# Patient Record
Sex: FEMALE | Race: BLACK OR AFRICAN AMERICAN | NOT HISPANIC OR LATINO | URBAN - METROPOLITAN AREA
[De-identification: names, ages, dates, MRNs, and addresses within clinical notes are randomized per-mention and may not be internally consistent; named-entity substitution may affect disease eponyms.]

---

## 2017-01-17 ENCOUNTER — OUTPATIENT (OUTPATIENT)
Dept: OUTPATIENT SERVICES | Facility: HOSPITAL | Age: 77
LOS: 1 days | Discharge: HOME | End: 2017-01-17

## 2017-01-17 DIAGNOSIS — C90.00 MULTIPLE MYELOMA NOT HAVING ACHIEVED REMISSION: ICD-10-CM

## 2017-01-18 ENCOUNTER — RESULT REVIEW (OUTPATIENT)
Age: 77
End: 2017-01-18

## 2017-01-19 ENCOUNTER — APPOINTMENT (OUTPATIENT)
Dept: HEMATOLOGY ONCOLOGY | Facility: CLINIC | Age: 77
End: 2017-01-19

## 2017-01-19 VITALS
DIASTOLIC BLOOD PRESSURE: 70 MMHG | WEIGHT: 143.3 LBS | TEMPERATURE: 98.5 F | OXYGEN SATURATION: 98 % | RESPIRATION RATE: 16 BRPM | SYSTOLIC BLOOD PRESSURE: 113 MMHG | HEART RATE: 61 BPM | BODY MASS INDEX: 26.71 KG/M2

## 2017-01-19 LAB
BASOPHILS # BLD AUTO: 0.1 K/UL — SIGNIFICANT CHANGE UP (ref 0–0.2)
BASOPHILS NFR BLD AUTO: 1.3 % — SIGNIFICANT CHANGE UP (ref 0–2)
EOSINOPHIL # BLD AUTO: 0 K/UL — SIGNIFICANT CHANGE UP (ref 0–0.5)
EOSINOPHIL NFR BLD AUTO: 0.3 % — SIGNIFICANT CHANGE UP (ref 0–6)
HCT VFR BLD CALC: 33.4 % — LOW (ref 34.5–45)
HGB BLD-MCNC: 11.3 G/DL — LOW (ref 11.5–15.5)
LYMPHOCYTES # BLD AUTO: 1.2 K/UL — SIGNIFICANT CHANGE UP (ref 1–3.3)
LYMPHOCYTES # BLD AUTO: 21.5 % — SIGNIFICANT CHANGE UP (ref 13–44)
MCHC RBC-ENTMCNC: 28.4 PG — SIGNIFICANT CHANGE UP (ref 27–34)
MCHC RBC-ENTMCNC: 33.8 GM/DL — SIGNIFICANT CHANGE UP (ref 32–36)
MCV RBC AUTO: 84.1 FL — SIGNIFICANT CHANGE UP (ref 80–100)
MONOCYTES # BLD AUTO: 0.3 K/UL — SIGNIFICANT CHANGE UP (ref 0–0.9)
MONOCYTES NFR BLD AUTO: 4.8 % — SIGNIFICANT CHANGE UP (ref 2–14)
NEUTROPHILS # BLD AUTO: 4 K/UL — SIGNIFICANT CHANGE UP (ref 1.8–7.4)
NEUTROPHILS NFR BLD AUTO: 72.1 % — SIGNIFICANT CHANGE UP (ref 43–77)
PLATELET # BLD AUTO: 230 K/UL — SIGNIFICANT CHANGE UP (ref 150–400)
RBC # BLD: 3.98 M/UL — SIGNIFICANT CHANGE UP (ref 3.8–5.2)
RBC # FLD: 15.4 % — HIGH (ref 10.3–14.5)
WBC # BLD: 5.6 K/UL — SIGNIFICANT CHANGE UP (ref 3.8–10.5)
WBC # FLD AUTO: 5.6 K/UL — SIGNIFICANT CHANGE UP (ref 3.8–10.5)

## 2017-01-20 ENCOUNTER — CHART COPY (OUTPATIENT)
Age: 77
End: 2017-01-20

## 2017-01-29 ENCOUNTER — FORM ENCOUNTER (OUTPATIENT)
Age: 77
End: 2017-01-29

## 2017-01-30 ENCOUNTER — OUTPATIENT (OUTPATIENT)
Dept: OUTPATIENT SERVICES | Facility: HOSPITAL | Age: 77
LOS: 1 days | End: 2017-01-30
Payer: COMMERCIAL

## 2017-01-30 ENCOUNTER — APPOINTMENT (OUTPATIENT)
Dept: RADIOLOGY | Facility: IMAGING CENTER | Age: 77
End: 2017-01-30

## 2017-01-30 DIAGNOSIS — C90.00 MULTIPLE MYELOMA NOT HAVING ACHIEVED REMISSION: ICD-10-CM

## 2017-01-30 PROCEDURE — 77074 RADEX OSSEOUS SURVEY LMTD: CPT

## 2017-04-11 ENCOUNTER — OUTPATIENT (OUTPATIENT)
Dept: OUTPATIENT SERVICES | Facility: HOSPITAL | Age: 77
LOS: 1 days | Discharge: HOME | End: 2017-04-11

## 2017-04-11 DIAGNOSIS — C90.00 MULTIPLE MYELOMA NOT HAVING ACHIEVED REMISSION: ICD-10-CM

## 2017-04-12 ENCOUNTER — RESULT REVIEW (OUTPATIENT)
Age: 77
End: 2017-04-12

## 2017-04-13 ENCOUNTER — APPOINTMENT (OUTPATIENT)
Dept: HEMATOLOGY ONCOLOGY | Facility: CLINIC | Age: 77
End: 2017-04-13

## 2017-04-13 ENCOUNTER — LABORATORY RESULT (OUTPATIENT)
Age: 77
End: 2017-04-13

## 2017-04-13 VITALS
DIASTOLIC BLOOD PRESSURE: 74 MMHG | OXYGEN SATURATION: 98 % | SYSTOLIC BLOOD PRESSURE: 147 MMHG | HEART RATE: 63 BPM | TEMPERATURE: 97.9 F | WEIGHT: 145.5 LBS | RESPIRATION RATE: 16 BRPM | BODY MASS INDEX: 27.12 KG/M2

## 2017-04-13 LAB
ALBUMIN SERPL ELPH-MCNC: 3.3 G/DL
ALP BLD-CCNC: 69 U/L
ALT SERPL-CCNC: 8 U/L
ANION GAP SERPL CALC-SCNC: 10 MMOL/L
AST SERPL-CCNC: 13 U/L
B2 MICROGLOB SERPL-MCNC: 3.3 MG/L
BASOPHILS # BLD AUTO: 0 K/UL — SIGNIFICANT CHANGE UP (ref 0–0.2)
BASOPHILS NFR BLD AUTO: 0.2 % — SIGNIFICANT CHANGE UP (ref 0–2)
BILIRUB SERPL-MCNC: 0.3 MG/DL
BUN SERPL-MCNC: 21 MG/DL
CALCIUM SERPL-MCNC: 9.2 MG/DL
CHLORIDE SERPL-SCNC: 105 MMOL/L
CO2 SERPL-SCNC: 24 MMOL/L
CREAT SERPL-MCNC: 1.52 MG/DL
EOSINOPHIL # BLD AUTO: 0 K/UL — SIGNIFICANT CHANGE UP (ref 0–0.5)
EOSINOPHIL NFR BLD AUTO: 0.4 % — SIGNIFICANT CHANGE UP (ref 0–6)
GLUCOSE SERPL-MCNC: 95 MG/DL
HCT VFR BLD CALC: 33.6 % — LOW (ref 34.5–45)
HGB BLD-MCNC: 11.3 G/DL — LOW (ref 11.5–15.5)
LDH SERPL-CCNC: 166 U/L
LYMPHOCYTES # BLD AUTO: 1.6 K/UL — SIGNIFICANT CHANGE UP (ref 1–3.3)
LYMPHOCYTES # BLD AUTO: 32.4 % — SIGNIFICANT CHANGE UP (ref 13–44)
MAGNESIUM SERPL-MCNC: 2.2 MG/DL
MCHC RBC-ENTMCNC: 29.6 PG — SIGNIFICANT CHANGE UP (ref 27–34)
MCHC RBC-ENTMCNC: 33.5 G/DL — SIGNIFICANT CHANGE UP (ref 32–36)
MCV RBC AUTO: 88.3 FL — SIGNIFICANT CHANGE UP (ref 80–100)
MONOCYTES # BLD AUTO: 0.3 K/UL — SIGNIFICANT CHANGE UP (ref 0–0.9)
MONOCYTES NFR BLD AUTO: 6.6 % — SIGNIFICANT CHANGE UP (ref 2–14)
NEUTROPHILS # BLD AUTO: 3 K/UL — SIGNIFICANT CHANGE UP (ref 1.8–7.4)
NEUTROPHILS NFR BLD AUTO: 60.4 % — SIGNIFICANT CHANGE UP (ref 43–77)
PLATELET # BLD AUTO: 204 K/UL — SIGNIFICANT CHANGE UP (ref 150–400)
POTASSIUM SERPL-SCNC: 4.2 MMOL/L
PROT SERPL-MCNC: 7.8 G/DL
RBC # BLD: 3.81 M/UL — SIGNIFICANT CHANGE UP (ref 3.8–5.2)
RBC # FLD: 14.3 % — SIGNIFICANT CHANGE UP (ref 10.3–14.5)
SODIUM SERPL-SCNC: 139 MMOL/L
WBC # BLD: 4.9 K/UL — SIGNIFICANT CHANGE UP (ref 3.8–10.5)
WBC # FLD AUTO: 4.9 K/UL — SIGNIFICANT CHANGE UP (ref 3.8–10.5)

## 2017-04-14 LAB
ALBUMIN MFR SERPL ELPH: 41.9 %
ALBUMIN SERPL-MCNC: 3.3 G/DL
ALBUMIN/GLOB SERPL: 0.7 RATIO
ALBUPE: 10.4 %
ALPHA1 GLOB MFR SERPL ELPH: 4.5 %
ALPHA1 GLOB SERPL ELPH-MCNC: 0.4 G/DL
ALPHA1UPE: 6 %
ALPHA2 GLOB MFR SERPL ELPH: 9.1 %
ALPHA2 GLOB SERPL ELPH-MCNC: 0.7 G/DL
ALPHA2UPE: 8 %
B-GLOBULIN MFR SERPL ELPH: 38.8 %
B-GLOBULIN SERPL ELPH-MCNC: 3 G/DL
BETAUPE: 53.9 %
CREAT 24H UR-MCNC: NORMAL G/24 H
CREATININE UR (MAYO): 40 MG/DL
DEPRECATED KAPPA LC FREE/LAMBDA SER: 45.36 RATIO
DEPRECATED KAPPA LC FREE/LAMBDA SER: 45.36 RATIO
GAMMA GLOB FLD ELPH-MCNC: 0.4 G/DL
GAMMA GLOB MFR SERPL ELPH: 5.7 %
GAMMAUPE: 21.7 %
IGA 24H UR QL IFE: NORMAL
IGA 24H UR QL IFE: NORMAL
IGA SER QL IEP: 26 MG/DL
IGG SER QL IEP: 2990 MG/DL
IGM SER QL IEP: 32 MG/DL
INTERPRETATION SERPL IEP-IMP: NORMAL
KAPPA LC 24H UR QL: PRESENT
KAPPA LC CSF-MCNC: 1.12 MG/DL
KAPPA LC CSF-MCNC: 1.12 MG/DL
KAPPA LC SERPL-MCNC: 50.8 MG/DL
KAPPA LC SERPL-MCNC: 50.8 MG/DL
M PROTEIN MFR SERPL ELPH: 32.2 %
M PROTEIN SPEC IFE-MCNC: NORMAL
MONOCLON BAND OBS SERPL: 2.5 G/DL
PROT PATTERN 24H UR ELPH-IMP: NORMAL
PROT SERPL-MCNC: 7.8 G/DL
PROT SERPL-MCNC: 7.8 G/DL
PROT UR-MCNC: 18 MG/DL
PROT UR-MCNC: 18 MG/DL
SPECIMEN VOL 24H UR: NORMAL ML

## 2017-08-02 ENCOUNTER — CHART COPY (OUTPATIENT)
Age: 77
End: 2017-08-02

## 2017-08-10 ENCOUNTER — OUTPATIENT (OUTPATIENT)
Dept: OUTPATIENT SERVICES | Facility: HOSPITAL | Age: 77
LOS: 1 days | Discharge: HOME | End: 2017-08-10

## 2017-08-10 DIAGNOSIS — C90.00 MULTIPLE MYELOMA NOT HAVING ACHIEVED REMISSION: ICD-10-CM

## 2017-08-15 ENCOUNTER — APPOINTMENT (OUTPATIENT)
Dept: HEMATOLOGY ONCOLOGY | Facility: CLINIC | Age: 77
End: 2017-08-15
Payer: MEDICARE

## 2017-08-15 ENCOUNTER — RESULT REVIEW (OUTPATIENT)
Age: 77
End: 2017-08-15

## 2017-08-15 VITALS
WEIGHT: 144.84 LBS | HEART RATE: 92 BPM | OXYGEN SATURATION: 98 % | SYSTOLIC BLOOD PRESSURE: 145 MMHG | TEMPERATURE: 98.3 F | BODY MASS INDEX: 27 KG/M2 | RESPIRATION RATE: 16 BRPM | DIASTOLIC BLOOD PRESSURE: 72 MMHG

## 2017-08-15 LAB
ALBUMIN SERPL ELPH-MCNC: 3.3 G/DL
ALP BLD-CCNC: 69 U/L
ALT SERPL-CCNC: 12 U/L
ANION GAP SERPL CALC-SCNC: 12 MMOL/L
AST SERPL-CCNC: 17 U/L
B2 MICROGLOB SERPL-MCNC: 4 MG/L
BASOPHILS # BLD AUTO: 0 K/UL — SIGNIFICANT CHANGE UP (ref 0–0.2)
BASOPHILS NFR BLD AUTO: 0.4 % — SIGNIFICANT CHANGE UP (ref 0–2)
BILIRUB SERPL-MCNC: 0.4 MG/DL
BUN SERPL-MCNC: 32 MG/DL
CALCIUM SERPL-MCNC: 8.4 MG/DL
CHLORIDE SERPL-SCNC: 108 MMOL/L
CO2 SERPL-SCNC: 22 MMOL/L
CREAT SERPL-MCNC: 1.76 MG/DL
EOSINOPHIL # BLD AUTO: 0 K/UL — SIGNIFICANT CHANGE UP (ref 0–0.5)
EOSINOPHIL NFR BLD AUTO: 0.9 % — SIGNIFICANT CHANGE UP (ref 0–6)
GLUCOSE SERPL-MCNC: 132 MG/DL
HCT VFR BLD CALC: 31.3 % — LOW (ref 34.5–45)
HGB BLD-MCNC: 11.1 G/DL — LOW (ref 11.5–15.5)
LDH SERPL-CCNC: 180 U/L
LYMPHOCYTES # BLD AUTO: 1.3 K/UL — SIGNIFICANT CHANGE UP (ref 1–3.3)
LYMPHOCYTES # BLD AUTO: 27.5 % — SIGNIFICANT CHANGE UP (ref 13–44)
MAGNESIUM SERPL-MCNC: 2.4 MG/DL
MCHC RBC-ENTMCNC: 30.8 PG — SIGNIFICANT CHANGE UP (ref 27–34)
MCHC RBC-ENTMCNC: 35.3 G/DL — SIGNIFICANT CHANGE UP (ref 32–36)
MCV RBC AUTO: 87.2 FL — SIGNIFICANT CHANGE UP (ref 80–100)
MONOCYTES # BLD AUTO: 0.2 K/UL — SIGNIFICANT CHANGE UP (ref 0–0.9)
MONOCYTES NFR BLD AUTO: 5.1 % — SIGNIFICANT CHANGE UP (ref 2–14)
NEUTROPHILS # BLD AUTO: 3.2 K/UL — SIGNIFICANT CHANGE UP (ref 1.8–7.4)
NEUTROPHILS NFR BLD AUTO: 66.1 % — SIGNIFICANT CHANGE UP (ref 43–77)
PLATELET # BLD AUTO: 204 K/UL — SIGNIFICANT CHANGE UP (ref 150–400)
POTASSIUM SERPL-SCNC: 4.5 MMOL/L
PROT SERPL-MCNC: 8.5 G/DL
RBC # BLD: 3.59 M/UL — LOW (ref 3.8–5.2)
RBC # FLD: 14.4 % — SIGNIFICANT CHANGE UP (ref 10.3–14.5)
SODIUM SERPL-SCNC: 142 MMOL/L
WBC # BLD: 4.8 K/UL — SIGNIFICANT CHANGE UP (ref 3.8–10.5)
WBC # FLD AUTO: 4.8 K/UL — SIGNIFICANT CHANGE UP (ref 3.8–10.5)

## 2017-08-15 PROCEDURE — 99214 OFFICE O/P EST MOD 30 MIN: CPT

## 2017-08-16 LAB
ALBUMIN MFR SERPL ELPH: 41.1 %
ALBUMIN SERPL-MCNC: 3.5 G/DL
ALBUMIN/GLOB SERPL: 0.7 RATIO
ALPHA1 GLOB MFR SERPL ELPH: 4.6 %
ALPHA1 GLOB SERPL ELPH-MCNC: 0.4 G/DL
ALPHA2 GLOB MFR SERPL ELPH: 9.2 %
ALPHA2 GLOB SERPL ELPH-MCNC: 0.8 G/DL
B-GLOBULIN MFR SERPL ELPH: 39.3 %
B-GLOBULIN SERPL ELPH-MCNC: 3.3 G/DL
DEPRECATED KAPPA LC FREE/LAMBDA SER: 55.07 RATIO
DEPRECATED KAPPA LC FREE/LAMBDA SER: 55.07 RATIO
GAMMA GLOB FLD ELPH-MCNC: 0.5 G/DL
GAMMA GLOB MFR SERPL ELPH: 5.8 %
IGA SER QL IEP: 24 MG/DL
IGG SER QL IEP: 3370 MG/DL
IGM SER QL IEP: 29 MG/DL
INTERPRETATION SERPL IEP-IMP: NORMAL
KAPPA LC CSF-MCNC: 1.4 MG/DL
KAPPA LC CSF-MCNC: 1.4 MG/DL
KAPPA LC SERPL-MCNC: 77.1 MG/DL
KAPPA LC SERPL-MCNC: 77.1 MG/DL
M PROTEIN MFR SERPL ELPH: 34.3 %
M PROTEIN SPEC IFE-MCNC: NORMAL
MONOCLON BAND OBS SERPL: 2.9 G/DL
PROT SERPL-MCNC: 8.5 G/DL
PROT SERPL-MCNC: 8.5 G/DL

## 2017-12-06 ENCOUNTER — OUTPATIENT (OUTPATIENT)
Dept: OUTPATIENT SERVICES | Facility: HOSPITAL | Age: 77
LOS: 1 days | Discharge: HOME | End: 2017-12-06

## 2017-12-06 DIAGNOSIS — C90.00 MULTIPLE MYELOMA NOT HAVING ACHIEVED REMISSION: ICD-10-CM

## 2017-12-12 ENCOUNTER — RESULT REVIEW (OUTPATIENT)
Age: 77
End: 2017-12-12

## 2017-12-12 ENCOUNTER — APPOINTMENT (OUTPATIENT)
Dept: HEMATOLOGY ONCOLOGY | Facility: CLINIC | Age: 77
End: 2017-12-12
Payer: MEDICARE

## 2017-12-12 VITALS
SYSTOLIC BLOOD PRESSURE: 154 MMHG | TEMPERATURE: 98.4 F | BODY MASS INDEX: 27.53 KG/M2 | RESPIRATION RATE: 16 BRPM | OXYGEN SATURATION: 98 % | WEIGHT: 147.71 LBS | DIASTOLIC BLOOD PRESSURE: 64 MMHG | HEART RATE: 86 BPM

## 2017-12-12 LAB
ALBUMIN SERPL ELPH-MCNC: 3.3 G/DL
ALP BLD-CCNC: 64 U/L
ALT SERPL-CCNC: 21 U/L
ANION GAP SERPL CALC-SCNC: 14 MMOL/L
AST SERPL-CCNC: 18 U/L
BASOPHILS # BLD AUTO: 0 K/UL — SIGNIFICANT CHANGE UP (ref 0–0.2)
BASOPHILS NFR BLD AUTO: 0.1 % — SIGNIFICANT CHANGE UP (ref 0–2)
BILIRUB SERPL-MCNC: 0.4 MG/DL
BUN SERPL-MCNC: 35 MG/DL
CALCIUM SERPL-MCNC: 8.8 MG/DL
CHLORIDE SERPL-SCNC: 104 MMOL/L
CO2 SERPL-SCNC: 23 MMOL/L
CREAT SERPL-MCNC: 1.7 MG/DL
EOSINOPHIL # BLD AUTO: 0 K/UL — SIGNIFICANT CHANGE UP (ref 0–0.5)
EOSINOPHIL NFR BLD AUTO: 0.1 % — SIGNIFICANT CHANGE UP (ref 0–6)
GLUCOSE SERPL-MCNC: 70 MG/DL
HCT VFR BLD CALC: 32.8 % — LOW (ref 34.5–45)
HGB BLD-MCNC: 11.1 G/DL — LOW (ref 11.5–15.5)
LDH SERPL-CCNC: 212 U/L
LYMPHOCYTES # BLD AUTO: 1.4 K/UL — SIGNIFICANT CHANGE UP (ref 1–3.3)
LYMPHOCYTES # BLD AUTO: 24 % — SIGNIFICANT CHANGE UP (ref 13–44)
MAGNESIUM SERPL-MCNC: 2.7 MG/DL
MCHC RBC-ENTMCNC: 30.1 PG — SIGNIFICANT CHANGE UP (ref 27–34)
MCHC RBC-ENTMCNC: 33.9 G/DL — SIGNIFICANT CHANGE UP (ref 32–36)
MCV RBC AUTO: 88.9 FL — SIGNIFICANT CHANGE UP (ref 80–100)
MONOCYTES # BLD AUTO: 0.3 K/UL — SIGNIFICANT CHANGE UP (ref 0–0.9)
MONOCYTES NFR BLD AUTO: 5.6 % — SIGNIFICANT CHANGE UP (ref 2–14)
NEUTROPHILS # BLD AUTO: 4.1 K/UL — SIGNIFICANT CHANGE UP (ref 1.8–7.4)
NEUTROPHILS NFR BLD AUTO: 70.3 % — SIGNIFICANT CHANGE UP (ref 43–77)
PLATELET # BLD AUTO: 188 K/UL — SIGNIFICANT CHANGE UP (ref 150–400)
POTASSIUM SERPL-SCNC: 4.5 MMOL/L
PROT SERPL-MCNC: 8.2 G/DL
RBC # BLD: 3.68 M/UL — LOW (ref 3.8–5.2)
RBC # FLD: 14.1 % — SIGNIFICANT CHANGE UP (ref 10.3–14.5)
SODIUM SERPL-SCNC: 141 MMOL/L
WBC # BLD: 5.9 K/UL — SIGNIFICANT CHANGE UP (ref 3.8–10.5)
WBC # FLD AUTO: 5.9 K/UL — SIGNIFICANT CHANGE UP (ref 3.8–10.5)

## 2017-12-12 PROCEDURE — 99214 OFFICE O/P EST MOD 30 MIN: CPT

## 2017-12-14 LAB
ALBUMIN MFR SERPL ELPH: 43.3 %
ALBUMIN SERPL-MCNC: 3.6 G/DL
ALBUMIN/GLOB SERPL: 0.8 RATIO
ALPHA1 GLOB MFR SERPL ELPH: 3.9 %
ALPHA1 GLOB SERPL ELPH-MCNC: 0.3 G/DL
ALPHA2 GLOB MFR SERPL ELPH: 8.6 %
ALPHA2 GLOB SERPL ELPH-MCNC: 0.7 G/DL
B-GLOBULIN MFR SERPL ELPH: 10.7 %
B-GLOBULIN SERPL ELPH-MCNC: 0.9 G/DL
DEPRECATED KAPPA LC FREE/LAMBDA SER: 53.42 RATIO
DEPRECATED KAPPA LC FREE/LAMBDA SER: 53.42 RATIO
GAMMA GLOB FLD ELPH-MCNC: 2.7 G/DL
GAMMA GLOB MFR SERPL ELPH: 33.5 %
IGA SER QL IEP: 23 MG/DL
IGG SER QL IEP: 3460 MG/DL
IGM SER QL IEP: 33 MG/DL
INTERPRETATION SERPL IEP-IMP: NORMAL
KAPPA LC CSF-MCNC: 1.11 MG/DL
KAPPA LC CSF-MCNC: 1.11 MG/DL
KAPPA LC SERPL-MCNC: 59.3 MG/DL
KAPPA LC SERPL-MCNC: 59.3 MG/DL
M PROTEIN MFR SERPL ELPH: 31.7 %
M PROTEIN SPEC IFE-MCNC: NORMAL
MONOCLON BAND OBS SERPL: 2.6 G/DL
PROT SERPL-MCNC: 8.2 G/DL
PROT SERPL-MCNC: 8.2 G/DL

## 2018-03-25 ENCOUNTER — EMERGENCY (EMERGENCY)
Facility: HOSPITAL | Age: 78
LOS: 1 days | Discharge: HOME | End: 2018-03-25
Attending: EMERGENCY MEDICINE
Payer: COMMERCIAL

## 2018-03-25 VITALS
TEMPERATURE: 98 F | HEART RATE: 67 BPM | SYSTOLIC BLOOD PRESSURE: 132 MMHG | OXYGEN SATURATION: 100 % | DIASTOLIC BLOOD PRESSURE: 61 MMHG | WEIGHT: 145.06 LBS | RESPIRATION RATE: 20 BRPM

## 2018-03-25 LAB
ALBUMIN SERPL ELPH-MCNC: 3 G/DL — LOW (ref 3.5–5)
ALP SERPL-CCNC: 70 U/L — SIGNIFICANT CHANGE UP (ref 40–120)
ALT FLD-CCNC: 35 U/L DA — SIGNIFICANT CHANGE UP (ref 10–60)
ANION GAP SERPL CALC-SCNC: 9 MMOL/L — SIGNIFICANT CHANGE UP (ref 5–17)
AST SERPL-CCNC: 18 U/L — SIGNIFICANT CHANGE UP (ref 10–40)
BASOPHILS # BLD AUTO: 0.1 K/UL — SIGNIFICANT CHANGE UP (ref 0–0.2)
BASOPHILS NFR BLD AUTO: 0.9 % — SIGNIFICANT CHANGE UP (ref 0–2)
BILIRUB SERPL-MCNC: 0.4 MG/DL — SIGNIFICANT CHANGE UP (ref 0.2–1.2)
BUN SERPL-MCNC: 25 MG/DL — HIGH (ref 7–18)
CALCIUM SERPL-MCNC: 9.1 MG/DL — SIGNIFICANT CHANGE UP (ref 8.4–10.5)
CHLORIDE SERPL-SCNC: 108 MMOL/L — SIGNIFICANT CHANGE UP (ref 96–108)
CO2 SERPL-SCNC: 24 MMOL/L — SIGNIFICANT CHANGE UP (ref 22–31)
CREAT SERPL-MCNC: 1.63 MG/DL — HIGH (ref 0.5–1.3)
EOSINOPHIL # BLD AUTO: 0 K/UL — SIGNIFICANT CHANGE UP (ref 0–0.5)
EOSINOPHIL NFR BLD AUTO: 0.1 % — SIGNIFICANT CHANGE UP (ref 0–6)
GLUCOSE SERPL-MCNC: 86 MG/DL — SIGNIFICANT CHANGE UP (ref 70–99)
HCT VFR BLD CALC: 36.7 % — SIGNIFICANT CHANGE UP (ref 34.5–45)
HGB BLD-MCNC: 11.5 G/DL — SIGNIFICANT CHANGE UP (ref 11.5–15.5)
LYMPHOCYTES # BLD AUTO: 1.5 K/UL — SIGNIFICANT CHANGE UP (ref 1–3.3)
LYMPHOCYTES # BLD AUTO: 24.1 % — SIGNIFICANT CHANGE UP (ref 13–44)
MCHC RBC-ENTMCNC: 28.3 PG — SIGNIFICANT CHANGE UP (ref 27–34)
MCHC RBC-ENTMCNC: 31.4 GM/DL — LOW (ref 32–36)
MCV RBC AUTO: 90 FL — SIGNIFICANT CHANGE UP (ref 80–100)
MONOCYTES # BLD AUTO: 0.5 K/UL — SIGNIFICANT CHANGE UP (ref 0–0.9)
MONOCYTES NFR BLD AUTO: 7.4 % — SIGNIFICANT CHANGE UP (ref 2–14)
NEUTROPHILS # BLD AUTO: 4.1 K/UL — SIGNIFICANT CHANGE UP (ref 1.8–7.4)
NEUTROPHILS NFR BLD AUTO: 67.5 % — SIGNIFICANT CHANGE UP (ref 43–77)
PLATELET # BLD AUTO: 235 K/UL — SIGNIFICANT CHANGE UP (ref 150–400)
POTASSIUM SERPL-MCNC: 4.4 MMOL/L — SIGNIFICANT CHANGE UP (ref 3.5–5.3)
POTASSIUM SERPL-SCNC: 4.4 MMOL/L — SIGNIFICANT CHANGE UP (ref 3.5–5.3)
PROT SERPL-MCNC: 9.5 G/DL — HIGH (ref 6–8.3)
RBC # BLD: 4.08 M/UL — SIGNIFICANT CHANGE UP (ref 3.8–5.2)
RBC # FLD: 14.3 % — SIGNIFICANT CHANGE UP (ref 10.3–14.5)
SODIUM SERPL-SCNC: 141 MMOL/L — SIGNIFICANT CHANGE UP (ref 135–145)
WBC # BLD: 6.1 K/UL — SIGNIFICANT CHANGE UP (ref 3.8–10.5)
WBC # FLD AUTO: 6.1 K/UL — SIGNIFICANT CHANGE UP (ref 3.8–10.5)

## 2018-03-25 PROCEDURE — 96374 THER/PROPH/DIAG INJ IV PUSH: CPT

## 2018-03-25 PROCEDURE — 96375 TX/PRO/DX INJ NEW DRUG ADDON: CPT

## 2018-03-25 PROCEDURE — 85027 COMPLETE CBC AUTOMATED: CPT

## 2018-03-25 PROCEDURE — 73502 X-RAY EXAM HIP UNI 2-3 VIEWS: CPT

## 2018-03-25 PROCEDURE — 99284 EMERGENCY DEPT VISIT MOD MDM: CPT | Mod: 25

## 2018-03-25 PROCEDURE — 73502 X-RAY EXAM HIP UNI 2-3 VIEWS: CPT | Mod: 26,LT

## 2018-03-25 PROCEDURE — 80053 COMPREHEN METABOLIC PANEL: CPT

## 2018-03-25 PROCEDURE — 72170 X-RAY EXAM OF PELVIS: CPT

## 2018-03-25 PROCEDURE — 99285 EMERGENCY DEPT VISIT HI MDM: CPT

## 2018-03-25 RX ORDER — ACETAMINOPHEN 500 MG
1000 TABLET ORAL ONCE
Qty: 0 | Refills: 0 | Status: COMPLETED | OUTPATIENT
Start: 2018-03-25 | End: 2018-03-25

## 2018-03-25 RX ORDER — KETOROLAC TROMETHAMINE 30 MG/ML
15 SYRINGE (ML) INJECTION ONCE
Qty: 0 | Refills: 0 | Status: DISCONTINUED | OUTPATIENT
Start: 2018-03-25 | End: 2018-03-25

## 2018-03-25 RX ADMIN — Medication 15 MILLIGRAM(S): at 10:07

## 2018-03-25 RX ADMIN — Medication 1000 MILLIGRAM(S): at 10:07

## 2018-03-25 RX ADMIN — Medication 400 MILLIGRAM(S): at 09:35

## 2018-03-25 RX ADMIN — Medication 15 MILLIGRAM(S): at 09:35

## 2018-03-25 NOTE — ED PROVIDER NOTE - PROGRESS NOTE DETAILS
tito  states she is feeling better.  pt able to walk to the bathroom and back to stretcher  asking to go home

## 2018-03-25 NOTE — ED PROVIDER NOTE - OBJECTIVE STATEMENT
77 y/o F pt w/ PMHx of presents to the ED c/o nontraumatic L hip pain. Pt takes mediation for arthritis howeer it does not help and today has trouble ambulating secondary to pain. Pain is 10/10.

## 2018-03-25 NOTE — ED ADULT NURSE NOTE - OBJECTIVE STATEMENT
Pt presented to ED via Ambulance , from home, c/o chronic arthritic pain, but LLE and Rt. hip pain getting worse in the past few days, this morning was unable to stand due to pain  Denies injuries,  Has a Hx of DVT, on Plavix

## 2018-04-10 ENCOUNTER — OUTPATIENT (OUTPATIENT)
Dept: OUTPATIENT SERVICES | Facility: HOSPITAL | Age: 78
LOS: 1 days | Discharge: HOME | End: 2018-04-10

## 2018-04-10 DIAGNOSIS — C90.00 MULTIPLE MYELOMA NOT HAVING ACHIEVED REMISSION: ICD-10-CM

## 2018-04-12 ENCOUNTER — APPOINTMENT (OUTPATIENT)
Dept: HEMATOLOGY ONCOLOGY | Facility: CLINIC | Age: 78
End: 2018-04-12
Payer: MEDICARE

## 2018-04-12 ENCOUNTER — RESULT REVIEW (OUTPATIENT)
Age: 78
End: 2018-04-12

## 2018-04-12 VITALS
SYSTOLIC BLOOD PRESSURE: 136 MMHG | OXYGEN SATURATION: 98 % | BODY MASS INDEX: 27.12 KG/M2 | WEIGHT: 145.5 LBS | TEMPERATURE: 99.2 F | RESPIRATION RATE: 16 BRPM | DIASTOLIC BLOOD PRESSURE: 69 MMHG | HEART RATE: 68 BPM

## 2018-04-12 LAB
ALBUMIN SERPL ELPH-MCNC: 3.4 G/DL
ALP BLD-CCNC: 63 U/L
ALT SERPL-CCNC: 26 U/L
ANION GAP SERPL CALC-SCNC: 15 MMOL/L
AST SERPL-CCNC: 16 U/L
B2 MICROGLOB SERPL-MCNC: 4.6 MG/L
BASOPHILS # BLD AUTO: 0 K/UL — SIGNIFICANT CHANGE UP (ref 0–0.2)
BASOPHILS NFR BLD AUTO: 0.2 % — SIGNIFICANT CHANGE UP (ref 0–2)
BILIRUB SERPL-MCNC: 0.7 MG/DL
BUN SERPL-MCNC: 37 MG/DL
CALCIUM SERPL-MCNC: 9.3 MG/DL
CHLORIDE SERPL-SCNC: 108 MMOL/L
CO2 SERPL-SCNC: 18 MMOL/L
CREAT SERPL-MCNC: 1.86 MG/DL
EOSINOPHIL # BLD AUTO: 0 K/UL — SIGNIFICANT CHANGE UP (ref 0–0.5)
EOSINOPHIL NFR BLD AUTO: 0.2 % — SIGNIFICANT CHANGE UP (ref 0–6)
GLUCOSE SERPL-MCNC: 80 MG/DL
HCT VFR BLD CALC: 31.6 % — LOW (ref 34.5–45)
HGB BLD-MCNC: 10.8 G/DL — LOW (ref 11.5–15.5)
LDH SERPL-CCNC: 176 U/L
LYMPHOCYTES # BLD AUTO: 0.9 K/UL — LOW (ref 1–3.3)
LYMPHOCYTES # BLD AUTO: 15.3 % — SIGNIFICANT CHANGE UP (ref 13–44)
MAGNESIUM SERPL-MCNC: 2.3 MG/DL
MCHC RBC-ENTMCNC: 29.4 PG — SIGNIFICANT CHANGE UP (ref 27–34)
MCHC RBC-ENTMCNC: 34.1 G/DL — SIGNIFICANT CHANGE UP (ref 32–36)
MCV RBC AUTO: 86.1 FL — SIGNIFICANT CHANGE UP (ref 80–100)
MONOCYTES # BLD AUTO: 0.3 K/UL — SIGNIFICANT CHANGE UP (ref 0–0.9)
MONOCYTES NFR BLD AUTO: 4.8 % — SIGNIFICANT CHANGE UP (ref 2–14)
NEUTROPHILS # BLD AUTO: 4.5 K/UL — SIGNIFICANT CHANGE UP (ref 1.8–7.4)
NEUTROPHILS NFR BLD AUTO: 79.5 % — HIGH (ref 43–77)
PLATELET # BLD AUTO: 251 K/UL — SIGNIFICANT CHANGE UP (ref 150–400)
POTASSIUM SERPL-SCNC: 4.3 MMOL/L
PROT SERPL-MCNC: 8.9 G/DL
RBC # BLD: 3.67 M/UL — LOW (ref 3.8–5.2)
RBC # FLD: 14.1 % — SIGNIFICANT CHANGE UP (ref 10.3–14.5)
SODIUM SERPL-SCNC: 141 MMOL/L
WBC # BLD: 5.6 K/UL — SIGNIFICANT CHANGE UP (ref 3.8–10.5)
WBC # FLD AUTO: 5.6 K/UL — SIGNIFICANT CHANGE UP (ref 3.8–10.5)

## 2018-04-12 PROCEDURE — 99215 OFFICE O/P EST HI 40 MIN: CPT

## 2018-04-13 LAB
ALBUPE: 8.6 %
ALPHA1UPE: 7.2 %
ALPHA2UPE: 6.7 %
BENCE JONES EXCRETION: 322.5 MG/24HR
BETAUPE: 49.4 %
CREAT 24H UR-MCNC: 1 G/24 H
CREAT ?TM UR-MCNC: 68 MG/DL
CREAT ?TM UR-MCNC: 68 MG/DL
CREAT SPEC-SCNC: 69 MG/DL
CREAT/PROT UR: 0.8 RATIO
CREATININE UR (MAYO): 68 MG/DL
DEPRECATED KAPPA LC FREE/LAMBDA SER: 56.28 RATIO
GAMMAUPE: 28.1 %
IGA 24H UR QL IFE: NORMAL
KAPPA LC 24H UR QL: 21.5 MG/DL
KAPPA LC CSF-MCNC: 1.37 MG/DL
KAPPA LC SERPL-MCNC: 77.1 MG/DL
M PROTEIN 24H MFR UR ELPH: 41.3 %
PROT 24H UR-MRATE: 52 MG/DL
PROT ?TM UR-MCNC: 24 HR
PROT PATTERN 24H UR ELPH-IMP: NORMAL
PROT UR-MCNC: 52 MG/DL
PROT UR-MCNC: 780 MG/24 H
SPECIMEN VOL 24H UR: 1500 ML
U PROTEIN QNT CALCULATION: 780 MG/24 H

## 2018-04-16 LAB
KAPPA LC 24H UR-MCNC: 21.8 MG/DL
KAPPA LC 24H UR-MRATE: 327 MG/24 H
LAMBDA LC 24H UR-MCNC: <0.4 MG/DL
LAMBDA LC 24H UR-MRATE: NORMAL
SPECIMEN VOL 24H UR: 1500 ML/24 H

## 2018-04-18 LAB
ALBUMIN MFR SERPL ELPH: 37.9 %
ALBUMIN SERPL-MCNC: 3.4 G/DL
ALBUMIN/GLOB SERPL: 0.6 RATIO
ALPHA1 GLOB MFR SERPL ELPH: 4.6 %
ALPHA1 GLOB SERPL ELPH-MCNC: 0.4 G/DL
ALPHA2 GLOB MFR SERPL ELPH: 9.8 %
ALPHA2 GLOB SERPL ELPH-MCNC: 0.9 G/DL
B-GLOBULIN MFR SERPL ELPH: 42.5 %
B-GLOBULIN SERPL ELPH-MCNC: 3.8 G/DL
DEPRECATED KAPPA LC FREE/LAMBDA SER: 56.28 RATIO
GAMMA GLOB FLD ELPH-MCNC: 0.5 G/DL
GAMMA GLOB MFR SERPL ELPH: 5.2 %
IGA SER QL IEP: 25 MG/DL
IGG SER QL IEP: 3570 MG/DL
IGM SER QL IEP: 30 MG/DL
INTERPRETATION SERPL IEP-IMP: NORMAL
KAPPA LC CSF-MCNC: 1.37 MG/DL
KAPPA LC SERPL-MCNC: 77.1 MG/DL
M PROTEIN MFR SERPL ELPH: NORMAL %
M PROTEIN SPEC IFE-MCNC: NORMAL
MONOCLON BAND OBS SERPL: NORMAL G/DL
PROT SERPL-MCNC: 8.9 G/DL
PROT SERPL-MCNC: 8.9 G/DL

## 2018-05-17 ENCOUNTER — FORM ENCOUNTER (OUTPATIENT)
Age: 78
End: 2018-05-17

## 2018-05-18 ENCOUNTER — OUTPATIENT (OUTPATIENT)
Dept: OUTPATIENT SERVICES | Facility: HOSPITAL | Age: 78
LOS: 1 days | End: 2018-05-18
Payer: COMMERCIAL

## 2018-05-18 ENCOUNTER — APPOINTMENT (OUTPATIENT)
Dept: CT IMAGING | Facility: IMAGING CENTER | Age: 78
End: 2018-05-18
Payer: MEDICARE

## 2018-05-18 DIAGNOSIS — C90.00 MULTIPLE MYELOMA NOT HAVING ACHIEVED REMISSION: ICD-10-CM

## 2018-05-18 PROCEDURE — 74176 CT ABD & PELVIS W/O CONTRAST: CPT

## 2018-05-18 PROCEDURE — 71250 CT THORAX DX C-: CPT | Mod: 26

## 2018-05-18 PROCEDURE — 71250 CT THORAX DX C-: CPT

## 2018-05-18 PROCEDURE — 74176 CT ABD & PELVIS W/O CONTRAST: CPT | Mod: 26

## 2018-05-22 ENCOUNTER — OUTPATIENT (OUTPATIENT)
Dept: OUTPATIENT SERVICES | Facility: HOSPITAL | Age: 78
LOS: 1 days | Discharge: HOME | End: 2018-05-22

## 2018-05-22 DIAGNOSIS — C90.00 MULTIPLE MYELOMA NOT HAVING ACHIEVED REMISSION: ICD-10-CM

## 2018-05-24 ENCOUNTER — RESULT REVIEW (OUTPATIENT)
Age: 78
End: 2018-05-24

## 2018-05-24 ENCOUNTER — APPOINTMENT (OUTPATIENT)
Dept: HEMATOLOGY ONCOLOGY | Facility: CLINIC | Age: 78
End: 2018-05-24
Payer: MEDICARE

## 2018-05-24 VITALS
RESPIRATION RATE: 16 BRPM | BODY MASS INDEX: 27.53 KG/M2 | HEART RATE: 62 BPM | WEIGHT: 147.71 LBS | DIASTOLIC BLOOD PRESSURE: 72 MMHG | TEMPERATURE: 98.7 F | SYSTOLIC BLOOD PRESSURE: 137 MMHG | OXYGEN SATURATION: 98 %

## 2018-05-24 DIAGNOSIS — Z82.69 FAMILY HISTORY OF OTHER DISEASES OF THE MUSCULOSKELETAL SYSTEM AND CONNECTIVE TISSUE: ICD-10-CM

## 2018-05-24 LAB
ALBUMIN SERPL ELPH-MCNC: 3.2 G/DL
ALP BLD-CCNC: 63 U/L
ALT SERPL-CCNC: 13 U/L
ANION GAP SERPL CALC-SCNC: 9 MMOL/L
AST SERPL-CCNC: 16 U/L
B2 MICROGLOB SERPL-MCNC: 3.9 MG/L
BASOPHILS # BLD AUTO: 0 K/UL — SIGNIFICANT CHANGE UP (ref 0–0.2)
BASOPHILS NFR BLD AUTO: 0 % — SIGNIFICANT CHANGE UP (ref 0–2)
BILIRUB SERPL-MCNC: 0.3 MG/DL
BUN SERPL-MCNC: 25 MG/DL
CALCIUM SERPL-MCNC: 8.9 MG/DL
CHLORIDE SERPL-SCNC: 107 MMOL/L
CO2 SERPL-SCNC: 25 MMOL/L
CREAT SERPL-MCNC: 1.45 MG/DL
EOSINOPHIL # BLD AUTO: 0 K/UL — SIGNIFICANT CHANGE UP (ref 0–0.5)
EOSINOPHIL NFR BLD AUTO: 0.5 % — SIGNIFICANT CHANGE UP (ref 0–6)
GLUCOSE SERPL-MCNC: 95 MG/DL
HCT VFR BLD CALC: 30.8 % — LOW (ref 34.5–45)
HGB BLD-MCNC: 10.5 G/DL — LOW (ref 11.5–15.5)
LDH SERPL-CCNC: 196 U/L
LYMPHOCYTES # BLD AUTO: 1 K/UL — SIGNIFICANT CHANGE UP (ref 1–3.3)
LYMPHOCYTES # BLD AUTO: 26.2 % — SIGNIFICANT CHANGE UP (ref 13–44)
MAGNESIUM SERPL-MCNC: 2.3 MG/DL
MCHC RBC-ENTMCNC: 30 PG — SIGNIFICANT CHANGE UP (ref 27–34)
MCHC RBC-ENTMCNC: 34 G/DL — SIGNIFICANT CHANGE UP (ref 32–36)
MCV RBC AUTO: 88.2 FL — SIGNIFICANT CHANGE UP (ref 80–100)
MONOCYTES # BLD AUTO: 0.2 K/UL — SIGNIFICANT CHANGE UP (ref 0–0.9)
MONOCYTES NFR BLD AUTO: 4.8 % — SIGNIFICANT CHANGE UP (ref 2–14)
NEUTROPHILS # BLD AUTO: 2.7 K/UL — SIGNIFICANT CHANGE UP (ref 1.8–7.4)
NEUTROPHILS NFR BLD AUTO: 68.5 % — SIGNIFICANT CHANGE UP (ref 43–77)
PLATELET # BLD AUTO: 166 K/UL — SIGNIFICANT CHANGE UP (ref 150–400)
POTASSIUM SERPL-SCNC: 4.8 MMOL/L
PROT SERPL-MCNC: 8 G/DL
RBC # BLD: 3.5 M/UL — LOW (ref 3.8–5.2)
RBC # FLD: 14.9 % — HIGH (ref 10.3–14.5)
SODIUM SERPL-SCNC: 141 MMOL/L
WBC # BLD: 4 K/UL — SIGNIFICANT CHANGE UP (ref 3.8–10.5)
WBC # FLD AUTO: 4 K/UL — SIGNIFICANT CHANGE UP (ref 3.8–10.5)

## 2018-05-24 PROCEDURE — 99214 OFFICE O/P EST MOD 30 MIN: CPT

## 2018-05-25 LAB
DEPRECATED KAPPA LC FREE/LAMBDA SER: 58.22 RATIO
KAPPA LC CSF-MCNC: 1.29 MG/DL
KAPPA LC SERPL-MCNC: 75.1 MG/DL

## 2018-05-29 LAB
ALBUMIN MFR SERPL ELPH: 39.8 %
ALBUMIN SERPL-MCNC: 3.2 G/DL
ALBUMIN/GLOB SERPL: 0.7 RATIO
ALPHA1 GLOB MFR SERPL ELPH: 4.4 %
ALPHA1 GLOB SERPL ELPH-MCNC: 0.4 G/DL
ALPHA2 GLOB MFR SERPL ELPH: 9.1 %
ALPHA2 GLOB SERPL ELPH-MCNC: 0.7 G/DL
B-GLOBULIN MFR SERPL ELPH: 42 %
B-GLOBULIN SERPL ELPH-MCNC: 3.4 G/DL
DEPRECATED KAPPA LC FREE/LAMBDA SER: 58.22 RATIO
GAMMA GLOB FLD ELPH-MCNC: 0.4 G/DL
GAMMA GLOB MFR SERPL ELPH: 4.7 %
IGA SER QL IEP: 22 MG/DL
IGG SER QL IEP: 3230 MG/DL
IGM SER QL IEP: 35 MG/DL
INTERPRETATION SERPL IEP-IMP: NORMAL
KAPPA LC CSF-MCNC: 1.29 MG/DL
KAPPA LC SERPL-MCNC: 75.1 MG/DL
M PROTEIN MFR SERPL ELPH: 34.8 %
M PROTEIN SPEC IFE-MCNC: NORMAL
MONOCLON BAND OBS SERPL: 2.8 G/DL
PROT SERPL-MCNC: 8 G/DL
PROT SERPL-MCNC: 8 G/DL

## 2018-07-05 ENCOUNTER — OUTPATIENT (OUTPATIENT)
Dept: OUTPATIENT SERVICES | Facility: HOSPITAL | Age: 78
LOS: 1 days | Discharge: HOME | End: 2018-07-05

## 2018-07-05 DIAGNOSIS — C90.00 MULTIPLE MYELOMA NOT HAVING ACHIEVED REMISSION: ICD-10-CM

## 2018-07-12 ENCOUNTER — RESULT REVIEW (OUTPATIENT)
Age: 78
End: 2018-07-12

## 2018-07-12 ENCOUNTER — APPOINTMENT (OUTPATIENT)
Dept: HEMATOLOGY ONCOLOGY | Facility: CLINIC | Age: 78
End: 2018-07-12
Payer: MEDICARE

## 2018-07-12 VITALS
WEIGHT: 147.05 LBS | OXYGEN SATURATION: 100 % | HEART RATE: 57 BPM | SYSTOLIC BLOOD PRESSURE: 146 MMHG | TEMPERATURE: 98.2 F | BODY MASS INDEX: 27.41 KG/M2 | DIASTOLIC BLOOD PRESSURE: 74 MMHG | RESPIRATION RATE: 16 BRPM

## 2018-07-12 LAB
ALBUMIN SERPL ELPH-MCNC: 3.6 G/DL
ALP BLD-CCNC: 82 U/L
ALT SERPL-CCNC: 7 U/L
ANION GAP SERPL CALC-SCNC: 14 MMOL/L
AST SERPL-CCNC: 15 U/L
BASOPHILS # BLD AUTO: 0 K/UL — SIGNIFICANT CHANGE UP (ref 0–0.2)
BASOPHILS NFR BLD AUTO: 0 % — SIGNIFICANT CHANGE UP (ref 0–2)
BILIRUB SERPL-MCNC: 0.5 MG/DL
BUN SERPL-MCNC: 22 MG/DL
CALCIUM SERPL-MCNC: 8.4 MG/DL
CHLORIDE SERPL-SCNC: 106 MMOL/L
CO2 SERPL-SCNC: 20 MMOL/L
CREAT SERPL-MCNC: 1.42 MG/DL
DEPRECATED KAPPA LC FREE/LAMBDA SER: 77.39 RATIO
EOSINOPHIL # BLD AUTO: 0 K/UL — SIGNIFICANT CHANGE UP (ref 0–0.5)
EOSINOPHIL NFR BLD AUTO: 0.9 % — SIGNIFICANT CHANGE UP (ref 0–6)
GLUCOSE SERPL-MCNC: 92 MG/DL
HCT VFR BLD CALC: 30.1 % — LOW (ref 34.5–45)
HGB BLD-MCNC: 10 G/DL — LOW (ref 11.5–15.5)
KAPPA LC CSF-MCNC: 1.34 MG/DL
KAPPA LC SERPL-MCNC: 103.7 MG/DL
LDH SERPL-CCNC: 169 U/L
LYMPHOCYTES # BLD AUTO: 1 K/UL — SIGNIFICANT CHANGE UP (ref 1–3.3)
LYMPHOCYTES # BLD AUTO: 23.3 % — SIGNIFICANT CHANGE UP (ref 13–44)
MAGNESIUM SERPL-MCNC: 2.2 MG/DL
MCHC RBC-ENTMCNC: 28.4 PG — SIGNIFICANT CHANGE UP (ref 27–34)
MCHC RBC-ENTMCNC: 33.3 G/DL — SIGNIFICANT CHANGE UP (ref 32–36)
MCV RBC AUTO: 85.4 FL — SIGNIFICANT CHANGE UP (ref 80–100)
MONOCYTES # BLD AUTO: 0.3 K/UL — SIGNIFICANT CHANGE UP (ref 0–0.9)
MONOCYTES NFR BLD AUTO: 5.8 % — SIGNIFICANT CHANGE UP (ref 2–14)
NEUTROPHILS # BLD AUTO: 3.2 K/UL — SIGNIFICANT CHANGE UP (ref 1.8–7.4)
NEUTROPHILS NFR BLD AUTO: 70.1 % — SIGNIFICANT CHANGE UP (ref 43–77)
PLATELET # BLD AUTO: 248 K/UL — SIGNIFICANT CHANGE UP (ref 150–400)
POTASSIUM SERPL-SCNC: 4 MMOL/L
PROT SERPL-MCNC: 8.7 G/DL
RBC # BLD: 3.53 M/UL — LOW (ref 3.8–5.2)
RBC # FLD: 13.7 % — SIGNIFICANT CHANGE UP (ref 10.3–14.5)
SODIUM SERPL-SCNC: 139 MMOL/L
WBC # BLD: 4.5 K/UL — SIGNIFICANT CHANGE UP (ref 3.8–10.5)
WBC # FLD AUTO: 4.5 K/UL — SIGNIFICANT CHANGE UP (ref 3.8–10.5)

## 2018-07-12 PROCEDURE — 99214 OFFICE O/P EST MOD 30 MIN: CPT

## 2018-07-18 LAB
ALBUMIN MFR SERPL ELPH: 38.5 %
ALBUMIN SERPL-MCNC: 3.3 G/DL
ALBUMIN/GLOB SERPL: 0.6 RATIO
ALPHA1 GLOB MFR SERPL ELPH: 4.6 %
ALPHA1 GLOB SERPL ELPH-MCNC: 0.4 G/DL
ALPHA2 GLOB MFR SERPL ELPH: 9.2 %
ALPHA2 GLOB SERPL ELPH-MCNC: 0.8 G/DL
B-GLOBULIN MFR SERPL ELPH: 43 %
B-GLOBULIN SERPL ELPH-MCNC: 3.7 G/DL
DEPRECATED KAPPA LC FREE/LAMBDA SER: 77.39 RATIO
GAMMA GLOB FLD ELPH-MCNC: 0.4 G/DL
GAMMA GLOB MFR SERPL ELPH: 4.7 %
IGA SER QL IEP: 20 MG/DL
IGG SER QL IEP: 4419 MG/DL
IGM SER QL IEP: 30 MG/DL
INTERPRETATION SERPL IEP-IMP: NORMAL
KAPPA LC CSF-MCNC: 1.34 MG/DL
KAPPA LC SERPL-MCNC: 103.7 MG/DL
M PROTEIN MFR SERPL ELPH: 37 %
M PROTEIN SPEC IFE-MCNC: NORMAL
MONOCLON BAND OBS SERPL: 3.2 G/DL
PROT SERPL-MCNC: 8.7 G/DL
PROT SERPL-MCNC: 8.7 G/DL

## 2018-10-02 ENCOUNTER — OUTPATIENT (OUTPATIENT)
Dept: OUTPATIENT SERVICES | Facility: HOSPITAL | Age: 78
LOS: 1 days | Discharge: HOME | End: 2018-10-02
Payer: MEDICARE

## 2018-10-02 DIAGNOSIS — C90.00 MULTIPLE MYELOMA NOT HAVING ACHIEVED REMISSION: ICD-10-CM

## 2018-10-17 ENCOUNTER — APPOINTMENT (OUTPATIENT)
Dept: HEMATOLOGY ONCOLOGY | Facility: CLINIC | Age: 78
End: 2018-10-17
Payer: MEDICARE

## 2018-10-17 ENCOUNTER — RESULT REVIEW (OUTPATIENT)
Age: 78
End: 2018-10-17

## 2018-10-17 ENCOUNTER — LABORATORY RESULT (OUTPATIENT)
Age: 78
End: 2018-10-17

## 2018-10-17 VITALS
HEART RATE: 64 BPM | BODY MASS INDEX: 26.91 KG/M2 | WEIGHT: 144.4 LBS | DIASTOLIC BLOOD PRESSURE: 70 MMHG | SYSTOLIC BLOOD PRESSURE: 130 MMHG | OXYGEN SATURATION: 100 % | TEMPERATURE: 98.6 F | RESPIRATION RATE: 16 BRPM

## 2018-10-17 DIAGNOSIS — Z82.49 FAMILY HISTORY OF ISCHEMIC HEART DISEASE AND OTHER DISEASES OF THE CIRCULATORY SYSTEM: ICD-10-CM

## 2018-10-17 LAB
ALBUMIN SERPL ELPH-MCNC: 3.3 G/DL
ALP BLD-CCNC: 71 U/L
ALT SERPL-CCNC: 8 U/L
ANION GAP SERPL CALC-SCNC: 12 MMOL/L
AST SERPL-CCNC: 14 U/L
B2 MICROGLOB SERPL-MCNC: 4.8 MG/L
BASOPHILS # BLD AUTO: 0 K/UL — SIGNIFICANT CHANGE UP (ref 0–0.2)
BASOPHILS NFR BLD AUTO: 0.1 % — SIGNIFICANT CHANGE UP (ref 0–2)
BILIRUB SERPL-MCNC: 0.6 MG/DL
BUN SERPL-MCNC: 22 MG/DL
CALCIUM SERPL-MCNC: 9.4 MG/DL
CHLORIDE SERPL-SCNC: 100 MMOL/L
CO2 SERPL-SCNC: 24 MMOL/L
CREAT SERPL-MCNC: 1.53 MG/DL
DEPRECATED KAPPA LC FREE/LAMBDA SER: 86.5 RATIO
EOSINOPHIL # BLD AUTO: 0 K/UL — SIGNIFICANT CHANGE UP (ref 0–0.5)
EOSINOPHIL NFR BLD AUTO: 0.7 % — SIGNIFICANT CHANGE UP (ref 0–6)
GLUCOSE SERPL-MCNC: 96 MG/DL
HCT VFR BLD CALC: 26.8 % — LOW (ref 34.5–45)
HGB BLD-MCNC: 9.2 G/DL — LOW (ref 11.5–15.5)
KAPPA LC CSF-MCNC: 1.19 MG/DL
KAPPA LC SERPL-MCNC: 102.94 MG/DL
LDH SERPL-CCNC: 135 U/L
LYMPHOCYTES # BLD AUTO: 1.3 K/UL — SIGNIFICANT CHANGE UP (ref 1–3.3)
LYMPHOCYTES # BLD AUTO: 31.2 % — SIGNIFICANT CHANGE UP (ref 13–44)
MAGNESIUM SERPL-MCNC: 2.3 MG/DL
MCHC RBC-ENTMCNC: 27.5 PG — SIGNIFICANT CHANGE UP (ref 27–34)
MCHC RBC-ENTMCNC: 34.2 G/DL — SIGNIFICANT CHANGE UP (ref 32–36)
MCV RBC AUTO: 80.5 FL — SIGNIFICANT CHANGE UP (ref 80–100)
MONOCYTES # BLD AUTO: 0.3 K/UL — SIGNIFICANT CHANGE UP (ref 0–0.9)
MONOCYTES NFR BLD AUTO: 6.5 % — SIGNIFICANT CHANGE UP (ref 2–14)
NEUTROPHILS # BLD AUTO: 2.5 K/UL — SIGNIFICANT CHANGE UP (ref 1.8–7.4)
NEUTROPHILS NFR BLD AUTO: 61.5 % — SIGNIFICANT CHANGE UP (ref 43–77)
PLATELET # BLD AUTO: 215 K/UL — SIGNIFICANT CHANGE UP (ref 150–400)
POTASSIUM SERPL-SCNC: 4.4 MMOL/L
PROT SERPL-MCNC: 9.3 G/DL
RBC # BLD: 3.33 M/UL — LOW (ref 3.8–5.2)
RBC # FLD: 14.9 % — HIGH (ref 10.3–14.5)
SODIUM SERPL-SCNC: 136 MMOL/L
WBC # BLD: 4 K/UL — SIGNIFICANT CHANGE UP (ref 3.8–10.5)
WBC # FLD AUTO: 4 K/UL — SIGNIFICANT CHANGE UP (ref 3.8–10.5)

## 2018-10-17 PROCEDURE — 99214 OFFICE O/P EST MOD 30 MIN: CPT

## 2018-10-18 LAB
ALBUMIN MFR SERPL ELPH: 34.4 %
ALBUMIN SERPL-MCNC: 3.2 G/DL
ALBUMIN/GLOB SERPL: 0.5 RATIO
ALPHA1 GLOB MFR SERPL ELPH: 4.8 %
ALPHA1 GLOB SERPL ELPH-MCNC: 0.4 G/DL
ALPHA2 GLOB MFR SERPL ELPH: 9.3 %
ALPHA2 GLOB SERPL ELPH-MCNC: 0.9 G/DL
APPEARANCE: CLEAR
B-GLOBULIN MFR SERPL ELPH: 46.3 %
B-GLOBULIN SERPL ELPH-MCNC: 4.3 G/DL
BILIRUBIN URINE: NEGATIVE
BLOOD URINE: NEGATIVE
COLOR: YELLOW
DEPRECATED KAPPA LC FREE/LAMBDA SER: 86.5 RATIO
GAMMA GLOB FLD ELPH-MCNC: 0.5 G/DL
GAMMA GLOB MFR SERPL ELPH: 5.2 %
GLUCOSE QUALITATIVE U: NEGATIVE MG/DL
IGA SER QL IEP: 18 MG/DL
IGG SER QL IEP: 4703 MG/DL
IGM SER QL IEP: 31 MG/DL
INTERPRETATION SERPL IEP-IMP: NORMAL
KAPPA LC CSF-MCNC: 1.19 MG/DL
KAPPA LC SERPL-MCNC: 102.94 MG/DL
KETONES URINE: NEGATIVE
LEUKOCYTE ESTERASE URINE: ABNORMAL
M PROTEIN MFR SERPL ELPH: NORMAL %
M PROTEIN SPEC IFE-MCNC: NORMAL
MONOCLON BAND OBS SERPL: NORMAL G/DL
NITRITE URINE: NEGATIVE
PH URINE: 6.5
PROT SERPL-MCNC: 9.3 G/DL
PROT SERPL-MCNC: 9.3 G/DL
PROTEIN URINE: NEGATIVE MG/DL
SPECIFIC GRAVITY URINE: 1
UROBILINOGEN URINE: NEGATIVE MG/DL

## 2018-10-19 LAB — BACTERIA UR CULT: NORMAL

## 2018-10-24 ENCOUNTER — RESULT REVIEW (OUTPATIENT)
Age: 78
End: 2018-10-24

## 2018-10-24 ENCOUNTER — APPOINTMENT (OUTPATIENT)
Dept: INFUSION THERAPY | Facility: HOSPITAL | Age: 78
End: 2018-10-24

## 2018-10-24 LAB
BASOPHILS # BLD AUTO: 0 K/UL — SIGNIFICANT CHANGE UP (ref 0–0.2)
BASOPHILS NFR BLD AUTO: 0.5 % — SIGNIFICANT CHANGE UP (ref 0–2)
EOSINOPHIL # BLD AUTO: 0.2 K/UL — SIGNIFICANT CHANGE UP (ref 0–0.5)
EOSINOPHIL NFR BLD AUTO: 3.2 % — SIGNIFICANT CHANGE UP (ref 0–6)
HCT VFR BLD CALC: 26.4 % — LOW (ref 34.5–45)
HGB BLD-MCNC: 9.1 G/DL — LOW (ref 11.5–15.5)
LYMPHOCYTES # BLD AUTO: 2 K/UL — SIGNIFICANT CHANGE UP (ref 1–3.3)
LYMPHOCYTES # BLD AUTO: 41.3 % — SIGNIFICANT CHANGE UP (ref 13–44)
MCHC RBC-ENTMCNC: 27.4 PG — SIGNIFICANT CHANGE UP (ref 27–34)
MCHC RBC-ENTMCNC: 34.5 G/DL — SIGNIFICANT CHANGE UP (ref 32–36)
MCV RBC AUTO: 79.3 FL — LOW (ref 80–100)
MONOCYTES # BLD AUTO: 0.4 K/UL — SIGNIFICANT CHANGE UP (ref 0–0.9)
MONOCYTES NFR BLD AUTO: 8.2 % — SIGNIFICANT CHANGE UP (ref 2–14)
NEUTROPHILS # BLD AUTO: 2.2 K/UL — SIGNIFICANT CHANGE UP (ref 1.8–7.4)
NEUTROPHILS NFR BLD AUTO: 46.8 % — SIGNIFICANT CHANGE UP (ref 43–77)
PLATELET # BLD AUTO: 248 K/UL — SIGNIFICANT CHANGE UP (ref 150–400)
RBC # BLD: 3.33 M/UL — LOW (ref 3.8–5.2)
RBC # FLD: 15 % — HIGH (ref 10.3–14.5)
WBC # BLD: 4.8 K/UL — SIGNIFICANT CHANGE UP (ref 3.8–10.5)
WBC # FLD AUTO: 4.8 K/UL — SIGNIFICANT CHANGE UP (ref 3.8–10.5)

## 2018-10-24 PROCEDURE — 93010 ELECTROCARDIOGRAM REPORT: CPT

## 2018-10-25 DIAGNOSIS — Z51.11 ENCOUNTER FOR ANTINEOPLASTIC CHEMOTHERAPY: ICD-10-CM

## 2018-10-25 DIAGNOSIS — R11.2 NAUSEA WITH VOMITING, UNSPECIFIED: ICD-10-CM

## 2018-10-31 ENCOUNTER — LABORATORY RESULT (OUTPATIENT)
Age: 78
End: 2018-10-31

## 2018-10-31 ENCOUNTER — RESULT REVIEW (OUTPATIENT)
Age: 78
End: 2018-10-31

## 2018-10-31 ENCOUNTER — APPOINTMENT (OUTPATIENT)
Dept: INFUSION THERAPY | Facility: HOSPITAL | Age: 78
End: 2018-10-31

## 2018-10-31 LAB
BASOPHILS # BLD AUTO: 0 K/UL — SIGNIFICANT CHANGE UP (ref 0–0.2)
BASOPHILS NFR BLD AUTO: 0.8 % — SIGNIFICANT CHANGE UP (ref 0–2)
EOSINOPHIL # BLD AUTO: 0 K/UL — SIGNIFICANT CHANGE UP (ref 0–0.5)
EOSINOPHIL NFR BLD AUTO: 0.7 % — SIGNIFICANT CHANGE UP (ref 0–6)
HCT VFR BLD CALC: 28.6 % — LOW (ref 34.5–45)
HGB BLD-MCNC: 9.7 G/DL — LOW (ref 11.5–15.5)
LYMPHOCYTES # BLD AUTO: 1.1 K/UL — SIGNIFICANT CHANGE UP (ref 1–3.3)
LYMPHOCYTES # BLD AUTO: 27.1 % — SIGNIFICANT CHANGE UP (ref 13–44)
MCHC RBC-ENTMCNC: 27.5 PG — SIGNIFICANT CHANGE UP (ref 27–34)
MCHC RBC-ENTMCNC: 34.1 G/DL — SIGNIFICANT CHANGE UP (ref 32–36)
MCV RBC AUTO: 80.7 FL — SIGNIFICANT CHANGE UP (ref 80–100)
MONOCYTES # BLD AUTO: 0.3 K/UL — SIGNIFICANT CHANGE UP (ref 0–0.9)
MONOCYTES NFR BLD AUTO: 6.3 % — SIGNIFICANT CHANGE UP (ref 2–14)
NEUTROPHILS # BLD AUTO: 2.6 K/UL — SIGNIFICANT CHANGE UP (ref 1.8–7.4)
NEUTROPHILS NFR BLD AUTO: 65.1 % — SIGNIFICANT CHANGE UP (ref 43–77)
PLATELET # BLD AUTO: 242 K/UL — SIGNIFICANT CHANGE UP (ref 150–400)
RBC # BLD: 3.54 M/UL — LOW (ref 3.8–5.2)
RBC # FLD: 15.1 % — HIGH (ref 10.3–14.5)
WBC # BLD: 4 K/UL — SIGNIFICANT CHANGE UP (ref 3.8–10.5)
WBC # FLD AUTO: 4 K/UL — SIGNIFICANT CHANGE UP (ref 3.8–10.5)

## 2018-11-01 ENCOUNTER — OUTPATIENT (OUTPATIENT)
Dept: OUTPATIENT SERVICES | Facility: HOSPITAL | Age: 78
LOS: 1 days | Discharge: HOME | End: 2018-11-01

## 2018-11-01 DIAGNOSIS — C90.00 MULTIPLE MYELOMA NOT HAVING ACHIEVED REMISSION: ICD-10-CM

## 2018-11-07 ENCOUNTER — RESULT REVIEW (OUTPATIENT)
Age: 78
End: 2018-11-07

## 2018-11-07 ENCOUNTER — APPOINTMENT (OUTPATIENT)
Dept: INFUSION THERAPY | Facility: HOSPITAL | Age: 78
End: 2018-11-07

## 2018-11-07 LAB
BASOPHILS # BLD AUTO: 0.1 K/UL — SIGNIFICANT CHANGE UP (ref 0–0.2)
BASOPHILS NFR BLD AUTO: 1.4 % — SIGNIFICANT CHANGE UP (ref 0–2)
EOSINOPHIL # BLD AUTO: 0.1 K/UL — SIGNIFICANT CHANGE UP (ref 0–0.5)
EOSINOPHIL NFR BLD AUTO: 1.1 % — SIGNIFICANT CHANGE UP (ref 0–6)
HCT VFR BLD CALC: 27.4 % — LOW (ref 34.5–45)
HGB BLD-MCNC: 9.4 G/DL — LOW (ref 11.5–15.5)
LYMPHOCYTES # BLD AUTO: 2.1 K/UL — SIGNIFICANT CHANGE UP (ref 1–3.3)
LYMPHOCYTES # BLD AUTO: 35.1 % — SIGNIFICANT CHANGE UP (ref 13–44)
MCHC RBC-ENTMCNC: 27.4 PG — SIGNIFICANT CHANGE UP (ref 27–34)
MCHC RBC-ENTMCNC: 34.2 G/DL — SIGNIFICANT CHANGE UP (ref 32–36)
MCV RBC AUTO: 80.1 FL — SIGNIFICANT CHANGE UP (ref 80–100)
MONOCYTES # BLD AUTO: 0.4 K/UL — SIGNIFICANT CHANGE UP (ref 0–0.9)
MONOCYTES NFR BLD AUTO: 7.3 % — SIGNIFICANT CHANGE UP (ref 2–14)
NEUTROPHILS # BLD AUTO: 3.4 K/UL — SIGNIFICANT CHANGE UP (ref 1.8–7.4)
NEUTROPHILS NFR BLD AUTO: 55.1 % — SIGNIFICANT CHANGE UP (ref 43–77)
PLATELET # BLD AUTO: 210 K/UL — SIGNIFICANT CHANGE UP (ref 150–400)
RBC # BLD: 3.42 M/UL — LOW (ref 3.8–5.2)
RBC # FLD: 15.7 % — HIGH (ref 10.3–14.5)
WBC # BLD: 6.1 K/UL — SIGNIFICANT CHANGE UP (ref 3.8–10.5)
WBC # FLD AUTO: 6.1 K/UL — SIGNIFICANT CHANGE UP (ref 3.8–10.5)

## 2018-11-08 DIAGNOSIS — R11.2 NAUSEA WITH VOMITING, UNSPECIFIED: ICD-10-CM

## 2018-11-08 DIAGNOSIS — Z51.11 ENCOUNTER FOR ANTINEOPLASTIC CHEMOTHERAPY: ICD-10-CM

## 2018-11-21 ENCOUNTER — RESULT REVIEW (OUTPATIENT)
Age: 78
End: 2018-11-21

## 2018-11-21 ENCOUNTER — APPOINTMENT (OUTPATIENT)
Dept: INFUSION THERAPY | Facility: HOSPITAL | Age: 78
End: 2018-11-21

## 2018-11-21 LAB
BASOPHILS # BLD AUTO: 0 K/UL — SIGNIFICANT CHANGE UP (ref 0–0.2)
BASOPHILS NFR BLD AUTO: 0.1 % — SIGNIFICANT CHANGE UP (ref 0–2)
EOSINOPHIL # BLD AUTO: 0.1 K/UL — SIGNIFICANT CHANGE UP (ref 0–0.5)
EOSINOPHIL NFR BLD AUTO: 2.2 % — SIGNIFICANT CHANGE UP (ref 0–6)
HCT VFR BLD CALC: 28.2 % — LOW (ref 34.5–45)
HGB BLD-MCNC: 9.6 G/DL — LOW (ref 11.5–15.5)
LYMPHOCYTES # BLD AUTO: 2.2 K/UL — SIGNIFICANT CHANGE UP (ref 1–3.3)
LYMPHOCYTES # BLD AUTO: 41.4 % — SIGNIFICANT CHANGE UP (ref 13–44)
MCHC RBC-ENTMCNC: 27.5 PG — SIGNIFICANT CHANGE UP (ref 27–34)
MCHC RBC-ENTMCNC: 34.1 G/DL — SIGNIFICANT CHANGE UP (ref 32–36)
MCV RBC AUTO: 80.8 FL — SIGNIFICANT CHANGE UP (ref 80–100)
MONOCYTES # BLD AUTO: 0.3 K/UL — SIGNIFICANT CHANGE UP (ref 0–0.9)
MONOCYTES NFR BLD AUTO: 6.5 % — SIGNIFICANT CHANGE UP (ref 2–14)
NEUTROPHILS # BLD AUTO: 2.6 K/UL — SIGNIFICANT CHANGE UP (ref 1.8–7.4)
NEUTROPHILS NFR BLD AUTO: 49.8 % — SIGNIFICANT CHANGE UP (ref 43–77)
PLATELET # BLD AUTO: 228 K/UL — SIGNIFICANT CHANGE UP (ref 150–400)
RBC # BLD: 3.49 M/UL — LOW (ref 3.8–5.2)
RBC # FLD: 15.7 % — HIGH (ref 10.3–14.5)
WBC # BLD: 5.2 K/UL — SIGNIFICANT CHANGE UP (ref 3.8–10.5)
WBC # FLD AUTO: 5.2 K/UL — SIGNIFICANT CHANGE UP (ref 3.8–10.5)

## 2018-11-28 ENCOUNTER — RESULT REVIEW (OUTPATIENT)
Age: 78
End: 2018-11-28

## 2018-11-28 ENCOUNTER — APPOINTMENT (OUTPATIENT)
Dept: HEMATOLOGY ONCOLOGY | Facility: CLINIC | Age: 78
End: 2018-11-28
Payer: MEDICARE

## 2018-11-28 ENCOUNTER — APPOINTMENT (OUTPATIENT)
Dept: INFUSION THERAPY | Facility: HOSPITAL | Age: 78
End: 2018-11-28

## 2018-11-28 VITALS
HEART RATE: 69 BPM | RESPIRATION RATE: 16 BRPM | BODY MASS INDEX: 26.5 KG/M2 | DIASTOLIC BLOOD PRESSURE: 70 MMHG | SYSTOLIC BLOOD PRESSURE: 130 MMHG | WEIGHT: 142.2 LBS | OXYGEN SATURATION: 99 % | TEMPERATURE: 98.6 F

## 2018-11-28 LAB
BASOPHILS # BLD AUTO: 0 K/UL — SIGNIFICANT CHANGE UP (ref 0–0.2)
BASOPHILS NFR BLD AUTO: 0.2 % — SIGNIFICANT CHANGE UP (ref 0–2)
EOSINOPHIL # BLD AUTO: 0 K/UL — SIGNIFICANT CHANGE UP (ref 0–0.5)
EOSINOPHIL NFR BLD AUTO: 1 % — SIGNIFICANT CHANGE UP (ref 0–6)
HCT VFR BLD CALC: 29.2 % — LOW (ref 34.5–45)
HGB BLD-MCNC: 9.5 G/DL — LOW (ref 11.5–15.5)
LYMPHOCYTES # BLD AUTO: 1.9 K/UL — SIGNIFICANT CHANGE UP (ref 1–3.3)
LYMPHOCYTES # BLD AUTO: 41.7 % — SIGNIFICANT CHANGE UP (ref 13–44)
MCHC RBC-ENTMCNC: 26.4 PG — LOW (ref 27–34)
MCHC RBC-ENTMCNC: 32.5 G/DL — SIGNIFICANT CHANGE UP (ref 32–36)
MCV RBC AUTO: 81.4 FL — SIGNIFICANT CHANGE UP (ref 80–100)
MONOCYTES # BLD AUTO: 0.3 K/UL — SIGNIFICANT CHANGE UP (ref 0–0.9)
MONOCYTES NFR BLD AUTO: 7.1 % — SIGNIFICANT CHANGE UP (ref 2–14)
NEUTROPHILS # BLD AUTO: 2.2 K/UL — SIGNIFICANT CHANGE UP (ref 1.8–7.4)
NEUTROPHILS NFR BLD AUTO: 50 % — SIGNIFICANT CHANGE UP (ref 43–77)
PLATELET # BLD AUTO: 210 K/UL — SIGNIFICANT CHANGE UP (ref 150–400)
RBC # BLD: 3.59 M/UL — LOW (ref 3.8–5.2)
RBC # FLD: 15.9 % — HIGH (ref 10.3–14.5)
WBC # BLD: 4.4 K/UL — SIGNIFICANT CHANGE UP (ref 3.8–10.5)
WBC # FLD AUTO: 4.4 K/UL — SIGNIFICANT CHANGE UP (ref 3.8–10.5)

## 2018-11-28 PROCEDURE — 99215 OFFICE O/P EST HI 40 MIN: CPT

## 2018-11-29 ENCOUNTER — OUTPATIENT (OUTPATIENT)
Dept: OUTPATIENT SERVICES | Facility: HOSPITAL | Age: 78
LOS: 1 days | Discharge: HOME | End: 2018-11-29

## 2018-11-29 DIAGNOSIS — C90.00 MULTIPLE MYELOMA NOT HAVING ACHIEVED REMISSION: ICD-10-CM

## 2018-11-29 LAB
ALBUMIN SERPL ELPH-MCNC: 3.3 G/DL
ALP BLD-CCNC: 82 U/L
ALT SERPL-CCNC: 15 U/L
ANION GAP SERPL CALC-SCNC: 11 MMOL/L
AST SERPL-CCNC: 15 U/L
B2 MICROGLOB SERPL-MCNC: 4.9 MG/L
BILIRUB SERPL-MCNC: 0.3 MG/DL
BUN SERPL-MCNC: 37 MG/DL
CALCIUM SERPL-MCNC: 9.2 MG/DL
CHLORIDE SERPL-SCNC: 104 MMOL/L
CO2 SERPL-SCNC: 22 MMOL/L
CREAT SERPL-MCNC: 1.57 MG/DL
DEPRECATED KAPPA LC FREE/LAMBDA SER: 83.42 RATIO
GLUCOSE SERPL-MCNC: 91 MG/DL
KAPPA LC CSF-MCNC: 1.15 MG/DL
KAPPA LC SERPL-MCNC: 95.93 MG/DL
LDH SERPL-CCNC: 136 U/L
MAGNESIUM SERPL-MCNC: 2.6 MG/DL
POTASSIUM SERPL-SCNC: 4.5 MMOL/L
PROT SERPL-MCNC: 9.2 G/DL
SODIUM SERPL-SCNC: 137 MMOL/L

## 2018-11-29 NOTE — REVIEW OF SYSTEMS
[Joint Pain] : joint pain [Negative] : Allergic/Immunologic [Fever] : no fever [Chills] : no chills [Cough] : no cough [Abdominal Pain] : no abdominal pain [Vomiting] : no vomiting [FreeTextEntry9] : generalized arthralgias

## 2018-11-29 NOTE — ASSESSMENT
[FreeTextEntry1] : Patient is a 79 y/o female with a hx of MM, RA, HTN, TIA.....s/p treatment as per HPI...presently on observation...IGG 3370 (8/15/17).\par \par Skeletal done recently was stable with a prior possible lesion in left hip of unclear etiology, though symptoms have improved. Consider a orthopedic evaluation if pain is worsening. \par \par She is doing overall better though repeat MRI with alecia in Spring 2018.\par \par Her creatinine is increased and she should continue to follow up with renal doctor. \par \par MM labs were sent today, consider SQ Velcade if she is sym..if symptomatic with POD. We have also discussed the possibility of adding revlimid and pomalidomide. I believe we will need to start treatment in the near future\par \par Questions were answered and well care was stressed. Repeat 24 hour urine at her next visit (April 2018. At this point no definite CRAB / end organ damage...consider ct/pet in  2018. \par \par RTC in 4 months. \par \par 4/12/18\par Peripheral blood work stable and discussed with patient. \par MM levels sent today, pt brought in 24-hour urine sample today. \par Order CT/PET May 2018 \par MRI - Left Hip \par Discussed starting Velcade with coadministration of Decadron if MM levels elevated and/or MRI - Left Hip demonstrates MM POD. \par Continue current medications as prescribed. \par Well care stressed, question addressed. \par RTC in 6 weeks. \par \par 5/24/18 \par Peripheral blood work stable and discussed with patient. \par MM levels sent today.\par CT/PET May 18, 2018 - results discussed with patient. No POD. \par MRI - Left Hip - arthritic changes. \par Discussed starting Velcade with coadministration of Decadron if MM levels elevated and/or MRI - Left Hip demonstrates MM POD. No indications to begin treatment as of today. \par Continue current medications as prescribed. \par Well care stressed, question addressed. \par RTC in 8 weeks for F/U with Dr. Rosales.  \par \par 7/12/18\par Peripheral blood work stable and discussed with patient. \par MM levels sent today.Follow up CMP, LD, Mg from today. \par CT/PET May 18, 2018 - no POD. \par MRI - Left Hip - arthritic changes. \par Continue current medications as prescribed. \par Well care stressed, question addressed. \par Stressed regular follow up with Rheum, Nephro and PMD. \par RTC in 12 weeks for F/U with Dr. Rosales.  Patient advised to contact us if any changes occur in interim. \par \par 10/17/18 \par Peripheral blood work demonstrates anemia H/H 9.2/26.8. \par IGG at 4419 and  M-spike at 3.2 on 7/18/18. Indicating POD.\par MM levels sent today. Follow up CMP, LD, Mg from today. Ordered urine C&S for today. \par We had a lengthy discussion of treatment option with Velcade 1mg/m2 with 20 mg Dex (3 weeks on; 1 week off). Rationale, benefits, risk and side effects were discussed at length. Patient and daughter verbalized understanding and consented to beginning treatment. Signed consent obtained today. \par Continue current medications as prescribed. \par Well care stressed, question addressed. \par Stressed regular follow up with Rheum, Nephro and PMD. \par RTC in 6 weeks for F/U with Dr. Rosales.  Patient advised to contact us if any changes occur in interim. \par \par 11/28/18\par Peripheral blood work demonstrates anemia H/H 9.5/29.2, platelets 210\par F/U Immunoelectrophoresis, CMP, LDH, MG, Immunoglobulin panel\par Currently receiving cycle two of velcade ( 3 weeks on, 1 week off with dexamethasone).\par Will receive velcade today.\par Will schedule next cycle of velcade for 12/20/18,12/27/18, 1/3/19\par Continue medications as prescribed\par Patient aware to call immediately if she experiences fever, severe nausea, vomiting, diarrhea, rash or mouth sores.\par Questions and concerns addressed\par RTC in 6 weeks with MD Rosales\par \par \par \par \par

## 2018-11-29 NOTE — HISTORY OF PRESENT ILLNESS
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 they questioned need for MTX and Rituxan..pt is on low dose prednisone and hydroxychloroquine..again discussed the possibility of requiring further treatment for her multiple myeloma  in the near future..things appear stable....24 hour urine and skeletal reviewed. \par \par On her 12/12/17 visit, On 8/15/17 IgG was 3370 and creat 1.76. She reports that she is feeling generally well. She complains of generalized arthralgias. She notes that her pain in her hip is unchanged. She denies any changes in her medications. She notes that she has frequent lower extremity edema. She denies any recent hospitalizations. \par \par On 4/12/18 visit, patient with history of MM and RA, reports of Left hip pain, with a worsened episode 2 weeks ago which ended her in the ER. Describes the pain as debilitating and she was unable to move her left leg. The pain resolved post pain management at the ED. Offers no acute concerns at this time. Denies nausea, vomiting, diarrhea, fever, chills, CP, SOB. Remains compliant with her current medications. Accompanied by her daughter today. \par \par On 5/24/18 visit, patient states no change from baseline. Notes continued difficulty in walking, worsened by varicose veins in b/l LE. Persistent left hip pain, unchanged from baseline. Keeps regular follow-up with rheumatology every 3-4 months, had a recent visit 3 days ago, change in provider. Denies nausea, vomiting, diarrhea, CP, SOB or LE edema. Offers no acute concerns today.  Remains compliant with her prescribed medications. Accompanied by her daughter today. \par \par On 7/12/18 visit, patient reports overall doing well, however notes intermittent joint pain. Maintains regular follow up with her rheumatologist, nephrologist and PMD.  Denies nausea, vomiting, diarrhea, CP, SOB or LE edema. Offers no acute concerns today.  Remains compliant with her prescribed medications. Accompanied by her daughter today. \par \par On 10/17/18 visit, patient reports burning and pain upon urination. Offers no other acute concerns. Maintains regular follow up with her rheumatologist, nephrologist and PMD.  Denies nausea, vomiting, diarrhea, CP, SOB or LE edema. Offers no acute concerns today.  Remains compliant with her prescribed medications. Accompanied by her daughter today. occ hip pain.\par \par On 11/28/18 visit, patient reports overall doing well. Offers no acute concerns at this time. Receiving second cycle of velcade ( 3 weeks on, 1 week off). Denies fever, rash, nausea, vomiting, diarrhea or mouth sores. Denies chest pain, SOB or B/L LE edema. Accompanied by daughter and grand daughter. Denies any pain at this time.

## 2018-11-29 NOTE — REASON FOR VISIT
[Follow-Up Visit] : a follow-up visit for [Family Member] : family member [FreeTextEntry2] : Multiple myeloma

## 2018-11-30 LAB
ALBUMIN MFR SERPL ELPH: 36.3 %
ALBUMIN SERPL-MCNC: 3.6 G/DL
ALBUMIN/GLOB SERPL: 0.6 RATIO
ALPHA1 GLOB MFR SERPL ELPH: 4.3 %
ALPHA1 GLOB SERPL ELPH-MCNC: 0.4 G/DL
ALPHA2 GLOB MFR SERPL ELPH: 9 %
ALPHA2 GLOB SERPL ELPH-MCNC: 0.9 G/DL
B-GLOBULIN MFR SERPL ELPH: 45.4 %
B-GLOBULIN SERPL ELPH-MCNC: 4.5 G/DL
DEPRECATED KAPPA LC FREE/LAMBDA SER: 83.42 RATIO
GAMMA GLOB FLD ELPH-MCNC: 0.5 G/DL
GAMMA GLOB MFR SERPL ELPH: 5 %
IGA SER QL IEP: 17 MG/DL
IGG SER QL IEP: 4933 MG/DL
IGM SER QL IEP: 25 MG/DL
INTERPRETATION SERPL IEP-IMP: NORMAL
KAPPA LC CSF-MCNC: 1.15 MG/DL
KAPPA LC SERPL-MCNC: 95.93 MG/DL
M PROTEIN MFR SERPL ELPH: NORMAL %
M PROTEIN SPEC IFE-MCNC: NORMAL
MONOCLON BAND OBS SERPL: NORMAL G/DL
PROT SERPL-MCNC: 9.9 G/DL
PROT SERPL-MCNC: 9.9 G/DL

## 2018-12-05 ENCOUNTER — RESULT REVIEW (OUTPATIENT)
Age: 78
End: 2018-12-05

## 2018-12-05 ENCOUNTER — APPOINTMENT (OUTPATIENT)
Dept: INFUSION THERAPY | Facility: HOSPITAL | Age: 78
End: 2018-12-05

## 2018-12-05 LAB
BASOPHILS # BLD AUTO: 0 K/UL — SIGNIFICANT CHANGE UP (ref 0–0.2)
BASOPHILS NFR BLD AUTO: 0.2 % — SIGNIFICANT CHANGE UP (ref 0–2)
EOSINOPHIL # BLD AUTO: 0.1 K/UL — SIGNIFICANT CHANGE UP (ref 0–0.5)
EOSINOPHIL NFR BLD AUTO: 3 % — SIGNIFICANT CHANGE UP (ref 0–6)
HCT VFR BLD CALC: 26.9 % — LOW (ref 34.5–45)
HGB BLD-MCNC: 9.3 G/DL — LOW (ref 11.5–15.5)
LYMPHOCYTES # BLD AUTO: 1.5 K/UL — SIGNIFICANT CHANGE UP (ref 1–3.3)
LYMPHOCYTES # BLD AUTO: 30.2 % — SIGNIFICANT CHANGE UP (ref 13–44)
MCHC RBC-ENTMCNC: 27.9 PG — SIGNIFICANT CHANGE UP (ref 27–34)
MCHC RBC-ENTMCNC: 34.6 G/DL — SIGNIFICANT CHANGE UP (ref 32–36)
MCV RBC AUTO: 80.6 FL — SIGNIFICANT CHANGE UP (ref 80–100)
MONOCYTES # BLD AUTO: 0.3 K/UL — SIGNIFICANT CHANGE UP (ref 0–0.9)
MONOCYTES NFR BLD AUTO: 6.3 % — SIGNIFICANT CHANGE UP (ref 2–14)
NEUTROPHILS # BLD AUTO: 2.9 K/UL — SIGNIFICANT CHANGE UP (ref 1.8–7.4)
NEUTROPHILS NFR BLD AUTO: 60.2 % — SIGNIFICANT CHANGE UP (ref 43–77)
PLATELET # BLD AUTO: 181 K/UL — SIGNIFICANT CHANGE UP (ref 150–400)
RBC # BLD: 3.34 M/UL — LOW (ref 3.8–5.2)
RBC # FLD: 16 % — HIGH (ref 10.3–14.5)
WBC # BLD: 4.8 K/UL — SIGNIFICANT CHANGE UP (ref 3.8–10.5)
WBC # FLD AUTO: 4.8 K/UL — SIGNIFICANT CHANGE UP (ref 3.8–10.5)

## 2018-12-06 DIAGNOSIS — Z51.11 ENCOUNTER FOR ANTINEOPLASTIC CHEMOTHERAPY: ICD-10-CM

## 2018-12-06 DIAGNOSIS — R11.2 NAUSEA WITH VOMITING, UNSPECIFIED: ICD-10-CM

## 2018-12-19 ENCOUNTER — RESULT REVIEW (OUTPATIENT)
Age: 78
End: 2018-12-19

## 2018-12-19 ENCOUNTER — APPOINTMENT (OUTPATIENT)
Dept: INFUSION THERAPY | Facility: HOSPITAL | Age: 78
End: 2018-12-19

## 2018-12-19 LAB
BASOPHILS # BLD AUTO: 0 K/UL — SIGNIFICANT CHANGE UP (ref 0–0.2)
BASOPHILS NFR BLD AUTO: 0.3 % — SIGNIFICANT CHANGE UP (ref 0–2)
EOSINOPHIL # BLD AUTO: 0 K/UL — SIGNIFICANT CHANGE UP (ref 0–0.5)
EOSINOPHIL NFR BLD AUTO: 0 % — SIGNIFICANT CHANGE UP (ref 0–6)
HCT VFR BLD CALC: 29.3 % — LOW (ref 34.5–45)
HGB BLD-MCNC: 10.3 G/DL — LOW (ref 11.5–15.5)
LYMPHOCYTES # BLD AUTO: 2.2 K/UL — SIGNIFICANT CHANGE UP (ref 1–3.3)
LYMPHOCYTES # BLD AUTO: 41.6 % — SIGNIFICANT CHANGE UP (ref 13–44)
MCHC RBC-ENTMCNC: 28.4 PG — SIGNIFICANT CHANGE UP (ref 27–34)
MCHC RBC-ENTMCNC: 35.1 G/DL — SIGNIFICANT CHANGE UP (ref 32–36)
MCV RBC AUTO: 80.8 FL — SIGNIFICANT CHANGE UP (ref 80–100)
MONOCYTES # BLD AUTO: 0.4 K/UL — SIGNIFICANT CHANGE UP (ref 0–0.9)
MONOCYTES NFR BLD AUTO: 7.4 % — SIGNIFICANT CHANGE UP (ref 2–14)
NEUTROPHILS # BLD AUTO: 2.6 K/UL — SIGNIFICANT CHANGE UP (ref 1.8–7.4)
NEUTROPHILS NFR BLD AUTO: 50.6 % — SIGNIFICANT CHANGE UP (ref 43–77)
PLATELET # BLD AUTO: 253 K/UL — SIGNIFICANT CHANGE UP (ref 150–400)
RBC # BLD: 3.63 M/UL — LOW (ref 3.8–5.2)
RBC # FLD: 16.5 % — HIGH (ref 10.3–14.5)
WBC # BLD: 5.2 K/UL — SIGNIFICANT CHANGE UP (ref 3.8–10.5)
WBC # FLD AUTO: 5.2 K/UL — SIGNIFICANT CHANGE UP (ref 3.8–10.5)

## 2018-12-27 ENCOUNTER — RESULT REVIEW (OUTPATIENT)
Age: 78
End: 2018-12-27

## 2018-12-27 ENCOUNTER — APPOINTMENT (OUTPATIENT)
Dept: INFUSION THERAPY | Facility: HOSPITAL | Age: 78
End: 2018-12-27

## 2018-12-27 LAB
BASOPHILS # BLD AUTO: 0 K/UL — SIGNIFICANT CHANGE UP (ref 0–0.2)
BASOPHILS NFR BLD AUTO: 0.6 % — SIGNIFICANT CHANGE UP (ref 0–2)
EOSINOPHIL # BLD AUTO: 0.1 K/UL — SIGNIFICANT CHANGE UP (ref 0–0.5)
EOSINOPHIL NFR BLD AUTO: 1.5 % — SIGNIFICANT CHANGE UP (ref 0–6)
HCT VFR BLD CALC: 28.3 % — LOW (ref 34.5–45)
HGB BLD-MCNC: 10.1 G/DL — LOW (ref 11.5–15.5)
LYMPHOCYTES # BLD AUTO: 2.1 K/UL — SIGNIFICANT CHANGE UP (ref 1–3.3)
LYMPHOCYTES # BLD AUTO: 41.6 % — SIGNIFICANT CHANGE UP (ref 13–44)
MCHC RBC-ENTMCNC: 29 PG — SIGNIFICANT CHANGE UP (ref 27–34)
MCHC RBC-ENTMCNC: 35.6 G/DL — SIGNIFICANT CHANGE UP (ref 32–36)
MCV RBC AUTO: 81.4 FL — SIGNIFICANT CHANGE UP (ref 80–100)
MONOCYTES # BLD AUTO: 0.3 K/UL — SIGNIFICANT CHANGE UP (ref 0–0.9)
MONOCYTES NFR BLD AUTO: 6.3 % — SIGNIFICANT CHANGE UP (ref 2–14)
NEUTROPHILS # BLD AUTO: 2.5 K/UL — SIGNIFICANT CHANGE UP (ref 1.8–7.4)
NEUTROPHILS NFR BLD AUTO: 50 % — SIGNIFICANT CHANGE UP (ref 43–77)
PLATELET # BLD AUTO: 194 K/UL — SIGNIFICANT CHANGE UP (ref 150–400)
RBC # BLD: 3.48 M/UL — LOW (ref 3.8–5.2)
RBC # FLD: 16.5 % — HIGH (ref 10.3–14.5)
WBC # BLD: 5.1 K/UL — SIGNIFICANT CHANGE UP (ref 3.8–10.5)
WBC # FLD AUTO: 5.1 K/UL — SIGNIFICANT CHANGE UP (ref 3.8–10.5)

## 2018-12-28 ENCOUNTER — OUTPATIENT (OUTPATIENT)
Dept: OUTPATIENT SERVICES | Facility: HOSPITAL | Age: 78
LOS: 1 days | Discharge: HOME | End: 2018-12-28

## 2018-12-28 DIAGNOSIS — C90.00 MULTIPLE MYELOMA NOT HAVING ACHIEVED REMISSION: ICD-10-CM

## 2019-01-03 ENCOUNTER — APPOINTMENT (OUTPATIENT)
Dept: INFUSION THERAPY | Facility: HOSPITAL | Age: 79
End: 2019-01-03

## 2019-01-03 ENCOUNTER — RESULT REVIEW (OUTPATIENT)
Age: 79
End: 2019-01-03

## 2019-01-03 LAB
BASOPHILS # BLD AUTO: 0 K/UL — SIGNIFICANT CHANGE UP (ref 0–0.2)
BASOPHILS NFR BLD AUTO: 0.7 % — SIGNIFICANT CHANGE UP (ref 0–2)
EOSINOPHIL # BLD AUTO: 0.1 K/UL — SIGNIFICANT CHANGE UP (ref 0–0.5)
EOSINOPHIL NFR BLD AUTO: 1.5 % — SIGNIFICANT CHANGE UP (ref 0–6)
HCT VFR BLD CALC: 28.1 % — LOW (ref 34.5–45)
HGB BLD-MCNC: 10.1 G/DL — LOW (ref 11.5–15.5)
LYMPHOCYTES # BLD AUTO: 2 K/UL — SIGNIFICANT CHANGE UP (ref 1–3.3)
LYMPHOCYTES # BLD AUTO: 39.8 % — SIGNIFICANT CHANGE UP (ref 13–44)
MCHC RBC-ENTMCNC: 29.5 PG — SIGNIFICANT CHANGE UP (ref 27–34)
MCHC RBC-ENTMCNC: 36.1 G/DL — HIGH (ref 32–36)
MCV RBC AUTO: 81.8 FL — SIGNIFICANT CHANGE UP (ref 80–100)
MONOCYTES # BLD AUTO: 0.5 K/UL — SIGNIFICANT CHANGE UP (ref 0–0.9)
MONOCYTES NFR BLD AUTO: 9.4 % — SIGNIFICANT CHANGE UP (ref 2–14)
NEUTROPHILS # BLD AUTO: 2.4 K/UL — SIGNIFICANT CHANGE UP (ref 1.8–7.4)
NEUTROPHILS NFR BLD AUTO: 48.5 % — SIGNIFICANT CHANGE UP (ref 43–77)
PLATELET # BLD AUTO: 184 K/UL — SIGNIFICANT CHANGE UP (ref 150–400)
RBC # BLD: 3.43 M/UL — LOW (ref 3.8–5.2)
RBC # FLD: 16.1 % — HIGH (ref 10.3–14.5)
WBC # BLD: 5 K/UL — SIGNIFICANT CHANGE UP (ref 3.8–10.5)
WBC # FLD AUTO: 5 K/UL — SIGNIFICANT CHANGE UP (ref 3.8–10.5)

## 2019-01-04 DIAGNOSIS — Z51.11 ENCOUNTER FOR ANTINEOPLASTIC CHEMOTHERAPY: ICD-10-CM

## 2019-01-04 DIAGNOSIS — R11.2 NAUSEA WITH VOMITING, UNSPECIFIED: ICD-10-CM

## 2019-01-08 ENCOUNTER — RESULT REVIEW (OUTPATIENT)
Age: 79
End: 2019-01-08

## 2019-01-08 ENCOUNTER — APPOINTMENT (OUTPATIENT)
Dept: HEMATOLOGY ONCOLOGY | Facility: CLINIC | Age: 79
End: 2019-01-08

## 2019-01-08 ENCOUNTER — APPOINTMENT (OUTPATIENT)
Dept: HEMATOLOGY ONCOLOGY | Facility: CLINIC | Age: 79
End: 2019-01-08
Payer: MEDICARE

## 2019-01-08 VITALS
OXYGEN SATURATION: 99 % | SYSTOLIC BLOOD PRESSURE: 130 MMHG | BODY MASS INDEX: 26.91 KG/M2 | HEART RATE: 72 BPM | RESPIRATION RATE: 16 BRPM | WEIGHT: 144.4 LBS | DIASTOLIC BLOOD PRESSURE: 70 MMHG | TEMPERATURE: 98.7 F

## 2019-01-08 DIAGNOSIS — Z92.21 PERSONAL HISTORY OF ANTINEOPLASTIC CHEMOTHERAPY: ICD-10-CM

## 2019-01-08 LAB
BASOPHILS # BLD AUTO: 0 K/UL — SIGNIFICANT CHANGE UP (ref 0–0.2)
BASOPHILS NFR BLD AUTO: 0.6 % — SIGNIFICANT CHANGE UP (ref 0–2)
EOSINOPHIL # BLD AUTO: 0 K/UL — SIGNIFICANT CHANGE UP (ref 0–0.5)
EOSINOPHIL NFR BLD AUTO: 0.5 % — SIGNIFICANT CHANGE UP (ref 0–6)
HCT VFR BLD CALC: 30.4 % — LOW (ref 34.5–45)
HGB BLD-MCNC: 10.3 G/DL — LOW (ref 11.5–15.5)
LYMPHOCYTES # BLD AUTO: 1.6 K/UL — SIGNIFICANT CHANGE UP (ref 1–3.3)
LYMPHOCYTES # BLD AUTO: 32.8 % — SIGNIFICANT CHANGE UP (ref 13–44)
MCHC RBC-ENTMCNC: 28.3 PG — SIGNIFICANT CHANGE UP (ref 27–34)
MCHC RBC-ENTMCNC: 34 G/DL — SIGNIFICANT CHANGE UP (ref 32–36)
MCV RBC AUTO: 83.3 FL — SIGNIFICANT CHANGE UP (ref 80–100)
MONOCYTES # BLD AUTO: 0.3 K/UL — SIGNIFICANT CHANGE UP (ref 0–0.9)
MONOCYTES NFR BLD AUTO: 6.1 % — SIGNIFICANT CHANGE UP (ref 2–14)
NEUTROPHILS # BLD AUTO: 2.9 K/UL — SIGNIFICANT CHANGE UP (ref 1.8–7.4)
NEUTROPHILS NFR BLD AUTO: 60.1 % — SIGNIFICANT CHANGE UP (ref 43–77)
PLATELET # BLD AUTO: 174 K/UL — SIGNIFICANT CHANGE UP (ref 150–400)
RBC # BLD: 3.65 M/UL — LOW (ref 3.8–5.2)
RBC # FLD: 16.2 % — HIGH (ref 10.3–14.5)
WBC # BLD: 4.8 K/UL — SIGNIFICANT CHANGE UP (ref 3.8–10.5)
WBC # FLD AUTO: 4.8 K/UL — SIGNIFICANT CHANGE UP (ref 3.8–10.5)

## 2019-01-08 PROCEDURE — 99214 OFFICE O/P EST MOD 30 MIN: CPT

## 2019-01-08 NOTE — HISTORY OF PRESENT ILLNESS
[de-identified] : Multiple Myeloma s/p Thal/ Dex 2008 to 2010.....TIA in 2009.....Velcade 2012 to 2013...treatment held due to neuropathy [de-identified] : Presenting for a follow up today, accompanied by her aide. She reports discomfort secondary to varicose veins in the lower extremity bilaterally Notes generalized arthritic pain. She has maintained regular follow up with rheumatologist and pain management. Reports occasional dry cough, self resolving, she attributes it to allergies. Denies fever/chills, night sweats, mouth sores, eye dryness, blurred vision, nausea, vomiting, diarrhea. No CP, SOB or LE edema. Remains compliant with medications.Received the Flu vaccine in Sept 2018.

## 2019-01-08 NOTE — ADDENDUM
[FreeTextEntry1] : Documented by Emma Vergara acting as a scribe for Dr. Bailey Rosales on 1/8/2019.\par \par All medical record entries made by the Scribe were at my, Dr. Bailey Rosales's, direction and personally dictated by me on 1/8/2019. I have reviewed the chart and agree that  the record accurately reflects my personal performance of the history, physical exam, assessment and plan. I have also personally directed, reviewed, and agree with the discharge instructions.\par

## 2019-01-08 NOTE — ASSESSMENT
[FreeTextEntry1] : Ms. Stover is 79 years old female with hisotry of MM, RA, HTN, TIA. s/p treatment as per HPI...was  on observation...IGG 3370 (8/15/17).currently being treated again with VD\par \par Peripheral blood work demonstrates anemia H/H 10.3/30.4\par IGG at 4933 and  M-spike at 3.8 on 11/30/2018. \par MM levels sent today. Follow up CMP, LD, Mg from today.\par We had a lengthy discussion of treatment option with Velcade 1mg/m2 with 20 mg Dex (3 weeks on; 1 week off). Rationale, benefits, risk and side effects were discussed at length. Patient and daughter verbalized understanding and consented to  treatment. Signed consent\par Continue Velcade as prescribed next due on January 17, 24, 31. \par Order skeletal survey and 24 hr urine for Spring 2019. \par Continue remaining medications as prescribed. \par Well care stressed, question addressed. \par Stressed regular follow up with Rheum, Nephro and PMD. \par RTC in 8 weeks for F/U with Dr. Rosales.  Patient advised to contact us if any changes occur in interim.

## 2019-01-08 NOTE — REVIEW OF SYSTEMS
[Joint Pain] : joint pain [Negative] : Allergic/Immunologic [Fever] : no fever [Chills] : no chills [Cough] : no cough [Abdominal Pain] : no abdominal pain [Vomiting] : no vomiting [FreeTextEntry9] : Left Hip pain, generalized arthralgias

## 2019-01-09 LAB
ALBUMIN SERPL ELPH-MCNC: 3.2 G/DL
ALP BLD-CCNC: 83 U/L
ALT SERPL-CCNC: 9 U/L
ANION GAP SERPL CALC-SCNC: 13 MMOL/L
AST SERPL-CCNC: 12 U/L
B2 MICROGLOB SERPL-MCNC: 4.6 MG/L
BILIRUB SERPL-MCNC: 0.4 MG/DL
BUN SERPL-MCNC: 22 MG/DL
CALCIUM SERPL-MCNC: 8.8 MG/DL
CHLORIDE SERPL-SCNC: 104 MMOL/L
CO2 SERPL-SCNC: 22 MMOL/L
CREAT SERPL-MCNC: 1.73 MG/DL
DEPRECATED KAPPA LC FREE/LAMBDA SER: 92.04 RATIO
GLUCOSE SERPL-MCNC: 129 MG/DL
KAPPA LC CSF-MCNC: 0.96 MG/DL
KAPPA LC SERPL-MCNC: 88.36 MG/DL
LDH SERPL-CCNC: 142 U/L
MAGNESIUM SERPL-MCNC: 2.5 MG/DL
POTASSIUM SERPL-SCNC: 4.6 MMOL/L
PROT SERPL-MCNC: 8.2 G/DL
SODIUM SERPL-SCNC: 139 MMOL/L

## 2019-01-10 LAB
ALBUMIN MFR SERPL ELPH: 40.5 %
ALBUMIN SERPL-MCNC: 3.3 G/DL
ALBUMIN/GLOB SERPL: 0.7 RATIO
ALPHA1 GLOB MFR SERPL ELPH: 4 %
ALPHA1 GLOB SERPL ELPH-MCNC: 0.3 G/DL
ALPHA2 GLOB MFR SERPL ELPH: 8.7 %
ALPHA2 GLOB SERPL ELPH-MCNC: 0.7 G/DL
B-GLOBULIN MFR SERPL ELPH: 41.7 %
B-GLOBULIN SERPL ELPH-MCNC: 3.4 G/DL
DEPRECATED KAPPA LC FREE/LAMBDA SER: 92.04 RATIO
GAMMA GLOB FLD ELPH-MCNC: 0.4 G/DL
GAMMA GLOB MFR SERPL ELPH: 5.1 %
IGA SER QL IEP: 13 MG/DL
IGG SER QL IEP: 4108 MG/DL
IGM SER QL IEP: 29 MG/DL
INTERPRETATION SERPL IEP-IMP: NORMAL
KAPPA LC CSF-MCNC: 0.96 MG/DL
KAPPA LC SERPL-MCNC: 88.36 MG/DL
M PROTEIN MFR SERPL ELPH: NORMAL %
M PROTEIN SPEC IFE-MCNC: NORMAL
MONOCLON BAND OBS SERPL: NORMAL G/DL
PROT SERPL-MCNC: 8.2 G/DL
PROT SERPL-MCNC: 8.2 G/DL

## 2019-01-17 ENCOUNTER — APPOINTMENT (OUTPATIENT)
Dept: INFUSION THERAPY | Facility: HOSPITAL | Age: 79
End: 2019-01-17

## 2019-01-17 ENCOUNTER — RESULT REVIEW (OUTPATIENT)
Age: 79
End: 2019-01-17

## 2019-01-17 LAB
BASOPHILS # BLD AUTO: 0 K/UL — SIGNIFICANT CHANGE UP (ref 0–0.2)
BASOPHILS NFR BLD AUTO: 0.3 % — SIGNIFICANT CHANGE UP (ref 0–2)
EOSINOPHIL # BLD AUTO: 0.1 K/UL — SIGNIFICANT CHANGE UP (ref 0–0.5)
EOSINOPHIL NFR BLD AUTO: 1.7 % — SIGNIFICANT CHANGE UP (ref 0–6)
HCT VFR BLD CALC: 28.8 % — LOW (ref 34.5–45)
HGB BLD-MCNC: 10.2 G/DL — LOW (ref 11.5–15.5)
LYMPHOCYTES # BLD AUTO: 2 K/UL — SIGNIFICANT CHANGE UP (ref 1–3.3)
LYMPHOCYTES # BLD AUTO: 39.9 % — SIGNIFICANT CHANGE UP (ref 13–44)
MCHC RBC-ENTMCNC: 29.4 PG — SIGNIFICANT CHANGE UP (ref 27–34)
MCHC RBC-ENTMCNC: 35.5 G/DL — SIGNIFICANT CHANGE UP (ref 32–36)
MCV RBC AUTO: 82.9 FL — SIGNIFICANT CHANGE UP (ref 80–100)
MONOCYTES # BLD AUTO: 0.4 K/UL — SIGNIFICANT CHANGE UP (ref 0–0.9)
MONOCYTES NFR BLD AUTO: 7.4 % — SIGNIFICANT CHANGE UP (ref 2–14)
NEUTROPHILS # BLD AUTO: 2.5 K/UL — SIGNIFICANT CHANGE UP (ref 1.8–7.4)
NEUTROPHILS NFR BLD AUTO: 50.7 % — SIGNIFICANT CHANGE UP (ref 43–77)
PLATELET # BLD AUTO: 223 K/UL — SIGNIFICANT CHANGE UP (ref 150–400)
RBC # BLD: 3.48 M/UL — LOW (ref 3.8–5.2)
RBC # FLD: 16 % — HIGH (ref 10.3–14.5)
WBC # BLD: 5 K/UL — SIGNIFICANT CHANGE UP (ref 3.8–10.5)
WBC # FLD AUTO: 5 K/UL — SIGNIFICANT CHANGE UP (ref 3.8–10.5)

## 2019-01-24 ENCOUNTER — RESULT REVIEW (OUTPATIENT)
Age: 79
End: 2019-01-24

## 2019-01-24 ENCOUNTER — APPOINTMENT (OUTPATIENT)
Dept: INFUSION THERAPY | Facility: HOSPITAL | Age: 79
End: 2019-01-24

## 2019-01-24 LAB
BASOPHILS # BLD AUTO: 0.1 K/UL — SIGNIFICANT CHANGE UP (ref 0–0.2)
BASOPHILS NFR BLD AUTO: 1 % — SIGNIFICANT CHANGE UP (ref 0–2)
EOSINOPHIL # BLD AUTO: 0.1 K/UL — SIGNIFICANT CHANGE UP (ref 0–0.5)
EOSINOPHIL NFR BLD AUTO: 2.1 % — SIGNIFICANT CHANGE UP (ref 0–6)
HCT VFR BLD CALC: 29.2 % — LOW (ref 34.5–45)
HGB BLD-MCNC: 10.1 G/DL — LOW (ref 11.5–15.5)
LYMPHOCYTES # BLD AUTO: 2 K/UL — SIGNIFICANT CHANGE UP (ref 1–3.3)
LYMPHOCYTES # BLD AUTO: 37.1 % — SIGNIFICANT CHANGE UP (ref 13–44)
MCHC RBC-ENTMCNC: 28.3 PG — SIGNIFICANT CHANGE UP (ref 27–34)
MCHC RBC-ENTMCNC: 34.5 G/DL — SIGNIFICANT CHANGE UP (ref 32–36)
MCV RBC AUTO: 82.1 FL — SIGNIFICANT CHANGE UP (ref 80–100)
MONOCYTES # BLD AUTO: 0.4 K/UL — SIGNIFICANT CHANGE UP (ref 0–0.9)
MONOCYTES NFR BLD AUTO: 7.8 % — SIGNIFICANT CHANGE UP (ref 2–14)
NEUTROPHILS # BLD AUTO: 2.9 K/UL — SIGNIFICANT CHANGE UP (ref 1.8–7.4)
NEUTROPHILS NFR BLD AUTO: 52.1 % — SIGNIFICANT CHANGE UP (ref 43–77)
PLATELET # BLD AUTO: 202 K/UL — SIGNIFICANT CHANGE UP (ref 150–400)
RBC # BLD: 3.56 M/UL — LOW (ref 3.8–5.2)
RBC # FLD: 16 % — HIGH (ref 10.3–14.5)
WBC # BLD: 5.5 K/UL — SIGNIFICANT CHANGE UP (ref 3.8–10.5)
WBC # FLD AUTO: 5.5 K/UL — SIGNIFICANT CHANGE UP (ref 3.8–10.5)

## 2019-01-25 ENCOUNTER — OUTPATIENT (OUTPATIENT)
Dept: OUTPATIENT SERVICES | Facility: HOSPITAL | Age: 79
LOS: 1 days | Discharge: HOME | End: 2019-01-25

## 2019-01-25 DIAGNOSIS — C90.00 MULTIPLE MYELOMA NOT HAVING ACHIEVED REMISSION: ICD-10-CM

## 2019-01-31 ENCOUNTER — RESULT REVIEW (OUTPATIENT)
Age: 79
End: 2019-01-31

## 2019-01-31 ENCOUNTER — APPOINTMENT (OUTPATIENT)
Dept: INFUSION THERAPY | Facility: HOSPITAL | Age: 79
End: 2019-01-31

## 2019-01-31 LAB
BASOPHILS # BLD AUTO: 0 K/UL — SIGNIFICANT CHANGE UP (ref 0–0.2)
BASOPHILS NFR BLD AUTO: 0 % — SIGNIFICANT CHANGE UP (ref 0–2)
EOSINOPHIL # BLD AUTO: 0.1 K/UL — SIGNIFICANT CHANGE UP (ref 0–0.5)
EOSINOPHIL NFR BLD AUTO: 1.2 % — SIGNIFICANT CHANGE UP (ref 0–6)
HCT VFR BLD CALC: 30.6 % — LOW (ref 34.5–45)
HGB BLD-MCNC: 10.3 G/DL — LOW (ref 11.5–15.5)
LYMPHOCYTES # BLD AUTO: 1.6 K/UL — SIGNIFICANT CHANGE UP (ref 1–3.3)
LYMPHOCYTES # BLD AUTO: 31.7 % — SIGNIFICANT CHANGE UP (ref 13–44)
MCHC RBC-ENTMCNC: 27.8 PG — SIGNIFICANT CHANGE UP (ref 27–34)
MCHC RBC-ENTMCNC: 33.6 G/DL — SIGNIFICANT CHANGE UP (ref 32–36)
MCV RBC AUTO: 82.7 FL — SIGNIFICANT CHANGE UP (ref 80–100)
MONOCYTES # BLD AUTO: 0.3 K/UL — SIGNIFICANT CHANGE UP (ref 0–0.9)
MONOCYTES NFR BLD AUTO: 6 % — SIGNIFICANT CHANGE UP (ref 2–14)
NEUTROPHILS # BLD AUTO: 3 K/UL — SIGNIFICANT CHANGE UP (ref 1.8–7.4)
NEUTROPHILS NFR BLD AUTO: 61.1 % — SIGNIFICANT CHANGE UP (ref 43–77)
PLATELET # BLD AUTO: 181 K/UL — SIGNIFICANT CHANGE UP (ref 150–400)
RBC # BLD: 3.7 M/UL — LOW (ref 3.8–5.2)
RBC # FLD: 16 % — HIGH (ref 10.3–14.5)
WBC # BLD: 5 K/UL — SIGNIFICANT CHANGE UP (ref 3.8–10.5)
WBC # FLD AUTO: 5 K/UL — SIGNIFICANT CHANGE UP (ref 3.8–10.5)

## 2019-02-01 DIAGNOSIS — R11.2 NAUSEA WITH VOMITING, UNSPECIFIED: ICD-10-CM

## 2019-02-01 DIAGNOSIS — Z51.11 ENCOUNTER FOR ANTINEOPLASTIC CHEMOTHERAPY: ICD-10-CM

## 2019-02-14 ENCOUNTER — RESULT REVIEW (OUTPATIENT)
Age: 79
End: 2019-02-14

## 2019-02-14 ENCOUNTER — APPOINTMENT (OUTPATIENT)
Dept: INFUSION THERAPY | Facility: HOSPITAL | Age: 79
End: 2019-02-14

## 2019-02-14 LAB
BASOPHILS # BLD AUTO: 0 K/UL — SIGNIFICANT CHANGE UP (ref 0–0.2)
BASOPHILS NFR BLD AUTO: 0.4 % — SIGNIFICANT CHANGE UP (ref 0–2)
EOSINOPHIL # BLD AUTO: 0.1 K/UL — SIGNIFICANT CHANGE UP (ref 0–0.5)
EOSINOPHIL NFR BLD AUTO: 1.5 % — SIGNIFICANT CHANGE UP (ref 0–6)
HCT VFR BLD CALC: 28.8 % — LOW (ref 34.5–45)
HGB BLD-MCNC: 10.1 G/DL — LOW (ref 11.5–15.5)
LYMPHOCYTES # BLD AUTO: 1.7 K/UL — SIGNIFICANT CHANGE UP (ref 1–3.3)
LYMPHOCYTES # BLD AUTO: 38 % — SIGNIFICANT CHANGE UP (ref 13–44)
MCHC RBC-ENTMCNC: 28.9 PG — SIGNIFICANT CHANGE UP (ref 27–34)
MCHC RBC-ENTMCNC: 35.1 G/DL — SIGNIFICANT CHANGE UP (ref 32–36)
MCV RBC AUTO: 82.5 FL — SIGNIFICANT CHANGE UP (ref 80–100)
MONOCYTES # BLD AUTO: 0.3 K/UL — SIGNIFICANT CHANGE UP (ref 0–0.9)
MONOCYTES NFR BLD AUTO: 6.4 % — SIGNIFICANT CHANGE UP (ref 2–14)
NEUTROPHILS # BLD AUTO: 2.4 K/UL — SIGNIFICANT CHANGE UP (ref 1.8–7.4)
NEUTROPHILS NFR BLD AUTO: 53.7 % — SIGNIFICANT CHANGE UP (ref 43–77)
PLATELET # BLD AUTO: 226 K/UL — SIGNIFICANT CHANGE UP (ref 150–400)
RBC # BLD: 3.49 M/UL — LOW (ref 3.8–5.2)
RBC # FLD: 15.2 % — HIGH (ref 10.3–14.5)
WBC # BLD: 4.5 K/UL — SIGNIFICANT CHANGE UP (ref 3.8–10.5)
WBC # FLD AUTO: 4.5 K/UL — SIGNIFICANT CHANGE UP (ref 3.8–10.5)

## 2019-02-21 ENCOUNTER — RESULT REVIEW (OUTPATIENT)
Age: 79
End: 2019-02-21

## 2019-02-21 ENCOUNTER — APPOINTMENT (OUTPATIENT)
Dept: INFUSION THERAPY | Facility: HOSPITAL | Age: 79
End: 2019-02-21

## 2019-02-21 ENCOUNTER — APPOINTMENT (OUTPATIENT)
Dept: HEMATOLOGY ONCOLOGY | Facility: CLINIC | Age: 79
End: 2019-02-21
Payer: MEDICARE

## 2019-02-21 VITALS
SYSTOLIC BLOOD PRESSURE: 160 MMHG | BODY MASS INDEX: 26.75 KG/M2 | TEMPERATURE: 98.4 F | OXYGEN SATURATION: 98 % | DIASTOLIC BLOOD PRESSURE: 72 MMHG | WEIGHT: 143.52 LBS | HEART RATE: 67 BPM | RESPIRATION RATE: 16 BRPM

## 2019-02-21 LAB
BASOPHILS # BLD AUTO: 0 K/UL — SIGNIFICANT CHANGE UP (ref 0–0.2)
BASOPHILS NFR BLD AUTO: 0.4 % — SIGNIFICANT CHANGE UP (ref 0–2)
EOSINOPHIL # BLD AUTO: 0 K/UL — SIGNIFICANT CHANGE UP (ref 0–0.5)
EOSINOPHIL NFR BLD AUTO: 0.7 % — SIGNIFICANT CHANGE UP (ref 0–6)
HCT VFR BLD CALC: 31.1 % — LOW (ref 34.5–45)
HGB BLD-MCNC: 10.1 G/DL — LOW (ref 11.5–15.5)
LYMPHOCYTES # BLD AUTO: 2 K/UL — SIGNIFICANT CHANGE UP (ref 1–3.3)
LYMPHOCYTES # BLD AUTO: 43.9 % — SIGNIFICANT CHANGE UP (ref 13–44)
MCHC RBC-ENTMCNC: 27.4 PG — SIGNIFICANT CHANGE UP (ref 27–34)
MCHC RBC-ENTMCNC: 32.6 G/DL — SIGNIFICANT CHANGE UP (ref 32–36)
MCV RBC AUTO: 84.2 FL — SIGNIFICANT CHANGE UP (ref 80–100)
MONOCYTES # BLD AUTO: 0.3 K/UL — SIGNIFICANT CHANGE UP (ref 0–0.9)
MONOCYTES NFR BLD AUTO: 7.7 % — SIGNIFICANT CHANGE UP (ref 2–14)
NEUTROPHILS # BLD AUTO: 2.2 K/UL — SIGNIFICANT CHANGE UP (ref 1.8–7.4)
NEUTROPHILS NFR BLD AUTO: 47.4 % — SIGNIFICANT CHANGE UP (ref 43–77)
PLATELET # BLD AUTO: 189 K/UL — SIGNIFICANT CHANGE UP (ref 150–400)
RBC # BLD: 3.7 M/UL — LOW (ref 3.8–5.2)
RBC # FLD: 15.2 % — HIGH (ref 10.3–14.5)
WBC # BLD: 4.5 K/UL — SIGNIFICANT CHANGE UP (ref 3.8–10.5)
WBC # FLD AUTO: 4.5 K/UL — SIGNIFICANT CHANGE UP (ref 3.8–10.5)

## 2019-02-21 PROCEDURE — 99213 OFFICE O/P EST LOW 20 MIN: CPT

## 2019-02-21 NOTE — ADDENDUM
[FreeTextEntry1] : Documented by Laurita Ramirez acting as a scribe for Dr. Bailey Rosales on 2/21/2019.\par \par All medical record entries made by the Scribe were at my, Dr. Bailey Rosales's, direction and personally dictated by me on 2/21/2019. I have reviewed the chart and agree that  the record accurately reflects my personal performance of the history, physical exam, assessment and plan. I have also personally directed, reviewed, and agree with the discharge instructions.\par

## 2019-02-21 NOTE — ASSESSMENT
[FreeTextEntry1] : Ms. James is 79 years old female with history of MM, RA, HTN, TIA. s/p treatment as per HPI...was  on observation...IGG 3370 (8/15/17).currently being treated again with VD\par \par Peripheral blood work demonstrates anemia H/H 10.1/31.1\par Latest IGG at 4100, previously at 4900. \par MM levels sent today. Follow up CMP, LD, Mg from today.\par We had a lengthy discussion of treatment option with Velcade 1mg/m2 with 20 mg Dex (3 weeks on; 1 week off). Rationale, benefits, risk and side effects were discussed at length. Patient and daughter verbalized understanding and consented to treatment. Signed consent\par Continue Velcade as prescribed with Decadron.\par Will Order skeletal survey and 24 hr urine for Spring 2019. \par Continue remaining medications as prescribed. \par Well care stressed, question addressed. \par Stressed regular follow up with Rheum, Nephro and PMD. \par RTC in April 2019 for F/U with Dr. Rosales.  Patient advised to contact us if any changes occur in interim.

## 2019-02-21 NOTE — HISTORY OF PRESENT ILLNESS
[de-identified] : Multiple Myeloma s/p Thal/ Dex 2008 to 2010.....TIA in 2009.....Velcade 2012 to 2013...treatment held due to neuropathy [de-identified] : Presenting for a follow up today, accompanied by her aide. She reports continued discomfort secondary to varicose veins in the lower extremity bilaterally. Notes generalized arthritic pain. She has maintained regular follow up with rheumatologist and pain management. She denies any neurological symptoms. Denies fever/chills, night sweats, mouth sores, eye dryness, blurred vision, nausea, vomiting, diarrhea. No CP, SOB or LE edema. Remains compliant with medications. Received the Flu vaccine in Sept 2018.

## 2019-02-21 NOTE — REVIEW OF SYSTEMS
[Joint Pain] : joint pain [Negative] : Allergic/Immunologic [Fever] : no fever [Chills] : no chills [Cough] : no cough [Abdominal Pain] : no abdominal pain [Vomiting] : no vomiting [FreeTextEntry9] : Stable Left Hip pain, generalized arthralgias

## 2019-02-22 ENCOUNTER — OUTPATIENT (OUTPATIENT)
Dept: OUTPATIENT SERVICES | Facility: HOSPITAL | Age: 79
LOS: 1 days | Discharge: HOME | End: 2019-02-22

## 2019-02-22 DIAGNOSIS — C90.00 MULTIPLE MYELOMA NOT HAVING ACHIEVED REMISSION: ICD-10-CM

## 2019-02-22 LAB
ALBUMIN MFR SERPL ELPH: 39.4 %
ALBUMIN SERPL ELPH-MCNC: 3.5 G/DL
ALBUMIN SERPL-MCNC: 3.3 G/DL
ALBUMIN/GLOB SERPL: 0.6 RATIO
ALP BLD-CCNC: 85 U/L
ALPHA1 GLOB MFR SERPL ELPH: 3.8 %
ALPHA1 GLOB SERPL ELPH-MCNC: 0.3 G/DL
ALPHA2 GLOB MFR SERPL ELPH: 8 %
ALPHA2 GLOB SERPL ELPH-MCNC: 0.7 G/DL
ALT SERPL-CCNC: 9 U/L
ANION GAP SERPL CALC-SCNC: 13 MMOL/L
AST SERPL-CCNC: 14 U/L
B-GLOBULIN MFR SERPL ELPH: 44.2 %
B-GLOBULIN SERPL ELPH-MCNC: 3.7 G/DL
B2 MICROGLOB SERPL-MCNC: 4.4 MG/L
BILIRUB SERPL-MCNC: 0.3 MG/DL
BUN SERPL-MCNC: 24 MG/DL
CALCIUM SERPL-MCNC: 8.8 MG/DL
CHLORIDE SERPL-SCNC: 107 MMOL/L
CO2 SERPL-SCNC: 22 MMOL/L
CREAT SERPL-MCNC: 1.45 MG/DL
DEPRECATED KAPPA LC FREE/LAMBDA SER: 83.21 RATIO
DEPRECATED KAPPA LC FREE/LAMBDA SER: 83.21 RATIO
GAMMA GLOB FLD ELPH-MCNC: 0.4 G/DL
GAMMA GLOB MFR SERPL ELPH: 4.6 %
GLUCOSE SERPL-MCNC: 77 MG/DL
IGA SER QL IEP: 12 MG/DL
IGG SER QL IEP: 3380 MG/DL
IGM SER QL IEP: 29 MG/DL
INTERPRETATION SERPL IEP-IMP: NORMAL
KAPPA LC CSF-MCNC: 1 MG/DL
KAPPA LC CSF-MCNC: 1 MG/DL
KAPPA LC SERPL-MCNC: 83.21 MG/DL
KAPPA LC SERPL-MCNC: 83.21 MG/DL
LDH SERPL-CCNC: 168 U/L
M PROTEIN MFR SERPL ELPH: NORMAL
M PROTEIN SPEC IFE-MCNC: NORMAL
MAGNESIUM SERPL-MCNC: 2.2 MG/DL
MONOCLON BAND OBS SERPL: NORMAL
POTASSIUM SERPL-SCNC: 4.5 MMOL/L
PROT SERPL-MCNC: 8.4 G/DL
SODIUM SERPL-SCNC: 142 MMOL/L

## 2019-02-28 ENCOUNTER — RESULT REVIEW (OUTPATIENT)
Age: 79
End: 2019-02-28

## 2019-02-28 ENCOUNTER — APPOINTMENT (OUTPATIENT)
Dept: INFUSION THERAPY | Facility: HOSPITAL | Age: 79
End: 2019-02-28

## 2019-02-28 LAB
BASOPHILS # BLD AUTO: 0 K/UL — SIGNIFICANT CHANGE UP (ref 0–0.2)
BASOPHILS NFR BLD AUTO: 0.8 % — SIGNIFICANT CHANGE UP (ref 0–2)
EOSINOPHIL # BLD AUTO: 0.1 K/UL — SIGNIFICANT CHANGE UP (ref 0–0.5)
EOSINOPHIL NFR BLD AUTO: 1.7 % — SIGNIFICANT CHANGE UP (ref 0–6)
HCT VFR BLD CALC: 30.5 % — LOW (ref 34.5–45)
HGB BLD-MCNC: 10.3 G/DL — LOW (ref 11.5–15.5)
LYMPHOCYTES # BLD AUTO: 2.2 K/UL — SIGNIFICANT CHANGE UP (ref 1–3.3)
LYMPHOCYTES # BLD AUTO: 39.1 % — SIGNIFICANT CHANGE UP (ref 13–44)
MCHC RBC-ENTMCNC: 28.3 PG — SIGNIFICANT CHANGE UP (ref 27–34)
MCHC RBC-ENTMCNC: 33.9 G/DL — SIGNIFICANT CHANGE UP (ref 32–36)
MCV RBC AUTO: 83.3 FL — SIGNIFICANT CHANGE UP (ref 80–100)
MONOCYTES # BLD AUTO: 0.4 K/UL — SIGNIFICANT CHANGE UP (ref 0–0.9)
MONOCYTES NFR BLD AUTO: 7.1 % — SIGNIFICANT CHANGE UP (ref 2–14)
NEUTROPHILS # BLD AUTO: 2.9 K/UL — SIGNIFICANT CHANGE UP (ref 1.8–7.4)
NEUTROPHILS NFR BLD AUTO: 51.3 % — SIGNIFICANT CHANGE UP (ref 43–77)
PLATELET # BLD AUTO: 188 K/UL — SIGNIFICANT CHANGE UP (ref 150–400)
RBC # BLD: 3.66 M/UL — LOW (ref 3.8–5.2)
RBC # FLD: 15.3 % — HIGH (ref 10.3–14.5)
WBC # BLD: 5.7 K/UL — SIGNIFICANT CHANGE UP (ref 3.8–10.5)
WBC # FLD AUTO: 5.7 K/UL — SIGNIFICANT CHANGE UP (ref 3.8–10.5)

## 2019-03-01 DIAGNOSIS — Z51.11 ENCOUNTER FOR ANTINEOPLASTIC CHEMOTHERAPY: ICD-10-CM

## 2019-03-01 DIAGNOSIS — R11.2 NAUSEA WITH VOMITING, UNSPECIFIED: ICD-10-CM

## 2019-03-14 ENCOUNTER — RESULT REVIEW (OUTPATIENT)
Age: 79
End: 2019-03-14

## 2019-03-14 ENCOUNTER — APPOINTMENT (OUTPATIENT)
Dept: INFUSION THERAPY | Facility: HOSPITAL | Age: 79
End: 2019-03-14

## 2019-03-14 LAB
BASOPHILS # BLD AUTO: 0 K/UL — SIGNIFICANT CHANGE UP (ref 0–0.2)
BASOPHILS NFR BLD AUTO: 0.5 % — SIGNIFICANT CHANGE UP (ref 0–2)
EOSINOPHIL # BLD AUTO: 0.1 K/UL — SIGNIFICANT CHANGE UP (ref 0–0.5)
EOSINOPHIL NFR BLD AUTO: 1.6 % — SIGNIFICANT CHANGE UP (ref 0–6)
HCT VFR BLD CALC: 29.9 % — LOW (ref 34.5–45)
HGB BLD-MCNC: 10.6 G/DL — LOW (ref 11.5–15.5)
LYMPHOCYTES # BLD AUTO: 2 K/UL — SIGNIFICANT CHANGE UP (ref 1–3.3)
LYMPHOCYTES # BLD AUTO: 32.5 % — SIGNIFICANT CHANGE UP (ref 13–44)
MCHC RBC-ENTMCNC: 29.3 PG — SIGNIFICANT CHANGE UP (ref 27–34)
MCHC RBC-ENTMCNC: 35.4 G/DL — SIGNIFICANT CHANGE UP (ref 32–36)
MCV RBC AUTO: 82.8 FL — SIGNIFICANT CHANGE UP (ref 80–100)
MONOCYTES # BLD AUTO: 0.4 K/UL — SIGNIFICANT CHANGE UP (ref 0–0.9)
MONOCYTES NFR BLD AUTO: 5.6 % — SIGNIFICANT CHANGE UP (ref 2–14)
NEUTROPHILS # BLD AUTO: 3.8 K/UL — SIGNIFICANT CHANGE UP (ref 1.8–7.4)
NEUTROPHILS NFR BLD AUTO: 59.7 % — SIGNIFICANT CHANGE UP (ref 43–77)
PLATELET # BLD AUTO: 227 K/UL — SIGNIFICANT CHANGE UP (ref 150–400)
RBC # BLD: 3.61 M/UL — LOW (ref 3.8–5.2)
RBC # FLD: 14.7 % — HIGH (ref 10.3–14.5)
WBC # BLD: 6.3 K/UL — SIGNIFICANT CHANGE UP (ref 3.8–10.5)
WBC # FLD AUTO: 6.3 K/UL — SIGNIFICANT CHANGE UP (ref 3.8–10.5)

## 2019-03-21 ENCOUNTER — APPOINTMENT (OUTPATIENT)
Dept: INFUSION THERAPY | Facility: HOSPITAL | Age: 79
End: 2019-03-21

## 2019-03-21 ENCOUNTER — RESULT REVIEW (OUTPATIENT)
Age: 79
End: 2019-03-21

## 2019-03-21 LAB
BASOPHILS # BLD AUTO: 0 K/UL — SIGNIFICANT CHANGE UP (ref 0–0.2)
BASOPHILS NFR BLD AUTO: 0.3 % — SIGNIFICANT CHANGE UP (ref 0–2)
EOSINOPHIL # BLD AUTO: 0 K/UL — SIGNIFICANT CHANGE UP (ref 0–0.5)
EOSINOPHIL NFR BLD AUTO: 0.6 % — SIGNIFICANT CHANGE UP (ref 0–6)
HCT VFR BLD CALC: 30.2 % — LOW (ref 34.5–45)
HGB BLD-MCNC: 10.6 G/DL — LOW (ref 11.5–15.5)
LYMPHOCYTES # BLD AUTO: 2.2 K/UL — SIGNIFICANT CHANGE UP (ref 1–3.3)
LYMPHOCYTES # BLD AUTO: 41.2 % — SIGNIFICANT CHANGE UP (ref 13–44)
MCHC RBC-ENTMCNC: 29.4 PG — SIGNIFICANT CHANGE UP (ref 27–34)
MCHC RBC-ENTMCNC: 35.1 G/DL — SIGNIFICANT CHANGE UP (ref 32–36)
MCV RBC AUTO: 83.8 FL — SIGNIFICANT CHANGE UP (ref 80–100)
MONOCYTES # BLD AUTO: 0.3 K/UL — SIGNIFICANT CHANGE UP (ref 0–0.9)
MONOCYTES NFR BLD AUTO: 6.2 % — SIGNIFICANT CHANGE UP (ref 2–14)
NEUTROPHILS # BLD AUTO: 2.7 K/UL — SIGNIFICANT CHANGE UP (ref 1.8–7.4)
NEUTROPHILS NFR BLD AUTO: 51.6 % — SIGNIFICANT CHANGE UP (ref 43–77)
PLATELET # BLD AUTO: 184 K/UL — SIGNIFICANT CHANGE UP (ref 150–400)
RBC # BLD: 3.6 M/UL — LOW (ref 3.8–5.2)
RBC # FLD: 14.8 % — HIGH (ref 10.3–14.5)
WBC # BLD: 5.2 K/UL — SIGNIFICANT CHANGE UP (ref 3.8–10.5)
WBC # FLD AUTO: 5.2 K/UL — SIGNIFICANT CHANGE UP (ref 3.8–10.5)

## 2019-03-22 ENCOUNTER — OUTPATIENT (OUTPATIENT)
Dept: OUTPATIENT SERVICES | Facility: HOSPITAL | Age: 79
LOS: 1 days | Discharge: HOME | End: 2019-03-22

## 2019-03-22 DIAGNOSIS — C90.00 MULTIPLE MYELOMA NOT HAVING ACHIEVED REMISSION: ICD-10-CM

## 2019-03-22 LAB
ALBUMIN SERPL ELPH-MCNC: 3.5 G/DL
ALP BLD-CCNC: 90 U/L
ALT SERPL-CCNC: 9 U/L
ANION GAP SERPL CALC-SCNC: 14 MMOL/L
AST SERPL-CCNC: 14 U/L
BILIRUB SERPL-MCNC: 0.3 MG/DL
BUN SERPL-MCNC: 27 MG/DL
CALCIUM SERPL-MCNC: 9 MG/DL
CHLORIDE SERPL-SCNC: 107 MMOL/L
CO2 SERPL-SCNC: 20 MMOL/L
CREAT SERPL-MCNC: 1.52 MG/DL
GLUCOSE SERPL-MCNC: 75 MG/DL
LDH SERPL-CCNC: 167 U/L
MAGNESIUM SERPL-MCNC: 2.3 MG/DL
POTASSIUM SERPL-SCNC: 4.1 MMOL/L
PROT SERPL-MCNC: 8.8 G/DL
SODIUM SERPL-SCNC: 141 MMOL/L

## 2019-03-28 ENCOUNTER — APPOINTMENT (OUTPATIENT)
Dept: INFUSION THERAPY | Facility: HOSPITAL | Age: 79
End: 2019-03-28

## 2019-03-28 ENCOUNTER — RESULT REVIEW (OUTPATIENT)
Age: 79
End: 2019-03-28

## 2019-03-28 LAB
BASOPHILS # BLD AUTO: 0 K/UL — SIGNIFICANT CHANGE UP (ref 0–0.2)
BASOPHILS NFR BLD AUTO: 0.1 % — SIGNIFICANT CHANGE UP (ref 0–2)
EOSINOPHIL # BLD AUTO: 0 K/UL — SIGNIFICANT CHANGE UP (ref 0–0.5)
EOSINOPHIL NFR BLD AUTO: 0 % — SIGNIFICANT CHANGE UP (ref 0–6)
HCT VFR BLD CALC: 29.8 % — LOW (ref 34.5–45)
HGB BLD-MCNC: 10.8 G/DL — LOW (ref 11.5–15.5)
LYMPHOCYTES # BLD AUTO: 1.8 K/UL — SIGNIFICANT CHANGE UP (ref 1–3.3)
LYMPHOCYTES # BLD AUTO: 33.2 % — SIGNIFICANT CHANGE UP (ref 13–44)
MCHC RBC-ENTMCNC: 30.1 PG — SIGNIFICANT CHANGE UP (ref 27–34)
MCHC RBC-ENTMCNC: 36.2 G/DL — HIGH (ref 32–36)
MCV RBC AUTO: 83.1 FL — SIGNIFICANT CHANGE UP (ref 80–100)
MONOCYTES # BLD AUTO: 0.3 K/UL — SIGNIFICANT CHANGE UP (ref 0–0.9)
MONOCYTES NFR BLD AUTO: 5.4 % — SIGNIFICANT CHANGE UP (ref 2–14)
NEUTROPHILS # BLD AUTO: 3.3 K/UL — SIGNIFICANT CHANGE UP (ref 1.8–7.4)
NEUTROPHILS NFR BLD AUTO: 61.2 % — SIGNIFICANT CHANGE UP (ref 43–77)
PLATELET # BLD AUTO: 171 K/UL — SIGNIFICANT CHANGE UP (ref 150–400)
RBC # BLD: 3.58 M/UL — LOW (ref 3.8–5.2)
RBC # FLD: 14.6 % — HIGH (ref 10.3–14.5)
WBC # BLD: 5.4 K/UL — SIGNIFICANT CHANGE UP (ref 3.8–10.5)
WBC # FLD AUTO: 5.4 K/UL — SIGNIFICANT CHANGE UP (ref 3.8–10.5)

## 2019-03-29 DIAGNOSIS — Z51.11 ENCOUNTER FOR ANTINEOPLASTIC CHEMOTHERAPY: ICD-10-CM

## 2019-03-29 DIAGNOSIS — R11.2 NAUSEA WITH VOMITING, UNSPECIFIED: ICD-10-CM

## 2019-03-31 LAB
ALBUMIN MFR SERPL ELPH: 39 %
ALBUMIN SERPL-MCNC: 3.4 G/DL
ALBUMIN/GLOB SERPL: 0.6 RATIO
ALPHA1 GLOB MFR SERPL ELPH: 3.8 %
ALPHA1 GLOB SERPL ELPH-MCNC: 0.3 G/DL
ALPHA2 GLOB MFR SERPL ELPH: 8.1 %
ALPHA2 GLOB SERPL ELPH-MCNC: 0.7 G/DL
B-GLOBULIN MFR SERPL ELPH: 44.2 %
B-GLOBULIN SERPL ELPH-MCNC: 3.9 G/DL
DEPRECATED KAPPA LC FREE/LAMBDA SER: 87.92 RATIO
GAMMA GLOB FLD ELPH-MCNC: 0.4 G/DL
GAMMA GLOB MFR SERPL ELPH: 4.9 %
IGA SER QL IEP: 13 MG/DL
IGG SER QL IEP: 3798 MG/DL
IGM SER QL IEP: 25 MG/DL
INTERPRETATION SERPL IEP-IMP: NORMAL
KAPPA LC CSF-MCNC: 1.05 MG/DL
KAPPA LC SERPL-MCNC: 92.32 MG/DL
M PROTEIN MFR SERPL ELPH: NORMAL
M PROTEIN SPEC IFE-MCNC: NORMAL
MONOCLON BAND OBS SERPL: NORMAL
PROT SERPL-MCNC: 8.8 G/DL
PROT SERPL-MCNC: 8.8 G/DL

## 2019-04-11 ENCOUNTER — RESULT REVIEW (OUTPATIENT)
Age: 79
End: 2019-04-11

## 2019-04-11 ENCOUNTER — APPOINTMENT (OUTPATIENT)
Dept: INFUSION THERAPY | Facility: HOSPITAL | Age: 79
End: 2019-04-11

## 2019-04-11 LAB
BASOPHILS # BLD AUTO: 0 K/UL — SIGNIFICANT CHANGE UP (ref 0–0.2)
BASOPHILS NFR BLD AUTO: 0.1 % — SIGNIFICANT CHANGE UP (ref 0–2)
EOSINOPHIL # BLD AUTO: 0 K/UL — SIGNIFICANT CHANGE UP (ref 0–0.5)
EOSINOPHIL NFR BLD AUTO: 0 % — SIGNIFICANT CHANGE UP (ref 0–6)
HCT VFR BLD CALC: 28.9 % — LOW (ref 34.5–45)
HGB BLD-MCNC: 10.6 G/DL — LOW (ref 11.5–15.5)
LYMPHOCYTES # BLD AUTO: 1.9 K/UL — SIGNIFICANT CHANGE UP (ref 1–3.3)
LYMPHOCYTES # BLD AUTO: 43.4 % — SIGNIFICANT CHANGE UP (ref 13–44)
MCHC RBC-ENTMCNC: 30.7 PG — SIGNIFICANT CHANGE UP (ref 27–34)
MCHC RBC-ENTMCNC: 36.5 G/DL — HIGH (ref 32–36)
MCV RBC AUTO: 84 FL — SIGNIFICANT CHANGE UP (ref 80–100)
MONOCYTES # BLD AUTO: 0.3 K/UL — SIGNIFICANT CHANGE UP (ref 0–0.9)
MONOCYTES NFR BLD AUTO: 6.8 % — SIGNIFICANT CHANGE UP (ref 2–14)
NEUTROPHILS # BLD AUTO: 2.2 K/UL — SIGNIFICANT CHANGE UP (ref 1.8–7.4)
NEUTROPHILS NFR BLD AUTO: 49.7 % — SIGNIFICANT CHANGE UP (ref 43–77)
PLAT MORPH BLD: NORMAL — SIGNIFICANT CHANGE UP
PLATELET # BLD AUTO: 189 K/UL — SIGNIFICANT CHANGE UP (ref 150–400)
RBC # BLD: 3.44 M/UL — LOW (ref 3.8–5.2)
RBC # FLD: 14.7 % — HIGH (ref 10.3–14.5)
RBC BLD AUTO: SIGNIFICANT CHANGE UP
WBC # BLD: 4.5 K/UL — SIGNIFICANT CHANGE UP (ref 3.8–10.5)
WBC # FLD AUTO: 4.5 K/UL — SIGNIFICANT CHANGE UP (ref 3.8–10.5)

## 2019-04-18 ENCOUNTER — RESULT REVIEW (OUTPATIENT)
Age: 79
End: 2019-04-18

## 2019-04-18 ENCOUNTER — APPOINTMENT (OUTPATIENT)
Dept: INFUSION THERAPY | Facility: HOSPITAL | Age: 79
End: 2019-04-18

## 2019-04-18 LAB
BASOPHILS # BLD AUTO: 0.1 K/UL — SIGNIFICANT CHANGE UP (ref 0–0.2)
BASOPHILS NFR BLD AUTO: 1 % — SIGNIFICANT CHANGE UP (ref 0–2)
EOSINOPHIL # BLD AUTO: 0 K/UL — SIGNIFICANT CHANGE UP (ref 0–0.5)
EOSINOPHIL NFR BLD AUTO: 1 % — SIGNIFICANT CHANGE UP (ref 0–6)
HCT VFR BLD CALC: 29.2 % — LOW (ref 34.5–45)
HGB BLD-MCNC: 10.7 G/DL — LOW (ref 11.5–15.5)
LYMPHOCYTES # BLD AUTO: 2 K/UL — SIGNIFICANT CHANGE UP (ref 1–3.3)
LYMPHOCYTES # BLD AUTO: 41 % — SIGNIFICANT CHANGE UP (ref 13–44)
MCHC RBC-ENTMCNC: 30.8 PG — SIGNIFICANT CHANGE UP (ref 27–34)
MCHC RBC-ENTMCNC: 36.7 G/DL — HIGH (ref 32–36)
MCV RBC AUTO: 83.7 FL — SIGNIFICANT CHANGE UP (ref 80–100)
MONOCYTES # BLD AUTO: 0.5 K/UL — SIGNIFICANT CHANGE UP (ref 0–0.9)
MONOCYTES NFR BLD AUTO: 11 % — SIGNIFICANT CHANGE UP (ref 2–14)
NEUTROPHILS # BLD AUTO: 2.4 K/UL — SIGNIFICANT CHANGE UP (ref 1.8–7.4)
NEUTROPHILS NFR BLD AUTO: 46 % — SIGNIFICANT CHANGE UP (ref 43–77)
PLAT MORPH BLD: NORMAL — SIGNIFICANT CHANGE UP
PLATELET # BLD AUTO: 165 K/UL — SIGNIFICANT CHANGE UP (ref 150–400)
RBC # BLD: 3.49 M/UL — LOW (ref 3.8–5.2)
RBC # FLD: 14.7 % — HIGH (ref 10.3–14.5)
RBC BLD AUTO: SIGNIFICANT CHANGE UP
WBC # BLD: 5 K/UL — SIGNIFICANT CHANGE UP (ref 3.8–10.5)
WBC # FLD AUTO: 5 K/UL — SIGNIFICANT CHANGE UP (ref 3.8–10.5)

## 2019-04-19 ENCOUNTER — OUTPATIENT (OUTPATIENT)
Dept: OUTPATIENT SERVICES | Facility: HOSPITAL | Age: 79
LOS: 1 days | Discharge: HOME | End: 2019-04-19

## 2019-04-19 DIAGNOSIS — C90.00 MULTIPLE MYELOMA NOT HAVING ACHIEVED REMISSION: ICD-10-CM

## 2019-04-25 ENCOUNTER — RESULT REVIEW (OUTPATIENT)
Age: 79
End: 2019-04-25

## 2019-04-25 ENCOUNTER — APPOINTMENT (OUTPATIENT)
Dept: INFUSION THERAPY | Facility: HOSPITAL | Age: 79
End: 2019-04-25

## 2019-04-25 ENCOUNTER — APPOINTMENT (OUTPATIENT)
Dept: HEMATOLOGY ONCOLOGY | Facility: CLINIC | Age: 79
End: 2019-04-25
Payer: MEDICARE

## 2019-04-25 VITALS
SYSTOLIC BLOOD PRESSURE: 147 MMHG | OXYGEN SATURATION: 100 % | HEART RATE: 66 BPM | TEMPERATURE: 98.9 F | RESPIRATION RATE: 16 BRPM | DIASTOLIC BLOOD PRESSURE: 72 MMHG | WEIGHT: 142.86 LBS | BODY MASS INDEX: 26.63 KG/M2

## 2019-04-25 LAB
BASOPHILS # BLD AUTO: 0.1 K/UL — SIGNIFICANT CHANGE UP (ref 0–0.2)
BASOPHILS NFR BLD AUTO: 1 % — SIGNIFICANT CHANGE UP (ref 0–2)
EOSINOPHIL # BLD AUTO: 0 K/UL — SIGNIFICANT CHANGE UP (ref 0–0.5)
EOSINOPHIL NFR BLD AUTO: 1 % — SIGNIFICANT CHANGE UP (ref 0–6)
HCT VFR BLD CALC: 30.7 % — LOW (ref 34.5–45)
HGB BLD-MCNC: 10.5 G/DL — LOW (ref 11.5–15.5)
LYMPHOCYTES # BLD AUTO: 1.8 K/UL — SIGNIFICANT CHANGE UP (ref 1–3.3)
LYMPHOCYTES # BLD AUTO: 34.2 % — SIGNIFICANT CHANGE UP (ref 13–44)
MCHC RBC-ENTMCNC: 29.3 PG — SIGNIFICANT CHANGE UP (ref 27–34)
MCHC RBC-ENTMCNC: 34.4 G/DL — SIGNIFICANT CHANGE UP (ref 32–36)
MCV RBC AUTO: 85.1 FL — SIGNIFICANT CHANGE UP (ref 80–100)
MONOCYTES # BLD AUTO: 0.4 K/UL — SIGNIFICANT CHANGE UP (ref 0–0.9)
MONOCYTES NFR BLD AUTO: 6.8 % — SIGNIFICANT CHANGE UP (ref 2–14)
NEUTROPHILS # BLD AUTO: 2.9 K/UL — SIGNIFICANT CHANGE UP (ref 1.8–7.4)
NEUTROPHILS NFR BLD AUTO: 57 % — SIGNIFICANT CHANGE UP (ref 43–77)
PLATELET # BLD AUTO: 170 K/UL — SIGNIFICANT CHANGE UP (ref 150–400)
RBC # BLD: 3.6 M/UL — LOW (ref 3.8–5.2)
RBC # FLD: 14.6 % — HIGH (ref 10.3–14.5)
WBC # BLD: 5.1 K/UL — SIGNIFICANT CHANGE UP (ref 3.8–10.5)
WBC # FLD AUTO: 5.1 K/UL — SIGNIFICANT CHANGE UP (ref 3.8–10.5)

## 2019-04-25 PROCEDURE — 99214 OFFICE O/P EST MOD 30 MIN: CPT

## 2019-04-26 DIAGNOSIS — R11.2 NAUSEA WITH VOMITING, UNSPECIFIED: ICD-10-CM

## 2019-04-26 DIAGNOSIS — Z51.11 ENCOUNTER FOR ANTINEOPLASTIC CHEMOTHERAPY: ICD-10-CM

## 2019-04-26 LAB
ALBUMIN SERPL ELPH-MCNC: 3.4 G/DL
ALP BLD-CCNC: 100 U/L
ALT SERPL-CCNC: 11 U/L
ANION GAP SERPL CALC-SCNC: 12 MMOL/L
AST SERPL-CCNC: 15 U/L
B2 MICROGLOB SERPL-MCNC: 4.2 MG/L
BILIRUB SERPL-MCNC: 0.3 MG/DL
BUN SERPL-MCNC: 25 MG/DL
CALCIUM SERPL-MCNC: 9.1 MG/DL
CHLORIDE SERPL-SCNC: 106 MMOL/L
CO2 SERPL-SCNC: 24 MMOL/L
CREAT SERPL-MCNC: 1.45 MG/DL
DEPRECATED KAPPA LC FREE/LAMBDA SER: 77.08 RATIO
GLUCOSE SERPL-MCNC: 128 MG/DL
KAPPA LC CSF-MCNC: 0.97 MG/DL
KAPPA LC SERPL-MCNC: 74.77 MG/DL
LDH SERPL-CCNC: 166 U/L
MAGNESIUM SERPL-MCNC: 2.6 MG/DL
POTASSIUM SERPL-SCNC: 4.3 MMOL/L
PROT SERPL-MCNC: 8.1 G/DL
SODIUM SERPL-SCNC: 142 MMOL/L

## 2019-04-26 NOTE — ADDENDUM
[FreeTextEntry1] : Documented by Laurita Ramirez acting as a scribe for Dr. Bailey Rosales on 04/25/2019.\par \par All medical record entries made by the Scribe were at my, Dr. Bailey Rosales's, direction and personally dictated by me on 04/25/2019. I have reviewed the chart and agree that  the record accurately reflects my personal performance of the history, physical exam, assessment and plan. I have also personally directed, reviewed, and agree with the discharge instructions.\par

## 2019-04-26 NOTE — HISTORY OF PRESENT ILLNESS
[de-identified] : Multiple Myeloma s/p Thal/ Dex 2008 to 2010.....TIA in 2009.....Velcade 2012 to 2013...treatment held due to neuropathy..resumed b/c of POD...tolerating thus far [de-identified] : Presenting for a follow up today, accompanied by her granddaughter. She reports continued discomfort secondary to varicose veins in the lower extremity bilaterally. Notes generalized arthritic pain. Patient also notes having heel spurs. She does not believe that pain levels have changed. She denies any neurological symptoms. Denies fever/chills, night sweats, mouth sores, eye dryness, blurred vision, nausea, vomiting, diarrhea. No CP, SOB or LE edema. Remains compliant with medications. Received the Flu vaccine in Sept 2018. Her last 24-hour urine was in March 2018.

## 2019-04-26 NOTE — ASSESSMENT
[FreeTextEntry1] : Ms. James is 79 years old female with history of MM, RA, HTN, TIA. s/p treatment as per HPI...was  on observation...IGG 3370 (8/15/17). Currently being treated again with VD.\par \par Peripheral blood work demonstrates anemia H/H 10.5/30.7\par IGG 3798 on 3/21/19, previously at 4900. \par MM levels sent today. Follow up CMP, LD, Mg from today.\par Continue Velcade 1mg/m2 with 20 mg Dex (3 weeks on; 1 week off). Rationale, benefits, risk and side effects were discussed at length. Patient and daughter verbalized understanding and consented to treatment. Signed consent. \par Ordered skeletal survey in May 2019, and 24 hr urine due for Spring 2019. \par Continue remaining medications as prescribed. \par Well care stressed, question addressed. \par Stressed regular follow up with Rheum, Nephro and PMD. \par RTC in June 2019 for F/U with Dr. Rosales.  Patient advised to contact us if any changes occur in interim.

## 2019-04-26 NOTE — REVIEW OF SYSTEMS
[Joint Pain] : joint pain [Negative] : Allergic/Immunologic [Chills] : no chills [Fever] : no fever [Cough] : no cough [Abdominal Pain] : no abdominal pain [Vomiting] : no vomiting [FreeTextEntry5] : varicose veins [FreeTextEntry9] : Stable Left Hip pain, generalized arthralgias

## 2019-04-27 LAB
ALBUMIN MFR SERPL ELPH: 40.3 %
ALBUMIN SERPL-MCNC: 3.3 G/DL
ALBUMIN/GLOB SERPL: 0.7 RATIO
ALPHA1 GLOB MFR SERPL ELPH: 3.8 %
ALPHA1 GLOB SERPL ELPH-MCNC: 0.3 G/DL
ALPHA2 GLOB MFR SERPL ELPH: 8.4 %
ALPHA2 GLOB SERPL ELPH-MCNC: 0.7 G/DL
B-GLOBULIN MFR SERPL ELPH: 42.6 %
B-GLOBULIN SERPL ELPH-MCNC: 3.5 G/DL
DEPRECATED KAPPA LC FREE/LAMBDA SER: 77.08 RATIO
GAMMA GLOB FLD ELPH-MCNC: 0.4 G/DL
GAMMA GLOB MFR SERPL ELPH: 4.9 %
IGA SER QL IEP: 10 MG/DL
IGG SER QL IEP: 3101 MG/DL
IGM SER QL IEP: 20 MG/DL
INTERPRETATION SERPL IEP-IMP: NORMAL
KAPPA LC CSF-MCNC: 0.97 MG/DL
KAPPA LC SERPL-MCNC: 74.77 MG/DL
M PROTEIN MFR SERPL ELPH: NORMAL
M PROTEIN SPEC IFE-MCNC: NORMAL
MONOCLON BAND OBS SERPL: NORMAL
PROT SERPL-MCNC: 8.1 G/DL

## 2019-05-09 ENCOUNTER — RESULT REVIEW (OUTPATIENT)
Age: 79
End: 2019-05-09

## 2019-05-09 ENCOUNTER — APPOINTMENT (OUTPATIENT)
Dept: INFUSION THERAPY | Facility: HOSPITAL | Age: 79
End: 2019-05-09

## 2019-05-09 LAB
EOSINOPHIL # BLD AUTO: 0 K/UL — SIGNIFICANT CHANGE UP (ref 0–0.5)
EOSINOPHIL NFR BLD AUTO: 3 % — SIGNIFICANT CHANGE UP (ref 0–6)
HCT VFR BLD CALC: 28.9 % — LOW (ref 34.5–45)
HGB BLD-MCNC: 10.5 G/DL — LOW (ref 11.5–15.5)
LYMPHOCYTES # BLD AUTO: 1.4 K/UL — SIGNIFICANT CHANGE UP (ref 1–3.3)
LYMPHOCYTES # BLD AUTO: 28 % — SIGNIFICANT CHANGE UP (ref 13–44)
MCHC RBC-ENTMCNC: 30.3 PG — SIGNIFICANT CHANGE UP (ref 27–34)
MCHC RBC-ENTMCNC: 36.2 G/DL — HIGH (ref 32–36)
MCV RBC AUTO: 83.6 FL — SIGNIFICANT CHANGE UP (ref 80–100)
MONOCYTES # BLD AUTO: 0.4 K/UL — SIGNIFICANT CHANGE UP (ref 0–0.9)
MONOCYTES NFR BLD AUTO: 11 % — SIGNIFICANT CHANGE UP (ref 2–14)
NEUTROPHILS # BLD AUTO: 2.6 K/UL — SIGNIFICANT CHANGE UP (ref 1.8–7.4)
NEUTROPHILS NFR BLD AUTO: 58 % — SIGNIFICANT CHANGE UP (ref 43–77)
PLAT MORPH BLD: NORMAL — SIGNIFICANT CHANGE UP
PLATELET # BLD AUTO: 241 K/UL — SIGNIFICANT CHANGE UP (ref 150–400)
RBC # BLD: 3.45 M/UL — LOW (ref 3.8–5.2)
RBC # FLD: 14.1 % — SIGNIFICANT CHANGE UP (ref 10.3–14.5)
RBC BLD AUTO: SIGNIFICANT CHANGE UP
WBC # BLD: 4.4 K/UL — SIGNIFICANT CHANGE UP (ref 3.8–10.5)
WBC # FLD AUTO: 4.4 K/UL — SIGNIFICANT CHANGE UP (ref 3.8–10.5)

## 2019-05-16 ENCOUNTER — RESULT REVIEW (OUTPATIENT)
Age: 79
End: 2019-05-16

## 2019-05-16 ENCOUNTER — APPOINTMENT (OUTPATIENT)
Dept: INFUSION THERAPY | Facility: HOSPITAL | Age: 79
End: 2019-05-16

## 2019-05-16 LAB
BASOPHILS # BLD AUTO: 0 K/UL — SIGNIFICANT CHANGE UP (ref 0–0.2)
BASOPHILS NFR BLD AUTO: 0 % — SIGNIFICANT CHANGE UP (ref 0–2)
EOSINOPHIL # BLD AUTO: 0 K/UL — SIGNIFICANT CHANGE UP (ref 0–0.5)
EOSINOPHIL NFR BLD AUTO: 0 % — SIGNIFICANT CHANGE UP (ref 0–6)
HCT VFR BLD CALC: 30.2 % — LOW (ref 34.5–45)
HGB BLD-MCNC: 10.9 G/DL — LOW (ref 11.5–15.5)
LYMPHOCYTES # BLD AUTO: 1.5 K/UL — SIGNIFICANT CHANGE UP (ref 1–3.3)
LYMPHOCYTES # BLD AUTO: 36.7 % — SIGNIFICANT CHANGE UP (ref 13–44)
MCHC RBC-ENTMCNC: 30.2 PG — SIGNIFICANT CHANGE UP (ref 27–34)
MCHC RBC-ENTMCNC: 36.2 G/DL — HIGH (ref 32–36)
MCV RBC AUTO: 83.6 FL — SIGNIFICANT CHANGE UP (ref 80–100)
MONOCYTES # BLD AUTO: 0.4 K/UL — SIGNIFICANT CHANGE UP (ref 0–0.9)
MONOCYTES NFR BLD AUTO: 9.2 % — SIGNIFICANT CHANGE UP (ref 2–14)
NEUTROPHILS # BLD AUTO: 2.2 K/UL — SIGNIFICANT CHANGE UP (ref 1.8–7.4)
NEUTROPHILS NFR BLD AUTO: 54.1 % — SIGNIFICANT CHANGE UP (ref 43–77)
PLAT MORPH BLD: NORMAL — SIGNIFICANT CHANGE UP
PLATELET # BLD AUTO: 196 K/UL — SIGNIFICANT CHANGE UP (ref 150–400)
RBC # BLD: 3.62 M/UL — LOW (ref 3.8–5.2)
RBC # FLD: 14.3 % — SIGNIFICANT CHANGE UP (ref 10.3–14.5)
RBC BLD AUTO: SIGNIFICANT CHANGE UP
WBC # BLD: 4 K/UL — SIGNIFICANT CHANGE UP (ref 3.8–10.5)
WBC # FLD AUTO: 4 K/UL — SIGNIFICANT CHANGE UP (ref 3.8–10.5)

## 2019-05-21 ENCOUNTER — OUTPATIENT (OUTPATIENT)
Dept: OUTPATIENT SERVICES | Facility: HOSPITAL | Age: 79
LOS: 1 days | Discharge: HOME | End: 2019-05-21

## 2019-05-21 DIAGNOSIS — C90.00 MULTIPLE MYELOMA NOT HAVING ACHIEVED REMISSION: ICD-10-CM

## 2019-05-22 ENCOUNTER — FORM ENCOUNTER (OUTPATIENT)
Age: 79
End: 2019-05-22

## 2019-05-23 ENCOUNTER — APPOINTMENT (OUTPATIENT)
Dept: RADIOLOGY | Facility: IMAGING CENTER | Age: 79
End: 2019-05-23
Payer: MEDICARE

## 2019-05-23 ENCOUNTER — APPOINTMENT (OUTPATIENT)
Dept: INFUSION THERAPY | Facility: HOSPITAL | Age: 79
End: 2019-05-23

## 2019-05-23 ENCOUNTER — OUTPATIENT (OUTPATIENT)
Dept: OUTPATIENT SERVICES | Facility: HOSPITAL | Age: 79
LOS: 1 days | End: 2019-05-23
Payer: COMMERCIAL

## 2019-05-23 ENCOUNTER — RESULT REVIEW (OUTPATIENT)
Age: 79
End: 2019-05-23

## 2019-05-23 DIAGNOSIS — C90.00 MULTIPLE MYELOMA NOT HAVING ACHIEVED REMISSION: ICD-10-CM

## 2019-05-23 LAB
BASOPHILS # BLD AUTO: 0 K/UL — SIGNIFICANT CHANGE UP (ref 0–0.2)
BASOPHILS NFR BLD AUTO: 0.2 % — SIGNIFICANT CHANGE UP (ref 0–2)
EOSINOPHIL # BLD AUTO: 0 K/UL — SIGNIFICANT CHANGE UP (ref 0–0.5)
EOSINOPHIL NFR BLD AUTO: 0 % — SIGNIFICANT CHANGE UP (ref 0–6)
HCT VFR BLD CALC: 31.6 % — LOW (ref 34.5–45)
HGB BLD-MCNC: 10.9 G/DL — LOW (ref 11.5–15.5)
LYMPHOCYTES # BLD AUTO: 1.6 K/UL — SIGNIFICANT CHANGE UP (ref 1–3.3)
LYMPHOCYTES # BLD AUTO: 33 % — SIGNIFICANT CHANGE UP (ref 13–44)
MCHC RBC-ENTMCNC: 28.9 PG — SIGNIFICANT CHANGE UP (ref 27–34)
MCHC RBC-ENTMCNC: 34.6 G/DL — SIGNIFICANT CHANGE UP (ref 32–36)
MCV RBC AUTO: 83.6 FL — SIGNIFICANT CHANGE UP (ref 80–100)
MONOCYTES # BLD AUTO: 0.5 K/UL — SIGNIFICANT CHANGE UP (ref 0–0.9)
MONOCYTES NFR BLD AUTO: 10.2 % — SIGNIFICANT CHANGE UP (ref 2–14)
NEUTROPHILS # BLD AUTO: 2.7 K/UL — SIGNIFICANT CHANGE UP (ref 1.8–7.4)
NEUTROPHILS NFR BLD AUTO: 56.6 % — SIGNIFICANT CHANGE UP (ref 43–77)
PLATELET # BLD AUTO: 186 K/UL — SIGNIFICANT CHANGE UP (ref 150–400)
RBC # BLD: 3.77 M/UL — LOW (ref 3.8–5.2)
RBC # FLD: 14.2 % — SIGNIFICANT CHANGE UP (ref 10.3–14.5)
WBC # BLD: 4.7 K/UL — SIGNIFICANT CHANGE UP (ref 3.8–10.5)
WBC # FLD AUTO: 4.7 K/UL — SIGNIFICANT CHANGE UP (ref 3.8–10.5)

## 2019-05-23 PROCEDURE — 77074 RADEX OSSEOUS SURVEY LMTD: CPT

## 2019-05-23 PROCEDURE — 77074 RADEX OSSEOUS SURVEY LMTD: CPT | Mod: 26

## 2019-05-24 DIAGNOSIS — Z51.11 ENCOUNTER FOR ANTINEOPLASTIC CHEMOTHERAPY: ICD-10-CM

## 2019-05-24 DIAGNOSIS — R11.2 NAUSEA WITH VOMITING, UNSPECIFIED: ICD-10-CM

## 2019-06-06 ENCOUNTER — RESULT REVIEW (OUTPATIENT)
Age: 79
End: 2019-06-06

## 2019-06-06 ENCOUNTER — APPOINTMENT (OUTPATIENT)
Dept: INFUSION THERAPY | Facility: HOSPITAL | Age: 79
End: 2019-06-06

## 2019-06-06 LAB
BASOPHILS # BLD AUTO: 0 K/UL — SIGNIFICANT CHANGE UP (ref 0–0.2)
BASOPHILS NFR BLD AUTO: 0.4 % — SIGNIFICANT CHANGE UP (ref 0–2)
EOSINOPHIL # BLD AUTO: 0.1 K/UL — SIGNIFICANT CHANGE UP (ref 0–0.5)
EOSINOPHIL NFR BLD AUTO: 2.3 % — SIGNIFICANT CHANGE UP (ref 0–6)
HCT VFR BLD CALC: 29.5 % — LOW (ref 34.5–45)
HGB BLD-MCNC: 10.2 G/DL — LOW (ref 11.5–15.5)
LYMPHOCYTES # BLD AUTO: 2 K/UL — SIGNIFICANT CHANGE UP (ref 1–3.3)
LYMPHOCYTES # BLD AUTO: 43.5 % — SIGNIFICANT CHANGE UP (ref 13–44)
MCHC RBC-ENTMCNC: 28.5 PG — SIGNIFICANT CHANGE UP (ref 27–34)
MCHC RBC-ENTMCNC: 34.5 G/DL — SIGNIFICANT CHANGE UP (ref 32–36)
MCV RBC AUTO: 82.4 FL — SIGNIFICANT CHANGE UP (ref 80–100)
MONOCYTES # BLD AUTO: 0.3 K/UL — SIGNIFICANT CHANGE UP (ref 0–0.9)
MONOCYTES NFR BLD AUTO: 7.2 % — SIGNIFICANT CHANGE UP (ref 2–14)
NEUTROPHILS # BLD AUTO: 2.1 K/UL — SIGNIFICANT CHANGE UP (ref 1.8–7.4)
NEUTROPHILS NFR BLD AUTO: 46.7 % — SIGNIFICANT CHANGE UP (ref 43–77)
PLATELET # BLD AUTO: 256 K/UL — SIGNIFICANT CHANGE UP (ref 150–400)
RBC # BLD: 3.58 M/UL — LOW (ref 3.8–5.2)
RBC # FLD: 13.9 % — SIGNIFICANT CHANGE UP (ref 10.3–14.5)
WBC # BLD: 4.6 K/UL — SIGNIFICANT CHANGE UP (ref 3.8–10.5)
WBC # FLD AUTO: 4.6 K/UL — SIGNIFICANT CHANGE UP (ref 3.8–10.5)

## 2019-06-12 ENCOUNTER — RESULT REVIEW (OUTPATIENT)
Age: 79
End: 2019-06-12

## 2019-06-12 ENCOUNTER — APPOINTMENT (OUTPATIENT)
Dept: INFUSION THERAPY | Facility: HOSPITAL | Age: 79
End: 2019-06-12

## 2019-06-12 LAB
BASOPHILS # BLD AUTO: 0 K/UL — SIGNIFICANT CHANGE UP (ref 0–0.2)
BASOPHILS NFR BLD AUTO: 0.7 % — SIGNIFICANT CHANGE UP (ref 0–2)
EOSINOPHIL # BLD AUTO: 0 K/UL — SIGNIFICANT CHANGE UP (ref 0–0.5)
EOSINOPHIL NFR BLD AUTO: 0 % — SIGNIFICANT CHANGE UP (ref 0–6)
HCT VFR BLD CALC: 30.3 % — LOW (ref 34.5–45)
HGB BLD-MCNC: 10.5 G/DL — LOW (ref 11.5–15.5)
LYMPHOCYTES # BLD AUTO: 1.9 K/UL — SIGNIFICANT CHANGE UP (ref 1–3.3)
LYMPHOCYTES # BLD AUTO: 39.9 % — SIGNIFICANT CHANGE UP (ref 13–44)
MCHC RBC-ENTMCNC: 28.7 PG — SIGNIFICANT CHANGE UP (ref 27–34)
MCHC RBC-ENTMCNC: 34.7 G/DL — SIGNIFICANT CHANGE UP (ref 32–36)
MCV RBC AUTO: 82.6 FL — SIGNIFICANT CHANGE UP (ref 80–100)
MONOCYTES # BLD AUTO: 0.4 K/UL — SIGNIFICANT CHANGE UP (ref 0–0.9)
MONOCYTES NFR BLD AUTO: 8.4 % — SIGNIFICANT CHANGE UP (ref 2–14)
NEUTROPHILS # BLD AUTO: 2.4 K/UL — SIGNIFICANT CHANGE UP (ref 1.8–7.4)
NEUTROPHILS NFR BLD AUTO: 51 % — SIGNIFICANT CHANGE UP (ref 43–77)
PLATELET # BLD AUTO: 197 K/UL — SIGNIFICANT CHANGE UP (ref 150–400)
RBC # BLD: 3.66 M/UL — LOW (ref 3.8–5.2)
RBC # FLD: 14 % — SIGNIFICANT CHANGE UP (ref 10.3–14.5)
WBC # BLD: 4.7 K/UL — SIGNIFICANT CHANGE UP (ref 3.8–10.5)
WBC # FLD AUTO: 4.7 K/UL — SIGNIFICANT CHANGE UP (ref 3.8–10.5)

## 2019-06-20 ENCOUNTER — RESULT REVIEW (OUTPATIENT)
Age: 79
End: 2019-06-20

## 2019-06-20 ENCOUNTER — APPOINTMENT (OUTPATIENT)
Dept: HEMATOLOGY ONCOLOGY | Facility: CLINIC | Age: 79
End: 2019-06-20
Payer: MEDICARE

## 2019-06-20 ENCOUNTER — APPOINTMENT (OUTPATIENT)
Dept: INFUSION THERAPY | Facility: HOSPITAL | Age: 79
End: 2019-06-20

## 2019-06-20 VITALS
BODY MASS INDEX: 25.97 KG/M2 | DIASTOLIC BLOOD PRESSURE: 75 MMHG | TEMPERATURE: 98.6 F | SYSTOLIC BLOOD PRESSURE: 164 MMHG | HEART RATE: 61 BPM | WEIGHT: 139.33 LBS | RESPIRATION RATE: 16 BRPM | OXYGEN SATURATION: 99 %

## 2019-06-20 LAB
BASOPHILS # BLD AUTO: 0 K/UL — SIGNIFICANT CHANGE UP (ref 0–0.2)
BASOPHILS NFR BLD AUTO: 0.6 % — SIGNIFICANT CHANGE UP (ref 0–2)
EOSINOPHIL # BLD AUTO: 0.1 K/UL — SIGNIFICANT CHANGE UP (ref 0–0.5)
EOSINOPHIL NFR BLD AUTO: 2.5 % — SIGNIFICANT CHANGE UP (ref 0–6)
HCT VFR BLD CALC: 32.8 % — LOW (ref 34.5–45)
HGB BLD-MCNC: 10.8 G/DL — LOW (ref 11.5–15.5)
LYMPHOCYTES # BLD AUTO: 1.6 K/UL — SIGNIFICANT CHANGE UP (ref 1–3.3)
LYMPHOCYTES # BLD AUTO: 33 % — SIGNIFICANT CHANGE UP (ref 13–44)
MCHC RBC-ENTMCNC: 28.2 PG — SIGNIFICANT CHANGE UP (ref 27–34)
MCHC RBC-ENTMCNC: 32.9 G/DL — SIGNIFICANT CHANGE UP (ref 32–36)
MCV RBC AUTO: 85.7 FL — SIGNIFICANT CHANGE UP (ref 80–100)
MONOCYTES # BLD AUTO: 0.4 K/UL — SIGNIFICANT CHANGE UP (ref 0–0.9)
MONOCYTES NFR BLD AUTO: 7.3 % — SIGNIFICANT CHANGE UP (ref 2–14)
NEUTROPHILS # BLD AUTO: 2.7 K/UL — SIGNIFICANT CHANGE UP (ref 1.8–7.4)
NEUTROPHILS NFR BLD AUTO: 56.6 % — SIGNIFICANT CHANGE UP (ref 43–77)
PLATELET # BLD AUTO: 171 K/UL — SIGNIFICANT CHANGE UP (ref 150–400)
RBC # BLD: 3.83 M/UL — SIGNIFICANT CHANGE UP (ref 3.8–5.2)
RBC # FLD: 14.8 % — HIGH (ref 10.3–14.5)
WBC # BLD: 4.8 K/UL — SIGNIFICANT CHANGE UP (ref 3.8–10.5)
WBC # FLD AUTO: 4.8 K/UL — SIGNIFICANT CHANGE UP (ref 3.8–10.5)

## 2019-06-20 PROCEDURE — 99213 OFFICE O/P EST LOW 20 MIN: CPT

## 2019-06-21 LAB
ALBUMIN SERPL ELPH-MCNC: 3.2 G/DL
ALP BLD-CCNC: 70 U/L
ALT SERPL-CCNC: 9 U/L
ANION GAP SERPL CALC-SCNC: 11 MMOL/L
AST SERPL-CCNC: 12 U/L
B2 MICROGLOB SERPL-MCNC: 3.5 MG/L
BILIRUB SERPL-MCNC: 0.4 MG/DL
BUN SERPL-MCNC: 15 MG/DL
CALCIUM SERPL-MCNC: 8.9 MG/DL
CHLORIDE SERPL-SCNC: 107 MMOL/L
CO2 SERPL-SCNC: 22 MMOL/L
CREAT SERPL-MCNC: 1.43 MG/DL
DEPRECATED KAPPA LC FREE/LAMBDA SER: 79.32 RATIO
GLUCOSE SERPL-MCNC: 140 MG/DL
KAPPA LC CSF-MCNC: 0.96 MG/DL
KAPPA LC SERPL-MCNC: 76.15 MG/DL
LDH SERPL-CCNC: 179 U/L
MAGNESIUM SERPL-MCNC: 2.3 MG/DL
POTASSIUM SERPL-SCNC: 4.5 MMOL/L
PROT SERPL-MCNC: 8.1 G/DL
SODIUM SERPL-SCNC: 139 MMOL/L

## 2019-06-24 LAB
ALBUMIN MFR SERPL ELPH: 40.1 %
ALBUMIN SERPL-MCNC: 3.2 G/DL
ALBUMIN/GLOB SERPL: 0.7 RATIO
ALPHA1 GLOB MFR SERPL ELPH: 3.8 %
ALPHA1 GLOB SERPL ELPH-MCNC: 0.3 G/DL
ALPHA2 GLOB MFR SERPL ELPH: 7.9 %
ALPHA2 GLOB SERPL ELPH-MCNC: 0.6 G/DL
B-GLOBULIN MFR SERPL ELPH: 43.4 %
B-GLOBULIN SERPL ELPH-MCNC: 3.5 G/DL
DEPRECATED KAPPA LC FREE/LAMBDA SER: 79.32 RATIO
GAMMA GLOB FLD ELPH-MCNC: 0.4 G/DL
GAMMA GLOB MFR SERPL ELPH: 4.8 %
IGA SER QL IEP: 12 MG/DL
IGG SER QL IEP: 3365 MG/DL
IGM SER QL IEP: 25 MG/DL
INTERPRETATION SERPL IEP-IMP: NORMAL
KAPPA LC CSF-MCNC: 0.96 MG/DL
KAPPA LC SERPL-MCNC: 76.15 MG/DL
M PROTEIN MFR SERPL ELPH: NORMAL
M PROTEIN SPEC IFE-MCNC: NORMAL
MONOCLON BAND OBS SERPL: NORMAL
PROT SERPL-MCNC: 8.1 G/DL
PROT SERPL-MCNC: 8.1 G/DL

## 2019-06-25 NOTE — ASSESSMENT
[FreeTextEntry1] : Ms. James is 79 years old female with history of MM, RA, HTN, TIA. s/p treatment as per HPI...was  on observation...IGG 3370 (8/15/17). Currently being treated again with VD.\par \par Peripheral blood work demonstrates anemia H/H 10.8/32.8\par IGG 3101 on 4/25/19\par Follow up CMP, LD, Mg, MM labs from today.\par Continue Velcade 1mg/m2 with 20 mg Dex (3 weeks on; 1 week off). \par Skeletal survey in May 2019 results reviewed with patient\par Continue remaining medications as prescribed. \par Questions and concerns addressed\par Continue regular follow up with Rheum, Nephro and PCP\par RTC in August 2019 for F/U with Dr. Rosales.  Patient advised to contact us if any changes occur in interim.

## 2019-06-25 NOTE — HISTORY OF PRESENT ILLNESS
[de-identified] : Multiple Myeloma s/p Thal/ Dex 2008 to 2010.....TIA in 2009.....Velcade 2012 to 2013...treatment held due to neuropathy..resumed b/c of POD...tolerating thus far [de-identified] : Presenting for a follow up today, accompanied by her granddaughter. She reports continued discomfort secondary to varicose veins in the lower extremity bilaterally. Notes generalized arthritic pain. Patient also notes having heel spurs. She does not believe that pain levels have changed. She denies any neurological symptoms. Denies fever/chills, night sweats, mouth sores, eye dryness, blurred vision, nausea, vomiting, diarrhea. No CP, SOB or LE edema. Remains compliant with medications. Received the Flu vaccine in Sept 2018. Her last 24-hour urine was in March 2018. \par \par On 6/20/19, patient presents for a follow up visit. Overall doing well and offers no acute concerns. Denies fever, chills, nausea, vomiting, diarrhea, rash, mouth sores, dysuria or any active signs of bleeding.Denies SOB, chest pain or B/L LE edema. Denies any pain at this time. Remains compliant with medications. \par

## 2019-06-25 NOTE — REVIEW OF SYSTEMS
[Fever] : no fever [Chills] : no chills [Cough] : no cough [Abdominal Pain] : no abdominal pain [Vomiting] : no vomiting [Negative] : Musculoskeletal [FreeTextEntry5] : varicose veins

## 2019-06-28 ENCOUNTER — OUTPATIENT (OUTPATIENT)
Dept: OUTPATIENT SERVICES | Facility: HOSPITAL | Age: 79
LOS: 1 days | Discharge: HOME | End: 2019-06-28

## 2019-06-28 DIAGNOSIS — C90.00 MULTIPLE MYELOMA NOT HAVING ACHIEVED REMISSION: ICD-10-CM

## 2019-07-03 ENCOUNTER — RESULT REVIEW (OUTPATIENT)
Age: 79
End: 2019-07-03

## 2019-07-03 ENCOUNTER — APPOINTMENT (OUTPATIENT)
Dept: INFUSION THERAPY | Facility: HOSPITAL | Age: 79
End: 2019-07-03

## 2019-07-03 LAB
BASOPHILS # BLD AUTO: 0 K/UL — SIGNIFICANT CHANGE UP (ref 0–0.2)
BASOPHILS NFR BLD AUTO: 0.2 % — SIGNIFICANT CHANGE UP (ref 0–2)
EOSINOPHIL # BLD AUTO: 0.1 K/UL — SIGNIFICANT CHANGE UP (ref 0–0.5)
EOSINOPHIL NFR BLD AUTO: 1.7 % — SIGNIFICANT CHANGE UP (ref 0–6)
HCT VFR BLD CALC: 31.1 % — LOW (ref 34.5–45)
HGB BLD-MCNC: 10.5 G/DL — LOW (ref 11.5–15.5)
LYMPHOCYTES # BLD AUTO: 1.6 K/UL — SIGNIFICANT CHANGE UP (ref 1–3.3)
LYMPHOCYTES # BLD AUTO: 40.2 % — SIGNIFICANT CHANGE UP (ref 13–44)
MCHC RBC-ENTMCNC: 28.7 PG — SIGNIFICANT CHANGE UP (ref 27–34)
MCHC RBC-ENTMCNC: 33.8 G/DL — SIGNIFICANT CHANGE UP (ref 32–36)
MCV RBC AUTO: 84.9 FL — SIGNIFICANT CHANGE UP (ref 80–100)
MONOCYTES # BLD AUTO: 0.3 K/UL — SIGNIFICANT CHANGE UP (ref 0–0.9)
MONOCYTES NFR BLD AUTO: 8.3 % — SIGNIFICANT CHANGE UP (ref 2–14)
NEUTROPHILS # BLD AUTO: 2 K/UL — SIGNIFICANT CHANGE UP (ref 1.8–7.4)
NEUTROPHILS NFR BLD AUTO: 49.7 % — SIGNIFICANT CHANGE UP (ref 43–77)
PLATELET # BLD AUTO: 195 K/UL — SIGNIFICANT CHANGE UP (ref 150–400)
RBC # BLD: 3.67 M/UL — LOW (ref 3.8–5.2)
RBC # FLD: 14.6 % — HIGH (ref 10.3–14.5)
WBC # BLD: 4 K/UL — SIGNIFICANT CHANGE UP (ref 3.8–10.5)
WBC # FLD AUTO: 4 K/UL — SIGNIFICANT CHANGE UP (ref 3.8–10.5)

## 2019-07-05 DIAGNOSIS — Z51.11 ENCOUNTER FOR ANTINEOPLASTIC CHEMOTHERAPY: ICD-10-CM

## 2019-07-05 DIAGNOSIS — R11.2 NAUSEA WITH VOMITING, UNSPECIFIED: ICD-10-CM

## 2019-07-10 ENCOUNTER — RESULT REVIEW (OUTPATIENT)
Age: 79
End: 2019-07-10

## 2019-07-10 ENCOUNTER — APPOINTMENT (OUTPATIENT)
Dept: INFUSION THERAPY | Facility: HOSPITAL | Age: 79
End: 2019-07-10

## 2019-07-10 LAB
BASOPHILS # BLD AUTO: 0 K/UL — SIGNIFICANT CHANGE UP (ref 0–0.2)
BASOPHILS NFR BLD AUTO: 0 % — SIGNIFICANT CHANGE UP (ref 0–2)
EOSINOPHIL # BLD AUTO: 0 K/UL — SIGNIFICANT CHANGE UP (ref 0–0.5)
EOSINOPHIL NFR BLD AUTO: 1.2 % — SIGNIFICANT CHANGE UP (ref 0–6)
HCT VFR BLD CALC: 31.4 % — LOW (ref 34.5–45)
HGB BLD-MCNC: 10.5 G/DL — LOW (ref 11.5–15.5)
LYMPHOCYTES # BLD AUTO: 1.6 K/UL — SIGNIFICANT CHANGE UP (ref 1–3.3)
LYMPHOCYTES # BLD AUTO: 40.9 % — SIGNIFICANT CHANGE UP (ref 13–44)
MCHC RBC-ENTMCNC: 28.7 PG — SIGNIFICANT CHANGE UP (ref 27–34)
MCHC RBC-ENTMCNC: 33.3 G/DL — SIGNIFICANT CHANGE UP (ref 32–36)
MCV RBC AUTO: 86.1 FL — SIGNIFICANT CHANGE UP (ref 80–100)
MONOCYTES # BLD AUTO: 0.4 K/UL — SIGNIFICANT CHANGE UP (ref 0–0.9)
MONOCYTES NFR BLD AUTO: 9.2 % — SIGNIFICANT CHANGE UP (ref 2–14)
NEUTROPHILS # BLD AUTO: 1.8 K/UL — SIGNIFICANT CHANGE UP (ref 1.8–7.4)
NEUTROPHILS NFR BLD AUTO: 48.7 % — SIGNIFICANT CHANGE UP (ref 43–77)
PLATELET # BLD AUTO: 172 K/UL — SIGNIFICANT CHANGE UP (ref 150–400)
RBC # BLD: 3.65 M/UL — LOW (ref 3.8–5.2)
RBC # FLD: 15.3 % — HIGH (ref 10.3–14.5)
WBC # BLD: 3.8 K/UL — SIGNIFICANT CHANGE UP (ref 3.8–10.5)
WBC # FLD AUTO: 3.8 K/UL — SIGNIFICANT CHANGE UP (ref 3.8–10.5)

## 2019-07-18 ENCOUNTER — APPOINTMENT (OUTPATIENT)
Dept: INFUSION THERAPY | Facility: HOSPITAL | Age: 79
End: 2019-07-18

## 2019-07-18 ENCOUNTER — RESULT REVIEW (OUTPATIENT)
Age: 79
End: 2019-07-18

## 2019-07-18 LAB
BASOPHILS # BLD AUTO: SIGNIFICANT CHANGE UP (ref 0–0.2)
EOSINOPHIL NFR BLD AUTO: 1 % — SIGNIFICANT CHANGE UP (ref 0–6)
HCT VFR BLD CALC: 31.4 % — LOW (ref 34.5–45)
HGB BLD-MCNC: 10.7 G/DL — LOW (ref 11.5–15.5)
LYMPHOCYTES # BLD AUTO: 32 % — SIGNIFICANT CHANGE UP (ref 13–44)
MCHC RBC-ENTMCNC: 29 PG — SIGNIFICANT CHANGE UP (ref 27–34)
MCHC RBC-ENTMCNC: 34 G/DL — SIGNIFICANT CHANGE UP (ref 32–36)
MCV RBC AUTO: 85.3 FL — SIGNIFICANT CHANGE UP (ref 80–100)
MONOCYTES NFR BLD AUTO: 4 % — SIGNIFICANT CHANGE UP (ref 2–14)
NEUTROPHILS # BLD AUTO: 3.5 K/UL — SIGNIFICANT CHANGE UP (ref 1.8–7.4)
NEUTROPHILS NFR BLD AUTO: 63 % — SIGNIFICANT CHANGE UP (ref 43–77)
PLAT MORPH BLD: NORMAL — SIGNIFICANT CHANGE UP
PLATELET # BLD AUTO: 150 K/UL — SIGNIFICANT CHANGE UP (ref 150–400)
RBC # BLD: 3.68 M/UL — LOW (ref 3.8–5.2)
RBC # FLD: 15.4 % — HIGH (ref 10.3–14.5)
RBC BLD AUTO: SIGNIFICANT CHANGE UP
WBC # BLD: 5.8 K/UL — SIGNIFICANT CHANGE UP (ref 3.8–10.5)
WBC # FLD AUTO: 5.8 K/UL — SIGNIFICANT CHANGE UP (ref 3.8–10.5)

## 2019-07-29 ENCOUNTER — OUTPATIENT (OUTPATIENT)
Dept: OUTPATIENT SERVICES | Facility: HOSPITAL | Age: 79
LOS: 1 days | Discharge: HOME | End: 2019-07-29

## 2019-07-29 DIAGNOSIS — C90.00 MULTIPLE MYELOMA NOT HAVING ACHIEVED REMISSION: ICD-10-CM

## 2019-08-01 ENCOUNTER — APPOINTMENT (OUTPATIENT)
Dept: INFUSION THERAPY | Facility: HOSPITAL | Age: 79
End: 2019-08-01

## 2019-08-02 ENCOUNTER — APPOINTMENT (OUTPATIENT)
Dept: INFUSION THERAPY | Facility: HOSPITAL | Age: 79
End: 2019-08-02

## 2019-08-02 ENCOUNTER — RESULT REVIEW (OUTPATIENT)
Age: 79
End: 2019-08-02

## 2019-08-02 LAB
BASOPHILS # BLD AUTO: 0 K/UL — SIGNIFICANT CHANGE UP (ref 0–0.2)
BASOPHILS NFR BLD AUTO: 0.3 % — SIGNIFICANT CHANGE UP (ref 0–2)
EOSINOPHIL # BLD AUTO: 0 K/UL — SIGNIFICANT CHANGE UP (ref 0–0.5)
EOSINOPHIL NFR BLD AUTO: 0.8 % — SIGNIFICANT CHANGE UP (ref 0–6)
HCT VFR BLD CALC: 31.2 % — LOW (ref 34.5–45)
HGB BLD-MCNC: 10.6 G/DL — LOW (ref 11.5–15.5)
LYMPHOCYTES # BLD AUTO: 1.8 K/UL — SIGNIFICANT CHANGE UP (ref 1–3.3)
LYMPHOCYTES # BLD AUTO: 43.2 % — SIGNIFICANT CHANGE UP (ref 13–44)
MCHC RBC-ENTMCNC: 29.8 PG — SIGNIFICANT CHANGE UP (ref 27–34)
MCHC RBC-ENTMCNC: 34.1 G/DL — SIGNIFICANT CHANGE UP (ref 32–36)
MCV RBC AUTO: 87.2 FL — SIGNIFICANT CHANGE UP (ref 80–100)
MONOCYTES # BLD AUTO: 0.2 K/UL — SIGNIFICANT CHANGE UP (ref 0–0.9)
MONOCYTES NFR BLD AUTO: 5.1 % — SIGNIFICANT CHANGE UP (ref 2–14)
NEUTROPHILS # BLD AUTO: 2.1 K/UL — SIGNIFICANT CHANGE UP (ref 1.8–7.4)
NEUTROPHILS NFR BLD AUTO: 50.6 % — SIGNIFICANT CHANGE UP (ref 43–77)
PLATELET # BLD AUTO: 191 K/UL — SIGNIFICANT CHANGE UP (ref 150–400)
RBC # BLD: 3.58 M/UL — LOW (ref 3.8–5.2)
RBC # FLD: 15.7 % — HIGH (ref 10.3–14.5)
WBC # BLD: 4.1 K/UL — SIGNIFICANT CHANGE UP (ref 3.8–10.5)
WBC # FLD AUTO: 4.1 K/UL — SIGNIFICANT CHANGE UP (ref 3.8–10.5)

## 2019-08-04 LAB
ALBUMIN SERPL ELPH-MCNC: 3.7 G/DL
ALP BLD-CCNC: 76 U/L
ALT SERPL-CCNC: 7 U/L
ANION GAP SERPL CALC-SCNC: 13 MMOL/L
AST SERPL-CCNC: 17 U/L
B2 MICROGLOB SERPL-MCNC: 4.6 MG/L
BILIRUB SERPL-MCNC: 0.4 MG/DL
BUN SERPL-MCNC: 29 MG/DL
CALCIUM SERPL-MCNC: 9 MG/DL
CHLORIDE SERPL-SCNC: 108 MMOL/L
CO2 SERPL-SCNC: 20 MMOL/L
CREAT SERPL-MCNC: 1.78 MG/DL
GLUCOSE SERPL-MCNC: 111 MG/DL
LDH SERPL-CCNC: 167 U/L
MAGNESIUM SERPL-MCNC: 2.4 MG/DL
POTASSIUM SERPL-SCNC: 4.2 MMOL/L
PROT SERPL-MCNC: 8.2 G/DL
SODIUM SERPL-SCNC: 140 MMOL/L

## 2019-08-06 DIAGNOSIS — R11.2 NAUSEA WITH VOMITING, UNSPECIFIED: ICD-10-CM

## 2019-08-06 DIAGNOSIS — Z51.11 ENCOUNTER FOR ANTINEOPLASTIC CHEMOTHERAPY: ICD-10-CM

## 2019-08-06 LAB
ALBUMIN MFR SERPL ELPH: 41.1 %
ALBUMIN SERPL-MCNC: 3.4 G/DL
ALBUMIN/GLOB SERPL: 0.7 RATIO
ALPHA1 GLOB MFR SERPL ELPH: 3.8 %
ALPHA1 GLOB SERPL ELPH-MCNC: 0.3 G/DL
ALPHA2 GLOB MFR SERPL ELPH: 8.3 %
ALPHA2 GLOB SERPL ELPH-MCNC: 0.7 G/DL
B-GLOBULIN MFR SERPL ELPH: 41.5 %
B-GLOBULIN SERPL ELPH-MCNC: 3.4 G/DL
DEPRECATED KAPPA LC FREE/LAMBDA SER: 106.05 RATIO
GAMMA GLOB FLD ELPH-MCNC: 0.4 G/DL
GAMMA GLOB MFR SERPL ELPH: 5.3 %
IGA SER QL IEP: 10 MG/DL
IGG SER QL IEP: 3102 MG/DL
IGM SER QL IEP: 22 MG/DL
INTERPRETATION SERPL IEP-IMP: NORMAL
KAPPA LC CSF-MCNC: 0.92 MG/DL
KAPPA LC SERPL-MCNC: 97.57 MG/DL
M PROTEIN MFR SERPL ELPH: NORMAL
M PROTEIN SPEC IFE-MCNC: NORMAL
MONOCLON BAND OBS SERPL: NORMAL
PROT SERPL-MCNC: 8.2 G/DL
PROT SERPL-MCNC: 8.2 G/DL

## 2019-08-08 ENCOUNTER — RESULT REVIEW (OUTPATIENT)
Age: 79
End: 2019-08-08

## 2019-08-08 ENCOUNTER — APPOINTMENT (OUTPATIENT)
Dept: INFUSION THERAPY | Facility: HOSPITAL | Age: 79
End: 2019-08-08

## 2019-08-08 LAB
BASOPHILS # BLD AUTO: 0 K/UL — SIGNIFICANT CHANGE UP (ref 0–0.2)
BASOPHILS NFR BLD AUTO: 0.2 % — SIGNIFICANT CHANGE UP (ref 0–2)
EOSINOPHIL # BLD AUTO: 0.1 K/UL — SIGNIFICANT CHANGE UP (ref 0–0.5)
EOSINOPHIL NFR BLD AUTO: 1.2 % — SIGNIFICANT CHANGE UP (ref 0–6)
HCT VFR BLD CALC: 32.2 % — LOW (ref 34.5–45)
HGB BLD-MCNC: 10.8 G/DL — LOW (ref 11.5–15.5)
LYMPHOCYTES # BLD AUTO: 2.2 K/UL — SIGNIFICANT CHANGE UP (ref 1–3.3)
LYMPHOCYTES # BLD AUTO: 48.3 % — HIGH (ref 13–44)
MCHC RBC-ENTMCNC: 29 PG — SIGNIFICANT CHANGE UP (ref 27–34)
MCHC RBC-ENTMCNC: 33.6 G/DL — SIGNIFICANT CHANGE UP (ref 32–36)
MCV RBC AUTO: 86.4 FL — SIGNIFICANT CHANGE UP (ref 80–100)
MONOCYTES # BLD AUTO: 0.4 K/UL — SIGNIFICANT CHANGE UP (ref 0–0.9)
MONOCYTES NFR BLD AUTO: 8.7 % — SIGNIFICANT CHANGE UP (ref 2–14)
NEUTROPHILS # BLD AUTO: 1.9 K/UL — SIGNIFICANT CHANGE UP (ref 1.8–7.4)
NEUTROPHILS NFR BLD AUTO: 41.6 % — LOW (ref 43–77)
PLATELET # BLD AUTO: 179 K/UL — SIGNIFICANT CHANGE UP (ref 150–400)
RBC # BLD: 3.72 M/UL — LOW (ref 3.8–5.2)
RBC # FLD: 15.9 % — HIGH (ref 10.3–14.5)
WBC # BLD: 4.6 K/UL — SIGNIFICANT CHANGE UP (ref 3.8–10.5)
WBC # FLD AUTO: 4.6 K/UL — SIGNIFICANT CHANGE UP (ref 3.8–10.5)

## 2019-08-15 ENCOUNTER — APPOINTMENT (OUTPATIENT)
Dept: HEMATOLOGY ONCOLOGY | Facility: CLINIC | Age: 79
End: 2019-08-15
Payer: MEDICARE

## 2019-08-15 ENCOUNTER — RESULT REVIEW (OUTPATIENT)
Age: 79
End: 2019-08-15

## 2019-08-15 ENCOUNTER — APPOINTMENT (OUTPATIENT)
Dept: INFUSION THERAPY | Facility: HOSPITAL | Age: 79
End: 2019-08-15

## 2019-08-15 VITALS
DIASTOLIC BLOOD PRESSURE: 69 MMHG | WEIGHT: 140.41 LBS | SYSTOLIC BLOOD PRESSURE: 153 MMHG | OXYGEN SATURATION: 100 % | TEMPERATURE: 99.2 F | HEART RATE: 66 BPM | BODY MASS INDEX: 26.17 KG/M2 | RESPIRATION RATE: 16 BRPM

## 2019-08-15 LAB
BASOPHILS # BLD AUTO: 0 K/UL — SIGNIFICANT CHANGE UP (ref 0–0.2)
EOSINOPHIL # BLD AUTO: 0 K/UL — SIGNIFICANT CHANGE UP (ref 0–0.5)
HCT VFR BLD CALC: 31.2 % — LOW (ref 34.5–45)
HGB BLD-MCNC: 10.5 G/DL — LOW (ref 11.5–15.5)
LYMPHOCYTES # BLD AUTO: 2.4 K/UL — SIGNIFICANT CHANGE UP (ref 1–3.3)
LYMPHOCYTES # BLD AUTO: 41 % — SIGNIFICANT CHANGE UP (ref 13–44)
MCHC RBC-ENTMCNC: 29.4 PG — SIGNIFICANT CHANGE UP (ref 27–34)
MCHC RBC-ENTMCNC: 33.6 G/DL — SIGNIFICANT CHANGE UP (ref 32–36)
MCV RBC AUTO: 87.7 FL — SIGNIFICANT CHANGE UP (ref 80–100)
MONOCYTES # BLD AUTO: 0.3 K/UL — SIGNIFICANT CHANGE UP (ref 0–0.9)
MONOCYTES NFR BLD AUTO: 7 % — SIGNIFICANT CHANGE UP (ref 2–14)
NEUTROPHILS # BLD AUTO: 3 K/UL — SIGNIFICANT CHANGE UP (ref 1.8–7.4)
NEUTROPHILS NFR BLD AUTO: 52 % — SIGNIFICANT CHANGE UP (ref 43–77)
PLAT MORPH BLD: NORMAL — SIGNIFICANT CHANGE UP
PLATELET # BLD AUTO: 162 K/UL — SIGNIFICANT CHANGE UP (ref 150–400)
RBC # BLD: 3.56 M/UL — LOW (ref 3.8–5.2)
RBC # FLD: 16 % — HIGH (ref 10.3–14.5)
RBC BLD AUTO: SIGNIFICANT CHANGE UP
WBC # BLD: 5.8 K/UL — SIGNIFICANT CHANGE UP (ref 3.8–10.5)
WBC # FLD AUTO: 5.8 K/UL — SIGNIFICANT CHANGE UP (ref 3.8–10.5)

## 2019-08-15 PROCEDURE — 99214 OFFICE O/P EST MOD 30 MIN: CPT

## 2019-08-15 NOTE — REVIEW OF SYSTEMS
[Joint Pain] : joint pain [Negative] : Allergic/Immunologic [Lower Ext Edema] : lower extremity edema [Chills] : no chills [Fever] : no fever [Cough] : no cough [Vomiting] : no vomiting [Abdominal Pain] : no abdominal pain [FreeTextEntry9] : stable left hip pain, generalized arthritis, hand stiffness [FreeTextEntry5] : bilateral LE varicose veins and edema

## 2019-08-15 NOTE — HISTORY OF PRESENT ILLNESS
[de-identified] : Multiple Myeloma s/p Thal/ Dex 2008 to 2010.....TIA in 2009.....Velcade 2012 to 2013...treatment held due to neuropathy..resumed b/c of POD...tolerating thus far [de-identified] : Presenting for a follow up today, accompanied by her daughter. She reports a healthy appetite. Notes generalized arthritic pain. She c/o bilateral LE varicose vein pain, continued left hip pain, bilateral LE edema, and hand stiffness. She does not believe that pain levels have changed. She denies any neurological symptoms. Denies fever/chills, night sweats, mouth sores, eye dryness, blurred vision, nausea, vomiting, diarrhea. No CP, SOB or LE edema. Remains compliant with medications. She received treatment last week, is receiving treatment today, and also receiving treatment next week.

## 2019-08-15 NOTE — ASSESSMENT
[FreeTextEntry1] : Ms. James is 79 years old female with history of MM, RA, HTN, TIA. s/p treatment as per HPI...was  on observation...IGG 3370 (8/15/17). Currently being treated again with VD.\par \par Peripheral blood work demonstrates anemia H/H 10.8/32.2\par IGG 3102 on 8/2/19, 3798 on 3/21/19, previously at 4900. \par Labs sent for CMP, LDH, Mg. Will follow up with results.\par Continue Velcade 1mg/m2 with 20 mg Dex (3 weeks on; 1 week off). Rationale, benefits, risk and side effects were discussed at length. Patient and daughter verbalized understanding and consented to treatment. Signed consent. \par Plan to order 24 hr urine for November 2019. \par Reviewed skeletal survey (5/23/19) and discussed with patient.\par Continue remaining medications as prescribed. \par Well care stressed, question addressed. \par Stressed regular follow up with Rheum, Nephro and PMD. \par RTC in November 2019 for f/u with Dr. Rosales.  Patient advised to contact us if any changes occur in interim.

## 2019-08-15 NOTE — ADDENDUM
[FreeTextEntry1] : Documented by Freddie Smith acting as a scribe for Dr. Bailey Rosales on 08/15/2019.\par \par All medical record entries made by the Scribe were at my, Dr. Bailey Rosales, direction and personally dictated by me on 08/15/2019. I have reviewed the chart and agree that  the record accurately reflects my personal performance of the history, physical exam, assessment and plan. I have also personally directed, reviewed, and agree with the discharge instructions.\par \par

## 2019-08-15 NOTE — RESULTS/DATA
[FreeTextEntry1] : Metastatic Bone Survey (5/23/19):\par Generalized osteopenia otherwise no developed discrete suspicious lytic lesions. No current upper or lower extremity fractures or impending pathologic fractures. No vertebral compression fractures. Stable previously seen incidentally observed findings including: Scattered bilateral lung calcified granulomata and calcified left hilar lymph nodes. Generalized spinal degenerative changes. Old healed bilateral rib fracture deformities again noted. Thick bilateral acromial undersurface bony spurring projecting into the subacromial spaces and bilateral greater tuberosity margin spurring. Atherosclerotic abdominal aortic calcifications.

## 2019-08-16 LAB
ALBUMIN SERPL ELPH-MCNC: 3.4 G/DL
ALP BLD-CCNC: 84 U/L
ALT SERPL-CCNC: 12 U/L
ANION GAP SERPL CALC-SCNC: 9 MMOL/L
AST SERPL-CCNC: 14 U/L
BILIRUB SERPL-MCNC: 0.3 MG/DL
BUN SERPL-MCNC: 26 MG/DL
CALCIUM SERPL-MCNC: 9.1 MG/DL
CHLORIDE SERPL-SCNC: 108 MMOL/L
CO2 SERPL-SCNC: 22 MMOL/L
CREAT SERPL-MCNC: 1.64 MG/DL
GLUCOSE SERPL-MCNC: 125 MG/DL
LDH SERPL-CCNC: 150 U/L
MAGNESIUM SERPL-MCNC: 2.2 MG/DL
POTASSIUM SERPL-SCNC: 4.2 MMOL/L
PROT SERPL-MCNC: 7.8 G/DL
SODIUM SERPL-SCNC: 139 MMOL/L

## 2019-08-27 ENCOUNTER — OUTPATIENT (OUTPATIENT)
Dept: OUTPATIENT SERVICES | Facility: HOSPITAL | Age: 79
LOS: 1 days | Discharge: HOME | End: 2019-08-27

## 2019-08-27 DIAGNOSIS — C90.00 MULTIPLE MYELOMA NOT HAVING ACHIEVED REMISSION: ICD-10-CM

## 2019-08-29 ENCOUNTER — RESULT REVIEW (OUTPATIENT)
Age: 79
End: 2019-08-29

## 2019-08-29 ENCOUNTER — APPOINTMENT (OUTPATIENT)
Dept: INFUSION THERAPY | Facility: HOSPITAL | Age: 79
End: 2019-08-29

## 2019-08-29 LAB
BASOPHILS # BLD AUTO: 0 K/UL — SIGNIFICANT CHANGE UP (ref 0–0.2)
BASOPHILS NFR BLD AUTO: 0.4 % — SIGNIFICANT CHANGE UP (ref 0–2)
EOSINOPHIL # BLD AUTO: 0.1 K/UL — SIGNIFICANT CHANGE UP (ref 0–0.5)
EOSINOPHIL NFR BLD AUTO: 2.9 % — SIGNIFICANT CHANGE UP (ref 0–6)
HCT VFR BLD CALC: 30.5 % — LOW (ref 34.5–45)
HGB BLD-MCNC: 10.5 G/DL — LOW (ref 11.5–15.5)
LYMPHOCYTES # BLD AUTO: 2 K/UL — SIGNIFICANT CHANGE UP (ref 1–3.3)
LYMPHOCYTES # BLD AUTO: 44.1 % — HIGH (ref 13–44)
MCHC RBC-ENTMCNC: 30.1 PG — SIGNIFICANT CHANGE UP (ref 27–34)
MCHC RBC-ENTMCNC: 34.6 G/DL — SIGNIFICANT CHANGE UP (ref 32–36)
MCV RBC AUTO: 87.2 FL — SIGNIFICANT CHANGE UP (ref 80–100)
MONOCYTES # BLD AUTO: 0.3 K/UL — SIGNIFICANT CHANGE UP (ref 0–0.9)
MONOCYTES NFR BLD AUTO: 6.1 % — SIGNIFICANT CHANGE UP (ref 2–14)
NEUTROPHILS # BLD AUTO: 2.1 K/UL — SIGNIFICANT CHANGE UP (ref 1.8–7.4)
NEUTROPHILS NFR BLD AUTO: 46.4 % — SIGNIFICANT CHANGE UP (ref 43–77)
PLATELET # BLD AUTO: 229 K/UL — SIGNIFICANT CHANGE UP (ref 150–400)
RBC # BLD: 3.5 M/UL — LOW (ref 3.8–5.2)
RBC # FLD: 16.3 % — HIGH (ref 10.3–14.5)
WBC # BLD: 4.5 K/UL — SIGNIFICANT CHANGE UP (ref 3.8–10.5)
WBC # FLD AUTO: 4.5 K/UL — SIGNIFICANT CHANGE UP (ref 3.8–10.5)

## 2019-08-30 DIAGNOSIS — R11.2 NAUSEA WITH VOMITING, UNSPECIFIED: ICD-10-CM

## 2019-08-30 DIAGNOSIS — Z51.11 ENCOUNTER FOR ANTINEOPLASTIC CHEMOTHERAPY: ICD-10-CM

## 2019-09-05 ENCOUNTER — APPOINTMENT (OUTPATIENT)
Dept: INFUSION THERAPY | Facility: HOSPITAL | Age: 79
End: 2019-09-05

## 2019-09-12 ENCOUNTER — APPOINTMENT (OUTPATIENT)
Dept: INFUSION THERAPY | Facility: HOSPITAL | Age: 79
End: 2019-09-12

## 2019-09-12 ENCOUNTER — RESULT REVIEW (OUTPATIENT)
Age: 79
End: 2019-09-12

## 2019-09-12 LAB
BASOPHILS # BLD AUTO: 0 K/UL — SIGNIFICANT CHANGE UP (ref 0–0.2)
BASOPHILS NFR BLD AUTO: 0.3 % — SIGNIFICANT CHANGE UP (ref 0–2)
EOSINOPHIL # BLD AUTO: 0.1 K/UL — SIGNIFICANT CHANGE UP (ref 0–0.5)
EOSINOPHIL NFR BLD AUTO: 1.5 % — SIGNIFICANT CHANGE UP (ref 0–6)
HCT VFR BLD CALC: 30.7 % — LOW (ref 34.5–45)
HGB BLD-MCNC: 10.4 G/DL — LOW (ref 11.5–15.5)
LYMPHOCYTES # BLD AUTO: 2 K/UL — SIGNIFICANT CHANGE UP (ref 1–3.3)
LYMPHOCYTES # BLD AUTO: 37.1 % — SIGNIFICANT CHANGE UP (ref 13–44)
MCHC RBC-ENTMCNC: 29.6 PG — SIGNIFICANT CHANGE UP (ref 27–34)
MCHC RBC-ENTMCNC: 33.9 G/DL — SIGNIFICANT CHANGE UP (ref 32–36)
MCV RBC AUTO: 87.4 FL — SIGNIFICANT CHANGE UP (ref 80–100)
MONOCYTES # BLD AUTO: 0.4 K/UL — SIGNIFICANT CHANGE UP (ref 0–0.9)
MONOCYTES NFR BLD AUTO: 6.9 % — SIGNIFICANT CHANGE UP (ref 2–14)
NEUTROPHILS # BLD AUTO: 2.9 K/UL — SIGNIFICANT CHANGE UP (ref 1.8–7.4)
NEUTROPHILS NFR BLD AUTO: 54.3 % — SIGNIFICANT CHANGE UP (ref 43–77)
PLATELET # BLD AUTO: 180 K/UL — SIGNIFICANT CHANGE UP (ref 150–400)
RBC # BLD: 3.51 M/UL — LOW (ref 3.8–5.2)
RBC # FLD: 15.8 % — HIGH (ref 10.3–14.5)
WBC # BLD: 5.4 K/UL — SIGNIFICANT CHANGE UP (ref 3.8–10.5)
WBC # FLD AUTO: 5.4 K/UL — SIGNIFICANT CHANGE UP (ref 3.8–10.5)

## 2019-09-26 ENCOUNTER — RESULT REVIEW (OUTPATIENT)
Age: 79
End: 2019-09-26

## 2019-09-26 ENCOUNTER — APPOINTMENT (OUTPATIENT)
Dept: INFUSION THERAPY | Facility: HOSPITAL | Age: 79
End: 2019-09-26

## 2019-09-26 LAB
BASOPHILS # BLD AUTO: 0 K/UL — SIGNIFICANT CHANGE UP (ref 0–0.2)
BASOPHILS NFR BLD AUTO: 0.3 % — SIGNIFICANT CHANGE UP (ref 0–2)
EOSINOPHIL # BLD AUTO: 0 K/UL — SIGNIFICANT CHANGE UP (ref 0–0.5)
EOSINOPHIL NFR BLD AUTO: 0 % — SIGNIFICANT CHANGE UP (ref 0–6)
HCT VFR BLD CALC: 31.3 % — LOW (ref 34.5–45)
HGB BLD-MCNC: 11 G/DL — LOW (ref 11.5–15.5)
LYMPHOCYTES # BLD AUTO: 1.8 K/UL — SIGNIFICANT CHANGE UP (ref 1–3.3)
LYMPHOCYTES # BLD AUTO: 35.6 % — SIGNIFICANT CHANGE UP (ref 13–44)
MCHC RBC-ENTMCNC: 30.7 PG — SIGNIFICANT CHANGE UP (ref 27–34)
MCHC RBC-ENTMCNC: 35 G/DL — SIGNIFICANT CHANGE UP (ref 32–36)
MCV RBC AUTO: 87.6 FL — SIGNIFICANT CHANGE UP (ref 80–100)
MONOCYTES # BLD AUTO: 0.4 K/UL — SIGNIFICANT CHANGE UP (ref 0–0.9)
MONOCYTES NFR BLD AUTO: 7.4 % — SIGNIFICANT CHANGE UP (ref 2–14)
NEUTROPHILS # BLD AUTO: 2.8 K/UL — SIGNIFICANT CHANGE UP (ref 1.8–7.4)
NEUTROPHILS NFR BLD AUTO: 56.7 % — SIGNIFICANT CHANGE UP (ref 43–77)
PLATELET # BLD AUTO: 228 K/UL — SIGNIFICANT CHANGE UP (ref 150–400)
RBC # BLD: 3.57 M/UL — LOW (ref 3.8–5.2)
RBC # FLD: 14.5 % — SIGNIFICANT CHANGE UP (ref 10.3–14.5)
WBC # BLD: 4.9 K/UL — SIGNIFICANT CHANGE UP (ref 3.8–10.5)
WBC # FLD AUTO: 4.9 K/UL — SIGNIFICANT CHANGE UP (ref 3.8–10.5)

## 2019-10-08 ENCOUNTER — OUTPATIENT (OUTPATIENT)
Dept: OUTPATIENT SERVICES | Facility: HOSPITAL | Age: 79
LOS: 1 days | Discharge: HOME | End: 2019-10-08

## 2019-10-08 DIAGNOSIS — C90.00 MULTIPLE MYELOMA NOT HAVING ACHIEVED REMISSION: ICD-10-CM

## 2019-10-10 ENCOUNTER — RESULT REVIEW (OUTPATIENT)
Age: 79
End: 2019-10-10

## 2019-10-10 ENCOUNTER — APPOINTMENT (OUTPATIENT)
Dept: INFUSION THERAPY | Facility: HOSPITAL | Age: 79
End: 2019-10-10

## 2019-10-10 LAB
BASOPHILS # BLD AUTO: 0 K/UL — SIGNIFICANT CHANGE UP (ref 0–0.2)
BASOPHILS NFR BLD AUTO: 0.5 % — SIGNIFICANT CHANGE UP (ref 0–2)
EOSINOPHIL # BLD AUTO: 0.1 K/UL — SIGNIFICANT CHANGE UP (ref 0–0.5)
EOSINOPHIL NFR BLD AUTO: 2.2 % — SIGNIFICANT CHANGE UP (ref 0–6)
HCT VFR BLD CALC: 31.6 % — LOW (ref 34.5–45)
HGB BLD-MCNC: 10.9 G/DL — LOW (ref 11.5–15.5)
LYMPHOCYTES # BLD AUTO: 1.6 K/UL — SIGNIFICANT CHANGE UP (ref 1–3.3)
LYMPHOCYTES # BLD AUTO: 33.4 % — SIGNIFICANT CHANGE UP (ref 13–44)
MCHC RBC-ENTMCNC: 30.2 PG — SIGNIFICANT CHANGE UP (ref 27–34)
MCHC RBC-ENTMCNC: 34.6 G/DL — SIGNIFICANT CHANGE UP (ref 32–36)
MCV RBC AUTO: 87.3 FL — SIGNIFICANT CHANGE UP (ref 80–100)
MONOCYTES # BLD AUTO: 0.3 K/UL — SIGNIFICANT CHANGE UP (ref 0–0.9)
MONOCYTES NFR BLD AUTO: 6.5 % — SIGNIFICANT CHANGE UP (ref 2–14)
NEUTROPHILS # BLD AUTO: 2.8 K/UL — SIGNIFICANT CHANGE UP (ref 1.8–7.4)
NEUTROPHILS NFR BLD AUTO: 57.3 % — SIGNIFICANT CHANGE UP (ref 43–77)
PLATELET # BLD AUTO: 198 K/UL — SIGNIFICANT CHANGE UP (ref 150–400)
RBC # BLD: 3.62 M/UL — LOW (ref 3.8–5.2)
RBC # FLD: 14 % — SIGNIFICANT CHANGE UP (ref 10.3–14.5)
WBC # BLD: 4.8 K/UL — SIGNIFICANT CHANGE UP (ref 3.8–10.5)
WBC # FLD AUTO: 4.8 K/UL — SIGNIFICANT CHANGE UP (ref 3.8–10.5)

## 2019-10-11 DIAGNOSIS — Z51.11 ENCOUNTER FOR ANTINEOPLASTIC CHEMOTHERAPY: ICD-10-CM

## 2019-10-11 DIAGNOSIS — R11.2 NAUSEA WITH VOMITING, UNSPECIFIED: ICD-10-CM

## 2019-10-17 ENCOUNTER — APPOINTMENT (OUTPATIENT)
Dept: INFUSION THERAPY | Facility: HOSPITAL | Age: 79
End: 2019-10-17

## 2019-10-17 ENCOUNTER — RESULT REVIEW (OUTPATIENT)
Age: 79
End: 2019-10-17

## 2019-10-17 LAB
BASOPHILS # BLD AUTO: 0 K/UL — SIGNIFICANT CHANGE UP (ref 0–0.2)
BASOPHILS NFR BLD AUTO: 0.1 % — SIGNIFICANT CHANGE UP (ref 0–2)
EOSINOPHIL # BLD AUTO: 0 K/UL — SIGNIFICANT CHANGE UP (ref 0–0.5)
EOSINOPHIL NFR BLD AUTO: 0 % — SIGNIFICANT CHANGE UP (ref 0–6)
HCT VFR BLD CALC: 32.5 % — LOW (ref 34.5–45)
HGB BLD-MCNC: 11.3 G/DL — LOW (ref 11.5–15.5)
LYMPHOCYTES # BLD AUTO: 2.4 K/UL — SIGNIFICANT CHANGE UP (ref 1–3.3)
LYMPHOCYTES # BLD AUTO: 48 % — HIGH (ref 13–44)
MCHC RBC-ENTMCNC: 30.5 PG — SIGNIFICANT CHANGE UP (ref 27–34)
MCHC RBC-ENTMCNC: 34.9 G/DL — SIGNIFICANT CHANGE UP (ref 32–36)
MCV RBC AUTO: 87.5 FL — SIGNIFICANT CHANGE UP (ref 80–100)
MONOCYTES # BLD AUTO: 0.4 K/UL — SIGNIFICANT CHANGE UP (ref 0–0.9)
MONOCYTES NFR BLD AUTO: 7.6 % — SIGNIFICANT CHANGE UP (ref 2–14)
NEUTROPHILS # BLD AUTO: 2.3 K/UL — SIGNIFICANT CHANGE UP (ref 1.8–7.4)
NEUTROPHILS NFR BLD AUTO: 44.2 % — SIGNIFICANT CHANGE UP (ref 43–77)
PLAT MORPH BLD: NORMAL — SIGNIFICANT CHANGE UP
PLATELET # BLD AUTO: 183 K/UL — SIGNIFICANT CHANGE UP (ref 150–400)
RBC # BLD: 3.71 M/UL — LOW (ref 3.8–5.2)
RBC # FLD: 14.4 % — SIGNIFICANT CHANGE UP (ref 10.3–14.5)
RBC BLD AUTO: SIGNIFICANT CHANGE UP
WBC # BLD: 5.1 K/UL — SIGNIFICANT CHANGE UP (ref 3.8–10.5)
WBC # FLD AUTO: 5.1 K/UL — SIGNIFICANT CHANGE UP (ref 3.8–10.5)

## 2019-10-25 ENCOUNTER — APPOINTMENT (OUTPATIENT)
Dept: INFUSION THERAPY | Facility: HOSPITAL | Age: 79
End: 2019-10-25

## 2019-10-25 ENCOUNTER — RESULT REVIEW (OUTPATIENT)
Age: 79
End: 2019-10-25

## 2019-10-25 LAB
BASOPHILS # BLD AUTO: 0.1 K/UL — SIGNIFICANT CHANGE UP (ref 0–0.2)
EOSINOPHIL NFR BLD AUTO: 1 % — SIGNIFICANT CHANGE UP (ref 0–6)
HCT VFR BLD CALC: 31 % — LOW (ref 34.5–45)
HGB BLD-MCNC: 11.1 G/DL — LOW (ref 11.5–15.5)
LYMPHOCYTES # BLD AUTO: 1.8 K/UL — SIGNIFICANT CHANGE UP (ref 1–3.3)
LYMPHOCYTES # BLD AUTO: 37 % — SIGNIFICANT CHANGE UP (ref 13–44)
MCHC RBC-ENTMCNC: 31.8 PG — SIGNIFICANT CHANGE UP (ref 27–34)
MCHC RBC-ENTMCNC: 35.8 G/DL — SIGNIFICANT CHANGE UP (ref 32–36)
MCV RBC AUTO: 88.8 FL — SIGNIFICANT CHANGE UP (ref 80–100)
MONOCYTES # BLD AUTO: 0.3 K/UL — SIGNIFICANT CHANGE UP (ref 0–0.9)
MONOCYTES NFR BLD AUTO: 9 % — SIGNIFICANT CHANGE UP (ref 2–14)
NEUTROPHILS # BLD AUTO: 2.9 K/UL — SIGNIFICANT CHANGE UP (ref 1.8–7.4)
NEUTROPHILS NFR BLD AUTO: 53 % — SIGNIFICANT CHANGE UP (ref 43–77)
PLAT MORPH BLD: NORMAL — SIGNIFICANT CHANGE UP
PLATELET # BLD AUTO: 134 K/UL — LOW (ref 150–400)
RBC # BLD: 3.49 M/UL — LOW (ref 3.8–5.2)
RBC # FLD: 14.3 % — SIGNIFICANT CHANGE UP (ref 10.3–14.5)
RBC BLD AUTO: SIGNIFICANT CHANGE UP
WBC # BLD: 5 K/UL — SIGNIFICANT CHANGE UP (ref 3.8–10.5)
WBC # FLD AUTO: 5 K/UL — SIGNIFICANT CHANGE UP (ref 3.8–10.5)

## 2019-11-07 ENCOUNTER — OUTPATIENT (OUTPATIENT)
Dept: OUTPATIENT SERVICES | Facility: HOSPITAL | Age: 79
LOS: 1 days | Discharge: HOME | End: 2019-11-07

## 2019-11-07 DIAGNOSIS — C90.00 MULTIPLE MYELOMA NOT HAVING ACHIEVED REMISSION: ICD-10-CM

## 2019-11-08 ENCOUNTER — RESULT REVIEW (OUTPATIENT)
Age: 79
End: 2019-11-08

## 2019-11-08 ENCOUNTER — APPOINTMENT (OUTPATIENT)
Dept: INFUSION THERAPY | Facility: HOSPITAL | Age: 79
End: 2019-11-08

## 2019-11-08 LAB
BASOPHILS # BLD AUTO: 0 K/UL — SIGNIFICANT CHANGE UP (ref 0–0.2)
BASOPHILS NFR BLD AUTO: 0 % — SIGNIFICANT CHANGE UP (ref 0–2)
EOSINOPHIL # BLD AUTO: 0 K/UL — SIGNIFICANT CHANGE UP (ref 0–0.5)
EOSINOPHIL NFR BLD AUTO: 0.5 % — SIGNIFICANT CHANGE UP (ref 0–6)
HCT VFR BLD CALC: 32.8 % — LOW (ref 34.5–45)
HGB BLD-MCNC: 11.3 G/DL — LOW (ref 11.5–15.5)
LYMPHOCYTES # BLD AUTO: 1.5 K/UL — SIGNIFICANT CHANGE UP (ref 1–3.3)
LYMPHOCYTES # BLD AUTO: 37 % — SIGNIFICANT CHANGE UP (ref 13–44)
MCHC RBC-ENTMCNC: 30.8 PG — SIGNIFICANT CHANGE UP (ref 27–34)
MCHC RBC-ENTMCNC: 34.4 G/DL — SIGNIFICANT CHANGE UP (ref 32–36)
MCV RBC AUTO: 89.6 FL — SIGNIFICANT CHANGE UP (ref 80–100)
MONOCYTES # BLD AUTO: 0.2 K/UL — SIGNIFICANT CHANGE UP (ref 0–0.9)
MONOCYTES NFR BLD AUTO: 4.5 % — SIGNIFICANT CHANGE UP (ref 2–14)
NEUTROPHILS # BLD AUTO: 2.4 K/UL — SIGNIFICANT CHANGE UP (ref 1.8–7.4)
NEUTROPHILS NFR BLD AUTO: 57.9 % — SIGNIFICANT CHANGE UP (ref 43–77)
PLATELET # BLD AUTO: 206 K/UL — SIGNIFICANT CHANGE UP (ref 150–400)
RBC # BLD: 3.66 M/UL — LOW (ref 3.8–5.2)
RBC # FLD: 13.7 % — SIGNIFICANT CHANGE UP (ref 10.3–14.5)
WBC # BLD: 4.1 K/UL — SIGNIFICANT CHANGE UP (ref 3.8–10.5)
WBC # FLD AUTO: 4.1 K/UL — SIGNIFICANT CHANGE UP (ref 3.8–10.5)

## 2019-11-11 DIAGNOSIS — Z51.11 ENCOUNTER FOR ANTINEOPLASTIC CHEMOTHERAPY: ICD-10-CM

## 2019-11-11 DIAGNOSIS — R11.2 NAUSEA WITH VOMITING, UNSPECIFIED: ICD-10-CM

## 2019-11-11 LAB
ALBUMIN SERPL ELPH-MCNC: 3.5 G/DL
ALP BLD-CCNC: 85 U/L
ALT SERPL-CCNC: 8 U/L
ANION GAP SERPL CALC-SCNC: 14 MMOL/L
AST SERPL-CCNC: 14 U/L
B2 MICROGLOB SERPL-MCNC: 3.8 MG/L
BILIRUB SERPL-MCNC: 0.4 MG/DL
BUN SERPL-MCNC: 23 MG/DL
CALCIUM SERPL-MCNC: 9.2 MG/DL
CHLORIDE SERPL-SCNC: 104 MMOL/L
CO2 SERPL-SCNC: 23 MMOL/L
CREAT SERPL-MCNC: 1.56 MG/DL
DEPRECATED KAPPA LC FREE/LAMBDA SER: 96.61 RATIO
GLUCOSE SERPL-MCNC: 126 MG/DL
KAPPA LC CSF-MCNC: 0.88 MG/DL
KAPPA LC SERPL-MCNC: 85.02 MG/DL
LDH SERPL-CCNC: 162 U/L
MAGNESIUM SERPL-MCNC: 2.2 MG/DL
POTASSIUM SERPL-SCNC: 3.9 MMOL/L
PROT SERPL-MCNC: 8.7 G/DL
SODIUM SERPL-SCNC: 141 MMOL/L

## 2019-11-12 LAB
ALBUMIN MFR SERPL ELPH: 41.1 %
ALBUMIN SERPL-MCNC: 3.6 G/DL
ALBUMIN/GLOB SERPL: 0.7 RATIO
ALPHA1 GLOB MFR SERPL ELPH: 3.5 %
ALPHA1 GLOB SERPL ELPH-MCNC: 0.3 G/DL
ALPHA2 GLOB MFR SERPL ELPH: 8.1 %
ALPHA2 GLOB SERPL ELPH-MCNC: 0.7 G/DL
B-GLOBULIN MFR SERPL ELPH: 42.3 %
B-GLOBULIN SERPL ELPH-MCNC: 3.7 G/DL
DEPRECATED KAPPA LC FREE/LAMBDA SER: 96.61 RATIO
GAMMA GLOB FLD ELPH-MCNC: 0.4 G/DL
GAMMA GLOB MFR SERPL ELPH: 5 %
IGA SER QL IEP: 22 MG/DL
IGG SER QL IEP: 3176 MG/DL
IGM SER QL IEP: 28 MG/DL
INTERPRETATION SERPL IEP-IMP: NORMAL
KAPPA LC CSF-MCNC: 0.88 MG/DL
KAPPA LC SERPL-MCNC: 85.02 MG/DL
M PROTEIN MFR SERPL ELPH: NORMAL
M PROTEIN SPEC IFE-MCNC: NORMAL
MONOCLON BAND OBS SERPL: NORMAL
PROT SERPL-MCNC: 8.7 G/DL
PROT SERPL-MCNC: 8.7 G/DL

## 2019-11-14 ENCOUNTER — RESULT REVIEW (OUTPATIENT)
Age: 79
End: 2019-11-14

## 2019-11-14 ENCOUNTER — APPOINTMENT (OUTPATIENT)
Dept: INFUSION THERAPY | Facility: HOSPITAL | Age: 79
End: 2019-11-14

## 2019-11-14 LAB
BASOPHILS # BLD AUTO: 0 K/UL — SIGNIFICANT CHANGE UP (ref 0–0.2)
BASOPHILS NFR BLD AUTO: 0.1 % — SIGNIFICANT CHANGE UP (ref 0–2)
EOSINOPHIL # BLD AUTO: 0.1 K/UL — SIGNIFICANT CHANGE UP (ref 0–0.5)
EOSINOPHIL NFR BLD AUTO: 2 % — SIGNIFICANT CHANGE UP (ref 0–6)
HCT VFR BLD CALC: 30.7 % — LOW (ref 34.5–45)
HGB BLD-MCNC: 11 G/DL — LOW (ref 11.5–15.5)
LYMPHOCYTES # BLD AUTO: 1.6 K/UL — SIGNIFICANT CHANGE UP (ref 1–3.3)
LYMPHOCYTES # BLD AUTO: 36.6 % — SIGNIFICANT CHANGE UP (ref 13–44)
MCHC RBC-ENTMCNC: 31.6 PG — SIGNIFICANT CHANGE UP (ref 27–34)
MCHC RBC-ENTMCNC: 35.8 G/DL — SIGNIFICANT CHANGE UP (ref 32–36)
MCV RBC AUTO: 88.4 FL — SIGNIFICANT CHANGE UP (ref 80–100)
MONOCYTES # BLD AUTO: 0.4 K/UL — SIGNIFICANT CHANGE UP (ref 0–0.9)
MONOCYTES NFR BLD AUTO: 8.8 % — SIGNIFICANT CHANGE UP (ref 2–14)
NEUTROPHILS # BLD AUTO: 2.3 K/UL — SIGNIFICANT CHANGE UP (ref 1.8–7.4)
NEUTROPHILS NFR BLD AUTO: 52.4 % — SIGNIFICANT CHANGE UP (ref 43–77)
PLATELET # BLD AUTO: 156 K/UL — SIGNIFICANT CHANGE UP (ref 150–400)
RBC # BLD: 3.47 M/UL — LOW (ref 3.8–5.2)
RBC # FLD: 13.5 % — SIGNIFICANT CHANGE UP (ref 10.3–14.5)
WBC # BLD: 4.4 K/UL — SIGNIFICANT CHANGE UP (ref 3.8–10.5)
WBC # FLD AUTO: 4.4 K/UL — SIGNIFICANT CHANGE UP (ref 3.8–10.5)

## 2019-11-18 ENCOUNTER — APPOINTMENT (OUTPATIENT)
Dept: ORTHOPEDIC SURGERY | Facility: CLINIC | Age: 79
End: 2019-11-18
Payer: MEDICARE

## 2019-11-18 VITALS
HEART RATE: 62 BPM | WEIGHT: 139 LBS | DIASTOLIC BLOOD PRESSURE: 72 MMHG | HEIGHT: 60 IN | BODY MASS INDEX: 27.29 KG/M2 | SYSTOLIC BLOOD PRESSURE: 133 MMHG

## 2019-11-18 DIAGNOSIS — M17.12 UNILATERAL PRIMARY OSTEOARTHRITIS, LEFT KNEE: ICD-10-CM

## 2019-11-18 PROCEDURE — 73564 X-RAY EXAM KNEE 4 OR MORE: CPT | Mod: LT

## 2019-11-18 PROCEDURE — 99203 OFFICE O/P NEW LOW 30 MIN: CPT

## 2019-11-18 NOTE — DISCUSSION/SUMMARY
[de-identified] : Left knee arthritis with stiffness\par \par The patient and I discussed the causes and progression of degenerative joint disease of the knee. Models, diagrams and drawings were used in the discussion. Treatment can include conservative non-operative management and surgical options. Conservative management includes weight loss, activity modification, physical therapy to improve motion and strength in the muscles around the knee and the body's core, PO and topical NSAIDs, corticosteroid and/or viscosupplementation intra-articular injections. If the patient fails to improve with non-operative management, surgical management is possible. Depending upon the patient's age, BMI, activity level, degree and location of arthrosis different surgical options are possible including arthroscopic debridement with chondroplasty, high-tibial osteotomy, unicondylar/partial arthroplasty, and total joint arthroplasty.\par \par Given primary complaint is knee stiffness recommend nonoperative management including\par \par Physical therapy was prescribed for knee ROM exercises, strengthening exercises, deep tissue massage, core strengthening, hip abductor strengthening, VMO strengthening, modalities PRN, and home exercises\par \par \par The patient was prescribed Diclofenac topical liquid/creme non-steroidal anti-inflammatory medication. 1-2 pumps twice daily and apply to area with pain. There is low systemic absorption of the medication but risks while reduced remain were discussed and include but not limited to renal damage and GI ulceration and bleeding. They were warned to stop the medication if worsening buring skin or gastric pain or dizziness or other side effects. Also to immediately stop the medication and seek appropriate medical attention if any severe stomach ache, gastritis, black/red vomit, black/red stools or any other medical concern.\par \par Do not recommend injections for just stiffness\par \par The patient verifies their understanding the the visit, diagnosis and plan. They agree with the treatment plan and will contact the office with any questions or problems.\par Follow up\par PRN

## 2019-11-18 NOTE — PHYSICAL EXAM
[de-identified] : 5 views of the affected Left knee (standing AP, flexing standing AP, 30degree flexed lateral, 0degree lateral, sunrise view)\par were ordered, obtained and evaluated by myself today and\par demonstrate:\par There is mild medial weightbearing asymmetric narrowing\par Trace to small osteophytic lipping\par Trace suprapatellar effusion\par Moderate osteophytes with moderate medial patellofemoral joint space loss without evidence of tilt [or] subluxation on sunrise view\par Normal soft tissue density\par Otherwise normal osseous bone structure without fracture or dislocation [de-identified] : \par Physical Examination\par General: well nourished, in no acute distress, alert and oriented to person, place and time\par Psychiatric: normal mood and affect, no abnormal movements or speech patterns\par Eyes: vision intact With glasses\par Throat: no thyromegaly\par Lymph: no enlarged nodes, no lymphedema in extremity\par Respiratory: no wheezing, no shortness of breath with ambulation\par Cardiac: no cardiac leg swelling, 2+ peripheral pulses\par Neurology: normal gross sensation in extremities to light touch\par Abdomen: soft, non-tender, tympanic, no masses\par \par Musculoskeletal Examination\par Ambulation	 + antalgic gait, + assistive devices\par \par Knee			Right			Left\par General\par      Swelling/Deformity	normal			normal	\par      Skin			normal			normal\par      Erythema		-			-\par      Standing Alignment	neutral			neutral\par      Effusion		none			none\par Range of Motion\par      Hip			full painless ROM		full painless ROM\par      Knee Flexion		120			110\par      Knee Extension	0			0\par Patella\par      J Sign		-			-\par      Quad Medial/Lateral	1/1 1/1\par      Apprehension		-			-\par      Angel's		-			+\par      Grind Sign		-			+\par      Crepitus		+			+\par Palpation\par      Medial Joint Line	-			-\par      Medial Fem Condyle	-			-\par      Lateral Joint Line	-			-\par      Quad Tendon		-			-\par      Patella Tendon	-			-\par      Medial Patella		-			-\par      Lateral Patella 	-			-\par      Posterior Knee	-			-\par Ligamentous\par      Varus @ 0° / 30°	-/-			-/-\par      Valgus @ 0° / 30°	-/-			-/-\par Strength Examination/Atrophy\par      Hip Flexors 		5+			5+\par      Quadriceps		5+			5+\par      Hamstring		5+			5+\par      Tibialis Anterior	5+			5+\par      Achilles/Soleus	5+			5+\par Sensation\par      Deep Peroneal	normal			normal\par      Superficial Peroneal 	normal			normal\par      Sural  		normal			normal\par      Posterior Tibial 	normal			normal\par      Saphneous 		normal			normal\par Pulses\par      DP			2+			2+\par \par \par

## 2019-11-18 NOTE — HISTORY OF PRESENT ILLNESS
[de-identified] : CC LEFT knee\par \par DAILY STEPHENS, 79 year old F [facility] presents with chronic onset of 6 months of Activity related stiffness in the Anterior LEFT knee without injury. The stiffness is worse, and rated a 7# out of 10, described as Aching, without radiation. Rest makes the pain better and walking stairs makes the stiffness worse. The patient reports associated symptoms of Weakness. The patient denies pain at night affecting sleep, and reports similar pain previously.\par \par The patient has tried the following treatments:\par Activity modification	+ \par Ice/Compression  	               + \par Braces    		               -\par Nsaids    		               +\par Physical Therapy  	              + 2 years ago min relief\par Cortisone Injection	               + years ago 3 month relief\par Visco Injection		-\par Arthroscopy		-\par \par Review of Systems is positive for the above musculoskeletal symptoms and is otherwise non-contributory for general, constitutional, psychiatric, neurologic, HEENT, cardiac, respiratory, gastrointestinal, reproductive, lymphatic, and dermatologic complaints.\par \par Consult by JOSE DANIEL Damon\par

## 2019-11-21 ENCOUNTER — APPOINTMENT (OUTPATIENT)
Dept: INFUSION THERAPY | Facility: HOSPITAL | Age: 79
End: 2019-11-21

## 2019-11-21 ENCOUNTER — RESULT REVIEW (OUTPATIENT)
Age: 79
End: 2019-11-21

## 2019-11-21 LAB
BASOPHILS # BLD AUTO: 0 K/UL — SIGNIFICANT CHANGE UP (ref 0–0.2)
BASOPHILS NFR BLD AUTO: 0 % — SIGNIFICANT CHANGE UP (ref 0–2)
EOSINOPHIL # BLD AUTO: 0.1 K/UL — SIGNIFICANT CHANGE UP (ref 0–0.5)
EOSINOPHIL NFR BLD AUTO: 1 % — SIGNIFICANT CHANGE UP (ref 0–6)
HCT VFR BLD CALC: 29.2 % — LOW (ref 34.5–45)
HGB BLD-MCNC: 10.1 G/DL — LOW (ref 11.5–15.5)
LYMPHOCYTES # BLD AUTO: 1.6 K/UL — SIGNIFICANT CHANGE UP (ref 1–3.3)
LYMPHOCYTES # BLD AUTO: 28.9 % — SIGNIFICANT CHANGE UP (ref 13–44)
MCHC RBC-ENTMCNC: 30.2 PG — SIGNIFICANT CHANGE UP (ref 27–34)
MCHC RBC-ENTMCNC: 34.6 G/DL — SIGNIFICANT CHANGE UP (ref 32–36)
MCV RBC AUTO: 87 FL — SIGNIFICANT CHANGE UP (ref 80–100)
MONOCYTES # BLD AUTO: 0.5 K/UL — SIGNIFICANT CHANGE UP (ref 0–0.9)
MONOCYTES NFR BLD AUTO: 8.6 % — SIGNIFICANT CHANGE UP (ref 2–14)
NEUTROPHILS # BLD AUTO: 3.4 K/UL — SIGNIFICANT CHANGE UP (ref 1.8–7.4)
NEUTROPHILS NFR BLD AUTO: 61.4 % — SIGNIFICANT CHANGE UP (ref 43–77)
PLATELET # BLD AUTO: 148 K/UL — LOW (ref 150–400)
RBC # BLD: 3.36 M/UL — LOW (ref 3.8–5.2)
RBC # FLD: 13.9 % — SIGNIFICANT CHANGE UP (ref 10.3–14.5)
WBC # BLD: 5.5 K/UL — SIGNIFICANT CHANGE UP (ref 3.8–10.5)
WBC # FLD AUTO: 5.5 K/UL — SIGNIFICANT CHANGE UP (ref 3.8–10.5)

## 2019-11-29 ENCOUNTER — APPOINTMENT (OUTPATIENT)
Dept: INFUSION THERAPY | Facility: HOSPITAL | Age: 79
End: 2019-11-29

## 2019-12-05 ENCOUNTER — APPOINTMENT (OUTPATIENT)
Dept: INFUSION THERAPY | Facility: HOSPITAL | Age: 79
End: 2019-12-05

## 2019-12-05 ENCOUNTER — RESULT REVIEW (OUTPATIENT)
Age: 79
End: 2019-12-05

## 2019-12-05 LAB
BASOPHILS # BLD AUTO: 0 K/UL — SIGNIFICANT CHANGE UP (ref 0–0.2)
BASOPHILS NFR BLD AUTO: 0.9 % — SIGNIFICANT CHANGE UP (ref 0–2)
EOSINOPHIL # BLD AUTO: 0.1 K/UL — SIGNIFICANT CHANGE UP (ref 0–0.5)
EOSINOPHIL NFR BLD AUTO: 3.3 % — SIGNIFICANT CHANGE UP (ref 0–6)
HCT VFR BLD CALC: 31.6 % — LOW (ref 34.5–45)
HGB BLD-MCNC: 11 G/DL — LOW (ref 11.5–15.5)
LYMPHOCYTES # BLD AUTO: 1.6 K/UL — SIGNIFICANT CHANGE UP (ref 1–3.3)
LYMPHOCYTES # BLD AUTO: 37.7 % — SIGNIFICANT CHANGE UP (ref 13–44)
MCHC RBC-ENTMCNC: 30.7 PG — SIGNIFICANT CHANGE UP (ref 27–34)
MCHC RBC-ENTMCNC: 34.8 G/DL — SIGNIFICANT CHANGE UP (ref 32–36)
MCV RBC AUTO: 88.2 FL — SIGNIFICANT CHANGE UP (ref 80–100)
MONOCYTES # BLD AUTO: 0.4 K/UL — SIGNIFICANT CHANGE UP (ref 0–0.9)
MONOCYTES NFR BLD AUTO: 9.5 % — SIGNIFICANT CHANGE UP (ref 2–14)
NEUTROPHILS # BLD AUTO: 2.1 K/UL — SIGNIFICANT CHANGE UP (ref 1.8–7.4)
NEUTROPHILS NFR BLD AUTO: 48.5 % — SIGNIFICANT CHANGE UP (ref 43–77)
PLATELET # BLD AUTO: 235 K/UL — SIGNIFICANT CHANGE UP (ref 150–400)
RBC # BLD: 3.59 M/UL — LOW (ref 3.8–5.2)
RBC # FLD: 13.5 % — SIGNIFICANT CHANGE UP (ref 10.3–14.5)
WBC # BLD: 4.3 K/UL — SIGNIFICANT CHANGE UP (ref 3.8–10.5)
WBC # FLD AUTO: 4.3 K/UL — SIGNIFICANT CHANGE UP (ref 3.8–10.5)

## 2019-12-06 ENCOUNTER — OUTPATIENT (OUTPATIENT)
Dept: OUTPATIENT SERVICES | Facility: HOSPITAL | Age: 79
LOS: 1 days | Discharge: HOME | End: 2019-12-06

## 2019-12-06 DIAGNOSIS — C90.00 MULTIPLE MYELOMA NOT HAVING ACHIEVED REMISSION: ICD-10-CM

## 2019-12-12 ENCOUNTER — APPOINTMENT (OUTPATIENT)
Dept: INFUSION THERAPY | Facility: HOSPITAL | Age: 79
End: 2019-12-12

## 2019-12-12 ENCOUNTER — RESULT REVIEW (OUTPATIENT)
Age: 79
End: 2019-12-12

## 2019-12-12 LAB
BASOPHILS # BLD AUTO: 0 K/UL — SIGNIFICANT CHANGE UP (ref 0–0.2)
BASOPHILS NFR BLD AUTO: 0 % — SIGNIFICANT CHANGE UP (ref 0–2)
EOSINOPHIL # BLD AUTO: 0.2 K/UL — SIGNIFICANT CHANGE UP (ref 0–0.5)
EOSINOPHIL NFR BLD AUTO: 3.1 % — SIGNIFICANT CHANGE UP (ref 0–6)
HCT VFR BLD CALC: 32.6 % — LOW (ref 34.5–45)
HGB BLD-MCNC: 11.1 G/DL — LOW (ref 11.5–15.5)
LYMPHOCYTES # BLD AUTO: 2 K/UL — SIGNIFICANT CHANGE UP (ref 1–3.3)
LYMPHOCYTES # BLD AUTO: 39.6 % — SIGNIFICANT CHANGE UP (ref 13–44)
MCHC RBC-ENTMCNC: 29.8 PG — SIGNIFICANT CHANGE UP (ref 27–34)
MCHC RBC-ENTMCNC: 34 G/DL — SIGNIFICANT CHANGE UP (ref 32–36)
MCV RBC AUTO: 87.7 FL — SIGNIFICANT CHANGE UP (ref 80–100)
MONOCYTES # BLD AUTO: 0.5 K/UL — SIGNIFICANT CHANGE UP (ref 0–0.9)
MONOCYTES NFR BLD AUTO: 9.6 % — SIGNIFICANT CHANGE UP (ref 2–14)
NEUTROPHILS # BLD AUTO: 2.4 K/UL — SIGNIFICANT CHANGE UP (ref 1.8–7.4)
NEUTROPHILS NFR BLD AUTO: 47.8 % — SIGNIFICANT CHANGE UP (ref 43–77)
PLATELET # BLD AUTO: 178 K/UL — SIGNIFICANT CHANGE UP (ref 150–400)
RBC # BLD: 3.72 M/UL — LOW (ref 3.8–5.2)
RBC # FLD: 13.5 % — SIGNIFICANT CHANGE UP (ref 10.3–14.5)
WBC # BLD: 5.1 K/UL — SIGNIFICANT CHANGE UP (ref 3.8–10.5)
WBC # FLD AUTO: 5.1 K/UL — SIGNIFICANT CHANGE UP (ref 3.8–10.5)

## 2019-12-13 DIAGNOSIS — Z51.11 ENCOUNTER FOR ANTINEOPLASTIC CHEMOTHERAPY: ICD-10-CM

## 2019-12-13 DIAGNOSIS — R11.2 NAUSEA WITH VOMITING, UNSPECIFIED: ICD-10-CM

## 2019-12-19 ENCOUNTER — RESULT REVIEW (OUTPATIENT)
Age: 79
End: 2019-12-19

## 2019-12-19 ENCOUNTER — APPOINTMENT (OUTPATIENT)
Dept: INFUSION THERAPY | Facility: HOSPITAL | Age: 79
End: 2019-12-19

## 2019-12-19 ENCOUNTER — APPOINTMENT (OUTPATIENT)
Dept: HEMATOLOGY ONCOLOGY | Facility: CLINIC | Age: 79
End: 2019-12-19
Payer: MEDICARE

## 2019-12-19 VITALS
RESPIRATION RATE: 18 BRPM | DIASTOLIC BLOOD PRESSURE: 76 MMHG | OXYGEN SATURATION: 99 % | HEART RATE: 60 BPM | TEMPERATURE: 98.1 F | BODY MASS INDEX: 27.04 KG/M2 | WEIGHT: 138.45 LBS | SYSTOLIC BLOOD PRESSURE: 143 MMHG

## 2019-12-19 LAB
BASOPHILS # BLD AUTO: 0 K/UL — SIGNIFICANT CHANGE UP (ref 0–0.2)
BASOPHILS NFR BLD AUTO: 0.5 % — SIGNIFICANT CHANGE UP (ref 0–2)
EOSINOPHIL # BLD AUTO: 0 K/UL — SIGNIFICANT CHANGE UP (ref 0–0.5)
EOSINOPHIL NFR BLD AUTO: 0.5 % — SIGNIFICANT CHANGE UP (ref 0–6)
HCT VFR BLD CALC: 33.5 % — LOW (ref 34.5–45)
HGB BLD-MCNC: 11.4 G/DL — LOW (ref 11.5–15.5)
LYMPHOCYTES # BLD AUTO: 1.6 K/UL — SIGNIFICANT CHANGE UP (ref 1–3.3)
LYMPHOCYTES # BLD AUTO: 30.8 % — SIGNIFICANT CHANGE UP (ref 13–44)
MCHC RBC-ENTMCNC: 30.1 PG — SIGNIFICANT CHANGE UP (ref 27–34)
MCHC RBC-ENTMCNC: 34.2 G/DL — SIGNIFICANT CHANGE UP (ref 32–36)
MCV RBC AUTO: 88 FL — SIGNIFICANT CHANGE UP (ref 80–100)
MONOCYTES # BLD AUTO: 0.4 K/UL — SIGNIFICANT CHANGE UP (ref 0–0.9)
MONOCYTES NFR BLD AUTO: 7.7 % — SIGNIFICANT CHANGE UP (ref 2–14)
NEUTROPHILS # BLD AUTO: 3.2 K/UL — SIGNIFICANT CHANGE UP (ref 1.8–7.4)
NEUTROPHILS NFR BLD AUTO: 60.5 % — SIGNIFICANT CHANGE UP (ref 43–77)
PLATELET # BLD AUTO: 164 K/UL — SIGNIFICANT CHANGE UP (ref 150–400)
RBC # BLD: 3.8 M/UL — SIGNIFICANT CHANGE UP (ref 3.8–5.2)
RBC # FLD: 13.4 % — SIGNIFICANT CHANGE UP (ref 10.3–14.5)
WBC # BLD: 5.3 K/UL — SIGNIFICANT CHANGE UP (ref 3.8–10.5)
WBC # FLD AUTO: 5.3 K/UL — SIGNIFICANT CHANGE UP (ref 3.8–10.5)

## 2019-12-19 PROCEDURE — 99214 OFFICE O/P EST MOD 30 MIN: CPT

## 2019-12-19 NOTE — HISTORY OF PRESENT ILLNESS
[de-identified] : Multiple Myeloma s/p Thal/ Dex 2008 to 2010.....TIA in 2009.....Velcade 2012 to 2013...treatment held due to neuropathy..resumed b/c of POD...tolerating thus far [de-identified] : Patient presents for a follow-up visit. She reports a healthy appetite. She c/o generalized arthritic pain which is stable and bilateral LE varicose veins. She states she received a flu vaccine. She is receiving Velcade three weeks on and 1 week off with Decadron. Denies fever/chills, night sweats, mouth sores, eye dryness, blurred vision, nausea, vomiting, diarrhea. No CP, SOB or LE edema.

## 2019-12-19 NOTE — REVIEW OF SYSTEMS
[Joint Pain] : joint pain [Negative] : Heme/Lymph [Fever] : no fever [Chills] : no chills [Cough] : no cough [Abdominal Pain] : no abdominal pain [Vomiting] : no vomiting [FreeTextEntry5] : bilateral LE varicose veins [FreeTextEntry9] : stable generalized arthritic pain; ambulates with a cane

## 2019-12-19 NOTE — ASSESSMENT
[FreeTextEntry1] : Ms. James is 79 years old female with history of MM, RA, HTN, TIA. s/p treatment as per HPI...was  on observation...IGG 3370 (8/15/17). Currently being treated again with VD.\par \par Peripheral blood work reviewed and discussed with patient. WBC 5.3 HGB 11.4 ANC 3.2 \par IGG 3176 on 11/8/19, 3102 on 8/2/19, 3798 on 3/21/19, previously at 4900. \par Labs sent for CMP, LDH, Mg, Beta2-microglobulin, SPEP, SFLC, Immunofixation, Quantitative Immunoglobulins. \par Continue Velcade 1mg/m2 with 20 mg Dex (3 weeks on; 1 week off). Rationale, benefits, risk and side effects were discussed at length. Patient and daughter verbalized understanding and consented to treatment. Signed consent. Next Velcade appointments: 1/2, 1/9, 1/16. 1/30. 2/6. 2/13. 2/27. 3/5. 3/12.\par Plan for patient to bring in 24-hour urine in Spring 2020.\par Reviewed skeletal survey (5/23/19) and discussed with patient.\par Continue remaining medications as prescribed. \par Well care stressed, question addressed. \par Stressed regular follow up with Rheum, Nephro and PMD. \par Patient advised to contact immediately with any concerns or symptoms.\par RTC on 3/5/20 for f/u with Dr. Rosales or sooner if IgG rising

## 2019-12-19 NOTE — ADDENDUM
[FreeTextEntry1] : Documented by Freddie Smith acting as a scribe for Dr. Bailey Rosales on 12/19/2019.\par \par All medical record entries made by the Scribe were at my, Dr. Bailey Rosales, direction and personally dictated by me on 12/19/2019. I have reviewed the chart and agree that  the record accurately reflects my personal performance of the history, physical exam, assessment and plan. I have also personally directed, reviewed, and agree with the discharge instructions. \par \par

## 2019-12-19 NOTE — PHYSICAL EXAM
[Ambulatory and capable of all self care but unable to carry out any work activities] : Status 2- Ambulatory and capable of all self care but unable to carry out any work activities. Up and about more than 50% of waking hours [Normal] : affect appropriate [de-identified] : Ambulates with cane

## 2019-12-20 LAB
ALBUMIN SERPL ELPH-MCNC: 3.7 G/DL
ALP BLD-CCNC: 81 U/L
ALT SERPL-CCNC: 8 U/L
ANION GAP SERPL CALC-SCNC: 11 MMOL/L
AST SERPL-CCNC: 16 U/L
B2 MICROGLOB SERPL-MCNC: 3.9 MG/L
BILIRUB SERPL-MCNC: 0.5 MG/DL
BUN SERPL-MCNC: 23 MG/DL
CALCIUM SERPL-MCNC: 9.4 MG/DL
CHLORIDE SERPL-SCNC: 103 MMOL/L
CO2 SERPL-SCNC: 21 MMOL/L
CREAT SERPL-MCNC: 1.49 MG/DL
DEPRECATED KAPPA LC FREE/LAMBDA SER: 94.06 RATIO
GLUCOSE SERPL-MCNC: 88 MG/DL
KAPPA LC CSF-MCNC: 0.63 MG/DL
KAPPA LC SERPL-MCNC: 59.26 MG/DL
LDH SERPL-CCNC: 158 U/L
MAGNESIUM SERPL-MCNC: 2.2 MG/DL
POTASSIUM SERPL-SCNC: 4.4 MMOL/L
PROT SERPL-MCNC: 8.4 G/DL
SODIUM SERPL-SCNC: 135 MMOL/L

## 2019-12-23 LAB
ALBUMIN MFR SERPL ELPH: 42.9 %
ALBUMIN SERPL-MCNC: 3.6 G/DL
ALBUMIN/GLOB SERPL: 0.8 RATIO
ALPHA1 GLOB MFR SERPL ELPH: 3.9 %
ALPHA1 GLOB SERPL ELPH-MCNC: 0.3 G/DL
ALPHA2 GLOB MFR SERPL ELPH: 8.6 %
ALPHA2 GLOB SERPL ELPH-MCNC: 0.7 G/DL
B-GLOBULIN MFR SERPL ELPH: 40.7 %
B-GLOBULIN SERPL ELPH-MCNC: 3.4 G/DL
DEPRECATED KAPPA LC FREE/LAMBDA SER: 94.06 RATIO
GAMMA GLOB FLD ELPH-MCNC: 0.3 G/DL
GAMMA GLOB MFR SERPL ELPH: 3.9 %
IGA SER QL IEP: 23 MG/DL
IGG SER QL IEP: 2948 MG/DL
IGM SER QL IEP: 23 MG/DL
INTERPRETATION SERPL IEP-IMP: NORMAL
KAPPA LC CSF-MCNC: 0.63 MG/DL
KAPPA LC SERPL-MCNC: 59.26 MG/DL
M PROTEIN MFR SERPL ELPH: NORMAL
M PROTEIN SPEC IFE-MCNC: NORMAL
MONOCLON BAND OBS SERPL: NORMAL
PROT SERPL-MCNC: 8.4 G/DL
PROT SERPL-MCNC: 8.4 G/DL

## 2020-01-02 ENCOUNTER — APPOINTMENT (OUTPATIENT)
Dept: INFUSION THERAPY | Facility: HOSPITAL | Age: 80
End: 2020-01-02

## 2020-01-02 ENCOUNTER — RESULT REVIEW (OUTPATIENT)
Age: 80
End: 2020-01-02

## 2020-01-02 LAB
BASOPHILS # BLD AUTO: 0 K/UL — SIGNIFICANT CHANGE UP (ref 0–0.2)
BASOPHILS NFR BLD AUTO: 0.3 % — SIGNIFICANT CHANGE UP (ref 0–2)
EOSINOPHIL # BLD AUTO: 0.1 K/UL — SIGNIFICANT CHANGE UP (ref 0–0.5)
EOSINOPHIL NFR BLD AUTO: 2.2 % — SIGNIFICANT CHANGE UP (ref 0–6)
HCT VFR BLD CALC: 31.5 % — LOW (ref 34.5–45)
HGB BLD-MCNC: 11.1 G/DL — LOW (ref 11.5–15.5)
LYMPHOCYTES # BLD AUTO: 1.8 K/UL — SIGNIFICANT CHANGE UP (ref 1–3.3)
LYMPHOCYTES # BLD AUTO: 34.3 % — SIGNIFICANT CHANGE UP (ref 13–44)
MCHC RBC-ENTMCNC: 30.5 PG — SIGNIFICANT CHANGE UP (ref 27–34)
MCHC RBC-ENTMCNC: 35.2 G/DL — SIGNIFICANT CHANGE UP (ref 32–36)
MCV RBC AUTO: 86.5 FL — SIGNIFICANT CHANGE UP (ref 80–100)
MONOCYTES # BLD AUTO: 0.3 K/UL — SIGNIFICANT CHANGE UP (ref 0–0.9)
MONOCYTES NFR BLD AUTO: 6.4 % — SIGNIFICANT CHANGE UP (ref 2–14)
NEUTROPHILS # BLD AUTO: 3.1 K/UL — SIGNIFICANT CHANGE UP (ref 1.8–7.4)
NEUTROPHILS NFR BLD AUTO: 56.9 % — SIGNIFICANT CHANGE UP (ref 43–77)
PLATELET # BLD AUTO: 217 K/UL — SIGNIFICANT CHANGE UP (ref 150–400)
RBC # BLD: 3.64 M/UL — LOW (ref 3.8–5.2)
RBC # FLD: 13.8 % — SIGNIFICANT CHANGE UP (ref 10.3–14.5)
WBC # BLD: 5.4 K/UL — SIGNIFICANT CHANGE UP (ref 3.8–10.5)
WBC # FLD AUTO: 5.4 K/UL — SIGNIFICANT CHANGE UP (ref 3.8–10.5)

## 2020-01-07 ENCOUNTER — OUTPATIENT (OUTPATIENT)
Dept: OUTPATIENT SERVICES | Facility: HOSPITAL | Age: 80
LOS: 1 days | Discharge: HOME | End: 2020-01-07

## 2020-01-07 DIAGNOSIS — C90.00 MULTIPLE MYELOMA NOT HAVING ACHIEVED REMISSION: ICD-10-CM

## 2020-01-09 ENCOUNTER — RESULT REVIEW (OUTPATIENT)
Age: 80
End: 2020-01-09

## 2020-01-09 ENCOUNTER — APPOINTMENT (OUTPATIENT)
Dept: INFUSION THERAPY | Facility: HOSPITAL | Age: 80
End: 2020-01-09

## 2020-01-09 LAB
BASOPHILS # BLD AUTO: 0 K/UL — SIGNIFICANT CHANGE UP (ref 0–0.2)
BASOPHILS NFR BLD AUTO: 0.2 % — SIGNIFICANT CHANGE UP (ref 0–2)
EOSINOPHIL # BLD AUTO: 0 K/UL — SIGNIFICANT CHANGE UP (ref 0–0.5)
EOSINOPHIL NFR BLD AUTO: 0 % — SIGNIFICANT CHANGE UP (ref 0–6)
HCT VFR BLD CALC: 32.7 % — LOW (ref 34.5–45)
HGB BLD-MCNC: 10.9 G/DL — LOW (ref 11.5–15.5)
LYMPHOCYTES # BLD AUTO: 1.5 K/UL — SIGNIFICANT CHANGE UP (ref 1–3.3)
LYMPHOCYTES # BLD AUTO: 32.2 % — SIGNIFICANT CHANGE UP (ref 13–44)
MCHC RBC-ENTMCNC: 29.2 PG — SIGNIFICANT CHANGE UP (ref 27–34)
MCHC RBC-ENTMCNC: 33.3 G/DL — SIGNIFICANT CHANGE UP (ref 32–36)
MCV RBC AUTO: 87.9 FL — SIGNIFICANT CHANGE UP (ref 80–100)
MONOCYTES # BLD AUTO: 0.3 K/UL — SIGNIFICANT CHANGE UP (ref 0–0.9)
MONOCYTES NFR BLD AUTO: 7.1 % — SIGNIFICANT CHANGE UP (ref 2–14)
NEUTROPHILS # BLD AUTO: 2.9 K/UL — SIGNIFICANT CHANGE UP (ref 1.8–7.4)
NEUTROPHILS NFR BLD AUTO: 60.5 % — SIGNIFICANT CHANGE UP (ref 43–77)
PLATELET # BLD AUTO: 186 K/UL — SIGNIFICANT CHANGE UP (ref 150–400)
RBC # BLD: 3.72 M/UL — LOW (ref 3.8–5.2)
RBC # FLD: 13.8 % — SIGNIFICANT CHANGE UP (ref 10.3–14.5)
WBC # BLD: 4.8 K/UL — SIGNIFICANT CHANGE UP (ref 3.8–10.5)
WBC # FLD AUTO: 4.8 K/UL — SIGNIFICANT CHANGE UP (ref 3.8–10.5)

## 2020-01-10 DIAGNOSIS — Z51.11 ENCOUNTER FOR ANTINEOPLASTIC CHEMOTHERAPY: ICD-10-CM

## 2020-01-10 DIAGNOSIS — R11.2 NAUSEA WITH VOMITING, UNSPECIFIED: ICD-10-CM

## 2020-01-16 ENCOUNTER — RESULT REVIEW (OUTPATIENT)
Age: 80
End: 2020-01-16

## 2020-01-16 ENCOUNTER — APPOINTMENT (OUTPATIENT)
Dept: INFUSION THERAPY | Facility: HOSPITAL | Age: 80
End: 2020-01-16

## 2020-01-16 LAB
BASOPHILS # BLD AUTO: 0 K/UL — SIGNIFICANT CHANGE UP (ref 0–0.2)
BASOPHILS NFR BLD AUTO: 0.1 % — SIGNIFICANT CHANGE UP (ref 0–2)
EOSINOPHIL # BLD AUTO: 0.2 K/UL — SIGNIFICANT CHANGE UP (ref 0–0.5)
EOSINOPHIL NFR BLD AUTO: 4.7 % — SIGNIFICANT CHANGE UP (ref 0–6)
HCT VFR BLD CALC: 33.2 % — LOW (ref 34.5–45)
HGB BLD-MCNC: 11.3 G/DL — LOW (ref 11.5–15.5)
LYMPHOCYTES # BLD AUTO: 2.1 K/UL — SIGNIFICANT CHANGE UP (ref 1–3.3)
LYMPHOCYTES # BLD AUTO: 40 % — SIGNIFICANT CHANGE UP (ref 13–44)
MCHC RBC-ENTMCNC: 29.8 PG — SIGNIFICANT CHANGE UP (ref 27–34)
MCHC RBC-ENTMCNC: 34.2 G/DL — SIGNIFICANT CHANGE UP (ref 32–36)
MCV RBC AUTO: 87.1 FL — SIGNIFICANT CHANGE UP (ref 80–100)
MONOCYTES # BLD AUTO: 0.5 K/UL — SIGNIFICANT CHANGE UP (ref 0–0.9)
MONOCYTES NFR BLD AUTO: 9.4 % — SIGNIFICANT CHANGE UP (ref 2–14)
NEUTROPHILS # BLD AUTO: 2.4 K/UL — SIGNIFICANT CHANGE UP (ref 1.8–7.4)
NEUTROPHILS NFR BLD AUTO: 45.8 % — SIGNIFICANT CHANGE UP (ref 43–77)
PLATELET # BLD AUTO: 176 K/UL — SIGNIFICANT CHANGE UP (ref 150–400)
RBC # BLD: 3.81 M/UL — SIGNIFICANT CHANGE UP (ref 3.8–5.2)
RBC # FLD: 13.7 % — SIGNIFICANT CHANGE UP (ref 10.3–14.5)
WBC # BLD: 5.2 K/UL — SIGNIFICANT CHANGE UP (ref 3.8–10.5)
WBC # FLD AUTO: 5.2 K/UL — SIGNIFICANT CHANGE UP (ref 3.8–10.5)

## 2020-01-28 ENCOUNTER — OTHER (OUTPATIENT)
Age: 80
End: 2020-01-28

## 2020-01-30 ENCOUNTER — RESULT REVIEW (OUTPATIENT)
Age: 80
End: 2020-01-30

## 2020-01-30 ENCOUNTER — APPOINTMENT (OUTPATIENT)
Dept: INFUSION THERAPY | Facility: HOSPITAL | Age: 80
End: 2020-01-30

## 2020-01-30 LAB
BASOPHILS # BLD AUTO: 0 K/UL — SIGNIFICANT CHANGE UP (ref 0–0.2)
BASOPHILS NFR BLD AUTO: 0.1 % — SIGNIFICANT CHANGE UP (ref 0–2)
EOSINOPHIL # BLD AUTO: 0.1 K/UL — SIGNIFICANT CHANGE UP (ref 0–0.5)
EOSINOPHIL NFR BLD AUTO: 1.6 % — SIGNIFICANT CHANGE UP (ref 0–6)
HCT VFR BLD CALC: 31.4 % — LOW (ref 34.5–45)
HGB BLD-MCNC: 10.5 G/DL — LOW (ref 11.5–15.5)
LYMPHOCYTES # BLD AUTO: 1.9 K/UL — SIGNIFICANT CHANGE UP (ref 1–3.3)
LYMPHOCYTES # BLD AUTO: 40.9 % — SIGNIFICANT CHANGE UP (ref 13–44)
MCHC RBC-ENTMCNC: 29.2 PG — SIGNIFICANT CHANGE UP (ref 27–34)
MCHC RBC-ENTMCNC: 33.5 G/DL — SIGNIFICANT CHANGE UP (ref 32–36)
MCV RBC AUTO: 87.3 FL — SIGNIFICANT CHANGE UP (ref 80–100)
MONOCYTES # BLD AUTO: 0.4 K/UL — SIGNIFICANT CHANGE UP (ref 0–0.9)
MONOCYTES NFR BLD AUTO: 8.2 % — SIGNIFICANT CHANGE UP (ref 2–14)
NEUTROPHILS # BLD AUTO: 2.2 K/UL — SIGNIFICANT CHANGE UP (ref 1.8–7.4)
NEUTROPHILS NFR BLD AUTO: 49.3 % — SIGNIFICANT CHANGE UP (ref 43–77)
PLATELET # BLD AUTO: 208 K/UL — SIGNIFICANT CHANGE UP (ref 150–400)
RBC # BLD: 3.6 M/UL — LOW (ref 3.8–5.2)
RBC # FLD: 13.5 % — SIGNIFICANT CHANGE UP (ref 10.3–14.5)
WBC # BLD: 4.5 K/UL — SIGNIFICANT CHANGE UP (ref 3.8–10.5)
WBC # FLD AUTO: 4.5 K/UL — SIGNIFICANT CHANGE UP (ref 3.8–10.5)

## 2020-02-06 ENCOUNTER — RESULT REVIEW (OUTPATIENT)
Age: 80
End: 2020-02-06

## 2020-02-06 ENCOUNTER — APPOINTMENT (OUTPATIENT)
Dept: INFUSION THERAPY | Facility: HOSPITAL | Age: 80
End: 2020-02-06

## 2020-02-06 LAB
BASOPHILS # BLD AUTO: 0 K/UL — SIGNIFICANT CHANGE UP (ref 0–0.2)
BASOPHILS NFR BLD AUTO: 0.7 % — SIGNIFICANT CHANGE UP (ref 0–2)
EOSINOPHIL # BLD AUTO: 0.1 K/UL — SIGNIFICANT CHANGE UP (ref 0–0.5)
EOSINOPHIL NFR BLD AUTO: 1.3 % — SIGNIFICANT CHANGE UP (ref 0–6)
HCT VFR BLD CALC: 31.9 % — LOW (ref 34.5–45)
HGB BLD-MCNC: 10.8 G/DL — LOW (ref 11.5–15.5)
LYMPHOCYTES # BLD AUTO: 1.6 K/UL — SIGNIFICANT CHANGE UP (ref 1–3.3)
LYMPHOCYTES # BLD AUTO: 37.8 % — SIGNIFICANT CHANGE UP (ref 13–44)
MCHC RBC-ENTMCNC: 29.5 PG — SIGNIFICANT CHANGE UP (ref 27–34)
MCHC RBC-ENTMCNC: 33.7 G/DL — SIGNIFICANT CHANGE UP (ref 32–36)
MCV RBC AUTO: 87.6 FL — SIGNIFICANT CHANGE UP (ref 80–100)
MONOCYTES # BLD AUTO: 0.3 K/UL — SIGNIFICANT CHANGE UP (ref 0–0.9)
MONOCYTES NFR BLD AUTO: 7.3 % — SIGNIFICANT CHANGE UP (ref 2–14)
NEUTROPHILS # BLD AUTO: 2.3 K/UL — SIGNIFICANT CHANGE UP (ref 1.8–7.4)
NEUTROPHILS NFR BLD AUTO: 52.8 % — SIGNIFICANT CHANGE UP (ref 43–77)
PLATELET # BLD AUTO: 168 K/UL — SIGNIFICANT CHANGE UP (ref 150–400)
RBC # BLD: 3.64 M/UL — LOW (ref 3.8–5.2)
RBC # FLD: 13.6 % — SIGNIFICANT CHANGE UP (ref 10.3–14.5)
WBC # BLD: 4.3 K/UL — SIGNIFICANT CHANGE UP (ref 3.8–10.5)
WBC # FLD AUTO: 4.3 K/UL — SIGNIFICANT CHANGE UP (ref 3.8–10.5)

## 2020-02-10 ENCOUNTER — OUTPATIENT (OUTPATIENT)
Dept: OUTPATIENT SERVICES | Facility: HOSPITAL | Age: 80
LOS: 1 days | Discharge: HOME | End: 2020-02-10

## 2020-02-10 DIAGNOSIS — C90.00 MULTIPLE MYELOMA NOT HAVING ACHIEVED REMISSION: ICD-10-CM

## 2020-02-14 ENCOUNTER — APPOINTMENT (OUTPATIENT)
Dept: INFUSION THERAPY | Facility: HOSPITAL | Age: 80
End: 2020-02-14

## 2020-02-14 ENCOUNTER — RESULT REVIEW (OUTPATIENT)
Age: 80
End: 2020-02-14

## 2020-02-14 LAB
BASOPHILS # BLD AUTO: 0 K/UL — SIGNIFICANT CHANGE UP (ref 0–0.2)
BASOPHILS NFR BLD AUTO: 0.5 % — SIGNIFICANT CHANGE UP (ref 0–2)
EOSINOPHIL # BLD AUTO: 0.1 K/UL — SIGNIFICANT CHANGE UP (ref 0–0.5)
EOSINOPHIL NFR BLD AUTO: 1.2 % — SIGNIFICANT CHANGE UP (ref 0–6)
HCT VFR BLD CALC: 32.5 % — LOW (ref 34.5–45)
HGB BLD-MCNC: 11.3 G/DL — LOW (ref 11.5–15.5)
LYMPHOCYTES # BLD AUTO: 1.7 K/UL — SIGNIFICANT CHANGE UP (ref 1–3.3)
LYMPHOCYTES # BLD AUTO: 30.2 % — SIGNIFICANT CHANGE UP (ref 13–44)
MCHC RBC-ENTMCNC: 30.5 PG — SIGNIFICANT CHANGE UP (ref 27–34)
MCHC RBC-ENTMCNC: 34.9 G/DL — SIGNIFICANT CHANGE UP (ref 32–36)
MCV RBC AUTO: 87.3 FL — SIGNIFICANT CHANGE UP (ref 80–100)
MONOCYTES # BLD AUTO: 0.4 K/UL — SIGNIFICANT CHANGE UP (ref 0–0.9)
MONOCYTES NFR BLD AUTO: 6.3 % — SIGNIFICANT CHANGE UP (ref 2–14)
NEUTROPHILS # BLD AUTO: 3.6 K/UL — SIGNIFICANT CHANGE UP (ref 1.8–7.4)
NEUTROPHILS NFR BLD AUTO: 61.8 % — SIGNIFICANT CHANGE UP (ref 43–77)
PLATELET # BLD AUTO: 162 K/UL — SIGNIFICANT CHANGE UP (ref 150–400)
RBC # BLD: 3.72 M/UL — LOW (ref 3.8–5.2)
RBC # FLD: 13.6 % — SIGNIFICANT CHANGE UP (ref 10.3–14.5)
WBC # BLD: 5.8 K/UL — SIGNIFICANT CHANGE UP (ref 3.8–10.5)
WBC # FLD AUTO: 5.8 K/UL — SIGNIFICANT CHANGE UP (ref 3.8–10.5)

## 2020-02-18 DIAGNOSIS — Z51.11 ENCOUNTER FOR ANTINEOPLASTIC CHEMOTHERAPY: ICD-10-CM

## 2020-02-18 DIAGNOSIS — R11.2 NAUSEA WITH VOMITING, UNSPECIFIED: ICD-10-CM

## 2020-02-27 ENCOUNTER — RESULT REVIEW (OUTPATIENT)
Age: 80
End: 2020-02-27

## 2020-02-27 ENCOUNTER — APPOINTMENT (OUTPATIENT)
Dept: INFUSION THERAPY | Facility: HOSPITAL | Age: 80
End: 2020-02-27

## 2020-02-27 LAB
BASOPHILS # BLD AUTO: 0 K/UL — SIGNIFICANT CHANGE UP (ref 0–0.2)
BASOPHILS NFR BLD AUTO: 0.8 % — SIGNIFICANT CHANGE UP (ref 0–2)
EOSINOPHIL # BLD AUTO: 0 K/UL — SIGNIFICANT CHANGE UP (ref 0–0.5)
EOSINOPHIL NFR BLD AUTO: 0.8 % — SIGNIFICANT CHANGE UP (ref 0–6)
HCT VFR BLD CALC: 31 % — LOW (ref 34.5–45)
HGB BLD-MCNC: 10.7 G/DL — LOW (ref 11.5–15.5)
LYMPHOCYTES # BLD AUTO: 1.7 K/UL — SIGNIFICANT CHANGE UP (ref 1–3.3)
LYMPHOCYTES # BLD AUTO: 32.3 % — SIGNIFICANT CHANGE UP (ref 13–44)
MCHC RBC-ENTMCNC: 30 PG — SIGNIFICANT CHANGE UP (ref 27–34)
MCHC RBC-ENTMCNC: 34.5 G/DL — SIGNIFICANT CHANGE UP (ref 32–36)
MCV RBC AUTO: 86.9 FL — SIGNIFICANT CHANGE UP (ref 80–100)
MONOCYTES # BLD AUTO: 0.3 K/UL — SIGNIFICANT CHANGE UP (ref 0–0.9)
MONOCYTES NFR BLD AUTO: 6.2 % — SIGNIFICANT CHANGE UP (ref 2–14)
NEUTROPHILS # BLD AUTO: 3.2 K/UL — SIGNIFICANT CHANGE UP (ref 1.8–7.4)
NEUTROPHILS NFR BLD AUTO: 60 % — SIGNIFICANT CHANGE UP (ref 43–77)
PLATELET # BLD AUTO: 190 K/UL — SIGNIFICANT CHANGE UP (ref 150–400)
RBC # BLD: 3.57 M/UL — LOW (ref 3.8–5.2)
RBC # FLD: 13.5 % — SIGNIFICANT CHANGE UP (ref 10.3–14.5)
WBC # BLD: 5.2 K/UL — SIGNIFICANT CHANGE UP (ref 3.8–10.5)
WBC # FLD AUTO: 5.2 K/UL — SIGNIFICANT CHANGE UP (ref 3.8–10.5)

## 2020-03-04 ENCOUNTER — APPOINTMENT (OUTPATIENT)
Dept: ORTHOPEDIC SURGERY | Facility: CLINIC | Age: 80
End: 2020-03-04
Payer: MEDICARE

## 2020-03-04 VITALS — BODY MASS INDEX: 27.09 KG/M2 | WEIGHT: 138 LBS | HEIGHT: 60 IN

## 2020-03-04 DIAGNOSIS — M75.122 COMPLETE ROTATOR CUFF TEAR OR RUPTURE OF LEFT SHOULDER, NOT SPECIFIED AS TRAUMATIC: ICD-10-CM

## 2020-03-04 PROCEDURE — 73030 X-RAY EXAM OF SHOULDER: CPT | Mod: LT

## 2020-03-04 PROCEDURE — 99214 OFFICE O/P EST MOD 30 MIN: CPT

## 2020-03-04 NOTE — HISTORY OF PRESENT ILLNESS
[de-identified] : CC Left shoulder\par \par HPI 79 yo female left HD presents with gradual onset of many months of activity related pain in the lateral Left shoulder [without injury]. The pain is same, and rated a 2 out of 10, described as achy, [without radiation]. Rest makes the pain better and moving the shoulder makes the pain worse. The patient reports associated symptoms of weakness. The patient - pain at night affecting sleep, - neck pain, and - similar pain previously.\par \par The patient has tried the following treatments:\par Activity modification	-\par Ice			-\par Nsaids    		-\par Physical Therapy  	-\par Cortisone Injection	-\par Arthroscopy/Surgery	-\par \par Review of Systems is positive for the above musculoskeletal symptoms and is otherwise non-contributory for general, constitutional, psychiatric, neurologic, HEENT, cardiac, respiratory, gastrointestinal, reproductive, lymphatic, and dermatologic complaints.\par \par Consult by Dr Fatuma Cruz

## 2020-03-04 NOTE — PHYSICAL EXAM
[de-identified] : Physical Examination\par General: well nourished, in no acute distress, alert and oriented to person, place and time\par Psychiatric: normal mood and affect, no abnormal movements or speech patterns\par Eyes: vision intact - glasses\par Throat: no thyromegaly\par Lymph: no enlarged nodes, no lymphedema in extremity\par Respiratory: no wheezing, no shortness of breath with ambulation\par Cardiac: no cardiac leg swelling, 2+ peripheral pulses\par Neurology: normal gross sensation in extremities to light touch\par Abdomen: soft, non-tender, tympanic, no masses\par \par Musculoskeletal Examination\par Cervical spine	decreased painless range of motion and negative Spurling's test\par \par Shoulder			Right			Left\par Appearance\par      Skin/Swelling/Deformity	normal			normal\par      Scapular Winging		-			-\par Range of Motion\par      Forward Flexion		170 / 170		170 / 170\par      Abduction			170 / 170		170 / 170\par      External Rotation		45			35\par      Internal Rotation		T10			T10\par      SAbd Ext Rotation		90			90\par      SAbd Int Rotation		80			80\par      Painful Arc			-			+\par      Crepitus			-			-\par Palpation\par      Clavicle			-			-\par      AC Joint			-			-\par      Posterior Acromion		-			-\par      Levator Scapula		-			-\par      Lateral Bursa			-			+\par      Impingement Area		-			-\par      Biceps Tendon		-			-\par      Anterior Capsule		-			-\par Strength Examination\par      Supraspinatous 		5+ / 0			3+ / 0\par      Infraspinatous			5+ / 0			3+ / 0\par      Subscapularis			5+ / 0			5+ / 0\par      Belly Press			5+ / 0			5+ / 0\par      Lift Off			-			-\par      Drop-Arm			-			-\par Sensation\par      Axillary			normal			normal\par      LatAntCubBrach 		normal			normal\par      Median 			normal			normal\par      Ulnar 			normal			normal\par      Radial 			normal			normal\par Motor\par      AIN 				normal			normal\par      Ulnar 			normal			normal\par      Radial 			normal			normal\par      PIN 				normal			normal\par Pulses\par      Radial			2+			2+ [de-identified] : 3 views of the affected Left shoulder (AP internal and external rotation, Y-View)\par Order taken and evaluated by myself today and\par demonstrate:\par normal bony calcification without dislocation and no fracture\par 	Arch	3B\par 	AC Joint	mild Arthrosis\par 	GH Joint	mild Arthrosis\par 	Calcifications	none with greater tuberosity changes\par \par MRI Left shoulder from [facility] on 2-23-20\par My impression of the images:\par Quality of the MRI is ok\par Supraspinatous Tendon complete tear, mild retraction\par Infraspinatous Tendon complete tear\par Subscapularis Tendon ok\par Teres Minor Tendon ok\par Muscle Belly Atrophy none\par Biceps Tendon is in the groove and looks ok intra-articularly but poorly visualized with a stable attachment anchor\par Superior Labrum degen\par Anterior Labrum degen\par Posterior Labrum degen\par AC joint signal\par There is no full thickness chondral lesion of the glenoid and humeral head\par \par The Final Radiologist Impression:\par Rotator cuff: Full-thickness tear involves nearly the entire width of the supraspinatus tendon with fluid-filled gap measuring 1.7 x 1.6 cm (medial-lateral x anterior-posterior). Small curvilinear fluid signal within the myotendinous unit of the supraspinatus in keeping with interstitial delamination partial tear. Small oblong intramuscular cyst at the myotendinous unit of the infraspinatus, a finding associated with interstitial delamination partial tear. Background moderate tendinosis of the torn supraspinatus and infraspinatus tendons. The teres minor tendon is intact. There is mild subscapularis tendinosis. Mild atrophy of the supraspinatus and infraspinatus muscles.\par \par Long head of the biceps tendon (LHBT): Intact biceps tendon anchor. Mild to moderate tendinosis of the intra-articular LHBT. The LHBT is maintained in the bicipital groove. \par \par Glenohumeral joint: There is a tear of the superior to posterosuperior labrum. Mild osteoarthritis of the glenohumeral joint with cartilage loss along the central to inferior aspect of the humeral head and low-grade partial thickness cartilage loss of the glenoid fossa. Small joint effusion. Intact inferior glenohumeral ligament complex. Small cystic change on the greater and lesser tuberosities.\par \par Bursae: Small subacromial subdeltoid bursal fluid.\par \par Acromioclavicular (AC) joint and rotator cuff outlet: Type II, curved undersurface acromion with a small anterior subacromial spur. Mild capsular hypertrophy of the AC joint small subchondral cysts on the distal clavicle. \par \par The coracoclavicular ligament is intact. No thickening of the coracoacromial ligament.\par \par Other: No mass in the visualized axilla or quadrilateral space.\par \par IMPRESSION: MRI of the left shoulder demonstrates:\par \par 1. Full-thickness tear involves nearly the entire width of the supraspinatus tendon with fluid-filled gap measuring 1.7 x 1.6 cm. Interstitial delamination partial tear myotendinous unit of the supraspinatus and infraspinatus. Background moderate supraspinatus and infraspinatus, and mild subscapularis tendinosis. Mild supraspinatus and infraspinatus muscle atrophy.\par 2. Mild tendinosis of the intra-articular long head of the biceps tendon. \par 3. Tear of the superior to posterosuperior labrum. Mild to moderate osteoarthritis of the glenohumeral joint.\par 4. Small subacromial subdeltoid bursitis.\par 5. Type II, curved undersurface acromion with a small anterior subacromial spur. Mild osteoarthritis of the AC joint.\par

## 2020-03-04 NOTE — DISCUSSION/SUMMARY
[de-identified] : Left shoulder complete  rotator cuff tear\par \par \par We discussed at length the anatomy, function and tear pattern of the rotator cuff using models, diagrams and drawings. We reviewed the patient's physical exam, radiographic images and history. We discussed the expected outcome of non-operative conservative treatment versus arthroscopic operative rotator cuff repair. Non-operative management consists of patient education, activity modification, corticosteroid injection, PO or topical NSAIDs, and formal physical therapy. Partial thickness tears do have some limited healing potential but infrequently heal completely and with time sometimes progress towards a focal full thickness tear. Smaller tears without retraction are expected to progress and enlarge overtime to larger full thickness tears with retraction. Full thickness rotator cuff tears, in a vast majority of circumstances, do not heal without surgical treatment. The larger the tear becomes, the more difficult the repair is technically and protracts and slows rehabilitation. As the full thickness rotator cuff tear progresses over time, the torn tendon edge retracted due to intrinsic tendon changes to its strength and elasticity, which along with progressive rotator cuff muscle belly atrophy and fatty degeneration makes surgical management both less effective and more difficult. Given enough chronic tendon changes and degenerative muscle changes, the cuff may become unrepairable, necessitating larger, more costly, less predictable poorer outcome, reconstructive arthroscopic or open surgeries including a reverse shoulder replacement.\par \par Given the patient's advanced age and a pain level of a 2/10 I do not recommend management surgical of this problem. The patient and her daughter both don't want surgery.\par Recommend holding off on corticosteroid injection as current pain level is 2/10.\par \par Physical therapy\par \par The patient verifies their understanding the the visit, diagnosis and plan. They agree with the treatment plan and will contact the office with any questions or problems.\par Follow up\par P.r.n.

## 2020-03-05 ENCOUNTER — RESULT REVIEW (OUTPATIENT)
Age: 80
End: 2020-03-05

## 2020-03-05 ENCOUNTER — APPOINTMENT (OUTPATIENT)
Dept: HEMATOLOGY ONCOLOGY | Facility: CLINIC | Age: 80
End: 2020-03-05
Payer: MEDICARE

## 2020-03-05 ENCOUNTER — APPOINTMENT (OUTPATIENT)
Dept: INFUSION THERAPY | Facility: HOSPITAL | Age: 80
End: 2020-03-05

## 2020-03-05 VITALS
OXYGEN SATURATION: 98 % | RESPIRATION RATE: 16 BRPM | DIASTOLIC BLOOD PRESSURE: 68 MMHG | SYSTOLIC BLOOD PRESSURE: 167 MMHG | WEIGHT: 137.13 LBS | HEART RATE: 69 BPM | BODY MASS INDEX: 26.78 KG/M2 | TEMPERATURE: 98.1 F

## 2020-03-05 LAB
BASOPHILS # BLD AUTO: 0.02 K/UL — SIGNIFICANT CHANGE UP (ref 0–0.2)
BASOPHILS NFR BLD AUTO: 0.5 % — SIGNIFICANT CHANGE UP (ref 0–2)
EOSINOPHIL # BLD AUTO: 0.02 K/UL — SIGNIFICANT CHANGE UP (ref 0–0.5)
EOSINOPHIL NFR BLD AUTO: 0.5 % — SIGNIFICANT CHANGE UP (ref 0–6)
HCT VFR BLD CALC: 29.7 % — LOW (ref 34.5–45)
HGB BLD-MCNC: 10.4 G/DL — LOW (ref 11.5–15.5)
IMM GRANULOCYTES NFR BLD AUTO: 0.3 % — SIGNIFICANT CHANGE UP (ref 0–1.5)
LYMPHOCYTES # BLD AUTO: 1.49 K/UL — SIGNIFICANT CHANGE UP (ref 1–3.3)
LYMPHOCYTES # BLD AUTO: 37.6 % — SIGNIFICANT CHANGE UP (ref 13–44)
MCHC RBC-ENTMCNC: 29.1 PG — SIGNIFICANT CHANGE UP (ref 27–34)
MCHC RBC-ENTMCNC: 35 GM/DL — SIGNIFICANT CHANGE UP (ref 32–36)
MCV RBC AUTO: 83.2 FL — SIGNIFICANT CHANGE UP (ref 80–100)
MONOCYTES # BLD AUTO: 0.32 K/UL — SIGNIFICANT CHANGE UP (ref 0–0.9)
MONOCYTES NFR BLD AUTO: 8.1 % — SIGNIFICANT CHANGE UP (ref 2–14)
NEUTROPHILS # BLD AUTO: 2.1 K/UL — SIGNIFICANT CHANGE UP (ref 1.8–7.4)
NEUTROPHILS NFR BLD AUTO: 53 % — SIGNIFICANT CHANGE UP (ref 43–77)
NRBC # BLD: 0 /100 WBCS — SIGNIFICANT CHANGE UP (ref 0–0)
NRBC # BLD: 0 /100 WBCS — SIGNIFICANT CHANGE UP (ref 0–0)
PLATELET # BLD AUTO: 189 K/UL — SIGNIFICANT CHANGE UP (ref 150–400)
RBC # BLD: 3.57 M/UL — LOW (ref 3.8–5.2)
RBC # FLD: 15.2 % — HIGH (ref 10.3–14.5)
WBC # BLD: 3.96 K/UL — SIGNIFICANT CHANGE UP (ref 3.8–10.5)
WBC # FLD AUTO: 3.96 K/UL — SIGNIFICANT CHANGE UP (ref 3.8–10.5)

## 2020-03-05 PROCEDURE — 99214 OFFICE O/P EST MOD 30 MIN: CPT

## 2020-03-05 NOTE — PHYSICAL EXAM
[Ambulatory and capable of all self care but unable to carry out any work activities] : Status 2- Ambulatory and capable of all self care but unable to carry out any work activities. Up and about more than 50% of waking hours [Normal] : affect appropriate [de-identified] : Ambulates with cane

## 2020-03-05 NOTE — HISTORY OF PRESENT ILLNESS
[de-identified] : Multiple Myeloma s/p Thal/ Dex 2008 to 2010.....TIA in 2009.....Velcade 2012 to 2013...treatment held due to neuropathy..resumed b/c of POD...tolerating thus far [de-identified] : Patient presents for a follow-up visit. She is accompanied by her family member. She is ambulating with a cane. She reports a healthy appetite. She notes varicose veins bilaterally in her LE and generalized arthritic pain which is stable. She notes some stiffness in hands bilaterally. She also c/o pain of left rotator cuff. She states she followed up with neurology for issues with her discs. She states her hip pain is stable. She is receiving Velcade three weeks on and 1 week off with Decadron. Denies fever/chills, night sweats, mouth sores, eye dryness, blurred vision, nausea, vomiting, diarrhea. No CP, SOB or LE edema.

## 2020-03-05 NOTE — ASSESSMENT
[FreeTextEntry1] : Ms. James is an 80 years old female with history of MM, RA, HTN, TIA. s/p treatment as per HPI...was  on observation...IGG 3370 (8/15/17). Currently being treated again with VD.\par \par Peripheral blood work reviewed and discussed with patient. 2/27/2020: WBC 5.2 HGB 10.7 ANC 3.2 \par IgG 2948 on 12/19/19, IGG 3176 on 11/8/19, 3102 on 8/2/19, 3798 on 3/21/19, previously at 4900. \par Labs sent for CMP, LDH, Mg, Beta2-microglobulin, SPEP, SFLC, Immunofixation, Quantitative Immunoglobulins. \par Continue Velcade 1mg/m2 with 20 mg Dex (3 weeks on; 1 week off). Rationale, benefits, risk and side effects were discussed at length. Patient and daughter verbalized understanding and consented to treatment. Signed consent. Next Velcade appointments: 3/5. 3/12. 3/26. 4/2. 4/9. 4/23. 4/30. 5/7. 5/21. 5/28. 6/4. 6/18. 6/25. 7/2.\par Plan for patient to bring in 24-hour urine in Summer  2020.\par Reviewed skeletal survey (5/23/19) and discussed with patient. Will repeat scan this summer and consider drug holiday / observation\par Continue remaining medications as prescribed. \par Well care stressed, question addressed. \par Stressed regular follow up with Rheum, Nephro and PMD. \par Patient advised to contact immediately with any concerns or symptoms.\par RTC for f/u with Dr. Rosales in July 2020.

## 2020-03-05 NOTE — ADDENDUM
[FreeTextEntry1] : Documented by Freddie Smith acting as a scribe for Dr. Bailey Rosales on 03/05/2020.\par \par All medical record entries made by the Scribe were at my, Dr. Bailey Rosales, direction and personally dictated by me on 03/05/2020. I have reviewed the chart and agree that  the record accurately reflects my personal performance of the history, physical exam, assessment and plan. I have also personally directed, reviewed, and agree with the discharge instructions.

## 2020-03-05 NOTE — REVIEW OF SYSTEMS
[Joint Pain] : joint pain [Negative] : Allergic/Immunologic [Joint Stiffness] : joint stiffness [Fever] : no fever [Chills] : no chills [Cough] : no cough [Abdominal Pain] : no abdominal pain [Vomiting] : no vomiting [FreeTextEntry2] : a [FreeTextEntry5] : bilateral LE varicose veins [FreeTextEntry9] : stable generalized arthritic pain; stiffness of hands bilaterally; pain of left rotator cuff; ambulates with cane; hip pain; spinal disc issues

## 2020-03-06 LAB
ALBUMIN SERPL ELPH-MCNC: 3.6 G/DL
ALP BLD-CCNC: 90 U/L
ALT SERPL-CCNC: 9 U/L
ANION GAP SERPL CALC-SCNC: 15 MMOL/L
AST SERPL-CCNC: 15 U/L
B2 MICROGLOB SERPL-MCNC: 3.4 MG/L
BILIRUB SERPL-MCNC: 0.4 MG/DL
BUN SERPL-MCNC: 26 MG/DL
CALCIUM SERPL-MCNC: 9.2 MG/DL
CHLORIDE SERPL-SCNC: 109 MMOL/L
CO2 SERPL-SCNC: 19 MMOL/L
CREAT SERPL-MCNC: 1.39 MG/DL
GLUCOSE SERPL-MCNC: 151 MG/DL
LDH SERPL-CCNC: 159 U/L
MAGNESIUM SERPL-MCNC: 2.1 MG/DL
POTASSIUM SERPL-SCNC: 4 MMOL/L
PROT SERPL-MCNC: 7.8 G/DL
SODIUM SERPL-SCNC: 143 MMOL/L

## 2020-03-07 ENCOUNTER — OUTPATIENT (OUTPATIENT)
Dept: OUTPATIENT SERVICES | Facility: HOSPITAL | Age: 80
LOS: 1 days | Discharge: HOME | End: 2020-03-07

## 2020-03-07 DIAGNOSIS — C90.00 MULTIPLE MYELOMA NOT HAVING ACHIEVED REMISSION: ICD-10-CM

## 2020-03-09 LAB
ALBUMIN MFR SERPL ELPH: 42.8 %
ALBUMIN SERPL-MCNC: 3.3 G/DL
ALBUMIN/GLOB SERPL: 0.7 RATIO
ALPHA1 GLOB MFR SERPL ELPH: 3.7 %
ALPHA1 GLOB SERPL ELPH-MCNC: 0.3 G/DL
ALPHA2 GLOB MFR SERPL ELPH: 8 %
ALPHA2 GLOB SERPL ELPH-MCNC: 0.6 G/DL
B-GLOBULIN MFR SERPL ELPH: 41.3 %
B-GLOBULIN SERPL ELPH-MCNC: 3.2 G/DL
DEPRECATED KAPPA LC FREE/LAMBDA SER: 66.9 RATIO
DEPRECATED KAPPA LC FREE/LAMBDA SER: 66.9 RATIO
GAMMA GLOB FLD ELPH-MCNC: 0.3 G/DL
GAMMA GLOB MFR SERPL ELPH: 4.2 %
IGA SER QL IEP: 11 MG/DL
IGG SER QL IEP: 3117 MG/DL
IGM SER QL IEP: 25 MG/DL
INTERPRETATION SERPL IEP-IMP: NORMAL
KAPPA LC CSF-MCNC: 0.71 MG/DL
KAPPA LC CSF-MCNC: 0.71 MG/DL
KAPPA LC SERPL-MCNC: 47.5 MG/DL
KAPPA LC SERPL-MCNC: 47.5 MG/DL
M PROTEIN MFR SERPL ELPH: NORMAL
M PROTEIN SPEC IFE-MCNC: NORMAL
MONOCLON BAND OBS SERPL: NORMAL
PROT SERPL-MCNC: 7.8 G/DL
PROT SERPL-MCNC: 7.8 G/DL

## 2020-03-13 ENCOUNTER — APPOINTMENT (OUTPATIENT)
Dept: INFUSION THERAPY | Facility: HOSPITAL | Age: 80
End: 2020-03-13

## 2020-03-13 ENCOUNTER — RESULT REVIEW (OUTPATIENT)
Age: 80
End: 2020-03-13

## 2020-03-13 LAB
BASOPHILS # BLD AUTO: 0.02 K/UL — SIGNIFICANT CHANGE UP (ref 0–0.2)
BASOPHILS NFR BLD AUTO: 0.4 % — SIGNIFICANT CHANGE UP (ref 0–2)
EOSINOPHIL # BLD AUTO: 0.03 K/UL — SIGNIFICANT CHANGE UP (ref 0–0.5)
EOSINOPHIL NFR BLD AUTO: 0.7 % — SIGNIFICANT CHANGE UP (ref 0–6)
HCT VFR BLD CALC: 30.1 % — LOW (ref 34.5–45)
HGB BLD-MCNC: 10.2 G/DL — LOW (ref 11.5–15.5)
IMM GRANULOCYTES NFR BLD AUTO: 0.7 % — SIGNIFICANT CHANGE UP (ref 0–1.5)
LYMPHOCYTES # BLD AUTO: 1.34 K/UL — SIGNIFICANT CHANGE UP (ref 1–3.3)
LYMPHOCYTES # BLD AUTO: 29.5 % — SIGNIFICANT CHANGE UP (ref 13–44)
MCHC RBC-ENTMCNC: 28.8 PG — SIGNIFICANT CHANGE UP (ref 27–34)
MCHC RBC-ENTMCNC: 33.9 GM/DL — SIGNIFICANT CHANGE UP (ref 32–36)
MCV RBC AUTO: 85 FL — SIGNIFICANT CHANGE UP (ref 80–100)
MONOCYTES # BLD AUTO: 0.35 K/UL — SIGNIFICANT CHANGE UP (ref 0–0.9)
MONOCYTES NFR BLD AUTO: 7.7 % — SIGNIFICANT CHANGE UP (ref 2–14)
NEUTROPHILS # BLD AUTO: 2.77 K/UL — SIGNIFICANT CHANGE UP (ref 1.8–7.4)
NEUTROPHILS NFR BLD AUTO: 61 % — SIGNIFICANT CHANGE UP (ref 43–77)
NRBC # BLD: 0 /100 WBCS — SIGNIFICANT CHANGE UP (ref 0–0)
PLATELET # BLD AUTO: 182 K/UL — SIGNIFICANT CHANGE UP (ref 150–400)
RBC # BLD: 3.54 M/UL — LOW (ref 3.8–5.2)
RBC # FLD: 15 % — HIGH (ref 10.3–14.5)
WBC # BLD: 4.54 K/UL — SIGNIFICANT CHANGE UP (ref 3.8–10.5)
WBC # FLD AUTO: 4.54 K/UL — SIGNIFICANT CHANGE UP (ref 3.8–10.5)

## 2020-03-16 DIAGNOSIS — Z51.11 ENCOUNTER FOR ANTINEOPLASTIC CHEMOTHERAPY: ICD-10-CM

## 2020-03-16 DIAGNOSIS — R11.2 NAUSEA WITH VOMITING, UNSPECIFIED: ICD-10-CM

## 2020-04-02 ENCOUNTER — APPOINTMENT (OUTPATIENT)
Dept: INFUSION THERAPY | Facility: HOSPITAL | Age: 80
End: 2020-04-02

## 2020-04-09 ENCOUNTER — APPOINTMENT (OUTPATIENT)
Dept: INFUSION THERAPY | Facility: HOSPITAL | Age: 80
End: 2020-04-09

## 2020-04-16 ENCOUNTER — APPOINTMENT (OUTPATIENT)
Dept: INFUSION THERAPY | Facility: HOSPITAL | Age: 80
End: 2020-04-16

## 2020-04-30 ENCOUNTER — APPOINTMENT (OUTPATIENT)
Dept: INFUSION THERAPY | Facility: HOSPITAL | Age: 80
End: 2020-04-30

## 2020-05-01 ENCOUNTER — OUTPATIENT (OUTPATIENT)
Dept: OUTPATIENT SERVICES | Facility: HOSPITAL | Age: 80
LOS: 1 days | Discharge: HOME | End: 2020-05-01

## 2020-05-01 DIAGNOSIS — C90.00 MULTIPLE MYELOMA NOT HAVING ACHIEVED REMISSION: ICD-10-CM

## 2020-05-07 ENCOUNTER — APPOINTMENT (OUTPATIENT)
Dept: INFUSION THERAPY | Facility: HOSPITAL | Age: 80
End: 2020-05-07

## 2020-05-14 ENCOUNTER — APPOINTMENT (OUTPATIENT)
Dept: INFUSION THERAPY | Facility: HOSPITAL | Age: 80
End: 2020-05-14

## 2020-05-21 ENCOUNTER — RESULT REVIEW (OUTPATIENT)
Age: 80
End: 2020-05-21

## 2020-05-21 ENCOUNTER — APPOINTMENT (OUTPATIENT)
Dept: INFUSION THERAPY | Facility: HOSPITAL | Age: 80
End: 2020-05-21

## 2020-05-21 LAB
BASOPHILS # BLD AUTO: 0.02 K/UL — SIGNIFICANT CHANGE UP (ref 0–0.2)
BASOPHILS NFR BLD AUTO: 0.4 % — SIGNIFICANT CHANGE UP (ref 0–2)
EOSINOPHIL # BLD AUTO: 0.05 K/UL — SIGNIFICANT CHANGE UP (ref 0–0.5)
EOSINOPHIL NFR BLD AUTO: 1 % — SIGNIFICANT CHANGE UP (ref 0–6)
HCT VFR BLD CALC: 31.3 % — LOW (ref 34.5–45)
HGB BLD-MCNC: 10.5 G/DL — LOW (ref 11.5–15.5)
IMM GRANULOCYTES NFR BLD AUTO: 1.2 % — SIGNIFICANT CHANGE UP (ref 0–1.5)
LYMPHOCYTES # BLD AUTO: 1.87 K/UL — SIGNIFICANT CHANGE UP (ref 1–3.3)
LYMPHOCYTES # BLD AUTO: 36 % — SIGNIFICANT CHANGE UP (ref 13–44)
MCHC RBC-ENTMCNC: 28.5 PG — SIGNIFICANT CHANGE UP (ref 27–34)
MCHC RBC-ENTMCNC: 33.5 GM/DL — SIGNIFICANT CHANGE UP (ref 32–36)
MCV RBC AUTO: 85.1 FL — SIGNIFICANT CHANGE UP (ref 80–100)
MONOCYTES # BLD AUTO: 0.46 K/UL — SIGNIFICANT CHANGE UP (ref 0–0.9)
MONOCYTES NFR BLD AUTO: 8.8 % — SIGNIFICANT CHANGE UP (ref 2–14)
NEUTROPHILS # BLD AUTO: 2.74 K/UL — SIGNIFICANT CHANGE UP (ref 1.8–7.4)
NEUTROPHILS NFR BLD AUTO: 52.6 % — SIGNIFICANT CHANGE UP (ref 43–77)
NRBC # BLD: 0 /100 WBCS — SIGNIFICANT CHANGE UP (ref 0–0)
PLATELET # BLD AUTO: 224 K/UL — SIGNIFICANT CHANGE UP (ref 150–400)
RBC # BLD: 3.68 M/UL — LOW (ref 3.8–5.2)
RBC # FLD: 15 % — HIGH (ref 10.3–14.5)
WBC # BLD: 5.2 K/UL — SIGNIFICANT CHANGE UP (ref 3.8–10.5)
WBC # FLD AUTO: 5.2 K/UL — SIGNIFICANT CHANGE UP (ref 3.8–10.5)

## 2020-05-22 DIAGNOSIS — Z51.11 ENCOUNTER FOR ANTINEOPLASTIC CHEMOTHERAPY: ICD-10-CM

## 2020-05-22 DIAGNOSIS — R11.2 NAUSEA WITH VOMITING, UNSPECIFIED: ICD-10-CM

## 2020-05-28 ENCOUNTER — APPOINTMENT (OUTPATIENT)
Dept: INFUSION THERAPY | Facility: HOSPITAL | Age: 80
End: 2020-05-28

## 2020-05-29 ENCOUNTER — OUTPATIENT (OUTPATIENT)
Dept: OUTPATIENT SERVICES | Facility: HOSPITAL | Age: 80
LOS: 1 days | Discharge: HOME | End: 2020-05-29

## 2020-05-29 DIAGNOSIS — C90.00 MULTIPLE MYELOMA NOT HAVING ACHIEVED REMISSION: ICD-10-CM

## 2020-05-30 ENCOUNTER — RESULT REVIEW (OUTPATIENT)
Age: 80
End: 2020-05-30

## 2020-05-30 ENCOUNTER — APPOINTMENT (OUTPATIENT)
Dept: INFUSION THERAPY | Facility: HOSPITAL | Age: 80
End: 2020-05-30

## 2020-05-30 LAB
BASOPHILS # BLD AUTO: 0.02 K/UL — SIGNIFICANT CHANGE UP (ref 0–0.2)
BASOPHILS NFR BLD AUTO: 0.4 % — SIGNIFICANT CHANGE UP (ref 0–2)
EOSINOPHIL # BLD AUTO: 0.02 K/UL — SIGNIFICANT CHANGE UP (ref 0–0.5)
EOSINOPHIL NFR BLD AUTO: 0.4 % — SIGNIFICANT CHANGE UP (ref 0–6)
HCT VFR BLD CALC: 30.8 % — LOW (ref 34.5–45)
HGB BLD-MCNC: 10.4 G/DL — LOW (ref 11.5–15.5)
IMM GRANULOCYTES NFR BLD AUTO: 0.4 % — SIGNIFICANT CHANGE UP (ref 0–1.5)
LYMPHOCYTES # BLD AUTO: 1.23 K/UL — SIGNIFICANT CHANGE UP (ref 1–3.3)
LYMPHOCYTES # BLD AUTO: 25.1 % — SIGNIFICANT CHANGE UP (ref 13–44)
MCHC RBC-ENTMCNC: 28.7 PG — SIGNIFICANT CHANGE UP (ref 27–34)
MCHC RBC-ENTMCNC: 33.8 GM/DL — SIGNIFICANT CHANGE UP (ref 32–36)
MCV RBC AUTO: 84.8 FL — SIGNIFICANT CHANGE UP (ref 80–100)
MONOCYTES # BLD AUTO: 0.47 K/UL — SIGNIFICANT CHANGE UP (ref 0–0.9)
MONOCYTES NFR BLD AUTO: 9.6 % — SIGNIFICANT CHANGE UP (ref 2–14)
NEUTROPHILS # BLD AUTO: 3.15 K/UL — SIGNIFICANT CHANGE UP (ref 1.8–7.4)
NEUTROPHILS NFR BLD AUTO: 64.1 % — SIGNIFICANT CHANGE UP (ref 43–77)
NRBC # BLD: 0 /100 WBCS — SIGNIFICANT CHANGE UP (ref 0–0)
PLATELET # BLD AUTO: 206 K/UL — SIGNIFICANT CHANGE UP (ref 150–400)
RBC # BLD: 3.63 M/UL — LOW (ref 3.8–5.2)
RBC # FLD: 14.3 % — SIGNIFICANT CHANGE UP (ref 10.3–14.5)
WBC # BLD: 4.91 K/UL — SIGNIFICANT CHANGE UP (ref 3.8–10.5)
WBC # FLD AUTO: 4.91 K/UL — SIGNIFICANT CHANGE UP (ref 3.8–10.5)

## 2020-06-01 DIAGNOSIS — R11.2 NAUSEA WITH VOMITING, UNSPECIFIED: ICD-10-CM

## 2020-06-01 DIAGNOSIS — Z01.818 ENCOUNTER FOR OTHER PREPROCEDURAL EXAMINATION: ICD-10-CM

## 2020-06-01 DIAGNOSIS — Z51.11 ENCOUNTER FOR ANTINEOPLASTIC CHEMOTHERAPY: ICD-10-CM

## 2020-06-02 ENCOUNTER — TRANSCRIPTION ENCOUNTER (OUTPATIENT)
Age: 80
End: 2020-06-02

## 2020-06-02 ENCOUNTER — APPOINTMENT (OUTPATIENT)
Dept: DISASTER EMERGENCY | Facility: CLINIC | Age: 80
End: 2020-06-02

## 2020-06-03 LAB — SARS-COV-2 N GENE NPH QL NAA+PROBE: NOT DETECTED

## 2020-06-04 ENCOUNTER — RESULT REVIEW (OUTPATIENT)
Age: 80
End: 2020-06-04

## 2020-06-04 ENCOUNTER — APPOINTMENT (OUTPATIENT)
Dept: INFUSION THERAPY | Facility: HOSPITAL | Age: 80
End: 2020-06-04

## 2020-06-04 LAB
BASOPHILS # BLD AUTO: 0.01 K/UL — SIGNIFICANT CHANGE UP (ref 0–0.2)
BASOPHILS NFR BLD AUTO: 0.2 % — SIGNIFICANT CHANGE UP (ref 0–2)
EOSINOPHIL # BLD AUTO: 0.02 K/UL — SIGNIFICANT CHANGE UP (ref 0–0.5)
EOSINOPHIL NFR BLD AUTO: 0.3 % — SIGNIFICANT CHANGE UP (ref 0–6)
HCT VFR BLD CALC: 28 % — LOW (ref 34.5–45)
HGB BLD-MCNC: 9.6 G/DL — LOW (ref 11.5–15.5)
IMM GRANULOCYTES NFR BLD AUTO: 0.8 % — SIGNIFICANT CHANGE UP (ref 0–1.5)
LYMPHOCYTES # BLD AUTO: 1.56 K/UL — SIGNIFICANT CHANGE UP (ref 1–3.3)
LYMPHOCYTES # BLD AUTO: 23.5 % — SIGNIFICANT CHANGE UP (ref 13–44)
MCHC RBC-ENTMCNC: 29.3 PG — SIGNIFICANT CHANGE UP (ref 27–34)
MCHC RBC-ENTMCNC: 34.3 GM/DL — SIGNIFICANT CHANGE UP (ref 32–36)
MCV RBC AUTO: 85.4 FL — SIGNIFICANT CHANGE UP (ref 80–100)
MONOCYTES # BLD AUTO: 0.52 K/UL — SIGNIFICANT CHANGE UP (ref 0–0.9)
MONOCYTES NFR BLD AUTO: 7.8 % — SIGNIFICANT CHANGE UP (ref 2–14)
NEUTROPHILS # BLD AUTO: 4.47 K/UL — SIGNIFICANT CHANGE UP (ref 1.8–7.4)
NEUTROPHILS NFR BLD AUTO: 67.4 % — SIGNIFICANT CHANGE UP (ref 43–77)
NRBC # BLD: 0 /100 WBCS — SIGNIFICANT CHANGE UP (ref 0–0)
PLATELET # BLD AUTO: 174 K/UL — SIGNIFICANT CHANGE UP (ref 150–400)
RBC # BLD: 3.28 M/UL — LOW (ref 3.8–5.2)
RBC # FLD: 15 % — HIGH (ref 10.3–14.5)
WBC # BLD: 6.63 K/UL — SIGNIFICANT CHANGE UP (ref 3.8–10.5)
WBC # FLD AUTO: 6.63 K/UL — SIGNIFICANT CHANGE UP (ref 3.8–10.5)

## 2020-06-18 ENCOUNTER — RESULT REVIEW (OUTPATIENT)
Age: 80
End: 2020-06-18

## 2020-06-18 ENCOUNTER — APPOINTMENT (OUTPATIENT)
Dept: INFUSION THERAPY | Facility: HOSPITAL | Age: 80
End: 2020-06-18

## 2020-06-18 LAB
BASOPHILS # BLD AUTO: 0.03 K/UL — SIGNIFICANT CHANGE UP (ref 0–0.2)
BASOPHILS NFR BLD AUTO: 0.7 % — SIGNIFICANT CHANGE UP (ref 0–2)
EOSINOPHIL # BLD AUTO: 0.03 K/UL — SIGNIFICANT CHANGE UP (ref 0–0.5)
EOSINOPHIL NFR BLD AUTO: 0.7 % — SIGNIFICANT CHANGE UP (ref 0–6)
HCT VFR BLD CALC: 28.3 % — LOW (ref 34.5–45)
HGB BLD-MCNC: 9.6 G/DL — LOW (ref 11.5–15.5)
IMM GRANULOCYTES NFR BLD AUTO: 0.5 % — SIGNIFICANT CHANGE UP (ref 0–1.5)
LYMPHOCYTES # BLD AUTO: 1.77 K/UL — SIGNIFICANT CHANGE UP (ref 1–3.3)
LYMPHOCYTES # BLD AUTO: 41.3 % — SIGNIFICANT CHANGE UP (ref 13–44)
MCHC RBC-ENTMCNC: 28.8 PG — SIGNIFICANT CHANGE UP (ref 27–34)
MCHC RBC-ENTMCNC: 33.9 GM/DL — SIGNIFICANT CHANGE UP (ref 32–36)
MCV RBC AUTO: 85 FL — SIGNIFICANT CHANGE UP (ref 80–100)
MONOCYTES # BLD AUTO: 0.39 K/UL — SIGNIFICANT CHANGE UP (ref 0–0.9)
MONOCYTES NFR BLD AUTO: 9.1 % — SIGNIFICANT CHANGE UP (ref 2–14)
NEUTROPHILS # BLD AUTO: 2.05 K/UL — SIGNIFICANT CHANGE UP (ref 1.8–7.4)
NEUTROPHILS NFR BLD AUTO: 47.7 % — SIGNIFICANT CHANGE UP (ref 43–77)
NRBC # BLD: 0 /100 WBCS — SIGNIFICANT CHANGE UP (ref 0–0)
PLATELET # BLD AUTO: 216 K/UL — SIGNIFICANT CHANGE UP (ref 150–400)
RBC # BLD: 3.33 M/UL — LOW (ref 3.8–5.2)
RBC # FLD: 15.1 % — HIGH (ref 10.3–14.5)
WBC # BLD: 4.29 K/UL — SIGNIFICANT CHANGE UP (ref 3.8–10.5)
WBC # FLD AUTO: 4.29 K/UL — SIGNIFICANT CHANGE UP (ref 3.8–10.5)

## 2020-06-25 ENCOUNTER — RESULT REVIEW (OUTPATIENT)
Age: 80
End: 2020-06-25

## 2020-06-25 ENCOUNTER — APPOINTMENT (OUTPATIENT)
Dept: INFUSION THERAPY | Facility: HOSPITAL | Age: 80
End: 2020-06-25

## 2020-06-25 LAB
BASOPHILS # BLD AUTO: 0.02 K/UL — SIGNIFICANT CHANGE UP (ref 0–0.2)
BASOPHILS NFR BLD AUTO: 0.5 % — SIGNIFICANT CHANGE UP (ref 0–2)
EOSINOPHIL # BLD AUTO: 0.03 K/UL — SIGNIFICANT CHANGE UP (ref 0–0.5)
EOSINOPHIL NFR BLD AUTO: 0.8 % — SIGNIFICANT CHANGE UP (ref 0–6)
HCT VFR BLD CALC: 28.7 % — LOW (ref 34.5–45)
HGB BLD-MCNC: 9.7 G/DL — LOW (ref 11.5–15.5)
IMM GRANULOCYTES NFR BLD AUTO: 0.3 % — SIGNIFICANT CHANGE UP (ref 0–1.5)
LYMPHOCYTES # BLD AUTO: 1.49 K/UL — SIGNIFICANT CHANGE UP (ref 1–3.3)
LYMPHOCYTES # BLD AUTO: 39.3 % — SIGNIFICANT CHANGE UP (ref 13–44)
MCHC RBC-ENTMCNC: 29 PG — SIGNIFICANT CHANGE UP (ref 27–34)
MCHC RBC-ENTMCNC: 33.8 GM/DL — SIGNIFICANT CHANGE UP (ref 32–36)
MCV RBC AUTO: 85.7 FL — SIGNIFICANT CHANGE UP (ref 80–100)
MONOCYTES # BLD AUTO: 0.35 K/UL — SIGNIFICANT CHANGE UP (ref 0–0.9)
MONOCYTES NFR BLD AUTO: 9.2 % — SIGNIFICANT CHANGE UP (ref 2–14)
NEUTROPHILS # BLD AUTO: 1.89 K/UL — SIGNIFICANT CHANGE UP (ref 1.8–7.4)
NEUTROPHILS NFR BLD AUTO: 49.9 % — SIGNIFICANT CHANGE UP (ref 43–77)
NRBC # BLD: 0 /100 WBCS — SIGNIFICANT CHANGE UP (ref 0–0)
PLATELET # BLD AUTO: 176 K/UL — SIGNIFICANT CHANGE UP (ref 150–400)
RBC # BLD: 3.35 M/UL — LOW (ref 3.8–5.2)
RBC # FLD: 15.1 % — HIGH (ref 10.3–14.5)
WBC # BLD: 3.79 K/UL — LOW (ref 3.8–10.5)
WBC # FLD AUTO: 3.79 K/UL — LOW (ref 3.8–10.5)

## 2020-06-29 ENCOUNTER — OUTPATIENT (OUTPATIENT)
Dept: OUTPATIENT SERVICES | Facility: HOSPITAL | Age: 80
LOS: 1 days | Discharge: HOME | End: 2020-06-29

## 2020-06-29 DIAGNOSIS — C90.00 MULTIPLE MYELOMA NOT HAVING ACHIEVED REMISSION: ICD-10-CM

## 2020-07-02 ENCOUNTER — RESULT REVIEW (OUTPATIENT)
Age: 80
End: 2020-07-02

## 2020-07-02 ENCOUNTER — APPOINTMENT (OUTPATIENT)
Dept: INFUSION THERAPY | Facility: HOSPITAL | Age: 80
End: 2020-07-02

## 2020-07-02 LAB
BASOPHILS # BLD AUTO: 0.02 K/UL — SIGNIFICANT CHANGE UP (ref 0–0.2)
BASOPHILS NFR BLD AUTO: 0.4 % — SIGNIFICANT CHANGE UP (ref 0–2)
EOSINOPHIL # BLD AUTO: 0.04 K/UL — SIGNIFICANT CHANGE UP (ref 0–0.5)
EOSINOPHIL NFR BLD AUTO: 0.7 % — SIGNIFICANT CHANGE UP (ref 0–6)
HCT VFR BLD CALC: 28 % — LOW (ref 34.5–45)
HGB BLD-MCNC: 9.8 G/DL — LOW (ref 11.5–15.5)
IMM GRANULOCYTES NFR BLD AUTO: 0.7 % — SIGNIFICANT CHANGE UP (ref 0–1.5)
LYMPHOCYTES # BLD AUTO: 2.04 K/UL — SIGNIFICANT CHANGE UP (ref 1–3.3)
LYMPHOCYTES # BLD AUTO: 36.8 % — SIGNIFICANT CHANGE UP (ref 13–44)
MCHC RBC-ENTMCNC: 29.2 PG — SIGNIFICANT CHANGE UP (ref 27–34)
MCHC RBC-ENTMCNC: 35 GM/DL — SIGNIFICANT CHANGE UP (ref 32–36)
MCV RBC AUTO: 83.3 FL — SIGNIFICANT CHANGE UP (ref 80–100)
MONOCYTES # BLD AUTO: 0.42 K/UL — SIGNIFICANT CHANGE UP (ref 0–0.9)
MONOCYTES NFR BLD AUTO: 7.6 % — SIGNIFICANT CHANGE UP (ref 2–14)
NEUTROPHILS # BLD AUTO: 2.99 K/UL — SIGNIFICANT CHANGE UP (ref 1.8–7.4)
NEUTROPHILS NFR BLD AUTO: 53.8 % — SIGNIFICANT CHANGE UP (ref 43–77)
NRBC # BLD: 0 /100 WBCS — SIGNIFICANT CHANGE UP (ref 0–0)
PLATELET # BLD AUTO: 192 K/UL — SIGNIFICANT CHANGE UP (ref 150–400)
RBC # BLD: 3.36 M/UL — LOW (ref 3.8–5.2)
RBC # FLD: 15.5 % — HIGH (ref 10.3–14.5)
WBC # BLD: 5.55 K/UL — SIGNIFICANT CHANGE UP (ref 3.8–10.5)
WBC # FLD AUTO: 5.55 K/UL — SIGNIFICANT CHANGE UP (ref 3.8–10.5)

## 2020-07-06 DIAGNOSIS — R11.2 NAUSEA WITH VOMITING, UNSPECIFIED: ICD-10-CM

## 2020-07-06 DIAGNOSIS — Z51.11 ENCOUNTER FOR ANTINEOPLASTIC CHEMOTHERAPY: ICD-10-CM

## 2020-07-14 ENCOUNTER — APPOINTMENT (OUTPATIENT)
Dept: INFUSION THERAPY | Facility: HOSPITAL | Age: 80
End: 2020-07-14

## 2020-07-20 ENCOUNTER — APPOINTMENT (OUTPATIENT)
Dept: INFUSION THERAPY | Facility: HOSPITAL | Age: 80
End: 2020-07-20

## 2020-07-23 ENCOUNTER — RESULT REVIEW (OUTPATIENT)
Age: 80
End: 2020-07-23

## 2020-07-23 ENCOUNTER — APPOINTMENT (OUTPATIENT)
Dept: INFUSION THERAPY | Facility: HOSPITAL | Age: 80
End: 2020-07-23

## 2020-07-23 LAB
BASOPHILS # BLD AUTO: 0.02 K/UL — SIGNIFICANT CHANGE UP (ref 0–0.2)
BASOPHILS NFR BLD AUTO: 0.5 % — SIGNIFICANT CHANGE UP (ref 0–2)
EOSINOPHIL # BLD AUTO: 0.02 K/UL — SIGNIFICANT CHANGE UP (ref 0–0.5)
EOSINOPHIL NFR BLD AUTO: 0.5 % — SIGNIFICANT CHANGE UP (ref 0–6)
HCT VFR BLD CALC: 28.6 % — LOW (ref 34.5–45)
HGB BLD-MCNC: 9.7 G/DL — LOW (ref 11.5–15.5)
IMM GRANULOCYTES NFR BLD AUTO: 0.5 % — SIGNIFICANT CHANGE UP (ref 0–1.5)
LYMPHOCYTES # BLD AUTO: 1.57 K/UL — SIGNIFICANT CHANGE UP (ref 1–3.3)
LYMPHOCYTES # BLD AUTO: 38.7 % — SIGNIFICANT CHANGE UP (ref 13–44)
MCHC RBC-ENTMCNC: 28.8 PG — SIGNIFICANT CHANGE UP (ref 27–34)
MCHC RBC-ENTMCNC: 33.9 GM/DL — SIGNIFICANT CHANGE UP (ref 32–36)
MCV RBC AUTO: 84.9 FL — SIGNIFICANT CHANGE UP (ref 80–100)
MONOCYTES # BLD AUTO: 0.33 K/UL — SIGNIFICANT CHANGE UP (ref 0–0.9)
MONOCYTES NFR BLD AUTO: 8.1 % — SIGNIFICANT CHANGE UP (ref 2–14)
NEUTROPHILS # BLD AUTO: 2.1 K/UL — SIGNIFICANT CHANGE UP (ref 1.8–7.4)
NEUTROPHILS NFR BLD AUTO: 51.7 % — SIGNIFICANT CHANGE UP (ref 43–77)
NRBC # BLD: 0 /100 WBCS — SIGNIFICANT CHANGE UP (ref 0–0)
PLATELET # BLD AUTO: 229 K/UL — SIGNIFICANT CHANGE UP (ref 150–400)
RBC # BLD: 3.37 M/UL — LOW (ref 3.8–5.2)
RBC # FLD: 15.6 % — HIGH (ref 10.3–14.5)
SARS-COV-2 N GENE NPH QL NAA+PROBE: NOT DETECTED
WBC # BLD: 4.06 K/UL — SIGNIFICANT CHANGE UP (ref 3.8–10.5)
WBC # FLD AUTO: 4.06 K/UL — SIGNIFICANT CHANGE UP (ref 3.8–10.5)

## 2020-07-25 ENCOUNTER — OUTPATIENT (OUTPATIENT)
Dept: OUTPATIENT SERVICES | Facility: HOSPITAL | Age: 80
LOS: 1 days | Discharge: HOME | End: 2020-07-25

## 2020-07-25 DIAGNOSIS — C90.00 MULTIPLE MYELOMA NOT HAVING ACHIEVED REMISSION: ICD-10-CM

## 2020-07-30 ENCOUNTER — RESULT REVIEW (OUTPATIENT)
Age: 80
End: 2020-07-30

## 2020-07-30 ENCOUNTER — APPOINTMENT (OUTPATIENT)
Dept: INFUSION THERAPY | Facility: HOSPITAL | Age: 80
End: 2020-07-30

## 2020-07-30 LAB
BASOPHILS # BLD AUTO: 0.01 K/UL — SIGNIFICANT CHANGE UP (ref 0–0.2)
BASOPHILS NFR BLD AUTO: 0.2 % — SIGNIFICANT CHANGE UP (ref 0–2)
EOSINOPHIL # BLD AUTO: 0.01 K/UL — SIGNIFICANT CHANGE UP (ref 0–0.5)
EOSINOPHIL NFR BLD AUTO: 0.2 % — SIGNIFICANT CHANGE UP (ref 0–6)
HCT VFR BLD CALC: 29.4 % — LOW (ref 34.5–45)
HGB BLD-MCNC: 10 G/DL — LOW (ref 11.5–15.5)
IMM GRANULOCYTES NFR BLD AUTO: 0.9 % — SIGNIFICANT CHANGE UP (ref 0–1.5)
LYMPHOCYTES # BLD AUTO: 2.31 K/UL — SIGNIFICANT CHANGE UP (ref 1–3.3)
LYMPHOCYTES # BLD AUTO: 54.1 % — HIGH (ref 13–44)
MCHC RBC-ENTMCNC: 28.8 PG — SIGNIFICANT CHANGE UP (ref 27–34)
MCHC RBC-ENTMCNC: 34 GM/DL — SIGNIFICANT CHANGE UP (ref 32–36)
MCV RBC AUTO: 84.7 FL — SIGNIFICANT CHANGE UP (ref 80–100)
MONOCYTES # BLD AUTO: 0.36 K/UL — SIGNIFICANT CHANGE UP (ref 0–0.9)
MONOCYTES NFR BLD AUTO: 8.4 % — SIGNIFICANT CHANGE UP (ref 2–14)
NEUTROPHILS # BLD AUTO: 1.54 K/UL — LOW (ref 1.8–7.4)
NEUTROPHILS NFR BLD AUTO: 36.2 % — LOW (ref 43–77)
NRBC # BLD: 0 /100 WBCS — SIGNIFICANT CHANGE UP (ref 0–0)
PLATELET # BLD AUTO: 179 K/UL — SIGNIFICANT CHANGE UP (ref 150–400)
RBC # BLD: 3.47 M/UL — LOW (ref 3.8–5.2)
RBC # FLD: 15.7 % — HIGH (ref 10.3–14.5)
WBC # BLD: 4.27 K/UL — SIGNIFICANT CHANGE UP (ref 3.8–10.5)
WBC # FLD AUTO: 4.27 K/UL — SIGNIFICANT CHANGE UP (ref 3.8–10.5)

## 2020-07-31 DIAGNOSIS — R11.2 NAUSEA WITH VOMITING, UNSPECIFIED: ICD-10-CM

## 2020-07-31 DIAGNOSIS — Z51.11 ENCOUNTER FOR ANTINEOPLASTIC CHEMOTHERAPY: ICD-10-CM

## 2020-08-06 ENCOUNTER — RESULT REVIEW (OUTPATIENT)
Age: 80
End: 2020-08-06

## 2020-08-06 ENCOUNTER — APPOINTMENT (OUTPATIENT)
Dept: INFUSION THERAPY | Facility: HOSPITAL | Age: 80
End: 2020-08-06

## 2020-08-06 LAB
BASOPHILS # BLD AUTO: 0.02 K/UL — SIGNIFICANT CHANGE UP (ref 0–0.2)
BASOPHILS NFR BLD AUTO: 0.5 % — SIGNIFICANT CHANGE UP (ref 0–2)
EOSINOPHIL # BLD AUTO: 0.01 K/UL — SIGNIFICANT CHANGE UP (ref 0–0.5)
EOSINOPHIL NFR BLD AUTO: 0.2 % — SIGNIFICANT CHANGE UP (ref 0–6)
HCT VFR BLD CALC: 29.3 % — LOW (ref 34.5–45)
HGB BLD-MCNC: 10.1 G/DL — LOW (ref 11.5–15.5)
IMM GRANULOCYTES NFR BLD AUTO: 0.2 % — SIGNIFICANT CHANGE UP (ref 0–1.5)
LYMPHOCYTES # BLD AUTO: 1.64 K/UL — SIGNIFICANT CHANGE UP (ref 1–3.3)
LYMPHOCYTES # BLD AUTO: 37.4 % — SIGNIFICANT CHANGE UP (ref 13–44)
MCHC RBC-ENTMCNC: 29.3 PG — SIGNIFICANT CHANGE UP (ref 27–34)
MCHC RBC-ENTMCNC: 34.5 GM/DL — SIGNIFICANT CHANGE UP (ref 32–36)
MCV RBC AUTO: 84.9 FL — SIGNIFICANT CHANGE UP (ref 80–100)
MONOCYTES # BLD AUTO: 0.37 K/UL — SIGNIFICANT CHANGE UP (ref 0–0.9)
MONOCYTES NFR BLD AUTO: 8.4 % — SIGNIFICANT CHANGE UP (ref 2–14)
NEUTROPHILS # BLD AUTO: 2.33 K/UL — SIGNIFICANT CHANGE UP (ref 1.8–7.4)
NEUTROPHILS NFR BLD AUTO: 53.3 % — SIGNIFICANT CHANGE UP (ref 43–77)
NRBC # BLD: 0 /100 WBCS — SIGNIFICANT CHANGE UP (ref 0–0)
PLATELET # BLD AUTO: 167 K/UL — SIGNIFICANT CHANGE UP (ref 150–400)
RBC # BLD: 3.45 M/UL — LOW (ref 3.8–5.2)
RBC # FLD: 16.3 % — HIGH (ref 10.3–14.5)
WBC # BLD: 4.38 K/UL — SIGNIFICANT CHANGE UP (ref 3.8–10.5)
WBC # FLD AUTO: 4.38 K/UL — SIGNIFICANT CHANGE UP (ref 3.8–10.5)

## 2020-08-13 ENCOUNTER — APPOINTMENT (OUTPATIENT)
Dept: HEMATOLOGY ONCOLOGY | Facility: CLINIC | Age: 80
End: 2020-08-13
Payer: MEDICARE

## 2020-08-13 ENCOUNTER — RESULT REVIEW (OUTPATIENT)
Age: 80
End: 2020-08-13

## 2020-08-13 ENCOUNTER — APPOINTMENT (OUTPATIENT)
Dept: INFUSION THERAPY | Facility: HOSPITAL | Age: 80
End: 2020-08-13

## 2020-08-13 VITALS
DIASTOLIC BLOOD PRESSURE: 69 MMHG | WEIGHT: 133.38 LBS | BODY MASS INDEX: 26.05 KG/M2 | OXYGEN SATURATION: 99 % | SYSTOLIC BLOOD PRESSURE: 146 MMHG | RESPIRATION RATE: 15 BRPM | TEMPERATURE: 97.9 F | HEART RATE: 61 BPM

## 2020-08-13 DIAGNOSIS — Z82.3 FAMILY HISTORY OF STROKE: ICD-10-CM

## 2020-08-13 LAB
BASOPHILS # BLD AUTO: 0.02 K/UL — SIGNIFICANT CHANGE UP (ref 0–0.2)
BASOPHILS NFR BLD AUTO: 0.4 % — SIGNIFICANT CHANGE UP (ref 0–2)
EOSINOPHIL # BLD AUTO: 0.01 K/UL — SIGNIFICANT CHANGE UP (ref 0–0.5)
EOSINOPHIL NFR BLD AUTO: 0.2 % — SIGNIFICANT CHANGE UP (ref 0–6)
HCT VFR BLD CALC: 30.2 % — LOW (ref 34.5–45)
HGB BLD-MCNC: 10.3 G/DL — LOW (ref 11.5–15.5)
IMM GRANULOCYTES NFR BLD AUTO: 0.2 % — SIGNIFICANT CHANGE UP (ref 0–1.5)
LYMPHOCYTES # BLD AUTO: 1.39 K/UL — SIGNIFICANT CHANGE UP (ref 1–3.3)
LYMPHOCYTES # BLD AUTO: 31.1 % — SIGNIFICANT CHANGE UP (ref 13–44)
MCHC RBC-ENTMCNC: 29 PG — SIGNIFICANT CHANGE UP (ref 27–34)
MCHC RBC-ENTMCNC: 34.1 GM/DL — SIGNIFICANT CHANGE UP (ref 32–36)
MCV RBC AUTO: 85.1 FL — SIGNIFICANT CHANGE UP (ref 80–100)
MONOCYTES # BLD AUTO: 0.32 K/UL — SIGNIFICANT CHANGE UP (ref 0–0.9)
MONOCYTES NFR BLD AUTO: 7.2 % — SIGNIFICANT CHANGE UP (ref 2–14)
NEUTROPHILS # BLD AUTO: 2.72 K/UL — SIGNIFICANT CHANGE UP (ref 1.8–7.4)
NEUTROPHILS NFR BLD AUTO: 60.9 % — SIGNIFICANT CHANGE UP (ref 43–77)
NRBC # BLD: 0 /100 WBCS — SIGNIFICANT CHANGE UP (ref 0–0)
PLATELET # BLD AUTO: 171 K/UL — SIGNIFICANT CHANGE UP (ref 150–400)
RBC # BLD: 3.55 M/UL — LOW (ref 3.8–5.2)
RBC # FLD: 16.4 % — HIGH (ref 10.3–14.5)
WBC # BLD: 4.47 K/UL — SIGNIFICANT CHANGE UP (ref 3.8–10.5)
WBC # FLD AUTO: 4.47 K/UL — SIGNIFICANT CHANGE UP (ref 3.8–10.5)

## 2020-08-13 PROCEDURE — 99214 OFFICE O/P EST MOD 30 MIN: CPT

## 2020-08-14 LAB
ALBUMIN SERPL ELPH-MCNC: 3.5 G/DL
ALP BLD-CCNC: 81 U/L
ALT SERPL-CCNC: 13 U/L
ANION GAP SERPL CALC-SCNC: 11 MMOL/L
AST SERPL-CCNC: 18 U/L
B2 MICROGLOB SERPL-MCNC: 4.6 MG/L
BILIRUB SERPL-MCNC: 0.4 MG/DL
BUN SERPL-MCNC: 38 MG/DL
CALCIUM SERPL-MCNC: 9.6 MG/DL
CHLORIDE SERPL-SCNC: 106 MMOL/L
CO2 SERPL-SCNC: 23 MMOL/L
CREAT SERPL-MCNC: 1.67 MG/DL
DEPRECATED KAPPA LC FREE/LAMBDA SER: 70 RATIO
GLUCOSE SERPL-MCNC: 130 MG/DL
KAPPA LC CSF-MCNC: 0.71 MG/DL
KAPPA LC SERPL-MCNC: 49.7 MG/DL
LDH SERPL-CCNC: 149 U/L
MAGNESIUM SERPL-MCNC: 2.6 MG/DL
POTASSIUM SERPL-SCNC: 4.2 MMOL/L
PROT SERPL-MCNC: 8.1 G/DL
SARS-COV-2 IGG SERPL IA-ACNC: 0.09 INDEX
SARS-COV-2 IGG SERPL QL IA: NEGATIVE
SODIUM SERPL-SCNC: 140 MMOL/L

## 2020-08-15 NOTE — HISTORY OF PRESENT ILLNESS
[de-identified] : Patient presents for a follow-up visit. She is accompanied by her family member on the phone. She is ambulating with a cane. She reports a healthy appetite. She notes varicose veins bilaterally in her LE and generalized arthritic pain which is stable. She notes some stiffness in hands bilaterally.  She states she followed up with neurology for issues with her discs. She states her hip pain is stable. She is receiving Velcade three weeks on and 1 week off with Decadron. Denies fever/chills, night sweats, mouth sores, eye dryness, blurred vision, nausea, vomiting, diarrhea. No CP, SOB or LE edema.  [de-identified] : Multiple Myeloma s/p Thal/ Dex 2008 to 2010.....TIA in 2009.....Velcade 2012 to 2013...treatment held due to neuropathy..resumed b/c of POD...tolerating thus far

## 2020-08-15 NOTE — PHYSICAL EXAM
[Ambulatory and capable of all self care but unable to carry out any work activities] : Status 2- Ambulatory and capable of all self care but unable to carry out any work activities. Up and about more than 50% of waking hours [Normal] : affect appropriate [de-identified] : Ambulates with cane

## 2020-08-15 NOTE — REVIEW OF SYSTEMS
[Joint Stiffness] : joint stiffness [Joint Pain] : joint pain [Negative] : Allergic/Immunologic [Cough] : no cough [Chills] : no chills [Fever] : no fever [Abdominal Pain] : no abdominal pain [FreeTextEntry5] : bilateral LE varicose veins [Vomiting] : no vomiting [FreeTextEntry9] : stable generalized arthritic pain; stiffness of hands bilaterally; ambulates with cane; hip pain; spinal disc issues

## 2020-08-15 NOTE — ASSESSMENT
[FreeTextEntry1] : Ms. James is an 80 years old female with history of MM, RA, HTN, TIA. s/p treatment as per HPI...was  on observation...IGG 3370 (8/15/17). Currently being treated again with VD.\par \par Peripheral blood work reviewed and discussed with patient. CBC, m spike, and IgG stable.\par IgG 2948 on 12/19/19, IGG 3176 on 11/8/19, 3102 on 8/2/19, 3798 on 3/21/19, previously at 4900. \par Labs sent for CMP, LDH, Mg, Beta2-microglobulin, SPEP, SFLC, Immunofixation, Quantitative Immunoglobulins. \par On  Velcade 1mg/m2 with 20 mg Dex (3 weeks on; 1 week off).  Given stability of disease will hold treatment...Rationale, benefits, risk and side effects were discussed at length. Patient and daughter verbalized understanding and consented to holding treatment. And observing\par Plan for patient to bring in 24-hour urine in Fall 2020.\par Reviewed skeletal survey (5/23/19) and discussed with patient. Will repeat scan this fall  and proceed with  drug holiday / observation\par Continue remaining medications as prescribed. \par Well care stressed, question addressed. \par Stressed regular follow up with Rheum, Nephro and PMD. \par Patient advised to contact immediately with any concerns or symptoms.\par RTC for f/u with Dr. Rosales in 3 months

## 2020-08-20 LAB
ALBUMIN MFR SERPL ELPH: 40.2 %
ALBUMIN SERPL-MCNC: 3.3 G/DL
ALBUMIN/GLOB SERPL: 0.7 RATIO
ALPHA1 GLOB MFR SERPL ELPH: 3.5 %
ALPHA1 GLOB SERPL ELPH-MCNC: 0.3 G/DL
ALPHA2 GLOB MFR SERPL ELPH: 8.1 %
ALPHA2 GLOB SERPL ELPH-MCNC: 0.7 G/DL
B-GLOBULIN MFR SERPL ELPH: 44.1 %
B-GLOBULIN SERPL ELPH-MCNC: 3.6 G/DL
DEPRECATED KAPPA LC FREE/LAMBDA SER: 70 RATIO
GAMMA GLOB FLD ELPH-MCNC: 0.3 G/DL
GAMMA GLOB MFR SERPL ELPH: 4.1 %
IGA SER QL IEP: 9 MG/DL
IGG SER QL IEP: 3327 MG/DL
IGM SER QL IEP: 20 MG/DL
INTERPRETATION SERPL IEP-IMP: NORMAL
KAPPA LC CSF-MCNC: 0.71 MG/DL
KAPPA LC SERPL-MCNC: 49.7 MG/DL
M PROTEIN MFR SERPL ELPH: NORMAL %
M PROTEIN SPEC IFE-MCNC: NORMAL
MONOCLON BAND OBS SERPL: NORMAL G/DL
PROT SERPL-MCNC: 8.1 G/DL
PROT SERPL-MCNC: 8.1 G/DL

## 2020-11-13 ENCOUNTER — OUTPATIENT (OUTPATIENT)
Dept: OUTPATIENT SERVICES | Facility: HOSPITAL | Age: 80
LOS: 1 days | Discharge: HOME | End: 2020-11-13

## 2020-11-13 DIAGNOSIS — C90.00 MULTIPLE MYELOMA NOT HAVING ACHIEVED REMISSION: ICD-10-CM

## 2020-11-17 ENCOUNTER — RESULT REVIEW (OUTPATIENT)
Age: 80
End: 2020-11-17

## 2020-11-17 ENCOUNTER — APPOINTMENT (OUTPATIENT)
Dept: HEMATOLOGY ONCOLOGY | Facility: CLINIC | Age: 80
End: 2020-11-17
Payer: MEDICARE

## 2020-11-17 VITALS
DIASTOLIC BLOOD PRESSURE: 84 MMHG | WEIGHT: 136.47 LBS | HEART RATE: 65 BPM | HEIGHT: 59.84 IN | BODY MASS INDEX: 26.79 KG/M2 | OXYGEN SATURATION: 100 % | SYSTOLIC BLOOD PRESSURE: 188 MMHG | TEMPERATURE: 97.2 F | RESPIRATION RATE: 16 BRPM

## 2020-11-17 DIAGNOSIS — Z82.5 FAMILY HISTORY OF ASTHMA AND OTHER CHRONIC LOWER RESPIRATORY DISEASES: ICD-10-CM

## 2020-11-17 LAB
BASOPHILS # BLD AUTO: 0.01 K/UL — SIGNIFICANT CHANGE UP (ref 0–0.2)
BASOPHILS NFR BLD AUTO: 0.3 % — SIGNIFICANT CHANGE UP (ref 0–2)
EOSINOPHIL # BLD AUTO: 0.02 K/UL — SIGNIFICANT CHANGE UP (ref 0–0.5)
EOSINOPHIL NFR BLD AUTO: 0.5 % — SIGNIFICANT CHANGE UP (ref 0–6)
HCT VFR BLD CALC: 31.6 % — LOW (ref 34.5–45)
HGB BLD-MCNC: 10.7 G/DL — LOW (ref 11.5–15.5)
IMM GRANULOCYTES NFR BLD AUTO: 0.3 % — SIGNIFICANT CHANGE UP (ref 0–1.5)
LYMPHOCYTES # BLD AUTO: 1.48 K/UL — SIGNIFICANT CHANGE UP (ref 1–3.3)
LYMPHOCYTES # BLD AUTO: 40.1 % — SIGNIFICANT CHANGE UP (ref 13–44)
MCHC RBC-ENTMCNC: 29 PG — SIGNIFICANT CHANGE UP (ref 27–34)
MCHC RBC-ENTMCNC: 33.9 G/DL — SIGNIFICANT CHANGE UP (ref 32–36)
MCV RBC AUTO: 85.6 FL — SIGNIFICANT CHANGE UP (ref 80–100)
MONOCYTES # BLD AUTO: 0.26 K/UL — SIGNIFICANT CHANGE UP (ref 0–0.9)
MONOCYTES NFR BLD AUTO: 7 % — SIGNIFICANT CHANGE UP (ref 2–14)
NEUTROPHILS # BLD AUTO: 1.91 K/UL — SIGNIFICANT CHANGE UP (ref 1.8–7.4)
NEUTROPHILS NFR BLD AUTO: 51.8 % — SIGNIFICANT CHANGE UP (ref 43–77)
NRBC # BLD: 0 /100 WBCS — SIGNIFICANT CHANGE UP (ref 0–0)
PLATELET # BLD AUTO: 204 K/UL — SIGNIFICANT CHANGE UP (ref 150–400)
RBC # BLD: 3.69 M/UL — LOW (ref 3.8–5.2)
RBC # FLD: 14.7 % — HIGH (ref 10.3–14.5)
WBC # BLD: 3.69 K/UL — LOW (ref 3.8–10.5)
WBC # FLD AUTO: 3.69 K/UL — LOW (ref 3.8–10.5)

## 2020-11-17 PROCEDURE — 99214 OFFICE O/P EST MOD 30 MIN: CPT

## 2020-11-17 NOTE — REVIEW OF SYSTEMS
[Joint Pain] : joint pain [Joint Stiffness] : joint stiffness [Negative] : Allergic/Immunologic [Fever] : no fever [Chills] : no chills [Cough] : no cough [Abdominal Pain] : no abdominal pain [Vomiting] : no vomiting [FreeTextEntry5] : bilateral LE varicose veins [FreeTextEntry9] : stable generalized arthritic pain; stiffness of hands bilaterally; ambulates with cane; hip pain; spinal disc issues

## 2020-11-17 NOTE — HISTORY OF PRESENT ILLNESS
[de-identified] : Multiple Myeloma s/p Thal/ Dex 2008 to 2010.....TIA in 2009.....Velcade 2012 to 2013...treatment held due to neuropathy..resumed b/c of POD...tolerating thus far [de-identified] : Patient presents for a follow-up visit.  She is ambulating with a cane. She reports a healthy appetite. She notes varicose veins bilaterally in her LE and generalized arthritic pain which is stable. She notes some stiffness in hands bilaterally.  She states she followed up with neurology for issues with her discs. She states her hip pain is stable. She off  Velcade three weeks on and 1 week off with Decadron. Denies fever/chills, night sweats, mouth sores, eye dryness, blurred vision, nausea, vomiting, diarrhea. No CP, SOB or LE edema.

## 2020-11-17 NOTE — ASSESSMENT
[FreeTextEntry1] : Ms. James is an 80 years old female with history of MM, RA, HTN, TIA. s/p treatment as per HPI...was  on observation...IGG 3370 (8/15/17). Currently being treated again with VD.\par \par Peripheral blood work reviewed and discussed with patient. CBC, m spike, and IgG stable.\par IgG 2948 on 12/19/19, IGG 3176 on 11/8/19, 3102 on 8/2/19, 3798 on 3/21/19, previously at 4900. \par Labs sent for CMP, LDH, Mg, Beta2-microglobulin, SPEP, SFLC, Immunofixation, Quantitative Immunoglobulins. \par On  Velcade 1mg/m2 with 20 mg Dex (3 weeks on; 1 week off).  Given stability of disease will hold treatment...Rationale, benefits, risk and side effects were discussed at length. Patient and daughter verbalized understanding and consented to holding treatment. And observing last visit...will cont...labs stable thus far...await today's labs\par Plan for patient to bring in 24-hour urine in Fall 2020.\par Reviewed skeletal survey (5/23/19) and discussed with patient. Will repeat scan next spring  and cont with  drug holiday / observation\par Continue remaining medications as prescribed. \par Well care stressed, question addressed. \par Stressed regular follow up with Rheum, Nephro and PMD. \par Patient advised to contact immediately with any concerns or symptoms.\par RTC for f/u with Dr. Rosales in 6 months...NP in 3 months

## 2020-11-17 NOTE — PHYSICAL EXAM
[Ambulatory and capable of all self care but unable to carry out any work activities] : Status 2- Ambulatory and capable of all self care but unable to carry out any work activities. Up and about more than 50% of waking hours [Normal] : affect appropriate [de-identified] : Ambulates with cane...changes c/w OA

## 2020-11-18 LAB
ALBUMIN SERPL ELPH-MCNC: 3.6 G/DL
ALP BLD-CCNC: 82 U/L
ALT SERPL-CCNC: 6 U/L
ANION GAP SERPL CALC-SCNC: 10 MMOL/L
AST SERPL-CCNC: 14 U/L
B2 MICROGLOB SERPL-MCNC: 3.9 MG/L
BILIRUB SERPL-MCNC: 0.6 MG/DL
BUN SERPL-MCNC: 18 MG/DL
CALCIUM SERPL-MCNC: 9.8 MG/DL
CHLORIDE SERPL-SCNC: 105 MMOL/L
CO2 SERPL-SCNC: 23 MMOL/L
CREAT SERPL-MCNC: 1.48 MG/DL
GLUCOSE SERPL-MCNC: 88 MG/DL
LDH SERPL-CCNC: 165 U/L
MAGNESIUM SERPL-MCNC: 2.1 MG/DL
POTASSIUM SERPL-SCNC: 4.1 MMOL/L
PROT SERPL-MCNC: 8.8 G/DL
SODIUM SERPL-SCNC: 138 MMOL/L

## 2020-11-23 LAB
ALBUMIN MFR SERPL ELPH: 37.1 %
ALBUMIN SERPL-MCNC: 3.3 G/DL
ALBUMIN/GLOB SERPL: 0.6 RATIO
ALPHA1 GLOB MFR SERPL ELPH: 3.3 %
ALPHA1 GLOB SERPL ELPH-MCNC: 0.3 G/DL
ALPHA2 GLOB MFR SERPL ELPH: 7.2 %
ALPHA2 GLOB SERPL ELPH-MCNC: 0.6 G/DL
B-GLOBULIN MFR SERPL ELPH: 48 %
B-GLOBULIN SERPL ELPH-MCNC: 4.2 G/DL
DEPRECATED KAPPA LC FREE/LAMBDA SER: 91.7 RATIO
DEPRECATED KAPPA LC FREE/LAMBDA SER: 91.7 RATIO
GAMMA GLOB FLD ELPH-MCNC: 0.4 G/DL
GAMMA GLOB MFR SERPL ELPH: 4.4 %
IGA SER QL IEP: 12 MG/DL
IGG SER QL IEP: 4155 MG/DL
IGM SER QL IEP: 24 MG/DL
INTERPRETATION SERPL IEP-IMP: NORMAL
KAPPA LC CSF-MCNC: 0.77 MG/DL
KAPPA LC CSF-MCNC: 0.77 MG/DL
KAPPA LC SERPL-MCNC: 70.61 MG/DL
KAPPA LC SERPL-MCNC: 70.61 MG/DL
M PROTEIN MFR SERPL ELPH: NORMAL
M PROTEIN SPEC IFE-MCNC: NORMAL
MONOCLON BAND OBS SERPL: NORMAL
PROT SERPL-MCNC: 8.8 G/DL
PROT SERPL-MCNC: 8.8 G/DL

## 2021-02-12 ENCOUNTER — OUTPATIENT (OUTPATIENT)
Dept: OUTPATIENT SERVICES | Facility: HOSPITAL | Age: 81
LOS: 1 days | Discharge: HOME | End: 2021-02-12

## 2021-02-12 DIAGNOSIS — C90.00 MULTIPLE MYELOMA NOT HAVING ACHIEVED REMISSION: ICD-10-CM

## 2021-02-16 ENCOUNTER — APPOINTMENT (OUTPATIENT)
Dept: HEMATOLOGY ONCOLOGY | Facility: CLINIC | Age: 81
End: 2021-02-16

## 2021-02-25 ENCOUNTER — INPATIENT (INPATIENT)
Facility: HOSPITAL | Age: 81
LOS: 4 days | Discharge: HOME | DRG: 841 | End: 2021-03-02
Attending: INTERNAL MEDICINE | Admitting: INTERNAL MEDICINE
Payer: COMMERCIAL

## 2021-02-25 VITALS
RESPIRATION RATE: 16 BRPM | DIASTOLIC BLOOD PRESSURE: 78 MMHG | HEART RATE: 88 BPM | SYSTOLIC BLOOD PRESSURE: 171 MMHG | OXYGEN SATURATION: 98 % | WEIGHT: 134.92 LBS | HEIGHT: 62 IN | TEMPERATURE: 100 F

## 2021-02-25 PROCEDURE — 93010 ELECTROCARDIOGRAM REPORT: CPT

## 2021-02-25 PROCEDURE — 99284 EMERGENCY DEPT VISIT MOD MDM: CPT

## 2021-02-25 RX ORDER — MORPHINE SULFATE 50 MG/1
4 CAPSULE, EXTENDED RELEASE ORAL ONCE
Refills: 0 | Status: DISCONTINUED | OUTPATIENT
Start: 2021-02-25 | End: 2021-02-25

## 2021-02-25 RX ORDER — KETOROLAC TROMETHAMINE 30 MG/ML
15 SYRINGE (ML) INJECTION ONCE
Refills: 0 | Status: DISCONTINUED | OUTPATIENT
Start: 2021-02-25 | End: 2021-02-25

## 2021-02-26 DIAGNOSIS — N18.9 CHRONIC KIDNEY DISEASE, UNSPECIFIED: ICD-10-CM

## 2021-02-26 DIAGNOSIS — N39.0 URINARY TRACT INFECTION, SITE NOT SPECIFIED: ICD-10-CM

## 2021-02-26 DIAGNOSIS — Z02.9 ENCOUNTER FOR ADMINISTRATIVE EXAMINATIONS, UNSPECIFIED: ICD-10-CM

## 2021-02-26 DIAGNOSIS — C90.00 MULTIPLE MYELOMA NOT HAVING ACHIEVED REMISSION: ICD-10-CM

## 2021-02-26 DIAGNOSIS — M54.9 DORSALGIA, UNSPECIFIED: ICD-10-CM

## 2021-02-26 DIAGNOSIS — M06.9 RHEUMATOID ARTHRITIS, UNSPECIFIED: ICD-10-CM

## 2021-02-26 DIAGNOSIS — Z29.9 ENCOUNTER FOR PROPHYLACTIC MEASURES, UNSPECIFIED: ICD-10-CM

## 2021-02-26 LAB
A1C WITH ESTIMATED AVERAGE GLUCOSE RESULT: 6.5 % — HIGH (ref 4–5.6)
ALBUMIN SERPL ELPH-MCNC: 2.6 G/DL — LOW (ref 3.5–5)
ALBUMIN SERPL ELPH-MCNC: 2.7 G/DL — LOW (ref 3.5–5)
ALP SERPL-CCNC: 100 U/L — SIGNIFICANT CHANGE UP (ref 40–120)
ALP SERPL-CCNC: 85 U/L — SIGNIFICANT CHANGE UP (ref 40–120)
ALT FLD-CCNC: 16 U/L DA — SIGNIFICANT CHANGE UP (ref 10–60)
ALT FLD-CCNC: 18 U/L DA — SIGNIFICANT CHANGE UP (ref 10–60)
ANION GAP SERPL CALC-SCNC: 3 MMOL/L — LOW (ref 5–17)
ANION GAP SERPL CALC-SCNC: 4 MMOL/L — LOW (ref 5–17)
ANION GAP SERPL CALC-SCNC: 5 MMOL/L — SIGNIFICANT CHANGE UP (ref 5–17)
ANION GAP SERPL CALC-SCNC: 8 MMOL/L — SIGNIFICANT CHANGE UP (ref 5–17)
APPEARANCE UR: CLEAR — SIGNIFICANT CHANGE UP
AST SERPL-CCNC: 23 U/L — SIGNIFICANT CHANGE UP (ref 10–40)
AST SERPL-CCNC: 31 U/L — SIGNIFICANT CHANGE UP (ref 10–40)
BASOPHILS # BLD AUTO: 0.01 K/UL — SIGNIFICANT CHANGE UP (ref 0–0.2)
BASOPHILS NFR BLD AUTO: 0.2 % — SIGNIFICANT CHANGE UP (ref 0–2)
BILIRUB SERPL-MCNC: 0.2 MG/DL — SIGNIFICANT CHANGE UP (ref 0.2–1.2)
BILIRUB SERPL-MCNC: 0.4 MG/DL — SIGNIFICANT CHANGE UP (ref 0.2–1.2)
BILIRUB UR-MCNC: NEGATIVE — SIGNIFICANT CHANGE UP
BUN SERPL-MCNC: 28 MG/DL — HIGH (ref 7–18)
BUN SERPL-MCNC: 34 MG/DL — HIGH (ref 7–18)
BUN SERPL-MCNC: 38 MG/DL — HIGH (ref 7–18)
BUN SERPL-MCNC: 39 MG/DL — HIGH (ref 7–18)
CALCIUM SERPL-MCNC: 9.2 MG/DL — SIGNIFICANT CHANGE UP (ref 8.4–10.5)
CALCIUM SERPL-MCNC: 9.2 MG/DL — SIGNIFICANT CHANGE UP (ref 8.4–10.5)
CALCIUM SERPL-MCNC: 9.3 MG/DL — SIGNIFICANT CHANGE UP (ref 8.4–10.5)
CALCIUM SERPL-MCNC: 9.4 MG/DL — SIGNIFICANT CHANGE UP (ref 8.4–10.5)
CHLORIDE SERPL-SCNC: 106 MMOL/L — SIGNIFICANT CHANGE UP (ref 96–108)
CHLORIDE SERPL-SCNC: 107 MMOL/L — SIGNIFICANT CHANGE UP (ref 96–108)
CHLORIDE SERPL-SCNC: 109 MMOL/L — HIGH (ref 96–108)
CHLORIDE SERPL-SCNC: 110 MMOL/L — HIGH (ref 96–108)
CHOLEST SERPL-MCNC: 78 MG/DL — SIGNIFICANT CHANGE UP
CO2 SERPL-SCNC: 21 MMOL/L — LOW (ref 22–31)
CO2 SERPL-SCNC: 23 MMOL/L — SIGNIFICANT CHANGE UP (ref 22–31)
CO2 SERPL-SCNC: 24 MMOL/L — SIGNIFICANT CHANGE UP (ref 22–31)
CO2 SERPL-SCNC: 24 MMOL/L — SIGNIFICANT CHANGE UP (ref 22–31)
COLOR SPEC: YELLOW — SIGNIFICANT CHANGE UP
CREAT ?TM UR-MCNC: 48 MG/DL — SIGNIFICANT CHANGE UP
CREAT SERPL-MCNC: 1.63 MG/DL — HIGH (ref 0.5–1.3)
CREAT SERPL-MCNC: 1.63 MG/DL — HIGH (ref 0.5–1.3)
CREAT SERPL-MCNC: 1.77 MG/DL — HIGH (ref 0.5–1.3)
CREAT SERPL-MCNC: 1.82 MG/DL — HIGH (ref 0.5–1.3)
DIFF PNL FLD: ABNORMAL
EOSINOPHIL # BLD AUTO: 0.01 K/UL — SIGNIFICANT CHANGE UP (ref 0–0.5)
EOSINOPHIL NFR BLD AUTO: 0.2 % — SIGNIFICANT CHANGE UP (ref 0–6)
ESTIMATED AVERAGE GLUCOSE: 140 MG/DL — HIGH (ref 68–114)
FERRITIN SERPL-MCNC: 180 NG/ML — HIGH (ref 15–150)
GLUCOSE BLDC GLUCOMTR-MCNC: 115 MG/DL — HIGH (ref 70–99)
GLUCOSE BLDC GLUCOMTR-MCNC: 122 MG/DL — HIGH (ref 70–99)
GLUCOSE BLDC GLUCOMTR-MCNC: 130 MG/DL — HIGH (ref 70–99)
GLUCOSE BLDC GLUCOMTR-MCNC: 92 MG/DL — SIGNIFICANT CHANGE UP (ref 70–99)
GLUCOSE SERPL-MCNC: 122 MG/DL — HIGH (ref 70–99)
GLUCOSE SERPL-MCNC: 130 MG/DL — HIGH (ref 70–99)
GLUCOSE SERPL-MCNC: 136 MG/DL — HIGH (ref 70–99)
GLUCOSE SERPL-MCNC: 143 MG/DL — HIGH (ref 70–99)
GLUCOSE UR QL: NEGATIVE — SIGNIFICANT CHANGE UP
HCT VFR BLD CALC: 28.1 % — LOW (ref 34.5–45)
HCT VFR BLD CALC: 28.1 % — LOW (ref 34.5–45)
HDLC SERPL-MCNC: 39 MG/DL — LOW
HGB BLD-MCNC: 9.2 G/DL — LOW (ref 11.5–15.5)
HGB BLD-MCNC: 9.4 G/DL — LOW (ref 11.5–15.5)
IMM GRANULOCYTES NFR BLD AUTO: 0.2 % — SIGNIFICANT CHANGE UP (ref 0–1.5)
IRON SATN MFR SERPL: 13 % — LOW (ref 15–50)
IRON SATN MFR SERPL: 34 UG/DL — LOW (ref 40–150)
KETONES UR-MCNC: NEGATIVE — SIGNIFICANT CHANGE UP
LEUKOCYTE ESTERASE UR-ACNC: ABNORMAL
LIPID PNL WITH DIRECT LDL SERPL: 27 MG/DL — SIGNIFICANT CHANGE UP
LYMPHOCYTES # BLD AUTO: 0.77 K/UL — LOW (ref 1–3.3)
LYMPHOCYTES # BLD AUTO: 15.1 % — SIGNIFICANT CHANGE UP (ref 13–44)
MAGNESIUM SERPL-MCNC: 2.5 MG/DL — SIGNIFICANT CHANGE UP (ref 1.6–2.6)
MCHC RBC-ENTMCNC: 28.6 PG — SIGNIFICANT CHANGE UP (ref 27–34)
MCHC RBC-ENTMCNC: 28.6 PG — SIGNIFICANT CHANGE UP (ref 27–34)
MCHC RBC-ENTMCNC: 32.7 GM/DL — SIGNIFICANT CHANGE UP (ref 32–36)
MCHC RBC-ENTMCNC: 33.5 GM/DL — SIGNIFICANT CHANGE UP (ref 32–36)
MCV RBC AUTO: 85.4 FL — SIGNIFICANT CHANGE UP (ref 80–100)
MCV RBC AUTO: 87.3 FL — SIGNIFICANT CHANGE UP (ref 80–100)
MONOCYTES # BLD AUTO: 0.27 K/UL — SIGNIFICANT CHANGE UP (ref 0–0.9)
MONOCYTES NFR BLD AUTO: 5.3 % — SIGNIFICANT CHANGE UP (ref 2–14)
NEUTROPHILS # BLD AUTO: 4.03 K/UL — SIGNIFICANT CHANGE UP (ref 1.8–7.4)
NEUTROPHILS NFR BLD AUTO: 79 % — HIGH (ref 43–77)
NITRITE UR-MCNC: NEGATIVE — SIGNIFICANT CHANGE UP
NON HDL CHOLESTEROL: 39 MG/DL — SIGNIFICANT CHANGE UP
NRBC # BLD: 0 /100 WBCS — SIGNIFICANT CHANGE UP (ref 0–0)
NRBC # BLD: 0 /100 WBCS — SIGNIFICANT CHANGE UP (ref 0–0)
PH UR: 5 — SIGNIFICANT CHANGE UP (ref 5–8)
PHOSPHATE SERPL-MCNC: 3.1 MG/DL — SIGNIFICANT CHANGE UP (ref 2.5–4.5)
PLATELET # BLD AUTO: 199 K/UL — SIGNIFICANT CHANGE UP (ref 150–400)
PLATELET # BLD AUTO: 229 K/UL — SIGNIFICANT CHANGE UP (ref 150–400)
POTASSIUM SERPL-MCNC: 4.3 MMOL/L — SIGNIFICANT CHANGE UP (ref 3.5–5.3)
POTASSIUM SERPL-MCNC: 4.3 MMOL/L — SIGNIFICANT CHANGE UP (ref 3.5–5.3)
POTASSIUM SERPL-MCNC: 6 MMOL/L — HIGH (ref 3.5–5.3)
POTASSIUM SERPL-MCNC: 6.1 MMOL/L — HIGH (ref 3.5–5.3)
POTASSIUM SERPL-SCNC: 4.3 MMOL/L — SIGNIFICANT CHANGE UP (ref 3.5–5.3)
POTASSIUM SERPL-SCNC: 4.3 MMOL/L — SIGNIFICANT CHANGE UP (ref 3.5–5.3)
POTASSIUM SERPL-SCNC: 6 MMOL/L — HIGH (ref 3.5–5.3)
POTASSIUM SERPL-SCNC: 6.1 MMOL/L — HIGH (ref 3.5–5.3)
PROT SERPL-MCNC: 10.1 G/DL — HIGH (ref 6–8.3)
PROT SERPL-MCNC: 10.6 G/DL — HIGH (ref 6–8.3)
PROT UR-MCNC: 30 MG/DL
RBC # BLD: 3.22 M/UL — LOW (ref 3.8–5.2)
RBC # BLD: 3.22 M/UL — LOW (ref 3.8–5.2)
RBC # BLD: 3.29 M/UL — LOW (ref 3.8–5.2)
RBC # FLD: 16 % — HIGH (ref 10.3–14.5)
RBC # FLD: 16 % — HIGH (ref 10.3–14.5)
RETICS #: 35.4 K/UL — SIGNIFICANT CHANGE UP (ref 25–125)
RETICS/RBC NFR: 1.1 % — SIGNIFICANT CHANGE UP (ref 0.5–2.5)
SARS-COV-2 RNA SPEC QL NAA+PROBE: SIGNIFICANT CHANGE UP
SODIUM SERPL-SCNC: 133 MMOL/L — LOW (ref 135–145)
SODIUM SERPL-SCNC: 135 MMOL/L — SIGNIFICANT CHANGE UP (ref 135–145)
SODIUM SERPL-SCNC: 138 MMOL/L — SIGNIFICANT CHANGE UP (ref 135–145)
SODIUM SERPL-SCNC: 138 MMOL/L — SIGNIFICANT CHANGE UP (ref 135–145)
SODIUM UR-SCNC: 96 MMOL/L — SIGNIFICANT CHANGE UP
SP GR SPEC: 1.01 — SIGNIFICANT CHANGE UP (ref 1.01–1.02)
TIBC SERPL-MCNC: 255 UG/DL — SIGNIFICANT CHANGE UP (ref 250–450)
TRIGL SERPL-MCNC: 60 MG/DL — SIGNIFICANT CHANGE UP
TSH SERPL-MCNC: 1.11 UU/ML — SIGNIFICANT CHANGE UP (ref 0.34–4.82)
UIBC SERPL-MCNC: 221 UG/DL — SIGNIFICANT CHANGE UP (ref 110–370)
UROBILINOGEN FLD QL: NEGATIVE — SIGNIFICANT CHANGE UP
WBC # BLD: 5.1 K/UL — SIGNIFICANT CHANGE UP (ref 3.8–10.5)
WBC # BLD: 5.24 K/UL — SIGNIFICANT CHANGE UP (ref 3.8–10.5)
WBC # FLD AUTO: 5.1 K/UL — SIGNIFICANT CHANGE UP (ref 3.8–10.5)
WBC # FLD AUTO: 5.24 K/UL — SIGNIFICANT CHANGE UP (ref 3.8–10.5)

## 2021-02-26 PROCEDURE — 76775 US EXAM ABDO BACK WALL LIM: CPT | Mod: 26

## 2021-02-26 PROCEDURE — 71045 X-RAY EXAM CHEST 1 VIEW: CPT | Mod: 26

## 2021-02-26 PROCEDURE — 99223 1ST HOSP IP/OBS HIGH 75: CPT

## 2021-02-26 PROCEDURE — 72131 CT LUMBAR SPINE W/O DYE: CPT | Mod: 26

## 2021-02-26 PROCEDURE — 99222 1ST HOSP IP/OBS MODERATE 55: CPT

## 2021-02-26 PROCEDURE — 72128 CT CHEST SPINE W/O DYE: CPT | Mod: 26

## 2021-02-26 RX ORDER — PANTOPRAZOLE SODIUM 20 MG/1
40 TABLET, DELAYED RELEASE ORAL
Refills: 0 | Status: DISCONTINUED | OUTPATIENT
Start: 2021-02-26 | End: 2021-03-02

## 2021-02-26 RX ORDER — ACETAMINOPHEN 500 MG
1000 TABLET ORAL EVERY 8 HOURS
Refills: 0 | Status: COMPLETED | OUTPATIENT
Start: 2021-02-26 | End: 2021-02-28

## 2021-02-26 RX ORDER — CEFTRIAXONE 500 MG/1
1000 INJECTION, POWDER, FOR SOLUTION INTRAMUSCULAR; INTRAVENOUS ONCE
Refills: 0 | Status: COMPLETED | OUTPATIENT
Start: 2021-02-26 | End: 2021-02-26

## 2021-02-26 RX ORDER — SODIUM CHLORIDE 9 MG/ML
1000 INJECTION INTRAMUSCULAR; INTRAVENOUS; SUBCUTANEOUS
Refills: 0 | Status: DISCONTINUED | OUTPATIENT
Start: 2021-02-26 | End: 2021-03-02

## 2021-02-26 RX ORDER — OXYCODONE AND ACETAMINOPHEN 5; 325 MG/1; MG/1
1 TABLET ORAL EVERY 4 HOURS
Refills: 0 | Status: DISCONTINUED | OUTPATIENT
Start: 2021-02-26 | End: 2021-02-26

## 2021-02-26 RX ORDER — INSULIN LISPRO 100/ML
VIAL (ML) SUBCUTANEOUS
Refills: 0 | Status: DISCONTINUED | OUTPATIENT
Start: 2021-02-26 | End: 2021-03-02

## 2021-02-26 RX ORDER — ENOXAPARIN SODIUM 100 MG/ML
40 INJECTION SUBCUTANEOUS DAILY
Refills: 0 | Status: DISCONTINUED | OUTPATIENT
Start: 2021-02-26 | End: 2021-03-02

## 2021-02-26 RX ORDER — MORPHINE SULFATE 50 MG/1
2 CAPSULE, EXTENDED RELEASE ORAL EVERY 4 HOURS
Refills: 0 | Status: DISCONTINUED | OUTPATIENT
Start: 2021-02-26 | End: 2021-02-26

## 2021-02-26 RX ORDER — TRAMADOL HYDROCHLORIDE 50 MG/1
50 TABLET ORAL EVERY 6 HOURS
Refills: 0 | Status: DISCONTINUED | OUTPATIENT
Start: 2021-02-26 | End: 2021-03-02

## 2021-02-26 RX ORDER — SENNA PLUS 8.6 MG/1
2 TABLET ORAL AT BEDTIME
Refills: 0 | Status: DISCONTINUED | OUTPATIENT
Start: 2021-02-26 | End: 2021-03-02

## 2021-02-26 RX ORDER — CEFTRIAXONE 500 MG/1
INJECTION, POWDER, FOR SOLUTION INTRAMUSCULAR; INTRAVENOUS
Refills: 0 | Status: DISCONTINUED | OUTPATIENT
Start: 2021-02-26 | End: 2021-03-02

## 2021-02-26 RX ORDER — CEFTRIAXONE 500 MG/1
1000 INJECTION, POWDER, FOR SOLUTION INTRAMUSCULAR; INTRAVENOUS EVERY 24 HOURS
Refills: 0 | Status: DISCONTINUED | OUTPATIENT
Start: 2021-02-27 | End: 2021-03-02

## 2021-02-26 RX ORDER — GABAPENTIN 400 MG/1
100 CAPSULE ORAL EVERY 8 HOURS
Refills: 0 | Status: DISCONTINUED | OUTPATIENT
Start: 2021-02-26 | End: 2021-03-01

## 2021-02-26 RX ADMIN — MORPHINE SULFATE 2 MILLIGRAM(S): 50 CAPSULE, EXTENDED RELEASE ORAL at 10:36

## 2021-02-26 RX ADMIN — Medication 1000 MILLIGRAM(S): at 13:59

## 2021-02-26 RX ADMIN — GABAPENTIN 100 MILLIGRAM(S): 400 CAPSULE ORAL at 13:58

## 2021-02-26 RX ADMIN — Medication 15 MILLIGRAM(S): at 00:13

## 2021-02-26 RX ADMIN — CEFTRIAXONE 100 MILLIGRAM(S): 500 INJECTION, POWDER, FOR SOLUTION INTRAMUSCULAR; INTRAVENOUS at 08:22

## 2021-02-26 RX ADMIN — MORPHINE SULFATE 4 MILLIGRAM(S): 50 CAPSULE, EXTENDED RELEASE ORAL at 00:14

## 2021-02-26 RX ADMIN — ENOXAPARIN SODIUM 40 MILLIGRAM(S): 100 INJECTION SUBCUTANEOUS at 10:36

## 2021-02-26 RX ADMIN — Medication 375 MILLIGRAM(S): at 13:58

## 2021-02-26 RX ADMIN — GABAPENTIN 100 MILLIGRAM(S): 400 CAPSULE ORAL at 21:29

## 2021-02-26 RX ADMIN — CEFTRIAXONE 100 MILLIGRAM(S): 500 INJECTION, POWDER, FOR SOLUTION INTRAMUSCULAR; INTRAVENOUS at 03:28

## 2021-02-26 RX ADMIN — SODIUM CHLORIDE 75 MILLILITER(S): 9 INJECTION INTRAMUSCULAR; INTRAVENOUS; SUBCUTANEOUS at 06:51

## 2021-02-26 RX ADMIN — PANTOPRAZOLE SODIUM 40 MILLIGRAM(S): 20 TABLET, DELAYED RELEASE ORAL at 08:23

## 2021-02-26 RX ADMIN — Medication 1000 MILLIGRAM(S): at 21:29

## 2021-02-26 RX ADMIN — SENNA PLUS 2 TABLET(S): 8.6 TABLET ORAL at 21:31

## 2021-02-26 NOTE — PROGRESS NOTE ADULT - SUBJECTIVE AND OBJECTIVE BOX
NP Note discussed with  Primary Attending    Patient is a 81y old  Female who presents with a chief complaint of Lower back pain (2021 03:37)    HPI - 81F, from home, walks with cane, AAOX3 with  PMH of  LIAT LEO( On velcadelast chemo session in Aug 2020) coming in with 1 week of worsening b/l back pain, Per Pt she noticed gradual onset of mildine lower back ache radiating to the sides and the upper back, worsened with movement. States never had pains this severe before. Pt describes pain to be continuos sharp 7/10, increases to 9/10 on movement. The increasing pain has made it difficult for her to ambulate. Pt denies numbness, tingling, weakness, urinary or fecal incontinence, urinary retention, fevers, chills, sweats, abd pains, N/V/D/C, hematuria. Pt mentions that she had burning urine 2 weeks ago and visited her doctor who recommended Vaginal ointment and the dysuria resolved.  Pt denies any smoking,, alcohol or any other drug use.     In the ED  Pt in distress in pain, worsened with movements  Vitals- 132/78, Hr 81, Afebrile  UA- UTI  CT lumbar/thoracic spine- no acute changes   Cr 1.7 ( baseline 1.2018)  s/p toradol and Morphine in ED     Off note- Pt mentions that she takes 6-7 pills but only recalls taking aspirin. Primary team to obtain Med rec from daughter in am.      Admitted to medicine for worsening of back pain. Seen and examined pt at bedside, reports that pain is only in the center of spine from lower to upper. Able to walk with assist, pain team consulted     INTERVAL HPI/OVERNIGHT EVENTS: no new complaints    MEDICATIONS  (STANDING):  cefTRIAXone   IVPB      enoxaparin Injectable 40 milliGRAM(s) SubCutaneous daily  insulin lispro (ADMELOG) corrective regimen sliding scale   SubCutaneous three times a day before meals  pantoprazole    Tablet 40 milliGRAM(s) Oral before breakfast  sodium chloride 0.9%. 1000 milliLiter(s) (75 mL/Hr) IV Continuous <Continuous>    MEDICATIONS  (PRN):  morphine  - Injectable 2 milliGRAM(s) IV Push every 4 hours PRN Moderate Pain (4 - 6)  oxycodone    5 mG/acetaminophen 325 mG 1 Tablet(s) Oral every 4 hours PRN Mild Pain (1 - 3)      __________________________________________________  REVIEW OF SYSTEMS:    CONSTITUTIONAL: No fever,   EYES: no acute visual disturbances  NECK: No pain or stiffness  RESPIRATORY: No cough; No shortness of breath  CARDIOVASCULAR: No chest pain, no palpitations  GASTROINTESTINAL: No pain. No nausea or vomiting; No diarrhea   NEUROLOGICAL: No headache or numbness, no tremors  MUSCULOSKELETAL:  lower back to upper back pain only in the center  GENITOURINARY: no dysuria, no frequency, no hesitancy  PSYCHIATRY: no depression , no anxiety  ALL OTHER  ROS negative        Vital Signs Last 24 Hrs  T(C): 36.5 (2021 07:50), Max: 37.8 (2021 23:12)  T(F): 97.7 (2021 07:50), Max: 100 (2021 23:12)  HR: 74 (2021 07:50) (74 - 88)  BP: 155/80 (2021 07:50) (134/73 - 171/78)  BP(mean): --  RR: 18 (2021 07:50) (16 - 18)  SpO2: 99% (2021 07:50) (98% - 99%)    ________________________________________________  PHYSICAL EXAM:  GENERAL: NAD  HEENT: Normocephalic;  conjunctivae and sclerae clear; moist mucous membranes;   NECK : supple  CHEST/LUNG: Clear to auscultation bilaterally with good air entry   HEART: S1 S2  regular; no murmurs, gallops or rubs  ABDOMEN: Soft, Nontender, Nondistended; Bowel sounds present  EXTREMITIES: no cyanosis; no edema; no calf tenderness  SKIN: warm and dry; no rash  NERVOUS SYSTEM:  Awake and alert; Oriented  to place, person and time ; no new deficits  spine - pain starting from lower lumbar which radiates to upper lumbar   _________________________________________________  LABS:                        9.4    5.10  )-----------( 199      ( 2021 00:24 )             28.1         138  |  110<H>  |  34<H>  ----------------------------<  122<H>  4.3   |  24  |  1.63<H>    Ca    9.2      2021 04:37    TPro  10.6<H>  /  Alb  2.7<L>  /  TBili  0.4  /  DBili  x   /  AST  31  /  ALT  18  /  AlkPhos  100        Urinalysis Basic - ( 2021 01:16 )    Color: Yellow / Appearance: Clear / S.010 / pH: x  Gluc: x / Ketone: Negative  / Bili: Negative / Urobili: Negative   Blood: x / Protein: 30 mg/dL / Nitrite: Negative   Leuk Esterase: Moderate / RBC: 10-25 /HPF / WBC 6-10 /HPF   Sq Epi: x / Non Sq Epi: Moderate /HPF / Bacteria: Few /HPF      CAPILLARY BLOOD GLUCOSE      POCT Blood Glucose.: 92 mg/dL (2021 07:43)        RADIOLOGY & ADDITIONAL TESTS:  < from: CT Thoracic Spine No Cont (21 @ 00:40) >    EXAM:  CT THORACIC SPINE                          EXAM:  CT LUMBAR SPINE                            PROCEDURE DATE:  2021          INTERPRETATION:  CLINICAL INFORMATION: Back pain. History of multiple myeloma.    COMPARISON: Correlation with CT chest, abdomen, and pelvis of 2018.    PROCEDURE:  Noncontrast CT of the thoracolumbar spine spine was performed. Coronal and Sagittal reformats were obtained. Volume rendered 3-D imaging of the thoracolumbar spine was obtained.    FINDINGS:    There is no acute thoracolumbar spine fracture or evidence of traumatic malalignment. Osseous structures are diffusely demineralized. No discrete aggressive appearing lytic or blastic lesion. Mild exaggeration of the thoracic kyphosis. Normal lumbarlordosis. Vertebral body heights are relatively well-maintained. Mild multilevel degenerative disc disease, as prominent at the L5-S1 level, characterized by disc height loss and endplate sclerosis. Multilevel fusion of the thoracic spine as processes and hypertrophy lumbar spinous processes.    Multilevel facet joint arthrosis, predominantly within the lower lumbar spine contributing to varying degrees of neuroforaminal stenosis. No critical stenosis within the spinal canal on this imaging modality.    Respiratory motion limits evaluation of the lung parenchyma, which demonstrates a few scattered calcified granulomas. Calcified bilateral hilar lymph nodes. Coronary artery calcification.    Numerous calcified granulomas within the liver and spleen.    Calcified fibroid uterus. Colonic diverticulosis.    Extensive atherosclerosis of the abdominal aorta, which is normal in caliber.    IMPRESSION:    No acute thoracolumbar spine fracture or evidence of traumatic malalignment. Diffusely demineralized osseous structures without evidence of aggressive lytic or blastic lesion. More sensitive evaluation with MRI and/or bone scan may be obtained, if no contraindications exist.            LUIS ZARAGOZA MD; Attending Radiologist  This document has been electronically signed. 2021  1:22AM    < end of copied text >    Imaging Personally Reviewed:  YES    Consultant(s) Notes Reviewed:   YES    Care Discussed with Consultants : pain NP/ heme/onc     Plan of care was discussed with patient and /or primary care giver; all questions and concerns were addressed and care was aligned with patient's wishes.

## 2021-02-26 NOTE — CONSULT NOTE ADULT - PROBLEM SELECTOR RECOMMENDATION 9
- + lower back pain.  CT neg for cord compression. No acute thoracolumbar spine fracture or evidence of traumatic malalignment. Diffusely demineralized osseous structures without evidence of aggressive lytic or blastic lesion.  nonopioid recommendations  - acetaminophen 1 gram po q 8 hours for 3 days  - dc nsaids - pt with ckd  - gabapentin 100mg po q 8 hours - titrate upwards  opioid recommendations  - tramadol 50mg po q 6 hours prn  Bowel regimen  - senna  OOB/PT  f/u with oncologist as outpt - + lower back pain.  CT neg for cord compression. No acute thoracolumbar spine fracture or evidence of traumatic malalignment. Diffusely demineralized osseous structures without evidence of aggressive lytic or blastic lesion.  MRI as per primary team  - check vitamin d levels. start calcium  nonopioid recommendations  - acetaminophen 1 gram po q 8 hours for 3 days  - dc nsaids - pt with ckd  - gabapentin 100mg po q 8 hours - titrate upwards  opioid recommendations  - tramadol 50mg po q 6 hours prn  Bowel regimen  - senna  OOB/PT  f/u with oncologist as outpt

## 2021-02-26 NOTE — H&P ADULT - NSHPPHYSICALEXAM_GEN_ALL_CORE
Vital Signs (24 Hrs):  T(C): 37.8 (02-25-21 @ 23:12), Max: 37.8 (02-25-21 @ 23:12)  HR: 81 (02-25-21 @ 23:53) (81 - 88)  BP: 137/72 (02-25-21 @ 23:53) (137/72 - 171/78)  RR: 16 (02-25-21 @ 23:12) (16 - 16)  SpO2: 98% (02-25-21 @ 23:12) (98% - 98%)  Wt(kg): --  Daily Height in cm: 157.48 (25 Feb 2021 23:12)    Daily     I&O's Summary

## 2021-02-26 NOTE — ED PROVIDER NOTE - PHYSICAL EXAMINATION
ttp b/l lower back R>L.  b/l LE strength 5/5 all muscle groups and gross sensation normal and equal in all dermatomes.  no rash on back.

## 2021-02-26 NOTE — H&P ADULT - NEUROLOGICAL DETAILS
SLRT+ B/L/alert and oriented x 3/responds to pain/responds to verbal commands/sensation intact/deep reflexes intact

## 2021-02-26 NOTE — ED PROVIDER NOTE - CARE PLAN
Principal Discharge DX:	Acute back pain, unspecified back location, unspecified back pain laterality  Secondary Diagnosis:	Urinary tract infection without hematuria, site unspecified

## 2021-02-26 NOTE — CONSULT NOTE ADULT - SUBJECTIVE AND OBJECTIVE BOX
81F, from home, walks with cane, AAOX3 with  PMH of  RA, MM( On velcadelast chemo session in Aug 2020) coming in with 1 week of worsening b/l back pain, Per Pt she noticed gradual onset of mildine lower back ache radiating to the sides and the upper back, worsened with movement. States never had pains this severe before. Pt describes pain to be continuos sharp 7/10, increases to 9/10 on movement. The increasing pain has made it difficult for her to ambulate. Pt denies numbness, tingling, weakness, urinary or fecal incontinence, urinary retention, fevers, chills, sweats, abd pains, N/V/D/C, hematuria. Pt mentions that she had burning urine 2 weeks ago and visited her doctor who recommended Vaginal ointment and the dysuria resolved.  Pt denies any smoking,, alcohol or any other drug use.     Complete Blood Count (02.26.21 @ 11:50)   Nucleated RBC: 0 /100 WBCs   WBC Count: 5.24 K/uL   RBC Count: 3.22 M/uL   Hemoglobin: 9.2 g/dL   Hematocrit: 28.1 %   Mean Cell Volume: 87.3 fl   Mean Cell Hemoglobin: 28.6 pg   Mean Cell Hemoglobin Conc: 32.7 gm/dL   Red Cell Distrib Width: 16.0 %   Platelet Count - Automated: 229 K/uL Comprehensive Metabolic Panel (02.26.21 @ 11:50)   Sodium, Serum: 138 mmol/L   Potassium, Serum: 4.3 mmol/L   Chloride, Serum: 107 mmol/L   Carbon Dioxide, Serum: 23 mmol/L   Anion Gap, Serum: 8 mmol/L   Blood Urea Nitrogen, Serum: 28 mg/dL   Glucose, Serum: 143 mg/dL   Calcium, Total Serum: 9.2 mg/dL   Albumin, Serum: 2.6 g/dL     < from: CT Thoracic Spine No Cont (02.26.21 @ 00:40) >    EXAM:  CT THORACIC SPINE                          EXAM:  CT LUMBAR SPINE                            PROCEDURE DATE:  02/26/2021          INTERPRETATION:  CLINICAL INFORMATION: Back pain. History of multiple myeloma.    COMPARISON: Correlation with CT chest, abdomen, and pelvis of 5/18/2018.    PROCEDURE:  Noncontrast CT of the thoracolumbar spine spine was performed. Coronal and Sagittal reformats were obtained. Volume rendered 3-D imaging of the thoracolumbar spine was obtained.    FINDINGS:    There is no acute thoracolumbar spine fracture or evidence of traumatic malalignment. Osseous structures are diffusely demineralized. No discrete aggressive appearing lytic or blastic lesion. Mild exaggeration of the thoracic kyphosis. Normal lumbarlordosis. Vertebral body heights are relatively well-maintained. Mild multilevel degenerative disc disease, as prominent at the L5-S1 level, characterized by disc height loss and endplate sclerosis. Multilevel fusion of the thoracic spine as processes and hypertrophy lumbar spinous processes.    Multilevel facet joint arthrosis, predominantly within the lower lumbar spine contributing to varying degrees of neuroforaminal stenosis. No critical stenosis within the spinal canal on this imaging modality.    Respiratory motion limits evaluation of the lung parenchyma, which demonstrates a few scattered calcified granulomas. Calcified bilateral hilar lymph nodes. Coronary artery calcification.    Numerous calcified granulomas within the liver and spleen.    Calcified fibroid uterus. Colonic diverticulosis.    Extensive atherosclerosis of the abdominal aorta, which is normal in caliber.    IMPRESSION:    No acute thoracolumbar spine fracture or evidence of traumatic malalignment. Diffusely demineralized osseous structures without evidence of aggressive lytic or blastic lesion. More sensitive evaluation with MRI and/or bone scan may be obtained, if no contraindications exist.            < end of copied text >

## 2021-02-26 NOTE — H&P ADULT - ASSESSMENT
81F, from home, walks with cane, AAOX3 with  PMH of  RA, MM( On velcadelast chemo session in Aug 2020) coming in with 1 week of worsening b/l back pain,

## 2021-02-26 NOTE — PROGRESS NOTE ADULT - PROBLEM SELECTOR PLAN 4
Pt has RA  Primary team to confirm med rec from daughter - will call [facility] at [phone] Pt has RA  verified meds with  [facility] at [phone]

## 2021-02-26 NOTE — H&P ADULT - PROBLEM SELECTOR PLAN 6
IMPROVE VTE Individual Risk Assessment    RISK                                                          Points  [] Previous VTE                                           3  [] Thrombophilia                                        2  [] Lower limb paralysis                              2   [] Current Cancer                                       2   [] Immobilization > 24 hrs                        1  [] ICU/CCU stay > 24 hours                       1  [x] Age > 60                                                   1    IMPROVE VTE Score: 2   lovenox   ppi

## 2021-02-26 NOTE — H&P ADULT - PROBLEM SELECTOR PLAN 4
Pt on admission Cr 1.7 ( baseline 1.6 March 2018)  f/u urine lytes and BMP am   avoid nephrotoxic drugs

## 2021-02-26 NOTE — ED PROVIDER NOTE - PROGRESS NOTE DETAILS
ct t/l spine no acute fx or abnormality. ua with +uti- ceftriaxone ordered. pain mildly improved. labs at baseline. ct t/l spine no acute fx or abnormality. ua with +uti- ceftriaxone ordered. still with pain and having difficulty moving around. will admit.

## 2021-02-26 NOTE — ED PROVIDER NOTE - OBJECTIVE STATEMENT
81 year old female PMH RA, MM coming in with 1 week of worsening b/l back pain starting in lumbar spine and radiating up her back. worsened with movement. States never had pains this severe before. denies numbness, tingling, weakness, urinary or fecal incontinence, urinary retention, fevers, chills, sweats, abd pains, N/V/D/C, dysuria, hematuria. states has been having vaginal discomfort and is scheduled to have a biopsy in march. denies fall or trauma. states able to ambulate slowly but difficult 2/2 pain.

## 2021-02-26 NOTE — CONSULT NOTE ADULT - ASSESSMENT
Hx of Multiple Myeloma   ?? under treatment- Patient states that last time she saw oncologist was in August- Velcade given?  Back pain appears secondary to diffuses osteoporosis- Consistent with Hx of MM  Does not appear to have chord compression, but would run spine MRI  Anemia- Chronic disease  Pain has ameliorated   If indeed no chord compression continue analgesic  F/U with patient's oncologist
Confidential Drug Utilization Report  Search Terms: michael odell, 1940   Search Date: 02/26/2021 20:54:02 PM   The Drug Utilization Report below displays all of the controlled substance prescriptions, if any, that your patient has filled in the last twelve months. The information displayed on this report is compiled from pharmacy submissions to the Department, and accurately reflects the information as submitted by the pharmacies.  This report was requested by: Jessenia Dave | Reference #: [id number]   There are no results for the search terms that you entered.

## 2021-02-26 NOTE — CONSULT NOTE ADULT - SUBJECTIVE AND OBJECTIVE BOX
Source of information: , Chart review, patient  Patient language: English  : n/a    CC: Patient is a 81y old  Female who presents with a chief complaint of Lower back pain (2021 12:16)      HPI:  81F, from home, walks with cane, AAOX3 with  PMH of  RALIAT( On velcadelast chemo session in Aug 2020) coming in with 1 week of worsening b/l back pain, Per Pt she noticed gradual onset of mildine lower back ache radiating to the sides and the upper back, worsened with movement. States never had pains this severe before. Pt describes pain to be continuos sharp 7/10, increases to 9/10 on movement. The increasing pain has made it difficult for her to ambulate. Pt denies numbness, tingling, weakness, urinary or fecal incontinence, urinary retention, fevers, chills, sweats, abd pains, N/V/D/C, hematuria. Pt mentions that she had burning urine 2 weeks ago and visited her doctor who recommended Vaginal ointment and the dysuria resolved.  Pt denies any smoking,, alcohol or any other drug use.     In the ED  Pt in distress in pain, worsened with movements  Vitals- 132/78, Hr 81, Afebrile  UA- UTI  CT lumbar/thoracic spine- no acute changes   Cr 1.7 ( baseline 1.2018)  s/p toradol and Morphine in ED     Off note- Pt mentions that she takes 6-7 pills but only recalls taking aspirin. Primary team to obtain Med rec from daughter in am. (2021 03:37)      Patient stated goal for pain control: to be able to take deep breaths, utilize incentive spirometer, get out of bed to chair and ambulate with tolerable pain control.     PAIN SCORE:       SCALE USED: (1-10 VNRS)    PAST MEDICAL & SURGICAL HISTORY:  Rheumatoid arthritis    No pertinent past medical history    No significant past surgical history        FAMILY HISTORY:        SOCIAL HISTORY:  [ ] Denies Smoking, Alcohol, or Drug Use    Allergies    No Known Allergies    Intolerances        MEDICATIONS:    MEDICATIONS  (STANDING):  acetaminophen   Tablet .. 1000 milliGRAM(s) Oral every 8 hours  cefTRIAXone   IVPB      enoxaparin Injectable 40 milliGRAM(s) SubCutaneous daily  gabapentin 100 milliGRAM(s) Oral every 8 hours  insulin lispro (ADMELOG) corrective regimen sliding scale   SubCutaneous three times a day before meals  naproxen 375 milliGRAM(s) Oral every 8 hours  pantoprazole    Tablet 40 milliGRAM(s) Oral before breakfast  sodium chloride 0.9%. 1000 milliLiter(s) (75 mL/Hr) IV Continuous <Continuous>    MEDICATIONS  (PRN):  traMADol 50 milliGRAM(s) Oral every 6 hours PRN Severe Pain (7 - 10)      Vital Signs Last 24 Hrs  T(C): 36.6 (2021 11:08), Max: 37.8 (2021 23:12)  T(F): 97.9 (2021 11:08), Max: 100 (2021 23:12)  HR: 77 (2021 11:08) (74 - 88)  BP: 127/58 (2021 11:08) (127/58 - 171/78)  BP(mean): --  RR: 18 (2021 11:08) (16 - 18)  SpO2: 100% (2021 11:08) (98% - 100%)    LABS:                          9.2    5.24  )-----------( 229      ( 2021 11:50 )             28.1         138  |  107  |  28<H>  ----------------------------<  143<H>  4.3   |  23  |  1.63<H>    Ca    9.2      2021 11:50  Phos  3.1       Mg     2.5         TPro  10.1<H>  /  Alb  2.6<L>  /  TBili  0.2  /  DBili  x   /  AST  23  /  ALT  16  /  AlkPhos  85  -26      LIVER FUNCTIONS - ( 2021 11:50 )  Alb: 2.6 g/dL / Pro: 10.1 g/dL / ALK PHOS: 85 U/L / ALT: 16 U/L DA / AST: 23 U/L / GGT: x           Urinalysis Basic - ( 2021 01:16 )    Color: Yellow / Appearance: Clear / S.010 / pH: x  Gluc: x / Ketone: Negative  / Bili: Negative / Urobili: Negative   Blood: x / Protein: 30 mg/dL / Nitrite: Negative   Leuk Esterase: Moderate / RBC: 10-25 /HPF / WBC 6-10 /HPF   Sq Epi: x / Non Sq Epi: Moderate /HPF / Bacteria: Few /HPF      CAPILLARY BLOOD GLUCOSE      POCT Blood Glucose.: 130 mg/dL (2021 11:38)  POCT Blood Glucose.: 92 mg/dL (2021 07:43)      REVIEW OF SYSTEMS:  CONSTITUTIONAL: No fever or fatigue  RESPIRATORY: No cough, wheezing, chills or hemoptysis; No shortness of breath  CARDIOVASCULAR: No chest pain, palpitations, dizziness, or leg swelling  GASTROINTESTINAL: No abdominal or epigastric pain. No nausea, vomiting; No diarrhea or constipation.   GENITOURINARY: No dysuria, frequency, hematuria, retention or incontinence  MUSCULOSKELETAL: No joint pain or swelling; No muscle, back, or extremity pain, no upper or lower motor strength weakness, no saddle anesthesia, bowel/bladder incontinence, no falls   NEURO: No weakness, no numbness   PSYCHIATRIC: No depression, anxiety, mood swings, or difficulty sleeping    PHYSICAL EXAM:  GENERAL:  Alert & Oriented X3, NAD, Good concentration  CHEST/LUNG: Clear to auscultation bilaterally; No rales, rhonchi, wheezing, or rubs  HEART: Regular rate and rhythm; No murmurs, rubs, or gallops  ABDOMEN: Soft, Nontender, Nondistended; Bowel sounds present  : no incontinence, no flank pain  EXTREMITIES:  2+ Peripheral Pulses, No cyanosis, or edema  MUSCULOSKELETAL: Motor Strength 5/5 B/L upper and lower extremities; moves all extremities equally against gravity; ROM intact; negative SRL  NEUROLOGICAL: awake and alert and oriented   SKIN: No rashes or lesions    Radiology:    Drug Screen:  enoxaparin Injectable 40 milliGRAM(s) SubCutaneous daily    cefTRIAXone   IVPB            ORT Score   Family Hx of substance abuse	Female	Male  Alcohol 	                                              1              3  Illegal drugs	                                      2              3  Rx drugs                                               4	      4    Personal Hx of substance abuse		  Alcohol 	                                               3	      3  Illegal drugs                                  	       4	      4  Rx drugs                                                5	      5  Age between 16- 45 years	               1             1  hx preadolescent sexual abuse	       3	      0  Psychological disease		  ADD, OCD, bipolar, schizophrenia	2	      2  Depression                                    	1	      1  Score totals 		  		  a score of 3 or lower indicates low risk for opioid abuse		  a score of 4-7 indicates moderate risk for opioid abuse		  a score of 8 or higher indicates high risk for opioid abuse	    Risk factors associated with adverse outcomes related to opioid treatment  [ ]  Concurrent benzodiazepine use  [ ]  History/ Active substance use or alcohol use disorder  [ ] Psychiatric co-morbidity  [ ] Sleep apnea  [ ] COPD  [ ] BMI> 35  [ ] Liver dysfunction  [ ] Renal dysfunction  [ ] CHF  [ ] Smoker  [ ]  Age > 60 years      [ ]  [place]  Reviewed and Copied to Chart. See below.           Source of information: , Chart review, patient  Patient language: English  : n/a    CC: Patient is a 81y old  Female who presents with a chief complaint of Lower back pain (2021 12:16)      HPI:  81F, from home, walks with cane, AAOX3 with  PMH of  RA, MM( On velcadelast chemo session in Aug 2020) coming in with 1 week of worsening b/l back pain, Per Pt she noticed gradual onset of mildine lower back ache radiating to the sides and the upper back, worsened with movement. States never had pains this severe before. Pt describes pain to be continuos sharp 7/10, increases to 9/10 on movement. The increasing pain has made it difficult for her to ambulate. Pt denies numbness, tingling, weakness, urinary or fecal incontinence, urinary retention, fevers, chills, sweats, abd pains, N/V/D/C, hematuria. Pt mentions that she had burning urine 2 weeks ago and visited her doctor who recommended Vaginal ointment and the dysuria resolved.    81 year old female from home.  Pt ambulating with cane.  + history of RA, MM.  + worsening LBP.  Pain is midline does not radiate down leg.  Pain worsens on movement.  CT shows no significant findings.  No numbness, no bowel or bladder incontinence      PAIN SCORE:   5/10    SCALE USED: (1-10 VNRS)    PAST MEDICAL & SURGICAL HISTORY:  Rheumatoid arthritis    No pertinent past medical history    No significant past surgical history        FAMILY HISTORY:        SOCIAL HISTORY:  [ x] Denies Smoking, Alcohol, or Drug Use    Allergies    No Known Allergies    Intolerances        MEDICATIONS:    MEDICATIONS  (STANDING):  acetaminophen   Tablet .. 1000 milliGRAM(s) Oral every 8 hours  cefTRIAXone   IVPB      enoxaparin Injectable 40 milliGRAM(s) SubCutaneous daily  gabapentin 100 milliGRAM(s) Oral every 8 hours  insulin lispro (ADMELOG) corrective regimen sliding scale   SubCutaneous three times a day before meals  naproxen 375 milliGRAM(s) Oral every 8 hours  pantoprazole    Tablet 40 milliGRAM(s) Oral before breakfast  sodium chloride 0.9%. 1000 milliLiter(s) (75 mL/Hr) IV Continuous <Continuous>    MEDICATIONS  (PRN):  traMADol 50 milliGRAM(s) Oral every 6 hours PRN Severe Pain (7 - 10)      Vital Signs Last 24 Hrs  T(C): 36.6 (2021 11:08), Max: 37.8 (2021 23:12)  T(F): 97.9 (2021 11:08), Max: 100 (2021 23:12)  HR: 77 (2021 11:08) (74 - 88)  BP: 127/58 (2021 11:08) (127/58 - 171/78)  BP(mean): --  RR: 18 (2021 11:08) (16 - 18)  SpO2: 100% (2021 11:08) (98% - 100%)    LABS:                          9.2    5.24  )-----------( 229      ( 2021 11:50 )             28.1     02-    138  |  107  |  28<H>  ----------------------------<  143<H>  4.3   |  23  |  1.63<H>    Ca    9.2      2021 11:50  Phos  3.1     -  Mg     2.5     -    TPro  10.1<H>  /  Alb  2.6<L>  /  TBili  0.2  /  DBili  x   /  AST  23  /  ALT  16  /  AlkPhos  85  02-26      LIVER FUNCTIONS - ( 2021 11:50 )  Alb: 2.6 g/dL / Pro: 10.1 g/dL / ALK PHOS: 85 U/L / ALT: 16 U/L DA / AST: 23 U/L / GGT: x           Urinalysis Basic - ( 2021 01:16 )    Color: Yellow / Appearance: Clear / S.010 / pH: x  Gluc: x / Ketone: Negative  / Bili: Negative / Urobili: Negative   Blood: x / Protein: 30 mg/dL / Nitrite: Negative   Leuk Esterase: Moderate / RBC: 10-25 /HPF / WBC 6-10 /HPF   Sq Epi: x / Non Sq Epi: Moderate /HPF / Bacteria: Few /HPF      CAPILLARY BLOOD GLUCOSE      POCT Blood Glucose.: 130 mg/dL (2021 11:38)  POCT Blood Glucose.: 92 mg/dL (2021 07:43)      REVIEW OF SYSTEMS:  CONSTITUTIONAL: No fever or fatigue  RESPIRATORY: No cough, wheezing, chills or hemoptysis; No shortness of breath  CARDIOVASCULAR: No chest pain, palpitations, dizziness, or leg swelling  GASTROINTESTINAL: No abdominal or epigastric pain. No nausea, vomiting; No diarrhea or constipation.   GENITOURINARY: No dysuria, frequency, hematuria, retention or incontinence  MUSCULOSKELETAL: + midline back pain  NEURO: No weakness, no numbness   PSYCHIATRIC: No depression, anxiety, mood swings, or difficulty sleeping    PHYSICAL EXAM:  GENERAL:  Alert & Oriented X3, NAD, Good concentration  CHEST/LUNG: decreased breath sounds   HEART: Regular rate and rhythm; No murmurs, rubs, or gallops  ABDOMEN: Soft, Nontender, Nondistended; Bowel sounds present  : no incontinence, no flank pain  EXTREMITIES:  2+ Peripheral Pulses, No cyanosis, or edema  MUSCULOSKELETAL: + lumbar spine tenderness decreased rom   NEUROLOGICAL: awake and alert and oriented + decreased strength - legs   SKIN: No rashes or lesions    Radiology:  < from: CT Thoracic Spine No Cont (21 @ 00:40) >  EXAM:  CT THORACIC SPINE                          EXAM:  CT LUMBAR SPINE                            PROCEDURE DATE:  2021          INTERPRETATION:  CLINICAL INFORMATION: Back pain. History of multiple myeloma.    COMPARISON: Correlation with CT chest, abdomen, and pelvis of 2018.    PROCEDURE:  Noncontrast CT of the thoracolumbar spine spine was performed. Coronal and Sagittal reformats were obtained. Volume rendered 3-D imaging of the thoracolumbar spine was obtained.    FINDINGS:    There is no acute thoracolumbar spine fracture or evidence of traumatic malalignment. Osseous structures are diffusely demineralized. No discrete aggressive appearing lytic or blastic lesion. Mild exaggeration of the thoracic kyphosis. Normal lumbarlordosis. Vertebral body heights are relatively well-maintained. Mild multilevel degenerative disc disease, as prominent at the L5-S1 level, characterized by disc height loss and endplate sclerosis. Multilevel fusion of the thoracic spine as processes and hypertrophy lumbar spinous processes.    Multilevel facet joint arthrosis, predominantly within the lower lumbar spine contributing to varying degrees of neuroforaminal stenosis. No critical stenosis within the spinal canal on this imaging modality.    Respiratory motion limits evaluation of the lung parenchyma, which demonstrates a few scattered calcified granulomas. Calcified bilateral hilar lymph nodes. Coronary artery calcification.    Numerous calcified granulomas within the liver and spleen.    Calcified fibroid uterus. Colonic diverticulosis.    Extensive atherosclerosis of the abdominal aorta, which is normal in caliber.    IMPRESSION:    No acute thoracolumbar spine fracture or evidence of traumatic malalignment. Diffusely demineralized osseous structures without evidence of aggressive lytic or blastic lesion. More sensitive evaluation with MRI and/or bone scan may be obtained, if no contraindications exist.    < end of copied text >    Drug Screen:  enoxaparin Injectable 40 milliGRAM(s) SubCutaneous daily    cefTRIAXone   IVPB            ORT Score   Family Hx of substance abuse	Female	Male  Alcohol 	                                              1              3  Illegal drugs	                                      2              3  Rx drugs                                               4	      4    Personal Hx of substance abuse		  Alcohol 	                                               3	      3  Illegal drugs                                  	       4	      4  Rx drugs                                                5	      5  Age between 16- 45 years	               1             1  hx preadolescent sexual abuse	       3	      0  Psychological disease		  ADD, OCD, bipolar, schizophrenia	2	      2  Depression                                    	1	      1  Score totals 		0  		  a score of 3 or lower indicates low risk for opioid abuse		  a score of 4-7 indicates moderate risk for opioid abuse		  a score of 8 or higher indicates high risk for opioid abuse	    Risk factors associated with adverse outcomes related to opioid treatment  [ ]  Concurrent benzodiazepine use  [ ]  History/ Active substance use or alcohol use disorder  [ ] Psychiatric co-morbidity  [ ] Sleep apnea  [ ] COPD  [ ] BMI> 35  [ ] Liver dysfunction  [ ] Renal dysfunction  [ ] CHF  [ ] Smoker  [x ]  Age > 60 years      [x ]  [place]  Reviewed and Copied to Chart. See below.

## 2021-02-26 NOTE — H&P ADULT - ATTENDING COMMENTS
Patient is an 81 year old female with a PMH of Multiple Myeloma (previously on Velcade, last session 8/6/2019), Rheumatoid Arthritis who was BIBEMS due to back pain.  Patient states that beginning approximately 1 week prior to current presentation she began to experience progressively worsening mid-back pain, which radiates upwards, worsened with exertion.    At time of examination in the ED, patient denies any headache, dizziness, chest pain, palpitations, shortness of breath, abdominal pain, nausea/vomiting/diarrhea.    T(C): 36.6 (02-26-21 @ 04:58), Max: 37.8 (02-25-21 @ 23:12)  T(F): 97.9 (02-26-21 @ 04:58), Max: 100 (02-25-21 @ 23:12)  HR: 80 (02-26-21 @ 04:58) (80 - 88)  BP: 134/73 (02-26-21 @ 04:58) (134/73 - 171/78)  RR: 17 (02-26-21 @ 04:58) (16 - 17)  SpO2: 99% (02-26-21 @ 04:58) (98% - 99%)  Wt(kg): --    P/E: As above MAR    A/P:    Back Pain likely due to Multiple Myeloma:  -Gamma Gap= 7.9  -Hgb= 9.4  -Calcium, corrected= 10.3  -eGFR= 31 (Baseline= 35 on 3/25/2018)   -CT Thoracic/Lumbar Spine without contrast identified Osseous structures are diffusely demineralized. No discrete aggressive appearing lytic or blastic lesion.  Mild multilevel degenerative disc disease, as prominent at the L5-S1 level, characterized by disc height loss and endplate sclerosis. Multilevel fusion of the thoracic spine as processes and hypertrophy lumbar spinous processes.  -Currently awaiting CXR to eval for any lytic lesions of bones  -Pain management as necessary without sedating  -Will ask Heme/Onc to evaluate patient for further recommendations  -Patient will need to follow-up with Ortho as an outpatient after discharge    History of Multiple Myeloma:  -As above    Moderate Hyperkalemia:  -Will repeat Potassium level and if remains elevated, will administer Calcium Gluconate 1gm, D50% IV Push, Regular Insulin 10units SUBQ and Lokelma 10gm PO Once  -Will continue to closely monitor plasma K+ level as well as EKG and continue to treat PRN    Anemia:  -Hgb= 9.4 (Baseline= 10.7 on 11/17/2020)  -MCV= 85.4, RDW= 16.0  -Will send Retic Count and Iron Panel (Iron, TIBC, Ferritin)    Chronic Renal Insufficiency:  -Cr= 1.77 (Baseline= 1.63 on 3/25/2018)  -eGFR= 31 (Baseline= 35 on 3/25/2018)   -Will continue with IVF hydration  -Will send Urinary Electrolytes (Sodium, Potassium, Creatinine, Chloride)  -Will obtain Bilateral Renal Sonogram    Acute Uncomplicated UTI:  -Will send ESR, CRP, Procalcitonin  -Tylenol PRN for fever  -Will start patient on Rocephin, for now    Uncontrolled Blood Glucose:  -Hemoglobin A1c with AM labs  -Blood Glucose Monitoring ACHS  -Regular Insulin Sliding Scale ACHS  -Carb Controlled, Heart Healthy, Renal (Non-Dialysis) diet as tolerated    Liver and Splenic Calcifications:  -CT Thoracic/Lumbar Spine without contrast identified numerous calcified granulomas within the liver and spleen.  -Patient will need to follow-up with GI/Hepatology as an outpatient after discharge    Hypoalbuminemia:  -Nutrition Consult    GI/DVT PPx:  -Lovenox  -PPI

## 2021-02-26 NOTE — H&P ADULT - PROBLEM SELECTOR PLAN 2
UA- UTI  Pt had dysuria 2 weeks ago now resolved   Pt is tender in lower back, not able to exclude  CVA tenderness  c/w ivf and rocephin   f/u ucx

## 2021-02-26 NOTE — ED PROVIDER NOTE - CONSTITUTIONAL, MLM
Well appearing, awake, alert, oriented to person, place, time/situation and uncomfortable appearing normal...

## 2021-02-26 NOTE — ED PROVIDER NOTE - CLINICAL SUMMARY MEDICAL DECISION MAKING FREE TEXT BOX
81 year old female with back pain. vitals WNL. PE as above.  labs, UA, ct t/l spine, pain control, reassess

## 2021-02-26 NOTE — H&P ADULT - HISTORY OF PRESENT ILLNESS
81F, from home, walks with cane, AAOX3 with  PMH of  LIAT LEO( last chemo session in Aug 2020) coming in with 1 week of worsening b/l back pain starting in lumbar spine and radiating up her back. worsened with movement. States never had pains this severe before. denies numbness, tingling, weakness, urinary or fecal incontinence, urinary retention, fevers, chills, sweats, abd pains, N/V/D/C, dysuria, hematuria.  has been having vaginal discomfort and is scheduled to have a biopsy in march. denies fall or trauma. states able to ambulate slowly but difficult 2/2 pain. 81F, from home, walks with cane, AAOX3 with  PMH of  LIAT LEO( On velcadelast chemo session in Aug 2020) coming in with 1 week of worsening b/l back pain, Per Pt she noticed gradual onset of mildine lower back ache radiating to the sides and the upper back, worsened with movement. States never had pains this severe before. Pt describes pain to be continuos sharp 7/10, increases to 9/10 on movement. The increasing pain has made it difficult for her to ambulate. Pt denies numbness, tingling, weakness, urinary or fecal incontinence, urinary retention, fevers, chills, sweats, abd pains, N/V/D/C, hematuria. Pt mentions that she had burning urine 2 weeks ago and visited her doctor who recommended Vaginal ointment and the dysuria resolved.  Pt denies any smoking,, alcohol or any other drug use.     In the ED  Pt in distress in pain, worsened with movements  Vitals- 132/78, Hr 81, Afebrile  UA- UTI  CT lumbar/thoracic spine- no acute changes   Cr 1.7 ( baseline 1.6 March 2018)  s/p toradol and Morphine in ED     Off note- Pt mentions that she takes 6-7 pills but only recalls taking aspirin. Primary team to obtain Med rec from daughter in am.

## 2021-02-26 NOTE — H&P ADULT - PROBLEM SELECTOR PLAN 5
Pt has MM   Pt is in chemotherapy with last session being on Aug 2020 Velcade   Pt due for session  in April 2021  OP Hemo- Dr. Rosales   Hemo/onc- [facility] consulted

## 2021-02-26 NOTE — H&P ADULT - PROBLEM SELECTOR PLAN 1
Pt admitted for severe lower back pain worsening x 1 week limiting her ambulation and mobility  CT lumbar/thoracic spine- no acute changes   s/p toradol and morphine in ED   C/w pain meds  f/u pain rashawn consults Pt admitted for severe lower back pain worsening x 1 week limiting her ambulation and mobility  CT lumbar/thoracic spine- no acute changes   Pain likely 2/2 MM  Gamma Gap= 7.9  -Hgb= 9.4  -Calcium, corrected= 10.3  -eGFR= 31 (Baseline= 35 on 3/25/2018)  s/p toradol and morphine in ED   C/w pain meds  f/u pain rashawn consults

## 2021-02-26 NOTE — H&P ADULT - NSHPLABSRESULTS_GEN_ALL_CORE
9.4    5.10  )-----------( 199      ( 2021 00:24 )             28.1           133<L>  |  106  |  38<H>  ----------------------------<  130<H>  6.0<H>   |  24  |  1.77<H>    Ca    9.3      2021 00:24    TPro  10.6<H>  /  Alb  2.7<L>  /  TBili  0.4  /  DBili  x   /  AST  31  /  ALT  18  /  AlkPhos  100                Urinalysis Basic - ( 2021 01:16 )    Color: Yellow / Appearance: Clear / S.010 / pH: x  Gluc: x / Ketone: Negative  / Bili: Negative / Urobili: Negative   Blood: x / Protein: 30 mg/dL / Nitrite: Negative   Leuk Esterase: Moderate / RBC: 10-25 /HPF / WBC 6-10 /HPF   Sq Epi: x / Non Sq Epi: Moderate /HPF / Bacteria: Few /HPF            < from: CT Lumbar Spine No Cont (21 @ 00:39) >      IMPRESSION:    No acute thoracolumbar spine fracture or evidence of traumatic malalignment. Diffusely demineralized osseous structures without evidence of aggressive lytic or blastic lesion. More sensitive evaluation with MRI and/or bone scan may be obtained, if no contraindications exist.    < end of copied text >    < from: CT Lumbar Spine No Cont (21 @ 00:39) >      IMPRESSION:    No acute thoracolumbar spine fracture or evidence of traumatic malalignment. Diffusely demineralized osseous structures without evidence of aggressive lytic or blastic lesion. More sensitive evaluation with MRI and/or bone scan may be obtained, if no contraindications exist.

## 2021-02-27 DIAGNOSIS — N18.32 CHRONIC KIDNEY DISEASE, STAGE 3B: ICD-10-CM

## 2021-02-27 DIAGNOSIS — M54.5 LOW BACK PAIN: ICD-10-CM

## 2021-02-27 LAB
A1C WITH ESTIMATED AVERAGE GLUCOSE RESULT: 6.2 % — HIGH (ref 4–5.6)
ANION GAP SERPL CALC-SCNC: 7 MMOL/L — SIGNIFICANT CHANGE UP (ref 5–17)
BUN SERPL-MCNC: 25 MG/DL — HIGH (ref 7–18)
CALCIUM SERPL-MCNC: 8.6 MG/DL — SIGNIFICANT CHANGE UP (ref 8.4–10.5)
CHLORIDE SERPL-SCNC: 109 MMOL/L — HIGH (ref 96–108)
CO2 SERPL-SCNC: 23 MMOL/L — SIGNIFICANT CHANGE UP (ref 22–31)
CREAT SERPL-MCNC: 1.62 MG/DL — HIGH (ref 0.5–1.3)
ESTIMATED AVERAGE GLUCOSE: 131 MG/DL — HIGH (ref 68–114)
GLUCOSE BLDC GLUCOMTR-MCNC: 100 MG/DL — HIGH (ref 70–99)
GLUCOSE BLDC GLUCOMTR-MCNC: 102 MG/DL — HIGH (ref 70–99)
GLUCOSE BLDC GLUCOMTR-MCNC: 112 MG/DL — HIGH (ref 70–99)
GLUCOSE BLDC GLUCOMTR-MCNC: 120 MG/DL — HIGH (ref 70–99)
GLUCOSE SERPL-MCNC: 117 MG/DL — HIGH (ref 70–99)
HCT VFR BLD CALC: 26.2 % — LOW (ref 34.5–45)
HGB BLD-MCNC: 8.8 G/DL — LOW (ref 11.5–15.5)
MCHC RBC-ENTMCNC: 28.6 PG — SIGNIFICANT CHANGE UP (ref 27–34)
MCHC RBC-ENTMCNC: 33.6 GM/DL — SIGNIFICANT CHANGE UP (ref 32–36)
MCV RBC AUTO: 85.1 FL — SIGNIFICANT CHANGE UP (ref 80–100)
NRBC # BLD: 0 /100 WBCS — SIGNIFICANT CHANGE UP (ref 0–0)
PLATELET # BLD AUTO: 187 K/UL — SIGNIFICANT CHANGE UP (ref 150–400)
POTASSIUM SERPL-MCNC: 4.2 MMOL/L — SIGNIFICANT CHANGE UP (ref 3.5–5.3)
POTASSIUM SERPL-SCNC: 4.2 MMOL/L — SIGNIFICANT CHANGE UP (ref 3.5–5.3)
RBC # BLD: 3.08 M/UL — LOW (ref 3.8–5.2)
RBC # FLD: 16.1 % — HIGH (ref 10.3–14.5)
SODIUM SERPL-SCNC: 139 MMOL/L — SIGNIFICANT CHANGE UP (ref 135–145)
WBC # BLD: 3.33 K/UL — LOW (ref 3.8–10.5)
WBC # FLD AUTO: 3.33 K/UL — LOW (ref 3.8–10.5)

## 2021-02-27 PROCEDURE — 99231 SBSQ HOSP IP/OBS SF/LOW 25: CPT

## 2021-02-27 PROCEDURE — 99233 SBSQ HOSP IP/OBS HIGH 50: CPT

## 2021-02-27 PROCEDURE — 72148 MRI LUMBAR SPINE W/O DYE: CPT | Mod: 26

## 2021-02-27 PROCEDURE — 72146 MRI CHEST SPINE W/O DYE: CPT | Mod: 26

## 2021-02-27 RX ORDER — POLYETHYLENE GLYCOL 3350 17 G/17G
17 POWDER, FOR SOLUTION ORAL DAILY
Refills: 0 | Status: DISCONTINUED | OUTPATIENT
Start: 2021-02-27 | End: 2021-03-02

## 2021-02-27 RX ORDER — LIDOCAINE 4 G/100G
1 CREAM TOPICAL DAILY
Refills: 0 | Status: DISCONTINUED | OUTPATIENT
Start: 2021-02-27 | End: 2021-03-02

## 2021-02-27 RX ADMIN — ENOXAPARIN SODIUM 40 MILLIGRAM(S): 100 INJECTION SUBCUTANEOUS at 12:13

## 2021-02-27 RX ADMIN — Medication 1000 MILLIGRAM(S): at 12:13

## 2021-02-27 RX ADMIN — LIDOCAINE 1 PATCH: 4 CREAM TOPICAL at 19:47

## 2021-02-27 RX ADMIN — Medication 1000 MILLIGRAM(S): at 05:36

## 2021-02-27 RX ADMIN — GABAPENTIN 100 MILLIGRAM(S): 400 CAPSULE ORAL at 05:36

## 2021-02-27 RX ADMIN — GABAPENTIN 100 MILLIGRAM(S): 400 CAPSULE ORAL at 12:15

## 2021-02-27 RX ADMIN — Medication 1000 MILLIGRAM(S): at 21:08

## 2021-02-27 RX ADMIN — CEFTRIAXONE 100 MILLIGRAM(S): 500 INJECTION, POWDER, FOR SOLUTION INTRAMUSCULAR; INTRAVENOUS at 05:36

## 2021-02-27 RX ADMIN — PANTOPRAZOLE SODIUM 40 MILLIGRAM(S): 20 TABLET, DELAYED RELEASE ORAL at 05:36

## 2021-02-27 RX ADMIN — SENNA PLUS 2 TABLET(S): 8.6 TABLET ORAL at 21:07

## 2021-02-27 RX ADMIN — LIDOCAINE 1 PATCH: 4 CREAM TOPICAL at 12:13

## 2021-02-27 RX ADMIN — GABAPENTIN 100 MILLIGRAM(S): 400 CAPSULE ORAL at 21:15

## 2021-02-27 NOTE — PROGRESS NOTE ADULT - SUBJECTIVE AND OBJECTIVE BOX
HPI:  81F, from home, walks with cane, AAOX3 with  PMH of  LIAT LEO( On velcadelast chemo session in Aug 2020) coming in with 1 week of worsening b/l back pain, Per Pt she noticed gradual onset of mildine lower back ache radiating to the sides and the upper back, worsened with movement. States never had pains this severe before. Pt describes pain to be continuos sharp 7/10, increases to 9/10 on movement. The increasing pain has made it difficult for her to ambulate. Pt denies numbness, tingling, weakness, urinary or fecal incontinence, urinary retention, fevers, chills, sweats, abd pains, N/V/D/C, hematuria. Pt mentions that she had burning urine 2 weeks ago and visited her doctor who recommended Vaginal ointment and the dysuria resolved.  Pt denies any smoking,, alcohol or any other drug use.     In the ED  Pt in distress in pain, worsened with movements  Vitals- 132/78, Hr 81, Afebrile  UA- UTI  CT lumbar/thoracic spine- no acute changes   Cr 1.7 ( baseline 1.2018)  s/p toradol and Morphine in ED     Off note- Pt mentions that she takes 6-7 pills but only recalls taking aspirin. Primary team to obtain Med rec from daughter in am. (2021 03:37)      Patient is a 81y old  Female who presents with a chief complaint of Lower back pain (2021 10:25)      INTERVAL HPI/OVERNIGHT EVENTS:  T(C): 37.1 (21 @ 04:59), Max: 37.1 (21 @ 04:59)  HR: 80 (21 @ 04:59) (75 - 90)  BP: 148/61 (21 @ 04:59) (134/69 - 148/61)  RR: 18 (21 @ 04:59) (18 - 18)  SpO2: 97% (21 @ 04:59) (97% - 100%)  Wt(kg): --  I&O's Summary      REVIEW OF SYSTEMS: denies fever, chills, SOB, palpitations, chest pain, abdominal pain, nausea, vomitting, diarrhea, constipation, dizziness    MEDICATIONS  (STANDING):  acetaminophen   Tablet .. 1000 milliGRAM(s) Oral every 8 hours  cefTRIAXone   IVPB      cefTRIAXone   IVPB 1000 milliGRAM(s) IV Intermittent every 24 hours  enoxaparin Injectable 40 milliGRAM(s) SubCutaneous daily  gabapentin 100 milliGRAM(s) Oral every 8 hours  insulin lispro (ADMELOG) corrective regimen sliding scale   SubCutaneous three times a day before meals  lidocaine   Patch 1 Patch Transdermal daily  pantoprazole    Tablet 40 milliGRAM(s) Oral before breakfast  senna 2 Tablet(s) Oral at bedtime  sodium chloride 0.9%. 1000 milliLiter(s) (75 mL/Hr) IV Continuous <Continuous>    MEDICATIONS  (PRN):  polyethylene glycol 3350 17 Gram(s) Oral daily PRN Constipation  traMADol 50 milliGRAM(s) Oral every 6 hours PRN Severe Pain (7 - 10)      PHYSICAL EXAM:  GENERAL: NAD, well-groomed, well-developed  HEAD:  Atraumatic, Normocephalic  EYES: EOMI, PERRLA, conjunctiva and sclera clear  ENMT: No tonsillar erythema, exudates, or enlargement; Moist mucous membranes, Good dentition, No lesions  NECK: Supple, No JVD, Normal thyroid  NERVOUS SYSTEM:  Alert & Oriented X3, Good concentration; Motor Strength 5/5 B/L upper and lower extremities; DTRs 2+ intact and symmetric  CHEST/LUNG: Clear to percussion bilaterally; No rales, rhonchi, wheezing, or rubs  HEART: Regular rate and rhythm; No murmurs, rubs, or gallops  ABDOMEN: Soft, Nontender, Nondistended; Bowel sounds present  EXTREMITIES:  2+ Peripheral Pulses, No clubbing, cyanosis, or edema  LYMPH: No lymphadenopathy noted  SKIN: No rashes or lesions  LABS:                        8.8    3.33  )-----------( 187      ( 2021 07:08 )             26.2     02-27    139  |  109<H>  |  25<H>  ----------------------------<  117<H>  4.2   |  23  |  1.62<H>    Ca    8.6      2021 07:08  Phos  3.1     02-  Mg     2.5     -    TPro  10.1<H>  /  Alb  2.6<L>  /  TBili  0.2  /  DBili  x   /  AST  23  /  ALT  16  /  AlkPhos  85        Urinalysis Basic - ( 2021 01:16 )    Color: Yellow / Appearance: Clear / S.010 / pH: x  Gluc: x / Ketone: Negative  / Bili: Negative / Urobili: Negative   Blood: x / Protein: 30 mg/dL / Nitrite: Negative   Leuk Esterase: Moderate / RBC: 10-25 /HPF / WBC 6-10 /HPF   Sq Epi: x / Non Sq Epi: Moderate /HPF / Bacteria: Few /HPF      CAPILLARY BLOOD GLUCOSE      POCT Blood Glucose.: 120 mg/dL (2021 07:56)  POCT Blood Glucose.: 122 mg/dL (2021 20:49)  POCT Blood Glucose.: 115 mg/dL (2021 16:54)        Urinalysis Basic - ( 2021 01:16 )    Color: Yellow / Appearance: Clear / S.010 / pH: x  Gluc: x / Ketone: Negative  / Bili: Negative / Urobili: Negative   Blood: x / Protein: 30 mg/dL / Nitrite: Negative   Leuk Esterase: Moderate / RBC: 10-25 /HPF / WBC 6-10 /HPF   Sq Epi: x / Non Sq Epi: Moderate /HPF / Bacteria: Few /HPF     HPI:  81F, from home, walks with cane, AAOX3 with  PMH of  LIAT LEO( On velcadelast chemo session in Aug 2020) coming in with 1 week of worsening b/l back pain, Per Pt she noticed gradual onset of mildine lower back ache radiating to the sides and the upper back, worsened with movement. States never had pains this severe before. Pt describes pain to be continuos sharp 7/10, increases to 9/10 on movement. The increasing pain has made it difficult for her to ambulate. Pt denies numbness, tingling, weakness, urinary or fecal incontinence, urinary retention, fevers, chills, sweats, abd pains, N/V/D/C, hematuria. Pt mentions that she had burning urine 2 weeks ago and visited her doctor who recommended Vaginal ointment and the dysuria resolved.  Pt denies any smoking,, alcohol or any other drug use.     In the ED  Pt in distress in pain, worsened with movements  Vitals- 132/78, Hr 81, Afebrile  UA- UTI  CT lumbar/thoracic spine- no acute changes   Cr 1.7 ( baseline 1.2018)  s/p toradol and Morphine in ED     Off note- Pt mentions that she takes 6-7 pills but only recalls taking aspirin. Primary team to obtain Med rec from daughter in am. (2021 03:37)      Patient is a 81y old  Female who presents with a chief complaint of Lower back pain (2021 10:25)      INTERVAL HPI/OVERNIGHT EVENTS:  T(C): 37.1 (21 @ 04:59), Max: 37.1 (21 @ 04:59)  HR: 80 (21 @ 04:59) (75 - 90)  BP: 148/61 (21 @ 04:59) (134/69 - 148/61)  RR: 18 (21 @ 04:59) (18 - 18)  SpO2: 97% (21 @ 04:59) (97% - 100%)  Wt(kg): --  I&O's Summary      REVIEW OF SYSTEMS: denies fever, chills, SOB, palpitations, chest pain, abdominal pain, nausea, vomitting, diarrhea, constipation, dizziness    MEDICATIONS  (STANDING):  acetaminophen   Tablet .. 1000 milliGRAM(s) Oral every 8 hours  cefTRIAXone   IVPB      cefTRIAXone   IVPB 1000 milliGRAM(s) IV Intermittent every 24 hours  enoxaparin Injectable 40 milliGRAM(s) SubCutaneous daily  gabapentin 100 milliGRAM(s) Oral every 8 hours  insulin lispro (ADMELOG) corrective regimen sliding scale   SubCutaneous three times a day before meals  lidocaine   Patch 1 Patch Transdermal daily  pantoprazole    Tablet 40 milliGRAM(s) Oral before breakfast  senna 2 Tablet(s) Oral at bedtime  sodium chloride 0.9%. 1000 milliLiter(s) (75 mL/Hr) IV Continuous <Continuous>    MEDICATIONS  (PRN):  polyethylene glycol 3350 17 Gram(s) Oral daily PRN Constipation  traMADol 50 milliGRAM(s) Oral every 6 hours PRN Severe Pain (7 - 10)      PHYSICAL EXAM:  GENERAL: NAD, elderly female  HEAD:  Atraumatic, Normocephalic  EYES: EOMI, PERRLA  ENMT: No tonsillar erythema, exudates, or enlargement; Moist mucous membranes,   NECK: Supple, No JVD, Normal thyroid  NERVOUS SYSTEM:  Alert & Oriented X3, Good concentration; Motor Strength 4/5 B/L upper and lower extremities, no focal sensory loss  CHEST/LUNG: Clear to percussion bilaterally; No rales, rhonchi, wheezing, or rubs  HEART: Regular rate and rhythm; No murmurs, rubs, or gallops  ABDOMEN: Soft, Nontender, Nondistended; Bowel sounds present  EXTREMITIES:  2+ Peripheral Pulses, No clubbing, cyanosis, or edema.   LYMPH: No lymphadenopathy noted  SKIN: No rashes or lesions    LABS:                        8.8    3.33  )-----------( 187      ( 2021 07:08 )             26.2     02-27    139  |  109<H>  |  25<H>  ----------------------------<  117<H>  4.2   |  23  |  1.62<H>    Ca    8.6      2021 07:08  Phos  3.1     02-26  Mg     2.5     02-    TPro  10.1<H>  /  Alb  2.6<L>  /  TBili  0.2  /  DBili  x   /  AST  23  /  ALT  16  /  AlkPhos  85        Urinalysis Basic - ( 2021 01:16 )    Color: Yellow / Appearance: Clear / S.010 / pH: x  Gluc: x / Ketone: Negative  / Bili: Negative / Urobili: Negative   Blood: x / Protein: 30 mg/dL / Nitrite: Negative   Leuk Esterase: Moderate / RBC: 10-25 /HPF / WBC 6-10 /HPF   Sq Epi: x / Non Sq Epi: Moderate /HPF / Bacteria: Few /HPF      CAPILLARY BLOOD GLUCOSE      POCT Blood Glucose.: 120 mg/dL (2021 07:56)  POCT Blood Glucose.: 122 mg/dL (2021 20:49)  POCT Blood Glucose.: 115 mg/dL (2021 16:54)        Urinalysis Basic - ( 2021 01:16 )    Color: Yellow / Appearance: Clear / S.010 / pH: x  Gluc: x / Ketone: Negative  / Bili: Negative / Urobili: Negative   Blood: x / Protein: 30 mg/dL / Nitrite: Negative   Leuk Esterase: Moderate / RBC: 10-25 /HPF / WBC 6-10 /HPF   Sq Epi: x / Non Sq Epi: Moderate /HPF / Bacteria: Few /HPF

## 2021-02-27 NOTE — PROGRESS NOTE ADULT - PROBLEM SELECTOR PROBLEM 1
Acute back pain, unspecified back location, unspecified back pain laterality Back pain of thoracolumbar region

## 2021-02-27 NOTE — PROGRESS NOTE ADULT - ASSESSMENT
Confidential Drug Utilization Report  Search Terms: michael odell, 1940   Search Date: 02/26/2021 20:54:02 PM   The Drug Utilization Report below displays all of the controlled substance prescriptions, if any, that your patient has filled in the last twelve months. The information displayed on this report is compiled from pharmacy submissions to the Department, and accurately reflects the information as submitted by the pharmacies.  This report was requested by: Jessenia Dave | Reference #: [id number]   There are no results for the search terms that you entered.

## 2021-02-27 NOTE — PROGRESS NOTE ADULT - ASSESSMENT
81 year old female with a PMH of Multiple Myeloma (previously on Velcade, last session 8/6/2019), Rheumatoid Arthritis who was BIBEMS due to back pain.  Patient states that beginning approximately 1 week prior to current presentation she began to experience progressively worsening mid-back pain, which radiates upwards, worsened with exertion. Patient does not have signs of cord compression.   At time of examination in the ED, patient denies any headache, dizziness, chest pain, palpitations, shortness of breath, abdominal pain, nausea/vomiting/diarrhea.    T(C): 36.6 (02-26-21 @ 04:58), Max: 37.8 (02-25-21 @ 23:12)  T(F): 97.9 (02-26-21 @ 04:58), Max: 100 (02-25-21 @ 23:12)  HR: 80 (02-26-21 @ 04:58) (80 - 88)  BP: 134/73 (02-26-21 @ 04:58) (134/73 - 171/78)  RR: 17 (02-26-21 @ 04:58) (16 - 17)  SpO2: 99% (02-26-21 @ 04:58) (98% - 99%)  Wt(kg): --    P/E: As above MAR    A/P:    Back Pain likely due to Multiple Myeloma:  -Gamma Gap= 7.9  -Hgb= 9.4  -Calcium, corrected= 10.3  -eGFR= 31 (Baseline= 35 on 3/25/2018)   -CT Thoracic/Lumbar Spine without contrast identified Osseous structures are diffusely demineralized. No discrete aggressive appearing lytic or blastic lesion.  Mild multilevel degenerative disc disease, as prominent at the L5-S1 level, characterized by disc height loss and endplate sclerosis. Multilevel fusion of the thoracic spine as processes and hypertrophy lumbar spinous processes.  -Currently awaiting CXR to eval for any lytic lesions of bones  -Pain management as necessary without sedating  -Will ask Heme/Onc to evaluate patient for further recommendations  -Patient will need to follow-up with Ortho as an outpatient after discharge    History of Multiple Myeloma:  -As above    Moderate Hyperkalemia:  -Will repeat Potassium level and if remains elevated, will administer Calcium Gluconate 1gm, D50% IV Push, Regular Insulin 10units SUBQ and Lokelma 10gm PO Once  -Will continue to closely monitor plasma K+ level as well as EKG and continue to treat PRN    Anemia:  -Hgb= 9.4 (Baseline= 10.7 on 11/17/2020)  -MCV= 85.4, RDW= 16.0  -Will send Retic Count and Iron Panel (Iron, TIBC, Ferritin)    Chronic Renal Insufficiency:  -Cr= 1.77 (Baseline= 1.63 on 3/25/2018)  -eGFR= 31 (Baseline= 35 on 3/25/2018)   -Will continue with IVF hydration  -Will send Urinary Electrolytes (Sodium, Potassium, Creatinine, Chloride)  -Will obtain Bilateral Renal Sonogram    Acute Uncomplicated UTI:  -Will send ESR, CRP, Procalcitonin  -Tylenol PRN for fever  -Will start patient on Rocephin, for now    Uncontrolled Blood Glucose:  -Hemoglobin A1c with AM labs  -Blood Glucose Monitoring ACHS  -Regular Insulin Sliding Scale ACHS  -Carb Controlled, Heart Healthy, Renal (Non-Dialysis) diet as tolerated    Liver and Splenic Calcifications:  -CT Thoracic/Lumbar Spine without contrast identified numerous calcified granulomas within the liver and spleen.  -Patient will need to follow-up with GI/Hepatology as an outpatient after discharge    Hypoalbuminemia:  -Nutrition Consult    GI/DVT PPx:  -Lovenox  -PPI.      81 year old female with a PMH of Multiple Myeloma (previously on Velcade, last session 8/6/2019), Rheumatoid Arthritis who was BIBEMS due to back pain.  Patient states that beginning approximately 1 week prior to current presentation she began to experience progressively worsening mid-back pain, which radiates upwards, worsened with exertion. Patient does not have signs of cord compression.   At time of examination in the ED, patient denies any headache, dizziness, chest pain, palpitations, shortness of breath, abdominal pain, nausea/vomiting/diarrhea.    T(C): 36.6 (02-26-21 @ 04:58), Max: 37.8 (02-25-21 @ 23:12)  T(F): 97.9 (02-26-21 @ 04:58), Max: 100 (02-25-21 @ 23:12)  HR: 80 (02-26-21 @ 04:58) (80 - 88)  BP: 134/73 (02-26-21 @ 04:58) (134/73 - 171/78)  RR: 17 (02-26-21 @ 04:58) (16 - 17)  SpO2: 99% (02-26-21 @ 04:58) (98% - 99%)  Wt(kg): --    P/E: As above MAR    A/P:    Acute Back Pain: patient has h/o Multiple Myeloma:  - Gamma Gap= 7.9, Hgb 9.4, Calcium, corrected 10.3>9.7.  - CT Thoracic/Lumbar Spine without contrast: Osseous structures are diffusely demineralized, No discrete aggressive appearing lytic or blastic lesion, Mild multilevel degenerative disc disease, as prominent at the L5-S1 level, characterized by disc height loss and end plate sclerosis. Multilevel fusion of the thoracic spine as processes and hypertrophy lumbar spinous processes.  - Hem/onc Dr. Marin consulted.   - Pain management consulted.  - MRI done today reported as acute compression if T12 vertebral body resulting in mild compression deformity. No significant posterior retropulsion. No evidence of cord compression.  - Consulted Dr. Barney ortho spine.     History of Multiple Myeloma: As above, reportedly patient is velcadelast chemo from Aug 2020  Gamma Gap= 7.9, Hgb 9.4, Calcium, corrected 10.3>9.7.  Hem Dr. Marin consulted  No lytic lesion noted on CT spine,     Anemia of Chronic disease    Chronic Renal Insufficiency:  -Cr= 1.77>1.6 (Baseline= 1.63 on 3/25/2018); eGFR= 31 (Baseline= 35 on 3/25/2018)   - Renal Sonogram showing atrophic kidneys bilaterally, no hydronephrosis. monitor BMP     Acute Uncomplicated UTI:  - UA positive, Urine cx sent, follow results.  - on Rocephin, will continue    Prediabetes:-Hemoglobin A1c 6.2  -Blood Glucose Monitoring ACHS, SSI  - Diabetic diet, education.    Liver and Splenic Calcifications:  -CT Thoracic/Lumbar Spine without contrast identified numerous calcified granulomas within the liver and spleen.  -Patient will need to follow-up with GI/Hepatology as an outpatient.    Hypoalbuminemia:  -Nutrition Consult    GI/DVT PPx:  -Lovenox  -PPI.

## 2021-02-27 NOTE — PROGRESS NOTE ADULT - SUBJECTIVE AND OBJECTIVE BOX
Source of information: ANGELO STRATTON, Chart review  Patient language: English  : n/a    HPI:  81F, from home, walks with cane, AAOX3 with  PMH of  RA MM( On velcadelast chemo session in Aug 2020) coming in with 1 week of worsening b/l back pain, Per Pt she noticed gradual onset of mildine lower back ache radiating to the sides and the upper back, worsened with movement. States never had pains this severe before. Pt describes pain to be continuos sharp 7/10, increases to 9/10 on movement. The increasing pain has made it difficult for her to ambulate. Pt denies numbness, tingling, weakness, urinary or fecal incontinence, urinary retention, fevers, chills, sweats, abd pains, N/V/D/C, hematuria. Pt mentions that she had burning urine 2 weeks ago and visited her doctor who recommended Vaginal ointment and the dysuria resolved.  Pt denies any smoking,, alcohol or any other drug use.     In the ED  Pt in distress in pain, worsened with movements  Vitals- 132/78, Hr 81, Afebrile  UA- UTI  CT lumbar/thoracic spine- no acute changes   Cr 1.7 ( baseline 1.2018)  s/p toradol and Morphine in ED     Off note- Pt mentions that she takes 6-7 pills but only recalls taking aspirin. Primary team to obtain Med rec from daughter in am. (2021 03:37)      This is a Patient is a 81y old  Female who presents with a chief complaint of Lower back pain. Pain consulted for pain recommendations. Pt seen and examined at bedside. Pt sitting at edge of bed, recently finished having breakfast. Reports thoracic and lumbar spine PAIN SCORE:  5/10 and tolerable SCALE USED: (1-10 VNRS). Pain adequately managed on current pain regimen. Reports pain has improved compared to yesterday. Pt describes pain as aching, non- radiating, alleviated by pain medications, exacerbated by movement. Pt tolerating PO diet. Pt denies lethargy, nausea, vomiting, constipation and itchiness. Reports last BM ***. Patient stated goal for pain control: to be able to take deep breaths, get out of bed to chair and ambulate with tolerable pain control.     PAST MEDICAL & SURGICAL HISTORY:  Rheumatoid arthritis    No pertinent past medical history    No significant past surgical history        FAMILY HISTORY:      Social History:  Pt stays at home   denies any sm/alc/ drug abuse (2021 03:37)   [X ]Denies ETOH use, illicit drug use, and smoking     Allergies    No Known Allergies    MEDICATIONS  (STANDING):  acetaminophen   Tablet .. 1000 milliGRAM(s) Oral every 8 hours  cefTRIAXone   IVPB      cefTRIAXone   IVPB 1000 milliGRAM(s) IV Intermittent every 24 hours  enoxaparin Injectable 40 milliGRAM(s) SubCutaneous daily  gabapentin 100 milliGRAM(s) Oral every 8 hours  insulin lispro (ADMELOG) corrective regimen sliding scale   SubCutaneous three times a day before meals  pantoprazole    Tablet 40 milliGRAM(s) Oral before breakfast  senna 2 Tablet(s) Oral at bedtime  sodium chloride 0.9%. 1000 milliLiter(s) (75 mL/Hr) IV Continuous <Continuous>    MEDICATIONS  (PRN):  traMADol 50 milliGRAM(s) Oral every 6 hours PRN Severe Pain (7 - 10)      Vital Signs Last 24 Hrs  T(C): 37.1 (2021 04:59), Max: 37.1 (2021 04:59)  T(F): 98.7 (2021 04:59), Max: 98.7 (2021 04:59)  HR: 80 (2021 04:59) (75 - 90)  BP: 148/61 (2021 04:59) (127/58 - 148/61)  BP(mean): --  RR: 18 (2021 04:59) (18 - 18)  SpO2: 97% (2021 04:59) (97% - 100%)  COVID-19 PCR: NotDetec (2021 03:53)    LABS: Reviewed                          8.8    3.33  )-----------( 187      ( 2021 07:08 )             26.2     02-27    139  |  109<H>  |  25<H>  ----------------------------<  117<H>  4.2   |  23  |  1.62<H>    Ca    8.6      2021 07:08  Phos  3.1     02-26  Mg     2.5         TPro  10.1<H>  /  Alb  2.6<L>  /  TBili  0.2  /  DBili  x   /  AST  23  /  ALT  16  /  AlkPhos  85        LIVER FUNCTIONS - ( 2021 11:50 )  Alb: 2.6 g/dL / Pro: 10.1 g/dL / ALK PHOS: 85 U/L / ALT: 16 U/L DA / AST: 23 U/L / GGT: x           Urinalysis Basic - ( 2021 01:16 )    Color: Yellow / Appearance: Clear / S.010 / pH: x  Gluc: x / Ketone: Negative  / Bili: Negative / Urobili: Negative   Blood: x / Protein: 30 mg/dL / Nitrite: Negative   Leuk Esterase: Moderate / RBC: 10-25 /HPF / WBC 6-10 /HPF   Sq Epi: x / Non Sq Epi: Moderate /HPF / Bacteria: Few /HPF      CAPILLARY BLOOD GLUCOSE      POCT Blood Glucose.: 120 mg/dL (2021 07:56)  POCT Blood Glucose.: 122 mg/dL (2021 20:49)  POCT Blood Glucose.: 115 mg/dL (2021 16:54)  POCT Blood Glucose.: 130 mg/dL (2021 11:38)    COVID-19 PCR: NotDetec (2021 03:53)      Radiology: Reviewed  < from: US Renal (21 @ 10:13) >    EXAM:  US KIDNEY(S)                            PROCEDURE DATE:  2021          INTERPRETATION:  CLINICAL INFORMATION: Acute kidney injury    COMPARISON: None available.    TECHNIQUE: Sonography of the kidneys and bladder.    FINDINGS:    Rightkidney: 8.0 cm which is atrophic. No renal mass, hydronephrosis or calculi.    Left kidney: 7.4 cm which is atrophic. No renal mass, hydronephrosis or calculi.    Urinary bladder: The prevoid urinary bladder has a volume of 789 cc. No gross evidence for a mass or calculus in the urinary bladder. The patient refuses to void.    IMPRESSION:    Atrophic kidneys bilaterally. No hydronephrosis.                  CARMEN URIBE MD; Attending Radiologist  This document has been electronically signed. 2021 10:29AM    < end of copied text >    < from: Xray Chest 1 View-PORTABLE IMMEDIATE (Xray Chest 1 View-PORTABLE IMMEDIATE .) (21 @ 05:08) >    EXAM:  XR CHEST PORTABLE IMMED 1V                            PROCEDURE DATE:  2021          INTERPRETATION:  AP chest on 2021 at 4:57 PM. Patient has multiple myeloma.    Heart is enlarged.    Scattered calcified granulomas in both lungs rather small again noted similar to CAT scan of May 18, 2018.    There is some significant right shoulder degeneration.    No gross bone destruction or fracture. Chest appears similar to the CAT scan.    IMPRESSION: Heart enlargement and small calcified lung granulomas again noted.            LUIS PIZARRO M.D., ATTENDING RADIOLOGIST  This document has been electronically signed. 2021 11:58AM    < end of copied text >    < from: CT Thoracic Spine No Cont (21 @ 00:40) >  EXAM:  CT THORACIC SPINE                          EXAM:  CT LUMBAR SPINE                            PROCEDURE DATE:  2021          INTERPRETATION:  CLINICAL INFORMATION: Back pain. History of multiple myeloma.    COMPARISON: Correlation with CT chest, abdomen, and pelvis of 2018.    PROCEDURE:  Noncontrast CT of the thoracolumbar spine spine was performed. Coronal and Sagittal reformats were obtained. Volume rendered 3-D imaging of the thoracolumbar spine was obtained.    FINDINGS:    There is no acute thoracolumbar spine fracture or evidence of traumatic malalignment. Osseous structures are diffusely demineralized. No discrete aggressive appearing lytic or blastic lesion. Mild exaggeration of the thoracic kyphosis. Normal lumbarlordosis. Vertebral body heights are relatively well-maintained. Mild multilevel degenerative disc disease, as prominent at the L5-S1 level, characterized by disc height loss and endplate sclerosis. Multilevel fusion of the thoracic spine as processes and hypertrophy lumbar spinous processes.    Multilevel facet joint arthrosis, predominantly within the lower lumbar spine contributing to varying degrees of neuroforaminal stenosis. No critical stenosis within the spinal canal on this imaging modality.    Respiratory motion limits evaluation of the lung parenchyma, which demonstrates a few scattered calcified granulomas. Calcified bilateral hilar lymph nodes. Coronary artery calcification.    Numerous calcified granulomas within the liver and spleen.    Calcified fibroid uterus. Colonic diverticulosis.    Extensive atherosclerosis of the abdominal aorta, which is normal in caliber.    IMPRESSION:    No acute thoracolumbar spine fracture or evidence of traumatic malalignment. Diffusely demineralized osseous structures without evidence of aggressive lytic or blastic lesion. More sensitive evaluation with MRI and/or bone scan may be obtained, if no contraindications exist.            LUIS ZARAGOZA MD; Attending Radiologist  This document has been electronically signed. 2021  1:22AM    < end of copied text >      ORT Score -   Family Hx of substance abuse	Female	      Male  Alcohol 	                                           1                     3  Illegal drugs	                                   2                     3  Rx drugs                                           4 	                  4  Personal Hx of substance abuse		  Alcohol 	                                          3	                  3  Illegal drugs                                     4	                  4  Rx drugs                                            5 	                  5  Age between 16- 45 years	           1                     1  hx preadolescent sexual abuse	   3 	                  0  Psychological disease		  ADD, OCD, bipolar, schizophrenia   2	          2  Depression                                           1 	          1  Total: 0    a score of 3 or lower indicates low risk for opioid abuse		  a score of 4-7 indicates moderate risk for opioid abuse		  a score of 8 or higher indicates high risk for opioid abuse    REVIEW OF SYSTEMS:  CONSTITUTIONAL: No fever or fatigue  HEENT:  No difficulty hearing, no change in vision  NECK: No pain or stiffness  RESPIRATORY: No cough, wheezing, chills or hemoptysis; No shortness of breath  CARDIOVASCULAR: No chest pain, palpitations, dizziness, or leg swelling  GASTROINTESTINAL: No loss of appetite, decreased PO intake. No abdominal or epigastric pain. No nausea, vomiting; No diarrhea. hx intermittent constipation.   GENITOURINARY: No dysuria, frequency, hematuria, retention or incontinence  MUSCULOSKELETAL: + hx rheumatoid arthritis; + thoracic and lumbar spine pain; No joint pain or swelling; no upper or lower motor strength weakness, no saddle anesthesia, bowel/bladder incontinence, no falls   NEURO: No headaches, + intermittent numbness/tingling b/l feet, No weakness  PSYCHIATRIC: No depression, anxiety or difficulty sleeping    PHYSICAL EXAM:  GENERAL:  Alert & Oriented X4, cooperative, NAD, Good concentration. Speech is clear.   RESPIRATORY: Respirations even and unlabored. Clear to auscultation bilaterally; No rales, rhonchi, wheezing, or rubs  CARDIOVASCULAR: Normal S1/S2, regular rate and rhythm; No murmurs, rubs, or gallops.  GASTROINTESTINAL:  Soft, Nontender, Nondistended; Bowel sounds present  PERIPHERAL VASCULAR:  Extremities warm without edema. 2+ Peripheral Pulses, No cyanosis, No calf tenderness  MUSCULOSKELETAL: + thoracic and lumbar spine tenderness; Motor Strength 5/5 B/L upper and lower extremities; moves all extremities equally against gravity; ROM intact; negative SLR   SKIN: Warm, dry, intact. No rashes, lesions, scars or wounds.     Risk factors associated with adverse outcomes related to opioid treatment  [ ]  Concurrent benzodiazepine use  [ ]  History/ Active substance use or alcohol use disorder  [ ] Psychiatric co-morbidity  [ ] Sleep apnea  [ ] COPD  [ ] BMI> 35  [ ] Liver dysfunction  [X ] Renal dysfunction  [ ] CHF  [ ] Smoker  [X ]  Age > 60 years    [X ]  [place]  Reviewed and Copied to Chart. See below.    Plan of care and goal oriented pain management treatment options were discussed with patient and /or primary care giver; all questions and concerns were addressed and care was aligned with patient's wishes.    Educated patient on goal oriented pain management treatment options     21 @ 10:27     Source of information: ANGELO STRATTON, Chart review  Patient language: English  : n/a    HPI:  81F, from home, walks with cane, AAOX3 with  PMH of  LIAT LEO( On velcadelast chemo session in Aug 2020) coming in with 1 week of worsening b/l back pain, Per Pt she noticed gradual onset of mildine lower back ache radiating to the sides and the upper back, worsened with movement. States never had pains this severe before. Pt describes pain to be continuos sharp 7/10, increases to 9/10 on movement. The increasing pain has made it difficult for her to ambulate. Pt denies numbness, tingling, weakness, urinary or fecal incontinence, urinary retention, fevers, chills, sweats, abd pains, N/V/D/C, hematuria. Pt mentions that she had burning urine 2 weeks ago and visited her doctor who recommended Vaginal ointment and the dysuria resolved.  Pt denies any smoking,, alcohol or any other drug use.     In the ED  Pt in distress in pain, worsened with movements  Vitals- 132/78, Hr 81, Afebrile  UA- UTI  CT lumbar/thoracic spine- no acute changes   Cr 1.7 ( baseline 1.2018)  s/p toradol and Morphine in ED     Off note- Pt mentions that she takes 6-7 pills but only recalls taking aspirin. Primary team to obtain Med rec from daughter in am. (2021 03:37)      This is a Patient is a 81y old  Female who presents with a chief complaint of Lower back pain. Pain consulted for pain recommendations. Pt seen and examined at bedside. Pt sitting at edge of bed, recently finished having breakfast. Reports thoracic and lumbar spine PAIN SCORE:  5/10 and tolerable SCALE USED: (1-10 VNRS). Pain adequately managed on current pain regimen. Reports pain has improved compared to yesterday. Pt describes pain as aching, non- radiating, alleviated by pain medications, exacerbated by movement. Pt tolerating PO diet. Pt denies lethargy, nausea, vomiting, constipation and itchiness. Reports she has a hx of constipation. States last BM . Patient stated goal for pain control: to be able to take deep breaths, get out of bed to chair and ambulate with tolerable pain control. Patient would like her back pain to go away completely. Educated pt on realistic expectations. Pending MR thoracic and lumbar spine.     PAST MEDICAL & SURGICAL HISTORY:  Rheumatoid arthritis    No pertinent past medical history    No significant past surgical history        FAMILY HISTORY:      Social History:  Pt stays at home   denies any sm/alc/ drug abuse (2021 03:37)   [X ]Denies ETOH use, illicit drug use, and smoking     Allergies    No Known Allergies    MEDICATIONS  (STANDING):  acetaminophen   Tablet .. 1000 milliGRAM(s) Oral every 8 hours  cefTRIAXone   IVPB      cefTRIAXone   IVPB 1000 milliGRAM(s) IV Intermittent every 24 hours  enoxaparin Injectable 40 milliGRAM(s) SubCutaneous daily  gabapentin 100 milliGRAM(s) Oral every 8 hours  insulin lispro (ADMELOG) corrective regimen sliding scale   SubCutaneous three times a day before meals  pantoprazole    Tablet 40 milliGRAM(s) Oral before breakfast  senna 2 Tablet(s) Oral at bedtime  sodium chloride 0.9%. 1000 milliLiter(s) (75 mL/Hr) IV Continuous <Continuous>    MEDICATIONS  (PRN):  traMADol 50 milliGRAM(s) Oral every 6 hours PRN Severe Pain (7 - 10)      Vital Signs Last 24 Hrs  T(C): 37.1 (2021 04:59), Max: 37.1 (2021 04:59)  T(F): 98.7 (2021 04:59), Max: 98.7 (2021 04:59)  HR: 80 (2021 04:59) (75 - 90)  BP: 148/61 (2021 04:59) (127/58 - 148/61)  BP(mean): --  RR: 18 (2021 04:59) (18 - 18)  SpO2: 97% (2021 04:59) (97% - 100%)  COVID-19 PCR: NotDetec (2021 03:53)    LABS: Reviewed                          8.8    3.33  )-----------( 187      ( 2021 07:08 )             26.2     02-    139  |  109<H>  |  25<H>  ----------------------------<  117<H>  4.2   |  23  |  1.62<H>    Ca    8.6      2021 07:08  Phos  3.1       Mg     2.5         TPro  10.1<H>  /  Alb  2.6<L>  /  TBili  0.2  /  DBili  x   /  AST  23  /  ALT  16  /  AlkPhos  85        LIVER FUNCTIONS - ( 2021 11:50 )  Alb: 2.6 g/dL / Pro: 10.1 g/dL / ALK PHOS: 85 U/L / ALT: 16 U/L DA / AST: 23 U/L / GGT: x           Urinalysis Basic - ( 2021 01:16 )    Color: Yellow / Appearance: Clear / S.010 / pH: x  Gluc: x / Ketone: Negative  / Bili: Negative / Urobili: Negative   Blood: x / Protein: 30 mg/dL / Nitrite: Negative   Leuk Esterase: Moderate / RBC: 10-25 /HPF / WBC 6-10 /HPF   Sq Epi: x / Non Sq Epi: Moderate /HPF / Bacteria: Few /HPF      CAPILLARY BLOOD GLUCOSE      POCT Blood Glucose.: 120 mg/dL (2021 07:56)  POCT Blood Glucose.: 122 mg/dL (2021 20:49)  POCT Blood Glucose.: 115 mg/dL (2021 16:54)  POCT Blood Glucose.: 130 mg/dL (2021 11:38)    COVID-19 PCR: NotDetec (2021 03:53)      Radiology: Reviewed  < from: US Renal (21 @ 10:13) >    EXAM:  US KIDNEY(S)                            PROCEDURE DATE:  2021          INTERPRETATION:  CLINICAL INFORMATION: Acute kidney injury    COMPARISON: None available.    TECHNIQUE: Sonography of the kidneys and bladder.    FINDINGS:    Rightkidney: 8.0 cm which is atrophic. No renal mass, hydronephrosis or calculi.    Left kidney: 7.4 cm which is atrophic. No renal mass, hydronephrosis or calculi.    Urinary bladder: The prevoid urinary bladder has a volume of 789 cc. No gross evidence for a mass or calculus in the urinary bladder. The patient refuses to void.    IMPRESSION:    Atrophic kidneys bilaterally. No hydronephrosis.                  CARMEN URIBE MD; Attending Radiologist  This document has been electronically signed. 2021 10:29AM    < end of copied text >    < from: Xray Chest 1 View-PORTABLE IMMEDIATE (Xray Chest 1 View-PORTABLE IMMEDIATE .) (21 @ 05:08) >    EXAM:  XR CHEST PORTABLE IMMED 1V                            PROCEDURE DATE:  2021          INTERPRETATION:  AP chest on 2021 at 4:57 PM. Patient has multiple myeloma.    Heart is enlarged.    Scattered calcified granulomas in both lungs rather small again noted similar to CAT scan of May 18, 2018.    There is some significant right shoulder degeneration.    No gross bone destruction or fracture. Chest appears similar to the CAT scan.    IMPRESSION: Heart enlargement and small calcified lung granulomas again noted.            LUIS PIZARRO M.D., ATTENDING RADIOLOGIST  This document has been electronically signed. 2021 11:58AM    < end of copied text >    < from: CT Thoracic Spine No Cont (21 @ 00:40) >  EXAM:  CT THORACIC SPINE                          EXAM:  CT LUMBAR SPINE                            PROCEDURE DATE:  2021          INTERPRETATION:  CLINICAL INFORMATION: Back pain. History of multiple myeloma.    COMPARISON: Correlation with CT chest, abdomen, and pelvis of 2018.    PROCEDURE:  Noncontrast CT of the thoracolumbar spine spine was performed. Coronal and Sagittal reformats were obtained. Volume rendered 3-D imaging of the thoracolumbar spine was obtained.    FINDINGS:    There is no acute thoracolumbar spine fracture or evidence of traumatic malalignment. Osseous structures are diffusely demineralized. No discrete aggressive appearing lytic or blastic lesion. Mild exaggeration of the thoracic kyphosis. Normal lumbarlordosis. Vertebral body heights are relatively well-maintained. Mild multilevel degenerative disc disease, as prominent at the L5-S1 level, characterized by disc height loss and endplate sclerosis. Multilevel fusion of the thoracic spine as processes and hypertrophy lumbar spinous processes.    Multilevel facet joint arthrosis, predominantly within the lower lumbar spine contributing to varying degrees of neuroforaminal stenosis. No critical stenosis within the spinal canal on this imaging modality.    Respiratory motion limits evaluation of the lung parenchyma, which demonstrates a few scattered calcified granulomas. Calcified bilateral hilar lymph nodes. Coronary artery calcification.    Numerous calcified granulomas within the liver and spleen.    Calcified fibroid uterus. Colonic diverticulosis.    Extensive atherosclerosis of the abdominal aorta, which is normal in caliber.    IMPRESSION:    No acute thoracolumbar spine fracture or evidence of traumatic malalignment. Diffusely demineralized osseous structures without evidence of aggressive lytic or blastic lesion. More sensitive evaluation with MRI and/or bone scan may be obtained, if no contraindications exist.            LUIS ZARAGOZA MD; Attending Radiologist  This document has been electronically signed. 2021  1:22AM    < end of copied text >      ORT Score -   Family Hx of substance abuse	Female	      Male  Alcohol 	                                           1                     3  Illegal drugs	                                   2                     3  Rx drugs                                           4 	                  4  Personal Hx of substance abuse		  Alcohol 	                                          3	                  3  Illegal drugs                                     4	                  4  Rx drugs                                            5 	                  5  Age between 16- 45 years	           1                     1  hx preadolescent sexual abuse	   3 	                  0  Psychological disease		  ADD, OCD, bipolar, schizophrenia   2	          2  Depression                                           1 	          1  Total: 0    a score of 3 or lower indicates low risk for opioid abuse		  a score of 4-7 indicates moderate risk for opioid abuse		  a score of 8 or higher indicates high risk for opioid abuse    REVIEW OF SYSTEMS:  CONSTITUTIONAL: No fever or fatigue  HEENT:  No difficulty hearing, no change in vision  NECK: No pain or stiffness  RESPIRATORY: No cough, wheezing, chills or hemoptysis; No shortness of breath  CARDIOVASCULAR: No chest pain, palpitations, dizziness, or leg swelling  GASTROINTESTINAL: No loss of appetite, decreased PO intake. No abdominal or epigastric pain. No nausea, vomiting; No diarrhea. hx intermittent constipation.   GENITOURINARY: No dysuria, frequency, hematuria, retention or incontinence  MUSCULOSKELETAL: + hx rheumatoid arthritis; + thoracic and lumbar spine pain; No joint pain or swelling; no upper or lower motor strength weakness, no saddle anesthesia, bowel/bladder incontinence, no falls   NEURO: No headaches, + intermittent numbness/tingling b/l feet, No weakness  PSYCHIATRIC: No depression, anxiety or difficulty sleeping    PHYSICAL EXAM:  GENERAL:  Alert & Oriented X4, cooperative, NAD, Good concentration. Speech is clear.   RESPIRATORY: Respirations even and unlabored. Clear to auscultation bilaterally; No rales, rhonchi, wheezing, or rubs  CARDIOVASCULAR: Normal S1/S2, regular rate and rhythm; No murmurs, rubs, or gallops.  GASTROINTESTINAL:  Soft, Nontender, Nondistended; Bowel sounds present  PERIPHERAL VASCULAR:  Extremities warm without edema. 2+ Peripheral Pulses, No cyanosis, No calf tenderness  MUSCULOSKELETAL: + thoracic and lumbar spine tenderness; Motor Strength 5/5 B/L upper and lower extremities; moves all extremities equally against gravity; ROM intact; negative SLR   SKIN: Warm, dry, intact. No rashes, lesions, scars or wounds.     Risk factors associated with adverse outcomes related to opioid treatment  [ ]  Concurrent benzodiazepine use  [ ]  History/ Active substance use or alcohol use disorder  [ ] Psychiatric co-morbidity  [ ] Sleep apnea  [ ] COPD  [ ] BMI> 35  [ ] Liver dysfunction  [X ] Renal dysfunction  [ ] CHF  [ ] Smoker  [X ]  Age > 60 years    [X ]  [place]  Reviewed and Copied to Chart. See below.    Plan of care and goal oriented pain management treatment options were discussed with patient and /or primary care giver; all questions and concerns were addressed and care was aligned with patient's wishes.    Educated patient on goal oriented pain management treatment options     21 @ 10:27

## 2021-02-28 LAB
ANION GAP SERPL CALC-SCNC: 7 MMOL/L — SIGNIFICANT CHANGE UP (ref 5–17)
BUN SERPL-MCNC: 21 MG/DL — HIGH (ref 7–18)
CALCIUM SERPL-MCNC: 8.9 MG/DL — SIGNIFICANT CHANGE UP (ref 8.4–10.5)
CHLORIDE SERPL-SCNC: 108 MMOL/L — SIGNIFICANT CHANGE UP (ref 96–108)
CO2 SERPL-SCNC: 23 MMOL/L — SIGNIFICANT CHANGE UP (ref 22–31)
CREAT SERPL-MCNC: 1.43 MG/DL — HIGH (ref 0.5–1.3)
CULTURE RESULTS: SIGNIFICANT CHANGE UP
GLUCOSE BLDC GLUCOMTR-MCNC: 102 MG/DL — HIGH (ref 70–99)
GLUCOSE BLDC GLUCOMTR-MCNC: 128 MG/DL — HIGH (ref 70–99)
GLUCOSE BLDC GLUCOMTR-MCNC: 73 MG/DL — SIGNIFICANT CHANGE UP (ref 70–99)
GLUCOSE BLDC GLUCOMTR-MCNC: 97 MG/DL — SIGNIFICANT CHANGE UP (ref 70–99)
GLUCOSE SERPL-MCNC: 79 MG/DL — SIGNIFICANT CHANGE UP (ref 70–99)
HCT VFR BLD CALC: 27.8 % — LOW (ref 34.5–45)
HGB BLD-MCNC: 9.2 G/DL — LOW (ref 11.5–15.5)
MCHC RBC-ENTMCNC: 28.4 PG — SIGNIFICANT CHANGE UP (ref 27–34)
MCHC RBC-ENTMCNC: 33.1 GM/DL — SIGNIFICANT CHANGE UP (ref 32–36)
MCV RBC AUTO: 85.8 FL — SIGNIFICANT CHANGE UP (ref 80–100)
NRBC # BLD: 0 /100 WBCS — SIGNIFICANT CHANGE UP (ref 0–0)
PLATELET # BLD AUTO: 200 K/UL — SIGNIFICANT CHANGE UP (ref 150–400)
POTASSIUM SERPL-MCNC: 4.2 MMOL/L — SIGNIFICANT CHANGE UP (ref 3.5–5.3)
POTASSIUM SERPL-SCNC: 4.2 MMOL/L — SIGNIFICANT CHANGE UP (ref 3.5–5.3)
RBC # BLD: 3.24 M/UL — LOW (ref 3.8–5.2)
RBC # FLD: 16.2 % — HIGH (ref 10.3–14.5)
SARS-COV-2 IGG SERPL QL IA: NEGATIVE — SIGNIFICANT CHANGE UP
SARS-COV-2 IGM SERPL IA-ACNC: <0.1 INDEX — SIGNIFICANT CHANGE UP
SODIUM SERPL-SCNC: 138 MMOL/L — SIGNIFICANT CHANGE UP (ref 135–145)
SPECIMEN SOURCE: SIGNIFICANT CHANGE UP
WBC # BLD: 3.21 K/UL — LOW (ref 3.8–10.5)
WBC # FLD AUTO: 3.21 K/UL — LOW (ref 3.8–10.5)

## 2021-02-28 PROCEDURE — 99232 SBSQ HOSP IP/OBS MODERATE 35: CPT

## 2021-02-28 RX ADMIN — SENNA PLUS 2 TABLET(S): 8.6 TABLET ORAL at 20:17

## 2021-02-28 RX ADMIN — LIDOCAINE 1 PATCH: 4 CREAM TOPICAL at 20:28

## 2021-02-28 RX ADMIN — ENOXAPARIN SODIUM 40 MILLIGRAM(S): 100 INJECTION SUBCUTANEOUS at 12:01

## 2021-02-28 RX ADMIN — LIDOCAINE 1 PATCH: 4 CREAM TOPICAL at 00:19

## 2021-02-28 RX ADMIN — CEFTRIAXONE 100 MILLIGRAM(S): 500 INJECTION, POWDER, FOR SOLUTION INTRAMUSCULAR; INTRAVENOUS at 05:33

## 2021-02-28 RX ADMIN — GABAPENTIN 100 MILLIGRAM(S): 400 CAPSULE ORAL at 20:17

## 2021-02-28 RX ADMIN — LIDOCAINE 1 PATCH: 4 CREAM TOPICAL at 12:01

## 2021-02-28 RX ADMIN — PANTOPRAZOLE SODIUM 40 MILLIGRAM(S): 20 TABLET, DELAYED RELEASE ORAL at 05:34

## 2021-02-28 RX ADMIN — GABAPENTIN 100 MILLIGRAM(S): 400 CAPSULE ORAL at 12:05

## 2021-02-28 RX ADMIN — GABAPENTIN 100 MILLIGRAM(S): 400 CAPSULE ORAL at 05:34

## 2021-02-28 RX ADMIN — Medication 1000 MILLIGRAM(S): at 05:33

## 2021-02-28 NOTE — PROGRESS NOTE ADULT - ASSESSMENT
Assessment and Plan:         81 year old female with a PMH of Multiple Myeloma (previously on Velcade, last session 8/6/2019), Rheumatoid Arthritis who was BIBEMS due to back pain. Patient states that beginning approximately 1 week prior to current presentation she began to experience progressively worsening mid-back pain, which radiates upwards, worsened with exertion. Patient does not have signs of cord compression.   At time of examination in the ED, patient denies any headache, dizziness, chest pain, palpitations, shortness of breath, abdominal pain, nausea/vomiting/diarrhea.      A/P:    Acute Back Pain: patient has h/o Multiple Myeloma:  - Gamma Gap= 7.9, Hgb 9.4, Calcium, corrected 10.3>9.7.  - CT Thoracic/Lumbar Spine without contrast: Osseous structures are diffusely demineralized, No discrete aggressive appearing lytic or blastic lesion, Mild multilevel degenerative disc disease, as prominent at the L5-S1 level, characterized by disc height loss and end plate sclerosis. Multilevel fusion of the thoracic spine as processes and hypertrophy lumbar spinous processes.  - Hem/onc Dr. Marin consulted.   - Pain management consulted.  - MRI showing acute T12 vertebral body resulting in mild compression fracture, no evidence of cord compression.. No significant posterior retropulsion. No evidence of cord compression.  - Consulted Dr. Barney ortho spine.    History of Multiple Myeloma: As above, reportedly patient is Velcade last chemo from Aug 2020  Gamma Gap= 7.9, Hgb 9.4, Calcium, corrected 10.3>9.7.  Hem Dr. Marin consulted, recommended outpatient follow up.  No lytic lesion noted on CT spine.    Anemia of Chronic disease    Chronic Renal Insufficiency:  -Cr= 1.77>1.6 (Baseline= 1.63 on 3/25/2018); eGFR= 31 (Baseline= 35 on 3/25/2018)   - Renal Sonogram showing atrophic kidneys bilaterally, no hydronephrosis. monitor BMP     Acute Uncomplicated UTI:  - UA positive, follow Urine cx   - on Rocephin day 3, will continue    Prediabetes:-Hemoglobin A1c 6.2  -Blood Glucose Monitoring ACHS, SSI  - Diabetic diet, education.    Liver and Splenic Calcifications:  -CT Thoracic/Lumbar Spine without contrast identified numerous calcified granulomas within the liver and spleen.  -Patient will need to follow-up with GI/Hepatology as an outpatient.    Hypoalbuminemia:  -Nutrition Consult    GI/DVT PPx:  -Lovenox  -PPI.     Discharge planning: will get physical therapy eval.

## 2021-02-28 NOTE — PROGRESS NOTE ADULT - SUBJECTIVE AND OBJECTIVE BOX
HPI:  81F, from home, walks with cane, AAOX3 with  PMH of  LIAT LEO( On velcadelast chemo session in Aug 2020) coming in with 1 week of worsening b/l back pain, Per Pt she noticed gradual onset of mildine lower back ache radiating to the sides and the upper back, worsened with movement. States never had pains this severe before. Pt describes pain to be continuos sharp 7/10, increases to 9/10 on movement. The increasing pain has made it difficult for her to ambulate. Pt denies numbness, tingling, weakness, urinary or fecal incontinence, urinary retention, fevers, chills, sweats, abd pains, N/V/D/C, hematuria. Pt mentions that she had burning urine 2 weeks ago and visited her doctor who recommended Vaginal ointment and the dysuria resolved.  Pt denies any smoking,, alcohol or any other drug use.     In the ED  Pt in distress in pain, worsened with movements  Vitals- 132/78, Hr 81, Afebrile  UA- UTI  CT lumbar/thoracic spine- no acute changes   Cr 1.7 ( baseline 1.6 March 2018)  s/p toradol and Morphine in ED     Off note- Pt mentions that she takes 6-7 pills but only recalls taking aspirin. Primary team to obtain Med rec from daughter in am. (26 Feb 2021 03:37)      Patient is a 81y old  Female who presents with a chief complaint of Lower back pain (27 Feb 2021 11:39)      INTERVAL HPI/OVERNIGHT EVENTS: Patient was seen and examined on bedside. Patient reports her back pain is better, controlled with current pain regimen. Patient sitting comfortably in chair.    T(C): 37.2 (02-28-21 @ 12:46), Max: 37.2 (02-28-21 @ 12:46)  HR: 70 (02-28-21 @ 12:46) (62 - 84)  BP: 121/60 (02-28-21 @ 12:46) (121/60 - 167/76)  RR: 18 (02-28-21 @ 12:46) (16 - 18)  SpO2: 100% (02-28-21 @ 12:46) (99% - 100%)  Wt(kg): --      REVIEW OF SYSTEMS: denies fever, chills, SOB, palpitations, chest pain, abdominal pain, nausea, vomiting, diarrhea, constipation, dizziness    MEDICATIONS  (STANDING):  cefTRIAXone   IVPB      cefTRIAXone   IVPB 1000 milliGRAM(s) IV Intermittent every 24 hours  enoxaparin Injectable 40 milliGRAM(s) SubCutaneous daily  gabapentin 100 milliGRAM(s) Oral every 8 hours  insulin lispro (ADMELOG) corrective regimen sliding scale   SubCutaneous three times a day before meals  lidocaine   Patch 1 Patch Transdermal daily  pantoprazole    Tablet 40 milliGRAM(s) Oral before breakfast  senna 2 Tablet(s) Oral at bedtime  sodium chloride 0.9%. 1000 milliLiter(s) (75 mL/Hr) IV Continuous <Continuous>    MEDICATIONS  (PRN):  polyethylene glycol 3350 17 Gram(s) Oral daily PRN Constipation  traMADol 50 milliGRAM(s) Oral every 6 hours PRN Severe Pain (7 - 10)      PHYSICAL EXAM:  GENERAL: NAD, well-groomed, well-developed  HEAD:  Atraumatic, Normocephalic  EYES: EOMI, PERRLA, conjunctiva and sclera clear  ENMT: No tonsillar erythema, exudates, or enlargement; Moist mucous membranes, Good dentition, No lesions  NECK: Supple, No JVD, Normal thyroid  NERVOUS SYSTEM:  Alert & Oriented X3, Good concentration; Motor Strength 5/5 B/L upper and lower extremities; DTRs 2+ intact and symmetric  CHEST/LUNG: Clear to percussion bilaterally; No rales, rhonchi, wheezing, or rubs  HEART: Regular rate and rhythm; No murmurs, rubs, or gallops  ABDOMEN: Soft, Nontender, Nondistended; Bowel sounds present  EXTREMITIES:  2+ Peripheral Pulses, No clubbing, cyanosis, or edema. mild tenderness noted over mild thoraco lumbar area.  LYMPH: No lymphadenopathy noted  SKIN: No rashes or lesions    LABS:                        9.2    3.21  )-----------( 200      ( 28 Feb 2021 08:13 )             27.8     02-28    138  |  108  |  21<H>  ----------------------------<  79  4.2   |  23  |  1.43<H>    Ca    8.9      28 Feb 2021 08:13      CAPILLARY BLOOD GLUCOSE  POCT Blood Glucose.: 102 mg/dL (28 Feb 2021 11:59)  POCT Blood Glucose.: 73 mg/dL (28 Feb 2021 08:06)  POCT Blood Glucose.: 102 mg/dL (27 Feb 2021 21:17)  POCT Blood Glucose.: 100 mg/dL (27 Feb 2021 16:39)      MRI thoracolumbar spine: (02/27)  IMPRESSION:  Acute fracture T12 vertebral body resulting in mild compression deformity. No significant posterior retropulsion. No evidence of cord compression

## 2021-02-28 NOTE — CONSULT NOTE ADULT - SUBJECTIVE AND OBJECTIVE BOX
I saw and evaluated pt  in bed with head of bed elevated. She is independent household ambulator who walks slowly with cane or walker with multiple joint pains with rheumatoid arthritis per pt. She developed some acute back pain which became severe and she came to ER. Denies any falls or other trauma and no heavy lifting. Denies any radiating leg symptoms. Pain with some improvement since admission on meds and was OOB to chair today. Has H/O multiple myeloma being followed by Dr Rosales at [facility].     History of Present Illness:  Reason for Admission: Lower back pain  History of Present Illness:   81F, from home, walks with cane, AAOX3 with  PMH of  RA, MM( On velcadelast chemo session in Aug 2020) coming in with 1 week of worsening b/l back pain, Per Pt she noticed gradual onset of mildine lower back ache radiating to the sides and the upper back, worsened with movement. States never had pains this severe before. Pt describes pain to be continuos sharp 7/10, increases to 9/10 on movement. The increasing pain has made it difficult for her to ambulate. Pt denies numbness, tingling, weakness, urinary or fecal incontinence, urinary retention, fevers, chills, sweats, abd pains, N/V/D/C, hematuria. Pt mentions that she had burning urine 2 weeks ago and visited her doctor who recommended Vaginal ointment and the dysuria resolved.  Pt denies any smoking,, alcohol or any other drug use.     In the ED  Pt in distress in pain, worsened with movements  Vitals- 132/78, Hr 81, Afebrile  UA- UTI  CT lumbar/thoracic spine- no acute changes   Cr 1.7 ( baseline 1.6 March 2018)  s/p toradol and Morphine in ED     Off note- Pt mentions that she takes 6-7 pills but only recalls taking aspirin. Primary team to obtain Med rec from daughter in am.     Review of Systems:  Other Review of Systems: All other review of systems negative, except as noted in HPI      Allergies and Intolerances:        Allergies:  	No Known Allergies:     Home Medications:   * Patient Currently Takes Medications as of 06-Aug-2020 18:24 documented in Structured Notes  · 	atorvastatin 40 mg tablet: 1 tab(s) orally once a day (at bedtime)   · 	folic acid 1 mg tablet: 1 tab(s) orally once a day x 30 days   · 	hydroxychloroquine 200 mg tablet: 1 tab(s) orally once a day   · 	acetaminophen 325 mg tablet: 2 tab(s) orally every 4 hours   · 	amLODIPine 5 mg oral tablet: 1 tab(s) orally once a day  · 	aspirin 81 mg oral tablet, chewable: 1 tab(s) orally once a day  · 	metoprolol succinate 50 mg oral tablet, extended release: 1 tab(s) orally once a day  · 	Elemental Iron: 65 milligram(s) orally once a day    Patient History:    Past Medical, Past Surgical, and Family History:  PAST MEDICAL HISTORY:  No pertinent past medical history     Rheumatoid arthritis.     PAST SURGICAL HISTORY:  No significant past surgical history.     Social History:  Social History (marital status, living situation, occupation, tobacco use, alcohol and drug use, and sexual history): Pt stays at home   denies any sm/alc/ drug abuse    PE: Awake and alert following commands and answering questions.       Able to partially roll with pain and has some tenderness across mid back and over spinous processes.       Able to actively lift legs off bed and has good general strength and sensation in legs with Neg clonus.    X-rays- CT and MRI of T and LS Spines reviewed (see reports) and shows and acute mild T12 superior endplate compression fracture with no canal compromise.  No aggressive lytic bony lesions seen. There is spondylosis and some foraminal stenosis primarily in lumbar spine.    A/P: T12 mild acute compression fracture with no canal compromise and no radiculopathy.         With h/o myeloma may hae some involvement but no gross lytic lesion or stenosis with cord compression.         Recommend pain control and mobilization PT ambulation.         Would not recommend brace.         Pt told pain should improve as fracture heals and important to exercise legs and walk in PT to keep legs as strong as possible.         Pt says she has appt with her oncologist Dr Rosales in April and can f/u T12 fracture there with x-ray and other imaging if necessary with her.          Discussed with pt at length and all questions answered.

## 2021-03-01 ENCOUNTER — TRANSCRIPTION ENCOUNTER (OUTPATIENT)
Age: 81
End: 2021-03-01

## 2021-03-01 LAB
ANION GAP SERPL CALC-SCNC: 9 MMOL/L — SIGNIFICANT CHANGE UP (ref 5–17)
BUN SERPL-MCNC: 30 MG/DL — HIGH (ref 7–18)
CALCIUM SERPL-MCNC: 8.5 MG/DL — SIGNIFICANT CHANGE UP (ref 8.4–10.5)
CHLORIDE SERPL-SCNC: 107 MMOL/L — SIGNIFICANT CHANGE UP (ref 96–108)
CO2 SERPL-SCNC: 22 MMOL/L — SIGNIFICANT CHANGE UP (ref 22–31)
CREAT SERPL-MCNC: 1.76 MG/DL — HIGH (ref 0.5–1.3)
GLUCOSE BLDC GLUCOMTR-MCNC: 109 MG/DL — HIGH (ref 70–99)
GLUCOSE BLDC GLUCOMTR-MCNC: 152 MG/DL — HIGH (ref 70–99)
GLUCOSE BLDC GLUCOMTR-MCNC: 85 MG/DL — SIGNIFICANT CHANGE UP (ref 70–99)
GLUCOSE BLDC GLUCOMTR-MCNC: 95 MG/DL — SIGNIFICANT CHANGE UP (ref 70–99)
GLUCOSE SERPL-MCNC: 86 MG/DL — SIGNIFICANT CHANGE UP (ref 70–99)
HCT VFR BLD CALC: 26.7 % — LOW (ref 34.5–45)
HGB BLD-MCNC: 8.8 G/DL — LOW (ref 11.5–15.5)
MCHC RBC-ENTMCNC: 27.9 PG — SIGNIFICANT CHANGE UP (ref 27–34)
MCHC RBC-ENTMCNC: 33 GM/DL — SIGNIFICANT CHANGE UP (ref 32–36)
MCV RBC AUTO: 84.8 FL — SIGNIFICANT CHANGE UP (ref 80–100)
MRSA PCR RESULT.: SIGNIFICANT CHANGE UP
NRBC # BLD: 0 /100 WBCS — SIGNIFICANT CHANGE UP (ref 0–0)
PLATELET # BLD AUTO: 192 K/UL — SIGNIFICANT CHANGE UP (ref 150–400)
POTASSIUM SERPL-MCNC: 4.4 MMOL/L — SIGNIFICANT CHANGE UP (ref 3.5–5.3)
POTASSIUM SERPL-SCNC: 4.4 MMOL/L — SIGNIFICANT CHANGE UP (ref 3.5–5.3)
RBC # BLD: 3.15 M/UL — LOW (ref 3.8–5.2)
RBC # FLD: 16.2 % — HIGH (ref 10.3–14.5)
S AUREUS DNA NOSE QL NAA+PROBE: DETECTED
SODIUM SERPL-SCNC: 138 MMOL/L — SIGNIFICANT CHANGE UP (ref 135–145)
VIT D25+D1,25 OH+D1,25 PNL SERPL-MCNC: 26.4 PG/ML — SIGNIFICANT CHANGE UP (ref 19.9–79.3)
WBC # BLD: 3.83 K/UL — SIGNIFICANT CHANGE UP (ref 3.8–10.5)
WBC # FLD AUTO: 3.83 K/UL — SIGNIFICANT CHANGE UP (ref 3.8–10.5)

## 2021-03-01 PROCEDURE — 99232 SBSQ HOSP IP/OBS MODERATE 35: CPT

## 2021-03-01 PROCEDURE — 99231 SBSQ HOSP IP/OBS SF/LOW 25: CPT

## 2021-03-01 RX ORDER — GABAPENTIN 400 MG/1
200 CAPSULE ORAL
Refills: 0 | Status: DISCONTINUED | OUTPATIENT
Start: 2021-03-01 | End: 2021-03-02

## 2021-03-01 RX ORDER — ACETAMINOPHEN 500 MG
1000 TABLET ORAL EVERY 8 HOURS
Refills: 0 | Status: DISCONTINUED | OUTPATIENT
Start: 2021-03-01 | End: 2021-03-02

## 2021-03-01 RX ADMIN — LIDOCAINE 1 PATCH: 4 CREAM TOPICAL at 23:00

## 2021-03-01 RX ADMIN — CEFTRIAXONE 100 MILLIGRAM(S): 500 INJECTION, POWDER, FOR SOLUTION INTRAMUSCULAR; INTRAVENOUS at 04:30

## 2021-03-01 RX ADMIN — SENNA PLUS 2 TABLET(S): 8.6 TABLET ORAL at 21:57

## 2021-03-01 RX ADMIN — PANTOPRAZOLE SODIUM 40 MILLIGRAM(S): 20 TABLET, DELAYED RELEASE ORAL at 06:14

## 2021-03-01 RX ADMIN — Medication 1: at 11:58

## 2021-03-01 RX ADMIN — GABAPENTIN 200 MILLIGRAM(S): 400 CAPSULE ORAL at 17:07

## 2021-03-01 RX ADMIN — TRAMADOL HYDROCHLORIDE 50 MILLIGRAM(S): 50 TABLET ORAL at 18:09

## 2021-03-01 RX ADMIN — LIDOCAINE 1 PATCH: 4 CREAM TOPICAL at 19:24

## 2021-03-01 RX ADMIN — LIDOCAINE 1 PATCH: 4 CREAM TOPICAL at 01:43

## 2021-03-01 RX ADMIN — LIDOCAINE 1 PATCH: 4 CREAM TOPICAL at 11:58

## 2021-03-01 RX ADMIN — ENOXAPARIN SODIUM 40 MILLIGRAM(S): 100 INJECTION SUBCUTANEOUS at 11:58

## 2021-03-01 RX ADMIN — GABAPENTIN 100 MILLIGRAM(S): 400 CAPSULE ORAL at 04:30

## 2021-03-01 NOTE — PHYSICAL THERAPY INITIAL EVALUATION ADULT - DIAGNOSIS, PT EVAL
midback pain due to noted acute fracture of T12 vertebral body on recent imaging; mild difficulty with mobility and ADLs

## 2021-03-01 NOTE — DISCHARGE NOTE PROVIDER - NSDCMRMEDTOKEN_GEN_ALL_CORE_FT
amLODIPine 10 mg oral tablet: 1 tab(s) orally once a day  aspirin 81 mg oral tablet, chewable: 1 tab(s) orally once a day  atorvastatin 20 mg oral tablet: 1 tab(s) orally once a day  folic acid 1 mg tablet: 1 tab(s) orally once a day x 30 days   hydroxychloroquine 200 mg tablet: 1 tab(s) orally once a day   metoprolol succinate 50 mg oral tablet, extended release: 1 tab(s) orally once a day   acetaminophen 500 mg oral tablet: 2 tab(s) orally every 8 hours, As needed, Moderate Pain (4 - 6)  amLODIPine 10 mg oral tablet: 1 tab(s) orally once a day  aspirin 81 mg oral tablet, chewable: 1 tab(s) orally once a day  atorvastatin 20 mg oral tablet: 1 tab(s) orally once a day  folic acid 1 mg tablet: 1 tab(s) orally once a day x 30 days   gabapentin 100 mg oral capsule: 2 cap(s) orally 2 times a day  hydroxychloroquine 200 mg tablet: 1 tab(s) orally once a day   lidocaine 5% topical film: Apply topically to affected area once a day  metoprolol succinate 50 mg oral tablet, extended release: 1 tab(s) orally once a day  pantoprazole 40 mg oral delayed release tablet: 1 tab(s) orally once a day (before a meal)  polyethylene glycol 3350 oral powder for reconstitution: 17 gram(s) orally once a day, As needed, Constipation  senna oral tablet: 2 tab(s) orally once a day (at bedtime), As Needed   traMADol 50 mg oral tablet: 1 tab(s) orally every 6 hours, As needed, Severe Pain (7 - 10) MDD:4 tabs/ day   acetaminophen 500 mg oral tablet: 2 tab(s) orally every 8 hours, As needed, Moderate Pain (4 - 6)  aspirin 81 mg oral tablet, chewable: 1 tab(s) orally once a day  atorvastatin 20 mg oral tablet: 1 tab(s) orally once a day  cefuroxime 250 mg oral tablet: 1 tab(s) orally every 12 hours   folic acid 1 mg tablet: 1 tab(s) orally once a day x 30 days   gabapentin 100 mg oral capsule: 2 cap(s) orally 2 times a day  hydroxychloroquine 200 mg tablet: 1 tab(s) orally once a day   lidocaine 5% topical film: Apply topically to affected area once a day  metoprolol succinate 50 mg oral tablet, extended release: 1 tab(s) orally once a day  pantoprazole 40 mg oral delayed release tablet: 1 tab(s) orally once a day (before a meal)  polyethylene glycol 3350 oral powder for reconstitution: 17 gram(s) orally once a day, As needed, Constipation  senna oral tablet: 2 tab(s) orally once a day (at bedtime), As Needed   traMADol 50 mg oral tablet: 1 tab(s) orally every 6 hours, As needed, Severe Pain (7 - 10) MDD:4 tabs/ day   acetaminophen 500 mg oral tablet: 2 tab(s) orally every 8 hours, As needed, Moderate Pain (4 - 6)  amLODIPine 5 mg oral tablet: 1 tab(s) orally once a day   aspirin 81 mg oral tablet, chewable: 1 tab(s) orally once a day  atorvastatin 20 mg oral tablet: 1 tab(s) orally once a day  cefuroxime 250 mg oral tablet: 1 tab(s) orally every 12 hours   folic acid 1 mg tablet: 1 tab(s) orally once a day x 30 days   gabapentin 100 mg oral capsule: 2 cap(s) orally 2 times a day  hydroxychloroquine 200 mg tablet: 1 tab(s) orally once a day   lidocaine 5% topical film: Apply topically to affected area once a day  metoprolol succinate 50 mg oral tablet, extended release: 1 tab(s) orally once a day  pantoprazole 40 mg oral delayed release tablet: 1 tab(s) orally once a day (before a meal)  polyethylene glycol 3350 oral powder for reconstitution: 17 gram(s) orally once a day, As needed, Constipation  senna oral tablet: 2 tab(s) orally once a day (at bedtime), As Needed   traMADol 50 mg oral tablet: 1 tab(s) orally every 6 hours, As needed, Severe Pain (7 - 10) MDD:4 tabs/ day

## 2021-03-01 NOTE — DISCHARGE NOTE PROVIDER - CARE PROVIDER_API CALL
Dr Rosales,   F/U with Primary Oncologist Dr. Rosales at [facility]  Phone: (   )    -  Fax: (   )    -  Follow Up Time:

## 2021-03-01 NOTE — PROGRESS NOTE ADULT - ASSESSMENT
Confidential Drug Utilization Report  Search Terms: michael odell, 1940   Search Date: 02/26/2021 20:54:02 PM   The Drug Utilization Report below displays all of the controlled substance prescriptions, if any, that your patient has filled in the last twelve months. The information displayed on this report is compiled from pharmacy submissions to the Department, and accurately reflects the information as submitted by the pharmacies.  This report was requested by: Jessenia Dave | Reference #: [id number]   There are no results for the search terms that you entered.

## 2021-03-01 NOTE — DISCHARGE NOTE PROVIDER - NSDCCPCAREPLAN_GEN_ALL_CORE_FT
PRINCIPAL DISCHARGE DIAGNOSIS  Diagnosis: Acute back pain, unspecified back location, unspecified back pain laterality  Assessment and Plan of Treatment: Please continue to take your medications recommended by Pain management. Please followup with your primary care physician in 2 weeks for follow up.      SECONDARY DISCHARGE DIAGNOSES  Diagnosis: Urinary tract infection without hematuria, site unspecified  Assessment and Plan of Treatment: You were found to have UTI (urinary tract infection) and were treated with IV antibiotics.  There are preventative measures like:  -wiping front to back after using the toilet.  -urination after sexual intercourse.  -take showers instead of baths, avoid bath oils  -drink pleanty of fluids   Please monitor for common symptoms:  -bad urine odor  -pain or burning when you urinate  -needing to urinate more often  -hard to empty your bladde all the way  -strong need to empty your bladder  Please seek immediate attention for:  -fever, palpitations, abnormal breathing,   -dizziness, lethary, confusion  Please continue taking your medication as prescribed. Please follow up with your primary care physician within 1 week.      Diagnosis: Stage 3b chronic kidney disease  Assessment and Plan of Treatment: Please continue to avoid medications that can cause further damage to your kidneys. Please follow up with your primary care physician in a week.    Diagnosis: Multiple myeloma  Assessment and Plan of Treatment: Please follow up with Hematology/ONcologist in 2 weeks for further management.     PRINCIPAL DISCHARGE DIAGNOSIS  Diagnosis: Acute back pain, unspecified back location, unspecified back pain laterality  Assessment and Plan of Treatment: - Please continue to take your pain medications as recommended  - you were seen by Pain management team for better pain control who recommended to continue with Tramadol and Lidoderm patch. Please follow up with your primary care physician in 2 weeks for further management and routine follow up.      SECONDARY DISCHARGE DIAGNOSES  Diagnosis: Stage 3b chronic kidney disease  Assessment and Plan of Treatment: - Please continue to avoid medications that can cause further damage to your kidneys such as NSAIDs (Motrin, Advil). Please follow up with your primary care physician in a week for further management    Diagnosis: Multiple myeloma  Assessment and Plan of Treatment: - Please follow up with Hematology/Oncologist in 2 weeks for further management and treatment. Follow safety / fall precautions with ambulation    Diagnosis: Urinary tract infection without hematuria, site unspecified  Assessment and Plan of Treatment: - You were found to have UTI (urinary tract infection) and were treated with IV antibiotics. Please continue taking Ceftin 250mg oral 2xdaily for 3 more days  - There are preventative measures like -take showers instead of baths, avoid bath oils, drink pleanty of fluids  - Please continue taking your medication as prescribed. Please follow up with your primary care physician within 1 week of discharge     PRINCIPAL DISCHARGE DIAGNOSIS  Diagnosis: Acute back pain, unspecified back location, unspecified back pain laterality  Assessment and Plan of Treatment: - Please continue to take your pain medications as recommended  - you were seen by Pain management team for better pain control who recommended to continue with Tramadol and Lidoderm patch. Follow safety / fall precautions with ambulation. Please follow up with your primary care physician in 2 weeks for further management and routine follow up.      SECONDARY DISCHARGE DIAGNOSES  Diagnosis: Stage 3b chronic kidney disease  Assessment and Plan of Treatment: - Please continue to avoid medications that can cause further damage to your kidneys such as NSAIDs (Motrin, Advil). Please follow up with your primary care physician in a week for further management    Diagnosis: Multiple myeloma  Assessment and Plan of Treatment: - Please follow up with Hematology/Oncologist in 2 weeks for further management and treatment. Follow safety / fall precautions with ambulation    Diagnosis: Urinary tract infection without hematuria, site unspecified  Assessment and Plan of Treatment: - You were found to have UTI (urinary tract infection) and were treated with IV antibiotics. Please continue taking Ceftin 250mg oral 2xdaily for 3 more days  - There are preventative measures like -take showers instead of baths, avoid bath oils, drink pleanty of fluids  - Please continue taking your medication as prescribed. Please follow up with your primary care physician within 1 week of discharge

## 2021-03-01 NOTE — PHYSICAL THERAPY INITIAL EVALUATION ADULT - LIVES WITH, PROFILE
in a private house with 5 steps with 2 handrails to negotiate to enter the house; no home health services/spouse

## 2021-03-01 NOTE — DISCHARGE NOTE PROVIDER - ATTENDING COMMENTS
Patient seen and examined.     Back pain is much improved. Pt is medically stable to discharge with outpatient follow up with Primary Oncologist Dr. Rosales at [facility].  Back pain secondary to T 12 compression fracture with out evidence of cord compression, seen by ortho spine,  recommended mobilization, Home PT  MM was on Velcade follows up with Dr. Rosales at [facility]  UTI resolving to complete PO ceftin.  CKD stage 3

## 2021-03-01 NOTE — PROGRESS NOTE ADULT - SUBJECTIVE AND OBJECTIVE BOX
Patient is a 81y old  Female who presents with a chief complaint of Lower back pain (01 Mar 2021 12:37)      SUBJECTIVE / OVERNIGHT EVENTS:    ADDITIONAL REVIEW OF SYSTEMS:    MEDICATIONS  (STANDING):  cefTRIAXone   IVPB      cefTRIAXone   IVPB 1000 milliGRAM(s) IV Intermittent every 24 hours  enoxaparin Injectable 40 milliGRAM(s) SubCutaneous daily  gabapentin 200 milliGRAM(s) Oral two times a day  insulin lispro (ADMELOG) corrective regimen sliding scale   SubCutaneous three times a day before meals  lidocaine   Patch 1 Patch Transdermal daily  pantoprazole    Tablet 40 milliGRAM(s) Oral before breakfast  senna 2 Tablet(s) Oral at bedtime  sodium chloride 0.9%. 1000 milliLiter(s) (75 mL/Hr) IV Continuous <Continuous>    MEDICATIONS  (PRN):  acetaminophen   Tablet .. 1000 milliGRAM(s) Oral every 8 hours PRN Moderate Pain (4 - 6)  polyethylene glycol 3350 17 Gram(s) Oral daily PRN Constipation  traMADol 50 milliGRAM(s) Oral every 6 hours PRN Severe Pain (7 - 10)      Vital Signs Last 24 Hrs  T(C): 36.7 (01 Mar 2021 13:02), Max: 36.7 (01 Mar 2021 13:02)  T(F): 98.1 (01 Mar 2021 13:02), Max: 98.1 (01 Mar 2021 13:02)  HR: 74 (01 Mar 2021 13:02) (74 - 76)  BP: 102/61 (01 Mar 2021 13:02) (102/61 - 130/59)  BP(mean): --  RR: 16 (01 Mar 2021 13:02) (16 - 18)  SpO2: 98% (01 Mar 2021 13:02) (98% - 99%)    LABS:                        8.8    3.83  )-----------( 192      ( 01 Mar 2021 08:27 )             26.7     03-01    138  |  107  |  30<H>  ----------------------------<  86  4.4   |  22  |  1.76<H>    Ca    8.5      01 Mar 2021 08:27                COVID-19 PCR: NotDetec (26 Feb 2021 03:53)      RADIOLOGY & ADDITIONAL TESTS:  Imaging from Last 24 Hours:    Electrocardiogram/QTc Interval:    COORDINATION OF CARE:  Care Discussed with Consultants/Other Providers:   Patient is a 81y old  Female who presents with a chief complaint of Lower back pain (01 Mar 2021 12:37)      SUBJECTIVE / OVERNIGHT EVENTS: events noted. Pt OOB to chair, eating lunch. No new complaints. LBP improved      MEDICATIONS  (STANDING):  cefTRIAXone   IVPB      cefTRIAXone   IVPB 1000 milliGRAM(s) IV Intermittent every 24 hours  enoxaparin Injectable 40 milliGRAM(s) SubCutaneous daily  gabapentin 200 milliGRAM(s) Oral two times a day  insulin lispro (ADMELOG) corrective regimen sliding scale   SubCutaneous three times a day before meals  lidocaine   Patch 1 Patch Transdermal daily  pantoprazole    Tablet 40 milliGRAM(s) Oral before breakfast  senna 2 Tablet(s) Oral at bedtime  sodium chloride 0.9%. 1000 milliLiter(s) (75 mL/Hr) IV Continuous <Continuous>    MEDICATIONS  (PRN):  acetaminophen   Tablet .. 1000 milliGRAM(s) Oral every 8 hours PRN Moderate Pain (4 - 6)  polyethylene glycol 3350 17 Gram(s) Oral daily PRN Constipation  traMADol 50 milliGRAM(s) Oral every 6 hours PRN Severe Pain (7 - 10)      Vital Signs Last 24 Hrs  T(C): 36.7 (01 Mar 2021 13:02), Max: 36.7 (01 Mar 2021 13:02)  T(F): 98.1 (01 Mar 2021 13:02), Max: 98.1 (01 Mar 2021 13:02)  HR: 74 (01 Mar 2021 13:02) (74 - 76)  BP: 102/61 (01 Mar 2021 13:02) (102/61 - 130/59)  BP(mean): --  RR: 16 (01 Mar 2021 13:02) (16 - 18)  SpO2: 98% (01 Mar 2021 13:02) (98% - 99%)    PHYSICAL EXAM:  GENERAL: NAD  HEENT: Normocephalic;  conjunctivae and sclerae clear; moist mucous membranes   NECK : supple  RESP: CTA B/L  HEART: S1S2, no murmurs, gallops or rubs  ABDOMEN: Soft, Nontender, Nondistended; Bowel sounds present  EXTREMITIES: no cyanosis; no edema; no calf tenderness  LN: No palpable lymphadenopathy  SKIN: warm and dry; no rash  NERVOUS SYSTEM:  Awake and alert; Oriented to place, person and time    LABS:                        8.8    3.83  )-----------( 192      ( 01 Mar 2021 08:27 )             26.7     03-01    138  |  107  |  30<H>  ----------------------------<  86  4.4   |  22  |  1.76<H>    Ca    8.5      01 Mar 2021 08:27      COVID-19 PCR: Constantine (26 Feb 2021 03:53)      Radiology:    < from: MR Thoracic Spine No Cont (02.27.21 @ 14:46) >  IMPRESSION:  Acute fracture T12 vertebral body resulting in mild compression deformity. No significant posterior retropulsion. No evidence of cord compression    < end of copied text >

## 2021-03-01 NOTE — PROGRESS NOTE ADULT - SUBJECTIVE AND OBJECTIVE BOX
Source of information: ANGELO STRATTON, Chart review  Patient language: English  : n/a    HPI:  81F, from home, walks with cane, AAOX3 with  PMH of  LIAT LEO( On velcadelast chemo session in Aug 2020) coming in with 1 week of worsening b/l back pain, Per Pt she noticed gradual onset of mildine lower back ache radiating to the sides and the upper back, worsened with movement. States never had pains this severe before. Pt describes pain to be continuos sharp 7/10, increases to 9/10 on movement. The increasing pain has made it difficult for her to ambulate. Pt denies numbness, tingling, weakness, urinary or fecal incontinence, urinary retention, fevers, chills, sweats, abd pains, N/V/D/C, hematuria. Pt mentions that she had burning urine 2 weeks ago and visited her doctor who recommended Vaginal ointment and the dysuria resolved.  Pt denies any smoking,, alcohol or any other drug use.     In the ED  Pt in distress in pain, worsened with movements  Vitals- 132/78, Hr 81, Afebrile  UA- UTI  CT lumbar/thoracic spine- no acute changes   Cr 1.7 ( baseline 1.6 March 2018)  s/p toradol and Morphine in ED     Off note- Pt mentions that she takes 6-7 pills but only recalls taking aspirin. Primary team to obtain Med rec from daughter in am. (26 Feb 2021 03:37)      This is a Patient is a 81y old  Female who presents with a chief complaint of Lower back pain. Pain consulted for pain recommendations. Pt seen and examined at bedside. Pt sitting in chair. Reports thoracic and lumbar spine PAIN SCORE:  7/10 and tolerable SCALE USED: (1-10 VNRS). Pain adequately managed on current pain regimen. Reports pain has improved compared to yesterday. Pt describes pain as aching, non- radiating, alleviated by pain medications, exacerbated by movement. Pt tolerating PO diet. Pt denies lethargy, nausea, vomiting, constipation and itchiness. Reports she has a hx of constipation. States last BM 3/1. Patient stated goal for pain control: to be able to take deep breaths, get out of bed to chair and ambulate with tolerable pain control. Patient would like her back pain to go away completely. Educated pt on realistic expectations. MR thoracic and lumbar spine shows acute fracture of T12, mild compression deformity.     PAST MEDICAL & SURGICAL HISTORY:  Rheumatoid arthritis    No pertinent past medical history    No significant past surgical history        FAMILY HISTORY:      Social History:  Pt stays at home   denies any sm/alc/ drug abuse (26 Feb 2021 03:37)   [X ]Denies ETOH use, illicit drug use, and smoking     Allergies    No Known Allergies      MEDICATIONS  (STANDING):  cefTRIAXone   IVPB      cefTRIAXone   IVPB 1000 milliGRAM(s) IV Intermittent every 24 hours  enoxaparin Injectable 40 milliGRAM(s) SubCutaneous daily  gabapentin 100 milliGRAM(s) Oral every 8 hours  insulin lispro (ADMELOG) corrective regimen sliding scale   SubCutaneous three times a day before meals  lidocaine   Patch 1 Patch Transdermal daily  pantoprazole    Tablet 40 milliGRAM(s) Oral before breakfast  senna 2 Tablet(s) Oral at bedtime  sodium chloride 0.9%. 1000 milliLiter(s) (75 mL/Hr) IV Continuous <Continuous>    MEDICATIONS  (PRN):  polyethylene glycol 3350 17 Gram(s) Oral daily PRN Constipation  traMADol 50 milliGRAM(s) Oral every 6 hours PRN Severe Pain (7 - 10)      Vital Signs Last 24 Hrs  T(C): 36.4 (01 Mar 2021 06:06), Max: 37.2 (28 Feb 2021 12:46)  T(F): 97.5 (01 Mar 2021 06:06), Max: 99 (28 Feb 2021 12:46)  HR: 76 (01 Mar 2021 06:06) (70 - 76)  BP: 128/54 (01 Mar 2021 06:06) (121/60 - 130/59)  BP(mean): --  RR: 18 (01 Mar 2021 06:06) (18 - 18)  SpO2: 98% (01 Mar 2021 06:06) (98% - 100%)  COVID-19 PCR: NotDetec (26 Feb 2021 03:53)    LABS: Reviewed                          8.8    3.83  )-----------( 192      ( 01 Mar 2021 08:27 )             26.7     03-01    138  |  107  |  30<H>  ----------------------------<  86  4.4   |  22  |  1.76<H>    Ca    8.5      01 Mar 2021 08:27            CAPILLARY BLOOD GLUCOSE      POCT Blood Glucose.: 152 mg/dL (01 Mar 2021 11:30)  POCT Blood Glucose.: 95 mg/dL (01 Mar 2021 07:42)  POCT Blood Glucose.: 128 mg/dL (28 Feb 2021 21:03)  POCT Blood Glucose.: 97 mg/dL (28 Feb 2021 16:52)    COVID-19 PCR: NotDetec (26 Feb 2021 03:53)      Radiology: Reviewed  < from: MR Thoracic Spine No Cont (02.27.21 @ 14:46) >    EXAM:  MR SPINE LUMBAR                          EXAM:  MR SPINE THORACIC                            PROCEDURE DATE:  02/27/2021          INTERPRETATION:  CLINICAL STATEMENT: Multiple myeloma    TECHNIQUE: MRI of the thoracic and lumbar spines were performed without gadolinium.    COMPARISON: CT thoracic lumbar spine 2/26/2021    FINDINGS:  Bone marrow edema involving T12 vertebral body resulting in mild compression deformity. No significant posterior retropulsion.    Otherwise thoracic sagittalalignment intact.    Lumbar vertebral body heights and sagittal alignment intact    No gross focal lesion identified.    There is no cord compression or abnormal cord edema. The conus terminates at L2    Multilevel thoracic and lumbar degenerative disc disease with loss of signal and disc space narrowing. No significant spinal canal stenosis thoracic spine. Multilevel neural foraminal narrowing in the thoracic spine noted. Mild spinal canal stenosis L4-5 and L5-S1. Multilevel neural foraminal narrowing also noted in the lumbar spine with moderate to severe right neural foraminal narrowing L3-4. Severe left neural foraminal narrowing at L5-S1    Small bilateral pleural effusions. Small nonspecific high T2 signal lesions kidneys statistically likely cysts    IMPRESSION:  Acute fracture T12 vertebral body resulting in mild compression deformity. No significant posterior retropulsion. No evidence of cord compression            CHRIS VALLEJO MD; Attending Radiologist  This document has been electronically signed. Feb 27 2021  3:13PM    < end of copied text >    < from: US Renal (02.26.21 @ 10:13) >    EXAM:  US KIDNEY(S)                            PROCEDURE DATE:  02/26/2021          INTERPRETATION:  CLINICAL INFORMATION: Acute kidney injury    COMPARISON: None available.    TECHNIQUE: Sonography of the kidneys and bladder.    FINDINGS:    Rightkidney: 8.0 cm which is atrophic. No renal mass, hydronephrosis or calculi.    Left kidney: 7.4 cm which is atrophic. No renal mass, hydronephrosis or calculi.    Urinary bladder: The prevoid urinary bladder has a volume of 789 cc. No gross evidence for a mass or calculus in the urinary bladder. The patient refuses to void.    IMPRESSION:    Atrophic kidneys bilaterally. No hydronephrosis.                  CARMEN URIBE MD; Attending Radiologist  This document has been electronically signed. Feb 26 2021 10:29AM    < end of copied text >    < from: Xray Chest 1 View-PORTABLE IMMEDIATE (Xray Chest 1 View-PORTABLE IMMEDIATE .) (02.26.21 @ 05:08) >    EXAM:  XR CHEST PORTABLE IMMED 1V                            PROCEDURE DATE:  02/26/2021          INTERPRETATION:  AP chest on February 26, 2021 at 4:57 PM. Patient has multiple myeloma.    Heart is enlarged.    Scattered calcified granulomas in both lungs rather small again noted similar to CAT scan of May 18, 2018.    There is some significant right shoulder degeneration.    No gross bone destruction or fracture. Chest appears similar to the CAT scan.    IMPRESSION: Heart enlargement and small calcified lung granulomas again noted.            LUIS PIZARRO M.D., ATTENDING RADIOLOGIST  This document has been electronically signed. Feb 26 2021 11:58AM    < end of copied text >    < from: CT Thoracic Spine No Cont (02.26.21 @ 00:40) >  EXAM:  CT THORACIC SPINE                          EXAM:  CT LUMBAR SPINE                            PROCEDURE DATE:  02/26/2021          INTERPRETATION:  CLINICAL INFORMATION: Back pain. History of multiple myeloma.    COMPARISON: Correlation with CT chest, abdomen, and pelvis of 5/18/2018.    PROCEDURE:  Noncontrast CT of the thoracolumbar spine spine was performed. Coronal and Sagittal reformats were obtained. Volume rendered 3-D imaging of the thoracolumbar spine was obtained.    FINDINGS:    There is no acute thoracolumbar spine fracture or evidence of traumatic malalignment. Osseous structures are diffusely demineralized. No discrete aggressive appearing lytic or blastic lesion. Mild exaggeration of the thoracic kyphosis. Normal lumbarlordosis. Vertebral body heights are relatively well-maintained. Mild multilevel degenerative disc disease, as prominent at the L5-S1 level, characterized by disc height loss and endplate sclerosis. Multilevel fusion of the thoracic spine as processes and hypertrophy lumbar spinous processes.    Multilevel facet joint arthrosis, predominantly within the lower lumbar spine contributing to varying degrees of neuroforaminal stenosis. No critical stenosis within the spinal canal on this imaging modality.    Respiratory motion limits evaluation of the lung parenchyma, which demonstrates a few scattered calcified granulomas. Calcified bilateral hilar lymph nodes. Coronary artery calcification.    Numerous calcified granulomas within the liver and spleen.    Calcified fibroid uterus. Colonic diverticulosis.    Extensive atherosclerosis of the abdominal aorta, which is normal in caliber.    IMPRESSION:    No acute thoracolumbar spine fracture or evidence of traumatic malalignment. Diffusely demineralized osseous structures without evidence of aggressive lytic or blastic lesion. More sensitive evaluation with MRI and/or bone scan may be obtained, if no contraindications exist.            LUIS ZARAGOZA MD; Attending Radiologist  This document has been electronically signed. Feb 26 2021  1:22AM    < end of copied text >      ORT Score -   Family Hx of substance abuse	Female	      Male  Alcohol 	                                           1                     3  Illegal drugs	                                   2                     3  Rx drugs                                           4 	                  4  Personal Hx of substance abuse		  Alcohol 	                                          3	                  3  Illegal drugs                                     4	                  4  Rx drugs                                            5 	                  5  Age between 16- 45 years	           1                     1  hx preadolescent sexual abuse	   3 	                  0  Psychological disease		  ADD, OCD, bipolar, schizophrenia   2	          2  Depression                                           1 	          1  Total: 0    a score of 3 or lower indicates low risk for opioid abuse		  a score of 4-7 indicates moderate risk for opioid abuse		  a score of 8 or higher indicates high risk for opioid abuse    REVIEW OF SYSTEMS:  CONSTITUTIONAL: No fever or fatigue  HEENT:  No difficulty hearing, no change in vision  NECK: No pain or stiffness  RESPIRATORY: No cough, wheezing, chills or hemoptysis; No shortness of breath  CARDIOVASCULAR: No chest pain, palpitations, dizziness, or leg swelling  GASTROINTESTINAL: No loss of appetite, decreased PO intake. No abdominal or epigastric pain. No nausea, vomiting; No diarrhea. hx intermittent constipation.   GENITOURINARY: No dysuria, frequency, hematuria, retention or incontinence  MUSCULOSKELETAL: + hx rheumatoid arthritis; + thoracic and lumbar spine pain; No joint pain or swelling; no upper or lower motor strength weakness, no saddle anesthesia, bowel/bladder incontinence, no falls   NEURO: No headaches, + intermittent numbness/tingling b/l feet, No weakness  PSYCHIATRIC: No depression, anxiety or difficulty sleeping    PHYSICAL EXAM:  GENERAL:  Alert & Oriented X4, cooperative, NAD, Good concentration. Speech is clear.   RESPIRATORY: Respirations even and unlabored. Clear to auscultation bilaterally; No rales, rhonchi, wheezing, or rubs  CARDIOVASCULAR: Normal S1/S2, regular rate and rhythm; No murmurs, rubs, or gallops.  GASTROINTESTINAL:  Soft, Nontender, Nondistended; Bowel sounds present  PERIPHERAL VASCULAR:  Extremities warm without edema. 2+ Peripheral Pulses, No cyanosis, No calf tenderness  MUSCULOSKELETAL: + thoracic and lumbar spine tenderness; Motor Strength 5/5 B/L upper and lower extremities; moves all extremities equally against gravity; ROM intact; negative SLR   SKIN: Warm, dry, intact. No rashes, lesions, scars or wounds.     Risk factors associated with adverse outcomes related to opioid treatment  [ ]  Concurrent benzodiazepine use  [ ]  History/ Active substance use or alcohol use disorder  [ ] Psychiatric co-morbidity  [ ] Sleep apnea  [ ] COPD  [ ] BMI> 35  [ ] Liver dysfunction  [X ] Renal dysfunction  [ ] CHF  [ ] Smoker  [X ]  Age > 60 years    [X ]  [place]  Reviewed and Copied to Chart. See below.    Plan of care and goal oriented pain management treatment options were discussed with patient and /or primary care giver; all questions and concerns were addressed and care was aligned with patient's wishes.    Educated patient on goal oriented pain management treatment options     03-01-21 @ 12:40

## 2021-03-01 NOTE — PROGRESS NOTE ADULT - ASSESSMENT
Patient is a 81 year old Female from home, walks with cane, AAOX3 with  PMH of  RA, MM( On velcadelast chemo session in Aug 2020) coming in with 1 week of worsening b/l back pain, gradual onset of midline lower back ache radiating to the sides and the upper back, worsened with movement. New-onset pain, increasing pain has made it difficult for her to ambulate. Patient admitted to medicine for acute lower back pain

## 2021-03-01 NOTE — PROGRESS NOTE ADULT - PROBLEM SELECTOR PROBLEM 4
Rheumatoid arthritis
Stage 3b chronic kidney disease
Stage 3b chronic kidney disease
Chronic kidney disease

## 2021-03-01 NOTE — PROGRESS NOTE ADULT - PROBLEM SELECTOR PLAN 5
Pt on admission Cr 1.7 ( baseline 1.6 March 2018)  pending urine lytes    CKD stage 3 based on GFR of 29 on this admission   monitor BMP  continue IVF   avoid nephrotoxic drugs.
continue lovenox for DVT ppx  continue PPI for GI ppx

## 2021-03-01 NOTE — PROGRESS NOTE ADULT - PROBLEM SELECTOR PLAN 3
Positive UA   Pt had dysuria 2 weeks ago now resolved   Pt is tender in lower back, not able to exclude  CVA tenderness  c/w ivf and rocephin   f/u ucx.
Positive UA   Pt had dysuria 2 weeks ago now resolved   Pt is tender in lower back, not able to exclude  CVA tenderness  c/w rocephin- discharge on Cefrin 250 mg BID po complete 7 days  -urine culture unremarkable

## 2021-03-01 NOTE — PROGRESS NOTE ADULT - PROBLEM SELECTOR PLAN 4
Pt on admission Cr 1.7 ( baseline 1.6 March 2018)  CKD stage 3 based on GFR of 29 on this admission   monitor BMP  continue IVF   avoid nephrotoxic drugs.

## 2021-03-01 NOTE — PROGRESS NOTE ADULT - PROBLEM SELECTOR PROBLEM 3
Multiple myeloma
Urinary tract infection without hematuria, site unspecified
Multiple myeloma
Urinary tract infection without hematuria, site unspecified

## 2021-03-01 NOTE — DISCHARGE NOTE PROVIDER - HOSPITAL COURSE
Patient is a 81 year old Female from home, walks with cane, AAOX3 with  PMH of  RA, MM( On velcadelast chemo session in Aug 2020) coming in with 1 week of worsening b/l back pain, gradual onset of midline lower back ache radiating to the sides and the upper back, worsened with movement. New-onset pain, increasing pain has made it difficult for her to ambulate. Patient admitted to medicine for acute lower back pain . CT lumbar/thoracic spine- no acute changes, Pain likely 2/2 MM, treated with gabapentin, lidocain patch, tramadol, pain mgmt team consulted. Patient is in chemotherapy with last session being on Aug 2020 Velcade   Pt due for session  in April 2021, upon discharge pt to F/U with Primary Oncologist Dr. Rosales at [facility], Hemo/onc- [facility] group consulted. Founf to have UTI, Positive UA, dysuria 2 weeks ago now resolved, tender in lower back, not able to exclude  CVA tenderness, treated with rocephin- discharge on Cefrin 250 mg BID po complete 7 days, urine culture unremarkable.      Please note that this a brief summary of hospital course please refer to daily progress notes and consult notes for full course and events. Patient seen and examined at bedside, discussed with medical attending. Patient medically cleared for discharge to home with outpatient services.    Incomplete 3/1/21 Patient is a 81 year old Female from home, walks with cane, AAOX3 with  PMH of  RA, MM( On velcadelast chemo session in Aug 2020) coming in with 1 week of worsening b/l back pain, gradual onset of midline lower back ache radiating to the sides and the upper back, worsened with movement. New-onset pain, increasing pain has made it difficult for her to ambulate. Patient admitted to medicine for acute lower back pain . CT lumbar/thoracic spine- no acute changes, Pain likely 2/2 MM, treated with gabapentin, lidocain patch, tramadol, pain mgmt team consulted. Patient is in chemotherapy with last session being on Aug 2020 Velcade   Pt due for session  in April 2021, upon discharge pt to F/U with Primary Oncologist Dr. Rosales at [facility], Hemo/onc- [facility] group consulted. Found to have UTI, Positive UA, dysuria 2 weeks ago now resolved, tender in lower back, not able to exclude CVA tenderness, treated with Rocephin- discharge on Ceftin 250 mg BID po complete 7 days, urine culture unremarkable.     Please note that this a brief summary of hospital course please refer to daily progress notes and consult notes for full course and events. Patient seen and examined at bedside, discussed with medical attending. Patient medically cleared for discharge to home with outpatient services.

## 2021-03-01 NOTE — PROGRESS NOTE ADULT - SUBJECTIVE AND OBJECTIVE BOX
NP Note discussed with  Primary Attending    Patient is a 81y old  Female who presents with a chief complaint of Lower back pain (01 Mar 2021 13:05)      INTERVAL HPI/OVERNIGHT EVENTS: no new complaints    MEDICATIONS  (STANDING):  cefTRIAXone   IVPB      cefTRIAXone   IVPB 1000 milliGRAM(s) IV Intermittent every 24 hours  enoxaparin Injectable 40 milliGRAM(s) SubCutaneous daily  gabapentin 200 milliGRAM(s) Oral two times a day  insulin lispro (ADMELOG) corrective regimen sliding scale   SubCutaneous three times a day before meals  lidocaine   Patch 1 Patch Transdermal daily  pantoprazole    Tablet 40 milliGRAM(s) Oral before breakfast  senna 2 Tablet(s) Oral at bedtime  sodium chloride 0.9%. 1000 milliLiter(s) (75 mL/Hr) IV Continuous <Continuous>    MEDICATIONS  (PRN):  acetaminophen   Tablet .. 1000 milliGRAM(s) Oral every 8 hours PRN Moderate Pain (4 - 6)  polyethylene glycol 3350 17 Gram(s) Oral daily PRN Constipation  traMADol 50 milliGRAM(s) Oral every 6 hours PRN Severe Pain (7 - 10)      __________________________________________________  REVIEW OF SYSTEMS:    CONSTITUTIONAL: No fever,   EYES: no acute visual disturbances  NECK: No pain or stiffness  RESPIRATORY: No cough; No shortness of breath  CARDIOVASCULAR: No chest pain, no palpitations  GASTROINTESTINAL: No pain. No nausea or vomiting; No diarrhea   NEUROLOGICAL: No headache or numbness, no tremors  MUSCULOSKELETAL: No joint pain, no muscle pain  GENITOURINARY: no dysuria, no frequency, no hesitancy  PSYCHIATRY: no depression , no anxiety  ALL OTHER  ROS negative        Vital Signs Last 24 Hrs  T(C): 36.7 (01 Mar 2021 13:02), Max: 36.7 (01 Mar 2021 13:02)  T(F): 98.1 (01 Mar 2021 13:02), Max: 98.1 (01 Mar 2021 13:02)  HR: 82 (01 Mar 2021 13:42) (74 - 82)  BP: 134/57 (01 Mar 2021 13:42) (102/61 - 134/57)  BP(mean): 76 (01 Mar 2021 13:42) (75 - 76)  RR: 18 (01 Mar 2021 13:42) (16 - 18)  SpO2: 100% (01 Mar 2021 13:42) (98% - 100%)    ________________________________________________  PHYSICAL EXAM:  GENERAL: NAD  HEENT: Normocephalic;  conjunctivae and sclerae clear; moist mucous membranes;   NECK : supple  CHEST/LUNG: Clear to auscultation bilaterally with good air entry   HEART: S1 S2  regular; no murmurs, gallops or rubs  ABDOMEN: Soft, Nontender, Nondistended; Bowel sounds present  EXTREMITIES: no cyanosis; no edema; no calf tenderness  SKIN: warm and dry; no rash  NERVOUS SYSTEM:  Awake and alert; Oriented  to place, person and time ; no new deficits    _________________________________________________  LABS:                        8.8    3.83  )-----------( 192      ( 01 Mar 2021 08:27 )             26.7     03-01    138  |  107  |  30<H>  ----------------------------<  86  4.4   |  22  |  1.76<H>    Ca    8.5      01 Mar 2021 08:27          CAPILLARY BLOOD GLUCOSE      POCT Blood Glucose.: 152 mg/dL (01 Mar 2021 11:30)  POCT Blood Glucose.: 95 mg/dL (01 Mar 2021 07:42)  POCT Blood Glucose.: 128 mg/dL (28 Feb 2021 21:03)  POCT Blood Glucose.: 97 mg/dL (28 Feb 2021 16:52)        RADIOLOGY & ADDITIONAL TESTS:    Imaging  Reviewed:  YES/NO    Consultant(s) Notes Reviewed:   YES/ No      Plan of care was discussed with patient and /or primary care giver; all questions and concerns were addressed  NP Note discussed with  Primary Attending    Patient is a 81y old  Female who presents with a chief complaint of Lower back pain (01 Mar 2021 13:05)      INTERVAL HPI/OVERNIGHT EVENTS: Patient seen and examined at bedside. Patient sitting OOB to chair, no new complaints    MEDICATIONS  (STANDING):  cefTRIAXone   IVPB      cefTRIAXone   IVPB 1000 milliGRAM(s) IV Intermittent every 24 hours  enoxaparin Injectable 40 milliGRAM(s) SubCutaneous daily  gabapentin 200 milliGRAM(s) Oral two times a day  insulin lispro (ADMELOG) corrective regimen sliding scale   SubCutaneous three times a day before meals  lidocaine   Patch 1 Patch Transdermal daily  pantoprazole    Tablet 40 milliGRAM(s) Oral before breakfast  senna 2 Tablet(s) Oral at bedtime  sodium chloride 0.9%. 1000 milliLiter(s) (75 mL/Hr) IV Continuous <Continuous>    MEDICATIONS  (PRN):  acetaminophen   Tablet .. 1000 milliGRAM(s) Oral every 8 hours PRN Moderate Pain (4 - 6)  polyethylene glycol 3350 17 Gram(s) Oral daily PRN Constipation  traMADol 50 milliGRAM(s) Oral every 6 hours PRN Severe Pain (7 - 10)      __________________________________________________  REVIEW OF SYSTEMS:    CONSTITUTIONAL: No fever,   EYES: no acute visual disturbances  NECK: No pain or stiffness  RESPIRATORY: No cough; No shortness of breath  CARDIOVASCULAR: No chest pain, no palpitations  GASTROINTESTINAL: No pain. No nausea or vomiting; No diarrhea   NEUROLOGICAL: No headache or numbness, no tremors  MUSCULOSKELETAL: No joint pain, no muscle pain  GENITOURINARY: no dysuria, no frequency, no hesitancy  PSYCHIATRY: no depression , no anxiety  ALL OTHER  ROS negative        Vital Signs Last 24 Hrs  T(C): 36.7 (01 Mar 2021 13:02), Max: 36.7 (01 Mar 2021 13:02)  T(F): 98.1 (01 Mar 2021 13:02), Max: 98.1 (01 Mar 2021 13:02)  HR: 82 (01 Mar 2021 13:42) (74 - 82)  BP: 134/57 (01 Mar 2021 13:42) (102/61 - 134/57)  BP(mean): 76 (01 Mar 2021 13:42) (75 - 76)  RR: 18 (01 Mar 2021 13:42) (16 - 18)  SpO2: 100% (01 Mar 2021 13:42) (98% - 100%)    ________________________________________________  PHYSICAL EXAM:  GENERAL: NAD  HEENT: Normocephalic;  conjunctivae and sclerae clear; moist mucous membranes;   NECK : supple  CHEST/LUNG: Clear to auscultation bilaterally with good air entry   HEART: S1 S2  regular; no murmurs, gallops or rubs  ABDOMEN: Soft, Nontender, Nondistended; Bowel sounds present  EXTREMITIES: no cyanosis; no edema; no calf tenderness  SKIN: warm and dry; no rash  NERVOUS SYSTEM:  Awake and alert; Oriented  to place, person and time ; no new deficits    _________________________________________________  LABS:                        8.8    3.83  )-----------( 192      ( 01 Mar 2021 08:27 )             26.7     03-01    138  |  107  |  30<H>  ----------------------------<  86  4.4   |  22  |  1.76<H>    Ca    8.5      01 Mar 2021 08:27          CAPILLARY BLOOD GLUCOSE      POCT Blood Glucose.: 152 mg/dL (01 Mar 2021 11:30)  POCT Blood Glucose.: 95 mg/dL (01 Mar 2021 07:42)  POCT Blood Glucose.: 128 mg/dL (28 Feb 2021 21:03)  POCT Blood Glucose.: 97 mg/dL (28 Feb 2021 16:52)        RADIOLOGY & ADDITIONAL TESTS:  < from: MR Thoracic Spine No Cont (02.27.21 @ 14:46) >    EXAM:  MR SPINE LUMBAR                          EXAM:  MR SPINE THORACIC                            PROCEDURE DATE:  02/27/2021          INTERPRETATION:  CLINICAL STATEMENT: Multiple myeloma    TECHNIQUE: MRI of the thoracic and lumbar spines were performed without gadolinium.    COMPARISON: CT thoracic lumbar spine 2/26/2021    FINDINGS:  Bone marrow edema involving T12 vertebral body resulting in mild compression deformity. No significant posterior retropulsion.    Otherwise thoracic sagittalalignment intact.    Lumbar vertebral body heights and sagittal alignment intact    No gross focal lesion identified.    There is no cord compression or abnormal cord edema. The conus terminates at L2    Multilevel thoracic and lumbar degenerative disc disease with loss of signal and disc space narrowing. No significant spinal canal stenosis thoracic spine. Multilevel neural foraminal narrowing in the thoracic spine noted. Mild spinal canal stenosis L4-5 and L5-S1. Multilevel neural foraminal narrowing also noted in the lumbar spine with moderate to severe right neural foraminal narrowing L3-4. Severe left neural foraminal narrowing at L5-S1    Small bilateral pleural effusions. Small nonspecific high T2 signal lesions kidneys statistically likely cysts    IMPRESSION:  Acute fracture T12 vertebral body resulting in mild compression deformity. No significant posterior retropulsion. No evidence of cord compression    < end of copied text >  < from: US Renal (02.26.21 @ 10:13) >  EXAM:  US KIDNEY(S)                            PROCEDURE DATE:  02/26/2021          INTERPRETATION:  CLINICAL INFORMATION: Acute kidney injury    COMPARISON: None available.    TECHNIQUE: Sonography of the kidneys and bladder.    FINDINGS:    Rightkidney: 8.0 cm which is atrophic. No renal mass, hydronephrosis or calculi.    Left kidney: 7.4 cm which is atrophic. No renal mass, hydronephrosis or calculi.    Urinary bladder: The prevoid urinary bladder has a volume of 789 cc. No gross evidence for a mass or calculus in the urinary bladder. The patient refuses to void.    IMPRESSION:    Atrophic kidneys bilaterally. No hydronephrosis.    < end of copied text >  < from: Xray Chest 1 View-PORTABLE IMMEDIATE (Xray Chest 1 View-PORTABLE IMMEDIATE .) (02.26.21 @ 05:08) >  EXAM:  XR CHEST PORTABLE IMMED 1V                            PROCEDURE DATE:  02/26/2021          INTERPRETATION:  AP chest on February 26, 2021 at 4:57 PM. Patient has multiple myeloma.    Heart is enlarged.    Scattered calcified granulomas in both lungs rather small again noted similar to CAT scan of May 18, 2018.    There is some significant right shoulder degeneration.    No gross bone destruction or fracture. Chest appears similar to the CAT scan.    IMPRESSION: Heart enlargement and small calcified lung granulomas again noted.      < end of copied text >  < from: CT Thoracic Spine No Cont (02.26.21 @ 00:40) >    EXAM:  CT THORACIC SPINE                          EXAM:  CT LUMBAR SPINE                            PROCEDURE DATE:  02/26/2021          INTERPRETATION:  CLINICAL INFORMATION: Back pain. History of multiple myeloma.    COMPARISON: Correlation with CT chest, abdomen, and pelvis of 5/18/2018.    PROCEDURE:  Noncontrast CT of the thoracolumbar spine spine was performed. Coronal and Sagittal reformats were obtained. Volume rendered 3-D imaging of the thoracolumbar spine was obtained.    FINDINGS:    There is no acute thoracolumbar spine fracture or evidence of traumatic malalignment. Osseous structures are diffusely demineralized. No discrete aggressive appearing lytic or blastic lesion. Mild exaggeration of the thoracic kyphosis. Normal lumbarlordosis. Vertebral body heights are relatively well-maintained. Mild multilevel degenerative disc disease, as prominent at the L5-S1 level, characterized by disc height loss and endplate sclerosis. Multilevel fusion of the thoracic spine as processes and hypertrophy lumbar spinous processes.    Multilevel facet joint arthrosis, predominantly within the lower lumbar spine contributing to varying degrees of neuroforaminal stenosis. No critical stenosis within the spinal canal on this imaging modality.    Respiratory motion limits evaluation of the lung parenchyma, which demonstrates a few scattered calcified granulomas. Calcified bilateral hilar lymph nodes. Coronary artery calcification.    Numerous calcified granulomas within the liver and spleen.    Calcified fibroid uterus. Colonic diverticulosis.    Extensive atherosclerosis of the abdominal aorta, which is normal in caliber.    IMPRESSION:    No acute thoracolumbar spine fracture or evidence of traumatic malalignment. Diffusely demineralized osseous structures without evidence of aggressive lytic or blastic lesion. More sensitive evaluation with MRI and/or bone scan may be obtained, if no contraindications exist.      < end of copied text >    Imaging  Reviewed:  YES  Consultant(s) Notes Reviewed:   YES      Plan of care was discussed with patient and /or primary care giver; all questions and concerns were addressed

## 2021-03-01 NOTE — PROGRESS NOTE ADULT - PROBLEM SELECTOR PLAN 6
continue lovenox for DVT ppx  continue PPI for GI ppx
-PT recommending home with outpatient services.  -medically cleared for discharge, patient to follow up outpatient Hem-Onc

## 2021-03-01 NOTE — PHYSICAL THERAPY INITIAL EVALUATION ADULT - PATIENT/FAMILY/SIGNIFICANT OTHER GOALS STATEMENT, PT EVAL
return home; ambulate, transfer, and perform ADLs independently and pain-free while using a single-tip cane/rolling walker

## 2021-03-01 NOTE — PROGRESS NOTE ADULT - ASSESSMENT
complete note to follow   Assessment and Recommendation:   · Assessment	    Problem# Multiple Myeloma  ?? under treatment- Patient states that last time she saw oncologist was in August- pt states she was "getting shots" Velcade given?  Back pain appears secondary to diffuses osteoporosis- Consistent with Hx of MM  Does not appear to have cord compression, Lumbar spine MRI showed new T12 Fx  appreciate ortho eval- pain control, PT  Anemia- Chronic disease  Pain has ameliorated  Pain Mgmt  upon discharge pt to F/U with Primary Oncologist Dr. Rosales at [facility]    Thank you for the referral. Will continue to monitor the patient.  Please call with any questions [phone]  Above reviewed with Attending Dr. Parker       Assessment and Recommendation:   · Assessment	    Problem# Multiple Myeloma  ?? under treatment- Patient states that last time she saw oncologist was in August- pt states she was "getting shots" Velcade given?  Back pain appears secondary to diffuses osteoporosis- Consistent with Hx of MM  Does not appear to have cord compression, Lumbar spine MRI showed new T12 Fx  appreciate ortho eval- pain control, PT  Anemia- Chronic disease  calcium nl  Rec's:  -Pain Mgmt  -upon discharge pt to F/U with Primary Oncologist Dr. Rosales at [facility]    Thank you for the referral. Will continue to monitor the patient.  Please call with any questions [phone]  Above reviewed with Attending Dr. Parker

## 2021-03-01 NOTE — PROGRESS NOTE ADULT - PROBLEM SELECTOR PLAN 1
Pt with acute thoracic and lumbar back pain which is somatic and neuropathic in nature due to acute fracture of T12, mild compression deformity as seen on MR thoracic and lumbar spine.   High risk medications reviewed. Avoid polypharmacy. Avoid IV opioids. Avoid NSAIDs and benzodiazepines. Non-pharmacological sleep aides initiated. Non-opioid medications and non-pharmacological pain management measures initiated.   Opioid pain recommendations   - Continue Tramadol 50mg PO q 6 hours PRN severe pain. Monitor for sedation/ respiratory depression.   Non-opioid pain recommendations   - Acetaminophen 1 gram PO q 8h PRN moderate pain.   - Avoid NSAIDs hx CKD  - Increase gabapentin 200mg po q 12 hours. Monitor renal function. CrCl 24.2.   - Lidoderm 5% patch daily.   Bowel Regimen  - Continue Miralax 17G PO daily  - Continue Senna 2 tablets at bedtime for constipation  Mild pain   - Non-pharmacological pain treatment recommendations  - Warm/ Cool packs PRN   - Repositioning, imagery, relaxation, distraction.  - Physical therapy OOB if no contraindications   Recommendations discussed with primary team and RN
Pt with acute thoracic and lumbar back pain which is somatic and neuropathic in nature. unknown etiology. Pending MR thoracic and lumbar.   Opioid pain recommendations   - Continue Tramadol 50mg PO q 6 hours PRN severe pain. Monitor for sedation/ respiratory depression.   Non-opioid pain recommendations   - Continue Acetaminophen 1 gram PO q 8h x 2 days.  - Avoid NSAIDs hx CKD  - Continue Gabapentin 100mg po q 8 hours. Monitor renal function.   - Lidoderm 5% patch daily.   Bowel Regimen  - Continue Miralax 17G PO daily  - Continue Senna 2 tablets at bedtime for constipation  Mild pain   - Non-pharmacological pain treatment recommendations  - Warm/ Cool packs PRN   - Repositioning, imagery, relaxation, distraction.  - Physical therapy OOB if no contraindications   Recommendations discussed with primary team and RN
Pt admitted for severe lower back pain worsening x 1 week limiting her ambulation and mobility  CT lumbar/thoracic spine- no acute changes   Pain likely 2/2 MM  Gamma Gap= 7.9  -Hgb= 9.4  -Calcium, corrected= 10.3  -eGFR= 31 (Baseline= 35 on 3/25/2018)  s/p toradol and morphine in ED   C/w pain meds  pain mgmt team consulted - f/u recommendations
Pt admitted for severe lower back pain worsening x 1 week limiting her ambulation and mobility  CT lumbar/thoracic spine- no acute changes   Pain likely 2/2 MM  C/w gabapentin, lidocain patch, tramadol  -pain mgmt team consulted

## 2021-03-01 NOTE — PROGRESS NOTE ADULT - PROBLEM SELECTOR PLAN 2
Pt has MM   Pt is in chemotherapy with last session being on Aug 2020 Velcade   Pt due for session  in April 2021  -upon discharge pt to F/U with Primary Oncologist Dr. Rosales at [facility]  Hemo/onc- [facility] group consulted
Pt has MM   Pt is in chemotherapy with last session being on Aug 2020 Velcade   Pt due for session  in April 2021  OP Hemo- Dr. Rosales   Hemo/onc- [facility] group consulted -f/u recommendations

## 2021-03-01 NOTE — PHYSICAL THERAPY INITIAL EVALUATION ADULT - PRECAUTIONS/LIMITATIONS, REHAB EVAL
avoid/limit trunk bending and twisting due to acute fracture of T12 vertebral body/no known precautions/limitations

## 2021-03-01 NOTE — DISCHARGE NOTE PROVIDER - PROVIDER TOKENS
FREE:[LAST:[Dr Rosales],PHONE:[(   )    -],FAX:[(   )    -],ADDRESS:[F/U with Primary Oncologist Dr. Rosales at [facility]]]

## 2021-03-02 ENCOUNTER — TRANSCRIPTION ENCOUNTER (OUTPATIENT)
Age: 81
End: 2021-03-02

## 2021-03-02 VITALS
SYSTOLIC BLOOD PRESSURE: 143 MMHG | OXYGEN SATURATION: 100 % | DIASTOLIC BLOOD PRESSURE: 65 MMHG | RESPIRATION RATE: 16 BRPM | HEART RATE: 85 BPM | TEMPERATURE: 98 F

## 2021-03-02 LAB
ANION GAP SERPL CALC-SCNC: 6 MMOL/L — SIGNIFICANT CHANGE UP (ref 5–17)
BUN SERPL-MCNC: 28 MG/DL — HIGH (ref 7–18)
CALCIUM SERPL-MCNC: 8.9 MG/DL — SIGNIFICANT CHANGE UP (ref 8.4–10.5)
CHLORIDE SERPL-SCNC: 107 MMOL/L — SIGNIFICANT CHANGE UP (ref 96–108)
CO2 SERPL-SCNC: 25 MMOL/L — SIGNIFICANT CHANGE UP (ref 22–31)
CREAT SERPL-MCNC: 1.58 MG/DL — HIGH (ref 0.5–1.3)
GLUCOSE BLDC GLUCOMTR-MCNC: 148 MG/DL — HIGH (ref 70–99)
GLUCOSE BLDC GLUCOMTR-MCNC: 94 MG/DL — SIGNIFICANT CHANGE UP (ref 70–99)
GLUCOSE BLDC GLUCOMTR-MCNC: 99 MG/DL — SIGNIFICANT CHANGE UP (ref 70–99)
GLUCOSE SERPL-MCNC: 94 MG/DL — SIGNIFICANT CHANGE UP (ref 70–99)
HCT VFR BLD CALC: 26.6 % — LOW (ref 34.5–45)
HGB BLD-MCNC: 8.9 G/DL — LOW (ref 11.5–15.5)
MCHC RBC-ENTMCNC: 28.4 PG — SIGNIFICANT CHANGE UP (ref 27–34)
MCHC RBC-ENTMCNC: 33.5 GM/DL — SIGNIFICANT CHANGE UP (ref 32–36)
MCV RBC AUTO: 85 FL — SIGNIFICANT CHANGE UP (ref 80–100)
NRBC # BLD: 0 /100 WBCS — SIGNIFICANT CHANGE UP (ref 0–0)
PLATELET # BLD AUTO: 207 K/UL — SIGNIFICANT CHANGE UP (ref 150–400)
POTASSIUM SERPL-MCNC: 4.4 MMOL/L — SIGNIFICANT CHANGE UP (ref 3.5–5.3)
POTASSIUM SERPL-SCNC: 4.4 MMOL/L — SIGNIFICANT CHANGE UP (ref 3.5–5.3)
RBC # BLD: 3.13 M/UL — LOW (ref 3.8–5.2)
RBC # FLD: 16.5 % — HIGH (ref 10.3–14.5)
SODIUM SERPL-SCNC: 138 MMOL/L — SIGNIFICANT CHANGE UP (ref 135–145)
WBC # BLD: 3.45 K/UL — LOW (ref 3.8–10.5)
WBC # FLD AUTO: 3.45 K/UL — LOW (ref 3.8–10.5)

## 2021-03-02 PROCEDURE — 99239 HOSP IP/OBS DSCHRG MGMT >30: CPT

## 2021-03-02 RX ORDER — AMLODIPINE BESYLATE 2.5 MG/1
1 TABLET ORAL
Qty: 0 | Refills: 0 | DISCHARGE

## 2021-03-02 RX ORDER — GABAPENTIN 400 MG/1
2 CAPSULE ORAL
Qty: 120 | Refills: 0
Start: 2021-03-02 | End: 2021-03-31

## 2021-03-02 RX ORDER — METOPROLOL TARTRATE 50 MG
1 TABLET ORAL
Qty: 30 | Refills: 0
Start: 2021-03-02 | End: 2021-03-31

## 2021-03-02 RX ORDER — POLYETHYLENE GLYCOL 3350 17 G/17G
17 POWDER, FOR SOLUTION ORAL
Qty: 510 | Refills: 0
Start: 2021-03-02 | End: 2021-03-31

## 2021-03-02 RX ORDER — TRAMADOL HYDROCHLORIDE 50 MG/1
1 TABLET ORAL
Qty: 28 | Refills: 0
Start: 2021-03-02 | End: 2021-03-08

## 2021-03-02 RX ORDER — FOLIC ACID 0.8 MG
1 TABLET ORAL
Qty: 30 | Refills: 0
Start: 2021-03-02 | End: 2021-03-31

## 2021-03-02 RX ORDER — LIDOCAINE 4 G/100G
1 CREAM TOPICAL
Qty: 30 | Refills: 0
Start: 2021-03-02 | End: 2021-03-31

## 2021-03-02 RX ORDER — AMLODIPINE BESYLATE 2.5 MG/1
1 TABLET ORAL
Qty: 30 | Refills: 0
Start: 2021-03-02 | End: 2021-03-31

## 2021-03-02 RX ORDER — MUPIROCIN 20 MG/G
1 OINTMENT TOPICAL
Refills: 0 | Status: DISCONTINUED | OUTPATIENT
Start: 2021-03-02 | End: 2021-03-02

## 2021-03-02 RX ORDER — ACETAMINOPHEN 500 MG
2 TABLET ORAL
Qty: 0 | Refills: 0 | DISCHARGE
Start: 2021-03-02

## 2021-03-02 RX ORDER — CHLORHEXIDINE GLUCONATE 213 G/1000ML
1 SOLUTION TOPICAL
Refills: 0 | Status: DISCONTINUED | OUTPATIENT
Start: 2021-03-02 | End: 2021-03-02

## 2021-03-02 RX ORDER — SENNA PLUS 8.6 MG/1
2 TABLET ORAL
Qty: 60 | Refills: 0
Start: 2021-03-02 | End: 2021-03-31

## 2021-03-02 RX ORDER — PANTOPRAZOLE SODIUM 20 MG/1
1 TABLET, DELAYED RELEASE ORAL
Qty: 30 | Refills: 0
Start: 2021-03-02 | End: 2021-03-31

## 2021-03-02 RX ORDER — CEFUROXIME AXETIL 250 MG
1 TABLET ORAL
Qty: 6 | Refills: 0
Start: 2021-03-02 | End: 2021-03-04

## 2021-03-02 RX ADMIN — CHLORHEXIDINE GLUCONATE 1 APPLICATION(S): 213 SOLUTION TOPICAL at 11:34

## 2021-03-02 RX ADMIN — GABAPENTIN 200 MILLIGRAM(S): 400 CAPSULE ORAL at 18:40

## 2021-03-02 RX ADMIN — ENOXAPARIN SODIUM 40 MILLIGRAM(S): 100 INJECTION SUBCUTANEOUS at 11:33

## 2021-03-02 RX ADMIN — CEFTRIAXONE 100 MILLIGRAM(S): 500 INJECTION, POWDER, FOR SOLUTION INTRAMUSCULAR; INTRAVENOUS at 04:59

## 2021-03-02 RX ADMIN — GABAPENTIN 200 MILLIGRAM(S): 400 CAPSULE ORAL at 05:00

## 2021-03-02 RX ADMIN — LIDOCAINE 1 PATCH: 4 CREAM TOPICAL at 11:33

## 2021-03-02 RX ADMIN — Medication 1000 MILLIGRAM(S): at 15:08

## 2021-03-02 RX ADMIN — PANTOPRAZOLE SODIUM 40 MILLIGRAM(S): 20 TABLET, DELAYED RELEASE ORAL at 06:17

## 2021-03-02 RX ADMIN — MUPIROCIN 1 APPLICATION(S): 20 OINTMENT TOPICAL at 18:42

## 2021-03-02 NOTE — PROGRESS NOTE ADULT - PROVIDER SPECIALTY LIST ADULT
Heme/Onc
Hospitalist
Internal Medicine
Hospitalist
Pain Medicine
Heme/Onc
Pain Medicine
Internal Medicine

## 2021-03-02 NOTE — PROGRESS NOTE ADULT - SUBJECTIVE AND OBJECTIVE BOX
Patient is a 81y old  Female who presents with a chief complaint of Lower back pain (01 Mar 2021 18:32)      SUBJECTIVE / OVERNIGHT EVENTS:    ADDITIONAL REVIEW OF SYSTEMS:    MEDICATIONS  (STANDING):  cefTRIAXone   IVPB      cefTRIAXone   IVPB 1000 milliGRAM(s) IV Intermittent every 24 hours  chlorhexidine 2% Cloths 1 Application(s) Topical <User Schedule>  enoxaparin Injectable 40 milliGRAM(s) SubCutaneous daily  gabapentin 200 milliGRAM(s) Oral two times a day  insulin lispro (ADMELOG) corrective regimen sliding scale   SubCutaneous three times a day before meals  lidocaine   Patch 1 Patch Transdermal daily  mupirocin 2% Ointment 1 Application(s) Both Nostrils two times a day  pantoprazole    Tablet 40 milliGRAM(s) Oral before breakfast  senna 2 Tablet(s) Oral at bedtime  sodium chloride 0.9%. 1000 milliLiter(s) (75 mL/Hr) IV Continuous <Continuous>    MEDICATIONS  (PRN):  acetaminophen   Tablet .. 1000 milliGRAM(s) Oral every 8 hours PRN Moderate Pain (4 - 6)  polyethylene glycol 3350 17 Gram(s) Oral daily PRN Constipation  traMADol 50 milliGRAM(s) Oral every 6 hours PRN Severe Pain (7 - 10)      Vital Signs Last 24 Hrs  T(C): 36.7 (02 Mar 2021 05:14), Max: 36.9 (01 Mar 2021 20:03)  T(F): 98 (02 Mar 2021 05:14), Max: 98.5 (01 Mar 2021 20:03)  HR: 79 (02 Mar 2021 05:14) (74 - 82)  BP: 141/60 (02 Mar 2021 05:14) (102/61 - 144/62)  BP(mean): 76 (01 Mar 2021 13:42) (75 - 76)  RR: 17 (02 Mar 2021 05:14) (16 - 18)  SpO2: 98% (02 Mar 2021 05:14) (98% - 100%)      LABS:                        8.9    3.45  )-----------( 207      ( 02 Mar 2021 08:17 )             26.6     03-02    138  |  107  |  28<H>  ----------------------------<  94  4.4   |  25  |  1.58<H>    Ca    8.9      02 Mar 2021 08:17                COVID-19 PCR: Constantine (26 Feb 2021 03:53)      RADIOLOGY & ADDITIONAL TESTS:  Imaging from Last 24 Hours:    Electrocardiogram/QTc Interval:    COORDINATION OF CARE:  Care Discussed with Consultants/Other Providers:   Patient is a 81y old  Female who presents with a chief complaint of Lower back pain (01 Mar 2021 18:32)      SUBJECTIVE / OVERNIGHT EVENTS: events noted. No new complaints. Back pain much improved. Plan for d/c today        MEDICATIONS  (STANDING):  cefTRIAXone   IVPB      cefTRIAXone   IVPB 1000 milliGRAM(s) IV Intermittent every 24 hours  chlorhexidine 2% Cloths 1 Application(s) Topical <User Schedule>  enoxaparin Injectable 40 milliGRAM(s) SubCutaneous daily  gabapentin 200 milliGRAM(s) Oral two times a day  insulin lispro (ADMELOG) corrective regimen sliding scale   SubCutaneous three times a day before meals  lidocaine   Patch 1 Patch Transdermal daily  mupirocin 2% Ointment 1 Application(s) Both Nostrils two times a day  pantoprazole    Tablet 40 milliGRAM(s) Oral before breakfast  senna 2 Tablet(s) Oral at bedtime  sodium chloride 0.9%. 1000 milliLiter(s) (75 mL/Hr) IV Continuous <Continuous>    MEDICATIONS  (PRN):  acetaminophen   Tablet .. 1000 milliGRAM(s) Oral every 8 hours PRN Moderate Pain (4 - 6)  polyethylene glycol 3350 17 Gram(s) Oral daily PRN Constipation  traMADol 50 milliGRAM(s) Oral every 6 hours PRN Severe Pain (7 - 10)      Vital Signs Last 24 Hrs  T(C): 36.7 (02 Mar 2021 05:14), Max: 36.9 (01 Mar 2021 20:03)  T(F): 98 (02 Mar 2021 05:14), Max: 98.5 (01 Mar 2021 20:03)  HR: 79 (02 Mar 2021 05:14) (74 - 82)  BP: 141/60 (02 Mar 2021 05:14) (102/61 - 144/62)  BP(mean): 76 (01 Mar 2021 13:42) (75 - 76)  RR: 17 (02 Mar 2021 05:14) (16 - 18)  SpO2: 98% (02 Mar 2021 05:14) (98% - 100%)    PHYSICAL EXAM:  GENERAL: NAD  HEENT: Normocephalic;  conjunctivae and sclerae clear; moist mucous membranes   NECK : supple  RESP: CTA B/L  HEART: S1S2, no murmurs, gallops or rubs  ABDOMEN: Soft, Nontender, Nondistended; Bowel sounds present  EXTREMITIES: no cyanosis; no edema; no calf tenderness  LN: No palpable lymphadenopathy  SKIN: warm and dry; no rash  NERVOUS SYSTEM:  Awake and alert; Oriented to place, person and time    LABS:                        8.9    3.45  )-----------( 207      ( 02 Mar 2021 08:17 )             26.6     03-02    138  |  107  |  28<H>  ----------------------------<  94  4.4   |  25  |  1.58<H>    Ca    8.9      02 Mar 2021 08:17      COVID-19 PCR: Constantine (26 Feb 2021 03:53)

## 2021-03-02 NOTE — PROGRESS NOTE ADULT - ASSESSMENT
complete note to follow     Assessment and Recommendation:   · Assessment	    Problem# Multiple Myeloma  ?? under treatment- Patient states that last time she saw oncologist was in August- pt states she was "getting shots" Velcade given?  Back pain appears secondary to diffuses osteoporosis- Consistent with Hx of MM  Does not appear to have cord compression, Lumbar spine MRI showed new T12 Fx  appreciate ortho eval- pain control, PT  Anemia- Chronic disease  calcium nl  Rec's:  -Pain Mgmt  -upon discharge pt to F/U with Primary Oncologist Dr. Rosales at [facility]    Thank you for the referral. Will continue to monitor the patient.  Please call with any questions [phone]  Above reviewed with Attending Dr. Morocho   Assessment and Recommendation:   · Assessment	    Problem# Multiple Myeloma  ?? under treatment- Patient states that last time she saw oncologist was in August- pt states she was "getting shots" Velcade given?  Back pain appears secondary to diffuses osteoporosis- Consistent with Hx of MM  Does not appear to have cord compression, Lumbar spine MRI showed new T12 Fx  appreciate ortho eval- pain control, PT  Anemia- Chronic disease  calcium nl  Rec's:  -Pain Mgmt  -upon discharge pt to F/U with Primary Oncologist Dr. Rosales at [facility]    Thank you for the referral.    Please call with any questions [phone]  Above reviewed with Attending Dr. Morocho

## 2021-03-02 NOTE — DISCHARGE NOTE NURSING/CASE MANAGEMENT/SOCIAL WORK - PATIENT PORTAL LINK FT
You can access the FollowMyHealth Patient Portal offered by [facility] by registering at the following website: [email or web address]. By joining [employer]’s FollowMyHealth portal, you will also be able to view your health information using other applications (apps) compatible with our system.

## 2021-03-02 NOTE — PROGRESS NOTE ADULT - REASON FOR ADMISSION
Lower back pain

## 2021-03-02 NOTE — PROGRESS NOTE ADULT - ATTENDING COMMENTS
81 year old female with a PMH of Multiple Myeloma (previously on Velcade, last session 8/6/2019), Rheumatoid Arthritis who was BIBEMS due to back pain. Patient states that beginning approximately 1 week prior to current presentation she began to experience progressively worsening mid-back pain, which radiates upwards, worsened with exertion. Patient does not have signs of cord compression.   At time of examination in the ED, patient denies any headache, dizziness, chest pain, palpitations, shortness of breath, abdominal pain, nausea/vomiting/diarrhea.    Vital Signs Last 24 Hrs  T(C): 36.7 (01 Mar 2021 13:02), Max: 36.7 (01 Mar 2021 13:02)  T(F): 98.1 (01 Mar 2021 13:02), Max: 98.1 (01 Mar 2021 13:02)  HR: 82 (01 Mar 2021 13:42) (74 - 82)  BP: 134/57 (01 Mar 2021 13:42) (102/61 - 134/57)  BP(mean): 76 (01 Mar 2021 13:42) (75 - 76)  RR: 18 (01 Mar 2021 13:42) (16 - 18)  SpO2: 100% (01 Mar 2021 13:42) (98% - 100%)                          8.8    3.83  )-----------( 192      ( 01 Mar 2021 08:27 )             26.7       03-01    138  |  107  |  30<H>  ----------------------------<  86  4.4   |  22  |  1.76<H>    Ca    8.5      01 Mar 2021 08:27    A/P:    Acute Back Pain: patient has h/o Multiple Myeloma:  - Gamma Gap= 7.9, Hgb 9.4, Calcium, corrected 10.3>9.7.  - CT Thoracic/Lumbar Spine without contrast: Osseous structures are diffusely demineralized, No discrete aggressive appearing lytic or blastic lesion, Mild multilevel degenerative disc disease, as prominent at the L5-S1 level, characterized by disc height loss and end plate sclerosis. Multilevel fusion of the thoracic spine as processes and hypertrophy lumbar spinous processes.  - Hem/onc Dr. Marin consulted.   - Pain management consulted. pain well controlled.  - MRI showing acute T12 vertebral body resulting in mild compression fracture, no evidence of cord compression.. No significant posterior retropulsion. No evidence of cord compression.  - Consulted Dr. Barney ortho spine, recommended pain management, PT and mobilization. No brace recommended    History of Multiple Myeloma: As above, reportedly patient is Velcade last chemo from Aug 2020  Gamma Gap= 7.9, Hgb 9.4, Calcium, corrected 10.3>9.7.  Hem Dr. Marin consulted, recommended outpatient follow up.  No lytic lesion noted on CT spine.    Anemia of Chronic disease    Chronic Renal Insufficiency:  -Cr= 1.77>1.6 (Baseline= 1.63 on 3/25/2018); eGFR= 31 (Baseline= 35 on 3/25/2018)   - Renal Sonogram showing atrophic kidneys bilaterally, no hydronephrosis. monitor BMP     Acute Uncomplicated UTI:  - UA positive, reported dysuria on presentation.  - U cx reported as possible contamination  - on Rocephin day 4, plan to d/c on po abx to complete the course 5-7 days.    Prediabetes: Hemoglobin A1c 6.2  -Blood Glucose Monitoring ACHS, SSI  - Diabetic diet, education.    Liver and Splenic Calcifications:  -CT Thoracic/Lumbar Spine without contrast identified numerous calcified granulomas within the liver and spleen.  -Patient will need to follow-up with GI/Hepatology as an outpatient.    Hypoalbuminemia:  -Nutrition Consult    GI/DVT PPx:  -Lovenox  -PPI.     Discharge planning: Patient is medically stable to discharge. physical therapy recommended home with home services. CM on board.
I have examined the patient at bedside and reviewed patient's data and participated in the management of the patient along with Bella DAWN as well as hemotology/med oncology faculty consisting of Dr. Siva Joshi, Dr. CHARLEY Guzmán, Dr. CHARLEY Morocho, Dr. Gavin Yo, Dr. Suma Cassidy, Dr. Cole Marin as well as myself during the daily heme/onc case review. I reviewed pertinent clinical information, PE,  labs as well as A/P as outline above.     Optimize pain control. Pt will need to resume MM regimen as outpt; PT/OT evaluation.
I have reviewed patient's data and participated in the management of the patient along with Bella DAWN as well as hematology/med oncology faculty during the daily heme/onc case review. I reviewed pertinent clinical information, PE,  labs as well as A/P as outlined above, in agreement and edited as appropriate.

## 2021-03-16 PROCEDURE — 96375 TX/PRO/DX INJ NEW DRUG ADDON: CPT

## 2021-03-16 PROCEDURE — 72131 CT LUMBAR SPINE W/O DYE: CPT

## 2021-03-16 PROCEDURE — 87086 URINE CULTURE/COLONY COUNT: CPT

## 2021-03-16 PROCEDURE — 85025 COMPLETE CBC W/AUTO DIFF WBC: CPT

## 2021-03-16 PROCEDURE — 36415 COLL VENOUS BLD VENIPUNCTURE: CPT

## 2021-03-16 PROCEDURE — 85045 AUTOMATED RETICULOCYTE COUNT: CPT

## 2021-03-16 PROCEDURE — 84300 ASSAY OF URINE SODIUM: CPT

## 2021-03-16 PROCEDURE — 83735 ASSAY OF MAGNESIUM: CPT

## 2021-03-16 PROCEDURE — 71045 X-RAY EXAM CHEST 1 VIEW: CPT

## 2021-03-16 PROCEDURE — 84100 ASSAY OF PHOSPHORUS: CPT

## 2021-03-16 PROCEDURE — 72146 MRI CHEST SPINE W/O DYE: CPT

## 2021-03-16 PROCEDURE — 97161 PT EVAL LOW COMPLEX 20 MIN: CPT

## 2021-03-16 PROCEDURE — 83540 ASSAY OF IRON: CPT

## 2021-03-16 PROCEDURE — 93005 ELECTROCARDIOGRAM TRACING: CPT

## 2021-03-16 PROCEDURE — 80053 COMPREHEN METABOLIC PANEL: CPT

## 2021-03-16 PROCEDURE — 83550 IRON BINDING TEST: CPT

## 2021-03-16 PROCEDURE — 82962 GLUCOSE BLOOD TEST: CPT

## 2021-03-16 PROCEDURE — 86769 SARS-COV-2 COVID-19 ANTIBODY: CPT

## 2021-03-16 PROCEDURE — 82570 ASSAY OF URINE CREATININE: CPT

## 2021-03-16 PROCEDURE — 87635 SARS-COV-2 COVID-19 AMP PRB: CPT

## 2021-03-16 PROCEDURE — 72148 MRI LUMBAR SPINE W/O DYE: CPT

## 2021-03-16 PROCEDURE — 81001 URINALYSIS AUTO W/SCOPE: CPT

## 2021-03-16 PROCEDURE — 85027 COMPLETE CBC AUTOMATED: CPT

## 2021-03-16 PROCEDURE — 96374 THER/PROPH/DIAG INJ IV PUSH: CPT

## 2021-03-16 PROCEDURE — 80048 BASIC METABOLIC PNL TOTAL CA: CPT

## 2021-03-16 PROCEDURE — 82728 ASSAY OF FERRITIN: CPT

## 2021-03-16 PROCEDURE — 87640 STAPH A DNA AMP PROBE: CPT

## 2021-03-16 PROCEDURE — 83036 HEMOGLOBIN GLYCOSYLATED A1C: CPT

## 2021-03-16 PROCEDURE — 99285 EMERGENCY DEPT VISIT HI MDM: CPT

## 2021-03-16 PROCEDURE — 80061 LIPID PANEL: CPT

## 2021-03-16 PROCEDURE — 76775 US EXAM ABDO BACK WALL LIM: CPT

## 2021-03-16 PROCEDURE — 87641 MR-STAPH DNA AMP PROBE: CPT

## 2021-03-16 PROCEDURE — U0005: CPT

## 2021-03-16 PROCEDURE — 72128 CT CHEST SPINE W/O DYE: CPT

## 2021-03-16 PROCEDURE — 84443 ASSAY THYROID STIM HORMONE: CPT

## 2021-03-19 ENCOUNTER — INPATIENT (INPATIENT)
Facility: HOSPITAL | Age: 81
LOS: 9 days | Discharge: NURSING, REHAB OR LONG-TERM CARE | DRG: 158 | End: 2021-03-29
Attending: INTERNAL MEDICINE | Admitting: INTERNAL MEDICINE
Payer: COMMERCIAL

## 2021-03-19 VITALS
RESPIRATION RATE: 16 BRPM | SYSTOLIC BLOOD PRESSURE: 164 MMHG | TEMPERATURE: 99 F | HEART RATE: 95 BPM | DIASTOLIC BLOOD PRESSURE: 77 MMHG | OXYGEN SATURATION: 95 % | WEIGHT: 136.91 LBS | HEIGHT: 62 IN

## 2021-03-19 DIAGNOSIS — R13.10 DYSPHAGIA, UNSPECIFIED: ICD-10-CM

## 2021-03-19 LAB
ALBUMIN SERPL ELPH-MCNC: 2.4 G/DL — LOW (ref 3.5–5)
ALP SERPL-CCNC: 96 U/L — SIGNIFICANT CHANGE UP (ref 40–120)
ALT FLD-CCNC: 16 U/L DA — SIGNIFICANT CHANGE UP (ref 10–60)
ANION GAP SERPL CALC-SCNC: 8 MMOL/L — SIGNIFICANT CHANGE UP (ref 5–17)
ANISOCYTOSIS BLD QL: SLIGHT — SIGNIFICANT CHANGE UP
APPEARANCE UR: CLEAR — SIGNIFICANT CHANGE UP
AST SERPL-CCNC: 18 U/L — SIGNIFICANT CHANGE UP (ref 10–40)
BACTERIA # UR AUTO: ABNORMAL /HPF
BASOPHILS # BLD AUTO: 0 K/UL — SIGNIFICANT CHANGE UP (ref 0–0.2)
BASOPHILS NFR BLD AUTO: 0 % — SIGNIFICANT CHANGE UP (ref 0–2)
BILIRUB SERPL-MCNC: 0.6 MG/DL — SIGNIFICANT CHANGE UP (ref 0.2–1.2)
BILIRUB UR-MCNC: NEGATIVE — SIGNIFICANT CHANGE UP
BUN SERPL-MCNC: 22 MG/DL — HIGH (ref 7–18)
CALCIUM SERPL-MCNC: 9.2 MG/DL — SIGNIFICANT CHANGE UP (ref 8.4–10.5)
CHLORIDE SERPL-SCNC: 110 MMOL/L — HIGH (ref 96–108)
CO2 SERPL-SCNC: 22 MMOL/L — SIGNIFICANT CHANGE UP (ref 22–31)
COLOR SPEC: YELLOW — SIGNIFICANT CHANGE UP
CREAT SERPL-MCNC: 1.73 MG/DL — HIGH (ref 0.5–1.3)
DIFF PNL FLD: ABNORMAL
EOSINOPHIL # BLD AUTO: 0 K/UL — SIGNIFICANT CHANGE UP (ref 0–0.5)
EOSINOPHIL NFR BLD AUTO: 0 % — SIGNIFICANT CHANGE UP (ref 0–6)
EPI CELLS # UR: SIGNIFICANT CHANGE UP /HPF
GLUCOSE SERPL-MCNC: 117 MG/DL — HIGH (ref 70–99)
GLUCOSE UR QL: NEGATIVE — SIGNIFICANT CHANGE UP
HCT VFR BLD CALC: 28.1 % — LOW (ref 34.5–45)
HGB BLD-MCNC: 9.3 G/DL — LOW (ref 11.5–15.5)
HYPOCHROMIA BLD QL: SLIGHT — SIGNIFICANT CHANGE UP
KETONES UR-MCNC: NEGATIVE — SIGNIFICANT CHANGE UP
LEUKOCYTE ESTERASE UR-ACNC: ABNORMAL
LIDOCAIN IGE QN: 112 U/L — SIGNIFICANT CHANGE UP (ref 73–393)
LYMPHOCYTES # BLD AUTO: 0.9 K/UL — LOW (ref 1–3.3)
LYMPHOCYTES # BLD AUTO: 12 % — LOW (ref 13–44)
MANUAL SMEAR VERIFICATION: SIGNIFICANT CHANGE UP
MCHC RBC-ENTMCNC: 28.4 PG — SIGNIFICANT CHANGE UP (ref 27–34)
MCHC RBC-ENTMCNC: 33.1 GM/DL — SIGNIFICANT CHANGE UP (ref 32–36)
MCV RBC AUTO: 85.9 FL — SIGNIFICANT CHANGE UP (ref 80–100)
MICROCYTES BLD QL: SLIGHT — SIGNIFICANT CHANGE UP
MONOCYTES # BLD AUTO: 0.38 K/UL — SIGNIFICANT CHANGE UP (ref 0–0.9)
MONOCYTES NFR BLD AUTO: 5 % — SIGNIFICANT CHANGE UP (ref 2–14)
NEUTROPHILS # BLD AUTO: 6.24 K/UL — SIGNIFICANT CHANGE UP (ref 1.8–7.4)
NEUTROPHILS NFR BLD AUTO: 83 % — HIGH (ref 43–77)
NITRITE UR-MCNC: NEGATIVE — SIGNIFICANT CHANGE UP
NRBC # BLD: 0 /100 — SIGNIFICANT CHANGE UP (ref 0–0)
PH UR: 6.5 — SIGNIFICANT CHANGE UP (ref 5–8)
PLAT MORPH BLD: NORMAL — SIGNIFICANT CHANGE UP
PLATELET # BLD AUTO: 204 K/UL — SIGNIFICANT CHANGE UP (ref 150–400)
POIKILOCYTOSIS BLD QL AUTO: SLIGHT — SIGNIFICANT CHANGE UP
POLYCHROMASIA BLD QL SMEAR: SLIGHT — SIGNIFICANT CHANGE UP
POTASSIUM SERPL-MCNC: 4.2 MMOL/L — SIGNIFICANT CHANGE UP (ref 3.5–5.3)
POTASSIUM SERPL-SCNC: 4.2 MMOL/L — SIGNIFICANT CHANGE UP (ref 3.5–5.3)
PROT SERPL-MCNC: 10.1 G/DL — HIGH (ref 6–8.3)
PROT UR-MCNC: 30 MG/DL
RBC # BLD: 3.27 M/UL — LOW (ref 3.8–5.2)
RBC # FLD: 17 % — HIGH (ref 10.3–14.5)
RBC BLD AUTO: ABNORMAL
RBC CASTS # UR COMP ASSIST: ABNORMAL /HPF (ref 0–2)
SARS-COV-2 RNA SPEC QL NAA+PROBE: SIGNIFICANT CHANGE UP
SODIUM SERPL-SCNC: 140 MMOL/L — SIGNIFICANT CHANGE UP (ref 135–145)
SP GR SPEC: 1.01 — SIGNIFICANT CHANGE UP (ref 1.01–1.02)
SPHEROCYTES BLD QL SMEAR: SLIGHT — SIGNIFICANT CHANGE UP
UROBILINOGEN FLD QL: NEGATIVE — SIGNIFICANT CHANGE UP
WBC # BLD: 7.52 K/UL — SIGNIFICANT CHANGE UP (ref 3.8–10.5)
WBC # FLD AUTO: 7.52 K/UL — SIGNIFICANT CHANGE UP (ref 3.8–10.5)
WBC UR QL: ABNORMAL /HPF (ref 0–5)

## 2021-03-19 PROCEDURE — 99285 EMERGENCY DEPT VISIT HI MDM: CPT

## 2021-03-19 PROCEDURE — 93010 ELECTROCARDIOGRAM REPORT: CPT

## 2021-03-19 PROCEDURE — 70490 CT SOFT TISSUE NECK W/O DYE: CPT | Mod: 26,MA

## 2021-03-19 PROCEDURE — 99222 1ST HOSP IP/OBS MODERATE 55: CPT

## 2021-03-19 PROCEDURE — 71045 X-RAY EXAM CHEST 1 VIEW: CPT | Mod: 26

## 2021-03-19 RX ORDER — CEFTRIAXONE 500 MG/1
1000 INJECTION, POWDER, FOR SOLUTION INTRAMUSCULAR; INTRAVENOUS EVERY 24 HOURS
Refills: 0 | Status: DISCONTINUED | OUTPATIENT
Start: 2021-03-19 | End: 2021-03-20

## 2021-03-19 RX ORDER — SODIUM CHLORIDE 9 MG/ML
1000 INJECTION INTRAMUSCULAR; INTRAVENOUS; SUBCUTANEOUS ONCE
Refills: 0 | Status: COMPLETED | OUTPATIENT
Start: 2021-03-19 | End: 2021-03-19

## 2021-03-19 RX ADMIN — SODIUM CHLORIDE 1000 MILLILITER(S): 9 INJECTION INTRAMUSCULAR; INTRAVENOUS; SUBCUTANEOUS at 18:52

## 2021-03-19 NOTE — H&P ADULT - NSICDXPASTMEDICALHX_GEN_ALL_CORE_FT
PAST MEDICAL HISTORY:  Hypertension     Multiple myeloma     Rheumatoid arthritis     Varicose veins of lower extremity

## 2021-03-19 NOTE — ED ADULT TRIAGE NOTE - HEART RATE (BEATS/MIN)
Problem: Physical Therapy Goal  Goal: Physical Therapy Goal  Goals to be met by: 14 days      Patient will increase functional independence with mobility by performin. Supine to sit with Stand-by Assistance. Not met  2. Sit to supine with Stand-by Assistance. Not met  3. Sit to stand transfer with Stand-by Assistance. Not met  4. Bed to chair transfer with Stand-by Assistance using Rolling Walker or SPC. Not met   5. Gait  x 150 feet with Stand-by Assistance using Rolling Walker or SPC. Not met  6. Wheelchair propulsion x 150 feet with Stand-by Assistance using bilateral uppper extremities. Not met  8. Ascend/Descend 4 inch curb step with Contact Guard Assistance using Rolling Walker or SPC. Not met  9. Stand for 3 minutes with Contact Guard Assistance using Rolling Walker or SPC. Not met  10. Lower extremity exercise program x 20 reps per handout, with independence. Not met      Outcome: Ongoing (interventions implemented as appropriate)  Goals remain appropriate       95

## 2021-03-19 NOTE — ED PROVIDER NOTE - CLINICAL SUMMARY MEDICAL DECISION MAKING FREE TEXT BOX
82 y/o F patient c/o difficulty swallowing x2 days and has history of multiple myeloma. Symptoms may suggest tonsilitis, salivary gland stones, or infection among other causes. Will Ct throat, obtain labs and reassess. Further history obtained from daughter- Binta [phone].

## 2021-03-19 NOTE — H&P ADULT - PROBLEM SELECTOR PLAN 6
- Patient has history of Hypertension on amlodipine and metoprolol at home   - Hold po meds  - Started on Lopressor 2.5 mg q6 hrs   - Monitor BP and adjust meds as needed - Patient has history of Hypertension on amlodipine and metoprolol at home   - Hold po meds  - Started on Lopressor 2.5 mg iv q6 hrs   - Monitor BP and adjust meds as needed

## 2021-03-19 NOTE — H&P ADULT - HISTORY OF PRESENT ILLNESS
81 year old female from home ambulates w/ walker/cane, has past medical hx of MM, hypertension, RA, bladder incontinence came to ED c/o 2 days of dysphagia and 1 one day of tongue/left jaw swelling. Patient refers not able to eat or drink anything, denies sob, sore troat, fever, weight loss, previous episodes, cough, chills.   During interview, patient was having trouble verbalizing words due to her tongue swelling, no drooling, saturating well.     Off note: patient was recently discharge from [facility] treated for UTI and back pain     GOC: Full code  81 year old female from home ambulates w/ walker/cane, has past medical hx of MM, hypertension, RA, bladder incontinence came to ED c/o 2 days of dysphagia and 1 one day of tongue/left jaw swelling. Patient refers not able to eat or drink anything, denies sob, sore troat, fever, weight loss, previous episodes, cough, chills.   During interview, patient was having trouble verbalizing words due to her tongue swelling, no drooling, saturating well. Patient denies any medication or food allergies, no recent changes in diet or medications.     Off note: patient was recently discharge from [facility] treated for UTI and back pain     GOC: Full code  81 year old female from home ambulates w/ walker/cane, has past medical hx of MM, hypertension, RA, bladder incontinence came to ED c/o 2 days of dysphagia and 1 one day of tongue/left jaw swelling. Patient refers not able to eat or drink anything, denies sob, sore troat, fever, weight loss, previous episodes, cough, chills.   During interview, patient was having trouble verbalizing words due to her tongue swelling, no drooling, saturating well. Patient denies any medication or food allergies, no recent changes in diet or medications. Also reports swelling if her gum for which she intended to see a dentist but presented to ER for worsening pain and swelling of jaw.    Off note: patient was recently discharge from [facility] treated for UTI and back pain     GOC: Full code

## 2021-03-19 NOTE — ED PROVIDER NOTE - PROGRESS NOTE DETAILS
Pt failed PO challengen, drank sips of water with water flowing from nose. Will admit. D/w Dr. Maxwell.

## 2021-03-19 NOTE — H&P ADULT - PROBLEM SELECTOR PLAN 8
IMPROVE VTE Individual Risk Assessment  RISK                                                                Points  [  ] Previous VTE                                                  3  [  ] Thrombophilia                                               2  [  ] Lower limb paralysis                                      2        (unable to hold up >15 seconds)    [  ] Current Cancer                                              2         (within 6 months)  [  ] Immobilization > 24 hrs                                1  [  ] ICU/CCU stay > 24 hours                              1  [  ] Age > 60                                                      1  IMPROVE VTE Score ___2______    Lovenox for DVT PPX  PPI for GI PPX

## 2021-03-19 NOTE — H&P ADULT - PROBLEM/PLAN-3
Full range of motion of upper and lower extremities, no joint tenderness/swelling. DISPLAY PLAN FREE TEXT

## 2021-03-19 NOTE — ED ADULT NURSE NOTE - NSIMPLEMENTINTERV_GEN_ALL_ED
Implemented All Fall with Harm Risk Interventions:  [place] to call system. Call bell, personal items and telephone within reach. Instruct patient to call for assistance. Room bathroom lighting operational. Non-slip footwear when patient is off stretcher. Physically safe environment: no spills, clutter or unnecessary equipment. Stretcher in lowest position, wheels locked, appropriate side rails in place. Provide visual cue, wrist band, yellow gown, etc. Monitor gait and stability. Monitor for mental status changes and reorient to person, place, and time. Review medications for side effects contributing to fall risk. Reinforce activity limits and safety measures with patient and family. Provide visual clues: red socks.

## 2021-03-19 NOTE — ED ADULT NURSE NOTE - OBJECTIVE STATEMENT
pt is here for unable to swallow.  pt stated that patient is unable to swallow anything, tongue slightly swollen, denied chest pain or fever, able to swallow pills, denied sob, no distress noted at this time.

## 2021-03-19 NOTE — H&P ADULT - PROBLEM SELECTOR PLAN 7
- Patient has hx of RA on Hydroxychloroquine at home   - Hold for now until patient is seen by [facility] + ENT

## 2021-03-19 NOTE — ED ADULT TRIAGE NOTE - CHIEF COMPLAINT QUOTE
as per daughter patient is unable to swallow anything as per daughter patient is unable to swallow anything, tongue slightly swollen. as per daughter tongue has been swollen x 3 days

## 2021-03-19 NOTE — ED PROVIDER NOTE - OBJECTIVE STATEMENT
80 y/o F with a significant PMHx of RA, multiple myeloma, c/o difficulty swallowing x2 days. Patient also developed left jaw pain and swelling which has subsided. Patient states saliva comes out of nose or she has to spit it out. Patient was recently discharged after evaluation of lower back pain. CT and MRI showed no acute changes at that time, but patient was placed on antibiotics for UTI. Patient notes she hasn't ate in x2 days because of the pain. Denies any trouble breathing, neck pain, headache, dizziness, or any other complaints.

## 2021-03-19 NOTE — H&P ADULT - PROBLEM SELECTOR PLAN 4
- Patient w/ hx of MM   - Follows up at [facility], has next appointment on April 2021

## 2021-03-19 NOTE — H&P ADULT - NSHPPHYSICALEXAM_GEN_ALL_CORE
ICU Vital Signs Last 24 Hrs  T(C): 37.2 (19 Mar 2021 23:18), Max: 37.2 (19 Mar 2021 23:18)  T(F): 99 (19 Mar 2021 23:18), Max: 99 (19 Mar 2021 23:18)  HR: 70 (19 Mar 2021 23:18) (70 - 95)  BP: 146/80 (19 Mar 2021 23:18) (146/80 - 164/77)  RR: 17 (19 Mar 2021 23:18) (16 - 17)  SpO2: 98% (19 Mar 2021 23:18) (95% - 98%)      GENERAL: NAD, overweight   HEAD:  Atraumatic, Normocephalic  EYES:  conjunctiva and sclera clear  NECK: Supple, No JVD, Normal thyroid  CHEST/LUNG: Clear to auscultation. Clear to percussion bilaterally; No rales, rhonchi, wheezing, or rubs  HEART: Regular rate and rhythm; No murmurs, rubs, or gallops  ABDOMEN: Soft, Nontender, Nondistended; Bowel sounds present, no pain or masses on palpation   NERVOUS SYSTEM:  Alert & Oriented X3  EXTREMITIES:  2+ Peripheral Pulses, No clubbing, cyanosis, or edema  : voiding well   SKIN: warm, dry ICU Vital Signs Last 24 Hrs  T(C): 37.2 (19 Mar 2021 23:18), Max: 37.2 (19 Mar 2021 23:18)  T(F): 99 (19 Mar 2021 23:18), Max: 99 (19 Mar 2021 23:18)  HR: 70 (19 Mar 2021 23:18) (70 - 95)  BP: 146/80 (19 Mar 2021 23:18) (146/80 - 164/77)  RR: 17 (19 Mar 2021 23:18) (16 - 17)  SpO2: 98% (19 Mar 2021 23:18) (95% - 98%)      GENERAL: NAD, average weight, sat 98% on RA  HEAD:  Atraumatic, Normocephalic  MOUTH:  EYES:  conjunctiva and sclera clear  NECK: Supple, No JVD, Normal thyroid  CHEST/LUNG: Clear to auscultation. Clear to percussion bilaterally; No rales, rhonchi, wheezing, or rubs  HEART: Regular rate and rhythm; No murmurs, rubs, or gallops  ABDOMEN: Soft, Nontender, Nondistended; Bowel sounds present, no pain or masses on palpation   NERVOUS SYSTEM:  Alert & Oriented X3  EXTREMITIES:  2+ Peripheral Pulses, No clubbing, cyanosis, or edema  : voiding well   SKIN: warm, dry ICU Vital Signs Last 24 Hrs  T(C): 37.2 (19 Mar 2021 23:18), Max: 37.2 (19 Mar 2021 23:18)  T(F): 99 (19 Mar 2021 23:18), Max: 99 (19 Mar 2021 23:18)  HR: 70 (19 Mar 2021 23:18) (70 - 95)  BP: 146/80 (19 Mar 2021 23:18) (146/80 - 164/77)  RR: 17 (19 Mar 2021 23:18) (16 - 17)  SpO2: 98% (19 Mar 2021 23:18) (95% - 98%)      GENERAL: NAD, average weight, sat 98% on RA, afebrile   HEAD:  Atraumatic, Normocephalic  MOUTH: malodor, no drooling, swollen tongue, unable to visualize oropharynx, no stridor, no skin changes   EYES:  conjunctiva and sclera clear  NECK: Supple, No JVD, Normal thyroid, no masses   CHEST/LUNG: Clear to auscultation. Clear to percussion bilaterally; No rales, rhonchi, wheezing, or rubs  HEART: Regular rate and rhythm; No murmurs, rubs, or gallops  ABDOMEN: Soft, Nontender, Nondistended; Bowel sounds present, no pain or masses on palpation   NERVOUS SYSTEM:  Alert & Oriented X3  EXTREMITIES:  2+ Peripheral Pulses, No clubbing, cyanosis, or edema  : voiding well   SKIN: warm, dry

## 2021-03-19 NOTE — H&P ADULT - PROBLEM SELECTOR PLAN 3
- Patient with KAJAL on CKD  - On admission Cr 1.7, baseline around 1.5-1.6  - Started on IVF D5 1/2 NS 75cc/hr  - Avoid nephrotoxins   - Monitor BMP

## 2021-03-19 NOTE — H&P ADULT - NSHPREVIEWOFSYSTEMS_GEN_ALL_CORE
REVIEW OF SYSTEMS:  CONSTITUTIONAL: No fever, weight loss, or fatigue  RESPIRATORY: No cough, wheezing, chills or hemoptysis; No shortness of breath  CARDIOVASCULAR: No chest pain, palpitations, dizziness, or leg swelling  GASTROINTESTINAL: No abdominal pain. No nausea, vomiting, or hematemesis; No diarrhea or constipation. No melena or hematochezia.  GENITOURINARY: No dysuria or hematuria, urinary frequency  NEUROLOGICAL: No headaches, memory loss, loss of strength, numbness, or tremors  SKIN: No itching, burning, rashes, or lesions

## 2021-03-19 NOTE — H&P ADULT - PROBLEM SELECTOR PLAN 2
- On admission, UA +, no leucocytosis, afebrile, no urinary symptoms   - Patient was treated for UTI on last admission   - No need to start antibiotics at this time

## 2021-03-19 NOTE — H&P ADULT - ASSESSMENT
81 year old female from home ambulates w/ walker/cane, has past medical hx of MM, hypertension, RA, bladder incontinence came to ED c/o 2 days of dysphagia and 1 one day of tongue/left jaw swelling. Patient admitted for dysphagia w/u 81 year old female from home ambulates w/ walker/cane, has past medical hx of MM, hypertension, RA, bladder incontinence came to ED c/o 2 days of dysphagia and 1 one day of tongue/left jaw swelling. Patient admitted for dysphagia w/u

## 2021-03-19 NOTE — ED ADULT NURSE NOTE - CHIEF COMPLAINT QUOTE
as per daughter patient is unable to swallow anything, tongue slightly swollen. as per daughter tongue has been swollen x 3 days

## 2021-03-19 NOTE — H&P ADULT - ATTENDING COMMENTS
Pt seen and examined.  Case discussed with MAR.  81 year old woman with PMH of Rheumatoid arthritis, MM with 2 days of painful deglutition. There is no associated symptoms but complaints of swollen jaw and tongue as well. NO fevers, no additional symptoms.    Vital Signs Last 24 Hrs  T(C): 37.1 (19 Mar 2021 17:03), Max: 37.1 (19 Mar 2021 17:03)  T(F): 98.8 (19 Mar 2021 17:03), Max: 98.8 (19 Mar 2021 17:03)  HR: 95 (19 Mar 2021 17:03) (95 - 95)  BP: 164/77 (19 Mar 2021 17:03) (164/77 - 164/77)  RR: 16 (19 Mar 2021 17:03) (16 - 16)  SpO2: 95% (19 Mar 2021 17:03) (95% - 95%)    Exam    Labs                        9.3    7.52  )-----------( 204      ( 19 Mar 2021 19:16 )             28.1     03-19    140  |  110  |  22<H>  ----------------------------<  117<H>  4.2   |  22  |  1.73<H>    Ca    9.2      19 Mar 2021 19:16    TPro  10.1<H>  /  Alb  2.4<L>  /  TBili  0.6  /  DBili  x   /  AST  18  /  ALT  16  /  AlkPhos  96  03-19    There is left lateral oropharyngeal and hypopharyngeal soft tissue thickening, with a segment of the left vallecula and partially of the left piriform sinus. Adjacent fat stranding is present.  There is no abnormally enlarged cervical adenopathy by size criteria within the constraints of noncontrast imaging.  The oropharynx, nasopharynx, hypopharynx, and the laryngeal structures are unremarkable.    +ve MRSA nasal swab    Impression  - Acute pharyngitis  with odynophagia and dysphagia  likely infectious ( viral vs bacterial) vs trauma related ( food or pill)  Unable to tolerate orally  Admit for pain control   topical anesthetic with magic or hurricane  Systemic pain control  ENT consult     Other plans as above Pt seen and examined.  Case discussed with MAR.  81 year old woman with PMH of Rheumatoid arthritis, MM with 2 days of painful deglutition. There is no associated symptoms but complaints of swollen jaw and tongue as well. NO fevers, no additional symptoms.    Vital Signs Last 24 Hrs  T(C): 37.1 (19 Mar 2021 17:03), Max: 37.1 (19 Mar 2021 17:03)  T(F): 98.8 (19 Mar 2021 17:03), Max: 98.8 (19 Mar 2021 17:03)  HR: 95 (19 Mar 2021 17:03) (95 - 95)  BP: 164/77 (19 Mar 2021 17:03) (164/77 - 164/77)  RR: 16 (19 Mar 2021 17:03) (16 - 16)  SpO2: 95% (19 Mar 2021 17:03) (95% - 95%)    Exam  Elderly woman, lying in bed, NAD - foul smelling breath   Limited evaluation of the oropharynx inspite of a tongue depressor  Unable to see above the upper portion of the oropharynx which appears unremarkable  rest of exam as above    Labs                        9.3    7.52  )-----------( 204      ( 19 Mar 2021 19:16 )             28.1     03-19    140  |  110  |  22<H>  ----------------------------<  117<H>  4.2   |  22  |  1.73<H>    Ca    9.2      19 Mar 2021 19:16    T Pro  10.1<H>  /  Alb  2.4<L>  /  T Bili  0.6  /  D Bili  x   /  AST  18  /  ALT  16  /  Alk Phos  96  03-19    CT neck w/o   There is left lateral oropharyngeal and hypopharyngeal soft tissue thickening, with a segment of the left vallecula and partially of the left piriform sinus. Adjacent fat stranding is present.  There is no abnormally enlarged cervical adenopathy by size criteria within the constraints of noncontrast imaging.  The oropharynx, nasopharynx, hypopharynx, and the laryngeal structures are unremarkable.    +ve MRSA nasal swab    Impression  81 year old woman with hx as detailed above with presentation c/w acute odynophagia and dysphagia of unclear etiology. Inflammation around the pharynx noted on limited non-contrast CT neck.  However, based on pt's symptoms and discomfort, there might be involvement of the upper esophagus. Will need endoscopy of her pharynx and upper esophagus.    - Acute odynophagia and dysphagia  Differentials include   infectious ( viral vs bacterial) vs trauma related ( food or pill)  vs malignancy  Unable to tolerate oral intake due to significant pain but able to maintain her airway.    Plan  1) Admit for pain control   2) topical anesthetic with magic or hurricane and lozenges  3) Systemic pain control  4) ENT and GI consult for endoscopy +/- biopsy   5) Will hold antibiotics for now and monitor      Other plans as above

## 2021-03-19 NOTE — H&P ADULT - PROBLEM SELECTOR PLAN 1
- Patient came to ED c/o dysphagia, swollen tongue  - Esophagitis vs Pharyngitis vs oral infection    - On PE: swollen tongue, no skin changes, unable to visualize oropharynx    - CT neck non contrast Thickening of the left oropharyngeal and hypopharyngeal soft tissues, probably pharyngitis. Recommend follow-up to ensure resolution and to exclude an underlying mass.  - Started on Cepacol, First wash   - F/u S and S  - ENT consult - Patient came to ED c/o dysphagia, swollen tongue  - Esophagitis vs Pharyngitis vs oral infection    - On PE: swollen tongue, no skin changes, unable to visualize oropharynx, VS stable, afebrile, no leucocytosis    - CT neck non contrast Thickening of the left oropharyngeal and hypopharyngeal soft tissues, probably pharyngitis. Recommend follow-up to ensure resolution and to exclude an underlying mass.  - Started on Cepacol, First wash   - Aspiration precautions   - Monitor respiratory status   - F/u S and S  - ENT consult - Patient came to ED c/o dysphagia, swollen tongue  - Esophagitis vs Pharyngitis vs oral infection    - On PE: swollen tongue, no skin changes, unable to visualize oropharynx, VS stable, afebrile, no leucocytosis    - CT neck non contrast Thickening of the left oropharyngeal and hypopharyngeal soft tissues, probably pharyngitis. Recommend follow-up to ensure resolution and to exclude an underlying mass.  - Started on Cepacol, First wash   - NPO for now, FS q6hrs  - Aspiration precautions   - Monitor respiratory status   - F/u S and S  - ENT consult - Patient came to ED c/o dysphagia, swollen tongue  - Esophagitis vs Pharyngitis vs oral infection    - On PE: swollen tongue, no skin changes, unable to visualize oropharynx, VS stable, afebrile, no leucocytosis    - CT neck non contrast Thickening of the left oropharyngeal and hypopharyngeal soft tissues, probably pharyngitis. Recommend follow-up to ensure resolution and to exclude an underlying mass.  - Started on Cepacol, First wash   - S/p 1 dose Rocephin in ER, will hold off further antibiotics since patient is afebrile and with no leucocytosis  - NPO for now, FS q6hrs  - Aspiration precautions   - Monitor respiratory status   - F/u S and S  - ENT Dr Nicholas consulted - Patient came to ED c/o dysphagia, swollen tongue  - Esophagitis vs Pharyngitis vs oral infection vs malignancy  - Findings: swollen tongue, no skin changes, unable to visualize oropharynx, VS stable, afebrile, no leucocytosis    - CT neck non contrast Thickening of the left oropharyngeal and hypopharyngeal soft tissues, probably pharyngitis. Recommend follow-up to ensure resolution and to exclude an underlying mass.  - Started on Cepacol, First wash - symptomatic management   - Started on IVF D5 1/2 NS 75cc/hr  - S/p 1 dose Rocephin in ER, will hold off further antibiotics since patient is afebrile and with no leucocytosis  - NPO for now, FS q6hrs  - Aspiration precautions   - Monitor respiratory status, head elevation 30-45 degrees   - F/u S and S  - ENT Dr Nicholas consulted  - Consider GI consult

## 2021-03-20 DIAGNOSIS — N17.9 ACUTE KIDNEY FAILURE, UNSPECIFIED: ICD-10-CM

## 2021-03-20 DIAGNOSIS — Z29.9 ENCOUNTER FOR PROPHYLACTIC MEASURES, UNSPECIFIED: ICD-10-CM

## 2021-03-20 DIAGNOSIS — I10 ESSENTIAL (PRIMARY) HYPERTENSION: ICD-10-CM

## 2021-03-20 DIAGNOSIS — M06.9 RHEUMATOID ARTHRITIS, UNSPECIFIED: ICD-10-CM

## 2021-03-20 DIAGNOSIS — C90.00 MULTIPLE MYELOMA NOT HAVING ACHIEVED REMISSION: ICD-10-CM

## 2021-03-20 DIAGNOSIS — R82.71 BACTERIURIA: ICD-10-CM

## 2021-03-20 DIAGNOSIS — D64.9 ANEMIA, UNSPECIFIED: ICD-10-CM

## 2021-03-20 DIAGNOSIS — R13.10 DYSPHAGIA, UNSPECIFIED: ICD-10-CM

## 2021-03-20 PROBLEM — I83.90 ASYMPTOMATIC VARICOSE VEINS OF UNSPECIFIED LOWER EXTREMITY: Chronic | Status: ACTIVE | Noted: 2021-02-27

## 2021-03-20 LAB
24R-OH-CALCIDIOL SERPL-MCNC: 27.9 NG/ML — LOW (ref 30–80)
ANION GAP SERPL CALC-SCNC: 5 MMOL/L — SIGNIFICANT CHANGE UP (ref 5–17)
APTT BLD: 30.5 SEC — SIGNIFICANT CHANGE UP (ref 27.5–35.5)
BLD GP AB SCN SERPL QL: SIGNIFICANT CHANGE UP
BUN SERPL-MCNC: 16 MG/DL — SIGNIFICANT CHANGE UP (ref 7–18)
CALCIUM SERPL-MCNC: 8.9 MG/DL — SIGNIFICANT CHANGE UP (ref 8.4–10.5)
CHLORIDE SERPL-SCNC: 110 MMOL/L — HIGH (ref 96–108)
CHOLEST SERPL-MCNC: 73 MG/DL — SIGNIFICANT CHANGE UP
CO2 SERPL-SCNC: 26 MMOL/L — SIGNIFICANT CHANGE UP (ref 22–31)
COVID-19 SPIKE DOMAIN AB INTERP: NEGATIVE — SIGNIFICANT CHANGE UP
COVID-19 SPIKE DOMAIN ANTIBODY RESULT: 0.4 U/ML — SIGNIFICANT CHANGE UP
CREAT SERPL-MCNC: 1.39 MG/DL — HIGH (ref 0.5–1.3)
FOLATE SERPL-MCNC: >20 NG/ML — SIGNIFICANT CHANGE UP
GLUCOSE BLDC GLUCOMTR-MCNC: 108 MG/DL — HIGH (ref 70–99)
GLUCOSE BLDC GLUCOMTR-MCNC: 120 MG/DL — HIGH (ref 70–99)
GLUCOSE SERPL-MCNC: 115 MG/DL — HIGH (ref 70–99)
HCT VFR BLD CALC: 27 % — LOW (ref 34.5–45)
HDLC SERPL-MCNC: 43 MG/DL — LOW
HGB BLD-MCNC: 9 G/DL — LOW (ref 11.5–15.5)
INR BLD: 1.34 RATIO — HIGH (ref 0.88–1.16)
LIPID PNL WITH DIRECT LDL SERPL: 22 MG/DL — SIGNIFICANT CHANGE UP
MAGNESIUM SERPL-MCNC: 2.4 MG/DL — SIGNIFICANT CHANGE UP (ref 1.6–2.6)
MCHC RBC-ENTMCNC: 28.3 PG — SIGNIFICANT CHANGE UP (ref 27–34)
MCHC RBC-ENTMCNC: 33.3 GM/DL — SIGNIFICANT CHANGE UP (ref 32–36)
MCV RBC AUTO: 84.9 FL — SIGNIFICANT CHANGE UP (ref 80–100)
NON HDL CHOLESTEROL: 30 MG/DL — SIGNIFICANT CHANGE UP
NRBC # BLD: 0 /100 WBCS — SIGNIFICANT CHANGE UP (ref 0–0)
PHOSPHATE SERPL-MCNC: 2.2 MG/DL — LOW (ref 2.5–4.5)
PLATELET # BLD AUTO: 175 K/UL — SIGNIFICANT CHANGE UP (ref 150–400)
POTASSIUM SERPL-MCNC: 3.5 MMOL/L — SIGNIFICANT CHANGE UP (ref 3.5–5.3)
POTASSIUM SERPL-SCNC: 3.5 MMOL/L — SIGNIFICANT CHANGE UP (ref 3.5–5.3)
PROTHROM AB SERPL-ACNC: 15.7 SEC — HIGH (ref 10.6–13.6)
RBC # BLD: 3.18 M/UL — LOW (ref 3.8–5.2)
RBC # FLD: 16.9 % — HIGH (ref 10.3–14.5)
SARS-COV-2 IGG+IGM SERPL QL IA: 0.4 U/ML — SIGNIFICANT CHANGE UP
SARS-COV-2 IGG+IGM SERPL QL IA: NEGATIVE — SIGNIFICANT CHANGE UP
SODIUM SERPL-SCNC: 141 MMOL/L — SIGNIFICANT CHANGE UP (ref 135–145)
TRIGL SERPL-MCNC: 40 MG/DL — SIGNIFICANT CHANGE UP
VIT B12 SERPL-MCNC: 286 PG/ML — SIGNIFICANT CHANGE UP (ref 232–1245)
WBC # BLD: 5.19 K/UL — SIGNIFICANT CHANGE UP (ref 3.8–10.5)
WBC # FLD AUTO: 5.19 K/UL — SIGNIFICANT CHANGE UP (ref 3.8–10.5)

## 2021-03-20 PROCEDURE — 99233 SBSQ HOSP IP/OBS HIGH 50: CPT | Mod: GC

## 2021-03-20 RX ORDER — SODIUM CHLORIDE 9 MG/ML
1000 INJECTION, SOLUTION INTRAVENOUS
Refills: 0 | Status: DISCONTINUED | OUTPATIENT
Start: 2021-03-20 | End: 2021-03-22

## 2021-03-20 RX ORDER — LIDOCAINE 4 G/100G
1 CREAM TOPICAL EVERY 24 HOURS
Refills: 0 | Status: DISCONTINUED | OUTPATIENT
Start: 2021-03-20 | End: 2021-03-29

## 2021-03-20 RX ORDER — ACETAMINOPHEN 500 MG
1000 TABLET ORAL ONCE
Refills: 0 | Status: COMPLETED | OUTPATIENT
Start: 2021-03-20 | End: 2021-03-20

## 2021-03-20 RX ORDER — ENOXAPARIN SODIUM 100 MG/ML
30 INJECTION SUBCUTANEOUS DAILY
Refills: 0 | Status: DISCONTINUED | OUTPATIENT
Start: 2021-03-20 | End: 2021-03-27

## 2021-03-20 RX ORDER — AMPICILLIN SODIUM AND SULBACTAM SODIUM 250; 125 MG/ML; MG/ML
1.5 INJECTION, POWDER, FOR SUSPENSION INTRAMUSCULAR; INTRAVENOUS EVERY 6 HOURS
Refills: 0 | Status: DISCONTINUED | OUTPATIENT
Start: 2021-03-20 | End: 2021-03-29

## 2021-03-20 RX ORDER — DIPHENHYDRAMINE HYDROCHLORIDE AND LIDOCAINE HYDROCHLORIDE AND ALUMINUM HYDROXIDE AND MAGNESIUM HYDRO
10 KIT
Refills: 0 | Status: DISCONTINUED | OUTPATIENT
Start: 2021-03-20 | End: 2021-03-25

## 2021-03-20 RX ORDER — DIPHENHYDRAMINE HCL 50 MG
50 CAPSULE ORAL EVERY 8 HOURS
Refills: 0 | Status: DISCONTINUED | OUTPATIENT
Start: 2021-03-20 | End: 2021-03-21

## 2021-03-20 RX ORDER — POTASSIUM PHOSPHATE, MONOBASIC POTASSIUM PHOSPHATE, DIBASIC 236; 224 MG/ML; MG/ML
15 INJECTION, SOLUTION INTRAVENOUS ONCE
Refills: 0 | Status: COMPLETED | OUTPATIENT
Start: 2021-03-20 | End: 2021-03-20

## 2021-03-20 RX ORDER — PANTOPRAZOLE SODIUM 20 MG/1
40 TABLET, DELAYED RELEASE ORAL DAILY
Refills: 0 | Status: DISCONTINUED | OUTPATIENT
Start: 2021-03-20 | End: 2021-03-23

## 2021-03-20 RX ORDER — METOPROLOL TARTRATE 50 MG
2.5 TABLET ORAL EVERY 6 HOURS
Refills: 0 | Status: DISCONTINUED | OUTPATIENT
Start: 2021-03-20 | End: 2021-03-23

## 2021-03-20 RX ORDER — BENZOCAINE AND MENTHOL 5; 1 G/100ML; G/100ML
1 LIQUID ORAL
Refills: 0 | Status: DISCONTINUED | OUTPATIENT
Start: 2021-03-20 | End: 2021-03-29

## 2021-03-20 RX ADMIN — LIDOCAINE 1 PATCH: 4 CREAM TOPICAL at 07:17

## 2021-03-20 RX ADMIN — ENOXAPARIN SODIUM 30 MILLIGRAM(S): 100 INJECTION SUBCUTANEOUS at 11:44

## 2021-03-20 RX ADMIN — POTASSIUM PHOSPHATE, MONOBASIC POTASSIUM PHOSPHATE, DIBASIC 62.5 MILLIMOLE(S): 236; 224 INJECTION, SOLUTION INTRAVENOUS at 10:12

## 2021-03-20 RX ADMIN — AMPICILLIN SODIUM AND SULBACTAM SODIUM 100 GRAM(S): 250; 125 INJECTION, POWDER, FOR SUSPENSION INTRAMUSCULAR; INTRAVENOUS at 19:08

## 2021-03-20 RX ADMIN — Medication 50 MILLIGRAM(S): at 21:40

## 2021-03-20 RX ADMIN — LIDOCAINE 1 PATCH: 4 CREAM TOPICAL at 06:30

## 2021-03-20 RX ADMIN — Medication 2.5 MILLIGRAM(S): at 19:08

## 2021-03-20 RX ADMIN — Medication 40 MILLIGRAM(S): at 21:40

## 2021-03-20 RX ADMIN — DIPHENHYDRAMINE HYDROCHLORIDE AND LIDOCAINE HYDROCHLORIDE AND ALUMINUM HYDROXIDE AND MAGNESIUM HYDRO 10 MILLILITER(S): KIT at 11:44

## 2021-03-20 RX ADMIN — Medication 400 MILLIGRAM(S): at 23:02

## 2021-03-20 RX ADMIN — AMPICILLIN SODIUM AND SULBACTAM SODIUM 100 GRAM(S): 250; 125 INJECTION, POWDER, FOR SUSPENSION INTRAMUSCULAR; INTRAVENOUS at 12:39

## 2021-03-20 RX ADMIN — PANTOPRAZOLE SODIUM 40 MILLIGRAM(S): 20 TABLET, DELAYED RELEASE ORAL at 11:45

## 2021-03-20 RX ADMIN — DIPHENHYDRAMINE HYDROCHLORIDE AND LIDOCAINE HYDROCHLORIDE AND ALUMINUM HYDROXIDE AND MAGNESIUM HYDRO 10 MILLILITER(S): KIT at 06:48

## 2021-03-20 RX ADMIN — Medication 1000 MILLIGRAM(S): at 23:32

## 2021-03-20 RX ADMIN — Medication 50 MILLIGRAM(S): at 13:20

## 2021-03-20 RX ADMIN — Medication 2.5 MILLIGRAM(S): at 12:39

## 2021-03-20 RX ADMIN — CEFTRIAXONE 100 MILLIGRAM(S): 500 INJECTION, POWDER, FOR SOLUTION INTRAMUSCULAR; INTRAVENOUS at 01:03

## 2021-03-20 RX ADMIN — LIDOCAINE 1 PATCH: 4 CREAM TOPICAL at 19:05

## 2021-03-20 RX ADMIN — Medication 2.5 MILLIGRAM(S): at 06:47

## 2021-03-20 NOTE — PROGRESS NOTE ADULT - PROBLEM SELECTOR PLAN 8
IMPROVE VTE Individual Risk Assessment  RISK                                                                Points  [  ] Previous VTE                                                  3  [  ] Thrombophilia                                               2  [  ] Lower limb paralysis                                      2        (unable to hold up >15 seconds)    [  ] Current Cancer                                              2         (within 6 months)  [  ] Immobilization > 24 hrs                                1  [  ] ICU/CCU stay > 24 hours                              1  [  ] Age > 60                                                      1  IMPROVE VTE Score ___2______  DVT ppx: Subq Lovenox  GI ppx: Protonix  Diet: NPO  Electrolytes replaced PRN  Dispo: Home  FULL CODE

## 2021-03-20 NOTE — PROGRESS NOTE ADULT - PROBLEM SELECTOR PLAN 4
- Patient w/ hx of MM on hydroxychloroquine  - All PO meds on hold pending s/s - restart when cleared  - Follows up at [facility], has next appointment on April 2021 - Patient w/ hx of MM, last tx in 2020  - Follows up at [facility], has next appointment on April 2021

## 2021-03-20 NOTE — CONSULT NOTE ADULT - ENMT COMMENTS
glossitis , unable to visualize the pharynx secondary to an enlarged tongue difficulty talking and swallowing secondary to glossitis

## 2021-03-20 NOTE — PROGRESS NOTE ADULT - PROBLEM SELECTOR PLAN 3
- Patient with KAJAL on CKD  - Now resolved Cr 1.3, baseline around 1.5-1.6  -C/w IVF D5 1/2 NS 75cc/hr while NPO  - Avoid nephrotoxins   - Monitor BMP

## 2021-03-20 NOTE — PROGRESS NOTE ADULT - SUBJECTIVE AND OBJECTIVE BOX
PGY 1 Note discussed with supervising resident and primary attending  PGY-1: Fatimah Resendiz MD  PAGER #: [phone] TILL 5:00 PM  Please contact On Call team 5PM - 8:30PM  Please contact Nightfloat team 8:30PM - 7:30AM  Patient is a 81y old  Female who presents with a chief complaint of Dysphagia and Tongue swelling (19 Mar 2021 22:32)    Brief Hospital Course: 81 year old female from home ambulates w/ walker/cane, has past medical hx of MM, hypertension, RA, bladder incontinence came to ED c/o 2 days of dysphagia and 1 one day of tongue/left jaw swelling. Patient refers not able to eat or drink anything, denies sob, sore troat, fever, weight loss, previous episodes, cough, chills.   During interview, patient was having trouble verbalizing words due to her tongue swelling, no drooling, saturating well. Patient denies any medication or food allergies, no recent changes in diet or medications. Also reports swelling if her gum for which she intended to see a dentist but presented to ER for worsening pain and swelling of jaw.    Off note: patient was recently discharge from [facility] treated for UTI and back pain     GOC: Full code     INTERVAL HPI/OVERNIGHT EVENTS: No acute events noted overnight. Pt reports feeling improvement in swelling. Will await ENT/ S/S and GI eval.     MEDICATIONS  (STANDING):  benzocaine 15 mG/menthol 3.6 mG (Sugar-Free) Lozenge 1 Lozenge Oral two times a day  dextrose 5% + sodium chloride 0.45%. 1000 milliLiter(s) (75 mL/Hr) IV Continuous <Continuous>  enoxaparin Injectable 30 milliGRAM(s) SubCutaneous daily  FIRST- Mouthwash  BLM 10 milliLiter(s) Swish and Spit four times a day  lidocaine   Patch 1 Patch Transdermal every 24 hours  metoprolol tartrate Injectable 2.5 milliGRAM(s) IV Push every 6 hours  pantoprazole  Injectable 40 milliGRAM(s) IV Push daily  potassium phosphate IVPB 15 milliMole(s) IV Intermittent once    MEDICATIONS  (PRN):      __________________________________________________  REVIEW OF SYSTEMS:  CONSTITUTIONAL: No fever, weight loss, or fatigue  RESPIRATORY: No cough, wheezing, chills or hemoptysis; No shortness of breath  CARDIOVASCULAR: No chest pain, palpitations, dizziness, or leg swelling  GASTROINTESTINAL: +dysphagia, tongue swelling No abdominal pain. No nausea, vomiting, or hematemesis; No diarrhea or constipation. No melena or hematochezia.  GENITOURINARY: No dysuria or hematuria, no burning micturition, no polyuria, no urinary hesitancy, no nocturia, normal urinary frequency  NEUROLOGICAL: No headaches, memory loss, loss of strength, numbness, or tremors  SKIN: No itching, burning, rashes, or lesions   All other ROS negative except noted above    Vital Signs Last 24 Hrs  T(C): 37.1 (20 Mar 2021 05:40), Max: 37.2 (19 Mar 2021 23:18)  T(F): 98.7 (20 Mar 2021 05:40), Max: 99 (19 Mar 2021 23:18)  HR: 99 (20 Mar 2021 05:40) (70 - 99)  BP: 146/74 (20 Mar 2021 05:40) (146/74 - 164/77)  BP(mean): --  RR: 17 (20 Mar 2021 05:40) (16 - 17)  SpO2: 100% (20 Mar 2021 05:40) (95% - 100%)    ________________________________________________  PHYSICAL EXAMINATION:  GENERAL: NAD, well-developed  HEAD: Swollen tongue, unable to visualize oropharynx Atraumatic, Normocephalic  EYES:  conjunctiva and sclera clear  NECK: Enlarged neck, tender to palpation  CHEST/LUNG: Clear to auscultation bilaterally; No rales, rhonchi, wheezing, or rubs  HEART: Regular rate and rhythm; No murmurs, rubs, or gallops  ABDOMEN: Soft, Nontender, Nondistended; Bowel sounds present  NERVOUS SYSTEM:  Alert & Oriented X3,  Moving all 4 extremities  EXTREMITIES:  Peripheral pulses palpable. No clubbing, cyanosis, or edema  SKIN: Warm, Dry, no rashes or lesions    _________________________________________________  LABS:                        9.0    5.19  )-----------( 175      ( 20 Mar 2021 07:17 )             27.0     03-20    141  |  110<H>  |  16  ----------------------------<  115<H>  3.5   |  26  |  1.39<H>    Ca    8.9      20 Mar 2021 07:17  Phos  2.2     03-20  Mg     2.4     03-20    TPro  10.1<H>  /  Alb  2.4<L>  /  TBili  0.6  /  DBili  x   /  AST  18  /  ALT  16  /  AlkPhos  96  03-19    PT/INR - ( 20 Mar 2021 07:17 )   PT: 15.7 sec;   INR: 1.34 ratio         PTT - ( 20 Mar 2021 07:17 )  PTT:30.5 sec  Urinalysis Basic - ( 19 Mar 2021 23:40 )    Color: Yellow / Appearance: Clear / S.010 / pH: x  Gluc: x / Ketone: Negative  / Bili: Negative / Urobili: Negative   Blood: x / Protein: 30 mg/dL / Nitrite: Negative   Leuk Esterase: Moderate / RBC: 5-10 /HPF / WBC 26-50 /HPF   Sq Epi: x / Non Sq Epi: Few /HPF / Bacteria: Few /HPF      CAPILLARY BLOOD GLUCOSE      POCT Blood Glucose.: 120 mg/dL (20 Mar 2021 08:04)        RADIOLOGY & ADDITIONAL TESTS:    Imaging Personally Reviewed:  YES    Consultant(s) Notes Reviewed:   YES    Care Discussed with Consultants : YES     Plan of care was discussed with patient and /or primary care giver; all questions and concerns were addressed and care was aligned with patient's wishes.     PGY 1 Note discussed with supervising resident and primary attending  PGY-1: Fatimah Resendiz MD  PAGER #: [phone] TILL 5:00 PM  Please contact On Call team 5PM - 8:30PM  Please contact Nightfloat team 8:30PM - 7:30AM  Patient is a 81y old  Female who presents with a chief complaint of Dysphagia and Tongue swelling (19 Mar 2021 22:32)    Brief Hospital Course: 81 year old female from home ambulates w/ walker/cane, has past medical hx of MM, hypertension, RA, bladder incontinence came to ED c/o 2 days of dysphagia and 1 one day of tongue/left jaw swelling. Patient refers not able to eat or drink anything, denies sob, sore troat, fever, weight loss, previous episodes, cough, chills.   During interview, patient was having trouble verbalizing words due to her tongue swelling, no drooling, saturating well. Patient denies any medication or food allergies, no recent changes in diet or medications. Also reports swelling if her gum for which she intended to see a dentist but presented to ER for worsening pain and swelling of jaw.    Off note: patient was recently discharge from [facility] treated for UTI and back pain     GOC: Full code     INTERVAL HPI/OVERNIGHT EVENTS: No acute events noted overnight. Pt reports feeling improvement in swelling. Will await ENT/ S/S and GI eval. Started Unasyn. STALIN Quigley consulted.     MEDICATIONS  (STANDING):  benzocaine 15 mG/menthol 3.6 mG (Sugar-Free) Lozenge 1 Lozenge Oral two times a day  dextrose 5% + sodium chloride 0.45%. 1000 milliLiter(s) (75 mL/Hr) IV Continuous <Continuous>  enoxaparin Injectable 30 milliGRAM(s) SubCutaneous daily  FIRST- Mouthwash  BLM 10 milliLiter(s) Swish and Spit four times a day  lidocaine   Patch 1 Patch Transdermal every 24 hours  metoprolol tartrate Injectable 2.5 milliGRAM(s) IV Push every 6 hours  pantoprazole  Injectable 40 milliGRAM(s) IV Push daily  potassium phosphate IVPB 15 milliMole(s) IV Intermittent once    MEDICATIONS  (PRN):      __________________________________________________  REVIEW OF SYSTEMS:  CONSTITUTIONAL: No fever, weight loss, or fatigue  RESPIRATORY: No cough, wheezing, chills or hemoptysis; No shortness of breath  CARDIOVASCULAR: No chest pain, palpitations, dizziness, or leg swelling  GASTROINTESTINAL: +dysphagia, tongue swelling No abdominal pain. No nausea, vomiting, or hematemesis; No diarrhea or constipation. No melena or hematochezia.  GENITOURINARY: No dysuria or hematuria, no burning micturition, no polyuria, no urinary hesitancy, no nocturia, normal urinary frequency  NEUROLOGICAL: No headaches, memory loss, loss of strength, numbness, or tremors  SKIN: No itching, burning, rashes, or lesions   All other ROS negative except noted above    Vital Signs Last 24 Hrs  T(C): 37.1 (20 Mar 2021 05:40), Max: 37.2 (19 Mar 2021 23:18)  T(F): 98.7 (20 Mar 2021 05:40), Max: 99 (19 Mar 2021 23:18)  HR: 99 (20 Mar 2021 05:40) (70 - 99)  BP: 146/74 (20 Mar 2021 05:40) (146/74 - 164/77)  BP(mean): --  RR: 17 (20 Mar 2021 05:40) (16 - 17)  SpO2: 100% (20 Mar 2021 05:40) (95% - 100%)    ________________________________________________  PHYSICAL EXAMINATION:  GENERAL: NAD, well-developed  HEAD: Swollen tongue, unable to visualize oropharynx Atraumatic, Normocephalic  EYES:  conjunctiva and sclera clear  NECK: Enlarged neck, tender to palpation  CHEST/LUNG: Clear to auscultation bilaterally; No rales, rhonchi, wheezing, or rubs  HEART: Regular rate and rhythm; No murmurs, rubs, or gallops  ABDOMEN: Soft, Nontender, Nondistended; Bowel sounds present  NERVOUS SYSTEM:  Alert & Oriented X3,  Moving all 4 extremities  EXTREMITIES:  Peripheral pulses palpable. No clubbing, cyanosis, or edema  SKIN: Warm, Dry, no rashes or lesions    _________________________________________________  LABS:                        9.0    5.19  )-----------( 175      ( 20 Mar 2021 07:17 )             27.0     03-20    141  |  110<H>  |  16  ----------------------------<  115<H>  3.5   |  26  |  1.39<H>    Ca    8.9      20 Mar 2021 07:17  Phos  2.2     03-20  Mg     2.4     03-20    TPro  10.1<H>  /  Alb  2.4<L>  /  TBili  0.6  /  DBili  x   /  AST  18  /  ALT  16  /  AlkPhos  96  03-19    PT/INR - ( 20 Mar 2021 07:17 )   PT: 15.7 sec;   INR: 1.34 ratio         PTT - ( 20 Mar 2021 07:17 )  PTT:30.5 sec  Urinalysis Basic - ( 19 Mar 2021 23:40 )    Color: Yellow / Appearance: Clear / S.010 / pH: x  Gluc: x / Ketone: Negative  / Bili: Negative / Urobili: Negative   Blood: x / Protein: 30 mg/dL / Nitrite: Negative   Leuk Esterase: Moderate / RBC: 5-10 /HPF / WBC 26-50 /HPF   Sq Epi: x / Non Sq Epi: Few /HPF / Bacteria: Few /HPF      CAPILLARY BLOOD GLUCOSE      POCT Blood Glucose.: 120 mg/dL (20 Mar 2021 08:04)        RADIOLOGY & ADDITIONAL TESTS:    Imaging Personally Reviewed:  YES    Consultant(s) Notes Reviewed:   YES    Care Discussed with Consultants : YES     Plan of care was discussed with patient and /or primary care giver; all questions and concerns were addressed and care was aligned with patient's wishes.

## 2021-03-20 NOTE — CONSULT NOTE ADULT - SUBJECTIVE AND OBJECTIVE BOX
HPI:  81 year old female from home ambulates w/ walker/cane, has past medical hx of MM, hypertension, RA, bladder incontinence came to ED c/o 2 days of dysphagia and 1 one day of tongue/left jaw swelling. Patient refers not able to eat or drink anything, denies sob, sore troat, fever, weight loss, previous episodes, cough, chills.   During interview, patient was having trouble verbalizing words due to her tongue swelling, no drooling, saturating well. Patient denies any medication or food allergies, no recent changes in diet or medications. Also reports swelling if her gum for which she intended to see a dentist but presented to ER for worsening pain and swelling of jaw.    Off note: patient was recently discharge from [facility] treated for UTI and back pain     GOC: Full code  (19 Mar 2021 22:32)      PAST MEDICAL & SURGICAL HISTORY:  Multiple myeloma    Hypertension    Varicose veins of lower extremity    Rheumatoid arthritis    No significant past surgical history        No Known Allergies      Meds:  ampicillin/sulbactam  IVPB 1.5 Gram(s) IV Intermittent every 6 hours  benzocaine 15 mG/menthol 3.6 mG (Sugar-Free) Lozenge 1 Lozenge Oral two times a day  dextrose 5% + sodium chloride 0.45%. 1000 milliLiter(s) IV Continuous <Continuous>  diphenhydrAMINE   Injectable 50 milliGRAM(s) IV Push every 8 hours  enoxaparin Injectable 30 milliGRAM(s) SubCutaneous daily  FIRST- Mouthwash  BLM 10 milliLiter(s) Swish and Spit four times a day  lidocaine   Patch 1 Patch Transdermal every 24 hours  metoprolol tartrate Injectable 2.5 milliGRAM(s) IV Push every 6 hours  pantoprazole  Injectable 40 milliGRAM(s) IV Push daily      SOCIAL HISTORY:  Smoker:  YES / NO        PACK YEARS:                         WHEN QUIT?  ETOH use:  YES / NO               FREQUENCY / QUANTITY:  Ilicit Drug use:  YES / NO  Occupation:  Assisted device use (Cane / Walker):  Live with:    FAMILY HISTORY:      VITALS:  Vital Signs Last 24 Hrs  T(C): 37.1 (20 Mar 2021 13:25), Max: 37.2 (19 Mar 2021 23:18)  T(F): 98.8 (20 Mar 2021 13:25), Max: 99 (19 Mar 2021 23:18)  HR: 81 (20 Mar 2021 13:25) (70 - 99)  BP: 149/71 (20 Mar 2021 13:25) (144/70 - 151/66)  BP(mean): --  RR: 17 (20 Mar 2021 13:25) (17 - 17)  SpO2: 97% (20 Mar 2021 13:25) (97% - 100%)    LABS/DIAGNOSTIC TESTS:                          9.0    5.19  )-----------( 175      ( 20 Mar 2021 07:17 )             27.0     WBC Count: 5.19 K/uL ( @ 07:17)  WBC Count: 7.52 K/uL ( @ 19:16)      03-    141  |  110<H>  |  16  ----------------------------<  115<H>  3.5   |  26  |  1.39<H>    Ca    8.9      20 Mar 2021 07:17  Phos  2.2       Mg     2.4         TPro  10.1<H>  /  Alb  2.4<L>  /  TBili  0.6  /  DBili  x   /  AST  18  /  ALT  16  /  AlkPhos  96        Urinalysis Basic - ( 19 Mar 2021 23:40 )    Color: Yellow / Appearance: Clear / S.010 / pH: x  Gluc: x / Ketone: Negative  / Bili: Negative / Urobili: Negative   Blood: x / Protein: 30 mg/dL / Nitrite: Negative   Leuk Esterase: Moderate / RBC: 5-10 /HPF / WBC 26-50 /HPF   Sq Epi: x / Non Sq Epi: Few /HPF / Bacteria: Few /HPF        LIVER FUNCTIONS - ( 19 Mar 2021 19:16 )  Alb: 2.4 g/dL / Pro: 10.1 g/dL / ALK PHOS: 96 U/L / ALT: 16 U/L DA / AST: 18 U/L / GGT: x             PT/INR - ( 20 Mar 2021 07:17 )   PT: 15.7 sec;   INR: 1.34 ratio         PTT - ( 20 Mar 2021 07:17 )  PTT:30.5 sec    LACTATE:    ABG -     CULTURES:   .Urine Clean Catch (Midstream)   @ 06:08   >=3 organisms. Probable collection contamination.  --  --          RADIOLOGY:      ROS  [  ] UNABLE TO ELICIT               HPI:  81 year old female from home ambulates w/ walker/cane, has past medical hx of MM, hypertension, RA, bladder incontinence came to ED c/o 2 days of dysphagia and 1 one day of tongue/left jaw swelling. Patient refers not able to eat or drink anything, denies sob, sore troat, fever, weight loss, previous episodes, cough, chills.   During interview, patient was having trouble verbalizing words due to her tongue swelling, no drooling, saturating well. Patient denies any medication or food allergies, no recent changes in diet or medications. Also reports swelling if her gum for which she intended to see a dentist but presented to ER for worsening pain and swelling of jaw.    Off note: patient was recently discharge from [facility] treated for UTI and back pain     GOC: Full code  (19 Mar 2021 22:32)      PAST MEDICAL & SURGICAL HISTORY:  Multiple myeloma    Hypertension    Varicose veins of lower extremity    Rheumatoid arthritis    No significant past surgical history        No Known Allergies      Meds:  ampicillin/sulbactam  IVPB 1.5 Gram(s) IV Intermittent every 6 hours  benzocaine 15 mG/menthol 3.6 mG (Sugar-Free) Lozenge 1 Lozenge Oral two times a day  dextrose 5% + sodium chloride 0.45%. 1000 milliLiter(s) IV Continuous <Continuous>  diphenhydrAMINE   Injectable 50 milliGRAM(s) IV Push every 8 hours  enoxaparin Injectable 30 milliGRAM(s) SubCutaneous daily  FIRST- Mouthwash  BLM 10 milliLiter(s) Swish and Spit four times a day  lidocaine   Patch 1 Patch Transdermal every 24 hours  metoprolol tartrate Injectable 2.5 milliGRAM(s) IV Push every 6 hours  pantoprazole  Injectable 40 milliGRAM(s) IV Push daily      SOCIAL HISTORY:  Smoker:  YES / NO        PACK YEARS:                         WHEN QUIT?  ETOH use:  YES / NO               FREQUENCY / QUANTITY:  Ilicit Drug use:  YES / NO  Occupation:  Assisted device use (Cane / Walker):  Live with:    FAMILY HISTORY:      VITALS:  Vital Signs Last 24 Hrs  T(C): 37.1 (20 Mar 2021 13:25), Max: 37.2 (19 Mar 2021 23:18)  T(F): 98.8 (20 Mar 2021 13:25), Max: 99 (19 Mar 2021 23:18)  HR: 81 (20 Mar 2021 13:25) (70 - 99)  BP: 149/71 (20 Mar 2021 13:25) (144/70 - 151/66)  BP(mean): --  RR: 17 (20 Mar 2021 13:25) (17 - 17)  SpO2: 97% (20 Mar 2021 13:25) (97% - 100%)    LABS/DIAGNOSTIC TESTS:                          9.0    5.19  )-----------( 175      ( 20 Mar 2021 07:17 )             27.0     WBC Count: 5.19 K/uL ( @ 07:17)  WBC Count: 7.52 K/uL ( @ 19:16)      03-    141  |  110<H>  |  16  ----------------------------<  115<H>  3.5   |  26  |  1.39<H>    Ca    8.9      20 Mar 2021 07:17  Phos  2.2       Mg     2.4         TPro  10.1<H>  /  Alb  2.4<L>  /  TBili  0.6  /  DBili  x   /  AST  18  /  ALT  16  /  AlkPhos  96        Urinalysis Basic - ( 19 Mar 2021 23:40 )    Color: Yellow / Appearance: Clear / S.010 / pH: x  Gluc: x / Ketone: Negative  / Bili: Negative / Urobili: Negative   Blood: x / Protein: 30 mg/dL / Nitrite: Negative   Leuk Esterase: Moderate / RBC: 5-10 /HPF / WBC 26-50 /HPF   Sq Epi: x / Non Sq Epi: Few /HPF / Bacteria: Few /HPF        LIVER FUNCTIONS - ( 19 Mar 2021 19:16 )  Alb: 2.4 g/dL / Pro: 10.1 g/dL / ALK PHOS: 96 U/L / ALT: 16 U/L DA / AST: 18 U/L / GGT: x             PT/INR - ( 20 Mar 2021 07:17 )   PT: 15.7 sec;   INR: 1.34 ratio         PTT - ( 20 Mar 2021 07:17 )  PTT:30.5 sec    LACTATE:    ABG -     CULTURES:   .Urine Clean Catch (Midstream)   @ 06:08   >=3 organisms. Probable collection contamination.  --  --          RADIOLOGY:< from: CT Neck Soft Tissue No Cont (21 @ 21:29) >  EXAM:  CT NECK SOFT TISSUE                            PROCEDURE DATE:  2021          INTERPRETATION:  CT NECK WITHOUT CONTRAST    HISTORY: left jaw pain/trouble swallowing.    COMPARISON: None available.    TECHNIQUE: CT scan of the neck was obtained without the administration of iodinated intravenous contrast. Sagittal and coronal reformations were made.  Lack of contrast limits evaluation.    FINDINGS:  There is left lateral oropharyngeal and hypopharyngeal soft tissue thickening, with a segment of the left vallecula and partially of the left piriform sinus. Adjacent fat stranding is present.    There is no abnormally enlarged cervical adenopathy by size criteria within the constraints of noncontrast imaging.    The oropharynx, nasopharynx, hypopharynx, and the laryngeal structures are unremarkable.    The parotid, submandibular, and thyroid glands are unremarkable.    The visualized paranasal sinuses and mastoid air cells are well aerated.    The visualized lung apices are clear.    Degenerative changes in the cervical spine.    IMPRESSION:    Thickening of the left oropharyngeal and hypopharyngeal soft tissues, probably pharyngitis. Recommend follow-up to ensure resolution and to exclude an underlying mass.            AURELIANO CANO MD; Attending Radiologist  This document has been electronically signed. Mar 19 2021  9:55PM    ------------------------------------------------------------------------------------------------------------------------------------------------------------------------------------------------------  EXAM:  XR CHEST PORTABLE URGENT 1V                            PROCEDURE DATE:  2021          INTERPRETATION:  History: Difficulty swallowing.    AP view of the chest was obtained.    Comparison: 2021.    Findings:    Scattered calcified granulomata are seen within the right lung. The lungs are otherwise clear. The heart size is mildly enlarged..    Impression: Unchanged mild cardiomegaly.              JERED SOLIS MD; Attending Radiologist  This document has been electronically signed. Mar 20 2021 10:45AM    < end of copied text >        ROS  [  ] UNABLE TO ELICIT               HPI:  81 year old female from home ambulates w/ walker/cane, has past medical hx of MM, hypertension, RA, bladder incontinence came to ED c/o 2 days of dysphagia and 1 one day of tongue/left jaw swelling. Patient refers not able to eat or drink anything, denies sob, sore troat, fever, weight loss, previous episodes, cough, chills.   During interview, patient was having trouble verbalizing words due to her tongue swelling, no drooling, saturating well. Patient denies any medication or food allergies, no recent changes in diet or medications. Also reports swelling if her gum for which she intended to see a dentist but presented to ER for worsening pain and swelling of jaw.  Of note: patient was recently discharge from [facility] treated for UTI and back pain     History as above, asked to evaluate patient as she has developed swelling of her tongue and left sided neck swelling and pain with difficulty of speech and swallowing. She has no fevers or chills, she has no Leukocytosis. She had a CT of her neck and does not show her to have any abscess but appears to have pharyngitis. She is currently on Unasyn.      PAST MEDICAL & SURGICAL HISTORY:  Multiple myeloma    Hypertension    Varicose veins of lower extremity    Rheumatoid arthritis    No significant past surgical history        No Known Allergies      Meds:  ampicillin/sulbactam  IVPB 1.5 Gram(s) IV Intermittent every 6 hours  benzocaine 15 mG/menthol 3.6 mG (Sugar-Free) Lozenge 1 Lozenge Oral two times a day  dextrose 5% + sodium chloride 0.45%. 1000 milliLiter(s) IV Continuous <Continuous>  diphenhydrAMINE   Injectable 50 milliGRAM(s) IV Push every 8 hours  enoxaparin Injectable 30 milliGRAM(s) SubCutaneous daily  FIRST- Mouthwash  BLM 10 milliLiter(s) Swish and Spit four times a day  lidocaine   Patch 1 Patch Transdermal every 24 hours  metoprolol tartrate Injectable 2.5 milliGRAM(s) IV Push every 6 hours  pantoprazole  Injectable 40 milliGRAM(s) IV Push daily      SOCIAL HISTORY:  Smoker:  no  ETOH use:  no      FAMILY HISTORY: not sig      VITALS:  Vital Signs Last 24 Hrs  T(C): 37.1 (20 Mar 2021 13:25), Max: 37.2 (19 Mar 2021 23:18)  T(F): 98.8 (20 Mar 2021 13:25), Max: 99 (19 Mar 2021 23:18)  HR: 81 (20 Mar 2021 13:25) (70 - 99)  BP: 149/71 (20 Mar 2021 13:25) (144/70 - 151/66)  BP(mean): --  RR: 17 (20 Mar 2021 13:25) (17 - 17)  SpO2: 97% (20 Mar 2021 13:25) (97% - 100%)    LABS/DIAGNOSTIC TESTS:                          9.0    5.19  )-----------( 175      ( 20 Mar 2021 07:17 )             27.0     WBC Count: 5.19 K/uL ( @ 07:17)  WBC Count: 7.52 K/uL ( @ 19:16)      03-    141  |  110<H>  |  16  ----------------------------<  115<H>  3.5   |  26  |  1.39<H>    Ca    8.9      20 Mar 2021 07:17  Phos  2.2     03-  Mg     2.4         TPro  10.1<H>  /  Alb  2.4<L>  /  TBili  0.6  /  DBili  x   /  AST  18  /  ALT  16  /  AlkPhos  96  03-19      Urinalysis Basic - ( 19 Mar 2021 23:40 )    Color: Yellow / Appearance: Clear / S.010 / pH: x  Gluc: x / Ketone: Negative  / Bili: Negative / Urobili: Negative   Blood: x / Protein: 30 mg/dL / Nitrite: Negative   Leuk Esterase: Moderate / RBC: 5-10 /HPF / WBC 26-50 /HPF   Sq Epi: x / Non Sq Epi: Few /HPF / Bacteria: Few /HPF        LIVER FUNCTIONS - ( 19 Mar 2021 19:16 )  Alb: 2.4 g/dL / Pro: 10.1 g/dL / ALK PHOS: 96 U/L / ALT: 16 U/L DA / AST: 18 U/L / GGT: x             PT/INR - ( 20 Mar 2021 07:17 )   PT: 15.7 sec;   INR: 1.34 ratio         PTT - ( 20 Mar 2021 07:17 )  PTT:30.5 sec    LACTATE:    ABG -     CULTURES:   .Urine Clean Catch (Midstream)   @ 06:08   >=3 organisms. Probable collection contamination.  --  --          RADIOLOGY:< from: CT Neck Soft Tissue No Cont (21 @ 21:29) >  EXAM:  CT NECK SOFT TISSUE                            PROCEDURE DATE:  2021          INTERPRETATION:  CT NECK WITHOUT CONTRAST    HISTORY: left jaw pain/trouble swallowing.    COMPARISON: None available.    TECHNIQUE: CT scan of the neck was obtained without the administration of iodinated intravenous contrast. Sagittal and coronal reformations were made.  Lack of contrast limits evaluation.    FINDINGS:  There is left lateral oropharyngeal and hypopharyngeal soft tissue thickening, with a segment of the left vallecula and partially of the left piriform sinus. Adjacent fat stranding is present.    There is no abnormally enlarged cervical adenopathy by size criteria within the constraints of noncontrast imaging.    The oropharynx, nasopharynx, hypopharynx, and the laryngeal structures are unremarkable.    The parotid, submandibular, and thyroid glands are unremarkable.    The visualized paranasal sinuses and mastoid air cells are well aerated.    The visualized lung apices are clear.    Degenerative changes in the cervical spine.    IMPRESSION:    Thickening of the left oropharyngeal and hypopharyngeal soft tissues, probably pharyngitis. Recommend follow-up to ensure resolution and to exclude an underlying mass.            AURELIANO CANO MD; Attending Radiologist  This document has been electronically signed. Mar 19 2021  9:55PM    ------------------------------------------------------------------------------------------------------------------------------------------------------------------------------------------------------  EXAM:  XR CHEST PORTABLE URGENT 1V                            PROCEDURE DATE:  2021          INTERPRETATION:  History: Difficulty swallowing.    AP view of the chest was obtained.    Comparison: 2021.    Findings:    Scattered calcified granulomata are seen within the right lung. The lungs are otherwise clear. The heart size is mildly enlarged..    Impression: Unchanged mild cardiomegaly.              JERED SOLIS MD; Attending Radiologist  This document has been electronically signed. Mar 20 2021 10:45AM    < end of copied text >        ROS  [  ] UNABLE TO ELICIT

## 2021-03-20 NOTE — PROGRESS NOTE ADULT - PROBLEM SELECTOR PLAN 6
- Patient has history of Hypertension on amlodipine and metoprolol at home   - Hold po meds  - Started on Lopressor 2.5 mg iv q6 hrs   - Monitor BP and adjust meds as needed

## 2021-03-20 NOTE — PROGRESS NOTE ADULT - ATTENDING COMMENTS
Patient is a 81y old  Female who presents with a chief complaint of Dysphagia and Tongue swelling (20 Mar 2021 18:34)    Patient was seen and examined at bedside   Reports swelling has somehow got better, denies SOB but still has difficulty swallowing  Complains of mild pain in the left side of neck   Foul smelling breath    INTERVAL HPI/OVERNIGHT EVENTS:  T(C): 37.1 (21 @ 13:25), Max: 37.2 (21 @ 23:18)  HR: 100 (21 @ 19:07) (70 - 100)  BP: 145/66 (21 @ 19:07) (144/70 - 151/66)  RR: 17 (21 @ 19:07) (17 - 17)  SpO2: 96% (21 @ 19:07) (96% - 100%)  Wt(kg): --  I&O's Summary    REVIEW OF SYSTEMS: denies fever, chills, SOB, palpitations, chest pain, abdominal pain, nausea, vomiting, diarrhea, constipation, dizziness    PHYSICAL EXAM:  GENERAL: NAD, well-groomed, well-developed  Oral cavity: tongue swollen, left>right, only half of the palate seen, tenderness on palpation of left side of neck, no erythema  NERVOUS SYSTEM:  Alert & Oriented X2, Good concentration; no focal deficit  CHEST/LUNG: Clear to auscultation bilaterally; No rales, rhonchi, wheezing, or rubs  HEART: Regular rate and rhythm; No murmurs, rubs, or gallops  ABDOMEN: Soft, Nontender, Nondistended; Bowel sounds present  EXTREMITIES:  2+ Peripheral Pulses, No clubbing, cyanosis, or edema  SKIN: No rashes or lesions    LABS:                        9.0    5.19  )-----------( 175      ( 20 Mar 2021 07:17 )             27.0     141  |  110<H>  |  16  ----------------------------<  115<H>  3.5   |  26  |  1.39<H>    Ca    8.9      20 Mar 2021 07:17  Phos  2.2     03-20  Mg     2.4     03-20    TPro  10.1<H>  /  Alb  2.4<L>  /  TBili  0.6  /  DBili  x   /  AST  18  /  ALT  16  /  AlkPhos  96  03-19    PT/INR - ( 20 Mar 2021 07:17 )   PT: 15.7 sec;   INR: 1.34 ratio         PTT - ( 20 Mar 2021 07:17 )  PTT:30.5 sec  Urinalysis Basic - ( 19 Mar 2021 23:40 )    Color: Yellow / Appearance: Clear / S.010 / pH: x  Gluc: x / Ketone: Negative  / Bili: Negative / Urobili: Negative   Blood: x / Protein: 30 mg/dL / Nitrite: Negative   Leuk Esterase: Moderate / RBC: 5-10 /HPF / WBC 26-50 /HPF   Sq Epi: x / Non Sq Epi: Few /HPF / Bacteria: Few /HPF      CAPILLARY BLOOD GLUCOSE      POCT Blood Glucose.: 108 mg/dL (20 Mar 2021 18:34)  POCT Blood Glucose.: 120 mg/dL (20 Mar 2021 08:04)      A/P:  Dysphagia and Tongue Swelling due to ?pharyngitis or infection of floor of mouth   KAJAL on CKD  Multiple anemia   HTN  RA    Plan:  Will start on Unasyn  Consulted Dr. Multani, agree with Steroid  Cont Benadryl  Signed out to senior resident and nocturnist for airway monitoring   Cr improving   Pending ENT and GI consult   Monitor Hb   Patient's last chemotherapy for MM was last year  Rest of the management as above Patient is a 81y old  Female who presents with a chief complaint of Dysphagia and Tongue swelling (20 Mar 2021 18:34)    Patient was seen and examined at bedside   Reports swelling has somehow got better, denies SOB but still has difficulty swallowing  Complains of mild pain in the left side of neck   Foul smelling breath    INTERVAL HPI/OVERNIGHT EVENTS:  T(C): 37.1 (21 @ 13:25), Max: 37.2 (21 @ 23:18)  HR: 100 (21 @ 19:07) (70 - 100)  BP: 145/66 (21 @ 19:07) (144/70 - 151/66)  RR: 17 (21 @ 19:07) (17 - 17)  SpO2: 96% (21 @ 19:07) (96% - 100%)  Wt(kg): --  I&O's Summary    REVIEW OF SYSTEMS: denies fever, chills, SOB, palpitations, chest pain, abdominal pain, nausea, vomiting, diarrhea, constipation, dizziness    PHYSICAL EXAM:  GENERAL: NAD, well-groomed, well-developed  Oral cavity: tongue swollen, left>right, only half of the palate seen, tenderness on palpation of left side of neck, no erythema  NERVOUS SYSTEM:  Alert & Oriented X2, Good concentration; no focal deficit  CHEST/LUNG: Clear to auscultation bilaterally; No rales, rhonchi, wheezing, or rubs  HEART: Regular rate and rhythm; No murmurs, rubs, or gallops  ABDOMEN: Soft, Nontender, Nondistended; Bowel sounds present  EXTREMITIES:  2+ Peripheral Pulses, No clubbing, cyanosis, or edema  SKIN: No rashes or lesions    LABS:                        9.0    5.19  )-----------( 175      ( 20 Mar 2021 07:17 )             27.0     141  |  110<H>  |  16  ----------------------------<  115<H>  3.5   |  26  |  1.39<H>    Ca    8.9      20 Mar 2021 07:17  Phos  2.2     03-20  Mg     2.4     03-20    TPro  10.1<H>  /  Alb  2.4<L>  /  TBili  0.6  /  DBili  x   /  AST  18  /  ALT  16  /  AlkPhos  96  03-19    PT/INR - ( 20 Mar 2021 07:17 )   PT: 15.7 sec;   INR: 1.34 ratio         PTT - ( 20 Mar 2021 07:17 )  PTT:30.5 sec  Urinalysis Basic - ( 19 Mar 2021 23:40 )    Color: Yellow / Appearance: Clear / S.010 / pH: x  Gluc: x / Ketone: Negative  / Bili: Negative / Urobili: Negative   Blood: x / Protein: 30 mg/dL / Nitrite: Negative   Leuk Esterase: Moderate / RBC: 5-10 /HPF / WBC 26-50 /HPF   Sq Epi: x / Non Sq Epi: Few /HPF / Bacteria: Few /HPF      CAPILLARY BLOOD GLUCOSE      POCT Blood Glucose.: 108 mg/dL (20 Mar 2021 18:34)  POCT Blood Glucose.: 120 mg/dL (20 Mar 2021 08:04)      A/P:  Dysphagia and Tongue Swelling due to ?pharyngitis or infection of floor of mouth   KAJAL on CKD  Multiple anemia   HTN  RA    Plan:  Will start on Unasyn  Consulted Dr. Multani, agree with Steroid  Cont Benadryl  Not SOB. maintaining airway. Signed out to senior resident and nocturnist for airway monitoring   Cr improving   Pending ENT and GI consult   Monitor Hb   Patient's last chemotherapy for MM was last year  Rest of the management as above

## 2021-03-20 NOTE — PROGRESS NOTE ADULT - ASSESSMENT
81 year old female from home ambulates w/ walker/cane, has past medical hx of MM, hypertension, RA, bladder incontinence came to ED c/o 2 days of dysphagia and 1 one day of tongue/left jaw swelling. Patient admitted for dysphagia w/u

## 2021-03-21 LAB
ANION GAP SERPL CALC-SCNC: 9 MMOL/L — SIGNIFICANT CHANGE UP (ref 5–17)
BUN SERPL-MCNC: 15 MG/DL — SIGNIFICANT CHANGE UP (ref 7–18)
CALCIUM SERPL-MCNC: 8.9 MG/DL — SIGNIFICANT CHANGE UP (ref 8.4–10.5)
CHLORIDE SERPL-SCNC: 110 MMOL/L — HIGH (ref 96–108)
CO2 SERPL-SCNC: 22 MMOL/L — SIGNIFICANT CHANGE UP (ref 22–31)
CREAT SERPL-MCNC: 1.36 MG/DL — HIGH (ref 0.5–1.3)
GLUCOSE BLDC GLUCOMTR-MCNC: 137 MG/DL — HIGH (ref 70–99)
GLUCOSE BLDC GLUCOMTR-MCNC: 150 MG/DL — HIGH (ref 70–99)
GLUCOSE BLDC GLUCOMTR-MCNC: 184 MG/DL — HIGH (ref 70–99)
GLUCOSE SERPL-MCNC: 150 MG/DL — HIGH (ref 70–99)
HCT VFR BLD CALC: 28.6 % — LOW (ref 34.5–45)
HGB BLD-MCNC: 9.5 G/DL — LOW (ref 11.5–15.5)
MAGNESIUM SERPL-MCNC: 2.5 MG/DL — SIGNIFICANT CHANGE UP (ref 1.6–2.6)
MCHC RBC-ENTMCNC: 28 PG — SIGNIFICANT CHANGE UP (ref 27–34)
MCHC RBC-ENTMCNC: 33.2 GM/DL — SIGNIFICANT CHANGE UP (ref 32–36)
MCV RBC AUTO: 84.4 FL — SIGNIFICANT CHANGE UP (ref 80–100)
NRBC # BLD: 0 /100 WBCS — SIGNIFICANT CHANGE UP (ref 0–0)
PHOSPHATE SERPL-MCNC: 3.2 MG/DL — SIGNIFICANT CHANGE UP (ref 2.5–4.5)
PLATELET # BLD AUTO: 184 K/UL — SIGNIFICANT CHANGE UP (ref 150–400)
POTASSIUM SERPL-MCNC: 3.6 MMOL/L — SIGNIFICANT CHANGE UP (ref 3.5–5.3)
POTASSIUM SERPL-SCNC: 3.6 MMOL/L — SIGNIFICANT CHANGE UP (ref 3.5–5.3)
RBC # BLD: 3.39 M/UL — LOW (ref 3.8–5.2)
RBC # FLD: 16.7 % — HIGH (ref 10.3–14.5)
SODIUM SERPL-SCNC: 141 MMOL/L — SIGNIFICANT CHANGE UP (ref 135–145)
WBC # BLD: 5.46 K/UL — SIGNIFICANT CHANGE UP (ref 3.8–10.5)
WBC # FLD AUTO: 5.46 K/UL — SIGNIFICANT CHANGE UP (ref 3.8–10.5)

## 2021-03-21 PROCEDURE — 99233 SBSQ HOSP IP/OBS HIGH 50: CPT | Mod: GC

## 2021-03-21 RX ORDER — OLANZAPINE 15 MG/1
2.5 TABLET, FILM COATED ORAL ONCE
Refills: 0 | Status: COMPLETED | OUTPATIENT
Start: 2021-03-21 | End: 2021-03-21

## 2021-03-21 RX ORDER — DEXAMETHASONE 0.5 MG/5ML
10 ELIXIR ORAL EVERY 8 HOURS
Refills: 0 | Status: DISCONTINUED | OUTPATIENT
Start: 2021-03-21 | End: 2021-03-21

## 2021-03-21 RX ORDER — OLANZAPINE 15 MG/1
2.5 TABLET, FILM COATED ORAL ONCE
Refills: 0 | Status: DISCONTINUED | OUTPATIENT
Start: 2021-03-21 | End: 2021-03-21

## 2021-03-21 RX ORDER — ACETAMINOPHEN 500 MG
650 TABLET ORAL EVERY 6 HOURS
Refills: 0 | Status: DISCONTINUED | OUTPATIENT
Start: 2021-03-21 | End: 2021-03-29

## 2021-03-21 RX ADMIN — DIPHENHYDRAMINE HYDROCHLORIDE AND LIDOCAINE HYDROCHLORIDE AND ALUMINUM HYDROXIDE AND MAGNESIUM HYDRO 10 MILLILITER(S): KIT at 17:00

## 2021-03-21 RX ADMIN — Medication 50 MILLIGRAM(S): at 06:32

## 2021-03-21 RX ADMIN — DIPHENHYDRAMINE HYDROCHLORIDE AND LIDOCAINE HYDROCHLORIDE AND ALUMINUM HYDROXIDE AND MAGNESIUM HYDRO 10 MILLILITER(S): KIT at 00:17

## 2021-03-21 RX ADMIN — DIPHENHYDRAMINE HYDROCHLORIDE AND LIDOCAINE HYDROCHLORIDE AND ALUMINUM HYDROXIDE AND MAGNESIUM HYDRO 10 MILLILITER(S): KIT at 06:31

## 2021-03-21 RX ADMIN — Medication 2.5 MILLIGRAM(S): at 00:17

## 2021-03-21 RX ADMIN — AMPICILLIN SODIUM AND SULBACTAM SODIUM 100 GRAM(S): 250; 125 INJECTION, POWDER, FOR SUSPENSION INTRAMUSCULAR; INTRAVENOUS at 06:31

## 2021-03-21 RX ADMIN — DIPHENHYDRAMINE HYDROCHLORIDE AND LIDOCAINE HYDROCHLORIDE AND ALUMINUM HYDROXIDE AND MAGNESIUM HYDRO 10 MILLILITER(S): KIT at 11:46

## 2021-03-21 RX ADMIN — BENZOCAINE AND MENTHOL 1 LOZENGE: 5; 1 LIQUID ORAL at 17:00

## 2021-03-21 RX ADMIN — Medication 2.5 MILLIGRAM(S): at 06:32

## 2021-03-21 RX ADMIN — PANTOPRAZOLE SODIUM 40 MILLIGRAM(S): 20 TABLET, DELAYED RELEASE ORAL at 11:45

## 2021-03-21 RX ADMIN — Medication 40 MILLIGRAM(S): at 06:33

## 2021-03-21 RX ADMIN — ENOXAPARIN SODIUM 30 MILLIGRAM(S): 100 INJECTION SUBCUTANEOUS at 11:46

## 2021-03-21 RX ADMIN — LIDOCAINE 1 PATCH: 4 CREAM TOPICAL at 18:44

## 2021-03-21 RX ADMIN — OLANZAPINE 2.5 MILLIGRAM(S): 15 TABLET, FILM COATED ORAL at 18:41

## 2021-03-21 RX ADMIN — AMPICILLIN SODIUM AND SULBACTAM SODIUM 100 GRAM(S): 250; 125 INJECTION, POWDER, FOR SUSPENSION INTRAMUSCULAR; INTRAVENOUS at 00:17

## 2021-03-21 RX ADMIN — LIDOCAINE 1 PATCH: 4 CREAM TOPICAL at 10:29

## 2021-03-21 RX ADMIN — LIDOCAINE 1 PATCH: 4 CREAM TOPICAL at 06:31

## 2021-03-21 RX ADMIN — Medication 2.5 MILLIGRAM(S): at 11:46

## 2021-03-21 RX ADMIN — AMPICILLIN SODIUM AND SULBACTAM SODIUM 100 GRAM(S): 250; 125 INJECTION, POWDER, FOR SUSPENSION INTRAMUSCULAR; INTRAVENOUS at 11:46

## 2021-03-21 RX ADMIN — Medication 50 MILLIGRAM(S): at 13:12

## 2021-03-21 RX ADMIN — Medication 2.5 MILLIGRAM(S): at 17:01

## 2021-03-21 RX ADMIN — BENZOCAINE AND MENTHOL 1 LOZENGE: 5; 1 LIQUID ORAL at 06:32

## 2021-03-21 RX ADMIN — AMPICILLIN SODIUM AND SULBACTAM SODIUM 100 GRAM(S): 250; 125 INJECTION, POWDER, FOR SUSPENSION INTRAMUSCULAR; INTRAVENOUS at 17:00

## 2021-03-21 NOTE — PHYSICAL THERAPY INITIAL EVALUATION ADULT - ADDITIONAL COMMENTS
cane for household ambulation, walker for community; Unreliable previous level of function information due to confusion.

## 2021-03-21 NOTE — DIETITIAN INITIAL EVALUATION ADULT. - OTHER INFO
Patient from home. Visited pt. alert but weak, able to speak but not "clearly", reports "swollen mouth" & need or extraction of "three back tooth on right side"?, unable to chew & with "dry mouth PTA, at times pt. confused, presently NPO with IV fluids, awaiting Speech/Swallow evaluation, tongue edema 2+ noted, skin intact, d/w RN. Patient from home. Visited pt. alert but weak, able to speak but not "clearly", reports "swollen mouth" & need or extraction of "three back tooth on left side"?, unable to chew & with "dry mouth PTA, at times pt. confused, presently NPO with IV fluids, awaiting Speech/Swallow evaluation, tongue edema 2+ noted, skin intact, d/w RN.

## 2021-03-21 NOTE — PROGRESS NOTE ADULT - SUBJECTIVE AND OBJECTIVE BOX
Patient is a 81y old  Female who presents with a chief complaint of Dysphagia and Tongue swelling (21 Mar 2021 13:04)    Patient was seen and examined at bedside this am  Tongue swelling has significantly improved  Later was informed by team patient was disoriented and agitated     INTERVAL HPI/OVERNIGHT EVENTS:  T(C): 36.7 (21 @ 12:22), Max: 38.1 (21 @ 21:08)  HR: 83 (21 @ 17:01) (83 - 100)  BP: 131/76 (21 @ 17:01) (131/76 - 159/68)  RR: 18 (21 @ 12:22) (17 - 18)  SpO2: 95% (21 @ 12:22) (95% - 100%)  Wt(kg): --  I&O's Summary      REVIEW OF SYSTEMS: in am denies fever, chills, SOB, palpitations, chest pain, abdominal pain, nausea, vomiting, diarrhea, constipation, dizziness    MEDICATIONS  (STANDING):  ampicillin/sulbactam  IVPB 1.5 Gram(s) IV Intermittent every 6 hours  benzocaine 15 mG/menthol 3.6 mG (Sugar-Free) Lozenge 1 Lozenge Oral two times a day  dextrose 5% + sodium chloride 0.45%. 1000 milliLiter(s) (75 mL/Hr) IV Continuous <Continuous>  enoxaparin Injectable 30 milliGRAM(s) SubCutaneous daily  FIRST- Mouthwash  BLM 10 milliLiter(s) Swish and Spit four times a day  lidocaine   Patch 1 Patch Transdermal every 24 hours  metoprolol tartrate Injectable 2.5 milliGRAM(s) IV Push every 6 hours  pantoprazole  Injectable 40 milliGRAM(s) IV Push daily    MEDICATIONS  (PRN):      PHYSICAL EXAM:  GENERAL: NAD  Oral cavity: tongue swelling markedly improved, tenderness on palpation of left side of neck, no erythema  NERVOUS SYSTEM:  Alert & Oriented X2, Good concentration; no focal deficit  CHEST/LUNG: Clear to auscultation bilaterally; No rales, rhonchi, wheezing, or rubs  HEART: Regular rate and rhythm; No murmurs, rubs, or gallops  ABDOMEN: Soft, Nontender, Nondistended; Bowel sounds present  EXTREMITIES:  2+ Peripheral Pulses, No clubbing, cyanosis, or edema  SKIN: No rashes or lesions        LABS:                        9.5    5.46  )-----------( 184      ( 21 Mar 2021 08:16 )             28.6     141  |  110<H>  |  15  ----------------------------<  150<H>  3.6   |  22  |  1.36<H>    Ca    8.9      21 Mar 2021 08:16  Phos  3.2     03-21  Mg     2.5     03-21    TPro  10.1<H>  /  Alb  2.4<L>  /  TBili  0.6  /  DBili  x   /  AST  18  /  ALT  16  /  AlkPhos  96  03-19    PT/INR - ( 20 Mar 2021 07:17 )   PT: 15.7 sec;   INR: 1.34 ratio         PTT - ( 20 Mar 2021 07:17 )  PTT:30.5 sec  Urinalysis Basic - ( 19 Mar 2021 23:40 )    Color: Yellow / Appearance: Clear / S.010 / pH: x  Gluc: x / Ketone: Negative  / Bili: Negative / Urobili: Negative   Blood: x / Protein: 30 mg/dL / Nitrite: Negative   Leuk Esterase: Moderate / RBC: 5-10 /HPF / WBC 26-50 /HPF   Sq Epi: x / Non Sq Epi: Few /HPF / Bacteria: Few /HPF      CAPILLARY BLOOD GLUCOSE      POCT Blood Glucose.: 184 mg/dL (21 Mar 2021 11:35)  POCT Blood Glucose.: 150 mg/dL (21 Mar 2021 00:13)

## 2021-03-21 NOTE — PHYSICAL THERAPY INITIAL EVALUATION ADULT - GENERAL OBSERVATIONS, REHAB EVAL
Consult received, chart reviewed. Patient received supine in bed, NAD, + IV. Patient agreed to EVALUATION from Physical Therapist. Pt c/o of severe pain in the back, fear avoidant of standing, sitting EOB.

## 2021-03-21 NOTE — PHYSICAL THERAPY INITIAL EVALUATION ADULT - LEVEL OF INDEPENDENCE: SIT/STAND, REHAB EVAL
TBA when Pt more compliant Pt defers sit to stand transfer sdespite encouragement; will assess in future session

## 2021-03-21 NOTE — DIETITIAN INITIAL EVALUATION ADULT. - FACTORS AFF FOOD INTAKE
weakness, dysphagia, HTN, rheumatoid arthritis, HTN, chronic anemia/difficulty chewing/difficulty swallowing/pain/other (specify)

## 2021-03-21 NOTE — PROGRESS NOTE ADULT - ASSESSMENT
Steroid induced psychosis  Dysphagia and Tongue Swelling due to ?pharyngitis or infection of floor of mouth   KAJAL on CKD 3b  Multiple anemia   HTN  RA  Back pain from recently diagnosed compression fracture of T12, Osteoporosis and MM    Plan:  Spoke with Dr. Nicholas over phone, recommended to start Decadron 10mg TIC but considering patient's improvement of swelling and developing side effects of Steroid, will keep Solumedrol 40mg qd   Cont on Unasyn  Appreciate consult from Dr. Nerissa REHMAN Benadryl  One dose of Zyprexa, monitor QTc  Not SOB. maintaining airway. Monitor closely  Cr improving   Appreciate GI consult   Monitor Hb   Patient's last chemotherapy for MM was last year  Spoke with patient's son and daughter over phone. Explained above treatment plan. Will cont to update  Full code

## 2021-03-21 NOTE — PHYSICAL THERAPY INITIAL EVALUATION ADULT - LEVEL OF INDEPENDENCE: SCOOT/BRIDGE, REHAB EVAL
Pt incompliant to move up toward HOB; Bed trendenlenberg to reposition Pt./moderate assist (50% patients effort) Pt deffered to scoot toward HOB; Bed trendenlenberg to reposition Pt./moderate assist (50% patients effort) Pt deferred to scoot toward HOB; Bed Trendelenburg to reposition Pt./moderate assist (50% patients effort)

## 2021-03-21 NOTE — DIETITIAN INITIAL EVALUATION ADULT. - PROBLEM SELECTOR PLAN 1
- Patient came to ED c/o dysphagia, swollen tongue  - Esophagitis vs Pharyngitis vs oral infection vs malignancy  - Findings: swollen tongue, no skin changes, unable to visualize oropharynx, VS stable, afebrile, no leucocytosis    - CT neck non contrast Thickening of the left oropharyngeal and hypopharyngeal soft tissues, probably pharyngitis. Recommend follow-up to ensure resolution and to exclude an underlying mass.  - Started on Cepacol, First wash - symptomatic management   - Started on IVF D5 1/2 NS 75cc/hr  - S/p 1 dose Rocephin in ER, will hold off further antibiotics since patient is afebrile and with no leucocytosis  - NPO for now, FS q6hrs  - Aspiration precautions   - Monitor respiratory status, head elevation 30-45 degrees   - F/u S and S  - ENT Dr Nicholas consulted  - Consider GI consult

## 2021-03-21 NOTE — CONSULT NOTE ADULT - SUBJECTIVE AND OBJECTIVE BOX
Patient is a 81y old  Female who presents with a chief complaint of Dysphagia and Tongue swelling (21 Mar 2021 11:13)     .     HPI:    HPI:  81 year old female from home ambulates w/ walker/cane, has past medical hx of MM, hypertension, RA, bladder incontinence came to ED c/o 2 days of dysphagia and 1 one day of tongue/left jaw swelling. Patient refers not able to eat or drink anything, denies sob, sore troat, fever, weight loss, previous episodes, cough, chills.   During interview, patient was having trouble verbalizing words due to her tongue swelling, no drooling, saturating well. Patient denies any medication or food allergies, no recent changes in diet or medications. Also reports swelling if her gum for which she intended to see a dentist but presented to ER for worsening pain and swelling of jaw.    Off note: patient was recently discharge from [facility] treated for UTI and back pain     GOC: Full code  (19 Mar 2021 22:32)          REVIEW OF SYSTEMS  Constitutional:   No fever, no fatigue, no pallor, no night sweats, no weight loss.  HEENT:   No eye pain, no vision changes, no icterus, no mouth ulcers.  Respiratory:   No shortness of breath, no cough, no respiratory distress.   Cardiovascular:   No chest pain, no palpitations.   Gastrointestinal: No abdominal pain, no nausea, no vomiting , no diahrrea, no constipation, no hematochezia,no melena.  Skin:   No rashes, no jaundice, no eczema.   Musculoskeletal:   No joint pain, no swelling, no myalgia.   Neurologic:   No headache, no seizure, no weakness.   Genitourinary:   No dysuria, no decreased urine output.  Psychiatric:  No depression, no anxiety,   Endocrine:   No thyroid disease, no diabetes.  Heme/Lymphatic:   No anemia, no blood transfusions, no lymph node enlargement, no bleeding, no bruising.  ___________________________________________________________________________________________  Allergies    No Known Allergies    Intolerances      MEDICATIONS  (STANDING):  ampicillin/sulbactam  IVPB 1.5 Gram(s) IV Intermittent every 6 hours  benzocaine 15 mG/menthol 3.6 mG (Sugar-Free) Lozenge 1 Lozenge Oral two times a day  dextrose 5% + sodium chloride 0.45%. 1000 milliLiter(s) (75 mL/Hr) IV Continuous <Continuous>  diphenhydrAMINE   Injectable 50 milliGRAM(s) IV Push every 8 hours  enoxaparin Injectable 30 milliGRAM(s) SubCutaneous daily  FIRST- Mouthwash  BLM 10 milliLiter(s) Swish and Spit four times a day  lidocaine   Patch 1 Patch Transdermal every 24 hours  methylPREDNISolone sodium succinate Injectable 40 milliGRAM(s) IV Push every 12 hours  metoprolol tartrate Injectable 2.5 milliGRAM(s) IV Push every 6 hours  pantoprazole  Injectable 40 milliGRAM(s) IV Push daily    MEDICATIONS  (PRN):      PAST MEDICAL & SURGICAL HISTORY:  Multiple myeloma    Hypertension    Varicose veins of lower extremity    Rheumatoid arthritis    No significant past surgical history      FAMILY HISTORY:    Social History: No hsitory of : Tobacco use, IVDA, EToH  ______________________________________________________________________________________    PHYSICAL EXAM    Daily     Daily   BMI: 25 (03-19 @ 17:03)  Change in Weight:  Vital Signs Last 24 Hrs  T(C): 36.7 (21 Mar 2021 12:22), Max: 38.1 (20 Mar 2021 21:08)  T(F): 98 (21 Mar 2021 12:22), Max: 100.5 (20 Mar 2021 21:08)  HR: 86 (21 Mar 2021 12:22) (81 - 100)  BP: 157/80 (21 Mar 2021 12:22) (145/66 - 159/68)  BP(mean): 86 (20 Mar 2021 19:07) (86 - 86)  RR: 18 (21 Mar 2021 12:22) (17 - 18)  SpO2: 95% (21 Mar 2021 12:22) (95% - 100%)    General:  Well developed, well nourished, alert and active, no pallor, NAD.  HEENT:    Normal appearance of conjunctiva, ears, nose, lips, oropharynx, and oral mucosa, anicteric.  Neck:  No masses, no asymmetry.  Lymph Nodes:  No lymphadenopathy.   Cardiovascular:  RRR normal S1/S2, no murmur.  Respiratory:  CTA B/L, normal respiratory effort.   Abdominal:   soft, no masses or tenderness, normoactive BS, NT/ND, no HSM.  Extremities:   No clubbing or cyanosis, normal capillary refill, no edema.   Skin:   No rash, jaundice, lesions, eczema.   Musculoskeletal:  No joint swelling, erythema or tenderness.   Neuro: No focal deficits.   Other:   _______________________________________________________________________________________________  Lab Results:                          9.5    5.46  )-----------( 184      ( 21 Mar 2021 08:16 )             28.6     03-21    141  |  110<H>  |  15  ----------------------------<  150<H>  3.6   |  22  |  1.36<H>    Ca    8.9      21 Mar 2021 08:16  Phos  3.2     03-21  Mg     2.5     03-21    TPro  10.1<H>  /  Alb  2.4<L>  /  TBili  0.6  /  DBili  x   /  AST  18  /  ALT  16  /  AlkPhos  96  03-19    LIVER FUNCTIONS - ( 19 Mar 2021 19:16 )  Alb: 2.4 g/dL / Pro: 10.1 g/dL / ALK PHOS: 96 U/L / ALT: 16 U/L DA / AST: 18 U/L / GGT: x           PT/INR - ( 20 Mar 2021 07:17 )   PT: 15.7 sec;   INR: 1.34 ratio         PTT - ( 20 Mar 2021 07:17 )  PTT:30.5 sec        Stool Results:          RADIOLOGY RESULTS:    SURGICAL PATHOLOGY:

## 2021-03-21 NOTE — PROGRESS NOTE ADULT - ASSESSMENT
Pharyngitis  Left neck cellulitis  Glossitis - greatly improved    Plan - Cont Unasyn 1.5 gms iv q6 hrs  Sugest tapering steroids as her confusion could be secondary to them. Pharyngitis  Left neck cellulitis  Glossitis - greatly improved    Plan - Cont Unasyn 1.5 gms iv q6 hrs  Suggest tapering steroids as her confusion could be secondary to them.

## 2021-03-21 NOTE — PHYSICAL THERAPY INITIAL EVALUATION ADULT - PERTINENT HX OF CURRENT PROBLEM, REHAB EVAL
dysphagia, confusion, T12 vertebral body fracture dysphagia, confusion, recent T12 vertebral body fracture

## 2021-03-21 NOTE — PHYSICAL THERAPY INITIAL EVALUATION ADULT - PLANNED THERAPY INTERVENTIONS, PT EVAL
balance training/bed mobility training/gait training/joint mobilization/neuromuscular re-education/postural re-education/strengthening/transfer training

## 2021-03-21 NOTE — PHYSICAL THERAPY INITIAL EVALUATION ADULT - MANUAL MUSCLE TESTING RESULTS, REHAB EVAL
Could not formally assess strength secondary to incompliance; Pt able to flx hip and extend knee against gravity while supine in bed/not tested due to Could not formally assess strength secondary to Pt deferment; Pt able to flx hip and extend knee against gravity while supine in bed/not tested due to Limited assessment secondary to imapired ability to follow commands, at least 2-/5 throughout

## 2021-03-21 NOTE — PHYSICAL THERAPY INITIAL EVALUATION ADULT - DIAGNOSIS, PT EVAL
severe pain, decreased balance, Additional diagnoses pending further assessment of Pt. functional mobility and ability to participate Severe pain, decreased balance, strength, impaired cognition. Additional diagnoses pending further assessment of Pt. functional mobility and ability to participate

## 2021-03-21 NOTE — CHART NOTE - NSCHARTNOTEFT_GEN_A_CORE
Patient began to have hyperactivity with hallucinations after starting IV Benadryl and IV steroid.  Patient examined with dilated pupils, anxious demeanor and active hallucinations of multiple people in the room and people moving in and out of the walls through portals. Breathing unlabored, no wheezing. Re-assured patient and discontinued high dose IV steroid and benadryl    Gave 1 dose 2.5 zyprexa IM  Discontinued IV benadryl  Lowered steroid to methylpred 40 IV QD to start tomorrow 3/22  STAT EKG after zyprexa showed Sinus with PAC, RBBB. Rate 86, , /QTc 516    Patient re-assessed approx 1 hours after dose, patient much improved. Pupils normalized. Hallucinations resolving. Patient Calm. Asking to eat. Did not pass speech and swallow.  Restart diet after passes eval. Patient began to have hyperactivity with hallucinations after starting IV Benadryl and IV steroid.  Patient examined with dilated pupils, anxious demeanor and active hallucinations of multiple people in the room and people moving in and out of the walls through portals. Breathing unlabored, no wheezing. Re-assured patient and discontinued high dose IV steroid and benadryl    Gave 1 dose 2.5 zyprexa IM  Discontinued IV benadryl  Lowered steroid to methylpred 40 IV QD to start tomorrow 3/22  STAT EKG after zyprexa showed Sinus with PAC, RBBB. Rate 86, , /QTc 516 (as seen on prior)    Patient re-assessed approx 1 hours after dose, patient much improved. Pupils normalized. Hallucinations resolving. Patient Calm. Asking to eat. Did not pass speech and swallow.  Restart diet after passes eval. Patient began to have hyperactivity with hallucinations after starting IV Benadryl and IV steroid.  Patient examined with dilated pupils, anxious demeanor and active hallucinations of multiple people in the room and people moving in and out of the walls through portals. Breathing unlabored, no wheezing. Re-assured patient and discontinued high dose IV steroid and benadryl    Gave 1 dose 2.5 zyprexa IM  Discontinued IV benadryl  Lowered steroid to methylpred 40 IV QD to start tomorrow 3/22  STAT EKG after zyprexa showed Sinus with PAC, RBBB. Rate 86, , /QTc 516 (as seen on prior)    Patient re-assessed approx 1 hours after dose, patient much improved. Pupils normalized. Hallucinations resolving. Patient Calm. Asking to eat. Did not pass speech and swallow.  Restart diet after passes eval.    Updated family (Binta in chart) called and informed of updates and current improved status.  All questions answered

## 2021-03-21 NOTE — CONSULT NOTE ADULT - ASSESSMENT
81 year old with dysphagia 
Pharyngitis  Left neck cellulitis  Glossitis ( not on any ARB or ACEinhibitors)    Plan - Cont Unasyn 1.5 gms iv q6 hrs  Start Solumedrol 40mgs iv q12 hrs x 2 days.

## 2021-03-21 NOTE — DIETITIAN INITIAL EVALUATION ADULT. - ADD RECOMMEND
Advance diet with nutrition supplement pending outcome of Speech/Swallow evaluation as medically feasible

## 2021-03-21 NOTE — PHYSICAL THERAPY INITIAL EVALUATION ADULT - LIVES WITH, PROFILE
Lives on first floor of two family home, several stairs to enter home. Lives with ; Unreliable social history due to confusion, altered mental status./spouse

## 2021-03-21 NOTE — PHYSICAL THERAPY INITIAL EVALUATION ADULT - ACTIVE RANGE OF MOTION EXAMINATION, REHAB EVAL
Could not formally assess AROM secondary to incompliance; Voluntary UE and LE movement during attempts of bed mobility and sitting./Left UE Active ROM was WFL (within functional limits)/Right UE Active ROM was WFL (within functional limits) Could not formally assess AROM secondary to Pt deferment; Voluntary UE and LE movement during attempts of bed mobility and sitting./Left UE Active ROM was WFL (within functional limits)/Right UE Active ROM was WFL (within functional limits)

## 2021-03-21 NOTE — DIETITIAN INITIAL EVALUATION ADULT. - PERTINENT MEDS FT
MEDICATIONS  (STANDING):  ampicillin/sulbactam  IVPB 1.5 Gram(s) IV Intermittent every 6 hours  benzocaine 15 mG/menthol 3.6 mG (Sugar-Free) Lozenge 1 Lozenge Oral two times a day  dextrose 5% + sodium chloride 0.45%. 1000 milliLiter(s) (75 mL/Hr) IV Continuous <Continuous>  diphenhydrAMINE   Injectable 50 milliGRAM(s) IV Push every 8 hours  enoxaparin Injectable 30 milliGRAM(s) SubCutaneous daily  FIRST- Mouthwash  BLM 10 milliLiter(s) Swish and Spit four times a day  lidocaine   Patch 1 Patch Transdermal every 24 hours  methylPREDNISolone sodium succinate Injectable 40 milliGRAM(s) IV Push every 12 hours  metoprolol tartrate Injectable 2.5 milliGRAM(s) IV Push every 6 hours  pantoprazole  Injectable 40 milliGRAM(s) IV Push daily    MEDICATIONS  (PRN):

## 2021-03-21 NOTE — DIETITIAN INITIAL EVALUATION ADULT. - PERTINENT LABORATORY DATA
03-21 Na141 mmol/L Glu 150 mg/dL<H> K+ 3.6 mmol/L Cr  1.36 mg/dL<H> BUN 15 mg/dL 03-21 Phos 3.2 mg/dL 03-19 Alb 2.4 g/dL<L> 03-20 Chol 73 mg/dL LDL --    HDL 43 mg/dL<L> Trig 40 mg/dL

## 2021-03-21 NOTE — PHYSICAL THERAPY INITIAL EVALUATION ADULT - IMPAIRMENTS FOUND, PT EVAL
arousal, attention, and cognition/decreased midline orientation/gait, locomotion, and balance/muscle strength/posture arousal, attention, and cognition/cognitive impairment/decreased midline orientation/gait, locomotion, and balance/muscle strength/poor safety awareness/posture

## 2021-03-21 NOTE — PROGRESS NOTE ADULT - SUBJECTIVE AND OBJECTIVE BOX
81y Female who is very confused today and is on a 1:1 observation as she is trying to get out of bed and is hallucinating, she is even telling me to open the door behind me when there is a wall there. She is able to talk better as her tongue swelling is dramatically better, she has no fevers , and is not coughing in front of me.    Meds:  ampicillin/sulbactam  IVPB 1.5 Gram(s) IV Intermittent every 6 hours    Allergies    No Known Allergies    Intolerances        VITALS:  Vital Signs Last 24 Hrs  T(C): 36.8 (22 Mar 2021 05:30), Max: 36.8 (22 Mar 2021 05:30)  T(F): 98.3 (22 Mar 2021 05:30), Max: 98.3 (22 Mar 2021 05:30)  HR: 100 (22 Mar 2021 05:30) (83 - 100)  BP: 151/84 (22 Mar 2021 05:30) (131/76 - 157/80)  BP(mean): --  RR: 18 (22 Mar 2021 05:30) (18 - 18)  SpO2: 100% (22 Mar 2021 05:30) (95% - 100%)    LABS/DIAGNOSTIC TESTS:                             RADIOLOGY:      ROS:  [ x ] UNABLE TO ELICIT

## 2021-03-22 LAB
ANION GAP SERPL CALC-SCNC: 11 MMOL/L — SIGNIFICANT CHANGE UP (ref 5–17)
BUN SERPL-MCNC: 24 MG/DL — HIGH (ref 7–18)
CALCIUM SERPL-MCNC: 9 MG/DL — SIGNIFICANT CHANGE UP (ref 8.4–10.5)
CHLORIDE SERPL-SCNC: 115 MMOL/L — HIGH (ref 96–108)
CO2 SERPL-SCNC: 20 MMOL/L — LOW (ref 22–31)
CREAT SERPL-MCNC: 1.52 MG/DL — HIGH (ref 0.5–1.3)
GLUCOSE BLDC GLUCOMTR-MCNC: 120 MG/DL — HIGH (ref 70–99)
GLUCOSE BLDC GLUCOMTR-MCNC: 167 MG/DL — HIGH (ref 70–99)
GLUCOSE BLDC GLUCOMTR-MCNC: 177 MG/DL — HIGH (ref 70–99)
GLUCOSE SERPL-MCNC: 116 MG/DL — HIGH (ref 70–99)
HCT VFR BLD CALC: 27.4 % — LOW (ref 34.5–45)
HGB BLD-MCNC: 9.3 G/DL — LOW (ref 11.5–15.5)
MAGNESIUM SERPL-MCNC: 2.8 MG/DL — HIGH (ref 1.6–2.6)
MCHC RBC-ENTMCNC: 28.7 PG — SIGNIFICANT CHANGE UP (ref 27–34)
MCHC RBC-ENTMCNC: 33.9 GM/DL — SIGNIFICANT CHANGE UP (ref 32–36)
MCV RBC AUTO: 84.6 FL — SIGNIFICANT CHANGE UP (ref 80–100)
NRBC # BLD: 0 /100 WBCS — SIGNIFICANT CHANGE UP (ref 0–0)
PHOSPHATE SERPL-MCNC: 2.9 MG/DL — SIGNIFICANT CHANGE UP (ref 2.5–4.5)
PLATELET # BLD AUTO: 191 K/UL — SIGNIFICANT CHANGE UP (ref 150–400)
POTASSIUM SERPL-MCNC: 3.4 MMOL/L — LOW (ref 3.5–5.3)
POTASSIUM SERPL-SCNC: 3.4 MMOL/L — LOW (ref 3.5–5.3)
RBC # BLD: 3.24 M/UL — LOW (ref 3.8–5.2)
RBC # FLD: 16.8 % — HIGH (ref 10.3–14.5)
SODIUM SERPL-SCNC: 146 MMOL/L — HIGH (ref 135–145)
WBC # BLD: 6.71 K/UL — SIGNIFICANT CHANGE UP (ref 3.8–10.5)
WBC # FLD AUTO: 6.71 K/UL — SIGNIFICANT CHANGE UP (ref 3.8–10.5)

## 2021-03-22 PROCEDURE — 93010 ELECTROCARDIOGRAM REPORT: CPT

## 2021-03-22 PROCEDURE — 99232 SBSQ HOSP IP/OBS MODERATE 35: CPT | Mod: GC

## 2021-03-22 RX ORDER — AMLODIPINE BESYLATE 2.5 MG/1
5 TABLET ORAL DAILY
Refills: 0 | Status: DISCONTINUED | OUTPATIENT
Start: 2021-03-22 | End: 2021-03-29

## 2021-03-22 RX ORDER — SODIUM CHLORIDE 9 MG/ML
1000 INJECTION, SOLUTION INTRAVENOUS
Refills: 0 | Status: DISCONTINUED | OUTPATIENT
Start: 2021-03-22 | End: 2021-03-23

## 2021-03-22 RX ORDER — OLANZAPINE 15 MG/1
2.5 TABLET, FILM COATED ORAL ONCE
Refills: 0 | Status: COMPLETED | OUTPATIENT
Start: 2021-03-22 | End: 2021-03-22

## 2021-03-22 RX ADMIN — LIDOCAINE 1 PATCH: 4 CREAM TOPICAL at 07:30

## 2021-03-22 RX ADMIN — Medication 2.5 MILLIGRAM(S): at 13:34

## 2021-03-22 RX ADMIN — Medication 40 MILLIGRAM(S): at 06:27

## 2021-03-22 RX ADMIN — DIPHENHYDRAMINE HYDROCHLORIDE AND LIDOCAINE HYDROCHLORIDE AND ALUMINUM HYDROXIDE AND MAGNESIUM HYDRO 10 MILLILITER(S): KIT at 00:03

## 2021-03-22 RX ADMIN — AMPICILLIN SODIUM AND SULBACTAM SODIUM 100 GRAM(S): 250; 125 INJECTION, POWDER, FOR SUSPENSION INTRAMUSCULAR; INTRAVENOUS at 13:30

## 2021-03-22 RX ADMIN — OLANZAPINE 2.5 MILLIGRAM(S): 15 TABLET, FILM COATED ORAL at 00:01

## 2021-03-22 RX ADMIN — Medication 2.5 MILLIGRAM(S): at 06:28

## 2021-03-22 RX ADMIN — Medication 2.5 MILLIGRAM(S): at 17:52

## 2021-03-22 RX ADMIN — LIDOCAINE 1 PATCH: 4 CREAM TOPICAL at 06:27

## 2021-03-22 RX ADMIN — LIDOCAINE 1 PATCH: 4 CREAM TOPICAL at 18:30

## 2021-03-22 RX ADMIN — ENOXAPARIN SODIUM 30 MILLIGRAM(S): 100 INJECTION SUBCUTANEOUS at 13:35

## 2021-03-22 RX ADMIN — PANTOPRAZOLE SODIUM 40 MILLIGRAM(S): 20 TABLET, DELAYED RELEASE ORAL at 13:35

## 2021-03-22 RX ADMIN — Medication 2.5 MILLIGRAM(S): at 00:04

## 2021-03-22 RX ADMIN — AMPICILLIN SODIUM AND SULBACTAM SODIUM 100 GRAM(S): 250; 125 INJECTION, POWDER, FOR SUSPENSION INTRAMUSCULAR; INTRAVENOUS at 17:52

## 2021-03-22 RX ADMIN — AMPICILLIN SODIUM AND SULBACTAM SODIUM 100 GRAM(S): 250; 125 INJECTION, POWDER, FOR SUSPENSION INTRAMUSCULAR; INTRAVENOUS at 00:02

## 2021-03-22 RX ADMIN — AMPICILLIN SODIUM AND SULBACTAM SODIUM 100 GRAM(S): 250; 125 INJECTION, POWDER, FOR SUSPENSION INTRAMUSCULAR; INTRAVENOUS at 06:27

## 2021-03-22 NOTE — SWALLOW BEDSIDE ASSESSMENT ADULT - SLP PERTINENT HISTORY OF CURRENT PROBLEM
81 year old female from home ambulates w/ walker/cane, has past medical hx of MM, hypertension, RA, bladder incontinence came to ED c/o 2 days of dysphagia and 1 one day of tongue/left jaw swelling. Patient refers not able to eat or drink anything, denies sob, sore troat, fever, weight loss, previous episodes, cough, chills.

## 2021-03-22 NOTE — PROGRESS NOTE ADULT - PROBLEM SELECTOR PLAN 1
- Patient came to ED c/o dysphagia, swollen tongue  - Esophagitis vs Pharyngitis vs oral infection    - On PE: swollen tongue (improving), no skin changes, unable to visualize oropharynx, VS stable, afebrile, no leucocytosis    - CT neck non contrast Thickening of the left oropharyngeal and hypopharyngeal soft tissues, probably pharyngitis. Recommend follow-up to ensure resolution and to exclude an underlying mass.  -  on Cepacol, First wash   -  on Unasyn 1.5gm q6   -  Mouth exam: visualization of only base of vulva ( Class III Mallampati score )  - Swallow and speech recommended Puree diet, thin nectar ( No straw )  - Will keep her on fluids for today untill we make sure she tolerate her diet.   - Monitor respiratory status   - F/u S and S  - ENT Dr Nicholas consulted and recommended to keep on IV steroids since she is doing better on it  - GI Dr Quigley consulted and recommended to complete antibiotics

## 2021-03-22 NOTE — PROGRESS NOTE ADULT - ASSESSMENT
Pharyngitis- improved  Left neck cellulitis  Glossitis - greatly improved    Plan - Cont Unasyn 1.5 gms iv q6 hrs  Suggest tapering steroids as her confusion could be secondary to them.

## 2021-03-22 NOTE — PROGRESS NOTE ADULT - SUBJECTIVE AND OBJECTIVE BOX
Summary:   81y  Female      Subjective:   Feeling improved     Objective:    MEDICATIONS  (STANDING):  amLODIPine   Tablet 5 milliGRAM(s) Oral daily  ampicillin/sulbactam  IVPB 1.5 Gram(s) IV Intermittent every 6 hours  benzocaine 15 mG/menthol 3.6 mG (Sugar-Free) Lozenge 1 Lozenge Oral two times a day  dextrose 5% + sodium chloride 0.45%. 1000 milliLiter(s) (75 mL/Hr) IV Continuous <Continuous>  enoxaparin Injectable 30 milliGRAM(s) SubCutaneous daily  FIRST- Mouthwash  BLM 10 milliLiter(s) Swish and Spit four times a day  lidocaine   Patch 1 Patch Transdermal every 24 hours  metoprolol tartrate Injectable 2.5 milliGRAM(s) IV Push every 6 hours  pantoprazole  Injectable 40 milliGRAM(s) IV Push daily    MEDICATIONS  (PRN):  acetaminophen   Tablet .. 650 milliGRAM(s) Oral every 6 hours PRN Moderate Pain (4 - 6)              Vital Signs Last 24 Hrs  T(C): 36.5 (22 Mar 2021 14:30), Max: 36.8 (22 Mar 2021 05:30)  T(F): 97.7 (22 Mar 2021 14:30), Max: 98.3 (22 Mar 2021 05:30)  HR: 94 (22 Mar 2021 14:30) (83 - 100)  BP: 157/71 (22 Mar 2021 14:30) (131/76 - 157/71)  BP(mean): --  RR: 17 (22 Mar 2021 14:30) (17 - 18)  SpO2: 98% (22 Mar 2021 14:30) (98% - 100%)      General:  Well developed, well nourished, alert and active, no pallor, NAD.  HEENT:    Normal appearance of conjunctiva, ears, nose, lips, oropharynx, and oral mucosa, anicteric.  Neck:  No masses, no asymmetry.  Lymph Nodes:  No lymphadenopathy.   Cardiovascular:  RRR normal S1/S2, no murmur.  Respiratory:  CTA B/L, normal respiratory effort.   Abdominal:   soft, no masses or tenderness, normoactive BS, NT/ND, no HSM.  Extremities:   No clubbing or cyanosis, normal capillary refill, no edema.   Skin:   No rash, jaundice, lesions, eczema.   Musculoskeletal:  No joint swelling, erythema or tenderness.   Neuro: No focal deficits.   Other:       LABS:                        9.3    6.71  )-----------( 191      ( 22 Mar 2021 07:44 )             27.4     03-22    146<H>  |  115<H>  |  24<H>  ----------------------------<  116<H>  3.4<L>   |  20<L>  |  1.52<H>    Ca    9.0      22 Mar 2021 07:44  Phos  2.9     03-22  Mg     2.8     03-22            RADIOLOGY & ADDITIONAL TESTS:

## 2021-03-22 NOTE — PROGRESS NOTE ADULT - PROBLEM SELECTOR PLAN 2
- On admission, UA +, no leucocytosis, afebrile, no urinary symptoms   - Patient was treated for UTI on last admission   - No need to start antibiotics for UTI at this time

## 2021-03-22 NOTE — SWALLOW BEDSIDE ASSESSMENT ADULT - SWALLOW EVAL: RECOMMENDED FEEDING/EATING TECHNIQUES
allow for swallow between intakes/alternate food with liquid/check mouth frequently for oral residue/pocketing/maintain upright posture during/after eating for 30 mins/no straws/position upright (90 degrees)/small sips/bites

## 2021-03-22 NOTE — PROGRESS NOTE ADULT - PROBLEM SELECTOR PLAN 3
- Patient with KAJAL on CKD  - Now resolved Cr 1.3, baseline around 1.5-1.6  -C/w IVF D5 1/2 NS 75cc/hr  - Avoid nephrotoxins   - Monitor BMP

## 2021-03-22 NOTE — SWALLOW BEDSIDE ASSESSMENT ADULT - SWALLOW EVAL: DIAGNOSIS
Pt p/w an rox-pharyngeal dysphagia c/b prolonged mastication, palatal stasis, reduced hyo-laryngeal elevation and multiple swallow with delayed cough of solids.

## 2021-03-22 NOTE — PROGRESS NOTE ADULT - SUBJECTIVE AND OBJECTIVE BOX
81y Female is under our care for pharyngitis and left neck cellulitis.  Patient was seen laying comfortably in bed with no acute distress.  She remains afebrile and denies any chills at this point.  Patient tongue swelling has reduced with less garbled speech, with mild tenderness on the left side of the jaw.    REVIEW OF SYSTEMS:  [  ] Not able to illicit  General: no fevers no malaise  Chest: no cough no sob  GI: no nvd  : no urinary sxs   Skin: no rashes  Musculoskeletal: no trauma no LBP  Neuro: no ha's no dizziness     MEDS:  ampicillin/sulbactam  IVPB 1.5 Gram(s) IV Intermittent every 6 hours    ALLERGIES: Allergies    No Known Allergies    Intolerances        VITALS:  Vital Signs Last 24 Hrs  T(C): 36.5 (22 Mar 2021 14:30), Max: 36.8 (22 Mar 2021 05:30)  T(F): 97.7 (22 Mar 2021 14:30), Max: 98.3 (22 Mar 2021 05:30)  HR: 94 (22 Mar 2021 14:30) (83 - 100)  BP: 157/71 (22 Mar 2021 14:30) (131/76 - 157/71)  BP(mean): --  RR: 17 (22 Mar 2021 14:30) (17 - 18)  SpO2: 98% (22 Mar 2021 14:30) (98% - 100%)      PHYSICAL EXAM:  HEENT: dry oral mucosa, improvement of pharynx erythema  Neck: supple no LN's, mild tenderness on left jaw  Respiratory: lungs clear no rales  Cardiovascular: S1 S2 reg no murmurs  Gastrointestinal: +BS with soft, nondistended abdomen; nontender  Extremities: no edema  Skin: no rashes  Ortho: n/a  Neuro: AAO x       LABS/DIAGNOSTIC TESTS:                        9.3    6.71  )-----------( 191      ( 22 Mar 2021 07:44 )             27.4     WBC Count: 6.71 K/uL (03-22 @ 07:44)  WBC Count: 5.46 K/uL (03-21 @ 08:16)  WBC Count: 5.19 K/uL (03-20 @ 07:17)  WBC Count: 7.52 K/uL (03-19 @ 19:16)    03-22    146<H>  |  115<H>  |  24<H>  ----------------------------<  116<H>  3.4<L>   |  20<L>  |  1.52<H>    Ca    9.0      22 Mar 2021 07:44  Phos  2.9     03-22  Mg     2.8     03-22        CULTURES:   .Urine Clean Catch (Midstream)  02-26 @ 06:08   >=3 organisms. Probable collection contamination.  --  --        RADIOLOGY:  no new studies 81y Female is under our care for pharyngitis and left neck cellulitis.  Patient was seen laying comfortably in bed with no acute distress.  She remains afebrile and denies any chills at this point.  Patients  tongue swelling has reduced with less garbled speech, with mild tenderness on the left side of the jaw.    REVIEW OF SYSTEMS:  [  ] Not able to illicit  General: no fevers no malaise  Chest: no cough no sob  GI: no nvd  : no urinary sxs   Skin: no rashes  Musculoskeletal: no trauma no LBP  Neuro: no ha's no dizziness     MEDS:  ampicillin/sulbactam  IVPB 1.5 Gram(s) IV Intermittent every 6 hours    ALLERGIES: Allergies    No Known Allergies    Intolerances        VITALS:  Vital Signs Last 24 Hrs  T(C): 36.5 (22 Mar 2021 14:30), Max: 36.8 (22 Mar 2021 05:30)  T(F): 97.7 (22 Mar 2021 14:30), Max: 98.3 (22 Mar 2021 05:30)  HR: 94 (22 Mar 2021 14:30) (83 - 100)  BP: 157/71 (22 Mar 2021 14:30) (131/76 - 157/71)  BP(mean): --  RR: 17 (22 Mar 2021 14:30) (17 - 18)  SpO2: 98% (22 Mar 2021 14:30) (98% - 100%)      PHYSICAL EXAM:  HEENT: dry oral mucosa, improvement of pharynx erythema, glossitis has resolved  Neck: supple no LN's, mild tenderness on left jaw  Respiratory: lungs clear no rales  Cardiovascular: S1 S2 reg no murmurs  Gastrointestinal: +BS with soft, nondistended abdomen; nontender  Extremities: no edema  Skin: no rashes  Ortho: n/a  Neuro: AAO x 1-2      LABS/DIAGNOSTIC TESTS:                        9.3    6.71  )-----------( 191      ( 22 Mar 2021 07:44 )             27.4     WBC Count: 6.71 K/uL (03-22 @ 07:44)  WBC Count: 5.46 K/uL (03-21 @ 08:16)  WBC Count: 5.19 K/uL (03-20 @ 07:17)  WBC Count: 7.52 K/uL (03-19 @ 19:16)    03-22    146<H>  |  115<H>  |  24<H>  ----------------------------<  116<H>  3.4<L>   |  20<L>  |  1.52<H>    Ca    9.0      22 Mar 2021 07:44  Phos  2.9     03-22  Mg     2.8     03-22        CULTURES:       RADIOLOGY:  no new studies

## 2021-03-22 NOTE — PROGRESS NOTE ADULT - PROBLEM SELECTOR PLAN 4
- Patient w/ hx of MM, last tx in 2020  - Follows up at [facility], has next appointment on April 2021

## 2021-03-22 NOTE — PROGRESS NOTE ADULT - SUBJECTIVE AND OBJECTIVE BOX
PGY-1 Progress Note discussed with attending    PAGER #: [-----] TILL 5:00 PM  PLEASE CONTACT ON CALL TEAM:  - On Call Team (Please refer to Nicola) FROM 5:00 PM - 8:30PM  - Nightfloat Team FROM 8:30 -7:30 AM    CHIEF COMPLAINT & BRIEF HOSPITAL COURSE:    INTERVAL HPI/OVERNIGHT EVENTS:     MEDICATIONS:  acetaminophen   Tablet .. 650 milliGRAM(s) Oral every 6 hours PRN  amLODIPine   Tablet 5 milliGRAM(s) Oral daily  ampicillin/sulbactam  IVPB 1.5 Gram(s) IV Intermittent every 6 hours  benzocaine 15 mG/menthol 3.6 mG (Sugar-Free) Lozenge 1 Lozenge Oral two times a day  dextrose 5% + sodium chloride 0.45%. 1000 milliLiter(s) IV Continuous <Continuous>  enoxaparin Injectable 30 milliGRAM(s) SubCutaneous daily  FIRST- Mouthwash  BLM 10 milliLiter(s) Swish and Spit four times a day  lidocaine   Patch 1 Patch Transdermal every 24 hours  methylPREDNISolone sodium succinate Injectable 40 milliGRAM(s) IV Push daily  metoprolol tartrate Injectable 2.5 milliGRAM(s) IV Push every 6 hours  pantoprazole  Injectable 40 milliGRAM(s) IV Push daily      REVIEW OF SYSTEMS:  CONSTITUTIONAL: No fever, weight loss, or fatigue  RESPIRATORY: No cough, wheezing, chills or hemoptysis; No shortness of breath  CARDIOVASCULAR: No chest pain, palpitations, dizziness, or leg swelling  GASTROINTESTINAL: No abdominal pain. No nausea, vomiting, or hematemesis; No diarrhea or constipation. No melena or hematochezia.  GENITOURINARY: No dysuria or hematuria, urinary frequency  NEUROLOGICAL: No headaches, memory loss, loss of strength, numbness, or tremors  SKIN: No itching, burning, rashes, or lesions     Vital Signs Last 24 Hrs  T(C): 36.8 (22 Mar 2021 05:30), Max: 36.8 (22 Mar 2021 05:30)  T(F): 98.3 (22 Mar 2021 05:30), Max: 98.3 (22 Mar 2021 05:30)  HR: 90 (22 Mar 2021 13:59) (83 - 100)  BP: 150/68 (22 Mar 2021 13:59) (131/76 - 151/84)  BP(mean): --  RR: 18 (22 Mar 2021 05:30) (18 - 18)  SpO2: 100% (22 Mar 2021 05:30) (98% - 100%)    PHYSICAL EXAMINATION:  GENERAL: NAD, well built  HEAD:  Atraumatic, Normocephalic  EYES:  conjunctiva and sclera clear  NECK: Supple, No JVD, Normal thyroid  CHEST/LUNG: Clear to auscultation. Clear to percussion bilaterally; No rales, rhonchi, wheezing, or rubs  HEART: Regular rate and rhythm; No murmurs, rubs, or gallops  ABDOMEN: Soft, Nontender, Nondistended; Bowel sounds present  NERVOUS SYSTEM:  Alert & Oriented X3,    EXTREMITIES:  2+ Peripheral Pulses, No clubbing, cyanosis, or edema  SKIN: warm dry                          9.3    6.71  )-----------( 191      ( 22 Mar 2021 07:44 )             27.4     03-22    146<H>  |  115<H>  |  24<H>  ----------------------------<  116<H>  3.4<L>   |  20<L>  |  1.52<H>    Ca    9.0      22 Mar 2021 07:44  Phos  2.9     03-22  Mg     2.8     03-22                CAPILLARY BLOOD GLUCOSE      RADIOLOGY & ADDITIONAL TESTS:                   PGY-1 Progress Note discussed with attending    PAGER #: [-----] TILL 5:00 PM  PLEASE CONTACT ON CALL TEAM:  - On Call Team (Please refer to Nicola) FROM 5:00 PM - 8:30PM  - Nightfloat Team FROM 8:30 -7:30 AM    INTERVAL HPI/OVERNIGHT EVENTS: No acute events overnight. Patient is doing much better. speech and swallow evaluated her and she started her diet. Patient is on Unaysn.     MEDICATIONS:  acetaminophen   Tablet .. 650 milliGRAM(s) Oral every 6 hours PRN  amLODIPine   Tablet 5 milliGRAM(s) Oral daily  ampicillin/sulbactam  IVPB 1.5 Gram(s) IV Intermittent every 6 hours  benzocaine 15 mG/menthol 3.6 mG (Sugar-Free) Lozenge 1 Lozenge Oral two times a day  dextrose 5% + sodium chloride 0.45%. 1000 milliLiter(s) IV Continuous <Continuous>  enoxaparin Injectable 30 milliGRAM(s) SubCutaneous daily  FIRST- Mouthwash  BLM 10 milliLiter(s) Swish and Spit four times a day  lidocaine   Patch 1 Patch Transdermal every 24 hours  methylPREDNISolone sodium succinate Injectable 40 milliGRAM(s) IV Push daily  metoprolol tartrate Injectable 2.5 milliGRAM(s) IV Push every 6 hours  pantoprazole  Injectable 40 milliGRAM(s) IV Push daily      REVIEW OF SYSTEMS:  CONSTITUTIONAL: No fever, weight loss, or fatigue  RESPIRATORY: No cough, wheezing, chills or hemoptysis; No shortness of breath  CARDIOVASCULAR: No chest pain, palpitations, dizziness, or leg swelling  GASTROINTESTINAL: No abdominal pain. No nausea, vomiting, or hematemesis; No diarrhea or constipation. No melena or hematochezia.  GENITOURINARY: No dysuria or hematuria, urinary frequency  NEUROLOGICAL: No headaches, memory loss, loss of strength, numbness, or tremors  SKIN: No itching, burning, rashes, or lesions     Vital Signs Last 24 Hrs  T(C): 36.8 (22 Mar 2021 05:30), Max: 36.8 (22 Mar 2021 05:30)  T(F): 98.3 (22 Mar 2021 05:30), Max: 98.3 (22 Mar 2021 05:30)  HR: 90 (22 Mar 2021 13:59) (83 - 100)  BP: 150/68 (22 Mar 2021 13:59) (131/76 - 151/84)  BP(mean): --  RR: 18 (22 Mar 2021 05:30) (18 - 18)  SpO2: 100% (22 Mar 2021 05:30) (98% - 100%)    PHYSICAL EXAMINATION:  GENERAL: NAD, AAOx1-2  Oral cavity: tongue swelling markedly improved, tenderness on palpation of left side of neck, no erythema  NERVOUS SYSTEM:  Alert & Oriented X2, Good concentration; no focal deficit  CHEST/LUNG: Clear to auscultation bilaterally; No rales, rhonchi, wheezing, or rubs  HEART: Regular rate and rhythm; No murmurs, rubs, or gallops  ABDOMEN: Soft, Nontender, Nondistended; Bowel sounds present  EXTREMITIES:  2+ Peripheral Pulses, No clubbing, cyanosis, or edema  SKIN: No rashes or lesions                          9.3    6.71  )-----------( 191      ( 22 Mar 2021 07:44 )             27.4     03-22    146<H>  |  115<H>  |  24<H>  ----------------------------<  116<H>  3.4<L>   |  20<L>  |  1.52<H>    Ca    9.0      22 Mar 2021 07:44  Phos  2.9     03-22  Mg     2.8     03-22                CAPILLARY BLOOD GLUCOSE      RADIOLOGY & ADDITIONAL TESTS:                   PGY-1 Progress Note discussed with attending    PAGER #: [-----] TILL 5:00 PM  PLEASE CONTACT ON CALL TEAM:  - On Call Team (Please refer to Nicola) FROM 5:00 PM - 8:30PM  - Nightfloat Team FROM 8:30 -7:30 AM    INTERVAL HPI/OVERNIGHT EVENTS: Patient is doing much better. speech and swallow evaluated her and she started her diet. Patient is on Unaysn.   As per night team: Patient began to have hyperactivity with hallucinations after starting IV Benadryl and IV steroid.  Patient examined with dilated pupils, anxious demeanor and active hallucinations of multiple people in the room and people moving in and out of the walls through portals. Breathing unlabored, no wheezing. Re-assured patient and discontinued high dose IV steroid and benadryl. Gave 1 dose 2.5 zyprexa IM. Discontinued IV benadryl. Lowered steroid to methylpred 40 IV QD to start tomorrow 3/22    MEDICATIONS:  acetaminophen   Tablet .. 650 milliGRAM(s) Oral every 6 hours PRN  amLODIPine   Tablet 5 milliGRAM(s) Oral daily  ampicillin/sulbactam  IVPB 1.5 Gram(s) IV Intermittent every 6 hours  benzocaine 15 mG/menthol 3.6 mG (Sugar-Free) Lozenge 1 Lozenge Oral two times a day  dextrose 5% + sodium chloride 0.45%. 1000 milliLiter(s) IV Continuous <Continuous>  enoxaparin Injectable 30 milliGRAM(s) SubCutaneous daily  FIRST- Mouthwash  BLM 10 milliLiter(s) Swish and Spit four times a day  lidocaine   Patch 1 Patch Transdermal every 24 hours  methylPREDNISolone sodium succinate Injectable 40 milliGRAM(s) IV Push daily  metoprolol tartrate Injectable 2.5 milliGRAM(s) IV Push every 6 hours  pantoprazole  Injectable 40 milliGRAM(s) IV Push daily      REVIEW OF SYSTEMS:  CONSTITUTIONAL: No fever, weight loss, or fatigue  RESPIRATORY: No cough, wheezing, chills or hemoptysis; No shortness of breath  CARDIOVASCULAR: No chest pain, palpitations, dizziness, or leg swelling  GASTROINTESTINAL: No abdominal pain. No nausea, vomiting, or hematemesis; No diarrhea or constipation. No melena or hematochezia.  GENITOURINARY: No dysuria or hematuria, urinary frequency  NEUROLOGICAL: No headaches, memory loss, loss of strength, numbness, or tremors  SKIN: No itching, burning, rashes, or lesions     Vital Signs Last 24 Hrs  T(C): 36.8 (22 Mar 2021 05:30), Max: 36.8 (22 Mar 2021 05:30)  T(F): 98.3 (22 Mar 2021 05:30), Max: 98.3 (22 Mar 2021 05:30)  HR: 90 (22 Mar 2021 13:59) (83 - 100)  BP: 150/68 (22 Mar 2021 13:59) (131/76 - 151/84)  BP(mean): --  RR: 18 (22 Mar 2021 05:30) (18 - 18)  SpO2: 100% (22 Mar 2021 05:30) (98% - 100%)    PHYSICAL EXAMINATION:  GENERAL: NAD, AAOx1-2  Oral cavity: tongue swelling markedly improved, tenderness on palpation of left side of neck, no erythema  NERVOUS SYSTEM:  Alert & Oriented X2, Good concentration; no focal deficit  CHEST/LUNG: Clear to auscultation bilaterally; No rales, rhonchi, wheezing, or rubs  HEART: Regular rate and rhythm; No murmurs, rubs, or gallops  ABDOMEN: Soft, Nontender, Nondistended; Bowel sounds present  EXTREMITIES:  2+ Peripheral Pulses, No clubbing, cyanosis, or edema  SKIN: No rashes or lesions                          9.3    6.71  )-----------( 191      ( 22 Mar 2021 07:44 )             27.4     03-22    146<H>  |  115<H>  |  24<H>  ----------------------------<  116<H>  3.4<L>   |  20<L>  |  1.52<H>    Ca    9.0      22 Mar 2021 07:44  Phos  2.9     03-22  Mg     2.8     03-22                CAPILLARY BLOOD GLUCOSE      RADIOLOGY & ADDITIONAL TESTS:

## 2021-03-22 NOTE — PROGRESS NOTE ADULT - ATTENDING COMMENTS
Patient is a 81y old  Female who presents with a chief complaint of Dysphagia and Tongue swelling (22 Mar 2021 16:04)    Patient was seen and examined at bedside   Hallucinating   Pain and swelling has markedly improved     INTERVAL HPI/OVERNIGHT EVENTS:  T(C): 36.5 (03-22-21 @ 14:30), Max: 36.8 (03-22-21 @ 05:30)  HR: 94 (03-22-21 @ 17:52) (88 - 100)  BP: 135/87 (03-22-21 @ 17:52) (135/87 - 157/71)  RR: 17 (03-22-21 @ 14:30) (17 - 18)  SpO2: 98% (03-22-21 @ 14:30) (98% - 100%)  Wt(kg): --  I&O's Summary    REVIEW OF SYSTEMS: not obtained    PHYSICAL EXAM:  GENERAL: NAD  Tongue swelling markedly improved, base of uvula visible  NERVOUS SYSTEM:  Alert & Oriented X0, actively hallucinating, no focal deficit  CHEST/LUNG: Clear to auscultation bilaterally; No rales, rhonchi, wheezing, or rubs  HEART: Regular rate and rhythm; No murmurs, rubs, or gallops  ABDOMEN: Soft, Nontender, Nondistended; Bowel sounds present  EXTREMITIES:  2+ Peripheral Pulses, No clubbing, cyanosis, or edema  SKIN: No rashes or lesions    LABS:                        9.3    6.71  )-----------( 191      ( 22 Mar 2021 07:44 )             27.4     03-22    146<H>  |  115<H>  |  24<H>  ----------------------------<  116<H>  3.4<L>   |  20<L>  |  1.52<H>    A/P:  Steroid induced psychosis  Dysphagia and Tongue Swelling due to ?pharyngitis or infection of floor of mouth   KAJAL on CKD 3b  Multiple anemia   HTN  RA  Back pain from recently diagnosed compression fracture of T12, Osteoporosis and MM    Plan:  Dc Solumedrol 40mg qd   Cont Unasyn  Pending consult from Dr Nicholas, will see today  Appreciate consult from Dr. Multani  PRN Zyprexa, monitor QTc  Not SOB. maintaining airway. Monitor closely  Appreciate GI consult   Monitor Hb   Full code   Rest of the management as above

## 2021-03-22 NOTE — SWALLOW BEDSIDE ASSESSMENT ADULT - PHARYNGEAL PHASE
Within functional limits Decreased laryngeal elevation Decreased laryngeal elevation/Multiple swallows Decreased laryngeal elevation/Delayed cough post oral intake Decreased laryngeal elevation/Cough post oral intake

## 2021-03-23 LAB
ALBUMIN SERPL ELPH-MCNC: 2 G/DL — LOW (ref 3.5–5)
ALP SERPL-CCNC: 83 U/L — SIGNIFICANT CHANGE UP (ref 40–120)
ALT FLD-CCNC: 21 U/L DA — SIGNIFICANT CHANGE UP (ref 10–60)
ANION GAP SERPL CALC-SCNC: 10 MMOL/L — SIGNIFICANT CHANGE UP (ref 5–17)
APPEARANCE UR: CLEAR — SIGNIFICANT CHANGE UP
AST SERPL-CCNC: 24 U/L — SIGNIFICANT CHANGE UP (ref 10–40)
BACTERIA # UR AUTO: ABNORMAL /HPF
BASOPHILS # BLD AUTO: 0.01 K/UL — SIGNIFICANT CHANGE UP (ref 0–0.2)
BASOPHILS NFR BLD AUTO: 0.2 % — SIGNIFICANT CHANGE UP (ref 0–2)
BILIRUB SERPL-MCNC: 0.3 MG/DL — SIGNIFICANT CHANGE UP (ref 0.2–1.2)
BILIRUB UR-MCNC: NEGATIVE — SIGNIFICANT CHANGE UP
BUN SERPL-MCNC: 31 MG/DL — HIGH (ref 7–18)
CALCIUM SERPL-MCNC: 8.9 MG/DL — SIGNIFICANT CHANGE UP (ref 8.4–10.5)
CHLORIDE SERPL-SCNC: 118 MMOL/L — HIGH (ref 96–108)
CO2 SERPL-SCNC: 23 MMOL/L — SIGNIFICANT CHANGE UP (ref 22–31)
COLOR SPEC: YELLOW — SIGNIFICANT CHANGE UP
CREAT SERPL-MCNC: 1.82 MG/DL — HIGH (ref 0.5–1.3)
DIFF PNL FLD: ABNORMAL
EOSINOPHIL # BLD AUTO: 0 K/UL — SIGNIFICANT CHANGE UP (ref 0–0.5)
EOSINOPHIL NFR BLD AUTO: 0 % — SIGNIFICANT CHANGE UP (ref 0–6)
EPI CELLS # UR: SIGNIFICANT CHANGE UP /HPF
GLUCOSE SERPL-MCNC: 105 MG/DL — HIGH (ref 70–99)
GLUCOSE UR QL: NEGATIVE — SIGNIFICANT CHANGE UP
HCT VFR BLD CALC: 26.9 % — LOW (ref 34.5–45)
HGB BLD-MCNC: 9 G/DL — LOW (ref 11.5–15.5)
HYALINE CASTS # UR AUTO: ABNORMAL /LPF
IMM GRANULOCYTES NFR BLD AUTO: 0.5 % — SIGNIFICANT CHANGE UP (ref 0–1.5)
KETONES UR-MCNC: NEGATIVE — SIGNIFICANT CHANGE UP
LEUKOCYTE ESTERASE UR-ACNC: NEGATIVE — SIGNIFICANT CHANGE UP
LYMPHOCYTES # BLD AUTO: 1.2 K/UL — SIGNIFICANT CHANGE UP (ref 1–3.3)
LYMPHOCYTES # BLD AUTO: 18.2 % — SIGNIFICANT CHANGE UP (ref 13–44)
MAGNESIUM SERPL-MCNC: 2.9 MG/DL — HIGH (ref 1.6–2.6)
MCHC RBC-ENTMCNC: 29 PG — SIGNIFICANT CHANGE UP (ref 27–34)
MCHC RBC-ENTMCNC: 33.5 GM/DL — SIGNIFICANT CHANGE UP (ref 32–36)
MCV RBC AUTO: 86.8 FL — SIGNIFICANT CHANGE UP (ref 80–100)
MONOCYTES # BLD AUTO: 0.36 K/UL — SIGNIFICANT CHANGE UP (ref 0–0.9)
MONOCYTES NFR BLD AUTO: 5.4 % — SIGNIFICANT CHANGE UP (ref 2–14)
NEUTROPHILS # BLD AUTO: 5.01 K/UL — SIGNIFICANT CHANGE UP (ref 1.8–7.4)
NEUTROPHILS NFR BLD AUTO: 75.7 % — SIGNIFICANT CHANGE UP (ref 43–77)
NITRITE UR-MCNC: NEGATIVE — SIGNIFICANT CHANGE UP
NRBC # BLD: 0 /100 WBCS — SIGNIFICANT CHANGE UP (ref 0–0)
PH UR: 5 — SIGNIFICANT CHANGE UP (ref 5–8)
PHOSPHATE SERPL-MCNC: 2.9 MG/DL — SIGNIFICANT CHANGE UP (ref 2.5–4.5)
PLATELET # BLD AUTO: 199 K/UL — SIGNIFICANT CHANGE UP (ref 150–400)
POTASSIUM SERPL-MCNC: 3.8 MMOL/L — SIGNIFICANT CHANGE UP (ref 3.5–5.3)
POTASSIUM SERPL-SCNC: 3.8 MMOL/L — SIGNIFICANT CHANGE UP (ref 3.5–5.3)
PROT SERPL-MCNC: 9.1 G/DL — HIGH (ref 6–8.3)
PROT UR-MCNC: 30 MG/DL
RBC # BLD: 3.1 M/UL — LOW (ref 3.8–5.2)
RBC # FLD: 16.8 % — HIGH (ref 10.3–14.5)
RBC CASTS # UR COMP ASSIST: ABNORMAL /HPF (ref 0–2)
SODIUM SERPL-SCNC: 151 MMOL/L — HIGH (ref 135–145)
SP GR SPEC: 1.01 — SIGNIFICANT CHANGE UP (ref 1.01–1.02)
UROBILINOGEN FLD QL: NEGATIVE — SIGNIFICANT CHANGE UP
WBC # BLD: 6.61 K/UL — SIGNIFICANT CHANGE UP (ref 3.8–10.5)
WBC # FLD AUTO: 6.61 K/UL — SIGNIFICANT CHANGE UP (ref 3.8–10.5)
WBC UR QL: SIGNIFICANT CHANGE UP /HPF (ref 0–5)

## 2021-03-23 PROCEDURE — 99233 SBSQ HOSP IP/OBS HIGH 50: CPT | Mod: GC

## 2021-03-23 RX ORDER — CHOLECALCIFEROL (VITAMIN D3) 125 MCG
2000 CAPSULE ORAL DAILY
Refills: 0 | Status: DISCONTINUED | OUTPATIENT
Start: 2021-03-23 | End: 2021-03-29

## 2021-03-23 RX ORDER — SODIUM CHLORIDE 9 MG/ML
500 INJECTION, SOLUTION INTRAVENOUS ONCE
Refills: 0 | Status: COMPLETED | OUTPATIENT
Start: 2021-03-23 | End: 2021-03-23

## 2021-03-23 RX ORDER — SODIUM CHLORIDE 9 MG/ML
1000 INJECTION, SOLUTION INTRAVENOUS
Refills: 0 | Status: DISCONTINUED | OUTPATIENT
Start: 2021-03-23 | End: 2021-03-24

## 2021-03-23 RX ORDER — PANTOPRAZOLE SODIUM 20 MG/1
40 TABLET, DELAYED RELEASE ORAL
Refills: 0 | Status: DISCONTINUED | OUTPATIENT
Start: 2021-03-23 | End: 2021-03-29

## 2021-03-23 RX ORDER — LANOLIN ALCOHOL/MO/W.PET/CERES
3 CREAM (GRAM) TOPICAL AT BEDTIME
Refills: 0 | Status: DISCONTINUED | OUTPATIENT
Start: 2021-03-23 | End: 2021-03-29

## 2021-03-23 RX ADMIN — LIDOCAINE 1 PATCH: 4 CREAM TOPICAL at 17:30

## 2021-03-23 RX ADMIN — AMPICILLIN SODIUM AND SULBACTAM SODIUM 100 GRAM(S): 250; 125 INJECTION, POWDER, FOR SUSPENSION INTRAMUSCULAR; INTRAVENOUS at 23:08

## 2021-03-23 RX ADMIN — AMPICILLIN SODIUM AND SULBACTAM SODIUM 100 GRAM(S): 250; 125 INJECTION, POWDER, FOR SUSPENSION INTRAMUSCULAR; INTRAVENOUS at 13:50

## 2021-03-23 RX ADMIN — ENOXAPARIN SODIUM 30 MILLIGRAM(S): 100 INJECTION SUBCUTANEOUS at 13:51

## 2021-03-23 RX ADMIN — Medication 2000 UNIT(S): at 13:51

## 2021-03-23 RX ADMIN — SODIUM CHLORIDE 75 MILLILITER(S): 9 INJECTION, SOLUTION INTRAVENOUS at 04:40

## 2021-03-23 RX ADMIN — AMLODIPINE BESYLATE 5 MILLIGRAM(S): 2.5 TABLET ORAL at 05:47

## 2021-03-23 RX ADMIN — AMPICILLIN SODIUM AND SULBACTAM SODIUM 100 GRAM(S): 250; 125 INJECTION, POWDER, FOR SUSPENSION INTRAMUSCULAR; INTRAVENOUS at 00:01

## 2021-03-23 RX ADMIN — AMPICILLIN SODIUM AND SULBACTAM SODIUM 100 GRAM(S): 250; 125 INJECTION, POWDER, FOR SUSPENSION INTRAMUSCULAR; INTRAVENOUS at 05:48

## 2021-03-23 RX ADMIN — Medication 3 MILLIGRAM(S): at 23:10

## 2021-03-23 RX ADMIN — Medication 2.5 MILLIGRAM(S): at 00:06

## 2021-03-23 RX ADMIN — LIDOCAINE 1 PATCH: 4 CREAM TOPICAL at 05:48

## 2021-03-23 RX ADMIN — PANTOPRAZOLE SODIUM 40 MILLIGRAM(S): 20 TABLET, DELAYED RELEASE ORAL at 13:51

## 2021-03-23 RX ADMIN — AMPICILLIN SODIUM AND SULBACTAM SODIUM 100 GRAM(S): 250; 125 INJECTION, POWDER, FOR SUSPENSION INTRAMUSCULAR; INTRAVENOUS at 17:15

## 2021-03-23 RX ADMIN — LIDOCAINE 1 PATCH: 4 CREAM TOPICAL at 07:30

## 2021-03-23 RX ADMIN — SODIUM CHLORIDE 500 MILLILITER(S): 9 INJECTION, SOLUTION INTRAVENOUS at 09:52

## 2021-03-23 RX ADMIN — Medication 2.5 MILLIGRAM(S): at 05:48

## 2021-03-23 NOTE — PROGRESS NOTE ADULT - PROBLEM SELECTOR PLAN 1
- Patient came to ED c/o dysphagia, swollen tongue  - Esophagitis vs Pharyngitis vs oral infection    - On PE: swollen tongue (improving), no skin changes, unable to visualize oropharynx, VS stable, afebrile, no leucocytosis    - CT neck non contrast Thickening of the left oropharyngeal and hypopharyngeal soft tissues, probably pharyngitis. Recommend follow-up to ensure resolution and to exclude an underlying mass.  -  on Cepacol, First wash   -  on Unasyn 1.5gm q6   -  Mouth exam: visualization of only base of vulva ( Class III Mallampati score )  - Swallow and speech recommended Puree diet, thin nectar ( No straw )  - Will keep her on fluids for today untill we make sure she tolerate her diet.   - Monitor respiratory status   - F/u S and S  - ENT Dr Nicholas consulted and recommended to keep on IV steroids since she is doing better on it  - GI Dr Quigley consulted and recommended to complete antibiotics - Patient came to ED c/o dysphagia, swollen tongue  - Esophagitis vs Pharyngitis vs oral infection    - CT neck non contrast Thickening of the left oropharyngeal and hypopharyngeal soft tissues, probably pharyngitis. Recommend follow-up to ensure resolution and to exclude an underlying mass.  -  on Cepacol, First wash   -  on Unasyn 1.5gm q6   -  Oral exam: visualization of only base of vulva ( Class III Mallampati score )  - on puree diet; poor oral intake due to low appetite  - Will keep her on fluids for today until we make sure she tolerate her diet.   - 3/23, IV fluids was renewed for 24 hours.  - 3/23, Off steroids   - Monitor respiratory status   - GI Dr Quigley consulted and recommended to complete antibiotics

## 2021-03-23 NOTE — PROGRESS NOTE ADULT - PROBLEM SELECTOR PLAN 4
- Patient w/ hx of MM, last tx in 2020  - Follows up at [facility], has next appointment on April 2021

## 2021-03-23 NOTE — PROGRESS NOTE ADULT - PROBLEM SELECTOR PLAN 3
- Patient with KAJAL on CKD  - Now resolved Cr 1.3, baseline around 1.5-1.6  -C/w IVF D5 1/2 NS 75cc/hr  - Avoid nephrotoxins   - Monitor BMP - Patient with KAJAL on CKD  - Now resolved Cr 1.3, baseline around 1.5-1.6  -C/w IVF D5 1/2 NS 85cc/hr  - Avoid nephrotoxins   - Monitor BMP

## 2021-03-23 NOTE — PROGRESS NOTE ADULT - SUBJECTIVE AND OBJECTIVE BOX
81y Female is under our care for pharyngitis and left neck cellulitis.  Patient was seen laying comfortably in bed with no acute distress.  Cellulitis on left neck has improved although patient still complains of mild tenderness on the left side of the neck.  She remains confused and refused to eat her breakfast this morning as per the nurse.    REVIEW OF SYSTEMS:  [  ] Not able to illicit  General: no fevers no malaise  Chest: no cough no sob  GI: no nvd  : no urinary sxs   Skin: no rashes  Musculoskeletal: no trauma no LBP  Neuro: no ha's no dizziness    MEDS:  ampicillin/sulbactam  IVPB 1.5 Gram(s) IV Intermittent every 6 hours    ALLERGIES: Allergies    No Known Allergies    Intolerances        VITALS:  Vital Signs Last 24 Hrs  T(C): 36.7 (23 Mar 2021 05:45), Max: 36.7 (23 Mar 2021 05:45)  T(F): 98 (23 Mar 2021 05:45), Max: 98 (23 Mar 2021 05:45)  HR: 89 (23 Mar 2021 05:45) (89 - 94)  BP: 147/58 (23 Mar 2021 05:45) (135/87 - 157/71)  BP(mean): --  RR: 18 (23 Mar 2021 05:45) (17 - 18)  SpO2: 98% (23 Mar 2021 05:45) (98% - 99%)      PHYSICAL EXAM:  HEENT: dry oral mucosa, improvement of pharynx erythema, glossitis has resolved, tenderness on left side of neck  Neck: supple no LN's, mild tenderness on left jaw  Respiratory: lungs clear no rales  Cardiovascular: S1 S2 reg no murmurs  Gastrointestinal: +BS with soft, nondistended abdomen; nontender  Extremities: no edema  Skin: no rashes  Ortho: n/a  Neuro: AAO x 1      LABS/DIAGNOSTIC TESTS:                        9.0    6.61  )-----------( 199      ( 23 Mar 2021 07:12 )             26.9     WBC Count: 6.61 K/uL (03-23 @ 07:12)  WBC Count: 6.71 K/uL (03-22 @ 07:44)  WBC Count: 5.46 K/uL (03-21 @ 08:16)  WBC Count: 5.19 K/uL (03-20 @ 07:17)  WBC Count: 7.52 K/uL (03-19 @ 19:16)    03-23    151<H>  |  118<H>  |  31<H>  ----------------------------<  105<H>  3.8   |  23  |  1.82<H>    Ca    8.9      23 Mar 2021 07:12  Phos  2.9     03-23  Mg     2.9     03-23    TPro  9.1<H>  /  Alb  2.0<L>  /  TBili  0.3  /  DBili  x   /  AST  24  /  ALT  21  /  AlkPhos  83  03-23      CULTURES:   .Urine Clean Catch (Midstream)  02-26 @ 06:08   >=3 organisms. Probable collection contamination.  --  --        RADIOLOGY:  no new studies

## 2021-03-23 NOTE — PROGRESS NOTE ADULT - ASSESSMENT
Pharyngitis- improved  Left neck cellulitis  Glossitis - greatly improved    Plan - Cont Unasyn 1.5 gms iv q6 hrs  Suggest tapering steroids as her confusion could be secondary to them. Pharyngitis- improved  Left neck cellulitis  Glossitis - greatly improved    Plan - Cont Unasyn 1.5 gms iv q6 hrs   Pharyngitis- improved  Left neck cellulitis  Glossitis - greatly improved    Plan - Cont Unasyn 1.5 gms iv q6 hrs  when ready for discharge will switch to augmentin po.      I agree with above

## 2021-03-23 NOTE — PROGRESS NOTE ADULT - PROBLEM SELECTOR PLAN 6
- Patient has history of Hypertension on amlodipine and metoprolol at home   - Hold po meds  - Started on Lopressor 2.5 mg iv q6 hrs   - Monitor BP and adjust meds as needed - Patient has history of Hypertension on amlodipine and metoprolol at home   - on AMlodipine 5mg daily with holding parameters   - on Lopressor 2.5 mg iv q6 hrs   - Monitor BP and adjust meds as needed

## 2021-03-23 NOTE — PROGRESS NOTE ADULT - SUBJECTIVE AND OBJECTIVE BOX
PGY-1 Progress Note discussed with attending    PAGER #: [-----] TILL 5:00 PM  PLEASE CONTACT ON CALL TEAM:  - On Call Team (Please refer to Nicola) FROM 5:00 PM - 8:30PM  - Nightfloat Team FROM 8:30 -7:30 AM    CHIEF COMPLAINT & BRIEF HOSPITAL COURSE:    INTERVAL HPI/OVERNIGHT EVENTS:     MEDICATIONS:  acetaminophen   Tablet .. 650 milliGRAM(s) Oral every 6 hours PRN  amLODIPine   Tablet 5 milliGRAM(s) Oral daily  ampicillin/sulbactam  IVPB 1.5 Gram(s) IV Intermittent every 6 hours  benzocaine 15 mG/menthol 3.6 mG (Sugar-Free) Lozenge 1 Lozenge Oral two times a day  cholecalciferol 2000 Unit(s) Oral daily  dextrose 5% + sodium chloride 0.45%. 1000 milliLiter(s) IV Continuous <Continuous>  enoxaparin Injectable 30 milliGRAM(s) SubCutaneous daily  FIRST- Mouthwash  BLM 10 milliLiter(s) Swish and Spit four times a day  lactated ringers Bolus 500 milliLiter(s) IV Bolus once  lidocaine   Patch 1 Patch Transdermal every 24 hours  pantoprazole  Injectable 40 milliGRAM(s) IV Push daily      REVIEW OF SYSTEMS:  CONSTITUTIONAL: No fever, weight loss, or fatigue  RESPIRATORY: No cough, wheezing, chills or hemoptysis; No shortness of breath  CARDIOVASCULAR: No chest pain, palpitations, dizziness, or leg swelling  GASTROINTESTINAL: No abdominal pain. No nausea, vomiting, or hematemesis; No diarrhea or constipation. No melena or hematochezia.  GENITOURINARY: No dysuria or hematuria, urinary frequency  NEUROLOGICAL: No headaches, memory loss, loss of strength, numbness, or tremors  SKIN: No itching, burning, rashes, or lesions     Vital Signs Last 24 Hrs  T(C): 36.7 (23 Mar 2021 05:45), Max: 36.7 (23 Mar 2021 05:45)  T(F): 98 (23 Mar 2021 05:45), Max: 98 (23 Mar 2021 05:45)  HR: 89 (23 Mar 2021 05:45) (89 - 94)  BP: 147/58 (23 Mar 2021 05:45) (135/87 - 157/71)  BP(mean): --  RR: 18 (23 Mar 2021 05:45) (17 - 18)  SpO2: 98% (23 Mar 2021 05:45) (98% - 99%)    PHYSICAL EXAMINATION:  GENERAL: NAD, well built  HEAD:  Atraumatic, Normocephalic  EYES:  conjunctiva and sclera clear  NECK: Supple, No JVD, Normal thyroid  CHEST/LUNG: Clear to auscultation. Clear to percussion bilaterally; No rales, rhonchi, wheezing, or rubs  HEART: Regular rate and rhythm; No murmurs, rubs, or gallops  ABDOMEN: Soft, Nontender, Nondistended; Bowel sounds present  NERVOUS SYSTEM:  Alert & Oriented X3,    EXTREMITIES:  2+ Peripheral Pulses, No clubbing, cyanosis, or edema  SKIN: warm dry                          9.0    6.61  )-----------( 199      ( 23 Mar 2021 07:12 )             26.9     03-23    151<H>  |  118<H>  |  31<H>  ----------------------------<  105<H>  3.8   |  23  |  1.82<H>    Ca    8.9      23 Mar 2021 07:12  Phos  2.9     03-23  Mg     2.9     03-23    TPro  9.1<H>  /  Alb  2.0<L>  /  TBili  0.3  /  DBili  x   /  AST  24  /  ALT  21  /  AlkPhos  83  03-23    LIVER FUNCTIONS - ( 23 Mar 2021 07:12 )  Alb: 2.0 g/dL / Pro: 9.1 g/dL / ALK PHOS: 83 U/L / ALT: 21 U/L DA / AST: 24 U/L / GGT: x                   CAPILLARY BLOOD GLUCOSE      RADIOLOGY & ADDITIONAL TESTS:                   PGY-1 Progress Note discussed with attending    PAGER #: [-----] TILL 5:00 PM  PLEASE CONTACT ON CALL TEAM:  - On Call Team (Please refer to Nicola) FROM 5:00 PM - 8:30PM  - Nightfloat Team FROM 8:30 -7:30 AM    INTERVAL HPI/OVERNIGHT EVENTS: No acute events overnight    MEDICATIONS:  acetaminophen   Tablet .. 650 milliGRAM(s) Oral every 6 hours PRN  amLODIPine   Tablet 5 milliGRAM(s) Oral daily  ampicillin/sulbactam  IVPB 1.5 Gram(s) IV Intermittent every 6 hours  benzocaine 15 mG/menthol 3.6 mG (Sugar-Free) Lozenge 1 Lozenge Oral two times a day  cholecalciferol 2000 Unit(s) Oral daily  dextrose 5% + sodium chloride 0.45%. 1000 milliLiter(s) IV Continuous <Continuous>  enoxaparin Injectable 30 milliGRAM(s) SubCutaneous daily  FIRST- Mouthwash  BLM 10 milliLiter(s) Swish and Spit four times a day  lactated ringers Bolus 500 milliLiter(s) IV Bolus once  lidocaine   Patch 1 Patch Transdermal every 24 hours  pantoprazole  Injectable 40 milliGRAM(s) IV Push daily      REVIEW OF SYSTEMS:  CONSTITUTIONAL: No fever, weight loss, or fatigue  RESPIRATORY: No cough, wheezing, chills or hemoptysis; No shortness of breath  CARDIOVASCULAR: No chest pain, palpitations, dizziness, or leg swelling  GASTROINTESTINAL: No abdominal pain. No nausea, vomiting, or hematemesis; No diarrhea or constipation. No melena or hematochezia.  GENITOURINARY: No dysuria or hematuria, urinary frequency  NEUROLOGICAL: No headaches, memory loss, loss of strength, numbness, or tremors  SKIN: No itching, burning, rashes, or lesions     Vital Signs Last 24 Hrs  T(C): 36.7 (23 Mar 2021 05:45), Max: 36.7 (23 Mar 2021 05:45)  T(F): 98 (23 Mar 2021 05:45), Max: 98 (23 Mar 2021 05:45)  HR: 89 (23 Mar 2021 05:45) (89 - 94)  BP: 147/58 (23 Mar 2021 05:45) (135/87 - 157/71)  BP(mean): --  RR: 18 (23 Mar 2021 05:45) (17 - 18)  SpO2: 98% (23 Mar 2021 05:45) (98% - 99%)    PHYSICAL EXAMINATION:  GENERAL: NAD, well built  HEAD:  Atraumatic, Normocephalic  EYES:  conjunctiva and sclera clear  NECK: Supple, No JVD, Normal thyroid  CHEST/LUNG: Clear to auscultation. Clear to percussion bilaterally; No rales, rhonchi, wheezing, or rubs  HEART: Regular rate and rhythm; No murmurs, rubs, or gallops  ABDOMEN: Soft, Nontender, Nondistended; Bowel sounds present  NERVOUS SYSTEM:  Alert & Oriented X3,    EXTREMITIES:  2+ Peripheral Pulses, No clubbing, cyanosis, or edema  SKIN: warm dry                          9.0    6.61  )-----------( 199      ( 23 Mar 2021 07:12 )             26.9     03-23    151<H>  |  118<H>  |  31<H>  ----------------------------<  105<H>  3.8   |  23  |  1.82<H>    Ca    8.9      23 Mar 2021 07:12  Phos  2.9     03-23  Mg     2.9     03-23    TPro  9.1<H>  /  Alb  2.0<L>  /  TBili  0.3  /  DBili  x   /  AST  24  /  ALT  21  /  AlkPhos  83  03-23    LIVER FUNCTIONS - ( 23 Mar 2021 07:12 )  Alb: 2.0 g/dL / Pro: 9.1 g/dL / ALK PHOS: 83 U/L / ALT: 21 U/L DA / AST: 24 U/L / GGT: x                   CAPILLARY BLOOD GLUCOSE      RADIOLOGY & ADDITIONAL TESTS:                   PGY-1 Progress Note discussed with attending    PAGER #: [-----] TILL 5:00 PM  PLEASE CONTACT ON CALL TEAM:  - On Call Team (Please refer to Nicola) FROM 5:00 PM - 8:30PM  - Nightfloat Team FROM 8:30 -7:30 AM    INTERVAL HPI/OVERNIGHT EVENTS: No acute events overnight. Patient is lying on bed. little bit more confused than yesterday.    MEDICATIONS:  acetaminophen   Tablet .. 650 milliGRAM(s) Oral every 6 hours PRN  amLODIPine   Tablet 5 milliGRAM(s) Oral daily  ampicillin/sulbactam  IVPB 1.5 Gram(s) IV Intermittent every 6 hours  benzocaine 15 mG/menthol 3.6 mG (Sugar-Free) Lozenge 1 Lozenge Oral two times a day  cholecalciferol 2000 Unit(s) Oral daily  dextrose 5% + sodium chloride 0.45%. 1000 milliLiter(s) IV Continuous <Continuous>  enoxaparin Injectable 30 milliGRAM(s) SubCutaneous daily  FIRST- Mouthwash  BLM 10 milliLiter(s) Swish and Spit four times a day  lactated ringers Bolus 500 milliLiter(s) IV Bolus once  lidocaine   Patch 1 Patch Transdermal every 24 hours  pantoprazole  Injectable 40 milliGRAM(s) IV Push daily      REVIEW OF SYSTEMS:  CONSTITUTIONAL: No fever, weight loss, or fatigue  RESPIRATORY: No cough, wheezing, chills or hemoptysis; No shortness of breath  CARDIOVASCULAR: No chest pain, palpitations, dizziness, or leg swelling  GASTROINTESTINAL: No abdominal pain. No nausea, vomiting, or hematemesis; No diarrhea or constipation. No melena or hematochezia.  GENITOURINARY: No dysuria or hematuria, urinary frequency  NEUROLOGICAL: No headaches, memory loss, loss of strength, numbness, or tremors  SKIN: No itching, burning, rashes, or lesions     Vital Signs Last 24 Hrs  T(C): 36.7 (23 Mar 2021 05:45), Max: 36.7 (23 Mar 2021 05:45)  T(F): 98 (23 Mar 2021 05:45), Max: 98 (23 Mar 2021 05:45)  HR: 89 (23 Mar 2021 05:45) (89 - 94)  BP: 147/58 (23 Mar 2021 05:45) (135/87 - 157/71)  BP(mean): --  RR: 18 (23 Mar 2021 05:45) (17 - 18)  SpO2: 98% (23 Mar 2021 05:45) (98% - 99%)    PHYSICAL EXAMINATION:  GENERAL: NAD, AAOx1-2  Oral cavity: tongue swelling markedly improved, tenderness on palpation of left side of neck, no erythema  NERVOUS SYSTEM:  Alert & Oriented X2, Good concentration; no focal deficit  CHEST/LUNG: Clear to auscultation bilaterally; No rales, rhonchi, wheezing, or rubs  HEART: Regular rate and rhythm; No murmurs, rubs, or gallops  ABDOMEN: Soft, Nontender, Nondistended; Bowel sounds present  EXTREMITIES:  2+ Peripheral Pulses, No clubbing, cyanosis, or edema  SKIN: No rashes or lesions                          9.0    6.61  )-----------( 199      ( 23 Mar 2021 07:12 )             26.9     03-23    151<H>  |  118<H>  |  31<H>  ----------------------------<  105<H>  3.8   |  23  |  1.82<H>    Ca    8.9      23 Mar 2021 07:12  Phos  2.9     03-23  Mg     2.9     03-23    TPro  9.1<H>  /  Alb  2.0<L>  /  TBili  0.3  /  DBili  x   /  AST  24  /  ALT  21  /  AlkPhos  83  03-23    LIVER FUNCTIONS - ( 23 Mar 2021 07:12 )  Alb: 2.0 g/dL / Pro: 9.1 g/dL / ALK PHOS: 83 U/L / ALT: 21 U/L DA / AST: 24 U/L / GGT: x                   CAPILLARY BLOOD GLUCOSE      RADIOLOGY & ADDITIONAL TESTS:

## 2021-03-23 NOTE — PROGRESS NOTE ADULT - ATTENDING COMMENTS
Patient is a 81y old  Female who presents with a chief complaint of Dysphagia and Tongue swelling (22 Mar 2021 16:04)    Patient was seen and examined at bedside   Hallucinating   Pain and swelling has markedly improved     INTERVAL HPI/OVERNIGHT EVENTS:    Vital Signs Last 24 Hrs  T(C): 36.7 (23 Mar 2021 13:39), Max: 36.7 (23 Mar 2021 05:45)  T(F): 98 (23 Mar 2021 13:39), Max: 98 (23 Mar 2021 05:45)  HR: 91 (23 Mar 2021 15:45) (89 - 91)  BP: 155/75 (23 Mar 2021 15:45) (147/58 - 165/65)  BP(mean): --  RR: 16 (23 Mar 2021 13:39) (16 - 18)  SpO2: 98% (23 Mar 2021 13:39) (98% - 99%)    REVIEW OF SYSTEMS: not obtained    PHYSICAL EXAM:  GENERAL: NAD  Tongue swelling markedly improved, base of uvula visible  NERVOUS SYSTEM:  Alert & Oriented X0, actively hallucinating, no focal deficit  CHEST/LUNG: Clear to auscultation bilaterally; No rales, rhonchi, wheezing, or rubs  HEART: Regular rate and rhythm; No murmurs, rubs, or gallops  ABDOMEN: Soft, Nontender, Nondistended; Bowel sounds present  EXTREMITIES:  2+ Peripheral Pulses, No clubbing, cyanosis, or edema  SKIN: No rashes or lesions    LABS:                        9.0    6.61  )-----------( 199      ( 23 Mar 2021 07:12 )             26.9   03-23    151<H>  |  118<H>  |  31<H>  ----------------------------<  105<H>  3.8   |  23  |  1.82<H>    Ca    8.9      23 Mar 2021 07:12  Phos  2.9     03-23  Mg     2.9     03-23    TPro  9.1<H>  /  Alb  2.0<L>  /  TBili  0.3  /  DBili  x   /  AST  24  /  ALT  21  /  AlkPhos  83  03-23    A/P:  Steroid induced psychosis  Dysphagia and Tongue Swelling due to ?pharyngitis or infection of floor of mouth   KAJAL on CKD 3b  Multiple anemia   HTN  RA  Back pain from recently diagnosed compression fracture of T12, Osteoporosis and MM    Plan:  Dc Solumedrol 40mg qd   Cont Unasyn  Pending consult from Dr Nicholas, will see today  Appreciate consult from Dr. Nerissa Hines, monitor QTc  Not SOB. maintaining airway. Monitor closely  Appreciate GI consult   Monitor Hb   Full code   Rest of the management as above Patient seen and examined with housestaff this morning around 11 AM;  Agree with PGY1 A/P above with editing as needed. My independent assessment, findings on exam, diagnosis and plan of care as listed below. Discussed with Dr. Dupont and PGY3 Dr. VINAYAK Bourgeois    Patient is a 81y old  Female who presents with a chief complaint of Dysphagia and Tongue swelling     Patient still Hallucinating but improved as per housestaff; Pain and swelling has markedly improved     INTERVAL HPI/OVERNIGHT EVENTS: Unable to offer complaints    Vital Signs Last 24 Hrs  T(C): 36.7 (23 Mar 2021 13:39), Max: 36.7 (23 Mar 2021 05:45)  T(F): 98 (23 Mar 2021 13:39), Max: 98 (23 Mar 2021 05:45)  HR: 91 (23 Mar 2021 15:45) (89 - 91)  BP: 155/75 (23 Mar 2021 15:45) (147/58 - 165/65)  RR: 16 (23 Mar 2021 13:39) (16 - 18)  SpO2: 98% (23 Mar 2021 13:39) (98% - 99%)    REVIEW OF SYSTEMS: not obtained    PHYSICAL EXAM:  GENERAL: NAD; appears dehydrated  Tongue swelling improved, base of uvula visible; no significant erythema  NERVOUS SYSTEM:  Alert & Oriented X01;  hallucinating, no focal deficit  CHEST/LUNG: Clear to auscultation bilaterally; No rales, rhonchi, wheezing, or rubs  HEART: Regular rate and rhythm;   ABDOMEN: Soft, Nontender, Nondistended; Bowel sounds present  EXTREMITIES:  2+ Peripheral Pulses, No clubbing, cyanosis, or edema  SKIN: No rashes or lesions    LABS:                        9.0    6.61  )-----------( 199      ( 23 Mar 2021 07:12 )             26.9   03-23    151<H>  |  118<H>  |  31<H>  ----------------------------<  105<H>  3.8   |  23  |  1.82<H>    Ca    8.9      23 Mar 2021 07:12  Phos  2.9     03-23  Mg     2.9     03-23    TPro  9.1<H>  /  Alb  2.0<L>  /  TBili  0.3  /  DBili  x   /  AST  24  /  ALT  21  /  AlkPhos  83  03-23    A/P:  Steroid induced psychosis  Dysphagia and Tongue Swelling due to likely pharyngitis or infection of floor of mouth   KAJAL on CKD 3  Multiple anemia   HTN  RA  Back pain from recently diagnosed compression fracture of T12, Osteoporosis and MM    Plan:  Patient off steroids; Dehydration might be contributing to her hallucination in my opinion.   Cont Unasyn; ID follow up appreciated  Pending consult from ENT Dr Nicholas, was expectedlast night  PRN Zyprexa, monitor QTc  Not SOB. maintaining airway. Monitor closely  Appreciate GI consult and follow up  Monitor Hb   Full code   Rest of the management as above

## 2021-03-24 LAB
ALBUMIN SERPL ELPH-MCNC: 2.2 G/DL — LOW (ref 3.5–5)
ALP SERPL-CCNC: 83 U/L — SIGNIFICANT CHANGE UP (ref 40–120)
ALT FLD-CCNC: 32 U/L DA — SIGNIFICANT CHANGE UP (ref 10–60)
ANION GAP SERPL CALC-SCNC: 8 MMOL/L — SIGNIFICANT CHANGE UP (ref 5–17)
AST SERPL-CCNC: 29 U/L — SIGNIFICANT CHANGE UP (ref 10–40)
BASOPHILS # BLD AUTO: 0.01 K/UL — SIGNIFICANT CHANGE UP (ref 0–0.2)
BASOPHILS NFR BLD AUTO: 0.3 % — SIGNIFICANT CHANGE UP (ref 0–2)
BILIRUB SERPL-MCNC: 0.6 MG/DL — SIGNIFICANT CHANGE UP (ref 0.2–1.2)
BUN SERPL-MCNC: 20 MG/DL — HIGH (ref 7–18)
CALCIUM SERPL-MCNC: 8.7 MG/DL — SIGNIFICANT CHANGE UP (ref 8.4–10.5)
CHLORIDE SERPL-SCNC: 116 MMOL/L — HIGH (ref 96–108)
CO2 SERPL-SCNC: 24 MMOL/L — SIGNIFICANT CHANGE UP (ref 22–31)
CREAT SERPL-MCNC: 1.36 MG/DL — HIGH (ref 0.5–1.3)
EOSINOPHIL # BLD AUTO: 0.02 K/UL — SIGNIFICANT CHANGE UP (ref 0–0.5)
EOSINOPHIL NFR BLD AUTO: 0.6 % — SIGNIFICANT CHANGE UP (ref 0–6)
GLUCOSE SERPL-MCNC: 89 MG/DL — SIGNIFICANT CHANGE UP (ref 70–99)
HCT VFR BLD CALC: 29.8 % — LOW (ref 34.5–45)
HGB BLD-MCNC: 9.9 G/DL — LOW (ref 11.5–15.5)
IMM GRANULOCYTES NFR BLD AUTO: 0.6 % — SIGNIFICANT CHANGE UP (ref 0–1.5)
LYMPHOCYTES # BLD AUTO: 0.82 K/UL — LOW (ref 1–3.3)
LYMPHOCYTES # BLD AUTO: 25.5 % — SIGNIFICANT CHANGE UP (ref 13–44)
MCHC RBC-ENTMCNC: 28.9 PG — SIGNIFICANT CHANGE UP (ref 27–34)
MCHC RBC-ENTMCNC: 33.2 GM/DL — SIGNIFICANT CHANGE UP (ref 32–36)
MCV RBC AUTO: 87.1 FL — SIGNIFICANT CHANGE UP (ref 80–100)
MONOCYTES # BLD AUTO: 0.23 K/UL — SIGNIFICANT CHANGE UP (ref 0–0.9)
MONOCYTES NFR BLD AUTO: 7.1 % — SIGNIFICANT CHANGE UP (ref 2–14)
NEUTROPHILS # BLD AUTO: 2.12 K/UL — SIGNIFICANT CHANGE UP (ref 1.8–7.4)
NEUTROPHILS NFR BLD AUTO: 65.9 % — SIGNIFICANT CHANGE UP (ref 43–77)
NRBC # BLD: 0 /100 WBCS — SIGNIFICANT CHANGE UP (ref 0–0)
PLATELET # BLD AUTO: 181 K/UL — SIGNIFICANT CHANGE UP (ref 150–400)
POTASSIUM SERPL-MCNC: 3.4 MMOL/L — LOW (ref 3.5–5.3)
POTASSIUM SERPL-SCNC: 3.4 MMOL/L — LOW (ref 3.5–5.3)
PROT SERPL-MCNC: 9.1 G/DL — HIGH (ref 6–8.3)
RBC # BLD: 3.42 M/UL — LOW (ref 3.8–5.2)
RBC # FLD: 17.1 % — HIGH (ref 10.3–14.5)
SODIUM SERPL-SCNC: 148 MMOL/L — HIGH (ref 135–145)
WBC # BLD: 3.22 K/UL — LOW (ref 3.8–10.5)
WBC # FLD AUTO: 3.22 K/UL — LOW (ref 3.8–10.5)

## 2021-03-24 PROCEDURE — 99233 SBSQ HOSP IP/OBS HIGH 50: CPT | Mod: GC

## 2021-03-24 RX ORDER — SODIUM CHLORIDE 9 MG/ML
1000 INJECTION, SOLUTION INTRAVENOUS
Refills: 0 | Status: DISCONTINUED | OUTPATIENT
Start: 2021-03-24 | End: 2021-03-24

## 2021-03-24 RX ORDER — DEXTROSE MONOHYDRATE, SODIUM CHLORIDE, AND POTASSIUM CHLORIDE 50; .745; 4.5 G/1000ML; G/1000ML; G/1000ML
1000 INJECTION, SOLUTION INTRAVENOUS
Refills: 0 | Status: DISCONTINUED | OUTPATIENT
Start: 2021-03-24 | End: 2021-03-25

## 2021-03-24 RX ORDER — POTASSIUM CHLORIDE 20 MEQ
10 PACKET (EA) ORAL
Refills: 0 | Status: COMPLETED | OUTPATIENT
Start: 2021-03-24 | End: 2021-03-24

## 2021-03-24 RX ADMIN — AMPICILLIN SODIUM AND SULBACTAM SODIUM 100 GRAM(S): 250; 125 INJECTION, POWDER, FOR SUSPENSION INTRAMUSCULAR; INTRAVENOUS at 12:15

## 2021-03-24 RX ADMIN — Medication 2000 UNIT(S): at 12:14

## 2021-03-24 RX ADMIN — SODIUM CHLORIDE 85 MILLILITER(S): 9 INJECTION, SOLUTION INTRAVENOUS at 02:00

## 2021-03-24 RX ADMIN — AMLODIPINE BESYLATE 5 MILLIGRAM(S): 2.5 TABLET ORAL at 09:31

## 2021-03-24 RX ADMIN — LIDOCAINE 1 PATCH: 4 CREAM TOPICAL at 06:43

## 2021-03-24 RX ADMIN — AMPICILLIN SODIUM AND SULBACTAM SODIUM 100 GRAM(S): 250; 125 INJECTION, POWDER, FOR SUSPENSION INTRAMUSCULAR; INTRAVENOUS at 05:58

## 2021-03-24 RX ADMIN — AMPICILLIN SODIUM AND SULBACTAM SODIUM 100 GRAM(S): 250; 125 INJECTION, POWDER, FOR SUSPENSION INTRAMUSCULAR; INTRAVENOUS at 17:43

## 2021-03-24 RX ADMIN — Medication 100 MILLIEQUIVALENT(S): at 20:55

## 2021-03-24 RX ADMIN — DEXTROSE MONOHYDRATE, SODIUM CHLORIDE, AND POTASSIUM CHLORIDE 75 MILLILITER(S): 50; .745; 4.5 INJECTION, SOLUTION INTRAVENOUS at 17:38

## 2021-03-24 RX ADMIN — Medication 100 MILLIEQUIVALENT(S): at 17:39

## 2021-03-24 RX ADMIN — Medication 100 MILLIEQUIVALENT(S): at 19:29

## 2021-03-24 RX ADMIN — ENOXAPARIN SODIUM 30 MILLIGRAM(S): 100 INJECTION SUBCUTANEOUS at 12:15

## 2021-03-24 RX ADMIN — LIDOCAINE 1 PATCH: 4 CREAM TOPICAL at 20:00

## 2021-03-24 RX ADMIN — LIDOCAINE 1 PATCH: 4 CREAM TOPICAL at 07:00

## 2021-03-24 NOTE — PROGRESS NOTE ADULT - PROBLEM SELECTOR PLAN 4
- Patient w/ hx of MM, last tx in 2020  - Follows up at [facility], has next appointment on April 2021

## 2021-03-24 NOTE — PROGRESS NOTE ADULT - PROBLEM SELECTOR PLAN 6
- Patient has history of Hypertension on amlodipine and metoprolol at home   - on AMlodipine 5mg daily with holding parameters   - on Lopressor 2.5 mg iv q6 hrs   - Monitor BP and adjust meds as needed - Patient has history of Hypertension on amlodipine and metoprolol at home   - on AMlodipine 5mg daily with holding parameters   - 3/24, Lopressor discontinued.  - Monitor BP and adjust meds as needed

## 2021-03-24 NOTE — PROGRESS NOTE ADULT - SUBJECTIVE AND OBJECTIVE BOX
PGY-1 Progress Note discussed with attending    PAGER #: [-----] TILL 5:00 PM  PLEASE CONTACT ON CALL TEAM:  - On Call Team (Please refer to Nicola) FROM 5:00 PM - 8:30PM  - Nightfloat Team FROM 8:30 -7:30 AM    INTERVAL HPI/OVERNIGHT EVENTS: No acute events overnight. The patient was moderately agitated.     MEDICATIONS:  acetaminophen   Tablet .. 650 milliGRAM(s) Oral every 6 hours PRN  amLODIPine   Tablet 5 milliGRAM(s) Oral daily  ampicillin/sulbactam  IVPB 1.5 Gram(s) IV Intermittent every 6 hours  benzocaine 15 mG/menthol 3.6 mG (Sugar-Free) Lozenge 1 Lozenge Oral two times a day  cholecalciferol 2000 Unit(s) Oral daily  dextrose 5% + sodium chloride 0.45%. 1000 milliLiter(s) IV Continuous <Continuous>  enoxaparin Injectable 30 milliGRAM(s) SubCutaneous daily  FIRST- Mouthwash  BLM 10 milliLiter(s) Swish and Spit four times a day  lidocaine   Patch 1 Patch Transdermal every 24 hours  melatonin 3 milliGRAM(s) Oral at bedtime  pantoprazole    Tablet 40 milliGRAM(s) Oral before breakfast      REVIEW OF SYSTEMS:  CONSTITUTIONAL: No fever, weight loss, or fatigue  RESPIRATORY: No cough, wheezing, chills or hemoptysis; No shortness of breath  CARDIOVASCULAR: No chest pain, palpitations, dizziness, or leg swelling  GASTROINTESTINAL: No abdominal pain. No nausea, vomiting, or hematemesis; No diarrhea or constipation. No melena or hematochezia.  GENITOURINARY: No dysuria or hematuria, urinary frequency  NEUROLOGICAL: No headaches, memory loss, loss of strength, numbness, or tremors  SKIN: No itching, burning, rashes, or lesions     Vital Signs Last 24 Hrs  T(C): 36.7 (24 Mar 2021 06:37), Max: 36.7 (23 Mar 2021 13:39)  T(F): 98 (24 Mar 2021 06:37), Max: 98.1 (23 Mar 2021 20:42)  HR: 98 (24 Mar 2021 09:13) (86 - 98)  BP: 174/108 (24 Mar 2021 09:13) (140/105 - 174/108)  BP(mean): --  RR: 18 (24 Mar 2021 06:37) (16 - 18)  SpO2: 99% (24 Mar 2021 06:37) (95% - 99%)    PHYSICAL EXAMINATION:  GENERAL: NAD, well built  HEAD:  Atraumatic, Normocephalic  EYES:  conjunctiva and sclera clear  NECK: Supple, No JVD, Normal thyroid  CHEST/LUNG: Clear to auscultation. Clear to percussion bilaterally; No rales, rhonchi, wheezing, or rubs  HEART: Regular rate and rhythm; No murmurs, rubs, or gallops  ABDOMEN: Soft, Nontender, Nondistended; Bowel sounds present  NERVOUS SYSTEM:  Alert & Oriented X3,    EXTREMITIES:  2+ Peripheral Pulses, No clubbing, cyanosis, or edema  SKIN: warm dry                          9.9    3.22  )-----------( 181      ( 24 Mar 2021 09:13 )             29.8     03-24    148<H>  |  116<H>  |  20<H>  ----------------------------<  89  3.4<L>   |  24  |  1.36<H>    Ca    8.7      24 Mar 2021 09:13  Phos  2.9     03-23  Mg     2.9     03-23    TPro  9.1<H>  /  Alb  2.2<L>  /  TBili  0.6  /  DBili  x   /  AST  29  /  ALT  32  /  AlkPhos  83  03-24    LIVER FUNCTIONS - ( 24 Mar 2021 09:13 )  Alb: 2.2 g/dL / Pro: 9.1 g/dL / ALK PHOS: 83 U/L / ALT: 32 U/L DA / AST: 29 U/L / GGT: x                   CAPILLARY BLOOD GLUCOSE      RADIOLOGY & ADDITIONAL TESTS:                   PGY-1 Progress Note discussed with attending    PAGER #: [-----] TILL 5:00 PM  PLEASE CONTACT ON CALL TEAM:  - On Call Team (Please refer to Nicola) FROM 5:00 PM - 8:30PM  - Nightfloat Team FROM 8:30 -7:30 AM    INTERVAL HPI/OVERNIGHT EVENTS: No acute events overnight. The patient was moderately agitated. refused to take amiodarone AM.     MEDICATIONS:  acetaminophen   Tablet .. 650 milliGRAM(s) Oral every 6 hours PRN  amLODIPine   Tablet 5 milliGRAM(s) Oral daily  ampicillin/sulbactam  IVPB 1.5 Gram(s) IV Intermittent every 6 hours  benzocaine 15 mG/menthol 3.6 mG (Sugar-Free) Lozenge 1 Lozenge Oral two times a day  cholecalciferol 2000 Unit(s) Oral daily  dextrose 5% + sodium chloride 0.45%. 1000 milliLiter(s) IV Continuous <Continuous>  enoxaparin Injectable 30 milliGRAM(s) SubCutaneous daily  FIRST- Mouthwash  BLM 10 milliLiter(s) Swish and Spit four times a day  lidocaine   Patch 1 Patch Transdermal every 24 hours  melatonin 3 milliGRAM(s) Oral at bedtime  pantoprazole    Tablet 40 milliGRAM(s) Oral before breakfast      REVIEW OF SYSTEMS:  CONSTITUTIONAL: No fever, weight loss, or fatigue  RESPIRATORY: No cough, wheezing, chills or hemoptysis; No shortness of breath  CARDIOVASCULAR: No chest pain, palpitations, dizziness, or leg swelling  GASTROINTESTINAL: No abdominal pain. No nausea, vomiting, or hematemesis; No diarrhea or constipation. No melena or hematochezia.  GENITOURINARY: No dysuria or hematuria, urinary frequency  NEUROLOGICAL: No headaches, memory loss, loss of strength, numbness, or tremors  SKIN: No itching, burning, rashes, or lesions     Vital Signs Last 24 Hrs  T(C): 36.7 (24 Mar 2021 06:37), Max: 36.7 (23 Mar 2021 13:39)  T(F): 98 (24 Mar 2021 06:37), Max: 98.1 (23 Mar 2021 20:42)  HR: 98 (24 Mar 2021 09:13) (86 - 98)  BP: 174/108 (24 Mar 2021 09:13) (140/105 - 174/108)  BP(mean): --  RR: 18 (24 Mar 2021 06:37) (16 - 18)  SpO2: 99% (24 Mar 2021 06:37) (95% - 99%)    PHYSICAL EXAMINATION:  GENERAL: NAD, AAOx1-2  Oral cavity: tongue swelling markedly improved, tenderness on palpation of left side of neck, no erythema  NERVOUS SYSTEM:  Alert & Oriented X2, Good concentration; no focal deficit  CHEST/LUNG: Clear to auscultation bilaterally; No rales, rhonchi, wheezing, or rubs  HEART: Regular rate and rhythm; No murmurs, rubs, or gallops  ABDOMEN: Soft, Nontender, Nondistended; Bowel sounds present  EXTREMITIES:  2+ Peripheral Pulses, No clubbing, cyanosis, or edema  SKIN: No rashes or lesions                          9.9    3.22  )-----------( 181      ( 24 Mar 2021 09:13 )             29.8     03-24    148<H>  |  116<H>  |  20<H>  ----------------------------<  89  3.4<L>   |  24  |  1.36<H>    Ca    8.7      24 Mar 2021 09:13  Phos  2.9     03-23  Mg     2.9     03-23    TPro  9.1<H>  /  Alb  2.2<L>  /  TBili  0.6  /  DBili  x   /  AST  29  /  ALT  32  /  AlkPhos  83  03-24    LIVER FUNCTIONS - ( 24 Mar 2021 09:13 )  Alb: 2.2 g/dL / Pro: 9.1 g/dL / ALK PHOS: 83 U/L / ALT: 32 U/L DA / AST: 29 U/L / GGT: x                   CAPILLARY BLOOD GLUCOSE      RADIOLOGY & ADDITIONAL TESTS:

## 2021-03-24 NOTE — PROGRESS NOTE ADULT - ASSESSMENT
Pharyngitis- improved  Left neck cellulitis  Glossitis - greatly improved    Plan - Cont Unasyn 1.5 gms iv q6 hrs  when ready for discharge will switch to augmentin po.           Pharyngitis- improved  Left neck cellulitis  Glossitis - greatly improved    Plan - Cont Unasyn 1.5 gms iv q6 hrs  when ready for discharge will switch to augmentin po.    I agree with above

## 2021-03-24 NOTE — CHART NOTE - NSCHARTNOTEFT_GEN_A_CORE
Reassessment:   81yFemalePatient is a 81y old  Female who presents with a chief complaint of Dysphagia and Tongue swelling (24 Mar 2021 11:20)      Factors impacting intake: [ ] none [ ] nausea  [ ] vomiting [ ] diarrhea [ ] constipation  [X ]chewing problems [ X] swallowing issues  [X ] other: Dysphagia and Tongue swelling    Diet Prescription: Diet, Dysphagia 1 Pureed-Nectar Consistency Fluid:   Supplement Feeding Modality:  Oral  Two Girma HN Cans or Servings Per Day:  1       Frequency:  Two Times a day (21 @ 16:05)    Intake:     Daily Weight in k.5 (24 Mar 2021 06:40)    % Weight Change    Pertinent Medications: MEDICATIONS  (STANDING):  amLODIPine   Tablet 5 milliGRAM(s) Oral daily  ampicillin/sulbactam  IVPB 1.5 Gram(s) IV Intermittent every 6 hours  benzocaine 15 mG/menthol 3.6 mG (Sugar-Free) Lozenge 1 Lozenge Oral two times a day  cholecalciferol 2000 Unit(s) Oral daily  dextrose 5% + sodium chloride 0.45% with potassium chloride 20 mEq/L 1000 milliLiter(s) (75 mL/Hr) IV Continuous <Continuous>  enoxaparin Injectable 30 milliGRAM(s) SubCutaneous daily  FIRST- Mouthwash  BLM 10 milliLiter(s) Swish and Spit four times a day  lidocaine   Patch 1 Patch Transdermal every 24 hours  melatonin 3 milliGRAM(s) Oral at bedtime  pantoprazole    Tablet 40 milliGRAM(s) Oral before breakfast  potassium chloride  10 mEq/100 mL IVPB 10 milliEquivalent(s) IV Intermittent every 1 hour    MEDICATIONS  (PRN):  acetaminophen   Tablet .. 650 milliGRAM(s) Oral every 6 hours PRN Moderate Pain (4 - 6)    Pertinent Labs:  Na148 mmol/L<H> Glu 89 mg/dL K+ 3.4 mmol/L<L> Cr  1.36 mg/dL<H> BUN 20 mg/dL<H>  Phos 2.9 mg/dL  Alb 2.2 g/dL<L>  Chol 73 mg/dL LDL --    HDL 43 mg/dL<L> Trig 40 mg/dL     CAPILLARY BLOOD GLUCOSE        Skin:     Estimated Needs:   [ ] no change since previous assessment  [ ] recalculated:     Previous Nutrition Diagnosis:   [ ] Inadequate Energy Intake [ ]Inadequate Oral Intake [ ] Excessive Energy Intake   [ ] Underweight [ ] Increased Nutrient Needs [ ] Overweight/Obesity   [ ] Altered GI Function [ ] Unintended Weight Loss [ ] Food & Nutrition Related Knowledge Deficit [ ] Malnutrition     Nutrition Diagnosis is [ ] ongoing  [ ] resolved [ ] not applicable     New Nutrition Diagnosis: [ ] not applicable       Interventions:   Recommend  [ ] Change Diet To:  [ ] Nutrition Supplement  [ ] Nutrition Support  [ ] Other:     Monitoring and Evaluation:   [ ] PO intake [ x ] Tolerance to diet prescription [ x ] weights [ x ] labs[ x ] follow up per protocol  [ ] other: Reassessment:   81yFemalePatient is a 81y old  Female who presents with a chief complaint of Dysphagia and Tongue swelling (24 Mar 2021 11:20)      Factors impacting intake: [ ] none [ ] nausea  [ ] vomiting [ ] diarrhea [ ] constipation  [X ]chewing problems [ X] swallowing issues  [X ] other: Dysphagia and Tongue swelling    Diet Prescription: Diet, Dysphagia 1 Pureed-Nectar Consistency Fluid:   Supplement Feeding Modality:  Oral  Two Girma HN Cans or Servings Per Day:  1       Frequency:  Two Times a day (21 @ 16:05)    Intake: Patient visited, alert but confused, PCA at beside, report Pt. with fair po intake, consuming 40% of meals, needs encouragement, chew/swallowing has improved, seen by Speech/Swallow team on 21 & recommendation noted, on IV Abx., rec. c/w diet as ordered & add Two Girma HN 1 can BID as medically feasible, d/w MD. Nursing to continue feeding assistance and encouragement, aspiration precaution. RD available.     Daily Weight in k.5 (24 Mar 2021 06:40)    % Weight Change: stable     Pertinent Medications: MEDICATIONS  (STANDING):  amLODIPine   Tablet 5 milliGRAM(s) Oral daily  ampicillin/sulbactam  IVPB 1.5 Gram(s) IV Intermittent every 6 hours  benzocaine 15 mG/menthol 3.6 mG (Sugar-Free) Lozenge 1 Lozenge Oral two times a day  cholecalciferol 2000 Unit(s) Oral daily  dextrose 5% + sodium chloride 0.45% with potassium chloride 20 mEq/L 1000 milliLiter(s) (75 mL/Hr) IV Continuous <Continuous>  enoxaparin Injectable 30 milliGRAM(s) SubCutaneous daily  FIRST- Mouthwash  BLM 10 milliLiter(s) Swish and Spit four times a day  lidocaine   Patch 1 Patch Transdermal every 24 hours  melatonin 3 milliGRAM(s) Oral at bedtime  pantoprazole    Tablet 40 milliGRAM(s) Oral before breakfast  potassium chloride  10 mEq/100 mL IVPB 10 milliEquivalent(s) IV Intermittent every 1 hour    MEDICATIONS  (PRN):  acetaminophen   Tablet .. 650 milliGRAM(s) Oral every 6 hours PRN Moderate Pain (4 - 6)    Pertinent Labs:  Na148 mmol/L<H> Glu 89 mg/dL K+ 3.4 mmol/L<L> Cr  1.36 mg/dL<H> BUN 20 mg/dL<H>  Phos 2.9 mg/dL  Alb 2.2 g/dL<L>  Chol 73 mg/dL LDL --    HDL 43 mg/dL<L> Trig 40 mg/dL     CAPILLARY BLOOD GLUCOSE    Skin: intact     Estimated Needs:   [X ] no change since previous assessment  [ ] recalculated:     Previous Nutrition Diagnosis:   [ ] Inadequate Energy Intake [X]Inadequate Oral Intake [ ] Excessive Energy Intake   [ ] Underweight [ ] Increased Nutrient Needs [ ] Overweight/Obesity   [ ] Altered GI Function [ ] Unintended Weight Loss [ ] Food & Nutrition Related Knowledge Deficit [ ] Malnutrition     Nutrition Diagnosis is [X ] ongoing  [ ] resolved [ ] not applicable     New Nutrition Diagnosis: [ ] not applicable     Interventions: To meet nutrition needs   Recommend  [ ] Change Diet To:  [ X] Nutrition Supplement: Add MVI/minerals daily as medically feasible   [ ] Nutrition Support  [X ] Other: Add Lactobacillus aphrophilus twice daily as medically feasible       Monitoring and Evaluation:   [ X] PO intake [ x ] Tolerance to diet prescription [ x ] weights [ x ] labs[ x ] follow up per protocol  [ ] other:

## 2021-03-24 NOTE — PROGRESS NOTE ADULT - ATTENDING COMMENTS
Patient seen and examined with housestaff this morning around 11 AM;  Agree with PGY1 A/P above with editing as needed. My independent assessment, findings on exam, diagnosis and plan of care as listed below. Discussed with Dr. Dupont and PGY3 Dr. VINAYAK Bourgeois    Patient is a 81y old  Female who presents with a chief complaint of Dysphagia and Tongue swelling     Patient still Hallucinating but improved as per housestaff; Pain and swelling has markedly improved     INTERVAL HPI/OVERNIGHT EVENTS: patient significantly improved today; following comands; not hallucinating anymore; tells that she lives with spouse at home. [place] outside [place] daughter in [place]. Eating better; No significant tongue or throat swelling    Vital Signs Last 24 Hrs  T(C): 36.7 (24 Mar 2021 13:26), Max: 36.7 (23 Mar 2021 20:42)  T(F): 98 (24 Mar 2021 13:26), Max: 98.1 (23 Mar 2021 20:42)  HR: 82 (24 Mar 2021 16:12) (78 - 98)  BP: 138/54 (24 Mar 2021 16:12) (138/54 - 174/108)  RR: 18 (24 Mar 2021 13:26) (18 - 18)  SpO2: 99% (24 Mar 2021 16:12) (95% - 99%)    REVIEW OF SYSTEMS: not obtained    PHYSICAL EXAM:  GENERAL: NAD; appears dehydrated  Tongue swelling improved, base of uvula visible; no significant erythema  NERVOUS SYSTEM:  Alert & Oriented X01;  hallucinating, no focal deficit  CHEST/LUNG: Clear to auscultation bilaterally; No rales, rhonchi, wheezing, or rubs  HEART: Regular rate and rhythm;   ABDOMEN: Soft, Nontender, Nondistended; Bowel sounds present  EXTREMITIES:  2+ Peripheral Pulses, No clubbing, cyanosis, or edema  SKIN: No rashes or lesions    LABS:                        9.9    3.22  )-----------( 181      ( 24 Mar 2021 09:13 )             29.8   03-24    148<H>  |  116<H>  |  20<H>  ----------------------------<  89  3.4<L>   |  24  |  1.36<H>    Ca    8.7      24 Mar 2021 09:13  Phos  2.9     03-23  Mg     2.9     03-23    TPro  9.1<H>  /  Alb  2.2<L>  /  TBili  0.6  /  DBili  x   /  AST  29  /  ALT  32  /  AlkPhos  83  03-24      A/P:  Steroid induced psychosis much resolved  Hypernatremia likely due to dehydration  Dysphagia and Tongue Swelling due to likely pharyngitis or infection of floor of mouth resolving well  KAJAL on CKD 3 improved  Multiple anemia   HTN  RA  Back pain from recently diagnosed compression fracture of T12, Osteoporosis and MM    Plan:  Patient off steroids;    IV Fluids x 24 hrs more as patient still appear dehydrated although better  Cont Unasyn; ID follow up appreciated  ENT consult still awaited but patient already doing better; Follow up outpatient  PRN Zyprexa, monitor QTc; will discontinue if remain stable next 24 hrs  Not SOB. maintaining airway. Monitor closely  Monitor Hb   Full code   Rest of the management as above Patient seen and examined with housestaff this morning around 11 AM;  Agree with PGY1 A/P above with editing as needed. My independent assessment, findings on exam, diagnosis and plan of care as listed below. Discussed with Dr. Dupont and PGY3 Dr. VINAYAK Bourgeois    Patient is a 81y old  Female who presents with a chief complaint of Dysphagia and Tongue swelling     INTERVAL HPI/OVERNIGHT EVENTS: patient significantly improved today; following commands; not hallucinating anymore; tells that she lives with spouse at home. [place] a [place] outside [place] and daughter in [place]. Eating better; No significant tongue or throat swelling. Pain much improved    Vital Signs Last 24 Hrs  T(C): 36.7 (24 Mar 2021 13:26), Max: 36.7 (23 Mar 2021 20:42)  T(F): 98 (24 Mar 2021 13:26), Max: 98.1 (23 Mar 2021 20:42)  HR: 82 (24 Mar 2021 16:12) (78 - 98)  BP: 138/54 (24 Mar 2021 16:12) (138/54 - 174/108)  RR: 18 (24 Mar 2021 13:26) (18 - 18)  SpO2: 99% (24 Mar 2021 16:12) (95% - 99%)    REVIEW OF SYSTEMS: denies SOB, palpitations, chest pain, nausea, vomiting, diarrhea, constipation,     PHYSICAL EXAM:  GENERAL: NAD; appears dehydrated  Tongue swelling much improved, base of uvula visible; no significant erythema  NERVOUS SYSTEM:  Alert & Oriented X 2 to 2.5; no new focal deficit  CHEST/LUNG: Clear to auscultation bilaterally; No rales, rhonchi, wheezing, or rubs  HEART: Regular rate and rhythm;   ABDOMEN: Soft, Nontender, Nondistended; Bowel sounds present  EXTREMITIES:  2+ Peripheral Pulses, No clubbing, cyanosis, or edema  SKIN: No rashes or lesions    LABS:                        9.9    3.22  )-----------( 181      ( 24 Mar 2021 09:13 )             29.8   03-24    148<H>  |  116<H>  |  20<H>  ----------------------------<  89  3.4<L>   |  24  |  1.36<H>    Ca    8.7      24 Mar 2021 09:13  Phos  2.9     03-23  Mg     2.9     03-23    TPro  9.1<H>  /  Alb  2.2<L>  /  TBili  0.6  /  DBili  x   /  AST  29  /  ALT  32  /  AlkPhos  83  03-24          A/P:  Steroid induced psychosis much resolved  Hypernatremia likely due to dehydration  Dysphagia and Tongue Swelling due to likely pharyngitis or infection of floor of mouth resolving well  KAJAL on CKD 3 much improved  Hx Multiple myeloma   Anemia of chronic disease  HTN  RA  Back pain from recently diagnosed compression fracture of T12, Osteoporosis and MM    Plan:  Patient off steroids; clinically much improved  Creatinine much improved from 1.8 to 1.36   IV Fluids x 24 hrs more as patient still appear dehydrated although better  Cont Unasyn; ID follow up appreciated  ENT consult still awaited but patient already doing better; Follow up outpatient  PRN Zyprexa, monitor QTc; will discontinue if remain stable next 24 hrs  No acute SOB. maintaining airway. Monitor closely  Monitor Hb   Full code   discussed with  Spouse passed away last night. FAMILY REQUESTED NOT TO SHARE NEWS WITH PATIENT AT THIS TIME  Patient will need DEV placement; Will request PT follow up as patient is clinically improved and will able to participate in therapy  Rest of the management as per PGY1 above  If continues to do well expect discharge next 48 hrs; likely Friday; CM advised to get choice; [facility] is preferred choice can send [facility];  Will need Authorization Patient seen and examined with housestaff this morning around 11 AM;  Agree with PGY1 A/P above with editing as needed. My independent assessment, findings on exam, diagnosis and plan of care as listed below. Discussed with Dr. Dupont and PGY3 Dr. VINAYAK Bourgeois    Patient is a 81y old  Female who presents with a chief complaint of Dysphagia and Tongue swelling     INTERVAL HPI/OVERNIGHT EVENTS: patient significantly improved today; following commands; not hallucinating anymore; tells that she lives with spouse at home. [place] a [place] outside [place] and daughter in [place]. Eating better; No significant tongue or throat swelling. Pain much improved    Vital Signs Last 24 Hrs  T(C): 36.7 (24 Mar 2021 13:26), Max: 36.7 (23 Mar 2021 20:42)  T(F): 98 (24 Mar 2021 13:26), Max: 98.1 (23 Mar 2021 20:42)  HR: 82 (24 Mar 2021 16:12) (78 - 98)  BP: 138/54 (24 Mar 2021 16:12) (138/54 - 174/108)  RR: 18 (24 Mar 2021 13:26) (18 - 18)  SpO2: 99% (24 Mar 2021 16:12) (95% - 99%)    REVIEW OF SYSTEMS: denies SOB, palpitations, chest pain, nausea, vomiting, diarrhea, constipation,     PHYSICAL EXAM:  GENERAL: NAD; appears dehydrated  Tongue swelling much improved, base of uvula visible; no significant erythema  NERVOUS SYSTEM:  Alert & Oriented X 2 to 2.5; no new focal deficit  CHEST/LUNG: Clear to auscultation bilaterally; No rales, rhonchi, wheezing, or rubs  HEART: Regular rate and rhythm;   ABDOMEN: Soft, Nontender, Nondistended; Bowel sounds present  EXTREMITIES:  2+ Peripheral Pulses, No clubbing, cyanosis, or edema  SKIN: No rashes or lesions    LABS:                        9.9    3.22  )-----------( 181      ( 24 Mar 2021 09:13 )             29.8   03-24    148<H>  |  116<H>  |  20<H>  ----------------------------<  89  3.4<L>   |  24  |  1.36<H>    Ca    8.7      24 Mar 2021 09:13  Phos  2.9     03-23  Mg     2.9     03-23    TPro  9.1<H>  /  Alb  2.2<L>  /  TBili  0.6  /  DBili  x   /  AST  29  /  ALT  32  /  AlkPhos  83  03-24          A/P:  Steroid induced psychosis much resolved  Hypernatremia likely due to dehydration  Dysphagia and Tongue Swelling due to likely pharyngitis or infection of floor of mouth resolving well  KAJAL on CKD 3 much improved  Hx Multiple myeloma   Anemia of chronic disease  HTN  RA  Back pain from recently diagnosed compression fracture of T12, Osteoporosis and MM    Plan:  Patient off steroids; clinically much improved  Creatinine much improved from 1.8 to 1.36   IV Fluids x 24 hrs more as patient still appear dehydrated although better  Cont Unasyn; ID follow up appreciated  ENT consult still awaited but patient already doing better; Follow up outpatient  PRN Zyprexa, monitor QTc; will discontinue if remain stable next 24 hrs  No acute SOB. maintaining airway. Monitor closely  Increase Lovenox to 40 mg SQ daily AM as Creatinine near normal  Monitor Hb   Full code   discussed with  Spouse passed away last night. FAMILY REQUESTED NOT TO SHARE NEWS WITH PATIENT AT THIS TIME  Patient will need DEV placement; Will request PT follow up as patient is clinically improved and will able to participate in therapy  Rest of the management as per PGY1 above  If continues to do well expect discharge next 48 hrs; likely Friday; CM advised to get choice; [facility] is preferred choice can send [facility];  Will need Authorization Patient seen and examined with housestaff this morning around 11 AM;  Agree with PGY1 A/P above with editing as needed. My independent assessment, findings on exam, diagnosis and plan of care as listed below. Discussed with Dr. Dupont and PGY3 Dr. VINAYAK Bourgeois    Patient is a 81y old  Female who presents with a chief complaint of Dysphagia and Tongue swelling     INTERVAL HPI/OVERNIGHT EVENTS: patient significantly improved today; following commands; not hallucinating anymore; tells that she lives with spouse at home. Has a [place] outside [place] and daughter in [place]. Eating better; No significant tongue or throat swelling. Pain much improved    Vital Signs Last 24 Hrs  T(C): 36.7 (24 Mar 2021 13:26), Max: 36.7 (23 Mar 2021 20:42)  T(F): 98 (24 Mar 2021 13:26), Max: 98.1 (23 Mar 2021 20:42)  HR: 82 (24 Mar 2021 16:12) (78 - 98)  BP: 138/54 (24 Mar 2021 16:12) (138/54 - 174/108)  RR: 18 (24 Mar 2021 13:26) (18 - 18)  SpO2: 99% (24 Mar 2021 16:12) (95% - 99%)    REVIEW OF SYSTEMS: denies SOB, palpitations, chest pain, nausea, vomiting, diarrhea, constipation,     PHYSICAL EXAM:  GENERAL: NAD; appears dehydrated  Tongue swelling much improved, base of uvula visible; no significant erythema  NERVOUS SYSTEM:  Alert & Oriented X 2 to 2.5; no new focal deficit  CHEST/LUNG: Clear to auscultation bilaterally; No rales, rhonchi, wheezing, or rubs  HEART: Regular rate and rhythm;   ABDOMEN: Soft, Nontender, Nondistended; Bowel sounds present  EXTREMITIES:  2+ Peripheral Pulses, No clubbing, cyanosis, or edema  SKIN: No rashes or lesions    LABS:                        9.9    3.22  )-----------( 181      ( 24 Mar 2021 09:13 )             29.8   03-24    148<H>  |  116<H>  |  20<H>  ----------------------------<  89  3.4<L>   |  24  |  1.36<H>    Ca    8.7      24 Mar 2021 09:13  Phos  2.9     03-23  Mg     2.9     03-23    TPro  9.1<H>  /  Alb  2.2<L>  /  TBili  0.6  /  DBili  x   /  AST  29  /  ALT  32  /  AlkPhos  83  03-24    A/P:  Steroid induced psychosis much resolved  Hypernatremia likely due to dehydration  Hypokalemia due to decreased oral intake  Dysphagia and Tongue Swelling due to likely pharyngitis or infection of floor of mouth resolving well  KAJAL on CKD 3 much improved  Hx Multiple myeloma   Anemia of chronic disease  HTN  RA  Back pain from recently diagnosed compression fracture of T12, Osteoporosis and MM    Plan:  Patient off steroids; clinically much improved  Creatinine much improved from 1.8 to 1.36   IV Fluids x 24 hrs more as patient still appear dehydrated although better  Add potassium to IVF  Cont Unasyn; ID follow up appreciated  ENT consult still awaited but patient already doing better; Follow up outpatient  PRN Zyprexa, monitor QTc; will discontinue if remain stable next 24 hrs  No acute SOB. maintaining airway. Monitor closely  Increase Lovenox to 40 mg SQ daily AM as Creatinine near normal  Monitor Hb   Full code   discussed with  Spouse passed away last night. FAMILY REQUESTED NOT TO SHARE NEWS WITH PATIENT AT THIS TIME  Patient will need DEV placement; Will request PT follow up as patient is clinically improved and will able to participate in therapy  Rest of the management as per PGY1 above  If continues to do well expect discharge next 48 hrs; likely Friday; CM advised to get choice; [facility] is preferred choice can send [facility];  Will need Authorization

## 2021-03-24 NOTE — PROGRESS NOTE ADULT - SUBJECTIVE AND OBJECTIVE BOX
81y Female is under our care for     REVIEW OF SYSTEMS:  [  ] Not able to illicit  General:	  Chest:	  GI:	  :  Skin:	  Musculoskeletal:	  Neuro:	    MEDS:  ampicillin/sulbactam  IVPB 1.5 Gram(s) IV Intermittent every 6 hours    ALLERGIES: Allergies    No Known Allergies    Intolerances        VITALS:  Vital Signs Last 24 Hrs  T(C): 36.7 (24 Mar 2021 06:37), Max: 36.7 (23 Mar 2021 13:39)  T(F): 98 (24 Mar 2021 06:37), Max: 98.1 (23 Mar 2021 20:42)  HR: 98 (24 Mar 2021 09:13) (86 - 98)  BP: 174/108 (24 Mar 2021 09:13) (140/105 - 174/108)  BP(mean): --  RR: 18 (24 Mar 2021 06:37) (16 - 18)  SpO2: 99% (24 Mar 2021 06:37) (95% - 99%)      PHYSICAL EXAM:  HEENT:  Neck:  Respiratory:  Cardiovascular:  Gastrointestinal:  Extremities:  Skin:  Ortho:  Neuro:    LABS/DIAGNOSTIC TESTS:                        9.9    3.22  )-----------( 181      ( 24 Mar 2021 09:13 )             29.8     WBC Count: 3.22 K/uL (03-24 @ 09:13)  WBC Count: 6.61 K/uL (03-23 @ 07:12)  WBC Count: 6.71 K/uL (03-22 @ 07:44)  WBC Count: 5.46 K/uL (03-21 @ 08:16)  WBC Count: 5.19 K/uL (03-20 @ 07:17)    03-24    148<H>  |  116<H>  |  20<H>  ----------------------------<  89  3.4<L>   |  24  |  1.36<H>    Ca    8.7      24 Mar 2021 09:13  Phos  2.9     03-23  Mg     2.9     03-23    TPro  9.1<H>  /  Alb  2.2<L>  /  TBili  0.6  /  DBili  x   /  AST  29  /  ALT  32  /  AlkPhos  83  03-24      CULTURES:   .Urine Clean Catch (Midstream)  02-26 @ 06:08   >=3 organisms. Probable collection contamination.  --  --        RADIOLOGY:  no new studies 81y Female is under our care for pharyngitis and left neck cellulitis.  Patient was seen laying comfortably in bed with no acute distress.  Patient ate the majority of her breakfast as per the nurse without any dysphagia or discomfort.  Patient remains afebrile and repeat UA was negative., and patient denies any tenderness on the neck or jaw.    REVIEW OF SYSTEMS:  [  ] Not able to illicit  General: no fevers no malaise  Chest: no cough no sob  GI: no nvd  : no urinary sxs   Skin: no rashes  Musculoskeletal: no trauma no LBP  Neuro: no ha's no dizziness    MEDS:  ampicillin/sulbactam  IVPB 1.5 Gram(s) IV Intermittent every 6 hours    ALLERGIES: Allergies    No Known Allergies    Intolerances        VITALS:  Vital Signs Last 24 Hrs  T(C): 36.7 (24 Mar 2021 06:37), Max: 36.7 (23 Mar 2021 13:39)  T(F): 98 (24 Mar 2021 06:37), Max: 98.1 (23 Mar 2021 20:42)  HR: 98 (24 Mar 2021 09:13) (86 - 98)  BP: 174/108 (24 Mar 2021 09:13) (140/105 - 174/108)  BP(mean): --  RR: 18 (24 Mar 2021 06:37) (16 - 18)  SpO2: 99% (24 Mar 2021 06:37) (95% - 99%)      PHYSICAL EXAM:  HEENT: dry oral mucosa, improvement of pharynx erythema, glossitis has resolved, no tenderness on left side of neck  Neck: supple no LN's,   Respiratory: lungs clear no rales  Cardiovascular: S1 S2 reg no murmurs  Gastrointestinal: +BS with soft, nondistended abdomen; nontender  Extremities: no edema  Skin: no rashes  Ortho: n/a  Neuro: AAO x 1 and slightly lethargic    LABS/DIAGNOSTIC TESTS:                        9.9    3.22  )-----------( 181      ( 24 Mar 2021 09:13 )             29.8     WBC Count: 3.22 K/uL (03-24 @ 09:13)  WBC Count: 6.61 K/uL (03-23 @ 07:12)  WBC Count: 6.71 K/uL (03-22 @ 07:44)  WBC Count: 5.46 K/uL (03-21 @ 08:16)  WBC Count: 5.19 K/uL (03-20 @ 07:17)    03-24    148<H>  |  116<H>  |  20<H>  ----------------------------<  89  3.4<L>   |  24  |  1.36<H>    Ca    8.7      24 Mar 2021 09:13  Phos  2.9     03-23  Mg     2.9     03-23    TPro  9.1<H>  /  Alb  2.2<L>  /  TBili  0.6  /  DBili  x   /  AST  29  /  ALT  32  /  AlkPhos  83  03-24      CULTURES:   .Urine Clean Catch (Midstream)  02-26 @ 06:08   >=3 organisms. Probable collection contamination.  --  --        RADIOLOGY:  no new studies

## 2021-03-24 NOTE — PROGRESS NOTE ADULT - PROBLEM SELECTOR PLAN 5
- Patient with chronic anemia   - Hg 9.3 around baseline   - Monitor CBC - Patient with chronic anemia   - 3/24, Hg 9.8 around baseline   - Monitor CBC

## 2021-03-24 NOTE — PROGRESS NOTE ADULT - PROBLEM SELECTOR PLAN 3
- Patient with KAJAL on CKD  - Now resolved Cr 1.3, baseline around 1.5-1.6  -C/w IVF D5 1/2 NS 85cc/hr  - Avoid nephrotoxins   - Monitor BMP - Patient with KAJAL on CKD  - Now resolved Cr 1.3, baseline around 1.5-1.6  -C/w IVF D5 1/2 NS 75cc/hr  - Avoid nephrotoxins   - Monitor BMP

## 2021-03-24 NOTE — PROGRESS NOTE ADULT - PROBLEM SELECTOR PLAN 1
- Patient came to ED c/o dysphagia, swollen tongue  - Esophagitis vs Pharyngitis vs oral infection    - CT neck non contrast Thickening of the left oropharyngeal and hypopharyngeal soft tissues, probably pharyngitis. Recommend follow-up to ensure resolution and to exclude an underlying mass.  -  on Cepacol, First wash   -  on Unasyn 1.5gm q6   -  Oral exam: visualization of only base of vulva ( Class III Mallampati score )  - on puree diet; poor oral intake due to low appetite  - Will keep her on fluids for today until we make sure she tolerate her diet.   - 3/23, IV fluids was renewed for 24 hours.  - 3/23, Off steroids   - Monitor respiratory status   - GI Dr Quigley consulted and recommended to complete antibiotics - Patient came to ED c/o dysphagia, swollen tongue  - Esophagitis vs Pharyngitis vs oral infection    - CT neck non contrast Thickening of the left oropharyngeal and hypopharyngeal soft tissues, probably pharyngitis. Recommend follow-up to ensure resolution and to exclude an underlying mass.  -  on Cepacol, First wash   -  on Unasyn 1.5gm q6   -  Oral exam: visualization of only base of vulva ( Class III Mallampati score )  - on Dysphagia 1 puree diet with nectar consistency ;  - 3/24, IV fluids was renewed for 24 hours.  - 3/23, Off steroids   - Monitor respiratory status   - GI Dr Quigley consulted and recommended to complete antibiotics

## 2021-03-25 LAB
ALBUMIN SERPL ELPH-MCNC: 1.9 G/DL — LOW (ref 3.5–5)
ALP SERPL-CCNC: 92 U/L — SIGNIFICANT CHANGE UP (ref 40–120)
ALT FLD-CCNC: 59 U/L DA — SIGNIFICANT CHANGE UP (ref 10–60)
ANION GAP SERPL CALC-SCNC: 7 MMOL/L — SIGNIFICANT CHANGE UP (ref 5–17)
AST SERPL-CCNC: 47 U/L — HIGH (ref 10–40)
BASOPHILS # BLD AUTO: 0.01 K/UL — SIGNIFICANT CHANGE UP (ref 0–0.2)
BASOPHILS NFR BLD AUTO: 0.3 % — SIGNIFICANT CHANGE UP (ref 0–2)
BILIRUB SERPL-MCNC: 0.4 MG/DL — SIGNIFICANT CHANGE UP (ref 0.2–1.2)
BUN SERPL-MCNC: 22 MG/DL — HIGH (ref 7–18)
CALCIUM SERPL-MCNC: 8.3 MG/DL — LOW (ref 8.4–10.5)
CHLORIDE SERPL-SCNC: 116 MMOL/L — HIGH (ref 96–108)
CO2 SERPL-SCNC: 24 MMOL/L — SIGNIFICANT CHANGE UP (ref 22–31)
CREAT SERPL-MCNC: 1.45 MG/DL — HIGH (ref 0.5–1.3)
EOSINOPHIL # BLD AUTO: 0.04 K/UL — SIGNIFICANT CHANGE UP (ref 0–0.5)
EOSINOPHIL NFR BLD AUTO: 1.3 % — SIGNIFICANT CHANGE UP (ref 0–6)
GLUCOSE SERPL-MCNC: 109 MG/DL — HIGH (ref 70–99)
HCT VFR BLD CALC: 29.9 % — LOW (ref 34.5–45)
HGB BLD-MCNC: 9.7 G/DL — LOW (ref 11.5–15.5)
IMM GRANULOCYTES NFR BLD AUTO: 0.6 % — SIGNIFICANT CHANGE UP (ref 0–1.5)
LYMPHOCYTES # BLD AUTO: 1.07 K/UL — SIGNIFICANT CHANGE UP (ref 1–3.3)
LYMPHOCYTES # BLD AUTO: 33.6 % — SIGNIFICANT CHANGE UP (ref 13–44)
MAGNESIUM SERPL-MCNC: 2.7 MG/DL — HIGH (ref 1.6–2.6)
MCHC RBC-ENTMCNC: 28.4 PG — SIGNIFICANT CHANGE UP (ref 27–34)
MCHC RBC-ENTMCNC: 32.4 GM/DL — SIGNIFICANT CHANGE UP (ref 32–36)
MCV RBC AUTO: 87.4 FL — SIGNIFICANT CHANGE UP (ref 80–100)
MONOCYTES # BLD AUTO: 0.21 K/UL — SIGNIFICANT CHANGE UP (ref 0–0.9)
MONOCYTES NFR BLD AUTO: 6.6 % — SIGNIFICANT CHANGE UP (ref 2–14)
NEUTROPHILS # BLD AUTO: 1.83 K/UL — SIGNIFICANT CHANGE UP (ref 1.8–7.4)
NEUTROPHILS NFR BLD AUTO: 57.6 % — SIGNIFICANT CHANGE UP (ref 43–77)
NRBC # BLD: 0 /100 WBCS — SIGNIFICANT CHANGE UP (ref 0–0)
PHOSPHATE SERPL-MCNC: 2.8 MG/DL — SIGNIFICANT CHANGE UP (ref 2.5–4.5)
PLATELET # BLD AUTO: 189 K/UL — SIGNIFICANT CHANGE UP (ref 150–400)
POTASSIUM SERPL-MCNC: 4.6 MMOL/L — SIGNIFICANT CHANGE UP (ref 3.5–5.3)
POTASSIUM SERPL-SCNC: 4.6 MMOL/L — SIGNIFICANT CHANGE UP (ref 3.5–5.3)
PROT SERPL-MCNC: 8.5 G/DL — HIGH (ref 6–8.3)
RBC # BLD: 3.42 M/UL — LOW (ref 3.8–5.2)
RBC # FLD: 17.1 % — HIGH (ref 10.3–14.5)
SODIUM SERPL-SCNC: 147 MMOL/L — HIGH (ref 135–145)
WBC # BLD: 3.18 K/UL — LOW (ref 3.8–10.5)
WBC # FLD AUTO: 3.18 K/UL — LOW (ref 3.8–10.5)

## 2021-03-25 PROCEDURE — 99233 SBSQ HOSP IP/OBS HIGH 50: CPT | Mod: GC

## 2021-03-25 RX ORDER — POLYETHYLENE GLYCOL 3350 17 G/17G
17 POWDER, FOR SOLUTION ORAL DAILY
Refills: 0 | Status: DISCONTINUED | OUTPATIENT
Start: 2021-03-25 | End: 2021-03-29

## 2021-03-25 RX ORDER — DEXTROSE MONOHYDRATE, SODIUM CHLORIDE, AND POTASSIUM CHLORIDE 50; .745; 4.5 G/1000ML; G/1000ML; G/1000ML
1000 INJECTION, SOLUTION INTRAVENOUS
Refills: 0 | Status: DISCONTINUED | OUTPATIENT
Start: 2021-03-25 | End: 2021-03-29

## 2021-03-25 RX ADMIN — DEXTROSE MONOHYDRATE, SODIUM CHLORIDE, AND POTASSIUM CHLORIDE 75 MILLILITER(S): 50; .745; 4.5 INJECTION, SOLUTION INTRAVENOUS at 11:11

## 2021-03-25 RX ADMIN — AMPICILLIN SODIUM AND SULBACTAM SODIUM 100 GRAM(S): 250; 125 INJECTION, POWDER, FOR SUSPENSION INTRAMUSCULAR; INTRAVENOUS at 00:46

## 2021-03-25 RX ADMIN — Medication 3 MILLIGRAM(S): at 22:05

## 2021-03-25 RX ADMIN — AMPICILLIN SODIUM AND SULBACTAM SODIUM 100 GRAM(S): 250; 125 INJECTION, POWDER, FOR SUSPENSION INTRAMUSCULAR; INTRAVENOUS at 06:17

## 2021-03-25 RX ADMIN — LIDOCAINE 1 PATCH: 4 CREAM TOPICAL at 06:17

## 2021-03-25 RX ADMIN — LIDOCAINE 1 PATCH: 4 CREAM TOPICAL at 08:11

## 2021-03-25 RX ADMIN — DIPHENHYDRAMINE HYDROCHLORIDE AND LIDOCAINE HYDROCHLORIDE AND ALUMINUM HYDROXIDE AND MAGNESIUM HYDRO 10 MILLILITER(S): KIT at 06:48

## 2021-03-25 RX ADMIN — AMLODIPINE BESYLATE 5 MILLIGRAM(S): 2.5 TABLET ORAL at 06:16

## 2021-03-25 RX ADMIN — AMPICILLIN SODIUM AND SULBACTAM SODIUM 100 GRAM(S): 250; 125 INJECTION, POWDER, FOR SUSPENSION INTRAMUSCULAR; INTRAVENOUS at 17:36

## 2021-03-25 RX ADMIN — PANTOPRAZOLE SODIUM 40 MILLIGRAM(S): 20 TABLET, DELAYED RELEASE ORAL at 06:16

## 2021-03-25 RX ADMIN — Medication 650 MILLIGRAM(S): at 11:26

## 2021-03-25 RX ADMIN — DIPHENHYDRAMINE HYDROCHLORIDE AND LIDOCAINE HYDROCHLORIDE AND ALUMINUM HYDROXIDE AND MAGNESIUM HYDRO 10 MILLILITER(S): KIT at 11:12

## 2021-03-25 RX ADMIN — Medication 2000 UNIT(S): at 11:12

## 2021-03-25 RX ADMIN — Medication 650 MILLIGRAM(S): at 11:56

## 2021-03-25 RX ADMIN — ENOXAPARIN SODIUM 30 MILLIGRAM(S): 100 INJECTION SUBCUTANEOUS at 11:12

## 2021-03-25 RX ADMIN — BENZOCAINE AND MENTHOL 1 LOZENGE: 5; 1 LIQUID ORAL at 06:17

## 2021-03-25 RX ADMIN — AMPICILLIN SODIUM AND SULBACTAM SODIUM 100 GRAM(S): 250; 125 INJECTION, POWDER, FOR SUSPENSION INTRAMUSCULAR; INTRAVENOUS at 11:12

## 2021-03-25 NOTE — PROGRESS NOTE ADULT - ATTENDING COMMENTS
Patient seen and examined with housestaff this morning around 11 AM;  Agree with PGY1 A/P above with editing as needed. My independent assessment, findings on exam, diagnosis and plan of care as listed below. Discussed with Dr. Dupont and PGY3 Dr. VINAYAK Bourgeois    Patient is a 81y old  Female who presents with a chief complaint of Dysphagia and Tongue swelling     INTERVAL HPI/OVERNIGHT EVENTS: patient significantly improved; almost back to basekline; eating well' speech fluent  REVIEW OF SYSTEMS: denies SOB, palpitations, chest pain, nausea, vomiting, diarrhea, constipation,     PHYSICAL EXAM:  GENERAL: NAD; appears dehydrated  Tongue swelling much improved, base of uvula visible; no significant erythema  NERVOUS SYSTEM:  Alert & Oriented X 2 to 2.5; no new focal deficit  CHEST/LUNG: Clear to auscultation bilaterally; No rales, rhonchi, wheezing, or rubs  HEART: Regular rate and rhythm;   ABDOMEN: Soft, Nontender, Nondistended; Bowel sounds present  EXTREMITIES:  2+ Peripheral Pulses, No clubbing, cyanosis, or edema  SKIN: No rashes or lesions    LABS:                        9.7    3.18  )-----------( 189      ( 25 Mar 2021 07:48 )             29.9   03-25    147<H>  |  116<H>  |  22<H>  ----------------------------<  109<H>  4.6   |  24  |  1.45<H>    Ca    8.3<L>      25 Mar 2021 07:48  Phos  2.8     03-25  Mg     2.7     03-25    TPro  8.5<H>  /  Alb  1.9<L>  /  TBili  0.4  /  DBili  x   /  AST  47<H>  /  ALT  59  /  AlkPhos  92  03-25      A/P:  Steroid induced psychosis much resolved  Hypernatremia likely due to dehydration  Hypokalemia due to decreased oral intake  Dysphagia and Tongue Swelling due to likely pharyngitis or infection of floor of mouth resolving well  KAJAL on CKD 3 much improved  Hx Multiple myeloma   Anemia of chronic disease  HTN  RA  Back pain from recently diagnosed compression fracture of T12, Osteoporosis and MM    Plan:  Creatinine much improved from 1.8 to 1.4 at baseline   Change IVF to 1/2NS with potassium to IVF  Cont Unasyn; ID follow up appreciated  ENT consult still awaited but patient already doing better; Follow up outpatient  PRN Zyprexa, monitor QTc; will discontinue if remain stable next 24 hrs  No acute SOB. maintaining airway. Monitor closely  Increase Lovenox to 40 mg SQ daily AM as Creatinine near normal  Monitor Hb   Full code   discussed with  Spouse passed away last night. FAMILY REQUESTED NOT TO SHARE NEWS WITH PATIENT AT THIS TIME  Patient will need DEV placement; Will request PT follow up as patient is clinically improved and will able to participate in therapy  Rest of the management as per PGY1 above  If continues to do well expect discharge next 48 hrs; likely Friday; CM advised to get choice; [facility] is preferred choice can send [facility];  Will need Authorization Patient seen and examined with housestaff this morning around 11 AM;  Agree with PGY1 A/P above with editing as needed. My independent assessment, findings on exam, diagnosis and plan of care as listed below. Discussed with Dr. Dupont and PGY3 Dr. VINAYAK Bourgeois    Patient is a 81y old  Female who presents with a chief complaint of Dysphagia and Tongue swelling     INTERVAL HPI/OVERNIGHT EVENTS: patient significantly improved; almost back to basekline; eating well' speech fluent  REVIEW OF SYSTEMS: denies SOB, palpitations, chest pain, nausea, vomiting, diarrhea, constipation,     PHYSICAL EXAM:  GENERAL: NAD; appears dehydrated  Tongue swelling much improved, base of uvula visible; no significant erythema  NERVOUS SYSTEM:  Alert & Oriented X 2 to 2.5; no new focal deficit  CHEST/LUNG: Clear to auscultation bilaterally; No rales, rhonchi, wheezing, or rubs  HEART: Regular rate and rhythm;   ABDOMEN: Soft, Nontender, Nondistended; Bowel sounds present  EXTREMITIES:  2+ Peripheral Pulses, No clubbing, cyanosis, or edema  SKIN: No rashes or lesions    LABS:                        9.7    3.18  )-----------( 189      ( 25 Mar 2021 07:48 )             29.9   03-25    147<H>  |  116<H>  |  22<H>  ----------------------------<  109<H>  4.6   |  24  |  1.45<H>    Ca    8.3<L>      25 Mar 2021 07:48  Phos  2.8     03-25  Mg     2.7     03-25    TPro  8.5<H>  /  Alb  1.9<L>  /  TBili  0.4  /  DBili  x   /  AST  47<H>  /  ALT  59  /  AlkPhos  92  03-25      A/P:  Steroid induced psychosis much resolved  Hypernatremia likely due to dehydration  Hypokalemia due to decreased oral intake  Dysphagia and Tongue Swelling due to likely pharyngitis or infection of floor of mouth resolving well  KAJAL on CKD 3 much improved  Hx Multiple myeloma   Anemia of chronic disease  HTN  RA  Back pain from recently diagnosed compression fracture of T12, Osteoporosis and MM    Plan:  Creatinine much improved from 1.8 to 1.4 at baseline   Change IVF to 1/2NS with potassium to IVF  Cont Unasyn; ID follow up appreciated  ENT consult still awaited but patient already doing better; Follow up outpatient  PRN Zyprexa will disxcontinue and monitor  No acute SOB. maintaining airway. Monitor closely  Increase Lovenox to 40 mg SQ daily AM as Creatinine near normal  Monitor Hb   Full code   discussed with    Patient will need DEV placement; Will request PT follow up as patient is clinically improved and will able to participate in therapy  Rest of the management as per PGY1 above  If continues to do well expect discharge next 48 hrs; likely Friday; CM advised to get choice; send [facility];  Will need Authorization

## 2021-03-25 NOTE — PROGRESS NOTE ADULT - ASSESSMENT
Pharyngitis- improved  Left neck cellulitis  Glossitis - greatly improved    Plan - Cont Unasyn 1.5 gms iv q6 hrs  when ready for discharge will switch to augmentin po.                 Pharyngitis- improved  Left neck cellulitis- improved  Glossitis - greatly improved    Plan - Cont Unasyn 1.5 gms iv q6 hrs  when ready for discharge will switch to augmentin po.                 Pharyngitis- improved  Left neck cellulitis- improved  Glossitis - greatly improved    Plan - Cont Unasyn 1.5 gms iv q6 hrs  when ready for discharge will switch to augmentin po  Will need antibiotics for a total of 10 days.                 Pharyngitis- improved  Left neck cellulitis- improved  Glossitis - greatly improved    Plan - Cont Unasyn 1.5 gms iv q6 hrs  when ready for discharge will switch to augmentin po  Will need antibiotics for a total of 10 days.    I agree with above

## 2021-03-25 NOTE — PROGRESS NOTE ADULT - SUBJECTIVE AND OBJECTIVE BOX
81y Female is under our care for     REVIEW OF SYSTEMS:  [  ] Not able to illicit  General:	  Chest:	  GI:	  :  Skin:	  Musculoskeletal:	  Neuro:	    MEDS:  ampicillin/sulbactam  IVPB 1.5 Gram(s) IV Intermittent every 6 hours    ALLERGIES: Allergies    No Known Allergies    Intolerances        VITALS:  Vital Signs Last 24 Hrs  T(C): 36.7 (25 Mar 2021 06:00), Max: 36.8 (24 Mar 2021 20:19)  T(F): 98.1 (25 Mar 2021 06:00), Max: 98.2 (24 Mar 2021 20:19)  HR: 81 (25 Mar 2021 06:00) (78 - 86)  BP: 143/61 (25 Mar 2021 06:00) (126/69 - 143/61)  BP(mean): --  RR: 17 (25 Mar 2021 06:00) (16 - 18)  SpO2: 99% (25 Mar 2021 06:00) (98% - 99%)      PHYSICAL EXAM:  HEENT:  Neck:  Respiratory:  Cardiovascular:  Gastrointestinal:  Extremities:  Skin:  Ortho:  Neuro:    LABS/DIAGNOSTIC TESTS:                        9.7    3.18  )-----------( 189      ( 25 Mar 2021 07:48 )             29.9     WBC Count: 3.18 K/uL (03-25 @ 07:48)  WBC Count: 3.22 K/uL (03-24 @ 09:13)  WBC Count: 6.61 K/uL (03-23 @ 07:12)  WBC Count: 6.71 K/uL (03-22 @ 07:44)  WBC Count: 5.46 K/uL (03-21 @ 08:16)    03-25    147<H>  |  116<H>  |  22<H>  ----------------------------<  109<H>  4.6   |  24  |  1.45<H>    Ca    8.3<L>      25 Mar 2021 07:48  Phos  2.8     03-25  Mg     2.7     03-25    TPro  8.5<H>  /  Alb  1.9<L>  /  TBili  0.4  /  DBili  x   /  AST  47<H>  /  ALT  59  /  AlkPhos  92  03-25              RADIOLOGY:  no new studies 81y Female is under our care for pharyngitis and left neck cellulitis.  Patient was seen laying comfortably in bed with no acute distress.  Patient tolerated her diet well this morning without any dysphagia or difficulty as per the nurse.  She is more alert today, answering questions appropriately and accurately.      REVIEW OF SYSTEMS:  [  ] Not able to illicit  General: no fevers no malaise  Chest: no cough no sob  GI: no nvd  : no urinary sxs   Skin: no rashes  Musculoskeletal: no trauma no LBP      MEDS:  ampicillin/sulbactam  IVPB 1.5 Gram(s) IV Intermittent every 6 hours    ALLERGIES: Allergies    No Known Allergies    Intolerances        VITALS:  Vital Signs Last 24 Hrs  T(C): 36.7 (25 Mar 2021 06:00), Max: 36.8 (24 Mar 2021 20:19)  T(F): 98.1 (25 Mar 2021 06:00), Max: 98.2 (24 Mar 2021 20:19)  HR: 81 (25 Mar 2021 06:00) (78 - 86)  BP: 143/61 (25 Mar 2021 06:00) (126/69 - 143/61)  BP(mean): --  RR: 17 (25 Mar 2021 06:00) (16 - 18)  SpO2: 99% (25 Mar 2021 06:00) (98% - 99%)      PHYSICAL EXAM:  HEENT: dry oral mucosa, improvement of pharynx erythema, glossitis has resolved  Neck: supple no LN's, no neck or jaw tenderness  Respiratory: lungs clear no rales  Cardiovascular: S1 S2 reg no murmurs  Gastrointestinal: +BS with soft, nondistended abdomen; nontender  Extremities: no edema  Skin: no rashes  Ortho: n/a  Neuro: AAO x 3    LABS/DIAGNOSTIC TESTS:                        9.7    3.18  )-----------( 189      ( 25 Mar 2021 07:48 )             29.9     WBC Count: 3.18 K/uL (03-25 @ 07:48)  WBC Count: 3.22 K/uL (03-24 @ 09:13)  WBC Count: 6.61 K/uL (03-23 @ 07:12)  WBC Count: 6.71 K/uL (03-22 @ 07:44)  WBC Count: 5.46 K/uL (03-21 @ 08:16)    03-25    147<H>  |  116<H>  |  22<H>  ----------------------------<  109<H>  4.6   |  24  |  1.45<H>    Ca    8.3<L>      25 Mar 2021 07:48  Phos  2.8     03-25  Mg     2.7     03-25    TPro  8.5<H>  /  Alb  1.9<L>  /  TBili  0.4  /  DBili  x   /  AST  47<H>  /  ALT  59  /  AlkPhos  92  03-25              RADIOLOGY:  no new studies

## 2021-03-25 NOTE — PROGRESS NOTE ADULT - PROBLEM SELECTOR PLAN 4
- Patient w/ hx of MM, last tx in 2020  - Follows up at [facility], has next appointment on April 2021

## 2021-03-25 NOTE — PROGRESS NOTE ADULT - SUBJECTIVE AND OBJECTIVE BOX
PGY-1 Progress Note discussed with attending    PAGER #: [-----] TILL 5:00 PM  PLEASE CONTACT ON CALL TEAM:  - On Call Team (Please refer to Nicola) FROM 5:00 PM - 8:30PM  - Nightfloat Team FROM 8:30 -7:30 AM    INTERVAL HPI/OVERNIGHT EVENTS: No acute events overnight. Patient is doing much better. Able to tolerate her diet. Alert and oriented x2.    MEDICATIONS:  acetaminophen   Tablet .. 650 milliGRAM(s) Oral every 6 hours PRN  amLODIPine   Tablet 5 milliGRAM(s) Oral daily  ampicillin/sulbactam  IVPB 1.5 Gram(s) IV Intermittent every 6 hours  benzocaine 15 mG/menthol 3.6 mG (Sugar-Free) Lozenge 1 Lozenge Oral two times a day  cholecalciferol 2000 Unit(s) Oral daily  dextrose 5% + sodium chloride 0.45% with potassium chloride 20 mEq/L 1000 milliLiter(s) IV Continuous <Continuous>  enoxaparin Injectable 30 milliGRAM(s) SubCutaneous daily  FIRST- Mouthwash  BLM 10 milliLiter(s) Swish and Spit four times a day  lidocaine   Patch 1 Patch Transdermal every 24 hours  melatonin 3 milliGRAM(s) Oral at bedtime  pantoprazole    Tablet 40 milliGRAM(s) Oral before breakfast      REVIEW OF SYSTEMS:  CONSTITUTIONAL: No fever, weight loss, or fatigue  RESPIRATORY: No cough, wheezing, chills or hemoptysis; No shortness of breath  CARDIOVASCULAR: No chest pain, palpitations, dizziness, or leg swelling  GASTROINTESTINAL: No abdominal pain. No nausea, vomiting, or hematemesis; No diarrhea or constipation. No melena or hematochezia.  GENITOURINARY: No dysuria or hematuria, urinary frequency  NEUROLOGICAL: No headaches, memory loss, loss of strength, numbness, or tremors  SKIN: No itching, burning, rashes, or lesions     Vital Signs Last 24 Hrs  T(C): 36.7 (25 Mar 2021 06:00), Max: 36.8 (24 Mar 2021 20:19)  T(F): 98.1 (25 Mar 2021 06:00), Max: 98.2 (24 Mar 2021 20:19)  HR: 81 (25 Mar 2021 06:00) (78 - 86)  BP: 143/61 (25 Mar 2021 06:00) (126/69 - 143/61)  BP(mean): --  RR: 17 (25 Mar 2021 06:00) (16 - 18)  SpO2: 99% (25 Mar 2021 06:00) (98% - 99%)    PHYSICAL EXAMINATION:  GENERAL: NAD, AAOx1-2  Oral cavity: tongue swelling markedly improved, tenderness on palpation of left side of neck, no erythema  NERVOUS SYSTEM:  Alert & Oriented X2, Good concentration; no focal deficit  CHEST/LUNG: Clear to auscultation bilaterally; No rales, rhonchi, wheezing, or rubs  HEART: Regular rate and rhythm; No murmurs, rubs, or gallops  ABDOMEN: Soft, Nontender, Nondistended; Bowel sounds present  EXTREMITIES:  2+ Peripheral Pulses, No clubbing, cyanosis, or edema  SKIN: No rashes or lesions                          9.7    3.18  )-----------( 189      ( 25 Mar 2021 07:48 )             29.9     03-25    147<H>  |  116<H>  |  22<H>  ----------------------------<  109<H>  4.6   |  24  |  1.45<H>    Ca    8.3<L>      25 Mar 2021 07:48  Phos  2.8     03-25  Mg     2.7     03-25    TPro  8.5<H>  /  Alb  1.9<L>  /  TBili  0.4  /  DBili  x   /  AST  47<H>  /  ALT  59  /  AlkPhos  92  03-25    LIVER FUNCTIONS - ( 25 Mar 2021 07:48 )  Alb: 1.9 g/dL / Pro: 8.5 g/dL / ALK PHOS: 92 U/L / ALT: 59 U/L DA / AST: 47 U/L / GGT: x                   CAPILLARY BLOOD GLUCOSE      RADIOLOGY & ADDITIONAL TESTS:

## 2021-03-25 NOTE — PROGRESS NOTE ADULT - PROBLEM SELECTOR PLAN 1
- Patient came to ED c/o dysphagia, swollen tongue  - Esophagitis vs Pharyngitis vs oral infection    - CT neck non contrast Thickening of the left oropharyngeal and hypopharyngeal soft tissues, probably pharyngitis. Recommend follow-up to ensure resolution and to exclude an underlying mass.  -  on Cepacol, First wash   -  on Unasyn 1.5gm q6   -  3/25, Oral exam: visualization of only base of vulva ( Class II Mallampati score )  - on Dysphagia 1 puree diet with nectar consistency ;  - 3/25, IV fluids was renewed for 24 hours.  - 3/23, Off steroids   - Monitor respiratory status   - GI Dr Quigley consulted and recommended to complete antibiotics  - Will watch her till almost 4:00 PM, If no bowel movement, will give her miralax  - Start Pain meds ( Tylenol ) PRN so PT can eval her

## 2021-03-26 ENCOUNTER — TRANSCRIPTION ENCOUNTER (OUTPATIENT)
Age: 81
End: 2021-03-26

## 2021-03-26 LAB
ALBUMIN SERPL ELPH-MCNC: 2 G/DL — LOW (ref 3.5–5)
ALP SERPL-CCNC: 90 U/L — SIGNIFICANT CHANGE UP (ref 40–120)
ALT FLD-CCNC: 40 U/L DA — SIGNIFICANT CHANGE UP (ref 10–60)
ANION GAP SERPL CALC-SCNC: 8 MMOL/L — SIGNIFICANT CHANGE UP (ref 5–17)
AST SERPL-CCNC: 19 U/L — SIGNIFICANT CHANGE UP (ref 10–40)
BASOPHILS # BLD AUTO: 0 K/UL — SIGNIFICANT CHANGE UP (ref 0–0.2)
BASOPHILS NFR BLD AUTO: 0 % — SIGNIFICANT CHANGE UP (ref 0–2)
BILIRUB SERPL-MCNC: 0.3 MG/DL — SIGNIFICANT CHANGE UP (ref 0.2–1.2)
BUN SERPL-MCNC: 17 MG/DL — SIGNIFICANT CHANGE UP (ref 7–18)
CALCIUM SERPL-MCNC: 8.5 MG/DL — SIGNIFICANT CHANGE UP (ref 8.4–10.5)
CHLORIDE SERPL-SCNC: 111 MMOL/L — HIGH (ref 96–108)
CO2 SERPL-SCNC: 23 MMOL/L — SIGNIFICANT CHANGE UP (ref 22–31)
CREAT SERPL-MCNC: 1.28 MG/DL — SIGNIFICANT CHANGE UP (ref 0.5–1.3)
EOSINOPHIL # BLD AUTO: 0.05 K/UL — SIGNIFICANT CHANGE UP (ref 0–0.5)
EOSINOPHIL NFR BLD AUTO: 1.6 % — SIGNIFICANT CHANGE UP (ref 0–6)
GLUCOSE SERPL-MCNC: 130 MG/DL — HIGH (ref 70–99)
HCT VFR BLD CALC: 28.5 % — LOW (ref 34.5–45)
HGB BLD-MCNC: 9.4 G/DL — LOW (ref 11.5–15.5)
IMM GRANULOCYTES NFR BLD AUTO: 0.9 % — SIGNIFICANT CHANGE UP (ref 0–1.5)
LYMPHOCYTES # BLD AUTO: 1.13 K/UL — SIGNIFICANT CHANGE UP (ref 1–3.3)
LYMPHOCYTES # BLD AUTO: 35.4 % — SIGNIFICANT CHANGE UP (ref 13–44)
MCHC RBC-ENTMCNC: 29.2 PG — SIGNIFICANT CHANGE UP (ref 27–34)
MCHC RBC-ENTMCNC: 33 GM/DL — SIGNIFICANT CHANGE UP (ref 32–36)
MCV RBC AUTO: 88.5 FL — SIGNIFICANT CHANGE UP (ref 80–100)
MONOCYTES # BLD AUTO: 0.2 K/UL — SIGNIFICANT CHANGE UP (ref 0–0.9)
MONOCYTES NFR BLD AUTO: 6.3 % — SIGNIFICANT CHANGE UP (ref 2–14)
NEUTROPHILS # BLD AUTO: 1.78 K/UL — LOW (ref 1.8–7.4)
NEUTROPHILS NFR BLD AUTO: 55.8 % — SIGNIFICANT CHANGE UP (ref 43–77)
NRBC # BLD: 0 /100 WBCS — SIGNIFICANT CHANGE UP (ref 0–0)
PLATELET # BLD AUTO: 190 K/UL — SIGNIFICANT CHANGE UP (ref 150–400)
POTASSIUM SERPL-MCNC: 4.3 MMOL/L — SIGNIFICANT CHANGE UP (ref 3.5–5.3)
POTASSIUM SERPL-SCNC: 4.3 MMOL/L — SIGNIFICANT CHANGE UP (ref 3.5–5.3)
PROT SERPL-MCNC: 8.3 G/DL — SIGNIFICANT CHANGE UP (ref 6–8.3)
RBC # BLD: 3.22 M/UL — LOW (ref 3.8–5.2)
RBC # FLD: 17.2 % — HIGH (ref 10.3–14.5)
SARS-COV-2 RNA SPEC QL NAA+PROBE: SIGNIFICANT CHANGE UP
SODIUM SERPL-SCNC: 142 MMOL/L — SIGNIFICANT CHANGE UP (ref 135–145)
WBC # BLD: 3.19 K/UL — LOW (ref 3.8–10.5)
WBC # FLD AUTO: 3.19 K/UL — LOW (ref 3.8–10.5)

## 2021-03-26 PROCEDURE — 99233 SBSQ HOSP IP/OBS HIGH 50: CPT | Mod: GC

## 2021-03-26 RX ADMIN — AMPICILLIN SODIUM AND SULBACTAM SODIUM 100 GRAM(S): 250; 125 INJECTION, POWDER, FOR SUSPENSION INTRAMUSCULAR; INTRAVENOUS at 17:20

## 2021-03-26 RX ADMIN — LIDOCAINE 1 PATCH: 4 CREAM TOPICAL at 06:15

## 2021-03-26 RX ADMIN — AMPICILLIN SODIUM AND SULBACTAM SODIUM 100 GRAM(S): 250; 125 INJECTION, POWDER, FOR SUSPENSION INTRAMUSCULAR; INTRAVENOUS at 00:12

## 2021-03-26 RX ADMIN — AMLODIPINE BESYLATE 5 MILLIGRAM(S): 2.5 TABLET ORAL at 06:15

## 2021-03-26 RX ADMIN — Medication 650 MILLIGRAM(S): at 11:19

## 2021-03-26 RX ADMIN — Medication 650 MILLIGRAM(S): at 12:00

## 2021-03-26 RX ADMIN — PANTOPRAZOLE SODIUM 40 MILLIGRAM(S): 20 TABLET, DELAYED RELEASE ORAL at 06:15

## 2021-03-26 RX ADMIN — BENZOCAINE AND MENTHOL 1 LOZENGE: 5; 1 LIQUID ORAL at 17:20

## 2021-03-26 RX ADMIN — AMPICILLIN SODIUM AND SULBACTAM SODIUM 100 GRAM(S): 250; 125 INJECTION, POWDER, FOR SUSPENSION INTRAMUSCULAR; INTRAVENOUS at 06:15

## 2021-03-26 RX ADMIN — BENZOCAINE AND MENTHOL 1 LOZENGE: 5; 1 LIQUID ORAL at 06:15

## 2021-03-26 RX ADMIN — ENOXAPARIN SODIUM 30 MILLIGRAM(S): 100 INJECTION SUBCUTANEOUS at 11:19

## 2021-03-26 RX ADMIN — LIDOCAINE 1 PATCH: 4 CREAM TOPICAL at 07:21

## 2021-03-26 RX ADMIN — AMPICILLIN SODIUM AND SULBACTAM SODIUM 100 GRAM(S): 250; 125 INJECTION, POWDER, FOR SUSPENSION INTRAMUSCULAR; INTRAVENOUS at 11:18

## 2021-03-26 RX ADMIN — LIDOCAINE 1 PATCH: 4 CREAM TOPICAL at 18:29

## 2021-03-26 RX ADMIN — Medication 2000 UNIT(S): at 11:19

## 2021-03-26 RX ADMIN — DEXTROSE MONOHYDRATE, SODIUM CHLORIDE, AND POTASSIUM CHLORIDE 75 MILLILITER(S): 50; .745; 4.5 INJECTION, SOLUTION INTRAVENOUS at 00:13

## 2021-03-26 NOTE — PROGRESS NOTE ADULT - SUBJECTIVE AND OBJECTIVE BOX
PGY-1 Progress Note discussed with attending    PAGER #: [-----] TILL 5:00 PM  PLEASE CONTACT ON CALL TEAM:  - On Call Team (Please refer to Nicola) FROM 5:00 PM - 8:30PM  - Nightfloat Team FROM 8:30 -7:30 AM    INTERVAL HPI/OVERNIGHT EVENTS: no acute events overnight. the patient is doing much better today. she had her meals. son visited her yesterday.    MEDICATIONS:  acetaminophen   Tablet .. 650 milliGRAM(s) Oral every 6 hours PRN  amLODIPine   Tablet 5 milliGRAM(s) Oral daily  ampicillin/sulbactam  IVPB 1.5 Gram(s) IV Intermittent every 6 hours  benzocaine 15 mG/menthol 3.6 mG (Sugar-Free) Lozenge 1 Lozenge Oral two times a day  cholecalciferol 2000 Unit(s) Oral daily  dextrose 5% + sodium chloride 0.45% with potassium chloride 20 mEq/L 1000 milliLiter(s) IV Continuous <Continuous>  enoxaparin Injectable 30 milliGRAM(s) SubCutaneous daily  lidocaine   Patch 1 Patch Transdermal every 24 hours  melatonin 3 milliGRAM(s) Oral at bedtime  pantoprazole    Tablet 40 milliGRAM(s) Oral before breakfast  polyethylene glycol 3350 17 Gram(s) Oral daily PRN      REVIEW OF SYSTEMS:  CONSTITUTIONAL: No fever, weight loss, or fatigue  RESPIRATORY: No cough, wheezing, chills or hemoptysis; No shortness of breath  CARDIOVASCULAR: No chest pain, palpitations, dizziness, or leg swelling  GASTROINTESTINAL: No abdominal pain. No nausea, vomiting, or hematemesis; No diarrhea or constipation. No melena or hematochezia.  GENITOURINARY: No dysuria or hematuria, urinary frequency  NEUROLOGICAL: No headaches, memory loss, loss of strength, numbness, or tremors  SKIN: No itching, burning, rashes, or lesions     Vital Signs Last 24 Hrs  T(C): 37.1 (26 Mar 2021 14:16), Max: 37.1 (26 Mar 2021 14:16)  T(F): 98.8 (26 Mar 2021 14:16), Max: 98.8 (26 Mar 2021 14:16)  HR: 85 (26 Mar 2021 14:16) (85 - 87)  BP: 157/70 (26 Mar 2021 14:16) (131/59 - 157/70)  BP(mean): --  RR: 18 (26 Mar 2021 14:16) (18 - 18)  SpO2: 100% (26 Mar 2021 14:16) (100% - 100%)    PHYSICAL EXAMINATION:  GENERAL: NAD, AAOx1-2  Oral cavity: tongue swelling markedly improved, tenderness on palpation of left side of neck, no erythema  NERVOUS SYSTEM:  Alert & Oriented X2, Good concentration; no focal deficit  CHEST/LUNG: Clear to auscultation bilaterally; No rales, rhonchi, wheezing, or rubs  HEART: Regular rate and rhythm; No murmurs, rubs, or gallops  ABDOMEN: Soft, Nontender, Nondistended; Bowel sounds present  EXTREMITIES:  2+ Peripheral Pulses, No clubbing, cyanosis, or edema  SKIN: No rashes or lesions                      9.4    3.19  )-----------( 190      ( 26 Mar 2021 08:25 )             28.5     03-26    142  |  111<H>  |  17  ----------------------------<  130<H>  4.3   |  23  |  1.28    Ca    8.5      26 Mar 2021 08:25  Phos  2.8     03-25  Mg     2.7     03-25    TPro  8.3  /  Alb  2.0<L>  /  TBili  0.3  /  DBili  x   /  AST  19  /  ALT  40  /  AlkPhos  90  03-26    LIVER FUNCTIONS - ( 26 Mar 2021 08:25 )  Alb: 2.0 g/dL / Pro: 8.3 g/dL / ALK PHOS: 90 U/L / ALT: 40 U/L DA / AST: 19 U/L / GGT: x                   CAPILLARY BLOOD GLUCOSE      RADIOLOGY & ADDITIONAL TESTS:

## 2021-03-26 NOTE — PROGRESS NOTE ADULT - PROBLEM SELECTOR PLAN 1
- Patient came to ED c/o dysphagia, swollen tongue  - Esophagitis vs Pharyngitis vs oral infection    - CT neck non contrast Thickening of the left oropharyngeal and hypopharyngeal soft tissues, probably pharyngitis. Recommend follow-up to ensure resolution and to exclude an underlying mass.  -  on Cepacol, First wash   -  on Unasyn 1.5gm q6   -  3/26, Oral exam: visualization of only base of vulva ( Class II Mallampati score )  - on Dysphagia 1 puree diet with nectar consistency ;  - 3/23, Off steroids   - Monitor respiratory status   - GI Dr Quigley consulted and recommended to complete antibiotics  - 3/26, Patient had bowel movement last night  - Start Pain meds ( Tylenol ) PRN  - If being discharged tomorrow, can go on Augmentin 875 po bid for 3 more days (to complete 10 day total course) - Patient came to ED c/o dysphagia, swollen tongue  - Esophagitis vs Pharyngitis vs oral infection    - CT neck non contrast Thickening of the left oropharyngeal and hypopharyngeal soft tissues, probably pharyngitis. Recommend follow-up to ensure resolution and to exclude an underlying mass.  -  on Cepacol, First wash   -  on Unasyn 1.5gm q6   -  3/26, Oral exam: visualization of only base of vulva ( Class II Mallampati score )  - on Dysphagia 1 puree diet with nectar consistency ;  - 3/23, Off steroids   - Monitor respiratory status   - GI Dr Quigley consulted and recommended to complete antibiotics  - 3/26, Patient had bowel movement last night  - on Pain meds ( Tylenol ) PRN  - If being discharged tomorrow, can go on Augmentin 875 po bid for 3 more days (to complete 10 day total course)

## 2021-03-26 NOTE — DISCHARGE NOTE PROVIDER - ATTENDING COMMENTS
Patient seen and examined with housestaff this morning 10.45 AM;  Discussed with Dr. Dupont and PGY3 Dr. VINAYAK Bourgeois    Patient is a 81y old  Female who presents with a chief complaint of Dysphagia and Tongue swelling placed on antibiotics and steroids developed Hallucination and steroids were discontinued.     INTERVAL HPI/OVERNIGHT EVENTS: patient significantly improved and now at baseline mentation. eating well. OOB to chair; Throat pain and discomfort improved. No dysphagia noted    REVIEW OF SYSTEMS: denies SOB, palpitations, chest pain, nausea, vomiting, diarrhea, constipation,     Vital Signs Last 24 Hrs  T(C): 36.5 (29 Mar 2021 14:07), Max: 36.5 (29 Mar 2021 14:07)  T(F): 97.7 (29 Mar 2021 14:07), Max: 97.7 (29 Mar 2021 14:07)  HR: 86 (29 Mar 2021 14:48) (80 - 86)  BP: 156/65 (29 Mar 2021 14:48) (137/63 - 156/65)  BP(mean): 88 (29 Mar 2021 14:48) (83 - 88)  RR: 18 (29 Mar 2021 14:07) (17 - 18)  SpO2: 100% (29 Mar 2021 14:48) (97% - 100%)    PHYSICAL EXAM:  NERVOUS SYSTEM:  Alert & Oriented X 3; no new focal deficit  CHEST/LUNG: Clear to auscultation bilaterally; No rales, rhonchi, wheezing, or rubs  HEART: Regular rate and rhythm;   ABDOMEN: Soft, Nontender, Nondistended; Bowel sounds present  EXTREMITIES:  2+ Peripheral Pulses, No clubbing, cyanosis, or edema  SKIN: No rashes or lesions    LABS: stable 3/26  COVID-19 PCR . (03.28.21 @ 05:24) COVID-19 PCR: NotDetec:     A/P:  Dysphagia and Tongue Swelling due to likely pharyngitis and glossitis or infection of floor of mouth nearly resolved  Hypernatremia likely due to dehydration resolved  Hypokalemia due to decreased oral intake replaced and normalized  KAJAL on CKD 3 much improved  Hx Multiple myeloma   Anemia of chronic disease  HTN  RA  Back pain from recently diagnosed compression fracture of T12, Osteoporosis and MM    Plan:  Creatinine now at baseline  Completed antibiotics; ID follow up appreciated  ENT consult: Follow up outpatient; patient clinically improved  Full code   discussed with ; Patient accepted to [facility]; has Auth  PT follow up appreciated    Discussed with Son Roland and Daughter over the phone this morning reviewed clinical improvement; agreed for [employer] COVID vaccine; aptient also agreed  Vaccine administered;  Daughter and Son later arrived at [facility] with Patient OOB to Chair; happy with progress;  to give family outpatient private hire available if needed once ready to discharge form Rehab.   Advised follow up with Oncologist outpatient Dr. Rosales at [facility].     PATIENT IS NOT PLANNED FOR ANY CHEMOTHERAPY IN THE NEXT TWO WEEKS WHILE PATIENT IS IN REHAB.     See discharge note updated for details    Discussed with housestaff and RN

## 2021-03-26 NOTE — PROGRESS NOTE ADULT - PROBLEM SELECTOR PLAN 6
- Patient has history of Hypertension on amlodipine and metoprolol at home   - on Amlodipine 5mg daily with holding parameters   - 3/24, Lopressor discontinued.  - Monitor BP and adjust meds as needed

## 2021-03-26 NOTE — PROGRESS NOTE ADULT - PROBLEM SELECTOR PLAN 3
- Patient with KAJAL on CKD  - Now resolved Cr 1.3, baseline around 1.5-1.6  - Avoid nephrotoxins   - Monitor BMP

## 2021-03-26 NOTE — PROGRESS NOTE ADULT - TIME BILLING
Discussed with patient ; D/C lea [facility] likely [place] once auth  Discussed with housestaff and RN
Discussed with patient   Discussed with housestaff and RN
Discussed with patient   Discussed with housestaff and RN

## 2021-03-26 NOTE — PROGRESS NOTE ADULT - PROBLEM SELECTOR PLAN 4
- Patient w/ hx of MM, last tx in 2020  - Follows up at [facility], has next appointment on April 2021

## 2021-03-26 NOTE — DISCHARGE NOTE PROVIDER - CARE PROVIDER_API CALL
Bailey Rosales)  Medical Oncology  [address] [place] [address]  Phone: [phone]  Fax: [phone]  Follow Up Time: 2 weeks

## 2021-03-26 NOTE — PROGRESS NOTE ADULT - ATTENDING COMMENTS
FULL NOTE TO FOLOW    Patient seen and examined with housestaff this morning around 11 AM;  Agree with PGY1 A/P above with editing as needed. My independent assessment, findings on exam, diagnosis and plan of care as listed below. Discussed with Dr. Dupont and PGY3 Dr. VINAYAK Bourgeois    Patient is a 81y old  Female who presents with a chief complaint of Dysphagia and Tongue swelling     INTERVAL HPI/OVERNIGHT EVENTS: patient significantly improved; almost back to basekline; eating well' speech fluent  REVIEW OF SYSTEMS: denies SOB, palpitations, chest pain, nausea, vomiting, diarrhea, constipation,     Vital Signs Last 24 Hrs  T(C): 37.1 (26 Mar 2021 14:16), Max: 37.1 (26 Mar 2021 14:16)  T(F): 98.8 (26 Mar 2021 14:16), Max: 98.8 (26 Mar 2021 14:16)  HR: 85 (26 Mar 2021 14:16) (85 - 87)  BP: 157/70 (26 Mar 2021 14:16) (131/59 - 157/70)  RR: 18 (26 Mar 2021 14:16) (18 - 18)  SpO2: 100% (26 Mar 2021 14:16) (100% - 100%)    PHYSICAL EXAM:  GENERAL: NAD; appears dehydrated  Tongue swelling much improved, base of uvula visible; no significant erythema  NERVOUS SYSTEM:  Alert & Oriented X 2 to 2.5; no new focal deficit  CHEST/LUNG: Clear to auscultation bilaterally; No rales, rhonchi, wheezing, or rubs  HEART: Regular rate and rhythm;   ABDOMEN: Soft, Nontender, Nondistended; Bowel sounds present  EXTREMITIES:  2+ Peripheral Pulses, No clubbing, cyanosis, or edema  SKIN: No rashes or lesions    LABS:                        9.4    3.19  )-----------( 190      ( 26 Mar 2021 08:25 )             28.5   03-26    142  |  111<H>  |  17  ----------------------------<  130<H>  4.3   |  23  |  1.28    Ca    8.5      26 Mar 2021 08:25  Phos  2.8     03-25  Mg     2.7     03-25    TPro  8.3  /  Alb  2.0<L>  /  TBili  0.3  /  DBili  x   /  AST  19  /  ALT  40  /  AlkPhos  90  03-26            A/P:  Steroid induced psychosis much resolved  Hypernatremia likely due to dehydration  Hypokalemia due to decreased oral intake  Dysphagia and Tongue Swelling due to likely pharyngitis or infection of floor of mouth resolving well  KAJAL on CKD 3 much improved  Hx Multiple myeloma   Anemia of chronic disease  HTN  RA  Back pain from recently diagnosed compression fracture of T12, Osteoporosis and MM    Plan:  Creatinine much improved from 1.8 to 1.4 at baseline   Change IVF to 1/2NS with potassium to IVF  Cont Unasyn; ID follow up appreciated  ENT consult still awaited but patient already doing better; Follow up outpatient  PRN Zyprexa will disxcontinue and monitor  No acute SOB. maintaining airway. Monitor closely  Increase Lovenox to 40 mg SQ daily AM as Creatinine near normal  Monitor Hb   Full code   discussed with    Patient will need DEV placement; Will request PT follow up as patient is clinically improved and will able to participate in therapy  Rest of the management as per PGY1 above  If continues to do well expect discharge next 48 hrs; likely Friday; CM advised to get choice; send BETO;  Will need Authorization Patient seen and examined with housestaff this morning 10.45 AM;  Agree with PGY1 A/P above with editing as needed. My independent assessment, findings on exam, diagnosis and plan of care as listed below. Discussed with Dr. Dupont and PGY3 Dr. VINAYAK Bourgeois    Patient is a 81y old  Female who presents with a chief complaint of Dysphagia and Tongue swelling placed on antibioticsd and steroids developed Hallucination and steroids were discontinued.     INTERVAL HPI/OVERNIGHT EVENTS: patient significantly improved and now at baseline mentation. eating well. Throat pain and discomfort improved. No dysphagia noted    REVIEW OF SYSTEMS: denies SOB, palpitations, chest pain, nausea, vomiting, diarrhea, constipation,     Vital Signs Last 24 Hrs  T(C): 37.1 (26 Mar 2021 14:16), Max: 37.1 (26 Mar 2021 14:16)  T(F): 98.8 (26 Mar 2021 14:16), Max: 98.8 (26 Mar 2021 14:16)  HR: 85 (26 Mar 2021 14:16) (85 - 87)  BP: 157/70 (26 Mar 2021 14:16) (131/59 - 157/70)  RR: 18 (26 Mar 2021 14:16) (18 - 18)  SpO2: 100% (26 Mar 2021 14:16) (100% - 100%)    PHYSICAL EXAM:  GENERAL: NAD; mucoas moist now;   Tongue swelling almost resolved,  no significant erythema in the base of oral cavity  NERVOUS SYSTEM:  Alert & Oriented X 2.5; no new focal deficit  CHEST/LUNG: Clear to auscultation bilaterally; No rales, rhonchi, wheezing, or rubs  HEART: Regular rate and rhythm;   ABDOMEN: Soft, Nontender, Nondistended; Bowel sounds present  EXTREMITIES:  2+ Peripheral Pulses, No clubbing, cyanosis, or edema  SKIN: No rashes or lesions    LABS:                        9.4    3.19  )-----------( 190      ( 26 Mar 2021 08:25 )             28.5   03-26    142  |  111<H>  |  17  ----------------------------<  130<H>  4.3   |  23  |  1.28    Ca    8.5      26 Mar 2021 08:25  Phos  2.8     03-25  Mg     2.7     03-25    TPro  8.3  /  Alb  2.0<L>  /  TBili  0.3  /  DBili  x   /  AST  19  /  ALT  40  /  AlkPhos  90  03-26      A/P:  Dysphagia and Tongue Swelling due to likely pharyngitis or infection of floor of mouth nearly resolved  Hypernatremia likely due to dehydration reswolved  Hypokalemia due to decreased oral intake replaced and normalized  KAJAL on CKD 3 much improved  Hx Multiple myeloma   Anemia of chronic disease  HTN  RA  Back pain from recently diagnosed compression fracture of T12, Osteoporosis and MM    Plan:  Creatinine now at baseline  Eating well; D/C IVFF  Cont Unasyn; ID follow up appreciated; Switch to Augmentin for couple of more days on discharge   ENT consult:  patient already doing better; Follow up outpatient  D/C Zyprexa; clinically doing well  No acute SOB. maintaining airway. Monitor closely  May Increase Lovenox to 40 mg SQ daily AM as Creatinine near normal  Full code   discussed with ; Patient will need DEV placement;   PT follow up appreciated  CM spoke with Son and obtained choice; BETO send to [facility]; Await Auth  As per , Son would want to send to [facility] for about 10 days only as spouse passed away recently.   PATIENT IS PLANNED FOR ANY CHEMOTHERAPY IN THE NEXT TWO WEEKS WHILE PATIENT IS IN REHAB.   Rest of the management as per PGY1 above  Patient is medically stable for discharge to Rehab pending Auth; Potential discharge tomorrow; if not Will get COVID on Sunday if need to stay until Monday.     Discussed with patient ; D/C lea [facility] likely [place] once auth obtained  Discussed with housestaff and RN  Left message for Son this morning but could not attend when he called back the floor this afternoon

## 2021-03-26 NOTE — DISCHARGE NOTE PROVIDER - NSDCCPCAREPLAN_GEN_ALL_CORE_FT
PRINCIPAL DISCHARGE DIAGNOSIS  Diagnosis: Dysphagia  Assessment and Plan of Treatment: You presented with Dysohagia and left Jaw swelling. You were not able to eat or drink. In ED, You started your IV antibiotics. CT scan of neck soft tissue showed swelling of oropharyngeal and hypopharyngeal probably pharyngitis. ID was consulted for you. Unasyn IV was started for total 3 days.  Solumedrol IV was given as well but due to some hallucinations you had overnight, Solumedrol was discontinued. ENT was consulted over the phone and agreed with Antibiotics. Your condition was improved really well and you were able to tolerate your food and passed bowel movement. Please take your medications ( Augmentin ) as prescribed and follow up with your PCP.      SECONDARY DISCHARGE DIAGNOSES  Diagnosis: Hypertension  Assessment and Plan of Treatment: You have history of hypertension. You are on metoprolol and amlodipine at home. Please continue on same meds as prescribed and follow up with your PCP.    Diagnosis: Rheumatoid arthritis  Assessment and Plan of Treatment: Patient has history of rheumatoid arthritis, on hydroxychloroquine at home. Please continue on your meds and follow up with PCP.    Diagnosis: Asymptomatic bacteriuria  Assessment and Plan of Treatment: Urinanalysis showed asymptomatic bacteruria. You had one dose of rocephin in ED. You don't have any UTI symptoms.    Diagnosis: Multiple myeloma  Assessment and Plan of Treatment: You have history of multiple myeloma. Please follow up with your doctor at [facility].     PRINCIPAL DISCHARGE DIAGNOSIS  Diagnosis: Dysphagia  Assessment and Plan of Treatment: You presented with Dysohagia and left Jaw swelling. You were not able to eat or drink. In ED, You started your IV antibiotics. CT scan of neck soft tissue showed swelling of oropharyngeal and hypopharyngeal probably pharyngitis. ID was consulted for you. Unasyn IV was started for total 3 days.  Solumedrol IV was given as well but due to some hallucinations you had overnight, Solumedrol was discontinued. ENT was consulted over the phone and agreed with Antibiotics. Your condition was improved really well and you were able to tolerate your food and passed bowel movement. Please take your medications ( Augmentin 875mg for 3 days ) as prescribed and follow up with your PCP.      SECONDARY DISCHARGE DIAGNOSES  Diagnosis: Rheumatoid arthritis  Assessment and Plan of Treatment: Patient has history of rheumatoid arthritis, on hydroxychloroquine at home. Please continue on your meds and follow up with PCP.    Diagnosis: Asymptomatic bacteriuria  Assessment and Plan of Treatment: Urinanalysis showed asymptomatic bacteruria. You had one dose of rocephin in ED. You don't have any UTI symptoms.    Diagnosis: Multiple myeloma  Assessment and Plan of Treatment: You have history of multiple myeloma. Please follow up with your doctor at [facility].    Diagnosis: Hypertension  Assessment and Plan of Treatment: You have history of hypertension. You are on metoprolol and amlodipine at home. Please continue on same meds as prescribed and follow up with your PCP.     PRINCIPAL DISCHARGE DIAGNOSIS  Diagnosis: Dysphagia  Assessment and Plan of Treatment: You presented with Dysohagia and left Jaw swelling. You were not able to eat or drink. In ED, You started your IV antibiotics. CT scan of neck soft tissue showed swelling of oropharyngeal and hypopharyngeal probably pharyngitis. ID was consulted for you. Unasyn IV was started and course was completed.  Solumedrol IV was given as well but due to some hallucinations you had overnight, Solumedrol was discontinued. ENT was consulted over the phone and agreed with Antibiotics. Your condition was improved really well and you were able to tolerate your food and passed bowel movement. follow up with your PCP.      SECONDARY DISCHARGE DIAGNOSES  Diagnosis: Rheumatoid arthritis  Assessment and Plan of Treatment: Patient has history of rheumatoid arthritis, on hydroxychloroquine at home. Please continue on your meds and follow up with PCP.    Diagnosis: Asymptomatic bacteriuria  Assessment and Plan of Treatment: Urinanalysis showed asymptomatic bacteruria. You had one dose of rocephin in ED. You don't have any UTI symptoms.    Diagnosis: Multiple myeloma  Assessment and Plan of Treatment: You have history of multiple myeloma. Please [facility] with [facility].    Diagnosis: COVID-19 vaccine series completed  Assessment and Plan of Treatment: You do not have a Coronavirus infection recently and was tested during hospitalization. We have discussed with you and your son and agreed for you to receive one dose Infolinks and Negrito (Zappos) vaccine which was administered 3/29/21 prior to disharge to rehab. Please note thay you may have a low grade fever or bodyache or tiredness after vaccination for a day or two.      Diagnosis: Hypertension  Assessment and Plan of Treatment: You have history of hypertension. You are on metoprolol and amlodipine at home. Please continue on same meds as prescribed and follow up with your PCP.     PRINCIPAL DISCHARGE DIAGNOSIS  Diagnosis: Dysphagia  Assessment and Plan of Treatment: You presented with Dysohagia and left Jaw swelling. You were not able to eat or drink. In ED, You started your IV antibiotics. CT scan of neck soft tissue showed swelling of oropharyngeal and hypopharyngeal probably pharyngitis. ID was consulted for you. Unasyn IV was started and course was completed.  Solumedrol IV was given as well but due to some hallucinations you had overnight, Solumedrol was discontinued. ENT was consulted over the phone and agreed with Antibiotics. Your condition was improved really well and you were able to tolerate your food and passed bowel movement. follow up with your PCP.      SECONDARY DISCHARGE DIAGNOSES  Diagnosis: At risk for constipation  Assessment and Plan of Treatment: You may be at risk of constipation from relative immobility and pain medicationss. Please contine bowel regimen with Senna at bedtime and Miralax as needed if no bowel movements.    Diagnosis: Compression fracture of thoracic vertebra  Assessment and Plan of Treatment: You was noted to have a compression fracture of T12 vertebra. You was seen by PT and recommended DEV. You may paritcipate in PT as tolerated and pain medications as neeeded to control pain and allow ambulation. It may take few weeks to achieve full healing and pain expected to improve in the next couple of weks.    Diagnosis: COVID-19 vaccine series completed  Assessment and Plan of Treatment: You do not have a Coronavirus infection recently and was tested during hospitalization. We have discussed with you and your son and agreed for you to receive one dose Grows Up and Negrito (Faction Skis) vaccine which was administered 3/29/21 prior to disharge to rehab. Please note thay you may have a low grade fever or bodyache or tiredness after vaccination for a day or two.      Diagnosis: Multiple myeloma  Assessment and Plan of Treatment: You have history of multiple myeloma. Please follow up with your doctor at [facility].    Diagnosis: Asymptomatic bacteriuria  Assessment and Plan of Treatment: Urinanalysis showed asymptomatic bacteruria. You had one dose of rocephin in ED. You don't have any UTI symptoms.    Diagnosis: Hypertension  Assessment and Plan of Treatment: You have history of hypertension. You are on metoprolol and amlodipine at home. Please continue on same meds as prescribed and follow up with your PCP.    Diagnosis: Rheumatoid arthritis  Assessment and Plan of Treatment: Patient has history of rheumatoid arthritis, on hydroxychloroquine at home. Please continue on your meds and follow up with PCP.

## 2021-03-26 NOTE — PROGRESS NOTE ADULT - SUBJECTIVE AND OBJECTIVE BOX
81y Female is under our care for     REVIEW OF SYSTEMS:  [  ] Not able to illicit  General:	  Chest:	  GI:	  :  Skin:	  Musculoskeletal:	  Neuro:	    MEDS:  ampicillin/sulbactam  IVPB 1.5 Gram(s) IV Intermittent every 6 hours    ALLERGIES: Allergies    No Known Allergies    Intolerances        VITALS:  Vital Signs Last 24 Hrs  T(C): 36.5 (26 Mar 2021 05:25), Max: 36.9 (25 Mar 2021 14:16)  T(F): 97.7 (26 Mar 2021 05:25), Max: 98.4 (25 Mar 2021 14:16)  HR: 85 (26 Mar 2021 05:25) (85 - 90)  BP: 150/79 (26 Mar 2021 05:25) (116/54 - 150/79)  BP(mean): --  RR: 18 (26 Mar 2021 05:25) (18 - 18)  SpO2: 100% (26 Mar 2021 05:25) (97% - 100%)      PHYSICAL EXAM:  HEENT:  Neck:  Respiratory:  Cardiovascular:  Gastrointestinal:  Extremities:  Skin:  Ortho:  Neuro:    LABS/DIAGNOSTIC TESTS:                        9.4    3.19  )-----------( 190      ( 26 Mar 2021 08:25 )             28.5     WBC Count: 3.19 K/uL (03-26 @ 08:25)  WBC Count: 3.18 K/uL (03-25 @ 07:48)  WBC Count: 3.22 K/uL (03-24 @ 09:13)  WBC Count: 6.61 K/uL (03-23 @ 07:12)  WBC Count: 6.71 K/uL (03-22 @ 07:44)    03-26    142  |  111<H>  |  17  ----------------------------<  130<H>  4.3   |  23  |  1.28    Ca    8.5      26 Mar 2021 08:25  Phos  2.8     03-25  Mg     2.7     03-25    TPro  8.3  /  Alb  2.0<L>  /  TBili  0.3  /  DBili  x   /  AST  19  /  ALT  40  /  AlkPhos  90  03-26      CULTURES:   .Urine Clean Catch (Midstream)  02-26 @ 06:08   >=3 organisms. Probable collection contamination.  --  --        RADIOLOGY:  no new studies 81y Female is under our care for pharyngitis and left neck cellulitis.  Patient was seen laying comfortably in bed with no acute distress.  Patient denies any dysphagia or neck pain, remains AOx3 and answering questions appropriately.    REVIEW OF SYSTEMS:  [  ] Not able to illicit  General: no fevers no malaise  Chest: no cough no sob  GI: no nvd  : no urinary sxs   Skin: no rashes  Musculoskeletal: no trauma, mild LBP    MEDS:  ampicillin/sulbactam  IVPB 1.5 Gram(s) IV Intermittent every 6 hours    ALLERGIES: Allergies    No Known Allergies    Intolerances        VITALS:  Vital Signs Last 24 Hrs  T(C): 36.5 (26 Mar 2021 05:25), Max: 36.9 (25 Mar 2021 14:16)  T(F): 97.7 (26 Mar 2021 05:25), Max: 98.4 (25 Mar 2021 14:16)  HR: 85 (26 Mar 2021 05:25) (85 - 90)  BP: 150/79 (26 Mar 2021 05:25) (116/54 - 150/79)  BP(mean): --  RR: 18 (26 Mar 2021 05:25) (18 - 18)  SpO2: 100% (26 Mar 2021 05:25) (97% - 100%)      PHYSICAL EXAM:  HEENT: dry oral mucosa, pharynx erythema has resolved  Neck: supple no LN's, no neck or jaw tenderness  Respiratory: lungs clear no rales  Cardiovascular: S1 S2 reg no murmurs  Gastrointestinal: +BS with soft, nondistended abdomen; nontender  Extremities: no edema  Skin: no rashes  Ortho: n/a  Neuro: AAO x 3    LABS/DIAGNOSTIC TESTS:                        9.4    3.19  )-----------( 190      ( 26 Mar 2021 08:25 )             28.5     WBC Count: 3.19 K/uL (03-26 @ 08:25)  WBC Count: 3.18 K/uL (03-25 @ 07:48)  WBC Count: 3.22 K/uL (03-24 @ 09:13)  WBC Count: 6.61 K/uL (03-23 @ 07:12)  WBC Count: 6.71 K/uL (03-22 @ 07:44)    03-26    142  |  111<H>  |  17  ----------------------------<  130<H>  4.3   |  23  |  1.28    Ca    8.5      26 Mar 2021 08:25  Phos  2.8     03-25  Mg     2.7     03-25    TPro  8.3  /  Alb  2.0<L>  /  TBili  0.3  /  DBili  x   /  AST  19  /  ALT  40  /  AlkPhos  90  03-26      CULTURES:   .Urine Clean Catch (Midstream)  02-26 @ 06:08   >=3 organisms. Probable collection contamination.  --  --        RADIOLOGY:  no new studies

## 2021-03-26 NOTE — DISCHARGE NOTE PROVIDER - NSDCFUSCHEDAPPT_GEN_ALL_CORE_FT
ANGELO STRATTON ; 04/14/2021 ; ANGELO Tejeda ; 05/18/2021 ; CHERI Lamb ANGELO STRATTON ; 05/18/2021 ; ANGELO Tejeda ; 05/26/2021 ; CHERI Lamb

## 2021-03-26 NOTE — DISCHARGE NOTE PROVIDER - HOSPITAL COURSE
81 year old female from home ambulates w/ walker/cane, has past medical hx of MM, hypertension, RA, bladder incontinence came to ED c/o 2 days of dysphagia and 1 one day of tongue/left jaw swelling. Patient refers not able to eat or drink anything. In ED, CT neck non contrast Thickening of the left oropharyngeal and hypopharyngeal soft tissues. She was Started on Cepacol, First wash for symptomatic management. Patient was placed on IV fluids as well. ID was consulted for her. Unasyn 1.5 gms iv q6 hrs was started for total 3 days Solumedrol IV 40mg was given. Gastroenterologist was consulted. ENT was consulted and recommended to continue on antibiotics as per ID and solumedrol. Patient started to hallucinate so solumedrol was discontinued. Patient condition is improved and patient was able to tolerate her diet and had bowel movements. As per attending, Patient is clinically stable for discharge to rehab.

## 2021-03-26 NOTE — PROGRESS NOTE ADULT - ASSESSMENT
Pharyngitis- improved  Left neck cellulitis- improved  Glossitis - greatly improved    Plan - Cont Unasyn 1.5 gms iv q6 hrs  when ready for discharge will switch to augmentin po  Will need antibiotics for a total of 10 days.                       Pharyngitis- improved  Left neck cellulitis- improved  Glossitis - greatly improved    Plan - Cont Unasyn 1.5 gms iv q6 hrs  Will need antibiotics for a total of 10 days.  If being discharged tomorrow, can go on Augmentin 875 po bid for 3 more days (to complete 10 day course)                       Pharyngitis- improved  Left neck cellulitis- improved  Glossitis - greatly improved    Plan - Cont Unasyn 1.5 gms iv q6 hrs  Will need antibiotics for a total of 10 days.  If being discharged tomorrow, can go on Augmentin 875 po bid for 3 more days (to complete 10 day course)    I agree with above

## 2021-03-27 DIAGNOSIS — Z02.9 ENCOUNTER FOR ADMINISTRATIVE EXAMINATIONS, UNSPECIFIED: ICD-10-CM

## 2021-03-27 PROCEDURE — 99232 SBSQ HOSP IP/OBS MODERATE 35: CPT | Mod: GC

## 2021-03-27 RX ORDER — ENOXAPARIN SODIUM 100 MG/ML
40 INJECTION SUBCUTANEOUS DAILY
Refills: 0 | Status: DISCONTINUED | OUTPATIENT
Start: 2021-03-27 | End: 2021-03-29

## 2021-03-27 RX ORDER — ACETAMINOPHEN 500 MG
1000 TABLET ORAL ONCE
Refills: 0 | Status: COMPLETED | OUTPATIENT
Start: 2021-03-27 | End: 2021-03-27

## 2021-03-27 RX ADMIN — ENOXAPARIN SODIUM 40 MILLIGRAM(S): 100 INJECTION SUBCUTANEOUS at 11:26

## 2021-03-27 RX ADMIN — PANTOPRAZOLE SODIUM 40 MILLIGRAM(S): 20 TABLET, DELAYED RELEASE ORAL at 05:55

## 2021-03-27 RX ADMIN — Medication 3 MILLIGRAM(S): at 23:21

## 2021-03-27 RX ADMIN — AMPICILLIN SODIUM AND SULBACTAM SODIUM 100 GRAM(S): 250; 125 INJECTION, POWDER, FOR SUSPENSION INTRAMUSCULAR; INTRAVENOUS at 11:16

## 2021-03-27 RX ADMIN — BENZOCAINE AND MENTHOL 1 LOZENGE: 5; 1 LIQUID ORAL at 05:55

## 2021-03-27 RX ADMIN — Medication 650 MILLIGRAM(S): at 19:56

## 2021-03-27 RX ADMIN — LIDOCAINE 1 PATCH: 4 CREAM TOPICAL at 05:55

## 2021-03-27 RX ADMIN — Medication 2000 UNIT(S): at 11:29

## 2021-03-27 RX ADMIN — AMPICILLIN SODIUM AND SULBACTAM SODIUM 100 GRAM(S): 250; 125 INJECTION, POWDER, FOR SUSPENSION INTRAMUSCULAR; INTRAVENOUS at 06:39

## 2021-03-27 RX ADMIN — AMPICILLIN SODIUM AND SULBACTAM SODIUM 100 GRAM(S): 250; 125 INJECTION, POWDER, FOR SUSPENSION INTRAMUSCULAR; INTRAVENOUS at 00:06

## 2021-03-27 RX ADMIN — Medication 1000 MILLIGRAM(S): at 13:30

## 2021-03-27 RX ADMIN — AMPICILLIN SODIUM AND SULBACTAM SODIUM 100 GRAM(S): 250; 125 INJECTION, POWDER, FOR SUSPENSION INTRAMUSCULAR; INTRAVENOUS at 17:26

## 2021-03-27 RX ADMIN — Medication 400 MILLIGRAM(S): at 11:16

## 2021-03-27 RX ADMIN — LIDOCAINE 1 PATCH: 4 CREAM TOPICAL at 07:38

## 2021-03-27 RX ADMIN — Medication 650 MILLIGRAM(S): at 07:39

## 2021-03-27 RX ADMIN — LIDOCAINE 1 PATCH: 4 CREAM TOPICAL at 18:25

## 2021-03-27 RX ADMIN — Medication 650 MILLIGRAM(S): at 06:50

## 2021-03-27 RX ADMIN — AMLODIPINE BESYLATE 5 MILLIGRAM(S): 2.5 TABLET ORAL at 05:55

## 2021-03-27 RX ADMIN — AMPICILLIN SODIUM AND SULBACTAM SODIUM 100 GRAM(S): 250; 125 INJECTION, POWDER, FOR SUSPENSION INTRAMUSCULAR; INTRAVENOUS at 23:21

## 2021-03-27 RX ADMIN — Medication 650 MILLIGRAM(S): at 19:18

## 2021-03-27 NOTE — PROGRESS NOTE ADULT - PROBLEM SELECTOR PLAN 4
- Patient w/ hx of MM, last tx in 2020  - Follows up at [facility], has next appointment on April 2021 - Patient with KAJAL on CKD  - Now resolved Cr 1.3, baseline around 1.5-1.6  - Avoid nephrotoxins   - Monitor BMP

## 2021-03-27 NOTE — PROGRESS NOTE ADULT - PROBLEM SELECTOR PLAN 8
IMPROVE VTE Individual Risk Assessment  RISK                                                                Points  [  ] Previous VTE                                                  3  [  ] Thrombophilia                                               2  [  ] Lower limb paralysis                                      2        (unable to hold up >15 seconds)    [  ] Current Cancer                                              2         (within 6 months)  [  ] Immobilization > 24 hrs                                1  [  ] ICU/CCU stay > 24 hours                              1  [  ] Age > 60                                                      1  IMPROVE VTE Score ___2______  DVT ppx: Subq Lovenox  GI ppx: Protonix  Diet: NPO  Electrolytes replaced PRN  Dispo: Home  FULL CODE - Patient has hx of RA on Hydroxychloroquine at home.

## 2021-03-27 NOTE — PROGRESS NOTE ADULT - PROBLEM SELECTOR PLAN 2
- On admission, UA +, no leucocytosis, afebrile, no urinary symptoms   - Patient was treated for UTI on last admission   - No need to start antibiotics for UTI at this time - Patient came to ED c/o dysphagia, swollen tongue  - Esophagitis vs Pharyngitis vs oral infection    - CT neck non contrast Thickening of the left oropharyngeal and hypopharyngeal soft tissues, probably pharyngitis. Recommend follow-up to ensure resolution and to exclude an underlying mass.  -  on Cepacol, First wash   -  on Unasyn 1.5gm q6   -  3/26, Oral exam: visualization of only base of vulva ( Class II Mallampati score )  - on Dysphagia 1 puree diet with nectar consistency ;  - 3/23, Off steroids   - Monitor respiratory status   - GI Dr Quigley consulted and recommended to complete antibiotics  - 3/26, Patient had bowel movement last night  - on Pain meds ( Tylenol ) PRN

## 2021-03-27 NOTE — PROGRESS NOTE ADULT - PROBLEM SELECTOR PLAN 1
- Patient came to ED c/o dysphagia, swollen tongue  - Esophagitis vs Pharyngitis vs oral infection    - CT neck non contrast Thickening of the left oropharyngeal and hypopharyngeal soft tissues, probably pharyngitis. Recommend follow-up to ensure resolution and to exclude an underlying mass.  -  on Cepacol, First wash   -  on Unasyn 1.5gm q6   -  3/26, Oral exam: visualization of only base of vulva ( Class II Mallampati score )  - on Dysphagia 1 puree diet with nectar consistency ;  - 3/23, Off steroids   - Monitor respiratory status   - GI Dr Quigley consulted and recommended to complete antibiotics  - 3/26, Patient had bowel movement last night  - on Pain meds ( Tylenol ) PRN  - If being discharged tomorrow, can go on Augmentin 875 po bid for 3 more days (to complete 10 day total course) CM spoke with Son and obtained choice; BETO send to [facility]; Await Auth  As per , Son would want to send to [facility] for about 10 days only as spouse passed away recently.   Patient will be discharged probably on Monday.

## 2021-03-27 NOTE — PROGRESS NOTE ADULT - PROBLEM SELECTOR PLAN 6
- Patient has history of Hypertension on amlodipine and metoprolol at home   - on Amlodipine 5mg daily with holding parameters   - 3/24, Lopressor discontinued.  - Monitor BP and adjust meds as needed - Patient with chronic anemia   - 3/26, Hg 9.4 around baseline   - Monitor CBC

## 2021-03-27 NOTE — PROGRESS NOTE ADULT - PROBLEM SELECTOR PLAN 7
- Patient has hx of RA on Hydroxychloroquine at home. - Patient has history of Hypertension on amlodipine and metoprolol at home   - on Amlodipine 5mg daily with holding parameters   - 3/24, Lopressor discontinued.  - Monitor BP and adjust meds as needed

## 2021-03-27 NOTE — PROGRESS NOTE ADULT - ATTENDING COMMENTS
Patient seen and examined with housestaff this morning 10.45 AM;  Agree with PGY1 A/P above with editing as needed. My independent assessment, findings on exam, diagnosis and plan of care as listed below. Discussed with Dr. uDpont and PGY3 Dr. VINAYAK Bourgeois    Patient is a 81y old  Female who presents with a chief complaint of Dysphagia and Tongue swelling placed on antibioticsd and steroids developed Hallucination and steroids were discontinued.     INTERVAL HPI/OVERNIGHT EVENTS: patient significantly improved and now at baseline mentation. eating well. Throat pain and discomfort improved. No dysphagia noted    REVIEW OF SYSTEMS: denies SOB, palpitations, chest pain, nausea, vomiting, diarrhea, constipation,     Vital Signs Last 24 Hrs  T(C): 36.9 (27 Mar 2021 14:35), Max: 37.3 (26 Mar 2021 20:11)  T(F): 98.4 (27 Mar 2021 14:35), Max: 99.2 (26 Mar 2021 20:11)  HR: 85 (27 Mar 2021 14:35) (85 - 94)  BP: 144/67 (27 Mar 2021 14:35) (144/67 - 164/74)  RR: 16 (27 Mar 2021 14:35) (16 - 17)  SpO2: 100% (27 Mar 2021 14:35) (99% - 100%)  PHYSICAL EXAM:  GENERAL: NAD; mucoas moist now;   Tongue swelling almost resolved,  no significant erythema in the base of oral cavity  NERVOUS SYSTEM:  Alert & Oriented X 2.5; no new focal deficit  CHEST/LUNG: Clear to auscultation bilaterally; No rales, rhonchi, wheezing, or rubs  HEART: Regular rate and rhythm;   ABDOMEN: Soft, Nontender, Nondistended; Bowel sounds present  EXTREMITIES:  2+ Peripheral Pulses, No clubbing, cyanosis, or edema  SKIN: No rashes or lesions    LABS:                        9.4    3.19  )-----------( 190      ( 26 Mar 2021 08:25 )             28.5   03-26    142  |  111<H>  |  17  ----------------------------<  130<H>  4.3   |  23  |  1.28    Ca    8.5      26 Mar 2021 08:25    TPro  8.3  /  Alb  2.0<L>  /  TBili  0.3  /  DBili  x   /  AST  19  /  ALT  40  /  AlkPhos  90  03-26        A/P:  Dysphagia and Tongue Swelling due to likely pharyngitis or infection of floor of mouth nearly resolved  Hypernatremia likely due to dehydration reswolved  Hypokalemia due to decreased oral intake replaced and normalized  KAJAL on CKD 3 much improved  Hx Multiple myeloma   Anemia of chronic disease  HTN  RA  Back pain from recently diagnosed compression fracture of T12, Osteoporosis and MM    Plan:  Creatinine now at baseline  Cont Unasyn; ID follow up appreciated; Switch to Augmentin for couple of more days on discharge   ENT consult: Follow up outpatient; patient clinically improved  D/C Zyprexa; clinically doing well  No acute SOB. maintaining airway. Monitor closely  Full code   discussed with ; Patient will need DEV placement;   PT follow up appreciated  As per covering CM Patient has Auth to go to [facility] but now no one in admission; so D?C Monday  Spoke with Son Roland at length over phone this evening; reviewed presentation, findings and plan of care  He is also willing for patient to receive the Coronavirus Vaccine    PATIENT IS PLANNED FOR ANY CHEMOTHERAPY IN THE NEXT TWO WEEKS WHILE PATIENT IS IN REHAB.   Rest of the management as per PGY1 above  Patient is medically stable for discharge to Rehab pending Auth; Repeat COVID test Monday morning    Discussed with patient ;   Discussed with housestaff and RN  Left message for Son this morning but could not attend when he called back the floor this afternoon Patient seen and examined with housestaff this morning 10.45 AM;  Agree with PGY1 A/P above with editing as needed. My independent assessment, findings on exam, diagnosis and plan of care as listed below. Discussed with Dr. Dupont and PGY3 Dr. VINAYAK Bourgeois    Patient is a 81y old  Female who presents with a chief complaint of Dysphagia and Tongue swelling placed on antibioticsd and steroids developed Hallucination and steroids were discontinued.     INTERVAL HPI/OVERNIGHT EVENTS: patient significantly improved and now at baseline mentation. eating well. Throat pain and discomfort improved. No dysphagia noted    REVIEW OF SYSTEMS: denies SOB, palpitations, chest pain, nausea, vomiting, diarrhea, constipation,     Vital Signs Last 24 Hrs  T(C): 36.9 (27 Mar 2021 14:35), Max: 37.3 (26 Mar 2021 20:11)  T(F): 98.4 (27 Mar 2021 14:35), Max: 99.2 (26 Mar 2021 20:11)  HR: 85 (27 Mar 2021 14:35) (85 - 94)  BP: 144/67 (27 Mar 2021 14:35) (144/67 - 164/74)  RR: 16 (27 Mar 2021 14:35) (16 - 17)  SpO2: 100% (27 Mar 2021 14:35) (99% - 100%)  PHYSICAL EXAM:  GENERAL: NAD; mucoas moist now;   Tongue swelling almost resolved,  no significant erythema in the base of oral cavity  NERVOUS SYSTEM:  Alert & Oriented X 2.5; no new focal deficit  CHEST/LUNG: Clear to auscultation bilaterally; No rales, rhonchi, wheezing, or rubs  HEART: Regular rate and rhythm;   ABDOMEN: Soft, Nontender, Nondistended; Bowel sounds present  EXTREMITIES:  2+ Peripheral Pulses, No clubbing, cyanosis, or edema  SKIN: No rashes or lesions    LABS:                        9.4    3.19  )-----------( 190      ( 26 Mar 2021 08:25 )             28.5   03-26    142  |  111<H>  |  17  ----------------------------<  130<H>  4.3   |  23  |  1.28    Ca    8.5      26 Mar 2021 08:25    TPro  8.3  /  Alb  2.0<L>  /  TBili  0.3  /  DBili  x   /  AST  19  /  ALT  40  /  AlkPhos  90  03-26        A/P:  Dysphagia and Tongue Swelling due to likely pharyngitis or infection of floor of mouth nearly resolved  Hypernatremia likely due to dehydration reswolved  Hypokalemia due to decreased oral intake replaced and normalized  KAJAL on CKD 3 much improved  Hx Multiple myeloma   Anemia of chronic disease  HTN  RA  Back pain from recently diagnosed compression fracture of T12, Osteoporosis and MM    Plan:  Creatinine now at baseline  Cont Unasyn; ID follow up appreciated; Switch to Augmentin for couple of more days on discharge   ENT consult: Follow up outpatient; patient clinically improved  D/C Zyprexa; clinically doing well  No acute SOB. maintaining airway. Monitor closely  Full code   discussed with ; Patient will need DEV placement;   PT follow up appreciated  As per covering CM Patient has Auth to go to [facility] but now no one in admission; so D?C Monday  Spoke with Son Roland at length over phone this evening; reviewed presentation, findings and plan of care  He is also willing for patient to receive the Coronavirus Vaccine    PATIENT IS NOT PLANNED FOR ANY CHEMOTHERAPY IN THE NEXT TWO WEEKS WHILE PATIENT IS IN REHAB.   Rest of the management as per PGY1 above  Patient is medically stable for discharge to Rehab pending Auth; Repeat COVID test Monday morning    Discussed with patient ;   Discussed with housestaff and RN  Left message for Son this morning but could not attend when he called back the floor this afternoon

## 2021-03-27 NOTE — PROGRESS NOTE ADULT - SUBJECTIVE AND OBJECTIVE BOX
PGY-1 Progress Note discussed with attending    PAGER #: [-----] TILL 5:00 PM  PLEASE CONTACT ON CALL TEAM:  - On Call Team (Please refer to Nicola) FROM 5:00 PM - 8:30PM  - Nightfloat Team FROM 8:30 -7:30 AM    INTERVAL HPI/OVERNIGHT EVENTS: No acute events overnight. Patient is lying comfortably on bed, getting her breakfast. Patient looks upset to me probably due to her  loss.    MEDICATIONS:  acetaminophen   Tablet .. 650 milliGRAM(s) Oral every 6 hours PRN  amLODIPine   Tablet 5 milliGRAM(s) Oral daily  ampicillin/sulbactam  IVPB 1.5 Gram(s) IV Intermittent every 6 hours  benzocaine 15 mG/menthol 3.6 mG (Sugar-Free) Lozenge 1 Lozenge Oral two times a day  cholecalciferol 2000 Unit(s) Oral daily  dextrose 5% + sodium chloride 0.45% with potassium chloride 20 mEq/L 1000 milliLiter(s) IV Continuous <Continuous>  enoxaparin Injectable 40 milliGRAM(s) SubCutaneous daily  lidocaine   Patch 1 Patch Transdermal every 24 hours  melatonin 3 milliGRAM(s) Oral at bedtime  pantoprazole    Tablet 40 milliGRAM(s) Oral before breakfast  polyethylene glycol 3350 17 Gram(s) Oral daily PRN      REVIEW OF SYSTEMS:  CONSTITUTIONAL: No fever, weight loss, or fatigue  RESPIRATORY: No cough, wheezing, chills or hemoptysis; No shortness of breath  CARDIOVASCULAR: No chest pain, palpitations, dizziness, or leg swelling  GASTROINTESTINAL: No abdominal pain. No nausea, vomiting, or hematemesis; No diarrhea or constipation. No melena or hematochezia.  GENITOURINARY: No dysuria or hematuria, urinary frequency  NEUROLOGICAL: No headaches, memory loss, loss of strength, numbness, or tremors  SKIN: No itching, burning, rashes, or lesions     Vital Signs Last 24 Hrs  T(C): 36.9 (27 Mar 2021 05:10), Max: 37.3 (26 Mar 2021 20:11)  T(F): 98.5 (27 Mar 2021 05:10), Max: 99.2 (26 Mar 2021 20:11)  HR: 94 (27 Mar 2021 05:10) (85 - 94)  BP: 164/74 (27 Mar 2021 05:10) (155/71 - 164/74)  BP(mean): --  RR: 16 (27 Mar 2021 05:10) (16 - 18)  SpO2: 99% (27 Mar 2021 05:10) (99% - 100%)    PHYSICAL EXAMINATION:  GENERAL: NAD, AAOx1-2  Oral cavity: tongue swelling markedly improved, tenderness on palpation of left side of neck, no erythema  NERVOUS SYSTEM:  Alert & Oriented X2, Good concentration; no focal deficit  CHEST/LUNG: Clear to auscultation bilaterally; No rales, rhonchi, wheezing, or rubs  HEART: Regular rate and rhythm; No murmurs, rubs, or gallops  ABDOMEN: Soft, Nontender, Nondistended; Bowel sounds present  EXTREMITIES:  2+ Peripheral Pulses, No clubbing, cyanosis, or edema  SKIN: No rashes or lesions                          9.4    3.19  )-----------( 190      ( 26 Mar 2021 08:25 )             28.5     03-26    142  |  111<H>  |  17  ----------------------------<  130<H>  4.3   |  23  |  1.28    Ca    8.5      26 Mar 2021 08:25    TPro  8.3  /  Alb  2.0<L>  /  TBili  0.3  /  DBili  x   /  AST  19  /  ALT  40  /  AlkPhos  90  03-26    LIVER FUNCTIONS - ( 26 Mar 2021 08:25 )  Alb: 2.0 g/dL / Pro: 8.3 g/dL / ALK PHOS: 90 U/L / ALT: 40 U/L DA / AST: 19 U/L / GGT: x                   CAPILLARY BLOOD GLUCOSE      RADIOLOGY & ADDITIONAL TESTS:                   PGY-1 Progress Note discussed with attending    PAGER #: [-----] TILL 5:00 PM  PLEASE CONTACT ON CALL TEAM:  - On Call Team (Please refer to Nicola) FROM 5:00 PM - 8:30PM  - Nightfloat Team FROM 8:30 -7:30 AM    INTERVAL HPI/OVERNIGHT EVENTS: No acute events overnight. Patient is lying comfortably on bed, getting her breakfast. Patient looks upset to me probably due to her  loss.    MEDICATIONS:  acetaminophen   Tablet .. 650 milliGRAM(s) Oral every 6 hours PRN  amLODIPine   Tablet 5 milliGRAM(s) Oral daily  ampicillin/sulbactam  IVPB 1.5 Gram(s) IV Intermittent every 6 hours  benzocaine 15 mG/menthol 3.6 mG (Sugar-Free) Lozenge 1 Lozenge Oral two times a day  cholecalciferol 2000 Unit(s) Oral daily  dextrose 5% + sodium chloride 0.45% with potassium chloride 20 mEq/L 1000 milliLiter(s) IV Continuous <Continuous>  enoxaparin Injectable 40 milliGRAM(s) SubCutaneous daily  lidocaine   Patch 1 Patch Transdermal every 24 hours  melatonin 3 milliGRAM(s) Oral at bedtime  pantoprazole    Tablet 40 milliGRAM(s) Oral before breakfast  polyethylene glycol 3350 17 Gram(s) Oral daily PRN      REVIEW OF SYSTEMS:  CONSTITUTIONAL: No fever, weight loss, or fatigue  RESPIRATORY: No cough, wheezing, chills or hemoptysis; No shortness of breath  CARDIOVASCULAR: No chest pain, palpitations, dizziness, or leg swelling  GASTROINTESTINAL: No abdominal pain. No nausea, vomiting, or hematemesis; No diarrhea or constipation. No melena or hematochezia.  GENITOURINARY: No dysuria or hematuria, urinary frequency  NEUROLOGICAL: No headaches, memory loss, loss of strength, numbness, or tremors  SKIN: No itching, burning, rashes, or lesions     Vital Signs Last 24 Hrs  T(C): 36.9 (27 Mar 2021 05:10), Max: 37.3 (26 Mar 2021 20:11)  T(F): 98.5 (27 Mar 2021 05:10), Max: 99.2 (26 Mar 2021 20:11)  HR: 94 (27 Mar 2021 05:10) (85 - 94)  BP: 164/74 (27 Mar 2021 05:10) (155/71 - 164/74)  BP(mean): --  RR: 16 (27 Mar 2021 05:10) (16 - 18)  SpO2: 99% (27 Mar 2021 05:10) (99% - 100%)    PHYSICAL EXAMINATION:  GENERAL: NAD, AAOx1-2  Oral cavity: tongue swelling markedly improved, No tenderness on palpation , no erythema  NERVOUS SYSTEM:  Alert & Oriented X2, Good concentration; no focal deficit  CHEST/LUNG: Clear to auscultation bilaterally; No rales, rhonchi, wheezing, or rubs  HEART: Regular rate and rhythm; No murmurs, rubs, or gallops  ABDOMEN: Soft, Nontender, Nondistended; Bowel sounds present  EXTREMITIES:  2+ Peripheral Pulses, No clubbing, cyanosis, or edema  SKIN: No rashes or lesions                          9.4    3.19  )-----------( 190      ( 26 Mar 2021 08:25 )             28.5     03-26    142  |  111<H>  |  17  ----------------------------<  130<H>  4.3   |  23  |  1.28    Ca    8.5      26 Mar 2021 08:25    TPro  8.3  /  Alb  2.0<L>  /  TBili  0.3  /  DBili  x   /  AST  19  /  ALT  40  /  AlkPhos  90  03-26    LIVER FUNCTIONS - ( 26 Mar 2021 08:25 )  Alb: 2.0 g/dL / Pro: 8.3 g/dL / ALK PHOS: 90 U/L / ALT: 40 U/L DA / AST: 19 U/L / GGT: x                   CAPILLARY BLOOD GLUCOSE      RADIOLOGY & ADDITIONAL TESTS:

## 2021-03-27 NOTE — PROGRESS NOTE ADULT - PROBLEM SELECTOR PROBLEM 9
Pt brought by ems came from home. Pt was at [address] for f/o for right kidney transplant 2 months about (1/25/2019). Pt came home and started c/o upper quad abd pain. N/v upon ems arrival. 12 lead clear. Hr 80s. bp 190/90. No line. 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 Pt reports blood clots in left upper arm. Ems gave 4mg of oral zofran. Denies cp, c/o left upper, denies sob. Discussed poc. Family at bedside. Pt states to use left lower arm for bp cuff Prophylactic measure

## 2021-03-27 NOTE — PROGRESS NOTE ADULT - PROBLEM SELECTOR PLAN 3
*3/20/20 Amylase, CBC and CMP Test Results    ----- Message from Sohail Santana MD sent at 3/23/2020  8:19 AM CDT -----  Pain not from pancreatitis, await ulcer test   - Patient with KAJAL on CKD  - Now resolved Cr 1.3, baseline around 1.5-1.6  - Avoid nephrotoxins   - Monitor BMP - On admission, UA +, no leucocytosis, afebrile, no urinary symptoms   - Patient was treated for UTI on last admission   - No need to start antibiotics for UTI at this time

## 2021-03-27 NOTE — PROGRESS NOTE ADULT - PROBLEM SELECTOR PLAN 5
- Patient with chronic anemia   - 3/26, Hg 9.4 around baseline   - Monitor CBC - Patient w/ hx of MM, last tx in 2020  - Follows up at [facility], has next appointment on April 2021  - Patient is planning for any chemotherapy in the next 2 weeks.

## 2021-03-28 LAB — SARS-COV-2 RNA SPEC QL NAA+PROBE: SIGNIFICANT CHANGE UP

## 2021-03-28 PROCEDURE — 99232 SBSQ HOSP IP/OBS MODERATE 35: CPT

## 2021-03-28 RX ORDER — KETOROLAC TROMETHAMINE 30 MG/ML
15 SYRINGE (ML) INJECTION ONCE
Refills: 0 | Status: DISCONTINUED | OUTPATIENT
Start: 2021-03-28 | End: 2021-03-28

## 2021-03-28 RX ORDER — TRAMADOL HYDROCHLORIDE 50 MG/1
25 TABLET ORAL EVERY 6 HOURS
Refills: 0 | Status: DISCONTINUED | OUTPATIENT
Start: 2021-03-28 | End: 2021-03-29

## 2021-03-28 RX ORDER — ACETAMINOPHEN 500 MG
1000 TABLET ORAL ONCE
Refills: 0 | Status: DISCONTINUED | OUTPATIENT
Start: 2021-03-28 | End: 2021-03-29

## 2021-03-28 RX ADMIN — AMPICILLIN SODIUM AND SULBACTAM SODIUM 100 GRAM(S): 250; 125 INJECTION, POWDER, FOR SUSPENSION INTRAMUSCULAR; INTRAVENOUS at 23:45

## 2021-03-28 RX ADMIN — Medication 2000 UNIT(S): at 12:51

## 2021-03-28 RX ADMIN — AMPICILLIN SODIUM AND SULBACTAM SODIUM 100 GRAM(S): 250; 125 INJECTION, POWDER, FOR SUSPENSION INTRAMUSCULAR; INTRAVENOUS at 12:50

## 2021-03-28 RX ADMIN — AMLODIPINE BESYLATE 5 MILLIGRAM(S): 2.5 TABLET ORAL at 05:12

## 2021-03-28 RX ADMIN — ENOXAPARIN SODIUM 40 MILLIGRAM(S): 100 INJECTION SUBCUTANEOUS at 12:51

## 2021-03-28 RX ADMIN — TRAMADOL HYDROCHLORIDE 25 MILLIGRAM(S): 50 TABLET ORAL at 21:03

## 2021-03-28 RX ADMIN — AMPICILLIN SODIUM AND SULBACTAM SODIUM 100 GRAM(S): 250; 125 INJECTION, POWDER, FOR SUSPENSION INTRAMUSCULAR; INTRAVENOUS at 17:04

## 2021-03-28 RX ADMIN — Medication 15 MILLIGRAM(S): at 17:00

## 2021-03-28 RX ADMIN — TRAMADOL HYDROCHLORIDE 25 MILLIGRAM(S): 50 TABLET ORAL at 21:38

## 2021-03-28 RX ADMIN — PANTOPRAZOLE SODIUM 40 MILLIGRAM(S): 20 TABLET, DELAYED RELEASE ORAL at 05:12

## 2021-03-28 RX ADMIN — Medication 3 MILLIGRAM(S): at 21:03

## 2021-03-28 RX ADMIN — Medication 15 MILLIGRAM(S): at 18:19

## 2021-03-28 RX ADMIN — LIDOCAINE 1 PATCH: 4 CREAM TOPICAL at 05:11

## 2021-03-28 RX ADMIN — LIDOCAINE 1 PATCH: 4 CREAM TOPICAL at 18:00

## 2021-03-28 RX ADMIN — AMPICILLIN SODIUM AND SULBACTAM SODIUM 100 GRAM(S): 250; 125 INJECTION, POWDER, FOR SUSPENSION INTRAMUSCULAR; INTRAVENOUS at 05:13

## 2021-03-28 RX ADMIN — LIDOCAINE 1 PATCH: 4 CREAM TOPICAL at 07:30

## 2021-03-28 NOTE — PROGRESS NOTE ADULT - SUBJECTIVE AND OBJECTIVE BOX
MEDICAL ATTENDING NOTE: Patient seen and examined earlier this afternoon around 1 PM      Patient is a 81y old  Female who presents with a chief complaint of Dysphagia and Tongue swelling placed on antibioticsd and steroids developed Hallucination and steroids were discontinued.     INTERVAL HPI/OVERNIGHT EVENTS: patient significantly improved and now at baseline mentation. eating well. Throat pain and discomfort improved. No dysphagia noted    REVIEW OF SYSTEMS: denies SOB, palpitations, chest pain, nausea, vomiting, diarrhea, constipation,     INTERVAL HPI/OVERNIGHT EVENTS: no new complaints    MEDICATIONS  (STANDING):  acetaminophen  IVPB .. 1000 milliGRAM(s) IV Intermittent once  amLODIPine   Tablet 5 milliGRAM(s) Oral daily  ampicillin/sulbactam  IVPB 1.5 Gram(s) IV Intermittent every 6 hours  benzocaine 15 mG/menthol 3.6 mG (Sugar-Free) Lozenge 1 Lozenge Oral two times a day  cholecalciferol 2000 Unit(s) Oral daily  dextrose 5% + sodium chloride 0.45% with potassium chloride 20 mEq/L 1000 milliLiter(s) (75 mL/Hr) IV Continuous <Continuous>  enoxaparin Injectable 40 milliGRAM(s) SubCutaneous daily  lidocaine   Patch 1 Patch Transdermal every 24 hours  melatonin 3 milliGRAM(s) Oral at bedtime  pantoprazole    Tablet 40 milliGRAM(s) Oral before breakfast    MEDICATIONS  (PRN):  acetaminophen   Tablet .. 650 milliGRAM(s) Oral every 6 hours PRN Moderate Pain (4 - 6)  polyethylene glycol 3350 17 Gram(s) Oral daily PRN Constipation  traMADol 25 milliGRAM(s) Oral every 6 hours PRN Severe Pain (7 - 10)      __________________________________________________  REVIEW OF SYSTEMS:    CONSTITUTIONAL: No fever,   EYES: no acute visual disturbances  NECK: No pain or stiffness  RESPIRATORY: No cough; No shortness of breath  CARDIOVASCULAR: No chest pain, no palpitations  GASTROINTESTINAL: No pain. No nausea or vomiting; No diarrhea   NEUROLOGICAL: No headache or numbness, no tremors  MUSCULOSKELETAL: No joint pain, no muscle pain  GENITOURINARY: no dysuria, no frequency, no hesitancy  PSYCHIATRY: no depression , no anxiety  ALL OTHER  ROS negative        Vital Signs Last 24 Hrs  T(C): 36.6 (28 Mar 2021 14:30), Max: 36.7 (27 Mar 2021 20:13)  T(F): 97.8 (28 Mar 2021 14:30), Max: 98.1 (27 Mar 2021 20:13)  HR: 85 (28 Mar 2021 14:30) (75 - 85)  BP: 110/64 (28 Mar 2021 14:30) (110/64 - 146/68)  BP(mean): --  RR: 16 (28 Mar 2021 14:30) (16 - 16)  SpO2: 100% (28 Mar 2021 14:30) (99% - 100%)    ________________________________________________  PHYSICAL EXAM:  GENERAL: NAD  HEENT: Normocephalic;  conjunctivae and sclerae clear; moist mucous membranes;   NECK : supple  CHEST/LUNG: Clear to auscultation bilaterally with good air entry   HEART: S1 S2  regular; no murmurs, gallops or rubs  ABDOMEN: Soft, Nontender, Nondistended; Bowel sounds present  EXTREMITIES: no cyanosis; no edema; no calf tenderness  SKIN: warm and dry; no rash  NERVOUS SYSTEM:  Awake and alert; Oriented  to place, person and time ; no new deficits    _________________________________________________  LABS: No new    RADIOLOGY & ADDITIONAL TESTS: no new    Plan of care was discussed with patient and /or primary care giver; all questions and concerns were addressed and care was aligned with patient's wishes.       MEDICAL ATTENDING NOTE: Patient seen and examined earlier this afternoon around 1 PM      Patient is a 81y old  Female who presents with a chief complaint of Dysphagia and Tongue swelling placed on antibioticsd and steroids developed Hallucination and steroids were discontinued.     INTERVAL HPI/OVERNIGHT EVENTS: patient significantly improved and now at baseline mentation. eating well. Throat pain and discomfort improved. No dysphagia noted    REVIEW OF SYSTEMS: denies SOB, palpitations, chest pain, nausea, vomiting, diarrhea, constipation,     INTERVAL HPI/OVERNIGHT EVENTS: Patient OOB to chair; c/o significant pain in back as per RN.     MEDICATIONS  (STANDING):  acetaminophen  IVPB .. 1000 milliGRAM(s) IV Intermittent once  amLODIPine   Tablet 5 milliGRAM(s) Oral daily  ampicillin/sulbactam  IVPB 1.5 Gram(s) IV Intermittent every 6 hours  benzocaine 15 mG/menthol 3.6 mG (Sugar-Free) Lozenge 1 Lozenge Oral two times a day  cholecalciferol 2000 Unit(s) Oral daily  dextrose 5% + sodium chloride 0.45% with potassium chloride 20 mEq/L 1000 milliLiter(s) (75 mL/Hr) IV Continuous <Continuous>  enoxaparin Injectable 40 milliGRAM(s) SubCutaneous daily  lidocaine   Patch 1 Patch Transdermal every 24 hours  melatonin 3 milliGRAM(s) Oral at bedtime  pantoprazole    Tablet 40 milliGRAM(s) Oral before breakfast    MEDICATIONS  (PRN):  acetaminophen   Tablet .. 650 milliGRAM(s) Oral every 6 hours PRN Moderate Pain (4 - 6)  polyethylene glycol 3350 17 Gram(s) Oral daily PRN Constipation  traMADol 25 milliGRAM(s) Oral every 6 hours PRN Severe Pain (7 - 10)      __________________________________________________  REVIEW OF SYSTEMS:    CONSTITUTIONAL: No fever,   RESPIRATORY: No cough; No shortness of breath  CARDIOVASCULAR: No chest pain, no palpitations  GASTROINTESTINAL: No pain. No nausea or vomiting; No diarrhea   NEUROLOGICAL: No headache or numbness, no tremors  MUSCULOSKELETAL: Back pain  GENITOURINARY: no dysuria, no frequency, no hesitancy  PSYCHIATRY: no depression, no anxiety  ALL OTHER  ROS negative        Vital Signs Last 24 Hrs  T(C): 36.6 (28 Mar 2021 14:30), Max: 36.7 (27 Mar 2021 20:13)  T(F): 97.8 (28 Mar 2021 14:30), Max: 98.1 (27 Mar 2021 20:13)  HR: 85 (28 Mar 2021 14:30) (75 - 85)  BP: 110/64 (28 Mar 2021 14:30) (110/64 - 146/68)  RR: 16 (28 Mar 2021 14:30) (16 - 16)  SpO2: 100% (28 Mar 2021 14:30) (99% - 100%)    ________________________________________________    PHYSICAL EXAM:  GENERAL: NAD; mucosa moist now;   Tongue swelling almost resolved,  no significant erythema in the base of oral cavity  NERVOUS SYSTEM:  Alert & Oriented X 2.5 to 3; no new focal deficit  CHEST/LUNG: Clear to auscultation bilaterally; No rales, rhonchi, wheezing, or rubs  HEART: Regular rate and rhythm;   ABDOMEN: Soft, Nontender, Nondistended; Bowel sounds present  EXTREMITIES:  2+ Peripheral Pulses, No clubbing, cyanosis, or edema  SKIN: No rashes or lesions  _________________________________________________  LABS: No new; Repeat AM    COVID-19 PCR . (03.28.21 @ 05:24) COVID-19 PCR: NotDetec: EUA/IVD   RADIOLOGY & ADDITIONAL TESTS: no new    Plan of care was discussed with patient and /or primary care giver; all questions and concerns were addressed and care was aligned with patient's wishes.

## 2021-03-28 NOTE — PROGRESS NOTE ADULT - ATTENDING COMMENTS
PHYSICAL EXAM:  GENERAL: NAD; mucoas moist now;   Tongue swelling almost resolved,  no significant erythema in the base of oral cavity  NERVOUS SYSTEM:  Alert & Oriented X 2.5; no new focal deficit  CHEST/LUNG: Clear to auscultation bilaterally; No rales, rhonchi, wheezing, or rubs  HEART: Regular rate and rhythm;   ABDOMEN: Soft, Nontender, Nondistended; Bowel sounds present  EXTREMITIES:  2+ Peripheral Pulses, No clubbing, cyanosis, or edema  SKIN: No rashes or lesions    LABS:                        9.4    3.19  )-----------( 190      ( 26 Mar 2021 08:25 )             28.5   03-26    142  |  111<H>  |  17  ----------------------------<  130<H>  4.3   |  23  |  1.28    Ca    8.5      26 Mar 2021 08:25    TPro  8.3  /  Alb  2.0<L>  /  TBili  0.3  /  DBili  x   /  AST  19  /  ALT  40  /  AlkPhos  90  03-26        A/P:  Dysphagia and Tongue Swelling due to likely pharyngitis or infection of floor of mouth nearly resolved  Hypernatremia likely due to dehydration reswolved  Hypokalemia due to decreased oral intake replaced and normalized  KAJAL on CKD 3 much improved  Hx Multiple myeloma   Anemia of chronic disease  HTN  RA  Back pain from recently diagnosed compression fracture of T12, Osteoporosis and MM    Plan:  Creatinine now at baseline  Cont Unasyn; ID follow up appreciated; Switch to Augmentin for couple of more days on discharge   ENT consult: Follow up outpatient; patient clinically improved  D/C Zyprexa; clinically doing well  No acute SOB. maintaining airway. Monitor closely  Full code   discussed with ; Patient will need DEV placement;   PT follow up appreciated  As per covering CM Patient has Auth to go to [facility] but now no one in admission; so D?C Monday  Spoke with Son Roland at length over phone this evening; reviewed presentation, findings and plan of care  He is also willing for patient to receive the Coronavirus Vaccine    PATIENT IS NOT PLANNED FOR ANY CHEMOTHERAPY IN THE NEXT TWO WEEKS WHILE PATIENT IS IN REHAB.   Rest of the management as per PGY1 above  Patient is medically stable for discharge to Rehab pending Auth; Repeat COVID test Monday morning    Discussed with patient ;   Discussed with housestaff and RN  Left message for Son this morning but could not attend when he called back the floor this afternoon

## 2021-03-28 NOTE — PROGRESS NOTE ADULT - ASSESSMENT
A/P:  Back pain from recently diagnosed compression fracture of T12, Osteoporosis and MM  Dysphagia and Tongue Swelling due to likely pharyngitis or infection of floor of mouth nearly resolved  Hypernatremia likely due to dehydration resolved  Hypokalemia due to decreased oral intake replaced and normalized  KAJAL on CKD 3 much improved  Hx Multiple myeloma   Anemia of chronic disease  HTN  RA    Plan:  Creatinine now at baseline  Cont Unasyn; ID follow up appreciated; Switch to Augmentin for couple of more days on discharge now ABX will be complete tomorrow.   ENT consult: Follow up outpatient; patient clinically improved  D/C Zyprexa; clinically doing well  No acute SOB. maintaining airway. Monitor closely  Full code   PT follow up appreciated  As per covering CM Patient has Auth to go to [facility] but now no one in admission; so D/C Monday; COVID negative  Spoke with Son Roland yesterday reviewed presentation, findings and plan of care  He is also willing for patient to receive the Coronavirus Vaccine; if patient agrees will administer vaccine prior to discharge  PATIENT IS NOT PLANNED FOR ANY CHEMOTHERAPY IN THE NEXT TWO WEEKS WHILE PATIENT IS IN REHAB.     Patient is medically stable for discharge to Rehab pending Auth;  Discussed with patient ;   Discussed with housestaff and RN

## 2021-03-29 ENCOUNTER — TRANSCRIPTION ENCOUNTER (OUTPATIENT)
Age: 81
End: 2021-03-29

## 2021-03-29 VITALS — OXYGEN SATURATION: 100 % | SYSTOLIC BLOOD PRESSURE: 156 MMHG | DIASTOLIC BLOOD PRESSURE: 65 MMHG | HEART RATE: 86 BPM

## 2021-03-29 PROCEDURE — 99239 HOSP IP/OBS DSCHRG MGMT >30: CPT | Mod: GC

## 2021-03-29 PROCEDURE — 80048 BASIC METABOLIC PNL TOTAL CA: CPT

## 2021-03-29 PROCEDURE — 85025 COMPLETE CBC W/AUTO DIFF WBC: CPT

## 2021-03-29 PROCEDURE — 80061 LIPID PANEL: CPT

## 2021-03-29 PROCEDURE — 82746 ASSAY OF FOLIC ACID SERUM: CPT

## 2021-03-29 PROCEDURE — 84100 ASSAY OF PHOSPHORUS: CPT

## 2021-03-29 PROCEDURE — 86900 BLOOD TYPING SEROLOGIC ABO: CPT

## 2021-03-29 PROCEDURE — 97530 THERAPEUTIC ACTIVITIES: CPT

## 2021-03-29 PROCEDURE — 92610 EVALUATE SWALLOWING FUNCTION: CPT

## 2021-03-29 PROCEDURE — 86769 SARS-COV-2 COVID-19 ANTIBODY: CPT

## 2021-03-29 PROCEDURE — 70490 CT SOFT TISSUE NECK W/O DYE: CPT

## 2021-03-29 PROCEDURE — 82607 VITAMIN B-12: CPT

## 2021-03-29 PROCEDURE — 87635 SARS-COV-2 COVID-19 AMP PRB: CPT

## 2021-03-29 PROCEDURE — 86850 RBC ANTIBODY SCREEN: CPT

## 2021-03-29 PROCEDURE — 97116 GAIT TRAINING THERAPY: CPT

## 2021-03-29 PROCEDURE — 82306 VITAMIN D 25 HYDROXY: CPT

## 2021-03-29 PROCEDURE — 85027 COMPLETE CBC AUTOMATED: CPT

## 2021-03-29 PROCEDURE — 83690 ASSAY OF LIPASE: CPT

## 2021-03-29 PROCEDURE — 0031A: CPT

## 2021-03-29 PROCEDURE — 85730 THROMBOPLASTIN TIME PARTIAL: CPT

## 2021-03-29 PROCEDURE — 85610 PROTHROMBIN TIME: CPT

## 2021-03-29 PROCEDURE — 81001 URINALYSIS AUTO W/SCOPE: CPT

## 2021-03-29 PROCEDURE — 82962 GLUCOSE BLOOD TEST: CPT

## 2021-03-29 PROCEDURE — 83735 ASSAY OF MAGNESIUM: CPT

## 2021-03-29 PROCEDURE — 93005 ELECTROCARDIOGRAM TRACING: CPT

## 2021-03-29 PROCEDURE — 97162 PT EVAL MOD COMPLEX 30 MIN: CPT

## 2021-03-29 PROCEDURE — 71045 X-RAY EXAM CHEST 1 VIEW: CPT

## 2021-03-29 PROCEDURE — 97110 THERAPEUTIC EXERCISES: CPT

## 2021-03-29 PROCEDURE — 99285 EMERGENCY DEPT VISIT HI MDM: CPT

## 2021-03-29 PROCEDURE — 36415 COLL VENOUS BLD VENIPUNCTURE: CPT

## 2021-03-29 PROCEDURE — 80053 COMPREHEN METABOLIC PANEL: CPT

## 2021-03-29 PROCEDURE — 86901 BLOOD TYPING SEROLOGIC RH(D): CPT

## 2021-03-29 RX ORDER — CHOLECALCIFEROL (VITAMIN D3) 125 MCG
2000 CAPSULE ORAL
Qty: 60000 | Refills: 0
Start: 2021-03-29 | End: 2021-04-27

## 2021-03-29 RX ORDER — BENZOCAINE AND MENTHOL 5; 1 G/100ML; G/100ML
1 LIQUID ORAL
Qty: 20 | Refills: 0
Start: 2021-03-29 | End: 2021-04-07

## 2021-03-29 RX ORDER — LANOLIN ALCOHOL/MO/W.PET/CERES
1 CREAM (GRAM) TOPICAL
Qty: 5 | Refills: 0
Start: 2021-03-29 | End: 2021-04-02

## 2021-03-29 RX ORDER — JNJ-78436735 50000000000 [PFU]/.5ML
0.5 SUSPENSION INTRAMUSCULAR ONCE
Refills: 0 | Status: COMPLETED | OUTPATIENT
Start: 2021-03-29 | End: 2021-03-29

## 2021-03-29 RX ORDER — TRAMADOL HYDROCHLORIDE 50 MG/1
0.5 TABLET ORAL
Qty: 30 | Refills: 0
Start: 2021-03-29 | End: 2021-04-12

## 2021-03-29 RX ORDER — ACETAMINOPHEN 500 MG
2 TABLET ORAL
Qty: 0 | Refills: 0 | DISCHARGE
Start: 2021-03-29

## 2021-03-29 RX ADMIN — ENOXAPARIN SODIUM 40 MILLIGRAM(S): 100 INJECTION SUBCUTANEOUS at 11:46

## 2021-03-29 RX ADMIN — AMPICILLIN SODIUM AND SULBACTAM SODIUM 100 GRAM(S): 250; 125 INJECTION, POWDER, FOR SUSPENSION INTRAMUSCULAR; INTRAVENOUS at 11:46

## 2021-03-29 RX ADMIN — Medication 650 MILLIGRAM(S): at 14:37

## 2021-03-29 RX ADMIN — AMPICILLIN SODIUM AND SULBACTAM SODIUM 100 GRAM(S): 250; 125 INJECTION, POWDER, FOR SUSPENSION INTRAMUSCULAR; INTRAVENOUS at 05:10

## 2021-03-29 RX ADMIN — AMLODIPINE BESYLATE 5 MILLIGRAM(S): 2.5 TABLET ORAL at 05:10

## 2021-03-29 RX ADMIN — LIDOCAINE 1 PATCH: 4 CREAM TOPICAL at 05:11

## 2021-03-29 RX ADMIN — PANTOPRAZOLE SODIUM 40 MILLIGRAM(S): 20 TABLET, DELAYED RELEASE ORAL at 05:11

## 2021-03-29 RX ADMIN — LIDOCAINE 1 PATCH: 4 CREAM TOPICAL at 08:17

## 2021-03-29 RX ADMIN — Medication 2000 UNIT(S): at 11:46

## 2021-03-29 RX ADMIN — JNJ-78436735 0.5 MILLILITER(S): 50000000000 SUSPENSION INTRAMUSCULAR at 13:11

## 2021-03-29 NOTE — DISCHARGE NOTE NURSING/CASE MANAGEMENT/SOCIAL WORK - PATIENT PORTAL LINK FT
You can access the FollowMyHealth Patient Portal offered by [facility] by registering at the following website: [email or web address]. By joining [employer]’s FollowMyHealth portal, you will also be able to view your health information using other applications (apps) compatible with our system.

## 2021-03-29 NOTE — PROGRESS NOTE ADULT - SUBJECTIVE AND OBJECTIVE BOX
81y Female is under our care for     REVIEW OF SYSTEMS:  [  ] Not able to illicit  General:	  Chest:	  GI:	  :  Skin:	  Musculoskeletal:	  Neuro:	    MEDS:  ampicillin/sulbactam  IVPB 1.5 Gram(s) IV Intermittent every 6 hours    ALLERGIES: Allergies    No Known Allergies    Intolerances        VITALS:  Vital Signs Last 24 Hrs  T(C): 36.4 (29 Mar 2021 05:08), Max: 36.6 (28 Mar 2021 14:30)  T(F): 97.5 (29 Mar 2021 05:08), Max: 97.8 (28 Mar 2021 14:30)  HR: 80 (29 Mar 2021 05:08) (80 - 87)  BP: 137/70 (29 Mar 2021 05:08) (110/64 - 145/74)  BP(mean): --  RR: 17 (29 Mar 2021 05:08) (16 - 17)  SpO2: 97% (29 Mar 2021 05:08) (97% - 100%)      PHYSICAL EXAM:  HEENT:  Neck:  Respiratory:  Cardiovascular:  Gastrointestinal:  Extremities:  Skin:  Ortho:  Neuro:    LABS/DIAGNOSTIC TESTS:    WBC Count: 3.19 K/uL (03-26 @ 08:25)  WBC Count: 3.18 K/uL (03-25 @ 07:48)            CULTURES:   .Urine Clean Catch (Midstream)  02-26 @ 06:08   >=3 organisms. Probable collection contamination.  --  --        RADIOLOGY:  no new studies 81y Female is under our care for pharyngitis and left neck cellulitis.  Patient was seen laying comfortably in bed with no acute distress.  Patient remains afebrile throughout the weekend, denies any dysphagia or neck tenderness, ate most of her breakfast, and answers all questions appropriately.    REVIEW OF SYSTEMS:  [  ] Not able to illicit  General: no fevers no malaise  Chest: no cough no sob  GI: no nvd  : no urinary sxs   Skin: no rashes  Musculoskeletal: no trauma, mild Lower back pain    MEDS:  ampicillin/sulbactam  IVPB 1.5 Gram(s) IV Intermittent every 6 hours    ALLERGIES: Allergies    No Known Allergies    Intolerances        VITALS:  Vital Signs Last 24 Hrs  T(C): 36.4 (29 Mar 2021 05:08), Max: 36.6 (28 Mar 2021 14:30)  T(F): 97.5 (29 Mar 2021 05:08), Max: 97.8 (28 Mar 2021 14:30)  HR: 80 (29 Mar 2021 05:08) (80 - 87)  BP: 137/70 (29 Mar 2021 05:08) (110/64 - 145/74)  BP(mean): --  RR: 17 (29 Mar 2021 05:08) (16 - 17)  SpO2: 97% (29 Mar 2021 05:08) (97% - 100%)      PHYSICAL EXAM:  HEENT: dry oral mucosa  Neck: supple no LN's, no neck or jaw tenderness  Respiratory: lungs clear no rales  Cardiovascular: S1 S2 reg no murmurs  Gastrointestinal: +BS with soft, nondistended abdomen; nontender  Extremities: no edema  Skin: no rashes  Ortho: n/a  Neuro: AAO x 3    LABS/DIAGNOSTIC TESTS:    WBC Count: 3.19 K/uL (03-26 @ 08:25)  WBC Count: 3.18 K/uL (03-25 @ 07:48)            CULTURES:   .Urine Clean Catch (Midstream)  02-26 @ 06:08   >=3 organisms. Probable collection contamination.  --  --        RADIOLOGY:  no new studies

## 2021-03-29 NOTE — DISCHARGE NOTE NURSING/CASE MANAGEMENT/SOCIAL WORK - NSDCVIVACCINE_GEN_ALL_CORE_FT
Severe acute respiratory syndrome coronavirus 2 (SARS-CoV-2) (Coronavirus disease [COVID-19]) vaccine , 2021/3/29 13:11 , Maria D Hightower (Student, Nursing)

## 2021-03-29 NOTE — PROGRESS NOTE ADULT - PROVIDER SPECIALTY LIST ADULT
Hospitalist
Infectious Disease
Infectious Disease
Gastroenterology
Infectious Disease
Internal Medicine
Infectious Disease
Internal Medicine
Internal Medicine
Gastroenterology
Gastroenterology
Internal Medicine
Internal Medicine
Infectious Disease
Internal Medicine

## 2021-03-29 NOTE — PROGRESS NOTE ADULT - ASSESSMENT
Pharyngitis- improved  Left neck cellulitis- improved  Glossitis - greatly improved    Plan - Cont Unasyn 1.5 gms iv q6 hrs and DC unasyn today after 6PM dose (will have completed a 10 day course)                                 Pharyngitis- improved  Left neck cellulitis- improved  Glossitis - greatly improved    Plan - Cont Unasyn 1.5 gms iv q6 hrs and DC unasyn today after 6PM dose (will have completed a 10 day course)    I agree with above

## 2021-03-29 NOTE — PROGRESS NOTE ADULT - REASON FOR ADMISSION
Dysphagia and Tongue swelling

## 2021-05-18 ENCOUNTER — APPOINTMENT (OUTPATIENT)
Dept: HEMATOLOGY ONCOLOGY | Facility: CLINIC | Age: 81
End: 2021-05-18

## 2021-05-25 ENCOUNTER — OUTPATIENT (OUTPATIENT)
Dept: OUTPATIENT SERVICES | Facility: HOSPITAL | Age: 81
LOS: 1 days | Discharge: HOME | End: 2021-05-25

## 2021-05-25 DIAGNOSIS — C90.00 MULTIPLE MYELOMA NOT HAVING ACHIEVED REMISSION: ICD-10-CM

## 2021-05-25 PROBLEM — I10 ESSENTIAL (PRIMARY) HYPERTENSION: Chronic | Status: ACTIVE | Noted: 2021-03-20

## 2021-05-26 ENCOUNTER — APPOINTMENT (OUTPATIENT)
Dept: HEMATOLOGY ONCOLOGY | Facility: CLINIC | Age: 81
End: 2021-05-26
Payer: MEDICARE

## 2021-05-26 ENCOUNTER — RESULT REVIEW (OUTPATIENT)
Age: 81
End: 2021-05-26

## 2021-05-26 VITALS
WEIGHT: 135.14 LBS | TEMPERATURE: 96.8 F | OXYGEN SATURATION: 96 % | DIASTOLIC BLOOD PRESSURE: 76 MMHG | BODY MASS INDEX: 26.53 KG/M2 | RESPIRATION RATE: 16 BRPM | SYSTOLIC BLOOD PRESSURE: 155 MMHG | HEART RATE: 77 BPM

## 2021-05-26 LAB
BASOPHILS # BLD AUTO: 0.02 K/UL — SIGNIFICANT CHANGE UP (ref 0–0.2)
BASOPHILS NFR BLD AUTO: 0.6 % — SIGNIFICANT CHANGE UP (ref 0–2)
EOSINOPHIL # BLD AUTO: 0.02 K/UL — SIGNIFICANT CHANGE UP (ref 0–0.5)
EOSINOPHIL NFR BLD AUTO: 0.6 % — SIGNIFICANT CHANGE UP (ref 0–6)
HCT VFR BLD CALC: 26.8 % — LOW (ref 34.5–45)
HGB BLD-MCNC: 9.1 G/DL — LOW (ref 11.5–15.5)
IMM GRANULOCYTES NFR BLD AUTO: 0.3 % — SIGNIFICANT CHANGE UP (ref 0–1.5)
LYMPHOCYTES # BLD AUTO: 1.5 K/UL — SIGNIFICANT CHANGE UP (ref 1–3.3)
LYMPHOCYTES # BLD AUTO: 41.7 % — SIGNIFICANT CHANGE UP (ref 13–44)
MCHC RBC-ENTMCNC: 29.8 PG — SIGNIFICANT CHANGE UP (ref 27–34)
MCHC RBC-ENTMCNC: 34 G/DL — SIGNIFICANT CHANGE UP (ref 32–36)
MCV RBC AUTO: 87.9 FL — SIGNIFICANT CHANGE UP (ref 80–100)
MONOCYTES # BLD AUTO: 0.24 K/UL — SIGNIFICANT CHANGE UP (ref 0–0.9)
MONOCYTES NFR BLD AUTO: 6.7 % — SIGNIFICANT CHANGE UP (ref 2–14)
NEUTROPHILS # BLD AUTO: 1.81 K/UL — SIGNIFICANT CHANGE UP (ref 1.8–7.4)
NEUTROPHILS NFR BLD AUTO: 50.1 % — SIGNIFICANT CHANGE UP (ref 43–77)
NRBC # BLD: 0 /100 WBCS — SIGNIFICANT CHANGE UP (ref 0–0)
PLATELET # BLD AUTO: 251 K/UL — SIGNIFICANT CHANGE UP (ref 150–400)
RBC # BLD: 3.05 M/UL — LOW (ref 3.8–5.2)
RBC # FLD: 15.9 % — HIGH (ref 10.3–14.5)
WBC # BLD: 3.6 K/UL — LOW (ref 3.8–10.5)
WBC # FLD AUTO: 3.6 K/UL — LOW (ref 3.8–10.5)

## 2021-05-26 PROCEDURE — 99214 OFFICE O/P EST MOD 30 MIN: CPT

## 2021-05-27 LAB
ALBUMIN SERPL ELPH-MCNC: 3.1 G/DL
ALP BLD-CCNC: 92 U/L
ALT SERPL-CCNC: 6 U/L
ANION GAP SERPL CALC-SCNC: 11 MMOL/L
AST SERPL-CCNC: 11 U/L
B2 MICROGLOB SERPL-MCNC: 6 MG/L
BILIRUB SERPL-MCNC: 0.3 MG/DL
BUN SERPL-MCNC: 28 MG/DL
CALCIUM SERPL-MCNC: 9.4 MG/DL
CHLORIDE SERPL-SCNC: 105 MMOL/L
CO2 SERPL-SCNC: 20 MMOL/L
CREAT SERPL-MCNC: 1.76 MG/DL
DEPRECATED KAPPA LC FREE/LAMBDA SER: 180.66 RATIO
GLUCOSE SERPL-MCNC: 114 MG/DL
KAPPA LC CSF-MCNC: 0.77 MG/DL
KAPPA LC SERPL-MCNC: 139.11 MG/DL
LDH SERPL-CCNC: 127 U/L
MAGNESIUM SERPL-MCNC: 2.3 MG/DL
POTASSIUM SERPL-SCNC: 4.5 MMOL/L
PROT SERPL-MCNC: 9.5 G/DL
SODIUM SERPL-SCNC: 136 MMOL/L

## 2021-05-29 NOTE — PHYSICAL EXAM
[Ambulatory and capable of all self care but unable to carry out any work activities] : Status 2- Ambulatory and capable of all self care but unable to carry out any work activities. Up and about more than 50% of waking hours [Normal] : affect appropriate [de-identified] : Ambulates with cane...changes c/w OA

## 2021-05-29 NOTE — REVIEW OF SYSTEMS
[Fatigue] : fatigue [Joint Pain] : joint pain [Joint Stiffness] : joint stiffness [Negative] : Allergic/Immunologic [Fever] : no fever [Chills] : no chills [Cough] : no cough [Abdominal Pain] : no abdominal pain [Vomiting] : no vomiting [FreeTextEntry5] : bilateral LE varicose veins [FreeTextEntry9] : stable generalized arthritic pain; stiffness of hands bilaterally; ambulates with cane; hip pain; spinal disc issues

## 2021-05-29 NOTE — ASSESSMENT
[Supportive] : Goals of care discussed with patient: Supportive [FreeTextEntry1] : Ms. James is an 81 year old female with history of MM, RA, HTN, TIA. s/p treatment as per HPI...was  on observation...IGG 3370 (8/15/17). 2018 to 2020 treated again with VD.\par \par Peripheral blood work reviewed and discussed with patient. hgb down.., m spikevand IgG rising\par \par Labs sent for CMP, LDH, Mg, Beta2-microglobulin, SPEP, SFLC, Immunofixation, Quantitative Immunoglobulins. \par was on  Velcade 1mg/m2 with 20 mg Dex (3 weeks on; 1 week off).  Given stability of disease and significant neuropathy..held  treatment...Rationale, benefits, risk and side effects were discussed at length. Patient and daughter verbalized understanding and consented to holding treatment and observing at prior visit...will cont to monitor..I suspect further POD with systemic issues....discussed pomalyst at 2 mg qd for 21 days and 7 days off...goals of care discussed...supportive at this time...restart zometa....will call pt with plan next week after lab results back\par Plan for patient to bring in 24-hour urine in Fall 2020.\par Reviewed skeletal survey discussed with patient. Will repeat scan now\par Continue remaining medications as prescribed. \par Well care stressed, question addressed. \par Stressed regular follow up with Rheum, Nephro and PMD. \par Patient advised to contact immediately with any concerns or symptoms.\par RTC for f/u with Dr. Rosales in 6 to 8 weeks or sooner if IgG is continuing to rise...\par consider xrt eval for pain control with new compression fx...suspect related to MM ..will restart zometa 2 mg every 2 to 3  months

## 2021-05-29 NOTE — HISTORY OF PRESENT ILLNESS
[de-identified] : Multiple Myeloma s/p Thal/ Dex 2008 to 2010.....TIA in 2009.....Velcade 2012 to 2013...treatment held due to neuropathy..resumed b/c of POD...tolerating thus far [de-identified] : Patient presents for a follow-up visit.  She is ambulating with a cane. Her son is with her...She was hospitalized and recently discharged from rehab s/p infectious complications. New vertebral compression fx...still with painShe reports a healthy appetite. She notes varicose veins bilaterally in her LE and generalized arthritic pain which is stable. She notes some stiffness in hands bilaterally.  She states she followed up with neurology for issues with her discs. She states her hip pain is stable. She off  Velcade three weeks on and 1 week off with Decadron. Denies fever/chills, night sweats, mouth sores, eye dryness, blurred vision, nausea, vomiting, diarrhea. No CP, SOB or LE edema.

## 2021-06-04 LAB
ALBUMIN MFR SERPL ELPH: 36 %
ALBUMIN SERPL-MCNC: 3.4 G/DL
ALBUMIN/GLOB SERPL: 0.6 RATIO
ALPHA1 GLOB MFR SERPL ELPH: 3.5 %
ALPHA1 GLOB SERPL ELPH-MCNC: 0.3 G/DL
ALPHA2 GLOB MFR SERPL ELPH: 6.7 %
ALPHA2 GLOB SERPL ELPH-MCNC: 0.6 G/DL
B-GLOBULIN MFR SERPL ELPH: 50.3 %
B-GLOBULIN SERPL ELPH-MCNC: 4.8 G/DL
DEPRECATED KAPPA LC FREE/LAMBDA SER: 180.66 RATIO
GAMMA GLOB FLD ELPH-MCNC: 0.3 G/DL
GAMMA GLOB MFR SERPL ELPH: 3.5 %
IGA SER QL IEP: 9 MG/DL
IGG SER QL IEP: 5976 MG/DL
IGM SER QL IEP: 24 MG/DL
INTERPRETATION SERPL IEP-IMP: NORMAL
KAPPA LC CSF-MCNC: 0.77 MG/DL
KAPPA LC SERPL-MCNC: 139.11 MG/DL
M PROTEIN MFR SERPL ELPH: 42.7 %
M PROTEIN SPEC IFE-MCNC: NORMAL
MONOCLON BAND OBS SERPL: 4.1 G/DL
PROT SERPL-MCNC: 9.5 G/DL
PROT SERPL-MCNC: 9.5 G/DL

## 2021-06-07 ENCOUNTER — APPOINTMENT (OUTPATIENT)
Dept: INFUSION THERAPY | Facility: HOSPITAL | Age: 81
End: 2021-06-07

## 2021-06-14 DIAGNOSIS — Z51.11 ENCOUNTER FOR ANTINEOPLASTIC CHEMOTHERAPY: ICD-10-CM

## 2021-06-24 ENCOUNTER — LABORATORY RESULT (OUTPATIENT)
Age: 81
End: 2021-06-24

## 2021-06-24 ENCOUNTER — RESULT REVIEW (OUTPATIENT)
Age: 81
End: 2021-06-24

## 2021-06-24 ENCOUNTER — APPOINTMENT (OUTPATIENT)
Dept: HEMATOLOGY ONCOLOGY | Facility: CLINIC | Age: 81
End: 2021-06-24
Payer: MEDICARE

## 2021-06-24 VITALS
DIASTOLIC BLOOD PRESSURE: 76 MMHG | SYSTOLIC BLOOD PRESSURE: 156 MMHG | WEIGHT: 132.28 LBS | OXYGEN SATURATION: 95 % | BODY MASS INDEX: 25.97 KG/M2 | TEMPERATURE: 96.6 F | RESPIRATION RATE: 16 BRPM | HEART RATE: 88 BPM

## 2021-06-24 LAB
ALBUMIN SERPL ELPH-MCNC: 3.2 G/DL
ALP BLD-CCNC: 115 U/L
ALT SERPL-CCNC: 8 U/L
ANION GAP SERPL CALC-SCNC: 11 MMOL/L
AST SERPL-CCNC: 9 U/L
B2 MICROGLOB SERPL-MCNC: 5.7 MG/L
BASOPHILS # BLD AUTO: 0.02 K/UL — SIGNIFICANT CHANGE UP (ref 0–0.2)
BASOPHILS NFR BLD AUTO: 0.2 % — SIGNIFICANT CHANGE UP (ref 0–2)
BILIRUB SERPL-MCNC: 0.5 MG/DL
BUN SERPL-MCNC: 25 MG/DL
CALCIUM SERPL-MCNC: 8.7 MG/DL
CHLORIDE SERPL-SCNC: 106 MMOL/L
CO2 SERPL-SCNC: 16 MMOL/L
CREAT SERPL-MCNC: 1.23 MG/DL
EOSINOPHIL # BLD AUTO: 0.01 K/UL — SIGNIFICANT CHANGE UP (ref 0–0.5)
EOSINOPHIL NFR BLD AUTO: 0.1 % — SIGNIFICANT CHANGE UP (ref 0–6)
GLUCOSE SERPL-MCNC: 94 MG/DL
HCT VFR BLD CALC: 26.7 % — LOW (ref 34.5–45)
HGB BLD-MCNC: 8.8 G/DL — LOW (ref 11.5–15.5)
IMM GRANULOCYTES NFR BLD AUTO: 0.6 % — SIGNIFICANT CHANGE UP (ref 0–1.5)
LDH SERPL-CCNC: 131 U/L
LYMPHOCYTES # BLD AUTO: 0.25 K/UL — LOW (ref 1–3.3)
LYMPHOCYTES # BLD AUTO: 3.1 % — LOW (ref 13–44)
MAGNESIUM SERPL-MCNC: 2.1 MG/DL
MCHC RBC-ENTMCNC: 28.7 PG — SIGNIFICANT CHANGE UP (ref 27–34)
MCHC RBC-ENTMCNC: 33 G/DL — SIGNIFICANT CHANGE UP (ref 32–36)
MCV RBC AUTO: 87 FL — SIGNIFICANT CHANGE UP (ref 80–100)
MONOCYTES # BLD AUTO: 0.07 K/UL — SIGNIFICANT CHANGE UP (ref 0–0.9)
MONOCYTES NFR BLD AUTO: 0.9 % — LOW (ref 2–14)
NEUTROPHILS # BLD AUTO: 7.71 K/UL — HIGH (ref 1.8–7.4)
NEUTROPHILS NFR BLD AUTO: 95.1 % — HIGH (ref 43–77)
NRBC # BLD: 0 /100 WBCS — SIGNIFICANT CHANGE UP (ref 0–0)
PLATELET # BLD AUTO: 243 K/UL — SIGNIFICANT CHANGE UP (ref 150–400)
POTASSIUM SERPL-SCNC: 4.7 MMOL/L
PROT SERPL-MCNC: 9.5 G/DL
RBC # BLD: 3.07 M/UL — LOW (ref 3.8–5.2)
RBC # FLD: 15.9 % — HIGH (ref 10.3–14.5)
SODIUM SERPL-SCNC: 133 MMOL/L
WBC # BLD: 8.11 K/UL — SIGNIFICANT CHANGE UP (ref 3.8–10.5)
WBC # FLD AUTO: 8.11 K/UL — SIGNIFICANT CHANGE UP (ref 3.8–10.5)

## 2021-06-24 PROCEDURE — 99213 OFFICE O/P EST LOW 20 MIN: CPT

## 2021-06-29 NOTE — HISTORY OF PRESENT ILLNESS
[de-identified] : Multiple Myeloma s/p Thal/ Dex 2008 to 2010.....TIA in 2009.....Velcade 2012 to 2013...treatment held due to neuropathy..resumed b/c of POD...tolerating thus far [de-identified] : Patient presents for a follow-up visit.  She is ambulating with a cane. Her son is with her...She was hospitalized and recently discharged from rehab s/p infectious complications. New vertebral compression fx...still with painShe reports a healthy appetite. She notes varicose veins bilaterally in her LE and generalized arthritic pain which is stable. She notes some stiffness in hands bilaterally.  She states she followed up with neurology for issues with her discs. She states her hip pain is stable. She off  Velcade three weeks on and 1 week off with Decadron. Denies fever/chills, night sweats, mouth sores, eye dryness, blurred vision, nausea, vomiting, diarrhea. No CP, SOB or LE edema. \par \par On 6/24/21, patient presents for a follow up visit. Overall patient is well and offers no acute concerns. Has generalized arthritic pain which is stable. Denies fever, chills, nausea, vomiting, diarrhea, rash, mouth sores, dysuria or any signs of active bleeding. Denies SOB, chest pain or B/L LE edema. Daughter and son on telephone during today's visit.

## 2021-06-29 NOTE — ASSESSMENT
[Supportive] : Goals of care discussed with patient: Supportive [FreeTextEntry1] : Ms. James is an 81 year old female with history of MM, RA, HTN, TIA. s/p treatment as per HPI...was  on observation...IGG 3370 (8/15/17). 2018 to 2020 treated again with VD.\par \par 1) MM\par Patient was on velcade 3 weeks on, 1 week off. Treatment was held and then discontinued. \par Discussed pomalyst 2 mg every other day on 6/29/21. \par Pomalyst application filled out and patient agrees to start pomalyst 2 mg every other day. Side effects explained to patient and family. \par Daughter and son on telephone and agree with plan of care. \par Continue zometa every 3 months. Zometa 2 mg every 3  months\par Continue Decadron 8 mg every Monday and Thursday\par Patient will take aspirin 81 mg daily\par \par 2) Heme\par Anemia. No indication for transfusion.\par CBC: WBC 8.11  H/H 8.8/26.7    ANC 7.71\par Prescribed folic acid 1 mg daily on 6/24/21\par \par 3) HTN\par Continue amlodipine 5 mg daily\par \par 4) Other\par Continue gabapentin 200 mg BID\par Tylenol prn for pain\par Questions and concerns addressed. Reassurance provided.\par \par 5) Plan\par Patient will start pomalyst 2 mg every other day\par Follow up with ASHLYN Carter in one month\par Follow up with Dr Rosales in two months \par Patient advised to contact immediately with any concerns or symptoms.\par \par

## 2021-06-29 NOTE — REVIEW OF SYSTEMS
[Joint Pain] : joint pain [Joint Stiffness] : joint stiffness [Negative] : Allergic/Immunologic [Fever] : no fever [Chills] : no chills [Night Sweats] : no night sweats [Fatigue] : no fatigue [Recent Change In Weight] : ~T no recent weight change [Cough] : no cough [Abdominal Pain] : no abdominal pain [Vomiting] : no vomiting [FreeTextEntry5] : bilateral LE varicose veins [FreeTextEntry9] : stable generalized arthritic pain

## 2021-06-29 NOTE — PHYSICAL EXAM
[Ambulatory and capable of all self care but unable to carry out any work activities] : Status 2- Ambulatory and capable of all self care but unable to carry out any work activities. Up and about more than 50% of waking hours [Normal] : affect appropriate [de-identified] : Ambulates with cane...changes c/w OA

## 2021-06-30 LAB
ALBUMIN MFR SERPL ELPH: 32.5 %
ALBUMIN SERPL-MCNC: 3.1 G/DL
ALBUMIN/GLOB SERPL: 0.5 RATIO
ALPHA1 GLOB MFR SERPL ELPH: 4.4 %
ALPHA1 GLOB SERPL ELPH-MCNC: 0.4 G/DL
ALPHA2 GLOB MFR SERPL ELPH: 7.4 %
ALPHA2 GLOB SERPL ELPH-MCNC: 0.7 G/DL
B-GLOBULIN MFR SERPL ELPH: 10.4 %
B-GLOBULIN SERPL ELPH-MCNC: 1 G/DL
DEPRECATED KAPPA LC FREE/LAMBDA SER: 121.67 RATIO
GAMMA GLOB FLD ELPH-MCNC: 4.3 G/DL
GAMMA GLOB MFR SERPL ELPH: 45.3 %
IGA SER QL IEP: 9 MG/DL
IGG SER QL IEP: 5123 MG/DL
IGM SER QL IEP: 21 MG/DL
INTERPRETATION SERPL IEP-IMP: NORMAL
KAPPA LC CSF-MCNC: 0.82 MG/DL
KAPPA LC SERPL-MCNC: 99.77 MG/DL
M PROTEIN MFR SERPL ELPH: 41.9 %
M PROTEIN SPEC IFE-MCNC: NORMAL
MONOCLON BAND OBS SERPL: 4 G/DL
PROT SERPL-MCNC: 9.5 G/DL
PROT SERPL-MCNC: 9.5 G/DL

## 2021-07-19 ENCOUNTER — OUTPATIENT (OUTPATIENT)
Dept: OUTPATIENT SERVICES | Facility: HOSPITAL | Age: 81
LOS: 1 days | Discharge: HOME | End: 2021-07-19

## 2021-07-19 DIAGNOSIS — C90.00 MULTIPLE MYELOMA NOT HAVING ACHIEVED REMISSION: ICD-10-CM

## 2021-07-21 ENCOUNTER — APPOINTMENT (OUTPATIENT)
Dept: HEMATOLOGY ONCOLOGY | Facility: CLINIC | Age: 81
End: 2021-07-21

## 2021-07-24 LAB
ALBUMIN SERPL ELPH-MCNC: 2.8 G/DL
ALP BLD-CCNC: 102 U/L
ALT SERPL-CCNC: 6 U/L
ANION GAP SERPL CALC-SCNC: 12 MMOL/L
AST SERPL-CCNC: 13 U/L
B2 MICROGLOB SERPL-MCNC: 6.4 MG/L
BASOPHILS # BLD AUTO: 0.02 K/UL
BASOPHILS NFR BLD AUTO: 0.6 %
BILIRUB SERPL-MCNC: 0.2 MG/DL
BUN SERPL-MCNC: 25 MG/DL
CALCIUM SERPL-MCNC: 8.2 MG/DL
CHLORIDE SERPL-SCNC: 108 MMOL/L
CO2 SERPL-SCNC: 16 MMOL/L
CREAT SERPL-MCNC: 1.19 MG/DL
DEPRECATED KAPPA LC FREE/LAMBDA SER: 76.32 RATIO
EOSINOPHIL # BLD AUTO: 0.02 K/UL
EOSINOPHIL NFR BLD AUTO: 0.6 %
GLUCOSE SERPL-MCNC: 144 MG/DL
HCT VFR BLD CALC: 26.5 %
HGB BLD-MCNC: 8.4 G/DL
IMM GRANULOCYTES NFR BLD AUTO: 0.3 %
KAPPA LC CSF-MCNC: 1.11 MG/DL
KAPPA LC SERPL-MCNC: 84.71 MG/DL
LDH SERPL-CCNC: 177 U/L
LYMPHOCYTES # BLD AUTO: 0.98 K/UL
LYMPHOCYTES NFR BLD AUTO: 29.3 %
MAGNESIUM SERPL-MCNC: 2.2 MG/DL
MAN DIFF?: NORMAL
MCHC RBC-ENTMCNC: 27.6 PG
MCHC RBC-ENTMCNC: 31.7 GM/DL
MCV RBC AUTO: 87.2 FL
MONOCYTES # BLD AUTO: 0.12 K/UL
MONOCYTES NFR BLD AUTO: 3.6 %
NEUTROPHILS # BLD AUTO: 2.2 K/UL
NEUTROPHILS NFR BLD AUTO: 65.6 %
PLATELET # BLD AUTO: 256 K/UL
POTASSIUM SERPL-SCNC: 4.7 MMOL/L
PROT SERPL-MCNC: 8.1 G/DL
RBC # BLD: 3.04 M/UL
RBC # FLD: 15.5 %
SODIUM SERPL-SCNC: 136 MMOL/L
WBC # FLD AUTO: 3.35 K/UL

## 2021-07-29 LAB
ALBUMIN MFR SERPL ELPH: 33.6 %
ALBUMIN SERPL-MCNC: 2.7 G/DL
ALBUMIN/GLOB SERPL: 0.5 RATIO
ALPHA1 GLOB MFR SERPL ELPH: 4.2 %
ALPHA1 GLOB SERPL ELPH-MCNC: 0.3 G/DL
ALPHA2 GLOB MFR SERPL ELPH: 8.7 %
ALPHA2 GLOB SERPL ELPH-MCNC: 0.7 G/DL
B-GLOBULIN MFR SERPL ELPH: 6.7 %
B-GLOBULIN SERPL ELPH-MCNC: 0.5 G/DL
DEPRECATED KAPPA LC FREE/LAMBDA SER: 76.32 RATIO
GAMMA GLOB FLD ELPH-MCNC: 3.8 G/DL
GAMMA GLOB MFR SERPL ELPH: 46.8 %
IGA SER QL IEP: 10 MG/DL
IGG SER QL IEP: 4140 MG/DL
IGM SER QL IEP: 25 MG/DL
INTERPRETATION SERPL IEP-IMP: NORMAL
KAPPA LC CSF-MCNC: 1.11 MG/DL
KAPPA LC SERPL-MCNC: 84.71 MG/DL
M PROTEIN MFR SERPL ELPH: 43 %
M PROTEIN SPEC IFE-MCNC: NORMAL
MONOCLON BAND OBS SERPL: 3.5 G/DL
PROT SERPL-MCNC: 8.1 G/DL
PROT SERPL-MCNC: 8.1 G/DL

## 2021-08-04 ENCOUNTER — RESULT REVIEW (OUTPATIENT)
Age: 81
End: 2021-08-04

## 2021-08-04 ENCOUNTER — APPOINTMENT (OUTPATIENT)
Dept: HEMATOLOGY ONCOLOGY | Facility: CLINIC | Age: 81
End: 2021-08-04

## 2021-08-04 ENCOUNTER — OUTPATIENT (OUTPATIENT)
Dept: OUTPATIENT SERVICES | Facility: HOSPITAL | Age: 81
LOS: 1 days | End: 2021-08-04
Payer: MEDICARE

## 2021-08-04 DIAGNOSIS — C90.00 MULTIPLE MYELOMA NOT HAVING ACHIEVED REMISSION: ICD-10-CM

## 2021-08-04 LAB
BASOPHILS # BLD AUTO: 0.01 K/UL — SIGNIFICANT CHANGE UP (ref 0–0.2)
BASOPHILS NFR BLD AUTO: 0.3 % — SIGNIFICANT CHANGE UP (ref 0–2)
EOSINOPHIL # BLD AUTO: 0.03 K/UL — SIGNIFICANT CHANGE UP (ref 0–0.5)
EOSINOPHIL NFR BLD AUTO: 0.9 % — SIGNIFICANT CHANGE UP (ref 0–6)
HCT VFR BLD CALC: 29.2 % — LOW (ref 34.5–45)
HGB BLD-MCNC: 9.5 G/DL — LOW (ref 11.5–15.5)
IMM GRANULOCYTES NFR BLD AUTO: 0.6 % — SIGNIFICANT CHANGE UP (ref 0–1.5)
LYMPHOCYTES # BLD AUTO: 1.29 K/UL — SIGNIFICANT CHANGE UP (ref 1–3.3)
LYMPHOCYTES # BLD AUTO: 38.2 % — SIGNIFICANT CHANGE UP (ref 13–44)
MCHC RBC-ENTMCNC: 27.9 PG — SIGNIFICANT CHANGE UP (ref 27–34)
MCHC RBC-ENTMCNC: 32.5 G/DL — SIGNIFICANT CHANGE UP (ref 32–36)
MCV RBC AUTO: 85.9 FL — SIGNIFICANT CHANGE UP (ref 80–100)
MONOCYTES # BLD AUTO: 0.15 K/UL — SIGNIFICANT CHANGE UP (ref 0–0.9)
MONOCYTES NFR BLD AUTO: 4.4 % — SIGNIFICANT CHANGE UP (ref 2–14)
NEUTROPHILS # BLD AUTO: 1.88 K/UL — SIGNIFICANT CHANGE UP (ref 1.8–7.4)
NEUTROPHILS NFR BLD AUTO: 55.6 % — SIGNIFICANT CHANGE UP (ref 43–77)
NRBC # BLD: 0 /100 WBCS — SIGNIFICANT CHANGE UP (ref 0–0)
PLATELET # BLD AUTO: 234 K/UL — SIGNIFICANT CHANGE UP (ref 150–400)
RBC # BLD: 3.4 M/UL — LOW (ref 3.8–5.2)
RBC # FLD: 15.9 % — HIGH (ref 10.3–14.5)
WBC # BLD: 3.38 K/UL — LOW (ref 3.8–10.5)
WBC # FLD AUTO: 3.38 K/UL — LOW (ref 3.8–10.5)

## 2021-08-04 PROCEDURE — 86850 RBC ANTIBODY SCREEN: CPT

## 2021-08-04 PROCEDURE — 86923 COMPATIBILITY TEST ELECTRIC: CPT

## 2021-08-04 PROCEDURE — 86901 BLOOD TYPING SEROLOGIC RH(D): CPT

## 2021-08-04 PROCEDURE — 86900 BLOOD TYPING SEROLOGIC ABO: CPT

## 2021-08-05 ENCOUNTER — APPOINTMENT (OUTPATIENT)
Dept: INFUSION THERAPY | Facility: HOSPITAL | Age: 81
End: 2021-08-05

## 2021-08-05 ENCOUNTER — NON-APPOINTMENT (OUTPATIENT)
Age: 81
End: 2021-08-05

## 2021-08-18 ENCOUNTER — OUTPATIENT (OUTPATIENT)
Dept: OUTPATIENT SERVICES | Facility: HOSPITAL | Age: 81
LOS: 1 days | Discharge: HOME | End: 2021-08-18

## 2021-08-18 DIAGNOSIS — C90.00 MULTIPLE MYELOMA NOT HAVING ACHIEVED REMISSION: ICD-10-CM

## 2021-08-19 ENCOUNTER — RESULT REVIEW (OUTPATIENT)
Age: 81
End: 2021-08-19

## 2021-08-19 ENCOUNTER — APPOINTMENT (OUTPATIENT)
Dept: HEMATOLOGY ONCOLOGY | Facility: CLINIC | Age: 81
End: 2021-08-19
Payer: MEDICARE

## 2021-08-19 VITALS
HEART RATE: 81 BPM | WEIGHT: 129.63 LBS | RESPIRATION RATE: 17 BRPM | HEIGHT: 59.84 IN | TEMPERATURE: 96.8 F | BODY MASS INDEX: 25.45 KG/M2 | DIASTOLIC BLOOD PRESSURE: 72 MMHG | SYSTOLIC BLOOD PRESSURE: 144 MMHG | OXYGEN SATURATION: 95 %

## 2021-08-19 LAB
ALBUMIN SERPL ELPH-MCNC: 3.3 G/DL
ALP BLD-CCNC: 102 U/L
ALT SERPL-CCNC: 9 U/L
ANION GAP SERPL CALC-SCNC: 11 MMOL/L
AST SERPL-CCNC: 13 U/L
B2 MICROGLOB SERPL-MCNC: 6.8 MG/L
BASOPHILS # BLD AUTO: 0.03 K/UL — SIGNIFICANT CHANGE UP (ref 0–0.2)
BASOPHILS NFR BLD AUTO: 0.9 % — SIGNIFICANT CHANGE UP (ref 0–2)
BILIRUB SERPL-MCNC: 0.3 MG/DL
BUN SERPL-MCNC: 34 MG/DL
CALCIUM SERPL-MCNC: 8.9 MG/DL
CHLORIDE SERPL-SCNC: 107 MMOL/L
CO2 SERPL-SCNC: 16 MMOL/L
CREAT SERPL-MCNC: 1.75 MG/DL
EOSINOPHIL # BLD AUTO: 0.05 K/UL — SIGNIFICANT CHANGE UP (ref 0–0.5)
EOSINOPHIL NFR BLD AUTO: 1.6 % — SIGNIFICANT CHANGE UP (ref 0–6)
GLUCOSE SERPL-MCNC: 141 MG/DL
HCT VFR BLD CALC: 27.7 % — LOW (ref 34.5–45)
HGB BLD-MCNC: 9.2 G/DL — LOW (ref 11.5–15.5)
IMM GRANULOCYTES NFR BLD AUTO: 0.6 % — SIGNIFICANT CHANGE UP (ref 0–1.5)
LDH SERPL-CCNC: 164 U/L
LYMPHOCYTES # BLD AUTO: 1.14 K/UL — SIGNIFICANT CHANGE UP (ref 1–3.3)
LYMPHOCYTES # BLD AUTO: 35.8 % — SIGNIFICANT CHANGE UP (ref 13–44)
MAGNESIUM SERPL-MCNC: 2.2 MG/DL
MCHC RBC-ENTMCNC: 28.4 PG — SIGNIFICANT CHANGE UP (ref 27–34)
MCHC RBC-ENTMCNC: 33.2 G/DL — SIGNIFICANT CHANGE UP (ref 32–36)
MCV RBC AUTO: 85.5 FL — SIGNIFICANT CHANGE UP (ref 80–100)
MONOCYTES # BLD AUTO: 0.12 K/UL — SIGNIFICANT CHANGE UP (ref 0–0.9)
MONOCYTES NFR BLD AUTO: 3.8 % — SIGNIFICANT CHANGE UP (ref 2–14)
NEUTROPHILS # BLD AUTO: 1.82 K/UL — SIGNIFICANT CHANGE UP (ref 1.8–7.4)
NEUTROPHILS NFR BLD AUTO: 57.3 % — SIGNIFICANT CHANGE UP (ref 43–77)
NRBC # BLD: 0 /100 WBCS — SIGNIFICANT CHANGE UP (ref 0–0)
PLATELET # BLD AUTO: 198 K/UL — SIGNIFICANT CHANGE UP (ref 150–400)
POTASSIUM SERPL-SCNC: 4.9 MMOL/L
PROT SERPL-MCNC: 7.7 G/DL
RBC # BLD: 3.24 M/UL — LOW (ref 3.8–5.2)
RBC # FLD: 16.5 % — HIGH (ref 10.3–14.5)
SODIUM SERPL-SCNC: 134 MMOL/L
WBC # BLD: 3.18 K/UL — LOW (ref 3.8–10.5)
WBC # FLD AUTO: 3.18 K/UL — LOW (ref 3.8–10.5)

## 2021-08-19 PROCEDURE — 99214 OFFICE O/P EST MOD 30 MIN: CPT

## 2021-08-20 LAB
DEPRECATED KAPPA LC FREE/LAMBDA SER: 40.84 RATIO
KAPPA LC CSF-MCNC: 1.61 MG/DL
KAPPA LC SERPL-MCNC: 65.75 MG/DL

## 2021-08-20 NOTE — HISTORY OF PRESENT ILLNESS
[de-identified] : Patient presents for a follow-up visit.  She is ambulating with a cane. Her son is with her...She was hospitalized and recently discharged from rehab s/p infectious complications. New vertebral compression fx...still with painShe reports a healthy appetite. She notes varicose veins bilaterally in her LE and generalized arthritic pain which is stable. She notes some stiffness in hands bilaterally.  She states she followed up with neurology for issues with her discs. She states her hip pain is stable. She off  Velcade three weeks on and 1 week off with Decadron. Denies fever/chills, night sweats, mouth sores, eye dryness, blurred vision, nausea, vomiting, diarrhea. No CP, SOB or LE edema. \par \par On 8/19//21, patient presents for a follow up visit. Overall patient is tolerating treatment.. and offers no acute concerns aside from continues low back pain similar to when she was hospitalized... Has generalized arthritic pain which is stable. Denies fever, chills, nausea, vomiting, diarrhea, rash, mouth sores, dysuria or any signs of active bleeding. Denies SOB, chest pain or B/L LE edema. Daughter  on telephone during today's visit.  [de-identified] : Multiple Myeloma s/p Thal/ Dex 2008 to 2010.....TIA in 2009.....Velcade 2012 to 2013...treatment held due to neuropathy..resumed b/c of POD...tolerating thus far

## 2021-08-20 NOTE — PHYSICAL EXAM
[Ambulatory and capable of all self care but unable to carry out any work activities] : Status 2- Ambulatory and capable of all self care but unable to carry out any work activities. Up and about more than 50% of waking hours [Normal] : affect appropriate [de-identified] : Ambulates with cane...changes c/w OA

## 2021-08-20 NOTE — REVIEW OF SYSTEMS
[Joint Pain] : joint pain [Joint Stiffness] : joint stiffness [Negative] : Allergic/Immunologic [Fever] : no fever [Chills] : no chills [Night Sweats] : no night sweats [Fatigue] : no fatigue [Recent Change In Weight] : ~T no recent weight change [Abdominal Pain] : no abdominal pain [Cough] : no cough [Vomiting] : no vomiting [FreeTextEntry5] : bilateral LE varicose veins [FreeTextEntry9] : stable generalized arthritic pain and low back

## 2021-08-20 NOTE — ASSESSMENT
[Supportive] : Goals of care discussed with patient: Supportive [FreeTextEntry1] : Ms. James is an 81 year old female with history of MM, RA, HTN, TIA. s/p treatment as per HPI...was  on observation...IGG 3370 (8/15/17). 2018 to 2020 treated again with VD.\par \par 1) MM\par Patient was on velcade 3 weeks on, 1 week off. Treatment was held and then discontinued. \par Discussed pomalyst 2 mg every other day on 6/29/21. \par Pomalyst application filled out and patient  started  pomalyst 2 mg every other day. Side effects explained to patient and family. IgG id down\par Daughter  on telephone and agree with plan of care. \par Continue zometa every 3 months. Zometa 2 mg every 3  months\par Increase  Decadron 12 mg every Monday and Thursday\par Patient will take aspirin 81 mg daily\par \par 2) Heme\par Anemia. No indication for transfusion. Improved\par CBC: WBC 8.11  H/H 8.8/26.7    ANC 7.71\par Prescribed folic acid 1 mg daily on 6/24/21\par \par 3) HTN\par Continue amlodipine 5 mg daily\par \par 4) Other\par Continue gabapentin 200 mg BID\par Tylenol prn for pain...xray ordered of l/s spine..increase in dex will help..follow sugars\par Questions and concerns addressed. Reassurance provided.\par \par 5) Plan\par Patient will start pomalyst 2 mg every other day\par Follow up in dec with zometa\par zometa early sept with cbc\par Patient advised to contact immediately with any concerns or symptoms.\par \par

## 2021-08-26 LAB
ALBUMIN MFR SERPL ELPH: 41.8 %
ALBUMIN SERPL-MCNC: 3.2 G/DL
ALBUMIN/GLOB SERPL: 0.7 RATIO
ALPHA1 GLOB MFR SERPL ELPH: 5.5 %
ALPHA1 GLOB SERPL ELPH-MCNC: 0.4 G/DL
ALPHA2 GLOB MFR SERPL ELPH: 9.6 %
ALPHA2 GLOB SERPL ELPH-MCNC: 0.7 G/DL
B-GLOBULIN MFR SERPL ELPH: 9.4 %
B-GLOBULIN SERPL ELPH-MCNC: 0.7 G/DL
DEPRECATED KAPPA LC FREE/LAMBDA SER: 40.84 RATIO
GAMMA GLOB FLD ELPH-MCNC: 2.6 G/DL
GAMMA GLOB MFR SERPL ELPH: 33.7 %
IGA SER QL IEP: 22 MG/DL
IGG SER QL IEP: 2664 MG/DL
IGM SER QL IEP: 32 MG/DL
INTERPRETATION SERPL IEP-IMP: NORMAL
KAPPA LC CSF-MCNC: 1.61 MG/DL
KAPPA LC SERPL-MCNC: 65.75 MG/DL
M PROTEIN MFR SERPL ELPH: 29.6 %
M PROTEIN SPEC IFE-MCNC: NORMAL
MONOCLON BAND OBS SERPL: 2.3 G/DL
PROT SERPL-MCNC: 7.7 G/DL
PROT SERPL-MCNC: 7.7 G/DL

## 2021-09-03 ENCOUNTER — APPOINTMENT (OUTPATIENT)
Dept: RADIOLOGY | Facility: IMAGING CENTER | Age: 81
End: 2021-09-03

## 2021-09-08 ENCOUNTER — APPOINTMENT (OUTPATIENT)
Dept: INFUSION THERAPY | Facility: HOSPITAL | Age: 81
End: 2021-09-08

## 2021-11-08 ENCOUNTER — NON-APPOINTMENT (OUTPATIENT)
Age: 81
End: 2021-11-08

## 2021-11-11 ENCOUNTER — APPOINTMENT (OUTPATIENT)
Dept: RADIOLOGY | Facility: IMAGING CENTER | Age: 81
End: 2021-11-11
Payer: MEDICARE

## 2021-11-11 ENCOUNTER — OUTPATIENT (OUTPATIENT)
Dept: OUTPATIENT SERVICES | Facility: HOSPITAL | Age: 81
LOS: 1 days | End: 2021-11-11
Payer: MEDICARE

## 2021-11-11 DIAGNOSIS — C90.00 MULTIPLE MYELOMA NOT HAVING ACHIEVED REMISSION: ICD-10-CM

## 2021-11-11 PROCEDURE — 72110 X-RAY EXAM L-2 SPINE 4/>VWS: CPT | Mod: 26

## 2021-11-11 PROCEDURE — 72070 X-RAY EXAM THORAC SPINE 2VWS: CPT

## 2021-11-11 PROCEDURE — 72070 X-RAY EXAM THORAC SPINE 2VWS: CPT | Mod: 26

## 2021-11-11 PROCEDURE — 72110 X-RAY EXAM L-2 SPINE 4/>VWS: CPT

## 2021-11-22 ENCOUNTER — OUTPATIENT (OUTPATIENT)
Dept: OUTPATIENT SERVICES | Facility: HOSPITAL | Age: 81
LOS: 1 days | Discharge: HOME | End: 2021-11-22

## 2021-11-22 DIAGNOSIS — C90.00 MULTIPLE MYELOMA NOT HAVING ACHIEVED REMISSION: ICD-10-CM

## 2021-11-24 ENCOUNTER — RESULT REVIEW (OUTPATIENT)
Age: 81
End: 2021-11-24

## 2021-11-24 ENCOUNTER — APPOINTMENT (OUTPATIENT)
Dept: HEMATOLOGY ONCOLOGY | Facility: CLINIC | Age: 81
End: 2021-11-24
Payer: MEDICARE

## 2021-11-24 VITALS
HEART RATE: 72 BPM | BODY MASS INDEX: 25.62 KG/M2 | WEIGHT: 130.49 LBS | SYSTOLIC BLOOD PRESSURE: 122 MMHG | RESPIRATION RATE: 16 BRPM | DIASTOLIC BLOOD PRESSURE: 67 MMHG | TEMPERATURE: 97 F | OXYGEN SATURATION: 94 %

## 2021-11-24 LAB
BASOPHILS # BLD AUTO: 0.04 K/UL — SIGNIFICANT CHANGE UP (ref 0–0.2)
BASOPHILS NFR BLD AUTO: 0.9 % — SIGNIFICANT CHANGE UP (ref 0–2)
EOSINOPHIL # BLD AUTO: 0.08 K/UL — SIGNIFICANT CHANGE UP (ref 0–0.5)
EOSINOPHIL NFR BLD AUTO: 1.7 % — SIGNIFICANT CHANGE UP (ref 0–6)
HCT VFR BLD CALC: 30.7 % — LOW (ref 34.5–45)
HGB BLD-MCNC: 9.7 G/DL — LOW (ref 11.5–15.5)
IMM GRANULOCYTES NFR BLD AUTO: 0.7 % — SIGNIFICANT CHANGE UP (ref 0–1.5)
LYMPHOCYTES # BLD AUTO: 1.18 K/UL — SIGNIFICANT CHANGE UP (ref 1–3.3)
LYMPHOCYTES # BLD AUTO: 25.8 % — SIGNIFICANT CHANGE UP (ref 13–44)
MCHC RBC-ENTMCNC: 27.8 PG — SIGNIFICANT CHANGE UP (ref 27–34)
MCHC RBC-ENTMCNC: 31.6 G/DL — LOW (ref 32–36)
MCV RBC AUTO: 88 FL — SIGNIFICANT CHANGE UP (ref 80–100)
MONOCYTES # BLD AUTO: 0.48 K/UL — SIGNIFICANT CHANGE UP (ref 0–0.9)
MONOCYTES NFR BLD AUTO: 10.5 % — SIGNIFICANT CHANGE UP (ref 2–14)
NEUTROPHILS # BLD AUTO: 2.77 K/UL — SIGNIFICANT CHANGE UP (ref 1.8–7.4)
NEUTROPHILS NFR BLD AUTO: 60.4 % — SIGNIFICANT CHANGE UP (ref 43–77)
NRBC # BLD: 0 /100 WBCS — SIGNIFICANT CHANGE UP (ref 0–0)
PLATELET # BLD AUTO: 232 K/UL — SIGNIFICANT CHANGE UP (ref 150–400)
RBC # BLD: 3.49 M/UL — LOW (ref 3.8–5.2)
RBC # FLD: 19.3 % — HIGH (ref 10.3–14.5)
WBC # BLD: 4.58 K/UL — SIGNIFICANT CHANGE UP (ref 3.8–10.5)
WBC # FLD AUTO: 4.58 K/UL — SIGNIFICANT CHANGE UP (ref 3.8–10.5)

## 2021-11-24 PROCEDURE — 99214 OFFICE O/P EST MOD 30 MIN: CPT

## 2021-11-24 NOTE — REASON FOR VISIT
[Follow-Up Visit] : a follow-up visit for [Family Member] : family member [Other: _____] : [unfilled] [FreeTextEntry2] : Multiple myeloma

## 2021-11-24 NOTE — REVIEW OF SYSTEMS
[Joint Pain] : joint pain [Joint Stiffness] : joint stiffness [Negative] : Allergic/Immunologic [Fever] : no fever [Chills] : no chills [Night Sweats] : no night sweats [Fatigue] : no fatigue [Recent Change In Weight] : ~T no recent weight change [Cough] : no cough [Abdominal Pain] : no abdominal pain [Vomiting] : no vomiting [FreeTextEntry5] : bilateral LE varicose veins [FreeTextEntry9] : stable generalized arthritic pain and low back

## 2021-11-24 NOTE — HISTORY OF PRESENT ILLNESS
[de-identified] : Multiple Myeloma s/p Thal/ Dex 2008 to 2010.....TIA in 2009.....Velcade 2012 to 2013...treatment held due to neuropathy..resumed b/c of POD...tolerating thus far [de-identified] : Patient presents for a follow-up visit.  She is ambulating with a cane. Her son is with her...She was hospitalized and recently discharged from rehab s/p infectious complications. New vertebral compression fx...still with painShe reports a healthy appetite. She notes varicose veins bilaterally in her LE and generalized arthritic pain which is stable. She notes some stiffness in hands bilaterally.  She states she followed up with neurology for issues with her discs. She states her hip pain is stable. She off  Velcade three weeks on and 1 week off with Decadron. Denies fever/chills, night sweats, mouth sores, eye dryness, blurred vision, nausea, vomiting, diarrhea. No CP, SOB or LE edema. \par \par On 8/19//21, patient presents for a follow up visit. Overall patient is tolerating treatment.. and offers no acute concerns aside from continues low back pain similar to when she was hospitalized... Has generalized arthritic pain which is stable. Denies fever, chills, nausea, vomiting, diarrhea, rash, mouth sores, dysuria or any signs of active bleeding. Denies SOB, chest pain or B/L LE edema. Daughter  on telephone during today's visit. \par \par 11/24/21Here with aide.fer on phone...back pain on and off..using tylenol and lidocaine patch..tolerating pom dex

## 2021-11-24 NOTE — ASSESSMENT
[Supportive] : Goals of care discussed with patient: Supportive [FreeTextEntry1] : Ms. James is an 81 year old female with history of MM, RA, HTN, TIA. s/p treatment as per HPI...was  on observation...IGG 3370 (8/15/17). 2018 to 2020 treated again with VD.\par \par 1) MM\par Patient was on velcade 3 weeks on, 1 week off. Treatment was held and then discontinued. \par Discussed pomalyst 2 mg every other day on 6/29/21. \par Pomalyst application filled out and patient  started  pomalyst 2 mg every other day. Side effects explained to patient and family. IgG id down\par Daughter  on telephone and agree with plan of care. \par Continue zometa every 3 months. Zometa 2 mg every 3  months\par Increased  Decadron 12 mg every Monday and Thursday\par Patient will take aspirin 81 mg daily\par \par 2) Heme\par Anemia. No indication for transfusion. Improved\par Prescribed folic acid 1 mg daily on 6/24/21\par \par 3) HTN\par Continue amlodipine 5 mg daily\par \par 4) Other\par Continue gabapentin 200 mg BID\par Tylenol prn for pain...xray ordered of l/s spine..likely small compression fx...increase in dex will help..follow sugars\par Questions and concerns addressed. Reassurance provided.\par \par 5) Plan\par Cont  pomalyst 2 mg every other day\par Follow up in dec with np and  zometa\par see me in feb\par Patient advised to contact immediately with any concerns or symptoms.\par \par

## 2021-11-24 NOTE — PHYSICAL EXAM
[Ambulatory and capable of all self care but unable to carry out any work activities] : Status 2- Ambulatory and capable of all self care but unable to carry out any work activities. Up and about more than 50% of waking hours [Normal] : affect appropriate [de-identified] : Ambulates with cane...changes c/w OA

## 2021-11-26 LAB
ALBUMIN SERPL ELPH-MCNC: 3.9 G/DL
ALP BLD-CCNC: 106 U/L
ALT SERPL-CCNC: 8 U/L
ANION GAP SERPL CALC-SCNC: 14 MMOL/L
AST SERPL-CCNC: 11 U/L
B2 MICROGLOB SERPL-MCNC: 5.4 MG/L
BILIRUB SERPL-MCNC: 0.2 MG/DL
BUN SERPL-MCNC: 37 MG/DL
CALCIUM SERPL-MCNC: 8.7 MG/DL
CHLORIDE SERPL-SCNC: 109 MMOL/L
CO2 SERPL-SCNC: 15 MMOL/L
CREAT SERPL-MCNC: 1.68 MG/DL
GLUCOSE SERPL-MCNC: 122 MG/DL
LDH SERPL-CCNC: 188 U/L
MAGNESIUM SERPL-MCNC: 2.3 MG/DL
POTASSIUM SERPL-SCNC: 4.4 MMOL/L
PROT SERPL-MCNC: 7 G/DL
SODIUM SERPL-SCNC: 139 MMOL/L

## 2021-11-27 LAB
DEPRECATED KAPPA LC FREE/LAMBDA SER: 27.06 RATIO
KAPPA LC CSF-MCNC: 1.58 MG/DL
KAPPA LC SERPL-MCNC: 42.75 MG/DL

## 2021-11-30 LAB
ALBUMIN MFR SERPL ELPH: 51.3 %
ALBUMIN SERPL-MCNC: 3.5 G/DL
ALBUMIN/GLOB SERPL: 1 RATIO
ALPHA1 GLOB MFR SERPL ELPH: 5.9 %
ALPHA1 GLOB SERPL ELPH-MCNC: 0.4 G/DL
ALPHA2 GLOB MFR SERPL ELPH: 11.4 %
ALPHA2 GLOB SERPL ELPH-MCNC: 0.8 G/DL
B-GLOBULIN MFR SERPL ELPH: 8.3 %
B-GLOBULIN SERPL ELPH-MCNC: 0.6 G/DL
DEPRECATED KAPPA LC FREE/LAMBDA SER: 27.06 RATIO
GAMMA GLOB FLD ELPH-MCNC: 1.6 G/DL
GAMMA GLOB MFR SERPL ELPH: 23.1 %
IGA SER QL IEP: 22 MG/DL
IGG SER QL IEP: 1514 MG/DL
IGM SER QL IEP: 43 MG/DL
INTERPRETATION SERPL IEP-IMP: NORMAL
KAPPA LC CSF-MCNC: 1.58 MG/DL
KAPPA LC SERPL-MCNC: 42.75 MG/DL
M PROTEIN MFR SERPL ELPH: 16.2 %
M PROTEIN SPEC IFE-MCNC: NORMAL
MONOCLON BAND OBS SERPL: 1.1 G/DL
PROT SERPL-MCNC: 6.9 G/DL
PROT SERPL-MCNC: 6.9 G/DL

## 2021-12-09 ENCOUNTER — APPOINTMENT (OUTPATIENT)
Dept: HEMATOLOGY ONCOLOGY | Facility: CLINIC | Age: 81
End: 2021-12-09

## 2021-12-16 ENCOUNTER — APPOINTMENT (OUTPATIENT)
Dept: INFUSION THERAPY | Facility: HOSPITAL | Age: 81
End: 2021-12-16

## 2022-02-14 ENCOUNTER — OUTPATIENT (OUTPATIENT)
Dept: OUTPATIENT SERVICES | Facility: HOSPITAL | Age: 82
LOS: 1 days | Discharge: HOME | End: 2022-02-14

## 2022-02-14 DIAGNOSIS — C90.00 MULTIPLE MYELOMA NOT HAVING ACHIEVED REMISSION: ICD-10-CM

## 2022-02-16 ENCOUNTER — RESULT REVIEW (OUTPATIENT)
Age: 82
End: 2022-02-16

## 2022-02-16 ENCOUNTER — APPOINTMENT (OUTPATIENT)
Dept: HEMATOLOGY ONCOLOGY | Facility: CLINIC | Age: 82
End: 2022-02-16
Payer: MEDICARE

## 2022-02-16 VITALS
OXYGEN SATURATION: 97 % | TEMPERATURE: 96.9 F | BODY MASS INDEX: 25.97 KG/M2 | WEIGHT: 132.28 LBS | HEART RATE: 84 BPM | RESPIRATION RATE: 16 BRPM | DIASTOLIC BLOOD PRESSURE: 76 MMHG | SYSTOLIC BLOOD PRESSURE: 131 MMHG

## 2022-02-16 DIAGNOSIS — M06.9 RHEUMATOID ARTHRITIS, UNSPECIFIED: ICD-10-CM

## 2022-02-16 DIAGNOSIS — D57.3 SICKLE-CELL TRAIT: ICD-10-CM

## 2022-02-16 LAB
BASOPHILS # BLD AUTO: 0.01 K/UL — SIGNIFICANT CHANGE UP (ref 0–0.2)
BASOPHILS NFR BLD AUTO: 0.1 % — SIGNIFICANT CHANGE UP (ref 0–2)
EOSINOPHIL # BLD AUTO: 0.17 K/UL — SIGNIFICANT CHANGE UP (ref 0–0.5)
EOSINOPHIL NFR BLD AUTO: 2.4 % — SIGNIFICANT CHANGE UP (ref 0–6)
HCT VFR BLD CALC: 29.3 % — LOW (ref 34.5–45)
HGB BLD-MCNC: 9.6 G/DL — LOW (ref 11.5–15.5)
IMM GRANULOCYTES NFR BLD AUTO: 1 % — SIGNIFICANT CHANGE UP (ref 0–1.5)
LYMPHOCYTES # BLD AUTO: 0.72 K/UL — LOW (ref 1–3.3)
LYMPHOCYTES # BLD AUTO: 10.4 % — LOW (ref 13–44)
MCHC RBC-ENTMCNC: 28 PG — SIGNIFICANT CHANGE UP (ref 27–34)
MCHC RBC-ENTMCNC: 32.8 G/DL — SIGNIFICANT CHANGE UP (ref 32–36)
MCV RBC AUTO: 85.4 FL — SIGNIFICANT CHANGE UP (ref 80–100)
MONOCYTES # BLD AUTO: 0.82 K/UL — SIGNIFICANT CHANGE UP (ref 0–0.9)
MONOCYTES NFR BLD AUTO: 11.8 % — SIGNIFICANT CHANGE UP (ref 2–14)
NEUTROPHILS # BLD AUTO: 5.16 K/UL — SIGNIFICANT CHANGE UP (ref 1.8–7.4)
NEUTROPHILS NFR BLD AUTO: 74.3 % — SIGNIFICANT CHANGE UP (ref 43–77)
NRBC # BLD: 0 /100 WBCS — SIGNIFICANT CHANGE UP (ref 0–0)
PLATELET # BLD AUTO: 231 K/UL — SIGNIFICANT CHANGE UP (ref 150–400)
RBC # BLD: 3.43 M/UL — LOW (ref 3.8–5.2)
RBC # FLD: 16.8 % — HIGH (ref 10.3–14.5)
WBC # BLD: 6.95 K/UL — SIGNIFICANT CHANGE UP (ref 3.8–10.5)
WBC # FLD AUTO: 6.95 K/UL — SIGNIFICANT CHANGE UP (ref 3.8–10.5)

## 2022-02-16 PROCEDURE — 99214 OFFICE O/P EST MOD 30 MIN: CPT

## 2022-02-16 NOTE — PHYSICAL EXAM
[Ambulatory and capable of all self care but unable to carry out any work activities] : Status 2- Ambulatory and capable of all self care but unable to carry out any work activities. Up and about more than 50% of waking hours [Normal] : affect appropriate [de-identified] : Ambulates with cane...changes c/w OA

## 2022-02-16 NOTE — REVIEW OF SYSTEMS
[Joint Pain] : joint pain [Joint Stiffness] : joint stiffness [Negative] : Allergic/Immunologic [Fever] : no fever [Chills] : no chills [Night Sweats] : no night sweats [Fatigue] : no fatigue [Recent Change In Weight] : ~T no recent weight change [Cough] : no cough [Abdominal Pain] : no abdominal pain [Vomiting] : no vomiting [FreeTextEntry5] : bilateral LE varicose veins [FreeTextEntry9] : stable generalized arthritic pain of  low back and neck

## 2022-02-16 NOTE — ASSESSMENT
[Supportive] : Goals of care discussed with patient: Supportive [FreeTextEntry1] : Ms. James is an 82 year old female with history of MM, RA, HTN, TIA. s/p treatment as per HPI...was  on observation...IGG 3370 (8/15/17). 2018 to 2020 treated again with VD.\par \par 1) MM\par Patient was on velcade 3 weeks on, 1 week off. Treatment was held and then discontinued. \par Discussed pomalyst 2 mg every other day on 6/29/21. \par Pomalyst application filled out and patient  started  pomalyst 2 mg every other day. Side effects explained to patient and family. IgG is down\par Daughter  on telephone and agree with plan of care. \par Continue zometa every 3 months. Zometa 2 mg every 3  months...due march 1st week\par Increased  Decadron 12 mg every Monday and Thursday\par Patient will take aspirin 81 mg daily\par \par 2) Heme\par Anemia. No indication for transfusion. Improved\par Prescribed folic acid 1 mg daily on 6/24/21\par \par 3) HTN\par Continue amlodipine 5 mg daily\par \par 4) Other\par Continue gabapentin 200 mg BID\par Tylenol prn for pain...xray ordered of l/s spine last visit.....will order xray of c spine today.......likely small compression fx...increase in dex  helped with low back pain..follow sugars\par Questions and concerns addressed. Reassurance provided.\par \par 5) Plan\par Cont  pomalyst 2 mg every other day\par Follow up in may with np and  zometa june \par see me in 6 months\par Patient advised to contact immediately with any concerns or symptoms.\par \par

## 2022-02-16 NOTE — HISTORY OF PRESENT ILLNESS
[de-identified] : Multiple Myeloma s/p Thal/ Dex 2008 to 2010.....TIA in 2009.....Velcade 2012 to 2013...treatment held due to neuropathy..resumed b/c of POD...tolerating thus far [de-identified] : Patient presents for a follow-up visit.  She is ambulating with a cane. Her son is with her...She was hospitalized and recently discharged from rehab s/p infectious complications. New vertebral compression fx...still with painShe reports a healthy appetite. She notes varicose veins bilaterally in her LE and generalized arthritic pain which is stable. She notes some stiffness in hands bilaterally.  She states she followed up with neurology for issues with her discs. She states her hip pain is stable. She off  Velcade three weeks on and 1 week off with Decadron. Denies fever/chills, night sweats, mouth sores, eye dryness, blurred vision, nausea, vomiting, diarrhea. No CP, SOB or LE edema. \par \par On 8/19//21, patient presents for a follow up visit. Overall patient is tolerating treatment.. and offers no acute concerns aside from continues low back pain similar to when she was hospitalized... Has generalized arthritic pain which is stable. Denies fever, chills, nausea, vomiting, diarrhea, rash, mouth sores, dysuria or any signs of active bleeding. Denies SOB, chest pain or B/L LE edema. Daughter  on telephone during today's visit. \par \par 2/16/22 Here with aide.fer on phone...back pain on and off..difficulty raising head up fully...using tylenol and lidocaine patch..tolerating pom dex...improved spep...

## 2022-02-17 LAB
ALBUMIN SERPL ELPH-MCNC: 3.9 G/DL
ALP BLD-CCNC: 122 U/L
ALT SERPL-CCNC: 12 U/L
ANION GAP SERPL CALC-SCNC: 17 MMOL/L
AST SERPL-CCNC: 12 U/L
B2 MICROGLOB SERPL-MCNC: 4.3 MG/L
BILIRUB SERPL-MCNC: <0.2 MG/DL
BUN SERPL-MCNC: 50 MG/DL
CALCIUM SERPL-MCNC: 8.9 MG/DL
CHLORIDE SERPL-SCNC: 106 MMOL/L
CO2 SERPL-SCNC: 13 MMOL/L
CREAT SERPL-MCNC: 1.85 MG/DL
DEPRECATED KAPPA LC FREE/LAMBDA SER: 18.83 RATIO
GLUCOSE SERPL-MCNC: 274 MG/DL
KAPPA LC CSF-MCNC: 1.49 MG/DL
KAPPA LC SERPL-MCNC: 28.06 MG/DL
LDH SERPL-CCNC: 212 U/L
MAGNESIUM SERPL-MCNC: 2.5 MG/DL
POTASSIUM SERPL-SCNC: 4.7 MMOL/L
PROT SERPL-MCNC: 6.9 G/DL
SODIUM SERPL-SCNC: 137 MMOL/L

## 2022-02-19 LAB
ALBUMIN MFR SERPL ELPH: 50.3 %
ALBUMIN SERPL-MCNC: 3.5 G/DL
ALBUMIN/GLOB SERPL: 1 RATIO
ALPHA1 GLOB MFR SERPL ELPH: 6.8 %
ALPHA1 GLOB SERPL ELPH-MCNC: 0.5 G/DL
ALPHA2 GLOB MFR SERPL ELPH: 13.6 %
ALPHA2 GLOB SERPL ELPH-MCNC: 0.9 G/DL
B-GLOBULIN MFR SERPL ELPH: 9.4 %
B-GLOBULIN SERPL ELPH-MCNC: 0.6 G/DL
DEPRECATED KAPPA LC FREE/LAMBDA SER: 18.83 RATIO
GAMMA GLOB FLD ELPH-MCNC: 1.4 G/DL
GAMMA GLOB MFR SERPL ELPH: 19.9 %
IGA SER QL IEP: 17 MG/DL
IGG SER QL IEP: 1274 MG/DL
IGM SER QL IEP: 34 MG/DL
INTERPRETATION SERPL IEP-IMP: NORMAL
KAPPA LC CSF-MCNC: 1.49 MG/DL
KAPPA LC SERPL-MCNC: 28.06 MG/DL
M PROTEIN MFR SERPL ELPH: 14.7 %
M PROTEIN SPEC IFE-MCNC: NORMAL
MONOCLON BAND OBS SERPL: 1 G/DL
PROT SERPL-MCNC: 6.9 G/DL
PROT SERPL-MCNC: 6.9 G/DL

## 2022-02-22 ENCOUNTER — RX RENEWAL (OUTPATIENT)
Age: 82
End: 2022-02-22

## 2022-02-23 ENCOUNTER — RESULT REVIEW (OUTPATIENT)
Age: 82
End: 2022-02-23

## 2022-02-23 ENCOUNTER — APPOINTMENT (OUTPATIENT)
Dept: HEMATOLOGY ONCOLOGY | Facility: CLINIC | Age: 82
End: 2022-02-23

## 2022-02-23 ENCOUNTER — OUTPATIENT (OUTPATIENT)
Dept: OUTPATIENT SERVICES | Facility: HOSPITAL | Age: 82
LOS: 1 days | End: 2022-02-23
Payer: MEDICARE

## 2022-02-23 ENCOUNTER — APPOINTMENT (OUTPATIENT)
Dept: RADIOLOGY | Facility: IMAGING CENTER | Age: 82
End: 2022-02-23
Payer: MEDICARE

## 2022-02-23 DIAGNOSIS — C90.00 MULTIPLE MYELOMA NOT HAVING ACHIEVED REMISSION: ICD-10-CM

## 2022-02-23 LAB
ALBUMIN SERPL ELPH-MCNC: 3.9 G/DL
ALP BLD-CCNC: 107 U/L
ALT SERPL-CCNC: 18 U/L
ANION GAP SERPL CALC-SCNC: 15 MMOL/L
AST SERPL-CCNC: 17 U/L
BASOPHILS # BLD AUTO: 0.02 K/UL — SIGNIFICANT CHANGE UP (ref 0–0.2)
BASOPHILS NFR BLD AUTO: 0.3 % — SIGNIFICANT CHANGE UP (ref 0–2)
BILIRUB SERPL-MCNC: 0.2 MG/DL
BUN SERPL-MCNC: 34 MG/DL
CALCIUM SERPL-MCNC: 8.6 MG/DL
CHLORIDE SERPL-SCNC: 110 MMOL/L
CO2 SERPL-SCNC: 15 MMOL/L
CREAT SERPL-MCNC: 1.94 MG/DL
EOSINOPHIL # BLD AUTO: 0.09 K/UL — SIGNIFICANT CHANGE UP (ref 0–0.5)
EOSINOPHIL NFR BLD AUTO: 1.4 % — SIGNIFICANT CHANGE UP (ref 0–6)
GLUCOSE SERPL-MCNC: 297 MG/DL
HCT VFR BLD CALC: 31.2 % — LOW (ref 34.5–45)
HGB BLD-MCNC: 10 G/DL — LOW (ref 11.5–15.5)
IMM GRANULOCYTES NFR BLD AUTO: 0.5 % — SIGNIFICANT CHANGE UP (ref 0–1.5)
LYMPHOCYTES # BLD AUTO: 2.24 K/UL — SIGNIFICANT CHANGE UP (ref 1–3.3)
LYMPHOCYTES # BLD AUTO: 35.6 % — SIGNIFICANT CHANGE UP (ref 13–44)
MCHC RBC-ENTMCNC: 27.7 PG — SIGNIFICANT CHANGE UP (ref 27–34)
MCHC RBC-ENTMCNC: 32.1 G/DL — SIGNIFICANT CHANGE UP (ref 32–36)
MCV RBC AUTO: 86.4 FL — SIGNIFICANT CHANGE UP (ref 80–100)
MONOCYTES # BLD AUTO: 0.47 K/UL — SIGNIFICANT CHANGE UP (ref 0–0.9)
MONOCYTES NFR BLD AUTO: 7.5 % — SIGNIFICANT CHANGE UP (ref 2–14)
NEUTROPHILS # BLD AUTO: 3.44 K/UL — SIGNIFICANT CHANGE UP (ref 1.8–7.4)
NEUTROPHILS NFR BLD AUTO: 54.7 % — SIGNIFICANT CHANGE UP (ref 43–77)
NRBC # BLD: 0 /100 WBCS — SIGNIFICANT CHANGE UP (ref 0–0)
PLATELET # BLD AUTO: 209 K/UL — SIGNIFICANT CHANGE UP (ref 150–400)
POTASSIUM SERPL-SCNC: 4.6 MMOL/L
PROT SERPL-MCNC: 6.7 G/DL
RBC # BLD: 3.61 M/UL — LOW (ref 3.8–5.2)
RBC # FLD: 16.9 % — HIGH (ref 10.3–14.5)
SODIUM SERPL-SCNC: 141 MMOL/L
WBC # BLD: 6.29 K/UL — SIGNIFICANT CHANGE UP (ref 3.8–10.5)
WBC # FLD AUTO: 6.29 K/UL — SIGNIFICANT CHANGE UP (ref 3.8–10.5)

## 2022-02-23 PROCEDURE — 72050 X-RAY EXAM NECK SPINE 4/5VWS: CPT

## 2022-02-23 PROCEDURE — 72050 X-RAY EXAM NECK SPINE 4/5VWS: CPT | Mod: 26

## 2022-03-14 ENCOUNTER — APPOINTMENT (OUTPATIENT)
Dept: INFUSION THERAPY | Facility: HOSPITAL | Age: 82
End: 2022-03-14

## 2022-04-12 ENCOUNTER — EMERGENCY (EMERGENCY)
Facility: HOSPITAL | Age: 82
LOS: 1 days | Discharge: ANOTHER HOSPITAL | End: 2022-04-12
Attending: EMERGENCY MEDICINE
Payer: MEDICARE

## 2022-04-12 ENCOUNTER — INPATIENT (INPATIENT)
Facility: HOSPITAL | Age: 82
LOS: 6 days | Discharge: HOME | DRG: 64 | End: 2022-04-19
Attending: INTERNAL MEDICINE | Admitting: HOSPITALIST
Payer: MEDICARE

## 2022-04-12 VITALS
OXYGEN SATURATION: 98 % | DIASTOLIC BLOOD PRESSURE: 65 MMHG | TEMPERATURE: 99 F | HEART RATE: 84 BPM | RESPIRATION RATE: 18 BRPM | HEIGHT: 62 IN | WEIGHT: 132.06 LBS | SYSTOLIC BLOOD PRESSURE: 130 MMHG

## 2022-04-12 VITALS
OXYGEN SATURATION: 98 % | SYSTOLIC BLOOD PRESSURE: 126 MMHG | HEART RATE: 78 BPM | DIASTOLIC BLOOD PRESSURE: 55 MMHG | TEMPERATURE: 97 F | RESPIRATION RATE: 20 BRPM | HEIGHT: 62 IN

## 2022-04-12 VITALS — SYSTOLIC BLOOD PRESSURE: 138 MMHG | HEART RATE: 94 BPM | DIASTOLIC BLOOD PRESSURE: 56 MMHG

## 2022-04-12 LAB
ALBUMIN SERPL ELPH-MCNC: 2.8 G/DL — LOW (ref 3.5–5)
ALBUMIN SERPL ELPH-MCNC: 3.5 G/DL — SIGNIFICANT CHANGE UP (ref 3.3–5)
ALP SERPL-CCNC: 153 U/L — HIGH (ref 40–120)
ALP SERPL-CCNC: 187 U/L — HIGH (ref 40–120)
ALT FLD-CCNC: 18 U/L — SIGNIFICANT CHANGE UP (ref 10–45)
ALT FLD-CCNC: 24 U/L DA — SIGNIFICANT CHANGE UP (ref 10–60)
ANION GAP SERPL CALC-SCNC: 13 MMOL/L — SIGNIFICANT CHANGE UP (ref 5–17)
ANION GAP SERPL CALC-SCNC: 8 MMOL/L — SIGNIFICANT CHANGE UP (ref 5–17)
APTT BLD: 25.5 SEC — LOW (ref 27.5–35.5)
AST SERPL-CCNC: 15 U/L — SIGNIFICANT CHANGE UP (ref 10–40)
AST SERPL-CCNC: 16 U/L — SIGNIFICANT CHANGE UP (ref 10–40)
BASOPHILS # BLD AUTO: 0.01 K/UL — SIGNIFICANT CHANGE UP (ref 0–0.2)
BASOPHILS # BLD AUTO: 0.01 K/UL — SIGNIFICANT CHANGE UP (ref 0–0.2)
BASOPHILS NFR BLD AUTO: 0.2 % — SIGNIFICANT CHANGE UP (ref 0–2)
BASOPHILS NFR BLD AUTO: 0.2 % — SIGNIFICANT CHANGE UP (ref 0–2)
BILIRUB SERPL-MCNC: 0.3 MG/DL — SIGNIFICANT CHANGE UP (ref 0.2–1.2)
BILIRUB SERPL-MCNC: 0.3 MG/DL — SIGNIFICANT CHANGE UP (ref 0.2–1.2)
BUN SERPL-MCNC: 44 MG/DL — HIGH (ref 7–23)
BUN SERPL-MCNC: 48 MG/DL — HIGH (ref 7–18)
CALCIUM SERPL-MCNC: 8.8 MG/DL — SIGNIFICANT CHANGE UP (ref 8.4–10.5)
CALCIUM SERPL-MCNC: 8.8 MG/DL — SIGNIFICANT CHANGE UP (ref 8.4–10.5)
CHLORIDE SERPL-SCNC: 102 MMOL/L — SIGNIFICANT CHANGE UP (ref 96–108)
CHLORIDE SERPL-SCNC: 106 MMOL/L — SIGNIFICANT CHANGE UP (ref 96–108)
CO2 SERPL-SCNC: 18 MMOL/L — LOW (ref 22–31)
CO2 SERPL-SCNC: 18 MMOL/L — LOW (ref 22–31)
CREAT SERPL-MCNC: 1.76 MG/DL — HIGH (ref 0.5–1.3)
CREAT SERPL-MCNC: 2.31 MG/DL — HIGH (ref 0.5–1.3)
EGFR: 21 ML/MIN/1.73M2 — LOW
EGFR: 29 ML/MIN/1.73M2 — LOW
EOSINOPHIL # BLD AUTO: 0.03 K/UL — SIGNIFICANT CHANGE UP (ref 0–0.5)
EOSINOPHIL # BLD AUTO: 0.03 K/UL — SIGNIFICANT CHANGE UP (ref 0–0.5)
EOSINOPHIL NFR BLD AUTO: 0.6 % — SIGNIFICANT CHANGE UP (ref 0–6)
EOSINOPHIL NFR BLD AUTO: 0.6 % — SIGNIFICANT CHANGE UP (ref 0–6)
GLUCOSE SERPL-MCNC: 440 MG/DL — HIGH (ref 70–99)
GLUCOSE SERPL-MCNC: 530 MG/DL — CRITICAL HIGH (ref 70–99)
HCT VFR BLD CALC: 28.4 % — LOW (ref 34.5–45)
HCT VFR BLD CALC: 28.6 % — LOW (ref 34.5–45)
HGB BLD-MCNC: 9.5 G/DL — LOW (ref 11.5–15.5)
HGB BLD-MCNC: 9.5 G/DL — LOW (ref 11.5–15.5)
IMM GRANULOCYTES NFR BLD AUTO: 1.2 % — SIGNIFICANT CHANGE UP (ref 0–1.5)
IMM GRANULOCYTES NFR BLD AUTO: 1.2 % — SIGNIFICANT CHANGE UP (ref 0–1.5)
INR BLD: 0.98 RATIO — SIGNIFICANT CHANGE UP (ref 0.88–1.16)
LYMPHOCYTES # BLD AUTO: 0.72 K/UL — LOW (ref 1–3.3)
LYMPHOCYTES # BLD AUTO: 0.89 K/UL — LOW (ref 1–3.3)
LYMPHOCYTES # BLD AUTO: 14.3 % — SIGNIFICANT CHANGE UP (ref 13–44)
LYMPHOCYTES # BLD AUTO: 17.3 % — SIGNIFICANT CHANGE UP (ref 13–44)
MCHC RBC-ENTMCNC: 27.5 PG — SIGNIFICANT CHANGE UP (ref 27–34)
MCHC RBC-ENTMCNC: 27.8 PG — SIGNIFICANT CHANGE UP (ref 27–34)
MCHC RBC-ENTMCNC: 33.2 GM/DL — SIGNIFICANT CHANGE UP (ref 32–36)
MCHC RBC-ENTMCNC: 33.5 GM/DL — SIGNIFICANT CHANGE UP (ref 32–36)
MCV RBC AUTO: 82.9 FL — SIGNIFICANT CHANGE UP (ref 80–100)
MCV RBC AUTO: 83 FL — SIGNIFICANT CHANGE UP (ref 80–100)
MONOCYTES # BLD AUTO: 0.55 K/UL — SIGNIFICANT CHANGE UP (ref 0–0.9)
MONOCYTES # BLD AUTO: 0.66 K/UL — SIGNIFICANT CHANGE UP (ref 0–0.9)
MONOCYTES NFR BLD AUTO: 10.9 % — SIGNIFICANT CHANGE UP (ref 2–14)
MONOCYTES NFR BLD AUTO: 12.8 % — SIGNIFICANT CHANGE UP (ref 2–14)
NEUTROPHILS # BLD AUTO: 3.5 K/UL — SIGNIFICANT CHANGE UP (ref 1.8–7.4)
NEUTROPHILS # BLD AUTO: 3.68 K/UL — SIGNIFICANT CHANGE UP (ref 1.8–7.4)
NEUTROPHILS NFR BLD AUTO: 67.9 % — SIGNIFICANT CHANGE UP (ref 43–77)
NEUTROPHILS NFR BLD AUTO: 72.8 % — SIGNIFICANT CHANGE UP (ref 43–77)
NRBC # BLD: 0 /100 WBCS — SIGNIFICANT CHANGE UP (ref 0–0)
NRBC # BLD: 0 /100 WBCS — SIGNIFICANT CHANGE UP (ref 0–0)
PLATELET # BLD AUTO: 207 K/UL — SIGNIFICANT CHANGE UP (ref 150–400)
PLATELET # BLD AUTO: 217 K/UL — SIGNIFICANT CHANGE UP (ref 150–400)
POTASSIUM SERPL-MCNC: 4.8 MMOL/L — SIGNIFICANT CHANGE UP (ref 3.5–5.3)
POTASSIUM SERPL-MCNC: 4.8 MMOL/L — SIGNIFICANT CHANGE UP (ref 3.5–5.3)
POTASSIUM SERPL-SCNC: 4.8 MMOL/L — SIGNIFICANT CHANGE UP (ref 3.5–5.3)
POTASSIUM SERPL-SCNC: 4.8 MMOL/L — SIGNIFICANT CHANGE UP (ref 3.5–5.3)
PROT SERPL-MCNC: 6.3 G/DL — SIGNIFICANT CHANGE UP (ref 6–8.3)
PROT SERPL-MCNC: 6.5 G/DL — SIGNIFICANT CHANGE UP (ref 6–8.3)
PROTHROM AB SERPL-ACNC: 11.7 SEC — SIGNIFICANT CHANGE UP (ref 10.5–13.4)
RBC # BLD: 3.42 M/UL — LOW (ref 3.8–5.2)
RBC # BLD: 3.45 M/UL — LOW (ref 3.8–5.2)
RBC # FLD: 16.7 % — HIGH (ref 10.3–14.5)
RBC # FLD: 16.7 % — HIGH (ref 10.3–14.5)
SARS-COV-2 RNA SPEC QL NAA+PROBE: SIGNIFICANT CHANGE UP
SODIUM SERPL-SCNC: 132 MMOL/L — LOW (ref 135–145)
SODIUM SERPL-SCNC: 133 MMOL/L — LOW (ref 135–145)
TROPONIN I, HIGH SENSITIVITY RESULT: 32.6 NG/L — SIGNIFICANT CHANGE UP
WBC # BLD: 5.05 K/UL — SIGNIFICANT CHANGE UP (ref 3.8–10.5)
WBC # BLD: 5.15 K/UL — SIGNIFICANT CHANGE UP (ref 3.8–10.5)
WBC # FLD AUTO: 5.05 K/UL — SIGNIFICANT CHANGE UP (ref 3.8–10.5)
WBC # FLD AUTO: 5.15 K/UL — SIGNIFICANT CHANGE UP (ref 3.8–10.5)

## 2022-04-12 PROCEDURE — 70498 CT ANGIOGRAPHY NECK: CPT | Mod: MA

## 2022-04-12 PROCEDURE — 99291 CRITICAL CARE FIRST HOUR: CPT | Mod: GC

## 2022-04-12 PROCEDURE — 36415 COLL VENOUS BLD VENIPUNCTURE: CPT

## 2022-04-12 PROCEDURE — 70498 CT ANGIOGRAPHY NECK: CPT | Mod: 26,MA

## 2022-04-12 PROCEDURE — 0042T: CPT

## 2022-04-12 PROCEDURE — 99291 CRITICAL CARE FIRST HOUR: CPT

## 2022-04-12 PROCEDURE — 85730 THROMBOPLASTIN TIME PARTIAL: CPT

## 2022-04-12 PROCEDURE — 87635 SARS-COV-2 COVID-19 AMP PRB: CPT

## 2022-04-12 PROCEDURE — 99285 EMERGENCY DEPT VISIT HI MDM: CPT | Mod: 25

## 2022-04-12 PROCEDURE — 70496 CT ANGIOGRAPHY HEAD: CPT | Mod: MA

## 2022-04-12 PROCEDURE — 70496 CT ANGIOGRAPHY HEAD: CPT | Mod: 26,MA

## 2022-04-12 PROCEDURE — 85610 PROTHROMBIN TIME: CPT

## 2022-04-12 PROCEDURE — 80053 COMPREHEN METABOLIC PANEL: CPT

## 2022-04-12 PROCEDURE — 85025 COMPLETE CBC W/AUTO DIFF WBC: CPT

## 2022-04-12 PROCEDURE — 82962 GLUCOSE BLOOD TEST: CPT

## 2022-04-12 PROCEDURE — 70450 CT HEAD/BRAIN W/O DYE: CPT | Mod: MA

## 2022-04-12 PROCEDURE — 84484 ASSAY OF TROPONIN QUANT: CPT

## 2022-04-12 RX ORDER — SODIUM CHLORIDE 9 MG/ML
1000 INJECTION INTRAMUSCULAR; INTRAVENOUS; SUBCUTANEOUS ONCE
Refills: 0 | Status: COMPLETED | OUTPATIENT
Start: 2022-04-12 | End: 2022-04-12

## 2022-04-12 RX ORDER — NICARDIPINE HYDROCHLORIDE 30 MG/1
5 CAPSULE, EXTENDED RELEASE ORAL
Qty: 40 | Refills: 0 | Status: DISCONTINUED | OUTPATIENT
Start: 2022-04-12 | End: 2022-04-12

## 2022-04-12 RX ORDER — INSULIN LISPRO 100/ML
6 VIAL (ML) SUBCUTANEOUS ONCE
Refills: 0 | Status: DISCONTINUED | OUTPATIENT
Start: 2022-04-12 | End: 2022-04-16

## 2022-04-12 RX ORDER — NICARDIPINE HYDROCHLORIDE 30 MG/1
5 CAPSULE, EXTENDED RELEASE ORAL
Qty: 40 | Refills: 0 | Status: DISCONTINUED | OUTPATIENT
Start: 2022-04-12 | End: 2022-04-16

## 2022-04-12 RX ADMIN — NICARDIPINE HYDROCHLORIDE 25 MG/HR: 30 CAPSULE, EXTENDED RELEASE ORAL at 21:52

## 2022-04-12 RX ADMIN — SODIUM CHLORIDE 1000 MILLILITER(S): 9 INJECTION INTRAMUSCULAR; INTRAVENOUS; SUBCUTANEOUS at 23:42

## 2022-04-12 NOTE — ED PROVIDER NOTE - NEUROLOGICAL, MLM
Alert and oriented, no focal deficits, no motor or sensory deficits. Slurred speech. Alert and oriented, no focal deficits, no motor or sensory deficits other than Slurred speech. see nihss

## 2022-04-12 NOTE — ED PROVIDER NOTE - CLINICAL SUMMARY MEDICAL DECISION MAKING FREE TEXT BOX
Stroke vs TIA. Possible brain mass. Will order CT and basic blood work. Due to uncertainty, will not give TPA.

## 2022-04-12 NOTE — ED PROVIDER NOTE - PROGRESS NOTE DETAILS
Maxine PGY3: spoke to NSx, no ddavp at this time, will get interval scan at 1am Maxine PGY3: STable CT scan, spoke with neuro who reviewed images with radiology, does not suspect acute stroke on imaging. Patient would not come to stroke unit with subarachnoid. Will admit to medicine. Maxine PGY3: patient with worsening mental status, severely slurred speech. R facial droop and not moving R side. Code stroke called, brought to CT emergently for interval scan. Concern for progression of stroke vs worsening SAH. Will load with keppra as there was concern for seizure by bystanders. Non seizure activity noted upon my evaluation

## 2022-04-12 NOTE — ED PROVIDER NOTE - PROGRESS NOTE DETAILS
accepted for transfer to [facility] er dr butterfield. he requests no ddavp for reversal of asa

## 2022-04-12 NOTE — ED PROVIDER NOTE - CRITICAL CARE ATTENDING CONTRIBUTION TO CARE
Evaluating for critical conditions noted above. Ordering tests. Reviewing results. Ordering medications as noted in MAR. Discussions with consultant. Evaluating for critical conditions noted above. Ordering tests. Reviewing results. Ordering medications as noted in MAR (cardene infusion). Discussions with consultant.

## 2022-04-12 NOTE — ED PROVIDER NOTE - ATTENDING CONTRIBUTION TO CARE
MD Chaudhry:  patient seen and evaluated personally.   I agree with the History & Physical,  Impression & Plan other than what was detailed in my note.  MD Chaudhry   82 year old female with a PHMx of hypertension, multiple myeloma and rheumatoid arthristis presenting to the ED  as trx from [facility] w/ occipital sah. Pt states felt confused around 2:30 pm today, when last known normal, report that pt was slurring speech around 6 and went to hospital, pt currently denies ha, bv, cp, sob, palpitations, n/v, afebrile bp 125 on cardine ,turned off to eval, pt has no focal deficits but is confused and needs redirection axox2 unaware of year, unable to state president, powre 5/5x 4 but needs redirection, cn intact but has trouble following some commands ie smiling, consult ns, will keep bp less than 160 for now unless they state otherwise, no thinners other than asa, of note pt had sig hyperglycemia and renal insufficiency that is new, received 6 units inuslin, will r/o dka, give some fluids, while awaiting NS.

## 2022-04-12 NOTE — ED PROVIDER NOTE - OBJECTIVE STATEMENT
Patient is a 82 year old female with a PHMx of hypertension, multiple myeloma and rheumatoid arthristis presenting to the ED with complaints of slurred speech (unable to obtain patient history). Patient states at 2:30 PM she was fine, but she has symptoms coming and going. Patient reports she is not on blood thinner. She states she has never had this before, and denies any other symptoms. NKDA Patient is a 82 year old female with a PHMx of hypertension, multiple myeloma and rheumatoid arthristis presenting to the ED with complaints of slurred speech . Patient states at 2:30 PM she was fine, but she has symptoms coming and going. Patient reports she is not on blood thinner. She states she has never had this before, and denies any other symptoms. NKDA

## 2022-04-12 NOTE — ED ADULT NURSE NOTE - NSIMPLEMENTINTERV_GEN_ALL_ED
Implemented All Universal Safety Interventions:  [place] to call system. Call bell, personal items and telephone within reach. Instruct patient to call for assistance. Room bathroom lighting operational. Non-slip footwear when patient is off stretcher. Physically safe environment: no spills, clutter or unnecessary equipment. Stretcher in lowest position, wheels locked, appropriate side rails in place. Implemented All Fall Risk Interventions:  [place] to call system. Call bell, personal items and telephone within reach. Instruct patient to call for assistance. Room bathroom lighting operational. Non-slip footwear when patient is off stretcher. Physically safe environment: no spills, clutter or unnecessary equipment. Stretcher in lowest position, wheels locked, appropriate side rails in place. Provide visual cue, wrist band, yellow gown, etc. Monitor gait and stability. Monitor for mental status changes and reorient to person, place, and time. Review medications for side effects contributing to fall risk. Reinforce activity limits and safety measures with patient and family.

## 2022-04-12 NOTE — ED PROVIDER NOTE - OBJECTIVE STATEMENT
83yo female RA, HTN on baby aspirin transfer from  for occipital subarachnoid. Went there for intermittent slurred speech since 230 pm today. Also noted her LLE to be "shaky" since yesterday. Denies falls, AC use, numbness, weakness, change in vision.

## 2022-04-12 NOTE — ED PROVIDER NOTE - CLINICAL SUMMARY MEDICAL DECISION MAKING FREE TEXT BOX
81yo female transfer from  for atraumatic SAH. No neuro deficits, but seems confused at times. Labs, BP control, NSx consult, interval scan, reassess.

## 2022-04-12 NOTE — ED PROVIDER NOTE - CONSTITUTIONAL, MLM
Well appearing, awake, alert, oriented to person, place, time/situation and in no apparent distress. normal... slurring speech, awake, alert

## 2022-04-12 NOTE — ED ADULT TRIAGE NOTE - CHIEF COMPLAINT QUOTE
As per daughter pt stated pt's speech was slurred and confused "sound like a baby" this occurred at 6pm today this lasted 5mins went away then occurred again lasted 5 mins then everything went back to normal  as per daughter pt is back to normal except for shaking left leg.

## 2022-04-12 NOTE — ED ADULT NURSE NOTE - OBJECTIVE STATEMENT
83 yo female lying on bed, brought in to the ED, for slurred speech and confusion that started at around 1800. As per patient's daughter, patient's last well known was at around 1400.

## 2022-04-12 NOTE — ED PROVIDER NOTE - PHYSICAL EXAMINATION
Physical Exam:  Gen: NAD, AOx3, seems confused intermittently, non-toxic appearing  Head: NCAT  HEENT: EOMI, PEERLA, normal conjunctiva, tongue midline, oral mucosa moist  Lung: CTAB, no respiratory distress, no wheezes/rhonchi/rales B/L, speaking in full sentences  CV: RRR, no murmurs, rubs or gallops, distal pulses 2+ b/l  Abd: soft, NT, ND  MSK: no visible deformities, ROM normal in UE/LE  Neuro: No focal sensory or motor deficits, does not follow some commands, no slurred speech noted  Skin: Warm, well perfused, no rash, no leg swelling  Psych: normal affect, calm

## 2022-04-13 DIAGNOSIS — C90.00 MULTIPLE MYELOMA NOT HAVING ACHIEVED REMISSION: ICD-10-CM

## 2022-04-13 DIAGNOSIS — R73.9 HYPERGLYCEMIA, UNSPECIFIED: ICD-10-CM

## 2022-04-13 DIAGNOSIS — I60.9 NONTRAUMATIC SUBARACHNOID HEMORRHAGE, UNSPECIFIED: ICD-10-CM

## 2022-04-13 DIAGNOSIS — M06.9 RHEUMATOID ARTHRITIS, UNSPECIFIED: ICD-10-CM

## 2022-04-13 DIAGNOSIS — R41.82 ALTERED MENTAL STATUS, UNSPECIFIED: ICD-10-CM

## 2022-04-13 DIAGNOSIS — I10 ESSENTIAL (PRIMARY) HYPERTENSION: ICD-10-CM

## 2022-04-13 LAB
A1C WITH ESTIMATED AVERAGE GLUCOSE RESULT: 11.5 % — HIGH (ref 4–5.6)
ALBUMIN SERPL ELPH-MCNC: 3.3 G/DL — SIGNIFICANT CHANGE UP (ref 3.3–5)
ALBUMIN SERPL ELPH-MCNC: 3.4 G/DL — SIGNIFICANT CHANGE UP (ref 3.3–5)
ALP SERPL-CCNC: 125 U/L — HIGH (ref 40–120)
ALP SERPL-CCNC: 130 U/L — HIGH (ref 40–120)
ALT FLD-CCNC: 16 U/L — SIGNIFICANT CHANGE UP (ref 10–45)
ALT FLD-CCNC: 20 U/L — SIGNIFICANT CHANGE UP (ref 10–45)
AMPHET UR-MCNC: NEGATIVE — SIGNIFICANT CHANGE UP
ANION GAP SERPL CALC-SCNC: 11 MMOL/L — SIGNIFICANT CHANGE UP (ref 5–17)
ANION GAP SERPL CALC-SCNC: 12 MMOL/L — SIGNIFICANT CHANGE UP (ref 5–17)
APPEARANCE UR: CLEAR — SIGNIFICANT CHANGE UP
APTT BLD: 20.9 SEC — LOW (ref 27.5–35.5)
AST SERPL-CCNC: 12 U/L — SIGNIFICANT CHANGE UP (ref 10–40)
AST SERPL-CCNC: 16 U/L — SIGNIFICANT CHANGE UP (ref 10–40)
BACTERIA # UR AUTO: NEGATIVE — SIGNIFICANT CHANGE UP
BARBITURATES UR SCN-MCNC: NEGATIVE — SIGNIFICANT CHANGE UP
BASE EXCESS BLDV CALC-SCNC: -5 MMOL/L — LOW (ref -2–2)
BASE EXCESS BLDV CALC-SCNC: -6 MMOL/L — LOW (ref -2–2)
BASOPHILS # BLD AUTO: 0.02 K/UL — SIGNIFICANT CHANGE UP (ref 0–0.2)
BASOPHILS NFR BLD AUTO: 0.4 % — SIGNIFICANT CHANGE UP (ref 0–2)
BENZODIAZ UR-MCNC: NEGATIVE — SIGNIFICANT CHANGE UP
BILIRUB SERPL-MCNC: 0.3 MG/DL — SIGNIFICANT CHANGE UP (ref 0.2–1.2)
BILIRUB SERPL-MCNC: 0.4 MG/DL — SIGNIFICANT CHANGE UP (ref 0.2–1.2)
BILIRUB UR-MCNC: NEGATIVE — SIGNIFICANT CHANGE UP
BLD GP AB SCN SERPL QL: NEGATIVE — SIGNIFICANT CHANGE UP
BUN SERPL-MCNC: 32 MG/DL — HIGH (ref 7–23)
BUN SERPL-MCNC: 38 MG/DL — HIGH (ref 7–23)
CA-I SERPL-SCNC: 1.18 MMOL/L — SIGNIFICANT CHANGE UP (ref 1.15–1.33)
CA-I SERPL-SCNC: 1.24 MMOL/L — SIGNIFICANT CHANGE UP (ref 1.15–1.33)
CALCIUM SERPL-MCNC: 8.6 MG/DL — SIGNIFICANT CHANGE UP (ref 8.4–10.5)
CALCIUM SERPL-MCNC: 8.7 MG/DL — SIGNIFICANT CHANGE UP (ref 8.4–10.5)
CHLORIDE BLDV-SCNC: 107 MMOL/L — SIGNIFICANT CHANGE UP (ref 96–108)
CHLORIDE BLDV-SCNC: 110 MMOL/L — HIGH (ref 96–108)
CHLORIDE SERPL-SCNC: 109 MMOL/L — HIGH (ref 96–108)
CHLORIDE SERPL-SCNC: 109 MMOL/L — HIGH (ref 96–108)
CHOLEST SERPL-MCNC: 115 MG/DL — SIGNIFICANT CHANGE UP
CO2 BLDV-SCNC: 21 MMOL/L — LOW (ref 22–26)
CO2 BLDV-SCNC: 21 MMOL/L — LOW (ref 22–26)
CO2 SERPL-SCNC: 17 MMOL/L — LOW (ref 22–31)
CO2 SERPL-SCNC: 18 MMOL/L — LOW (ref 22–31)
COCAINE METAB.OTHER UR-MCNC: NEGATIVE — SIGNIFICANT CHANGE UP
COLOR SPEC: COLORLESS — SIGNIFICANT CHANGE UP
CREAT SERPL-MCNC: 1.34 MG/DL — HIGH (ref 0.5–1.3)
CREAT SERPL-MCNC: 1.46 MG/DL — HIGH (ref 0.5–1.3)
DIFF PNL FLD: NEGATIVE — SIGNIFICANT CHANGE UP
EGFR: 36 ML/MIN/1.73M2 — LOW
EGFR: 40 ML/MIN/1.73M2 — LOW
EOSINOPHIL # BLD AUTO: 0.04 K/UL — SIGNIFICANT CHANGE UP (ref 0–0.5)
EOSINOPHIL NFR BLD AUTO: 0.7 % — SIGNIFICANT CHANGE UP (ref 0–6)
EPI CELLS # UR: 0 /HPF — SIGNIFICANT CHANGE UP
ESTIMATED AVERAGE GLUCOSE: 283 MG/DL — HIGH (ref 68–114)
GAS PNL BLDV: 132 MMOL/L — LOW (ref 136–145)
GAS PNL BLDV: 137 MMOL/L — SIGNIFICANT CHANGE UP (ref 136–145)
GAS PNL BLDV: SIGNIFICANT CHANGE UP
GAS PNL BLDV: SIGNIFICANT CHANGE UP
GLUCOSE BLDC GLUCOMTR-MCNC: 272 MG/DL — HIGH (ref 70–99)
GLUCOSE BLDC GLUCOMTR-MCNC: 277 MG/DL — HIGH (ref 70–99)
GLUCOSE BLDC GLUCOMTR-MCNC: 333 MG/DL — HIGH (ref 70–99)
GLUCOSE BLDC GLUCOMTR-MCNC: 334 MG/DL — HIGH (ref 70–99)
GLUCOSE BLDC GLUCOMTR-MCNC: 350 MG/DL — HIGH (ref 70–99)
GLUCOSE BLDC GLUCOMTR-MCNC: 359 MG/DL — HIGH (ref 70–99)
GLUCOSE BLDC GLUCOMTR-MCNC: 407 MG/DL — HIGH (ref 70–99)
GLUCOSE BLDV-MCNC: 364 MG/DL — HIGH (ref 70–99)
GLUCOSE BLDV-MCNC: 431 MG/DL — HIGH (ref 70–99)
GLUCOSE SERPL-MCNC: 267 MG/DL — HIGH (ref 70–99)
GLUCOSE SERPL-MCNC: 351 MG/DL — HIGH (ref 70–99)
GLUCOSE UR QL: ABNORMAL
HCO3 BLDV-SCNC: 20 MMOL/L — LOW (ref 22–29)
HCO3 BLDV-SCNC: 20 MMOL/L — LOW (ref 22–29)
HCT VFR BLD CALC: 28.3 % — LOW (ref 34.5–45)
HCT VFR BLD CALC: 28.4 % — LOW (ref 34.5–45)
HCT VFR BLDA CALC: 28 % — LOW (ref 34.5–46.5)
HCT VFR BLDA CALC: 28 % — LOW (ref 34.5–46.5)
HDLC SERPL-MCNC: 58 MG/DL — SIGNIFICANT CHANGE UP
HGB BLD CALC-MCNC: 9.2 G/DL — LOW (ref 11.7–16.1)
HGB BLD CALC-MCNC: 9.3 G/DL — LOW (ref 11.7–16.1)
HGB BLD-MCNC: 9.2 G/DL — LOW (ref 11.5–15.5)
HGB BLD-MCNC: 9.4 G/DL — LOW (ref 11.5–15.5)
HYALINE CASTS # UR AUTO: 1 /LPF — SIGNIFICANT CHANGE UP (ref 0–2)
IMM GRANULOCYTES NFR BLD AUTO: 1.2 % — SIGNIFICANT CHANGE UP (ref 0–1.5)
INR BLD: 1 RATIO — SIGNIFICANT CHANGE UP (ref 0.88–1.16)
KETONES UR-MCNC: NEGATIVE — SIGNIFICANT CHANGE UP
LACTATE BLDV-MCNC: 1.3 MMOL/L — SIGNIFICANT CHANGE UP (ref 0.7–2)
LACTATE BLDV-MCNC: 1.7 MMOL/L — SIGNIFICANT CHANGE UP (ref 0.7–2)
LEUKOCYTE ESTERASE UR-ACNC: NEGATIVE — SIGNIFICANT CHANGE UP
LIPID PNL WITH DIRECT LDL SERPL: 42 MG/DL — SIGNIFICANT CHANGE UP
LYMPHOCYTES # BLD AUTO: 0.66 K/UL — LOW (ref 1–3.3)
LYMPHOCYTES # BLD AUTO: 11.7 % — LOW (ref 13–44)
MCHC RBC-ENTMCNC: 27.5 PG — SIGNIFICANT CHANGE UP (ref 27–34)
MCHC RBC-ENTMCNC: 27.6 PG — SIGNIFICANT CHANGE UP (ref 27–34)
MCHC RBC-ENTMCNC: 32.5 GM/DL — SIGNIFICANT CHANGE UP (ref 32–36)
MCHC RBC-ENTMCNC: 33.1 GM/DL — SIGNIFICANT CHANGE UP (ref 32–36)
MCV RBC AUTO: 83.3 FL — SIGNIFICANT CHANGE UP (ref 80–100)
MCV RBC AUTO: 84.7 FL — SIGNIFICANT CHANGE UP (ref 80–100)
METHADONE UR-MCNC: NEGATIVE — SIGNIFICANT CHANGE UP
MONOCYTES # BLD AUTO: 0.55 K/UL — SIGNIFICANT CHANGE UP (ref 0–0.9)
MONOCYTES NFR BLD AUTO: 9.7 % — SIGNIFICANT CHANGE UP (ref 2–14)
NEUTROPHILS # BLD AUTO: 4.32 K/UL — SIGNIFICANT CHANGE UP (ref 1.8–7.4)
NEUTROPHILS NFR BLD AUTO: 76.3 % — SIGNIFICANT CHANGE UP (ref 43–77)
NITRITE UR-MCNC: NEGATIVE — SIGNIFICANT CHANGE UP
NON HDL CHOLESTEROL: 57 MG/DL — SIGNIFICANT CHANGE UP
NRBC # BLD: 0 /100 WBCS — SIGNIFICANT CHANGE UP (ref 0–0)
NRBC # BLD: 0 /100 WBCS — SIGNIFICANT CHANGE UP (ref 0–0)
OPIATES UR-MCNC: NEGATIVE — SIGNIFICANT CHANGE UP
OXYCODONE UR-MCNC: NEGATIVE — SIGNIFICANT CHANGE UP
PCO2 BLDV: 34 MMHG — LOW (ref 39–42)
PCO2 BLDV: 38 MMHG — LOW (ref 39–42)
PCP SPEC-MCNC: SIGNIFICANT CHANGE UP
PCP UR-MCNC: NEGATIVE — SIGNIFICANT CHANGE UP
PH BLDV: 7.32 — SIGNIFICANT CHANGE UP (ref 7.32–7.43)
PH BLDV: 7.37 — SIGNIFICANT CHANGE UP (ref 7.32–7.43)
PH UR: 6.5 — SIGNIFICANT CHANGE UP (ref 5–8)
PLATELET # BLD AUTO: 191 K/UL — SIGNIFICANT CHANGE UP (ref 150–400)
PLATELET # BLD AUTO: 199 K/UL — SIGNIFICANT CHANGE UP (ref 150–400)
PO2 BLDV: 51 MMHG — HIGH (ref 25–45)
PO2 BLDV: 52 MMHG — HIGH (ref 25–45)
POTASSIUM BLDV-SCNC: 5 MMOL/L — SIGNIFICANT CHANGE UP (ref 3.5–5.1)
POTASSIUM BLDV-SCNC: 5.1 MMOL/L — SIGNIFICANT CHANGE UP (ref 3.5–5.1)
POTASSIUM SERPL-MCNC: 4.5 MMOL/L — SIGNIFICANT CHANGE UP (ref 3.5–5.3)
POTASSIUM SERPL-MCNC: 5.1 MMOL/L — SIGNIFICANT CHANGE UP (ref 3.5–5.3)
POTASSIUM SERPL-SCNC: 4.5 MMOL/L — SIGNIFICANT CHANGE UP (ref 3.5–5.3)
POTASSIUM SERPL-SCNC: 5.1 MMOL/L — SIGNIFICANT CHANGE UP (ref 3.5–5.3)
PROT SERPL-MCNC: 6 G/DL — SIGNIFICANT CHANGE UP (ref 6–8.3)
PROT SERPL-MCNC: 6.3 G/DL — SIGNIFICANT CHANGE UP (ref 6–8.3)
PROT UR-MCNC: SIGNIFICANT CHANGE UP
PROTHROM AB SERPL-ACNC: 11.5 SEC — SIGNIFICANT CHANGE UP (ref 10.5–13.4)
RBC # BLD: 3.34 M/UL — LOW (ref 3.8–5.2)
RBC # BLD: 3.41 M/UL — LOW (ref 3.8–5.2)
RBC # FLD: 16.9 % — HIGH (ref 10.3–14.5)
RBC # FLD: 17 % — HIGH (ref 10.3–14.5)
RBC CASTS # UR COMP ASSIST: 2 /HPF — SIGNIFICANT CHANGE UP (ref 0–4)
RH IG SCN BLD-IMP: NEGATIVE — SIGNIFICANT CHANGE UP
SAO2 % BLDV: 84 % — SIGNIFICANT CHANGE UP (ref 67–88)
SAO2 % BLDV: 84.8 % — SIGNIFICANT CHANGE UP (ref 67–88)
SODIUM SERPL-SCNC: 138 MMOL/L — SIGNIFICANT CHANGE UP (ref 135–145)
SODIUM SERPL-SCNC: 138 MMOL/L — SIGNIFICANT CHANGE UP (ref 135–145)
SP GR SPEC: 1.02 — SIGNIFICANT CHANGE UP (ref 1.01–1.02)
THC UR QL: NEGATIVE — SIGNIFICANT CHANGE UP
TRIGL SERPL-MCNC: 74 MG/DL — SIGNIFICANT CHANGE UP
TROPONIN T, HIGH SENSITIVITY RESULT: 64 NG/L — HIGH (ref 0–51)
TROPONIN T, HIGH SENSITIVITY RESULT: 65 NG/L — HIGH (ref 0–51)
TSH SERPL-MCNC: 0.54 UIU/ML — SIGNIFICANT CHANGE UP (ref 0.27–4.2)
UROBILINOGEN FLD QL: NEGATIVE — SIGNIFICANT CHANGE UP
VIT B12 SERPL-MCNC: 775 PG/ML — SIGNIFICANT CHANGE UP (ref 232–1245)
WBC # BLD: 5.36 K/UL — SIGNIFICANT CHANGE UP (ref 3.8–10.5)
WBC # BLD: 5.66 K/UL — SIGNIFICANT CHANGE UP (ref 3.8–10.5)
WBC # FLD AUTO: 5.36 K/UL — SIGNIFICANT CHANGE UP (ref 3.8–10.5)
WBC # FLD AUTO: 5.66 K/UL — SIGNIFICANT CHANGE UP (ref 3.8–10.5)
WBC UR QL: 0 /HPF — SIGNIFICANT CHANGE UP (ref 0–5)

## 2022-04-13 PROCEDURE — 99222 1ST HOSP IP/OBS MODERATE 55: CPT | Mod: GC

## 2022-04-13 PROCEDURE — 95816 EEG AWAKE AND DROWSY: CPT | Mod: 26

## 2022-04-13 PROCEDURE — 99223 1ST HOSP IP/OBS HIGH 75: CPT

## 2022-04-13 PROCEDURE — 70553 MRI BRAIN STEM W/O & W/DYE: CPT | Mod: 26

## 2022-04-13 PROCEDURE — 70544 MR ANGIOGRAPHY HEAD W/O DYE: CPT | Mod: 26,59

## 2022-04-13 PROCEDURE — 70450 CT HEAD/BRAIN W/O DYE: CPT | Mod: 26,MA

## 2022-04-13 PROCEDURE — 93010 ELECTROCARDIOGRAM REPORT: CPT

## 2022-04-13 RX ORDER — AMLODIPINE BESYLATE 2.5 MG/1
5 TABLET ORAL DAILY
Refills: 0 | Status: DISCONTINUED | OUTPATIENT
Start: 2022-04-13 | End: 2022-04-19

## 2022-04-13 RX ORDER — DEXTROSE 50 % IN WATER 50 %
15 SYRINGE (ML) INTRAVENOUS ONCE
Refills: 0 | Status: DISCONTINUED | OUTPATIENT
Start: 2022-04-13 | End: 2022-04-19

## 2022-04-13 RX ORDER — PANTOPRAZOLE SODIUM 20 MG/1
40 TABLET, DELAYED RELEASE ORAL
Refills: 0 | Status: DISCONTINUED | OUTPATIENT
Start: 2022-04-13 | End: 2022-04-19

## 2022-04-13 RX ORDER — INSULIN LISPRO 100/ML
3 VIAL (ML) SUBCUTANEOUS
Refills: 0 | Status: DISCONTINUED | OUTPATIENT
Start: 2022-04-13 | End: 2022-04-18

## 2022-04-13 RX ORDER — DEXTROSE 50 % IN WATER 50 %
25 SYRINGE (ML) INTRAVENOUS ONCE
Refills: 0 | Status: DISCONTINUED | OUTPATIENT
Start: 2022-04-13 | End: 2022-04-19

## 2022-04-13 RX ORDER — CHOLECALCIFEROL (VITAMIN D3) 125 MCG
1000 CAPSULE ORAL DAILY
Refills: 0 | Status: DISCONTINUED | OUTPATIENT
Start: 2022-04-13 | End: 2022-04-19

## 2022-04-13 RX ORDER — GABAPENTIN 400 MG/1
200 CAPSULE ORAL AT BEDTIME
Refills: 0 | Status: DISCONTINUED | OUTPATIENT
Start: 2022-04-13 | End: 2022-04-19

## 2022-04-13 RX ORDER — FOLIC ACID 0.8 MG
1 TABLET ORAL DAILY
Refills: 0 | Status: DISCONTINUED | OUTPATIENT
Start: 2022-04-13 | End: 2022-04-19

## 2022-04-13 RX ORDER — INSULIN LISPRO 100/ML
VIAL (ML) SUBCUTANEOUS AT BEDTIME
Refills: 0 | Status: DISCONTINUED | OUTPATIENT
Start: 2022-04-13 | End: 2022-04-19

## 2022-04-13 RX ORDER — INSULIN GLARGINE 100 [IU]/ML
6 INJECTION, SOLUTION SUBCUTANEOUS AT BEDTIME
Refills: 0 | Status: DISCONTINUED | OUTPATIENT
Start: 2022-04-13 | End: 2022-04-14

## 2022-04-13 RX ORDER — LANOLIN ALCOHOL/MO/W.PET/CERES
3 CREAM (GRAM) TOPICAL AT BEDTIME
Refills: 0 | Status: DISCONTINUED | OUTPATIENT
Start: 2022-04-13 | End: 2022-04-19

## 2022-04-13 RX ORDER — INSULIN LISPRO 100/ML
VIAL (ML) SUBCUTANEOUS
Refills: 0 | Status: DISCONTINUED | OUTPATIENT
Start: 2022-04-13 | End: 2022-04-13

## 2022-04-13 RX ORDER — INSULIN LISPRO 100/ML
VIAL (ML) SUBCUTANEOUS
Refills: 0 | Status: DISCONTINUED | OUTPATIENT
Start: 2022-04-13 | End: 2022-04-19

## 2022-04-13 RX ORDER — POLYETHYLENE GLYCOL 3350 17 G/17G
17 POWDER, FOR SOLUTION ORAL DAILY
Refills: 0 | Status: DISCONTINUED | OUTPATIENT
Start: 2022-04-13 | End: 2022-04-19

## 2022-04-13 RX ORDER — GABAPENTIN 400 MG/1
100 CAPSULE ORAL DAILY
Refills: 0 | Status: DISCONTINUED | OUTPATIENT
Start: 2022-04-13 | End: 2022-04-19

## 2022-04-13 RX ORDER — GLUCAGON INJECTION, SOLUTION 0.5 MG/.1ML
1 INJECTION, SOLUTION SUBCUTANEOUS ONCE
Refills: 0 | Status: DISCONTINUED | OUTPATIENT
Start: 2022-04-13 | End: 2022-04-19

## 2022-04-13 RX ORDER — HYDROXYCHLOROQUINE SULFATE 200 MG
200 TABLET ORAL DAILY
Refills: 0 | Status: DISCONTINUED | OUTPATIENT
Start: 2022-04-13 | End: 2022-04-19

## 2022-04-13 RX ORDER — ATORVASTATIN CALCIUM 80 MG/1
20 TABLET, FILM COATED ORAL AT BEDTIME
Refills: 0 | Status: DISCONTINUED | OUTPATIENT
Start: 2022-04-13 | End: 2022-04-19

## 2022-04-13 RX ORDER — ACETAMINOPHEN 500 MG
650 TABLET ORAL EVERY 6 HOURS
Refills: 0 | Status: DISCONTINUED | OUTPATIENT
Start: 2022-04-13 | End: 2022-04-15

## 2022-04-13 RX ORDER — DEXTROSE 50 % IN WATER 50 %
12.5 SYRINGE (ML) INTRAVENOUS ONCE
Refills: 0 | Status: DISCONTINUED | OUTPATIENT
Start: 2022-04-13 | End: 2022-04-19

## 2022-04-13 RX ORDER — SODIUM CHLORIDE 9 MG/ML
1000 INJECTION, SOLUTION INTRAVENOUS
Refills: 0 | Status: DISCONTINUED | OUTPATIENT
Start: 2022-04-13 | End: 2022-04-19

## 2022-04-13 RX ORDER — LIDOCAINE 4 G/100G
1 CREAM TOPICAL DAILY
Refills: 0 | Status: DISCONTINUED | OUTPATIENT
Start: 2022-04-13 | End: 2022-04-19

## 2022-04-13 RX ORDER — SODIUM CHLORIDE 9 MG/ML
1000 INJECTION INTRAMUSCULAR; INTRAVENOUS; SUBCUTANEOUS ONCE
Refills: 0 | Status: COMPLETED | OUTPATIENT
Start: 2022-04-13 | End: 2022-04-13

## 2022-04-13 RX ORDER — LEVETIRACETAM 250 MG/1
500 TABLET, FILM COATED ORAL EVERY 12 HOURS
Refills: 0 | Status: DISCONTINUED | OUTPATIENT
Start: 2022-04-13 | End: 2022-04-19

## 2022-04-13 RX ORDER — SENNA PLUS 8.6 MG/1
2 TABLET ORAL AT BEDTIME
Refills: 0 | Status: DISCONTINUED | OUTPATIENT
Start: 2022-04-13 | End: 2022-04-19

## 2022-04-13 RX ORDER — LEVETIRACETAM 250 MG/1
1000 TABLET, FILM COATED ORAL EVERY 12 HOURS
Refills: 0 | Status: DISCONTINUED | OUTPATIENT
Start: 2022-04-13 | End: 2022-04-13

## 2022-04-13 RX ADMIN — LEVETIRACETAM 400 MILLIGRAM(S): 250 TABLET, FILM COATED ORAL at 05:36

## 2022-04-13 RX ADMIN — Medication 3: at 07:32

## 2022-04-13 RX ADMIN — Medication 1000 UNIT(S): at 17:18

## 2022-04-13 RX ADMIN — Medication 650 MILLIGRAM(S): at 07:30

## 2022-04-13 RX ADMIN — GABAPENTIN 100 MILLIGRAM(S): 400 CAPSULE ORAL at 17:18

## 2022-04-13 RX ADMIN — Medication 4: at 16:21

## 2022-04-13 RX ADMIN — Medication 650 MILLIGRAM(S): at 20:46

## 2022-04-13 RX ADMIN — Medication 1 MILLIGRAM(S): at 17:18

## 2022-04-13 RX ADMIN — Medication 2: at 22:01

## 2022-04-13 RX ADMIN — Medication 200 MILLIGRAM(S): at 17:18

## 2022-04-13 RX ADMIN — LEVETIRACETAM 400 MILLIGRAM(S): 250 TABLET, FILM COATED ORAL at 17:22

## 2022-04-13 RX ADMIN — SODIUM CHLORIDE 1000 MILLILITER(S): 9 INJECTION INTRAMUSCULAR; INTRAVENOUS; SUBCUTANEOUS at 05:17

## 2022-04-13 RX ADMIN — PANTOPRAZOLE SODIUM 40 MILLIGRAM(S): 20 TABLET, DELAYED RELEASE ORAL at 07:30

## 2022-04-13 RX ADMIN — GABAPENTIN 200 MILLIGRAM(S): 400 CAPSULE ORAL at 21:59

## 2022-04-13 RX ADMIN — INSULIN GLARGINE 6 UNIT(S): 100 INJECTION, SOLUTION SUBCUTANEOUS at 22:40

## 2022-04-13 RX ADMIN — ATORVASTATIN CALCIUM 20 MILLIGRAM(S): 80 TABLET, FILM COATED ORAL at 21:59

## 2022-04-13 NOTE — CONSULT NOTE ADULT - SUBJECTIVE AND OBJECTIVE BOX
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   Per family, patient has been increasing confused over the past few days. On day prior to admission, patient right leg shaking  noticed by granddaughter. On day of admission, patient was in usual state of health per home attendant , later in the evening ,patient had a transient episode where she became confused , started slurring speech and talking nonsense. Prior to the incident , patient has no specific complaints , no recent illness , no fever or chills. No chest pain or SOB , and no palpitations. Per family , there were no recent falls or injuries. Patient currently reports lower back pain , which family states is chronic.   ED course: patient evaluated by neurosurgery , patient evaluated by neurology ,  patient had episode of Altered state , code stroke called and HCT was obtained, patient treated with Keppra    (13 Apr 2022 05:39)    Heme hx:  Multiple Myeloma s/p Thal/ Dex 2008 to 2010. Velcade 2012 to 2013...treatment held due to neuropathy. It was resumed b/c of POD, treated again with VD. 3 weeks on, 1 week off.  pomalyst 2 mg every other day on 6/29/21. Increased Decadron 12 mg every Monday and Thursday.        Allergies    No Known Allergies    Intolerances        MEDICATIONS  (STANDING):  amLODIPine   Tablet 5 milliGRAM(s) Oral daily  atorvastatin 20 milliGRAM(s) Oral at bedtime  cholecalciferol 1000 Unit(s) Oral daily  dextrose 5%. 1000 milliLiter(s) (50 mL/Hr) IV Continuous <Continuous>  dextrose 5%. 1000 milliLiter(s) (100 mL/Hr) IV Continuous <Continuous>  dextrose 50% Injectable 25 Gram(s) IV Push once  dextrose 50% Injectable 12.5 Gram(s) IV Push once  folic acid 1 milliGRAM(s) Oral daily  gabapentin 200 milliGRAM(s) Oral at bedtime  gabapentin 100 milliGRAM(s) Oral daily  glucagon  Injectable 1 milliGRAM(s) IntraMuscular once  hydroxychloroquine 200 milliGRAM(s) Oral daily  insulin lispro (ADMELOG) corrective regimen sliding scale   SubCutaneous three times a day before meals  insulin lispro (ADMELOG) corrective regimen sliding scale   SubCutaneous at bedtime  levETIRAcetam  IVPB 500 milliGRAM(s) IV Intermittent every 12 hours  lidocaine   4% Patch 1 Patch Transdermal daily  pantoprazole    Tablet 40 milliGRAM(s) Oral before breakfast    MEDICATIONS  (PRN):  acetaminophen     Tablet .. 650 milliGRAM(s) Oral every 6 hours PRN Temp greater or equal to 38C (100.4F), Mild Pain (1 - 3)  dextrose Oral Gel 15 Gram(s) Oral once PRN Blood Glucose LESS THAN 70 milliGRAM(s)/deciliter  melatonin 3 milliGRAM(s) Oral at bedtime PRN Insomnia  polyethylene glycol 3350 17 Gram(s) Oral daily PRN Constipation  senna 2 Tablet(s) Oral at bedtime PRN Constipation      PAST MEDICAL & SURGICAL HISTORY:  Rheumatoid arthritis    Varicose veins of lower extremity    Hypertension    Multiple myeloma    No significant past surgical history        FAMILY HISTORY:  FHx: stroke (Father)        SOCIAL HISTORY: No EtOH, no tobacco    REVIEW OF SYSTEMS:    CONSTITUTIONAL: No weakness, fevers or chills  EYES/ENT: No visual changes;  No vertigo or throat pain   NECK: No pain or stiffness  RESPIRATORY: No cough, wheezing, hemoptysis; No shortness of breath  CARDIOVASCULAR: No chest pain or palpitations  GASTROINTESTINAL: No abdominal or epigastric pain. No nausea, vomiting, or hematemesis; No diarrhea or constipation. No melena or hematochezia.  GENITOURINARY: No dysuria, frequency or hematuria  NEUROLOGICAL: No numbness or weakness  SKIN: No itching, burning, rashes, or lesions   All other review of systems is negative unless indicated above.    Height (cm): 157.5 (04-12 @ 22:56), 157.5 (04-12 @ 20:29)  Weight (kg): 59.9 (04-12 @ 20:29)  BMI (kg/m2): 24.1 (04-12 @ 22:56), 24.1 (04-12 @ 20:29)  BSA (m2): 1.6 (04-12 @ 22:56), 1.6 (04-12 @ 20:29)    T(F): 97.2 (04-13-22 @ 13:30), Max: 98.6 (04-12-22 @ 20:29)  HR: 66 (04-13-22 @ 13:06) (63 - 130)  BP: 148/77 (04-13-22 @ 13:06)  RR: 18 (04-13-22 @ 13:30)  SpO2: 100% (04-13-22 @ 13:30) (98% - 100%)    Physical Exam  GENERAL: NAD, well-developed  HEAD:  Atraumatic, Normocephalic  EYES: EOMI, PERRLA, conjunctiva and sclera clear  NECK: Supple, No JVD  CHEST/LUNG: Clear to auscultation bilaterally; No wheeze  HEART: Regular rate and rhythm; No murmurs, rubs, or gallops  ABDOMEN: Soft, Nontender, Nondistended; Bowel sounds present  EXTREMITIES:  2+ Peripheral Pulses, No clubbing, cyanosis, or edema  NEUROLOGY: non-focal  SKIN: No rashes or lesions                          9.4    5.66  )-----------( 199      ( 13 Apr 2022 08:35 )             28.4       04-13    138  |  109<H>  |  32<H>  ----------------------------<  267<H>  4.5   |  18<L>  |  1.34<H>    Ca    8.6      13 Apr 2022 08:35    TPro  6.3  /  Alb  3.4  /  TBili  0.4  /  DBili  x   /  AST  16  /  ALT  20  /  AlkPhos  125<H>  04-13          Vitamin B12, Serum: 775 pg/mL (04-13-22 @ 12:27)      Imaging:  < from: CT Head No Cont (04.13.22 @ 04:10) >    IMPRESSION:  No significant change when compared with prior study.    No CT evidence of acute, large transcortical infarct. MR brain can be   obtained for further evaluation if clinical concern remains.    < end of copied text >  < from: CT Angio Head w/ IV Cont (04.12.22 @ 21:24) >  IMPRESSION:    CT PERFUSION: No regional perfusion abnormality.    CTA BRAIN: No associated vascular malformation or aneurysm associated   with the small left occipital subarachnoid bleed.    Patent intracranial circulation. No flow-limiting stenosis or occlusion.    Hypervascular right inferior frontal convexity extra-axial lesion   measuring approximately 1 cm likely reflects a meningioma.    CTANECK: Patent cervical vasculature. No flow limiting stenosis or   occlusion.    Sequelae of prior pulmonary granulomatous disease.      < from: CT Angio Neck w/ IV Cont (04.12.22 @ 21:23) >  IMPRESSION:    CT PERFUSION: No regional perfusion abnormality.    CTA BRAIN: No associated vascular malformation or aneurysm associated   with the small left occipital subarachnoid bleed.    Patent intracranial circulation. No flow-limiting stenosis or occlusion.    Hypervascular right inferior frontal convexity extra-axial lesion   measuring approximately 1 cm likely reflects a meningioma.    CTANECK: Patent cervical vasculature. No flow limiting stenosis or   occlusion.    Sequelae of prior pulmonary granulomatous disease.      < from: CT Brain Stroke Protocol (04.12.22 @ 21:21) >    IMPRESSION:    Suggestion of small left occipital subarachnoid bleed. No local mass   effect or midline shift.       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   Per family, patient has been increasing confused over the past few days. On day prior to admission, patient right leg shaking  noticed by granddaughter. On day of admission, patient was in usual state of health per home attendant , later in the evening ,patient had a transient episode where she became confused , started slurring speech and talking nonsense. Prior to the incident , patient has no specific complaints , no recent illness , no fever or chills. No chest pain or SOB , and no palpitations. Per family , there were no recent falls or injuries. Patient currently reports lower back pain , which family states is chronic.   ED course: patient evaluated by neurosurgery , patient evaluated by neurology ,  patient had episode of Altered state , code stroke called and HCT was obtained, patient treated with Keppra    (13 Apr 2022 05:39)    Heme hx:  Multiple Myeloma s/p Thal/ Dex 2008 to 2010. Velcade 2012 to 2013...treatment held due to neuropathy. It was resumed b/c of POD, treated again with VD. 3 weeks on, 1 week off.  pomalyst 2 mg every other day on 6/29/21. Increased Decadron 12 mg every Monday and Thursday.        Allergies    No Known Allergies    Intolerances        MEDICATIONS  (STANDING):  amLODIPine   Tablet 5 milliGRAM(s) Oral daily  atorvastatin 20 milliGRAM(s) Oral at bedtime  cholecalciferol 1000 Unit(s) Oral daily  dextrose 5%. 1000 milliLiter(s) (50 mL/Hr) IV Continuous <Continuous>  dextrose 5%. 1000 milliLiter(s) (100 mL/Hr) IV Continuous <Continuous>  dextrose 50% Injectable 25 Gram(s) IV Push once  dextrose 50% Injectable 12.5 Gram(s) IV Push once  folic acid 1 milliGRAM(s) Oral daily  gabapentin 200 milliGRAM(s) Oral at bedtime  gabapentin 100 milliGRAM(s) Oral daily  glucagon  Injectable 1 milliGRAM(s) IntraMuscular once  hydroxychloroquine 200 milliGRAM(s) Oral daily  insulin lispro (ADMELOG) corrective regimen sliding scale   SubCutaneous three times a day before meals  insulin lispro (ADMELOG) corrective regimen sliding scale   SubCutaneous at bedtime  levETIRAcetam  IVPB 500 milliGRAM(s) IV Intermittent every 12 hours  lidocaine   4% Patch 1 Patch Transdermal daily  pantoprazole    Tablet 40 milliGRAM(s) Oral before breakfast    MEDICATIONS  (PRN):  acetaminophen     Tablet .. 650 milliGRAM(s) Oral every 6 hours PRN Temp greater or equal to 38C (100.4F), Mild Pain (1 - 3)  dextrose Oral Gel 15 Gram(s) Oral once PRN Blood Glucose LESS THAN 70 milliGRAM(s)/deciliter  melatonin 3 milliGRAM(s) Oral at bedtime PRN Insomnia  polyethylene glycol 3350 17 Gram(s) Oral daily PRN Constipation  senna 2 Tablet(s) Oral at bedtime PRN Constipation      PAST MEDICAL & SURGICAL HISTORY:  Rheumatoid arthritis    Varicose veins of lower extremity    Hypertension    Multiple myeloma    No significant past surgical history        FAMILY HISTORY:  FHx: stroke (Father)        SOCIAL HISTORY: No EtOH, no tobacco    REVIEW OF SYSTEMS:    CONSTITUTIONAL: + weakness, fevers or chills  EYES/ENT: No visual changes;  No vertigo or throat pain   NECK: No pain or stiffness  RESPIRATORY: No cough, wheezing, hemoptysis; No shortness of breath  CARDIOVASCULAR: No chest pain or palpitations  GASTROINTESTINAL: No abdominal or epigastric pain. No nausea, vomiting, or hematemesis; No diarrhea or constipation. No melena or hematochezia.  GENITOURINARY: No dysuria, frequency or hematuria  NEUROLOGICAL: +slurred speech, +Confusion  SKIN: No itching, burning, rashes, or lesions   All other review of systems is negative unless indicated above.    Height (cm): 157.5 (04-12 @ 22:56), 157.5 (04-12 @ 20:29)  Weight (kg): 59.9 (04-12 @ 20:29)  BMI (kg/m2): 24.1 (04-12 @ 22:56), 24.1 (04-12 @ 20:29)  BSA (m2): 1.6 (04-12 @ 22:56), 1.6 (04-12 @ 20:29)    T(F): 97.2 (04-13-22 @ 13:30), Max: 98.6 (04-12-22 @ 20:29)  HR: 66 (04-13-22 @ 13:06) (63 - 130)  BP: 148/77 (04-13-22 @ 13:06)  RR: 18 (04-13-22 @ 13:30)  SpO2: 100% (04-13-22 @ 13:30) (98% - 100%)    Physical Exam  GENERAL: NAD, well-developed  HEAD:  Atraumatic, Normocephalic  EYES: EOMI, PERRLA, conjunctiva and sclera clear  NECK: Supple, No JVD  CHEST/LUNG: Clear to auscultation bilaterally; No wheeze  HEART: Regular rate and rhythm; No murmurs, rubs, or gallops  ABDOMEN: Soft, Nontender, Nondistended; Bowel sounds present  EXTREMITIES:  2+ Peripheral Pulses, No clubbing, cyanosis, or edema  NEUROLOGY: non-focal  SKIN: No rashes or lesions                          9.4    5.66  )-----------( 199      ( 13 Apr 2022 08:35 )             28.4       04-13    138  |  109<H>  |  32<H>  ----------------------------<  267<H>  4.5   |  18<L>  |  1.34<H>    Ca    8.6      13 Apr 2022 08:35    TPro  6.3  /  Alb  3.4  /  TBili  0.4  /  DBili  x   /  AST  16  /  ALT  20  /  AlkPhos  125<H>  04-13          Vitamin B12, Serum: 775 pg/mL (04-13-22 @ 12:27)      Imaging:  < from: CT Head No Cont (04.13.22 @ 04:10) >    IMPRESSION:  No significant change when compared with prior study.    No CT evidence of acute, large transcortical infarct. MR brain can be   obtained for further evaluation if clinical concern remains.    < end of copied text >  < from: CT Angio Head w/ IV Cont (04.12.22 @ 21:24) >  IMPRESSION:    CT PERFUSION: No regional perfusion abnormality.    CTA BRAIN: No associated vascular malformation or aneurysm associated   with the small left occipital subarachnoid bleed.    Patent intracranial circulation. No flow-limiting stenosis or occlusion.    Hypervascular right inferior frontal convexity extra-axial lesion   measuring approximately 1 cm likely reflects a meningioma.    CTANECK: Patent cervical vasculature. No flow limiting stenosis or   occlusion.    Sequelae of prior pulmonary granulomatous disease.      < from: CT Angio Neck w/ IV Cont (04.12.22 @ 21:23) >  IMPRESSION:    CT PERFUSION: No regional perfusion abnormality.    CTA BRAIN: No associated vascular malformation or aneurysm associated   with the small left occipital subarachnoid bleed.    Patent intracranial circulation. No flow-limiting stenosis or occlusion.    Hypervascular right inferior frontal convexity extra-axial lesion   measuring approximately 1 cm likely reflects a meningioma.    CTANECK: Patent cervical vasculature. No flow limiting stenosis or   occlusion.    Sequelae of prior pulmonary granulomatous disease.      < from: CT Brain Stroke Protocol (04.12.22 @ 21:21) >    IMPRESSION:    Suggestion of small left occipital subarachnoid bleed. No local mass   effect or midline shift.

## 2022-04-13 NOTE — H&P ADULT - NSHPPHYSICALEXAM_GEN_ALL_CORE
Vital Signs Last 24 Hrs  T(C): 36.6 (13 Apr 2022 03:15), Max: 37 (12 Apr 2022 20:29)  T(F): 97.9 (13 Apr 2022 03:15), Max: 98.6 (12 Apr 2022 20:29)  HR: 84 (13 Apr 2022 03:15) (63 - 94)  BP: 145/66 (13 Apr 2022 03:15) (114/65 - 153/65)  BP(mean): --  RR: 17 (13 Apr 2022 03:15) (17 - 20)  SpO2: 100% (13 Apr 2022 03:15) (98% - 100%) Vital Signs Last 24 Hrs  T(C): 36.6 (13 Apr 2022 03:15), Max: 37 (12 Apr 2022 20:29)  T(F): 97.9 (13 Apr 2022 03:15), Max: 98.6 (12 Apr 2022 20:29)  HR: 84 (13 Apr 2022 03:15) (63 - 94)  BP: 145/66 (13 Apr 2022 03:15) (114/65 - 153/65)  BP(mean): --  RR: 17 (13 Apr 2022 03:15) (17 - 20)  SpO2: 100% (13 Apr 2022 03:15) (98% - 100%)    GENERAL: No acute distress, well-developed, alert , oriented to self and place , not time   HEAD:  Atraumatic, Normocephalic  ENT: EOMI, PERRLA, conjunctiva and sclera clear,  moist mucosa no pharyngeal erythema or exudates   NECK: supple , no JVD   CHEST/LUNG: Clear to auscultation bilaterally; No wheeze, equal breath sounds bilaterally   BACK: No spinal tenderness,  No CVA tenderness   HEART: Regular rate and rhythm; No murmurs, rubs, or gallops  ABDOMEN: Soft, Nontender, Nondistended; Bowel sounds present  EXTREMITIES:  No clubbing, cyanosis,+ 1 pitting edema  MSK: No joint swelling or effusions, ROM intact   PSYCH: Normal behavior/affect  NEUROLOGY: + dysarthria ,  AAOx3, non-focal, cranial nerves intact  SKIN: Normal color, No rashes or lesions

## 2022-04-13 NOTE — SPEECH LANGUAGE PATHOLOGY EVALUATION - SLP CONVERSATIONAL SPEECH
Fluent, grammatical speech. Single instance of word-finding difficulty noted during conversation, "what's it called..."

## 2022-04-13 NOTE — SWALLOW BEDSIDE ASSESSMENT ADULT - SLP GENERAL OBSERVATIONS
Pt was received at bedside attempting to self-feed lunch (spilled food noted on bed). +NC (2L). She was AAOx2 to self and general location. Unable to recall date or reason for hospital admission. Speech noted to be mildly dysarthric and instance of word-finding difficulty noted ("what's it called..."). Pt able to follow ~75% of basic commands and answer simple questions for purposes of evaluation.

## 2022-04-13 NOTE — SWALLOW BEDSIDE ASSESSMENT ADULT - SWALLOW EVAL: ANTICIPATED DISCHARGE DISPOSITION
TBD pending further workup. Recommend ST upon d/c for speech/language deficits noted during eval (requested SLE).

## 2022-04-13 NOTE — CONSULT NOTE ADULT - SUBJECTIVE AND OBJECTIVE BOX
[facility] Consult Note Nephrology - CONSULTATION NOTE    82 y.o. LHW with a PMH of HTN, rheumatoid arthritis, multiple myeloma, who presented to OSH with c/o slurred speech and confusion, found to have a small left occipital SAH, transferred to [facility] for further management. EVELIN 14:30 on 22. While in the ED a stroke code was activated at 03:56 for worsening dysarthria and right sided weakness. Per providers the patient was previously reported to be moving all extremities, but at that time was not moving her right upper and lower extremities to command. On evaluation, the patient was noted to be moving all extremities spontaneously and without difficulty. However questionable slight preference with respect to the left side, as although she intermittently moved the RUE/RLE to the same degree as the left, she sometimes required repeated prompting to properly elicit an action on the right. Per providers at bedside, symptoms have been inconsistent and somewhat fluctuating in quality since presentation. Of note, the patient is also reported to have had seizure-like activity just prior to activation of the stroke code, for which received Keppra. However mental status noted to be at baseline at time of evaluation.    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 1000 milliLiter(s) (50 mL/Hr) IV Continuous <Continuous>  dextrose 5%. 1000 milliLiter(s) (100 mL/Hr) IV Continuous <Continuous>  dextrose 50% Injectable 25 Gram(s) IV Push once  dextrose 50% Injectable 12.5 Gram(s) IV Push once  folic acid 1 milliGRAM(s) Oral daily  gabapentin 200 milliGRAM(s) Oral at bedtime  gabapentin 100 milliGRAM(s) Oral daily  glucagon  Injectable 1 milliGRAM(s) IntraMuscular once  hydroxychloroquine 200 milliGRAM(s) Oral daily  insulin lispro (ADMELOG) corrective regimen sliding scale   SubCutaneous three times a day before meals  insulin lispro (ADMELOG) corrective regimen sliding scale   SubCutaneous at bedtime  levETIRAcetam  IVPB 500 milliGRAM(s) IV Intermittent every 12 hours  lidocaine   4% Patch 1 Patch Transdermal daily  pantoprazole    Tablet 40 milliGRAM(s) Oral before breakfast    SOCIAL HISTORY:  Denies ETOh,Smoking,   FAMILY HISTORY:  FHx: stroke (Father)      REVIEW OF SYSTEMS:  CONSTITUTIONAL: No weakness, fevers or chills  EYES/ENT: No visual changes;  No vertigo or throat pain   NECK: No pain or stiffness  RESPIRATORY: No cough, wheezing, hemoptysis; No shortness of breath  CARDIOVASCULAR: No chest pain or palpitations.  GASTROINTESTINAL: No abdominal or epigastric pain. No nausea, vomiting, or hematemesis; No diarrhea or constipation. No melena or hematochezia.  GENITOURINARY: No dysuria, frequency, foamy urine, urinary urgency, incontinence or hematuria  NEUROLOGICAL: No numbness or weakness  SKIN: No itching, burning, rashes, or lesions   VASCULAR: No bilateral lower extremity edema.   All other review of systems is negative unless indicated above.    VITALS:  T(F): 97.9 (22 @ 07:10), Max: 98.6 (22 @ 20:29)  HR: 68 (22 @ 07:10)  BP: 146/70 (22 @ 07:10)  RR: 17 (22 @ 07:10)  SpO2: 100% (22 @ 07:10)  Wt(kg): --    Height (cm): 157.5 ( @ 22:56), 157.5 ( @ 20:29)  Weight (kg): 59.9 ( @ 20:29)  BMI (kg/m2): 24.1 ( @ 22:56), 24.1 ( @ 20:29)  BSA (m2): 1.6 ( @ 22:56), 1.6 ( @ 20:29)  PHYSICAL EXAM:  Constitutional: NAD  HEENT: anicteric sclera, oropharynx clear, MMM  Neck: No JVD  Respiratory: CTAB, no wheezes, rales or rhonchi  Cardiovascular: S1, S2, RRR  Gastrointestinal: BS+, soft, NT/ND  Extremities: No cyanosis or clubbing. No peripheral edema  Neurological: A/O x 3, no focal deficits  Psychiatric: Normal mood, normal affect  : No CVA tenderness. No neves.   Skin: No rashes    LABS:      138  |  109<H>  |  32<H>  ----------------------------<  267<H>  4.5   |  18<L>  |  1.34<H>    Ca    8.6      2022 08:35    TPro  6.3  /  Alb  3.4  /  TBili  0.4  /  DBili      /  AST  16  /  ALT  20  /  AlkPhos  125<H>      Creatinine Trend: 1.34 <--, 1.46 <--, 1.76 <--, 2.31 <--                        9.4    5.66  )-----------( 199      ( 2022 08:35 )             28.4     Urine Studies:  Urinalysis Basic - ( 2022 04:40 )    Color: Colorless / Appearance: Clear / S.020 / pH:   Gluc:  / Ketone: Negative  / Bili: Negative / Urobili: Negative   Blood:  / Protein: Trace / Nitrite: Negative   Leuk Esterase: Negative / RBC: 2 /hpf / WBC 0 /HPF   Sq Epi:  / Non Sq Epi: 0 /hpf / Bacteria: Negative        RADIOLOGY & ADDITIONAL STUDIES:

## 2022-04-13 NOTE — SWALLOW BEDSIDE ASSESSMENT ADULT - SLP PERTINENT HISTORY OF CURRENT PROBLEM
81y/o F with PMH of HTN, multiple myeloma, RA on ASA81, remote h/o CVA no residual, presented to [facility] for AMS and slurred speech on day of admission. Pt was noted to have mixed aphasia, as well as RUE weakness with positive pronator drift. She was evaluated for stroke. CTH showed possible SAH, and patient was transferred to [facility] for neurosurgical evaluation. Pt currently reports chronic lower back pain.

## 2022-04-13 NOTE — PHYSICAL THERAPY INITIAL EVALUATION ADULT - PHYSICAL ASSIST/NONPHYSICAL ASSIST: SUPINE/SIT, REHAB EVAL
verbal cues/nonverbal cues (demo/gestures)/2 person assist via log rolling/verbal cues/nonverbal cues (demo/gestures)/2 person assist

## 2022-04-13 NOTE — H&P ADULT - NSHPLABSRESULTS_GEN_ALL_CORE
Labs personally reviewed:                          9.5    5.05  )-----------( 207      ( 2022 23:31 )             28.4     04-12    133<L>  |  102  |  44<H>  ----------------------------<  440<H>  4.8   |  18<L>  |  1.76<H>    Ca    8.8      2022 23:31    TPro  6.3  /  Alb  3.5  /  TBili  0.3  /  DBili  x   /  AST  16  /  ALT  18  /  AlkPhos  187<H>  04-12        LIVER FUNCTIONS - ( 2022 23:31 )  Alb: 3.5 g/dL / Pro: 6.3 g/dL / ALK PHOS: 187 U/L / ALT: 18 U/L / AST: 16 U/L / GGT: x           PT/INR - ( 2022 23:31 )   PT: 11.5 sec;   INR: 1.00 ratio         PTT - ( 2022 23:31 )  PTT:20.9 sec  Urinalysis Basic - ( 2022 04:40 )    Color: Colorless / Appearance: Clear / S.020 / pH: x  Gluc: x / Ketone: Negative  / Bili: Negative / Urobili: Negative   Blood: x / Protein: Trace / Nitrite: Negative   Leuk Esterase: Negative / RBC: 2 /hpf / WBC 0 /HPF   Sq Epi: x / Non Sq Epi: 0 /hpf / Bacteria: Negative      CAPILLARY BLOOD GLUCOSE      POCT Blood Glucose.: 512 mg/dL (2022 20:53)      Imaging:  CT head:  Suggestion of small left occipital subarachnoid bleed. No local mass   effect or midline shift.    4hour repeat CT head at 1:00am: No significant change when compared with prior study.  CT head at 4:00am for acute change in mental state : No significant change when compared with prior study.    No CT evidence of acute, large transcortical infarct. MR brain can be   obtained for further evaluation if clinical concern remains.  CTA head and neck:   CT PERFUSION: No regional perfusion abnormality.    CTA BRAIN: No associated vascular malformation or aneurysm associated   with the small left occipital subarachnoid bleed.    Patent intracranial circulation. No flow-limiting stenosis or occlusion.    Hypervascular right inferior frontal convexity extra-axial lesion   measuring approximately 1 cm likely reflects a meningioma.    CTA NECK: Patent cervical vasculature. No flow limiting stenosis or   occlusion.    Sequelae of prior pulmonary granulomatous disease.      EKG : Labs personally reviewed:                          9.5    5.05  )-----------( 207      ( 2022 23:31 )             28.4     04-12    133<L>  |  102  |  44<H>  ----------------------------<  440<H>  4.8   |  18<L>  |  1.76<H>    Ca    8.8      2022 23:31    TPro  6.3  /  Alb  3.5  /  TBili  0.3  /  DBili  x   /  AST  16  /  ALT  18  /  AlkPhos  187<H>  04-12        LIVER FUNCTIONS - ( 2022 23:31 )  Alb: 3.5 g/dL / Pro: 6.3 g/dL / ALK PHOS: 187 U/L / ALT: 18 U/L / AST: 16 U/L / GGT: x           PT/INR - ( 2022 23:31 )   PT: 11.5 sec;   INR: 1.00 ratio         PTT - ( 2022 23:31 )  PTT:20.9 sec  Urinalysis Basic - ( 2022 04:40 )    Color: Colorless / Appearance: Clear / S.020 / pH: x  Gluc: x / Ketone: Negative  / Bili: Negative / Urobili: Negative   Blood: x / Protein: Trace / Nitrite: Negative   Leuk Esterase: Negative / RBC: 2 /hpf / WBC 0 /HPF   Sq Epi: x / Non Sq Epi: 0 /hpf / Bacteria: Negative      CAPILLARY BLOOD GLUCOSE      POCT Blood Glucose.: 512 mg/dL (2022 20:53)      Imaging:  CT head:  Suggestion of small left occipital subarachnoid bleed. No local mass   effect or midline shift.    4hour repeat CT head at 1:00am: No significant change when compared with prior study.  CT head at 4:00am for acute change in mental state : No significant change when compared with prior study.    No CT evidence of acute, large transcortical infarct. MR brain can be   obtained for further evaluation if clinical concern remains.  CTA head and neck:   CT PERFUSION: No regional perfusion abnormality.    CTA BRAIN: No associated vascular malformation or aneurysm associated   with the small left occipital subarachnoid bleed.    Patent intracranial circulation. No flow-limiting stenosis or occlusion.    Hypervascular right inferior frontal convexity extra-axial lesion   measuring approximately 1 cm likely reflects a meningioma.    CTA NECK: Patent cervical vasculature. No flow limiting stenosis or   occlusion.    Sequelae of prior pulmonary granulomatous disease.      EKG : ordered

## 2022-04-13 NOTE — ED ADULT NURSE REASSESSMENT NOTE - NS ED NURSE REASSESS COMMENT FT1
Patient's neuro assessment reassessed and patient able to move all 4 extremities without difficulty. Neuro flow sheet updated in patient's paper chart.

## 2022-04-13 NOTE — CONSULT NOTE ADULT - ATTENDING COMMENTS
81 y/o F w/ history of R/R multiple myeloma, also with history of TIA who presented to OSH for altered mental state and slurred speech and was ultimatley found to have SAH and transferred to [facility] for neurosurgery evaluation. Management of CNS hemorrhage as per primary team. Most recently she has been on pomalidomide and velcade with dexamethasone with a good partial response. Platelet levels have been mostly normal. Although pomalidomide certainly increases risk for hemorrhage, this is typically due to myelosuppression and thrombocytopenia, which isn't the case here. Hold pomalidomide and velcade for now, will follow along.
Briefly   82 y.o. LHW with HTN, rheumatoid arthritis, multiple myeloma, who presented to OSH with c/o slurred speech and confusion, found to have a small left occipital SAH, transferred to [facility] for further management.   LAURENN 14:30 on 4/12/22. While in the ED a stroke code was activated at 03:56 for worsening dysarthria and right sided weakness. Per providers at bedside, symptoms have been inconsistent and somewhat fluctuating in quality since presentation, and the patient may have had seizure-like activity just prior to activation of the stroke code, for which received Keppra.  Initial VSS and wnl. On exam patient noted to be at baseline mental status, mildly dysarthric, but fluent and moving all extremities against gravity, with RUE drift noted. Follows all commands, however intermittently required repeated prompting to properly elicit actions on the right, which when done, was to the same degree as the left. Initial labs significant for serum glucose 530, BUN 48, Cr 2.31, sodium 132, , Hb 9.5.   CTH w/o: significant for small left occipital SAH noted to be stable on repeat imaging.   CTA H/N w/ does not demonstrate flow limiting stenosis or occlusion.     NIHSS: 3  MRS: 3    Impression: Fluctuating dysarthria and right sided weakness due to left hemispheric dysfunction possibly secondary to left SAH vs stroke mimic in the setting hyperglycemia vs seizure vs less likely acute stroke. Possible annita's paresis following reported witnessed seizure. Rule out other underlying toxic-metabolic or infectious etiologies. unclear etiology of SAH     Recommendations:  - Follow up MRI/MRA, and MR NOVA per nsx ; would also add on MRV   - Stat CTH prn for worsening mental status or neuro exam  - TTE  - Routine EEG (Sunrise order name: EEG awake and asleep)   - HgA1c, Lipid profile  - Hold ASA and lovenox, use mechanical DVT prophylaxis for now  -  Keppra 500mg Q12H  - Strict BP control goal <160/90   - Appreciate Neurosurgery eval  - Telemetry Monitoring, Neuro checks/vitals per unit protocol  - NPO unless passes bedside swallow, otherwise swallow eval  - ISS and POCT glucose monitoring  - BG goal <180, avoid hypoglycemia  - PT/OT/SLP evaluation  - Fall, aspiration, seizure precautions  - Counseling on diet, exercise, and medication adherence was done  - Counseling on smoking cessation and alcohol consumption offered when appropriate.  - Pain assessed and judicious use of narcotics when appropriate was discussed.    - Stroke education given when appropriate.  - Importance of fall prevention discussed.   - Differential diagnosis and plan of care discussed with patient and/or family and primary team  - Thank you for allowing me to participate in the care of this patient. Call with questions.   Clem Roberto MD  Vascular Neurology

## 2022-04-13 NOTE — CONSULT NOTE ADULT - SUBJECTIVE AND OBJECTIVE BOX
[facility] Neurological [facility]        Patient is a 82y old  Female who presents with a chief complaint of AMS x1 day (2022 13:33)    Excerpt from H&P,"   Patient is an 82 year old female w/pmh  significant for HTN, multiple myeloma, RA on ASA81, remote h/o CVA no residual , presented  to  [facility] for altered mental state and slurred speech on day of admission,  patient was noted to have mixed aphasia , as well as right upper extremity weakness with positive pronator drift ,  she was evaluated for stroke , CT head showed possible sub arachnoid hemorrhage , and patient was transferred to [facility] for neurosurgical evaluation.   Per family, patient has been increasing confused over the past few days. On day prior to admission, patient right leg shaking  noticed by granddaughter. On day of admission, patient was in usual state of health per home attendant , later in the evening ,patient had a transient episode where she became confused , started slurring speech and talking nonsense. Prior to the incident , patient has no specific complaints , no recent illness , no fever or chills. No chest pain or SOB , and no palpitations. Per family , there were no recent falls or injuries. Patient currently reports lower back pain , which family states is chronic.   ED course: patient evaluated by neurosurgery ,  no intervention    (2022 05:39)           *****PAST MEDICAL / Surgical  HISTORY:  PAST MEDICAL & SURGICAL HISTORY:  Rheumatoid arthritis    Varicose veins of lower extremity    Hypertension    Multiple myeloma    No significant past surgical history             *****FAMILY HISTORY:  FAMILY HISTORY:  FHx: stroke (Father)             *****SOCIAL HISTORY:  Alcohol: None  Smoking: None         *****ALLERGIES:   Allergies    No Known Allergies    Intolerances             *****MEDICATIONS: current medication reviewed and documented.   MEDICATIONS  (STANDING):  amLODIPine   Tablet 5 milliGRAM(s) Oral daily  atorvastatin 20 milliGRAM(s) Oral at bedtime  cholecalciferol 1000 Unit(s) Oral daily  dextrose 5%. 1000 milliLiter(s) (50 mL/Hr) IV Continuous <Continuous>  dextrose 5%. 1000 milliLiter(s) (100 mL/Hr) IV Continuous <Continuous>  dextrose 50% Injectable 25 Gram(s) IV Push once  dextrose 50% Injectable 12.5 Gram(s) IV Push once  folic acid 1 milliGRAM(s) Oral daily  gabapentin 200 milliGRAM(s) Oral at bedtime  gabapentin 100 milliGRAM(s) Oral daily  glucagon  Injectable 1 milliGRAM(s) IntraMuscular once  hydroxychloroquine 200 milliGRAM(s) Oral daily  insulin glargine Injectable (LANTUS) 6 Unit(s) SubCutaneous at bedtime  insulin lispro (ADMELOG) corrective regimen sliding scale   SubCutaneous three times a day before meals  insulin lispro (ADMELOG) corrective regimen sliding scale   SubCutaneous at bedtime  insulin lispro Injectable (ADMELOG) 3 Unit(s) SubCutaneous three times a day before meals  levETIRAcetam  IVPB 500 milliGRAM(s) IV Intermittent every 12 hours  lidocaine   4% Patch 1 Patch Transdermal daily  pantoprazole    Tablet 40 milliGRAM(s) Oral before breakfast    MEDICATIONS  (PRN):  acetaminophen     Tablet .. 650 milliGRAM(s) Oral every 6 hours PRN Temp greater or equal to 38C (100.4F), Mild Pain (1 - 3)  dextrose Oral Gel 15 Gram(s) Oral once PRN Blood Glucose LESS THAN 70 milliGRAM(s)/deciliter  melatonin 3 milliGRAM(s) Oral at bedtime PRN Insomnia  polyethylene glycol 3350 17 Gram(s) Oral daily PRN Constipation  senna 2 Tablet(s) Oral at bedtime PRN Constipation           *****REVIEW OF SYSTEM:  GEN: no fever, no chills, no pain  RESP: no SOB, no cough, no sputum  CVS: no chest pain, no palpitations, no edema  GI: no abdominal pain, no nausea, no vomiting, no constipation, no diarrhea  : no dysurea, no frequency, no hematurea  Neuro: no headache, no dizziness  PSYCH: no anxiety, no depression  Derm : no itching, no rash         *****VITAL SIGNS:  T(C): 37.2 (22 @ 22:16), Max: 37.2 (22 @ 22:16)  HR: 96 (22 @ 22:16) (63 - 130)  BP: 126/74 (22 @ 22:16) (114/65 - 151/64)  RR: 18 (22 @ 22:16) (16 - 20)  SpO2: 98% (22 @ 22:16) (98% - 100%)  Wt(kg): --     @ 07:01  -   @ 22:19  --------------------------------------------------------  IN: 200 mL / OUT: 750 mL / NET: -550 mL             *****PHYSICAL EXAM:   Alert oriented x 3   Attention comprehension are fair. Able to name, repeat, read without any difficulty.   Able to follow 3 step commands.     EOMI fundi not visualized,  VFF to confrontration  No facial asymmetry   Tongue is midline   Palate elevates symmetrically   Moving all 4 ext symmetrically no pronator drift   Reflexes are symmetric throughout   sensation is grossly symmetric  Gait : not assessed.  B/L down going toes               *****LAB AND IMAGIN.4    5.66  )-----------( 199      ( 2022 08:35 )             28.4                   138  |  109<H>  |  32<H>  ----------------------------<  267<H>  4.5   |  18<L>  |  1.34<H>    Ca    8.6      2022 08:35    TPro  6.3  /  Alb  3.4  /  TBili  0.4  /  DBili  x   /  AST  16  /  ALT  20  /  AlkPhos  125<H>      PT/INR - ( 2022 23:31 )   PT: 11.5 sec;   INR: 1.00 ratio         PTT - ( 2022 23:31 )  PTT:20.9 sec                        Urinalysis Basic - ( 2022 04:40 )    Color: Colorless / Appearance: Clear / S.020 / pH: x  Gluc: x / Ketone: Negative  / Bili: Negative / Urobili: Negative   Blood: x / Protein: Trace / Nitrite: Negative   Leuk Esterase: Negative / RBC: 2 /hpf / WBC 0 /HPF   Sq Epi: x / Non Sq Epi: 0 /hpf / Bacteria: Negative    < from: MR Head w/wo IV Cont (22 @ 15:04) >  IMPRESSION: Age-appropriate involutional and ischemic gliotic changes.   Left occipital subarachnoid hemorrhage is not appreciated on this   examination as it may be isointense signal intensity CSF. No acute   infarcts or abnormal enhancement. Incidental small right frontal   pterional meningioma. Small vessel white matter ischemic changes. Normal   noninvasive MRA angiography.    < end of copied text >      [All pertinent recent Imaging reports reviewed]         *****A S S E S S M E N T   A N D   P L A N   :Excerpt from H&P,"   Patient is an 82 year old female w/pmh  significant for HTN, multiple myeloma, RA on ASA81, remote h/o CVA no residual , presented  to  [facility] for altered mental state and slurred speech on day of admission,  patient was noted to have mixed aphasia , as well as right upper extremity weakness with positive pronator drift ,  she was evaluated for stroke , CT head showed possible sub arachnoid hemorrhage , and patient was transferred to [facility] for neurosurgical evaluation.   Per family, patient has been increasing confused over the past few days. On day prior to admission, patient right leg shaking  noticed by granddaughter. On day of admission, patient was in usual state of health per home attendant , later in the evening ,patient had a transient episode where she became confused , started slurring speech and talking nonsense. Prior to the incident , patient has no specific complaints , no recent illness , no fever or chills. No chest pain or SOB , and no palpitations. Per family , there were no recent falls or injuries. Patient currently reports lower back pain , which family states is chronic.            Problem/Recommendations 1:  ams r/o seizures   ddx hyperglycemia related ams   a1c 11.5   nutrition consult   eeg   keppra 500 bid         Problem/Recommendations 2: sah of unclear etiology   mri did not confirm presence of sah  pseudonormalization   neurosurgery no interventions   ct head x2 showing sah   etiology is not entirely clear   neuro checks          pt at risk for developing worsening  delirium, therefore please institute the following preventative measures if possible          - initiating early mobilization          -minimizing "tethers" - IV, oxygen, catheters, etc          -avoiding   sedatives          -maintaining hydration/nutrition          -avoid anticholinergics - diphenhydramine, etc          -pain control          -sleep wake cycle regulation; avoid day time somnolence           -supportive environment    ___________________________  Will follow with you.  Thank you,  Paradise Wallace MD  Diplomate of the American Board of Neurology and Psychiatry.  Diplomate of the American Board of Vascular Neurology.   [facility] Neurological [facility] ([facility]), [facility]   Ph: [phone]    Differential diagnosis and plan of care discussed with patient after the evaluation.   Advanced care planning options discussed.   Pain assessed and judicious use of narcotics when appropriate was discussed.  Importance of Fall prevention discussed.  Counseling on Smoking and Alcohol cessation was offered when appropriate.  Counseling on Diet, exercise, and medication compliance was done.   83 minutes spent on the total encounter;  more than 50 % of the visit was spent on counseling  and or coordinating care by the attending physician.    Thank you for allowing me to participate in the care of this eric patient. Please do not hesitate to call me if you have any questions.     This and subsequent notes  will  inherently be subject to errors including those of syntax and substitutions which may escape proofreading. In such instances original meaning may be extrapolated by contextual derivation.

## 2022-04-13 NOTE — H&P ADULT - PROBLEM SELECTOR PLAN 1
Repeat CTH stable   - Neurosurgery consult appreciated, day team to follow up further recommendations   - Neurology consult appreciated, day team to follow up further recommendations   - Follow up MRI/MRA/MR NOVA   - HgA1c, Lipid profile, b12    - EEG   - TTE   - Telemetry   - continue keprra   - Hold ASA and avoid lovenox,  - seizure precautions   - aspiration precautions   - speech and swallow   - dysphagia diet

## 2022-04-13 NOTE — SPEECH LANGUAGE PATHOLOGY EVALUATION - SLP DIAGNOSIS
81 y/o F p/w fluctuating dysarthria and right sided weakness 2/2 left hemispheric dysfunction possibly 2/2 left SAH vs other--awaiting MRI BRAIN). Pt was seen today for Speech Language Evaluation which revealed mixed expressive and receptive language deficits, cognitive-linguistic deficits, and mild dysarthria. Receptive language characterized by difficulty with 3-step commands and answering basic Y/N questions. Speech was fluent and grammatical. Single instance of word-finding difficulty noted in conversation. Difficulty with convergent naming. Cognitive-linguistic deficits noted in the domains of orientation, reasoning, organization, memory, and executive function. Mild dysarthria noted characterized by mild slurred speech/imprecise consonant articulation (worsens as length of utterance increases). Reading and writing skills significantly impaired. c/f left side neglect given observations during clock drawing task and reading tasks.

## 2022-04-13 NOTE — PHYSICAL THERAPY INITIAL EVALUATION ADULT - SOCIAL CONCERNS
addendum 4/18:  per neurosurgery consult 4/14 at 22:59: CT T/L 4/14 at 17:49: multilevel chronic comp fx sherrie at T9, T12, L2, L3, and likley unstable T10 fishmouth deformity w/ fx line into b/l pedicles likely ustable, not seen on prior xray from 2021. per progress note 4/15: "While the T10 fracture is grossly unstable and we would recommend surgical stabilization due to her DISH and 3-column injury, her medical co-morbidities make the surgery prohibitively high risk. The patient expresses a clear desire for this to be surgically addressed but both the daughter and myself are in agreement that we should try a course of external orthosis to see how she does, and at the minimum that would buy a little bit of time to see if we can get her a little bit more medically optimized in terms of glycemic control. Recommend TLSO brace when OOB, OK to be without brace in bed."

## 2022-04-13 NOTE — SPEECH LANGUAGE PATHOLOGY EVALUATION - SLP ABLE TO IDENTIFY PICTURES
[address] [address] [address]ORE[address] Beyer [address]  Dept: [phone]  Dept Fax: [phone]  Loc: [phone]    Visit Date: 9/9/2020    Argelia Martinez is a 64 y.o. female who presents today for: No chief complaint on file. HPI:       Argelia Martinez is a 64 y.o. female referred to me by Dr. Chapman Dubin of GI for further evaluation regarding hemorrhoids. Rashaad Chaney tells me she has had hemorrhoids for numerous years, all the way back into her teenage years. Her main issues are bleeding, difficulty with hygiene, discomfort, and mucus drainage. She is underwent colonoscopy with Dr. Stephanie Crawley showing only a few small polyps, and has undergone rubber band ligation x3 in the past couple months. She has not noticed any significant improvement. She denies fever, chills, nausea, vomiting, food tolerance, weight loss, abdominal pain. She tells me she does have intermittent issues with straining and constipation, but overall tries to eat healthy. Patient's problem list, medications, past medical, surgical, family, and social histories were reviewed and updated in the chart as indicated today.     Past Medical History:   Diagnosis Date    Anxiety     Basal cell carcinoma     chest , stomach    DDD (degenerative disc disease), cervical     L4-5; L5-S1    History of melanoma 2000    right thigh     Infertility, female     Lumbar foraminal stenosis     Menopause ovarian failure     Menorrhagia     Migraine     Ovarian cyst        Past Surgical History:   Procedure Laterality Date    BACK SURGERY  2000    X 4    COLONOSCOPY N/A 8/11/2020    COLONOSCOPY POLYPECTOMY SNARE/COLD BIOPSY performed by Chery Waddell MD at [address] [place] [address]  2013    FOOT SURGERY Left     SKIN CANCER EXCISION      melanoma in early 26ths       Family History: denies family history of colon cancer    Social History:   Social History     Tobacco Use    Smoking status: Never Smoker    Smokeless tobacco: Never Used   Substance Use Topics    Alcohol use: Yes     Comment: socially      Tobacco cessation counseling provided as appropriate. REVIEW OF SYSTEMS:    Pertinent positives and negatives are mentioned in the HPI above. Otherwise, all other systems were reviewed and negative. Objective:     Physical Exam   BP (!) 148/90 (Site: Left Upper Arm, Position: Sitting, Cuff Size: Medium Adult)   Pulse 69   Temp 97.1 °F (36.2 °C) (Tympanic)   Resp 16   Ht 5' 10\" (1.778 m)   Wt 137 lb (62.1 kg)   BMI 19.66 kg/m²   Constitutional: Appears well-developed and well-nourished. Grooming appropriate. No gross deformities. Body mass index is 19.66 kg/m². Eyes: No scleral icterus. Conjunctiva/lids normal. Vision intact grossly. Pupils equal/symmetric, reactive bilaterally. ENT: External ears/nose without defect, scars, or masses. Hearing grossly intact. No facial deformity. Lips normal, normal dentition. Neck: No masses. Trachea midline. No crepitus. Thyroid not enlarged. Cardiovascular: Normal rate. No peripheral edema. Abdominal aorta normal size to palpation. Pulmonary/Chest: Effort normal. No respiratory distress. No wheezes. No use of accessory muscles. Musculoskeletal: Normal range of motion x all 4 extremities and head/neck, without deformity, pain, or crepitus, with normal strength and tone. Normal gait. Nails without clubbing or cyanosis. Neurological: Alert and oriented to person, place, and time. No gross deficits. Sensation intact. Skin: Skin is dry. No rashes noted. No pallor. No induration of nodules. Psychiatric: Normal mood and affect. Behavior normal. Oriented to person, place, and time. Judgment and insight reasonable. Abdominal/wound: soft, nontender, nondistended    ANOSCOPY:    Chaperone/MA present in room during entire exam. Patient was placed in knee-chest position or left side down position depending on comfort.  Exam table manipulated for proper visualization and lighting. Buttocks spread. Inspection reveals: Multiple perianal skin tag/external hemorrhoids, with accompanying mild internal hemorrhoids    Digital exam performed with lubricated finger revealing: no masses, induration, or tenderness. No gross blood. Normal tone. Lubricated anoscope inserted gently into anus and withdrawn for visualization of distal rectum and anal canal: Mildly inflamed internal hemorrhoids    Anoscope removed without difficulty. Pertinent recent lab and radiology results reviewed. Last colonoscopy: Jonathan Bender - 8/11/20      Assessment/Plan:     A/P:  New problem(s): Symptomatic grade 2-3 hemorrhoids with external components  Established problem(s): None  Additional workup/treatment planned: Attempt rubber band ligation, potential surgery  Risk of complications/morbidity: High    I had a long discussion with [facility] today in the office. We discussed the pathophysiology, etiology, work-up, natural history, treatment options for internal and external hemorrhoids. It does sound like she has attempted rubber band ligation x3. She does not think that there is been much improvement. I discussed with her that if she wants definitive management, that would require hemorrhoidectomy. Unfortunately, that is a big step up from rubber banding, especially in regard to pain postoperatively, and time to heal.    We specifically discussed other risks such as nonhealing, poor cosmetic result, recurrence of hemorrhoids. She is a little hesitant to proceed with surgery given the above risks, so I offered 1 more trial at internal hemorrhoid rubber band ligation. Occasionally hemorrhoid banding of internal hemorrhoids may help draw in the external hemorrhoids and decrease their presence and overall symptoms. She is willing to give it a try. We will plan for this in the office next week.     We will then give it a few weeks to reassess, and if she is still having trouble, we can proceed with surgery in the next month or so. I provided written information in the After Visit Summary AVS Regarding: hemorrhoids, RBL    DISPOSITION:  F/u next wk for RBL    My findings will be relayed to consulting practitioner or PCP via Epic    Note completed using dictation software, please excuse any errors.     Electronically signed by Billy Barrera MD on 9/9/2020 at 3:54 PM within functional limits

## 2022-04-13 NOTE — CHART NOTE - NSCHARTNOTEFT_GEN_A_CORE
Patient seen and examined  pt still w sig aphasia, word finding difficulty that worsens deeper into interview  minimal focal weakness or other findings on my exam  await MRIs   f/u neuro/nsgy for SAH and neuro symptoms  heme contacted for MM mgmt and any additional w/u - protein, sodium are wnl doubt viscosity syndrome or similar issue, however will f/u w heme  elev but plateaued troponin likely demand/related to neuro cause, pt has no cp, cont to monitor  mohan improving  hyperglycemia - ?due to intermittent high dose steroid - endo eval - FS resolving  RA on hydroxychloroquine Patient seen and examined  pt still w sig aphasia, word finding difficulty that worsens deeper into interview  minimal focal weakness or other findings on my exam  await MRIs   f/u neuro/nsgy for SAH and neuro symptoms  heme contacted for MM mgmt and any additional w/u - protein, sodium are wnl doubt viscosity syndrome or similar issue, however will f/u w heme  elev but plateaued troponin likely demand/related to neuro cause, pt has no cp, cont to monitor  mohan improving  hyperglycemia - ?due to intermittent high dose steroid, however A1c 11.5 - endo eval - FS still elevated, no weight recorded, add ideal body weight based basal bolus (lantus 6 qhs admelog 3 TID) and change to moderate sliding scale  RA on hydroxychloroquine

## 2022-04-13 NOTE — CONSULT NOTE ADULT - ASSESSMENT
82 y.o. LHW with a PMH of HTN, rheumatoid arthritis, multiple myeloma, who presented to OSH with c/o slurred speech and confusion, found to have a small left occipital SAH with DUC on CKD stage 3    1) Duc on CKD stage 3  ddx includes pre renal vs atn  Cr now continues to improve  b/l cr is 1.3 to 1.4mg/dl  check ua  check urine na./cr/cl/osm k  trend bun/cr  avoid nephrotoxins  will monitor    2) HTN- bp stable  monitor bp    Dr Gandara   [phone]

## 2022-04-13 NOTE — SWALLOW BEDSIDE ASSESSMENT ADULT - SWALLOW EVAL: DIAGNOSIS
83 y/o F p/w fluctuating dysarthria and right sided weakness 2/2 left hemispheric dysfunction possibly 2/2 left SAH vs stroke mimic in the setting hyperglycemia vs seizure vs less likely acute stroke, possible annita's paresis following reported witnessed seizure, r/o other underlying toxic-metabolic or infectious etiologies. Pt was seen today for bedside swallow evaluation which revealed a generally functional rox-pharyngeal swallow mechanism for an easy to chew diet and thin liquids. Generally timely mastication of soft solid. Adequate AP bolus transfer or puree and liquids. No overt clinical s/s of aspiration or penetration with thin liquids, puree, or soft solid. Generally timely trigger of pharyngeal swallow. Pt demonstrated difficulty with self-feeding and noted to be impulsive (large bites at a fast rate). Pt requires 1:1 feeding assistance.

## 2022-04-13 NOTE — PHYSICAL THERAPY INITIAL EVALUATION ADULT - PRECAUTIONS/LIMITATIONS, REHAB EVAL
CTH: Suggestion of small left occipital subarachnoid bleed. No local mass effect or midline shift. CTP: No regional perfusion abnormality. CTA BRAIN: No associated vascular malformation or aneurysm associated with the small L occipital subarachnoid bleed. Patent intracranial circulation. No flow-limiting stenosis or occlusion. Hypervascular R inferior frontal convexity extra-axial lesion measuring approximately 1 cm likely reflects a meningioma. CTA NECK: Patent cervical vasculature. No flow limiting stenosis or occlusion./fall precautions

## 2022-04-13 NOTE — EEG REPORT - NS EEG TEXT BOX
*** [facility] ***   COMPREHENSIVE EPILEPSY CENTER   REPORT OF ROUTINE VIDEO EEG     [facility]: [address], [place] [address], Ph#: [phone]  [facility]: [address], [place] [address], Ph#: [phone]  [facility]: [address] [place] [address], Ph#: [phone]    Patient Name: ANGELO STRATTON  Age and : 82y (40)  MRN #: [id number]  Location: [address] [address]  Referring Physician: Hernán Garcia    Study Date: 22    _____________________________________________________________  TECHNICAL INFORMATION    Placement and Labeling of Electrodes:  The EEG was performed utilizing 20 channels referential EEG connections (coronal over temporal over parasagittal montage) using all standard 10-20 electrode placements with EKG.  Recording was at a sampling rate of 256 samples per second per channel.  Time synchronized digital video recording was done simultaneously with EEG recording.  A low light infrared camera was used for low light recording.  Hayes and seizure detection algorithms were utilized.    _____________________________________________________________  HISTORY    Patient is a 82y old  Female who presents with a chief complaint of AMS x1 day (2022 13:33)      PERTINENT MEDICATION:  gabapentin 200 milliGRAM(s) Oral at bedtime  gabapentin 100 milliGRAM(s) Oral daily  levETIRAcetam  IVPB 500 milliGRAM(s) IV Intermittent every 12 hours    _____________________________________________________________  STUDY INTERPRETATION    Findings: The background was continuous and reactive. During wakefulness, the posterior dominant rhythm consisted of symmetric, poorly-modulated 7Hz activity.    Background Slowing:  Background predominantly consisted of irregular theta, delta and faster activities.    Focal Slowing:   None were present.    Sleep Background:  Drowsiness was characterized by fragmentation, attenuation, and slowing of the background activity.    Stage II sleep transients were not recorded.    Other Non-Epileptiform Findings:  None were present.    Interictal Epileptiform Activity:   None were present.    Events:  Clinical events: None recorded.  Seizures: None recorded.    Activation Procedures:   Hyperventilation was not performed.    Photic stimulation was performed and did not elicit any abnormality.     Artifacts:  Intermittent myogenic and movement artifacts were noted.    ECG:  The heart rate on single channel ECG was predominantly between 60-80 BPM.    _____________________________________________________________  **EEG SUMMARY/CLASSIFICATION**    Abnormal EEG in an encephalopathic patient.     - Background slowing, generalized.  __________________________________________________________  **EEG IMPRESSION/CLINICAL CORRELATE**    Abnormal EEG study.    - Mild to moderate nonspecific diffuse or multifocal cerebral dysfunction.   - No epileptiform patterns or seizures recorded.  _____________________________________________________________    Sinan Urena MD, WILLIAN  Fellow | [facility] [place] *** [facility] ***   COMPREHENSIVE EPILEPSY CENTER   REPORT OF ROUTINE VIDEO EEG     [facility]: [address], [place] [address], Ph#: [phone]  [facility]: [address], [place] [address], Ph#: [phone]  [facility]: [address] [place] [address], Ph#: [phone]    Patient Name: ANGELO STRATTON  Age and : 82y (40)  MRN #: [id number]  Location: [address] [address]  Referring Physician: Hernán Garcia    Study Date: 22    _____________________________________________________________  TECHNICAL INFORMATION    Placement and Labeling of Electrodes:  The EEG was performed utilizing 20 channels referential EEG connections (coronal over temporal over parasagittal montage) using all standard 10-20 electrode placements with EKG.  Recording was at a sampling rate of 256 samples per second per channel.  Time synchronized digital video recording was done simultaneously with EEG recording.  A low light infrared camera was used for low light recording.  Hayes and seizure detection algorithms were utilized.    _____________________________________________________________  HISTORY    Patient is a 82y old  Female who presents with a chief complaint of AMS x1 day (2022 13:33)      PERTINENT MEDICATION:  gabapentin 200 milliGRAM(s) Oral at bedtime  gabapentin 100 milliGRAM(s) Oral daily  levETIRAcetam  IVPB 500 milliGRAM(s) IV Intermittent every 12 hours    _____________________________________________________________  STUDY INTERPRETATION    Findings: The background was continuous and reactive. During wakefulness, the posterior dominant rhythm consisted of symmetric, poorly-modulated 7Hz activity.    Background Slowing:  Background predominantly consisted of irregular theta, delta and faster activities.    Focal Slowing:   None were present.    Sleep Background:  Drowsiness was characterized by fragmentation, attenuation, and slowing of the background activity.    Stage II sleep transients were not recorded.    Other Non-Epileptiform Findings:  None were present.    Interictal Epileptiform Activity:   None were present.    Events:  Clinical events: None recorded.  Seizures: None recorded.    Activation Procedures:   Hyperventilation was not performed.    Photic stimulation was performed and did not elicit any abnormality.     Artifacts:  Intermittent myogenic and movement artifacts were noted.    ECG:  The heart rate on single channel ECG was predominantly between 60-80 BPM.    _____________________________________________________________  **EEG SUMMARY/CLASSIFICATION**    Abnormal EEG in an encephalopathic patient.     - Background slowing, generalized.  __________________________________________________________  **EEG IMPRESSION/CLINICAL CORRELATE**    Abnormal EEG study.    - Mild to moderate nonspecific diffuse or multifocal cerebral dysfunction.   - No epileptiform patterns or seizures recorded.  _____________________________________________________________    Sinan Urena MD, WILLIAN  Fellow | [facility]    Shahid Alva MD PhD  Director, Epilepsy Division, [facility] EEG Reading Room Ph#: [phone]  Epilepsy Answering Service after 5PM and before 8:30AM: Ph#: [phone]

## 2022-04-13 NOTE — CONSULT NOTE ADULT - SUBJECTIVE AND OBJECTIVE BOX
Neurology Consult    ANGELO STRATTON  82y Female  MRN-[id number]      HPI:  82 y.o. LHW with a PMH of HTN, rheumatoid arthritis, multiple myeloma, who presented to OSH with c/o slurred speech and confusion, found to have a small left occipital SAH, transferred to [facility] for further management. LKN 14:30 on 22. While in the ED a stroke code was activated at 03:56 for worsening dysarthria and right sided weakness. Per providers the patient was previously reported to be moving all extremities, but at that time was not moving her right upper and lower extremities to command. On evaluation, the patient was noted to be moving all extremities spontaneously and without difficulty. However questionable slight preference with respect to the left side, as although she intermittently moved the RUE/RLE to the same degree as the left, she sometimes required repeated prompting to properly elicit an action on the right. Per providers at bedside, symptoms have been inconsistent and somewhat fluctuating in quality since presentation. Of note, the patient is also reported to have had seizure-like activity just prior to activation of the stroke code, for which received Keppra. However mental status noted to be at baseline at time of evaluation.      A 10-system ROS was performed and is negative except as noted above and/or in the HPI.      PAST MEDICAL, SURGICAL & FAMILY HISTORY:  Rheumatoid arthritis    Varicose veins of lower extremity    Hypertension    Multiple myeloma    No significant past surgical history      SOCIAL HISTORY:   As stated in HPI, unless otherwise noted below.       MEDICATIONS    Home Medications:  acetaminophen 325 mg oral tablet: 2 tab(s) orally every 6 hours, As needed, Moderate Pain (4 - 6) (16 Dec 2021 16:23)  aspirin 81 mg oral tablet, chewable: 1 tab(s) orally once a day (16 Dec 2021 16:23)  atorvastatin 20 mg oral tablet: 1 tab(s) orally once a day (16 Dec 2021 16:23)    MEDICATIONS  (STANDING):  levETIRAcetam  IVPB 1000 milliGRAM(s) IV Intermittent every 12 hours    MEDICATIONS  (PRN):      Allergies  No Known Allergies        VITALS & EXAMINATION    Height (cm): 157.5 (04-12 @ 22:56), 157.5 (-12 @ 20:29)  Weight (kg): 59.9 ( @ 20:29)  BMI (kg/m2): 24.1 ( @ 22:56), 24.1 ( @ 20:29)    Vital Signs Last 24 Hrs  T(C): 36.6 (2022 03:15), Max: 37 (2022 20:29)  T(F): 97.9 (2022 03:15), Max: 98.6 (2022 20:29)  HR: 84 (2022 03:15) (63 - 94)  BP: 145/66 (2022 03:15) (114/65 - 153/65)  RR: 17 (2022 03:15) (17 - 20)  SpO2: 100% (:15) (98% - 100%)    General:  Constitutional: W, appears stated age.  Head: Normocephalic & atraumatic.  Respiratory: On nasal cannula.   Extremities: Mild LE edema b/l.     Neurological (>12):  MS: Eyes open, alert, oriented to person, place and situation, not time ("march"). Reports she knows the current president was the  for [place] but can't recall his name. Normal affect. Follows all commands.  Language: Speech is mildly slurred, but fluent with good repetition & comprehension (able to name watch, phone, and pen).  CNs: PERRL (slightly sluggish, 3.5mm OU). BTT intact b/l. EOMI, no diplopia. V1-3 intact to LT. Well developed masseter muscles b/l. Facial movements normal and symmetric. Hearing grossly normal to conversation b/l. Symmetric palate elevation in midline. Gag reflex deferred. Tongue midline, normal movements.  Motor: Normal muscle bulk & tone. No noticeable tremor at rest.   RUE: Motor drift  LUE: No drift  RLE: No drift  LLE: No drift  Sensation: Intact to LT b/l throughout.  Cortical: Extinction on DSS (neglect): none.  Reflexes: 1+ biceps, brachioradialis, triceps,  patellar and achilles b/l. Plantar response flexor on right, withdraws on left.   Coordination: No dysmetria to FTN b/l.  Gait: Not assessed due to current medical condition/risk of fall.     LABS:    CBC                       9.5    5.05  )-----------( 207      ( 2022 23:31 )             28.4     Chem 04-12    133<L>  |  102  |  44<H>  ----------------------------<  440<H>  4.8   |  18<L>  |  1.76<H>    Ca    8.8      2022 23:31    TPro  6.3  /  Alb  3.5  /  TBili  0.3  /  DBili  x   /  AST  16  /  ALT  18  /  AlkPhos  187<H>  04-12    Coags PT/INR - ( 2022 23:31 )   PT: 11.5 sec;   INR: 1.00 ratio         PTT - ( 2022 23:31 )  PTT:20.9 sec  LFTs LIVER FUNCTIONS - ( 2022 23:31 )  Alb: 3.5 g/dL / Pro: 6.3 g/dL / ALK PHOS: 187 U/L / ALT: 18 U/L / AST: 16 U/L / GGT: x           Lipid Panel   A1c   Cardiac enzymes     U/A Urinalysis Basic - ( 2022 04:40 )  Color: Colorless / Appearance: Clear / S.020 / pH: x  Gluc: x / Ketone: Negative  / Bili: Negative / Urobili: Negative   Blood: x / Protein: Trace / Nitrite: Negative   Leuk Esterase: Negative / RBC: 2 /hpf / WBC 0 /HPF   Sq Epi: x / Non Sq Epi: 0 /hpf / Bacteria: Negative        STUDIES & IMAGING    CT ANGIO NECK & BRAIN (W)AW IC; CT PERFUSION W MAPS IC    PROCEDURE DATE: 2022  INTERPRETATION: HISTORY: Stroke code.  COMPARISON: None  TECHNIQUE: CT angiogram of the neck and brain was performed after administration of intravenous contrast. MIP and 3D reconstructions were performed. Perfusion maps were also generated and submitted for evaluation.  Total of 90 cc Omnipaque 350 intravenous contrast were administered without complication.    CT PERFUSION  There are symmetric time parameters, including mean transit time and Tmax, in the hemispheres bilaterally. No focal decrease in cerebral blood volume or cerebral blood flow. No regional perfusion abnormality is evident.    CTA BRAIN  INTERNAL CAROTID ARTERIES: Atheromatous irregularity and mild stenoses along the carotid siphons bilaterally. The intracranial segments of the ICA are patent without hemodynamically significant stenosis, occlusion, or aneurysm. The ICA bifurcations are unremarkable.  ANTERIOR CEREBRAL ARTERIES: No flow-limiting stenosis or occlusion. Anterior communicating artery is unremarkable without aneurysm.  MIDDLE CEREBRAL ARTERIES: No flow-limiting stenosis or occlusion. MCA bifurcations are unremarkable without aneurysm.  POSTERIOR CEREBRAL ARTERIES: No flow-limiting stenosis or occlusion. Posterior communicating arteries are not well seen bilaterally.  VERTEBROBASILAR SYSTEM: No flow-limiting stenosis or occlusion. Basilar tip is unremarkable.   Hypervascular right inferior frontal convexity extra-axial lesion measuring approximately 1 cm likely reflects a meningioma.    CTA NECK  RIGHT CAROTID SYSTEM: Normal in course and caliber without flow-limiting stenosis or occlusion. Calcified plaque along the bulb and ICA origin.  LEFT CAROTID SYSTEM: Normal in course and caliber without flow-limiting stenosis or occlusion. Calcified plaque along the bulb and ICA origin.  VERTEBRAL SYSTEM: Normal in course and caliber without flow-limiting stenosis or occlusion. Origin of the vertebral arteries are unremarkable.  AORTIC ARCH: Calcified plaque along the aortic arch. Bilateral calcified hilar and mediastinal nodes from prior disease. Scattered parenchymal granulomas are noted in the upper lobes bilaterally. Biapical interstitial prominence with hazy groundglass airspace opacity due to air trapping and/or dependent changes. Small interstitial edema may be present.    IMPRESSION:  CT PERFUSION: No regional perfusion abnormality.    CTA BRAIN: No associated vascular malformation or aneurysm associated with the small left occipital subarachnoid bleed.  Patent intracranial circulation. No flow-limiting stenosis or occlusion.  Hypervascular right inferior frontal convexity extra-axial lesion measuring approximately 1 cm likely reflects a meningioma.    CTA NECK: Patent cervical vasculature. No flow limiting stenosis or occlusion.  Sequelae of prior pulmonary granulomatous disease.    --- End of Report ---    CT BRAIN STROKE PROTOCOL    PROCEDURE DATE: 2022  INTERPRETATION: HISTORY: Stroke code. Slurred speech.  COMPARISON: CT head 2012  TECHNIQUE: Axial noncontrast CT images from the skull base to the vertex were obtained and submitted for interpretation. Coronal and sagittal reformatted images were performed. Bone and soft tissue windows were evaluated.    FINDINGS:  Faint hyperattenuation in the left occipital sulci (5-31) suggests small acute subarachnoid bleed. No local mass effect or midline shift. Mild widening of the left greater than right occipital sulci suggest chronic infarction inferiorly. Gray-white differentiation is maintained.  Mild to moderate chronic microvascular ischemic changes and vascular calcifications along the carotid siphons are noted.  Ventricles are normal in size for the patient's age without hydrocephalus. Basal cisterns are patent.  Visualized paranasal sinuses and mastoid air cells are clear. Calvarium is intact.    IMPRESSION:  Suggestion of small left occipital subarachnoid bleed. No local mass effect or midline shift.      CT Head No Cont (22 @ 01:09)   IMPRESSION: No significant change when compared with prior study.      CT Head No Cont (22 @ 04:10)   IMPRESSION: No significant change when compared with prior study.  No CT evidence of acute, large transcortical infarct. MR brain can be obtained for further evaluation if clinical concern remains.   Neurology Consult    ANGELO STRATTON  82y Female  MRN-[id number]      HPI:  82 y.o. LHW with a PMH of HTN, rheumatoid arthritis, multiple myeloma, who presented to OSH with c/o slurred speech and confusion, found to have a small left occipital SAH, transferred to [facility] for further management. LKN 14:30 on 22. While in the ED a stroke code was activated at 03:56 for worsening dysarthria and right sided weakness. Per providers the patient was previously reported to be moving all extremities, but at that time was not moving her right upper and lower extremities to command. On evaluation, the patient was noted to be moving all extremities spontaneously and without difficulty. However questionable slight preference with respect to the left side, as although she intermittently moved the RUE/RLE to the same degree as the left, she sometimes required repeated prompting to properly elicit an action on the right. Per providers at bedside, symptoms have been inconsistent and somewhat fluctuating in quality since presentation. Of note, the patient is also reported to have had seizure-like activity just prior to activation of the stroke code, for which received Keppra. However mental status noted to be at baseline at time of evaluation.      A 10-system ROS was performed and is negative except as noted above and/or in the HPI.      PAST MEDICAL, SURGICAL & FAMILY HISTORY:  Rheumatoid arthritis    Varicose veins of lower extremity    Hypertension    Multiple myeloma    No significant past surgical history      SOCIAL HISTORY:   As stated in HPI, unless otherwise noted below.       MEDICATIONS    Home Medications:  acetaminophen 325 mg oral tablet: 2 tab(s) orally every 6 hours, As needed, Moderate Pain (4 - 6) (16 Dec 2021 16:23)  aspirin 81 mg oral tablet, chewable: 1 tab(s) orally once a day (16 Dec 2021 16:23)  atorvastatin 20 mg oral tablet: 1 tab(s) orally once a day (16 Dec 2021 16:23)    MEDICATIONS  (STANDING):  levETIRAcetam  IVPB 1000 milliGRAM(s) IV Intermittent every 12 hours    MEDICATIONS  (PRN):      Allergies  No Known Allergies        VITALS & EXAMINATION    Height (cm): 157.5 (04-12 @ 22:56), 157.5 (-12 @ 20:29)  Weight (kg): 59.9 ( @ 20:29)  BMI (kg/m2): 24.1 ( @ 22:56), 24.1 ( @ 20:29)    Vital Signs Last 24 Hrs  T(C): 36.6 (2022 03:15), Max: 37 (2022 20:29)  T(F): 97.9 (2022 03:15), Max: 98.6 (2022 20:29)  HR: 84 (2022 03:15) (63 - 94)  BP: 145/66 (2022 03:15) (114/65 - 153/65)  RR: 17 (2022 03:15) (17 - 20)  SpO2: 100% (:15) (98% - 100%)    General:  Constitutional: W, appears stated age.  Head: Normocephalic & atraumatic.  Respiratory: On nasal cannula.   Extremities: Mild LE edema b/l.     Neurological (>12):  MS: Eyes open, alert, oriented to person, place and situation, not time ("march"). Reports she knows the current president was the  for Edwin but can't recall his name. Normal affect. Follows all commands.  Language: Speech is mildly slurred, but fluent with good repetition & comprehension (able to name watch, phone, and pen).  CNs: PERRL (slightly sluggish, 3.5mm OU). BTT intact b/l. EOMI, no diplopia. V1-3 intact to LT. Well developed masseter muscles b/l. Facial movements normal and symmetric. Hearing grossly normal to conversation b/l. Symmetric palate elevation in midline. Gag reflex deferred. Tongue midline, normal movements.  Motor: Normal muscle bulk & tone. No noticeable tremor at rest.   RUE: Motor drift, grossly 4+/5  RLE: No drift, grossly 5/5  LUE/LLE: No drift, 5/5 throughout  Sensation: Intact to LT b/l throughout.  Cortical: Extinction on DSS (neglect): none.  Reflexes: 1+ biceps, brachioradialis, triceps,  patellar and achilles b/l. Plantar response flexor on right, withdraws on left.   Coordination: No dysmetria to FTN b/l.  Gait: Not assessed due to current medical condition/risk of fall.     LABS:    CBC                       9.5    5.05  )-----------( 207      ( 2022 23:31 )             28.4     Chem 04-12    133<L>  |  102  |  44<H>  ----------------------------<  440<H>  4.8   |  18<L>  |  1.76<H>    Ca    8.8      2022 23:31    TPro  6.3  /  Alb  3.5  /  TBili  0.3  /  DBili  x   /  AST  16  /  ALT  18  /  AlkPhos  187<H>  04-12    Coags PT/INR - ( 2022 23:31 )   PT: 11.5 sec;   INR: 1.00 ratio         PTT - ( 2022 23:31 )  PTT:20.9 sec  LFTs LIVER FUNCTIONS - ( 2022 23:31 )  Alb: 3.5 g/dL / Pro: 6.3 g/dL / ALK PHOS: 187 U/L / ALT: 18 U/L / AST: 16 U/L / GGT: x           Lipid Panel   A1c   Cardiac enzymes     U/A Urinalysis Basic - ( 2022 04:40 )  Color: Colorless / Appearance: Clear / S.020 / pH: x  Gluc: x / Ketone: Negative  / Bili: Negative / Urobili: Negative   Blood: x / Protein: Trace / Nitrite: Negative   Leuk Esterase: Negative / RBC: 2 /hpf / WBC 0 /HPF   Sq Epi: x / Non Sq Epi: 0 /hpf / Bacteria: Negative        STUDIES & IMAGING    CT ANGIO NECK & BRAIN (W)AW IC; CT PERFUSION W MAPS IC    PROCEDURE DATE: 2022  INTERPRETATION: HISTORY: Stroke code.  COMPARISON: None  TECHNIQUE: CT angiogram of the neck and brain was performed after administration of intravenous contrast. MIP and 3D reconstructions were performed. Perfusion maps were also generated and submitted for evaluation.  Total of 90 cc Omnipaque 350 intravenous contrast were administered without complication.    CT PERFUSION  There are symmetric time parameters, including mean transit time and Tmax, in the hemispheres bilaterally. No focal decrease in cerebral blood volume or cerebral blood flow. No regional perfusion abnormality is evident.    CTA BRAIN  INTERNAL CAROTID ARTERIES: Atheromatous irregularity and mild stenoses along the carotid siphons bilaterally. The intracranial segments of the ICA are patent without hemodynamically significant stenosis, occlusion, or aneurysm. The ICA bifurcations are unremarkable.  ANTERIOR CEREBRAL ARTERIES: No flow-limiting stenosis or occlusion. Anterior communicating artery is unremarkable without aneurysm.  MIDDLE CEREBRAL ARTERIES: No flow-limiting stenosis or occlusion. MCA bifurcations are unremarkable without aneurysm.  POSTERIOR CEREBRAL ARTERIES: No flow-limiting stenosis or occlusion. Posterior communicating arteries are not well seen bilaterally.  VERTEBROBASILAR SYSTEM: No flow-limiting stenosis or occlusion. Basilar tip is unremarkable.   Hypervascular right inferior frontal convexity extra-axial lesion measuring approximately 1 cm likely reflects a meningioma.    CTA NECK  RIGHT CAROTID SYSTEM: Normal in course and caliber without flow-limiting stenosis or occlusion. Calcified plaque along the bulb and ICA origin.  LEFT CAROTID SYSTEM: Normal in course and caliber without flow-limiting stenosis or occlusion. Calcified plaque along the bulb and ICA origin.  VERTEBRAL SYSTEM: Normal in course and caliber without flow-limiting stenosis or occlusion. Origin of the vertebral arteries are unremarkable.  AORTIC ARCH: Calcified plaque along the aortic arch. Bilateral calcified hilar and mediastinal nodes from prior disease. Scattered parenchymal granulomas are noted in the upper lobes bilaterally. Biapical interstitial prominence with hazy groundglass airspace opacity due to air trapping and/or dependent changes. Small interstitial edema may be present.    IMPRESSION:  CT PERFUSION: No regional perfusion abnormality.    CTA BRAIN: No associated vascular malformation or aneurysm associated with the small left occipital subarachnoid bleed.  Patent intracranial circulation. No flow-limiting stenosis or occlusion.  Hypervascular right inferior frontal convexity extra-axial lesion measuring approximately 1 cm likely reflects a meningioma.    CTA NECK: Patent cervical vasculature. No flow limiting stenosis or occlusion.  Sequelae of prior pulmonary granulomatous disease.    --- End of Report ---    CT BRAIN STROKE PROTOCOL    PROCEDURE DATE: 2022  INTERPRETATION: HISTORY: Stroke code. Slurred speech.  COMPARISON: CT head 2012  TECHNIQUE: Axial noncontrast CT images from the skull base to the vertex were obtained and submitted for interpretation. Coronal and sagittal reformatted images were performed. Bone and soft tissue windows were evaluated.    FINDINGS:  Faint hyperattenuation in the left occipital sulci (5-31) suggests small acute subarachnoid bleed. No local mass effect or midline shift. Mild widening of the left greater than right occipital sulci suggest chronic infarction inferiorly. Gray-white differentiation is maintained.  Mild to moderate chronic microvascular ischemic changes and vascular calcifications along the carotid siphons are noted.  Ventricles are normal in size for the patient's age without hydrocephalus. Basal cisterns are patent.  Visualized paranasal sinuses and mastoid air cells are clear. Calvarium is intact.    IMPRESSION:  Suggestion of small left occipital subarachnoid bleed. No local mass effect or midline shift.      CT Head No Cont (22 @ 01:09)   IMPRESSION: No significant change when compared with prior study.      CT Head No Cont (22 @ 04:10)   IMPRESSION: No significant change when compared with prior study.  No CT evidence of acute, large transcortical infarct. MR brain can be obtained for further evaluation if clinical concern remains.

## 2022-04-13 NOTE — CONSULT NOTE ADULT - SUBJECTIVE AND OBJECTIVE BOX
p ([id number])     HPI:  82F hx HTN, mult myeloma, RA on ASA81 xfer [facility] for ?punctate L occ tsah no hx trauma, however at [facility] was code stroke LKW 2:30PM w/ persistent mild-mod exp aphasia and dysarthria w/ subtle RUE weakness. CTA w/ b/l cav carotid athero w/ mod stenosis, L parietal/coronaradiata hypodensity c/f acute/subacute infarct not called by rads, thin hyperdensity in L occ lobe tsah vs calcification/artifact. Exam: AOx2, mild-mod mixed aphasia, PERRL, EOMI, no facial, RUE +drift, 4+/5 hg, LUE/BLE 5/5.     --Anticoagulation:    =====================  PAST MEDICAL HISTORY   Rheumatoid arthritis    Varicose veins of lower extremity    Hypertension    Multiple myeloma      PAST SURGICAL HISTORY   No significant past surgical history          MEDICATIONS:  Antibiotics:    Neuro:    Other:      SOCIAL HISTORY:   Occupation:   Marital Status:     FAMILY HISTORY:      ROS: Negative except per HPI    LABS:  PT/INR - ( 12 Apr 2022 23:31 )   PT: 11.5 sec;   INR: 1.00 ratio         PTT - ( 12 Apr 2022 23:31 )  PTT:20.9 sec                        9.5    5.05  )-----------( 207      ( 12 Apr 2022 23:31 )             28.4     04-12    133<L>  |  102  |  44<H>  ----------------------------<  440<H>  4.8   |  18<L>  |  1.76<H>    Ca    8.8      12 Apr 2022 23:31    TPro  6.3  /  Alb  3.5  /  TBili  0.3  /  DBili  x   /  AST  16  /  ALT  18  /  AlkPhos  187<H>  04-12

## 2022-04-13 NOTE — PHYSICAL THERAPY INITIAL EVALUATION ADULT - ASR WT BEARING STATUS EVAL
differential diagnoses: Fluctuating dysarthria and right sided weakness due to left hemispheric dysfunction possibly secondary to left SAH vs stroke mimic in the setting hyperglycemia vs seizure vs less likely acute stroke. Possible annita's paresis following reported witnessed seizure. Rule out other underlying toxic-metabolic or infectious etiologies/no weight-bearing restrictions

## 2022-04-13 NOTE — ED ADULT NURSE NOTE - OBJECTIVE STATEMENT
82y F arrived to the ED via EMS from [place] for occipital subarachnoid. As per EMS pt presents to [place] after experiencing slurred speech, CT results showed occipital subarachnoid. Pt was placed on Cardene at [place]. Upon arrival to [facility] Pt A&Ox2-3, able to move all extremities at present. PERRL. Pt Cardene d/c. Pt denies falls/trauma/hitting head recently. Pt denies chest pain, SOB, HA, N/V/D, abdominal pain. Pt placed in position of comfort. Pt educated on call bell system and provided call bell. Bed in lowest position, wheels locked, appropriate side rails raised. Pt denies needs at this time.

## 2022-04-13 NOTE — SPEECH LANGUAGE PATHOLOGY EVALUATION - SLP WRITING TO DICTATION
when writing clock numbers writing incorrect numbers, writing numbers backwards, etc./impaired/letters/names/words

## 2022-04-13 NOTE — SPEECH LANGUAGE PATHOLOGY EVALUATION - SLP ORAL READING ABILITY
Phrases; Pt given text with 5 short phrases-- omitted all first words on the L side (c/f left side neglect) and omitted various words/phrases when reading out loud/impaired

## 2022-04-13 NOTE — CONSULT NOTE ADULT - ASSESSMENT
TAMI STRATTONTY  82F hx HTN, mult myeloma, RA on ASA81 xfer [facility] for ?punctate L occ tsah no hx trauma, however at [facility] was code stroke LKW 2:30PM w/ persistent mild-mod exp aphasia and dysarthria w/ subtle RUE weakness. CTA w/ b/l cav carotid athero w/ mod stenosis, L parietal/coronaradiata hypodensity c/f acute/subacute infarct not called by rads, thin hyperdensity in L occ lobe tsah vs calcification/artifact. Exam: AOx2, mild-mod mixed aphasia, PERRL, EOMI, no facial, RUE +drift, 4+/5 hg, LUE/BLE 5/5.   - No acute nsgy intervention, interval CTH at 1AM for 4h  - Needs stroke neuro eval, hold AC/AP pending stability imaging  - MRI/MRA/MR NOVA in AM  - if repeat CTH stable, ok for q4h neuro checks  - stroke core measures per neurology, will follow for mri ANGELO STRATTON  82F hx HTN, mult myeloma, RA on ASA81 xfer [facility] for ?punctate L occ tsah no hx trauma, however at [facility] was code stroke LKW 2:30PM w/ persistent mild-mod exp aphasia and dysarthria w/ subtle RUE weakness. CTA w/ b/l cav carotid athero w/ mod stenosis, L parietal/coronaradiata hypodensity c/f acute/subacute infarct not called by rads, thin hyperdensity in L occ lobe tsah vs calcification/artifact. Exam: AOx2, mild-mod mixed aphasia, PERRL, EOMI, no facial, RUE +drift, 4+/5 hg, LUE/BLE 5/5. NIHSS4.  - No acute nsgy intervention, interval CTH at 1AM for 4h  - Needs stroke neuro eval, hold AC/AP pending stability imaging  - MRI/MRA/MR NOVA in AM  - if repeat CTH stable, ok for q4h neuro checks  - stroke core measures per neurology, will follow for mri

## 2022-04-13 NOTE — SWALLOW BEDSIDE ASSESSMENT ADULT - ASR SWALLOW DENTITION
missing bottom molars; missing upper R molars-- pt reported she has partial dentures at home but does not wear

## 2022-04-13 NOTE — ED ADULT NURSE REASSESSMENT NOTE - NS ED NURSE REASSESS COMMENT FT1
Report received from SE Godinez and Aniyah. Pt resting comfortably in stretcher at this time. A&O x2, oriented to self and place, disoriented to year. Speech coherent, Strong x4 extremities. VS as documented. Bed locked and lowered. Comfort and safety measures maintained.

## 2022-04-13 NOTE — SPEECH LANGUAGE PATHOLOGY EVALUATION - SLP PATIENT/FAMILY GOALS STATEMENT
Pt endorsed slurred speech (reported L side of mouth felt "funny"). Daughter endorsed slurred speech x2 days and reported chronic memory deficits which she was planning on bringing pt to an OP Neurologist for.

## 2022-04-13 NOTE — H&P ADULT - HISTORY OF PRESENT ILLNESS
Patient is an 82 year old female w/pmh  significant for HTN, multiple myeloma, RA on ASA81,  presented  to  [facility] for altered mental state and slurred speech on day of admission,  patient was noted to have mixed aphasia , as well as right upper extremity weakness with positive pronator drift ,  she was evaluated for stroke , CT head showed possible sub arachnoid hemorrhage , and patient was transferred to [facility] for neurosurgical evaluation.   ED course: patient evaluated by neurosurgery , patient evaluated by neurology ,  patient had episode of unresponsiveness , Code stroke called , repeat CT head obtained , patient treated with Keppra    Patient is an 82 year old female w/pmh  significant for HTN, multiple myeloma, RA on ASA81, remote h/o CVA no residual , presented  to  [facility] for altered mental state and slurred speech on day of admission,  patient was noted to have mixed aphasia , as well as right upper extremity weakness with positive pronator drift ,  she was evaluated for stroke , CT head showed possible sub arachnoid hemorrhage , and patient was transferred to [facility] for neurosurgical evaluation.   Per family, patient has been increasing confused over the past few days. On day prior to admission, patient right leg shaking  noticed by granddaughter. On day of admission, patient was in usual state of health per home attendant , later in the evening ,patient had a transient episode where she became confused , started slurring speech and talking nonsense. Prior to the incident , patient has no specific complaints , no recent illness , no fever or chills. No chest pain or SOB , and no palpitations. Per family , there were no recent falls or injuries. Patient currently reports lower back pain , which family states is chronic.   ED course: patient evaluated by neurosurgery , patient evaluated by neurology ,  patient had episode of Altered state , code stroke called and HCT was obtained, patient treated with Keppra

## 2022-04-13 NOTE — STROKE CODE NOTE - INITIAL PRESENTATION
Mom would like to discuss patients vyvanse.  Patient told mom that he doesn't want to be angry and sad anymore.  Mom can be reached at above number.  
Spoke with mom  Last month or so not working well  Has grown a little  He is emotionally volatile, can get angry or very upset.    He is very hyperactive and vyvanse used to work very well  In addition it has not been lasting long enough.    I think he needs his dose adjusted up   Will write for 30 mg    Note: brother Chelsy has recently done very well on focalin XR    PDMP reviewed; no aberrant behavior identified, prescription authorized.  Sent      
ED

## 2022-04-13 NOTE — CONSULT NOTE ADULT - ASSESSMENT
82 year old woman w/pmh  significant for HTN, multiple myeloma, RA, TIA presented to [facility] for altered mental state and slurred speech found to have SAH transferred to [facility] for neurosurgery eval. Hematology consulted for MM management.    #Multiple Myeloma, IgG kappa  -Heme as above  -Pt most recently on Pomalyst since 06/2021 and dexamethasone  -Immunofixation and FLC checked 02/2021 show disease under control   -FLC ratio 18 02/2022  -M spike 1 02/2022  -Can re-check FLC and SPEP to monitor disease status  -Hold pomalyst for now  -Pending MRI for neurosurgery eval  -PT eval  -S/S eval  -No plan for inpatient treatment at the moment for multiple myeloma  -Outpatient follow up with Dr. Rosales once stable for discharge    Please page with questions or concerns. Will Follow with you.      Hernán Haley M.D.  Hematology/Oncology Fellow PGY4  Pager [phone]  After 5pm, please contact on-call team.   82 year old woman w/pmh  significant for HTN, multiple myeloma, RA, TIA presented to [facility] for altered mental state and slurred speech found to have SAH transferred to [facility] for neurosurgery eval. Hematology consulted for MM management.    #Multiple Myeloma, IgG kappa  -Heme history as above  -Pt most recently on Pomalyst since 06/2021 and dexamethasone  -Immunofixation and FLC checked 02/2021 show disease under control   -FLC ratio 18 02/2022  -M spike 1 02/2022  -Can re-check FLC and SPEP to monitor disease status  -Hold pomalyst for now  -Pending MRI for neurosurgery eval  -PT eval  -S/S eval  -No plan for inpatient treatment at the moment for multiple myeloma  -Outpatient follow up with Dr. Rosales once stable for discharge    Please page with questions or concerns. Will Follow with you.      Hernán Haley M.D.  Hematology/Oncology Fellow PGY4  Pager [phone]  After 5pm, please contact on-call team.

## 2022-04-13 NOTE — SWALLOW BEDSIDE ASSESSMENT ADULT - SWALLOW EVAL: PATIENT/FAMILY GOALS STATEMENT
Pt denied dysphagia. Per daughter, "She swallows fine. She had a burger at 6 PM before she came to the hospital." Daughter denied any coughing/choking with meals PTA or any recent/recurrent PNA.

## 2022-04-13 NOTE — PHYSICAL THERAPY INITIAL EVALUATION ADULT - ORIENTATION, REHAB EVAL
required choices for place, president of [place], and difficulty stating date (these questions asked at end of session and appeared to have worsening dysarthria)/person/place/time

## 2022-04-13 NOTE — PHYSICAL THERAPY INITIAL EVALUATION ADULT - ADDITIONAL COMMENTS
per family, pt with h/o chronic LBP per family, pt with h/o chronic LBP    per pt: lives in private house with granddaughter, more than 3 steps to enter with bilateral rails, uses rollator for ambulation, tub shower with seat and grab bars, left hand dominant per family, pt with h/o chronic LBP. per pt: lives in private house with granddaughter, more than 3 steps to enter with bilateral rails, uses rollator for ambulation, tub shower with seat and grab bars, left hand dominant

## 2022-04-13 NOTE — H&P ADULT - PROBLEM SELECTOR PLAN 5
- Onc consult -  to approve Pomalyst and discuss whether to continue DEX , to be arranged by day team

## 2022-04-13 NOTE — ED ADULT NURSE REASSESSMENT NOTE - NS ED NURSE REASSESS COMMENT FT1
Report received from SE Shelton. Patient resting in stretcher in green room A. A&Ox2 (person, place). VSS, IV patent and flushes without difficulty. On neuro assessment, patient strong to left extremities, weakness to right extremities, follows commands, coherent speech with periods of incoherent speech (unchanged from previous shift). Denies any pain. Patient repositioned in stretcher for comfort. Stretcher in lowest position, side rails up. Call bell within reach and patient instructed to notify RN if assistance is needed. Pending dispo.

## 2022-04-13 NOTE — PHYSICAL THERAPY INITIAL EVALUATION ADULT - PERTINENT HX OF CURRENT PROBLEM, REHAB EVAL
PMHx: HTN, multiple myeloma, RA, remote h/o CVA. presented  to  [facility]. Pt with increasing confused x few days. Day prior to admission, RLE shaking noticed. On day of admission, pt in usual state of health, in the evening +transient episode of confusion, started slurring speech & talking nonsense. In ED, +episode of Altered state, code stroke called, CTH: possible SAH. pt transferred to [facility] for neurosurgical evaluation. In ED, +episode of Altered state, pt treated with Keppra, NIHSS 3

## 2022-04-13 NOTE — H&P ADULT - ASSESSMENT
82 F w/pmh  significant for HTN, multiple myeloma, RA on ASA81, remote h/o CVA no residual , p/w AMS to [facility] , found to have SAH t/f to [facility]

## 2022-04-13 NOTE — SPEECH LANGUAGE PATHOLOGY EVALUATION - COMMENTS
Significant difficulty with convergent naming tasks-- 0% accuracy. Improvement with F:2 multiple choice cueing. Hx cont: ED course: patient evaluated by Nsx and Neurology. Treated with Keppra. CTA w/ b/l cav carotid athero w/ mod stenosis, L parietal/coronaradiata hypodensity c/f acute/subacute infarct not called by rads, thin hyperdensity in L occ lobe tsah vs calcification/artifact. Exam: AOx2, mild-mod mixed aphasia, PERRL, EOMI, no facial, RUE +drift, 4+/5 hg, LUE/BLE 5/5. NIHSS4. No acute nsgy intervention." Per Neuro, "Impression: Fluctuating dysarthria and right sided weakness due to left hemispheric dysfunction possibly secondary to left SAH vs stroke mimic in the setting hyperglycemia vs seizure vs less likely acute stroke. Possible annita's paresis following reported witnessed seizure. Rule out other underlying toxic-metabolic or infectious etiologies."     IMAGING:  CT HEAD: 4/13/22  IMPRESSION:  No significant change when compared with prior study. No CT evidence of acute, large transcortical infarct. MR brain can be obtained for further evaluation if clinical concern remains.    CT BRAIN STROKE PROTOCOL: 4/12/22  IMPRESSION:  Suggestion of small left occipital subarachnoid bleed. No local mass effect or midline shift.    No chest imaging available for review.    Pt is known to the Speech Pathology service at [facility] from bedside swallow evaluation completed 3/22/21 which revealed an rox-pharyngeal dysphagia c/b prolonged mastication, palatal stasis, reduced hyo-laryngeal elevation and multiple swallow with delayed cough of solids and cough post oral intake of thin liquids. Rx at that time: Puree Textures/Nectar Liquids- NO STRAW. GOALS:  1. Pt will improve expressive language skills for functional communication.  2. Pt will improve receptive language skills for functional communication.  3. Pt will improve cognitive-linguistic skills for functional communication.   4. Pt will improve dysarthria/speech intelligibility for functional communication. Clock drawing task: significant difficulty. Wrote incorrect numbers on the R side of clock only (c/f left side neglect)

## 2022-04-13 NOTE — CHART NOTE - NSCHARTNOTEFT_GEN_A_CORE
Imaging reviewed with radiology. Hypodensity reported to be consistent with chronic microvascular ischemic changes.  Follow up imaging as recommended by Neurosurgery.

## 2022-04-13 NOTE — CHART NOTE - NSCHARTNOTEFT_GEN_A_CORE
Pt is an 81 y/o female with an acute neurological issue. A1c 11.5, not on meds at home for DM. She is on dexamethasone 12mg on Mon and Thurs for multiple myeloma. eGFR 40. Weight 61.1kg.    Agree with starting Lantus 6 units qhs and Admelog 3 units TIDac which the primary team already ordered. Would recommend lowering pre-meal correction scale to low dose scale. Continue with low dose correction at bedtime.    Full consult on 4/14.    Jose Hoover DO, Endocrinology Fellow  Pager [phone] from 9am to 5pm. After hours and on weekends, please call [phone].

## 2022-04-13 NOTE — CHART NOTE - NSCHARTNOTEFT_GEN_A_CORE
Repeat CTH stable tsah w/ inc prominence of high L cortical hypodensity although need MRI imaging to confirm. Rec fu stroke neurology recommendations. Hold AC/AP until after MRI, remaining recs as previously documented.

## 2022-04-13 NOTE — H&P ADULT - PROBLEM SELECTOR PLAN 3
suspect secondary to Dex , as well as acute neuro event   - s/p IV fluid bolus   - insulin correction scale  - check fsg qac/qhs  - check a1c in am

## 2022-04-13 NOTE — ED ADULT NURSE REASSESSMENT NOTE - NS ED NURSE REASSESS COMMENT FT1
As per SE Rinaldi patient appearing to have seizure like activity. MD Chaudhry and MD Goodman at bedside. Patient had change in mental status and change in neuro status from previous neuro assessment. Code stroke activated and patient brought to CT Scan on cardiac with RN, MD, and EDT at bedside. Patient is immobile to right upper and lower extremities and weak to left upper and lower extremity. Patient with noticeable facial droop and is displaying incoherent speech and not oriented at the time. CT scan completed and patient transported back to room for assessment. Neurology at bedside performing assessment. Patient's neuro assessment intermittently changing. Patient can move extremities the same then intermittently having difficulty during assessment. This has been consistent throughout patient's neuro exam. At 0420 symptoms resolved and patient able to move all extremities and follows commands speaking coherently in full sentences. Neuro MD signed out to Medicine NP at bedside. Pending H&P. As per SE Rinaldi patient appearing to have seizure like activity @0348. MD Chaudhry and MD Goodman at bedside. Patient had change in mental status and change in neuro status from previous neuro assessment. Code stroke activated and patient brought to CT Scan on cardiac with RN, MD, and EDT at bedside. Patient is immobile to right upper and lower extremities and weak to left upper and lower extremity. Patient with noticeable facial droop and is displaying incoherent speech and not oriented at the time. CT scan completed and patient transported back to room for assessment. Neurology at bedside performing assessment. Patient's neuro assessment intermittently changing. Patient can move extremities the same then intermittently having difficulty during assessment. This has been consistent throughout patient's neuro exam. At 0420 symptoms resolved and patient able to move all extremities and follows commands speaking coherently in full sentences. Neuro MD signed out to Medicine NP at bedside. Pending H&P.

## 2022-04-13 NOTE — ED ADULT NURSE NOTE - NSIMPLEMENTINTERV_GEN_ALL_ED
Implemented All Fall with Harm Risk Interventions:  [place] to call system. Call bell, personal items and telephone within reach. Instruct patient to call for assistance. Room bathroom lighting operational. Non-slip footwear when patient is off stretcher. Physically safe environment: no spills, clutter or unnecessary equipment. Stretcher in lowest position, wheels locked, appropriate side rails in place. Provide visual cue, wrist band, yellow gown, etc. Monitor gait and stability. Monitor for mental status changes and reorient to person, place, and time. Review medications for side effects contributing to fall risk. Reinforce activity limits and safety measures with patient and family. Provide visual clues: red socks.

## 2022-04-13 NOTE — CONSULT NOTE ADULT - ASSESSMENT
82 y.o. LHW with a PMH of HTN, rheumatoid arthritis, multiple myeloma, who presented to OSH with c/o slurred speech and confusion, found to have a small left occipital SAH, transferred to [facility] for further management. LKN 14:30 on 4/12/22. While in the ED a stroke code was activated at 03:56 for worsening dysarthria and right sided weakness. Per providers at bedside, symptoms have been inconsistent and somewhat fluctuating in quality since presentation, and the patient may have had seizure-like activity just prior to activation of the stroke code, for which received Keppra. Initial VSS and wnl. On exam patient noted to be at baseline mental status, mildly dysarthric, but fluent and moving all extremities against gravity, with RUE drift noted. Follows all commands, however intermittently required repeated prompting to properly elicit actions on the right, which when done, was to the same degree as the left. Initial labs signficant for serum glucose 530, BUN 48, Cr 2.31, sodium 132, , Hb 9.5. CTH w/o significant for small left occipital SAH noted to be stable on repeat imaging. CTA H/N w/ does not demonstrate flow limiting stenosis or occlusion.     NIHSS: 3  MRS: 3    Impression: Fluctuating dysarthria and right sided weakness due to left hemispheric dysfunction possibly secondary to left SAH vs stroke mimic in the setting hyperglycemia vs acute stroke vs seizure. Rule out other underlying toxic-metabolic or infectious etiologies.     Recommendations:  Imaging/Labs:  [] Follow up MRI/MRA/MR NOVA   [] Stat CTH prn for worsening mental status or neuro exam  [] TTE  [] HgA1c, Lipid profile  [] UA, Ucx, Utox, Bcx, CK, lactate    Meds:  [] Hold ASA and lovenox, use mechanical DVT prophylaxis for now    Other:  [] Strict BP control goal <160/90   [] Appreciate Neurosurgery eval  [] Telemetry Monitoring, Neuro checks/vitals per unit protocol  [] NPO unless passes bedside swallow, otherwise swallow eval  [] ISS and POCT glucose monitoring  [] BG goal <180, avoid hypoglycemia  [] PT/OT/SLP evaluation  [] Fall, aspiration, seizure precautions     82 y.o. LHW with a PMH of HTN, rheumatoid arthritis, multiple myeloma, who presented to OSH with c/o slurred speech and confusion, found to have a small left occipital SAH, transferred to [facility] for further management. LKN 14:30 on 4/12/22. While in the ED a stroke code was activated at 03:56 for worsening dysarthria and right sided weakness. Per providers at bedside, symptoms have been inconsistent and somewhat fluctuating in quality since presentation, and the patient may have had seizure-like activity just prior to activation of the stroke code, for which received Keppra. Initial VSS and wnl. On exam patient noted to be at baseline mental status, mildly dysarthric, but fluent and moving all extremities against gravity, with RUE drift noted. Follows all commands, however intermittently required repeated prompting to properly elicit actions on the right, which when done, was to the same degree as the left. Initial labs significant for serum glucose 530, BUN 48, Cr 2.31, sodium 132, , Hb 9.5. CTH w/o significant for small left occipital SAH noted to be stable on repeat imaging. CTA H/N w/ does not demonstrate flow limiting stenosis or occlusion.     NIHSS: 3  MRS: 3    Impression: Fluctuating dysarthria and right sided weakness due to left hemispheric dysfunction possibly secondary to left SAH vs stroke mimic in the setting hyperglycemia vs seizure vs less likely acute stroke. Possible annita's paresis following reported witnessed seizure. Rule out other underlying toxic-metabolic or infectious etiologies.     Recommendations:  Imaging/Labs:  [] Follow up MRI/MRA/MR NOVA   [] Stat CTH prn for worsening mental status or neuro exam  [] TTE  [] HgA1c, Lipid profile  [] UA, Ucx, Utox, Bcx, CK, lactate    Meds:  [] Hold ASA and lovenox, use mechanical DVT prophylaxis for now  [] Keppra 500mg Q12H    Other:  [] Strict BP control goal <160/90   [] Appreciate Neurosurgery eval  [] Telemetry Monitoring, Neuro checks/vitals per unit protocol  [] NPO unless passes bedside swallow, otherwise swallow eval  [] ISS and POCT glucose monitoring  [] BG goal <180, avoid hypoglycemia  [] PT/OT/SLP evaluation  [] Fall, aspiration, seizure precautions    Discussed with stroke fellow, Dr. Hale.  82 y.o. LHW with a PMH of HTN, rheumatoid arthritis, multiple myeloma, who presented to OSH with c/o slurred speech and confusion, found to have a small left occipital SAH, transferred to [facility] for further management. LKN 14:30 on 4/12/22. While in the ED a stroke code was activated at 03:56 for worsening dysarthria and right sided weakness. Per providers at bedside, symptoms have been inconsistent and somewhat fluctuating in quality since presentation, and the patient may have had seizure-like activity just prior to activation of the stroke code, for which received Keppra. Initial VSS and wnl. On exam patient noted to be at baseline mental status, mildly dysarthric, but fluent and moving all extremities against gravity, with RUE drift noted. Follows all commands, however intermittently required repeated prompting to properly elicit actions on the right, which when done, was to the same degree as the left. Initial labs significant for serum glucose 530, BUN 48, Cr 2.31, sodium 132, , Hb 9.5. CTH w/o significant for small left occipital SAH noted to be stable on repeat imaging. CTA H/N w/ does not demonstrate flow limiting stenosis or occlusion.     NIHSS: 3  MRS: 3    Impression: Fluctuating dysarthria and right sided weakness due to left hemispheric dysfunction possibly secondary to left SAH vs stroke mimic in the setting hyperglycemia vs seizure vs less likely acute stroke. Possible annita's paresis following reported witnessed seizure. Rule out other underlying toxic-metabolic or infectious etiologies.     Recommendations:  Imaging/Labs:  [] Follow up MRI/MRA/MR NOVA   [] Stat CTH prn for worsening mental status or neuro exam  [] TTE  [] Routine EEG ([place] order name: EEG awake and asleep)   [] HgA1c, Lipid profile  [] UA, Ucx, Utox, Bcx, CK, lactate    Meds:  [] Hold ASA and lovenox, use mechanical DVT prophylaxis for now  [] Keppra 500mg Q12H    Other:  [] Strict BP control goal <160/90   [] Appreciate Neurosurgery eval  [] Telemetry Monitoring, Neuro checks/vitals per unit protocol  [] NPO unless passes bedside swallow, otherwise swallow eval  [] ISS and POCT glucose monitoring  [] BG goal <180, avoid hypoglycemia  [] PT/OT/SLP evaluation  [] Fall, aspiration, seizure precautions    Discussed with stroke fellow, Dr. Hale.

## 2022-04-13 NOTE — SPEECH LANGUAGE PATHOLOGY EVALUATION - SLP PERTINENT HISTORY OF CURRENT PROBLEM
83y/o F with PMH of HTN, multiple myeloma, RA on ASA81, remote h/o CVA no residual, presented to [facility] for AMS and slurred speech on day of admission. Pt was noted to have mixed aphasia, as well as RUE weakness with positive pronator drift. She was evaluated for stroke. CTH showed possible SAH, and patient was transferred to [facility] for neurosurgical evaluation. Pt currently reports chronic lower back pain.

## 2022-04-13 NOTE — CHART NOTE - NSCHARTNOTEFT_GEN_A_CORE
Neurosurgery reviewed MRI/MRA/NOVA findings showing good intracranial flow for age. No acute infarcts. T2 flair images show no evidence of occipital hemorrhage which may be due to isodense quality of CSF vs. artifact. Small incidental meningioma found. No neurosurgical intervention indicated in this patient. OK to start dvt prophylaxis tomorrow pm.     Recommend Keppra x7D in setting of possible traumatic seizure. Recommend further care per neurology/medicine teams.

## 2022-04-14 DIAGNOSIS — M54.9 DORSALGIA, UNSPECIFIED: ICD-10-CM

## 2022-04-14 DIAGNOSIS — E78.5 HYPERLIPIDEMIA, UNSPECIFIED: ICD-10-CM

## 2022-04-14 DIAGNOSIS — Z02.9 ENCOUNTER FOR ADMINISTRATIVE EXAMINATIONS, UNSPECIFIED: ICD-10-CM

## 2022-04-14 DIAGNOSIS — E11.65 TYPE 2 DIABETES MELLITUS WITH HYPERGLYCEMIA: ICD-10-CM

## 2022-04-14 LAB
A1C WITH ESTIMATED AVERAGE GLUCOSE RESULT: 11.4 % — HIGH (ref 4–5.6)
ANION GAP SERPL CALC-SCNC: 12 MMOL/L — SIGNIFICANT CHANGE UP (ref 5–17)
BUN SERPL-MCNC: 20 MG/DL — SIGNIFICANT CHANGE UP (ref 7–23)
CALCIUM SERPL-MCNC: 9.2 MG/DL — SIGNIFICANT CHANGE UP (ref 8.4–10.5)
CHLORIDE SERPL-SCNC: 111 MMOL/L — HIGH (ref 96–108)
CO2 SERPL-SCNC: 19 MMOL/L — LOW (ref 22–31)
CREAT SERPL-MCNC: 1.26 MG/DL — SIGNIFICANT CHANGE UP (ref 0.5–1.3)
EGFR: 43 ML/MIN/1.73M2 — LOW
ESTIMATED AVERAGE GLUCOSE: 280 MG/DL — HIGH (ref 68–114)
GLUCOSE BLDC GLUCOMTR-MCNC: 132 MG/DL — HIGH (ref 70–99)
GLUCOSE BLDC GLUCOMTR-MCNC: 146 MG/DL — HIGH (ref 70–99)
GLUCOSE BLDC GLUCOMTR-MCNC: 201 MG/DL — HIGH (ref 70–99)
GLUCOSE BLDC GLUCOMTR-MCNC: 246 MG/DL — HIGH (ref 70–99)
GLUCOSE SERPL-MCNC: 146 MG/DL — HIGH (ref 70–99)
HCT VFR BLD CALC: 30.4 % — LOW (ref 34.5–45)
HGB BLD-MCNC: 10 G/DL — LOW (ref 11.5–15.5)
MCHC RBC-ENTMCNC: 27.9 PG — SIGNIFICANT CHANGE UP (ref 27–34)
MCHC RBC-ENTMCNC: 32.9 GM/DL — SIGNIFICANT CHANGE UP (ref 32–36)
MCV RBC AUTO: 84.9 FL — SIGNIFICANT CHANGE UP (ref 80–100)
NRBC # BLD: 0 /100 WBCS — SIGNIFICANT CHANGE UP (ref 0–0)
PLATELET # BLD AUTO: 187 K/UL — SIGNIFICANT CHANGE UP (ref 150–400)
POTASSIUM SERPL-MCNC: 4.3 MMOL/L — SIGNIFICANT CHANGE UP (ref 3.5–5.3)
POTASSIUM SERPL-SCNC: 4.3 MMOL/L — SIGNIFICANT CHANGE UP (ref 3.5–5.3)
RBC # BLD: 3.58 M/UL — LOW (ref 3.8–5.2)
RBC # FLD: 17.1 % — HIGH (ref 10.3–14.5)
SODIUM SERPL-SCNC: 142 MMOL/L — SIGNIFICANT CHANGE UP (ref 135–145)
WBC # BLD: 4.9 K/UL — SIGNIFICANT CHANGE UP (ref 3.8–10.5)
WBC # FLD AUTO: 4.9 K/UL — SIGNIFICANT CHANGE UP (ref 3.8–10.5)

## 2022-04-14 PROCEDURE — 72131 CT LUMBAR SPINE W/O DYE: CPT | Mod: 26

## 2022-04-14 PROCEDURE — 72128 CT CHEST SPINE W/O DYE: CPT | Mod: 26

## 2022-04-14 PROCEDURE — 93306 TTE W/DOPPLER COMPLETE: CPT | Mod: 26

## 2022-04-14 PROCEDURE — 99233 SBSQ HOSP IP/OBS HIGH 50: CPT

## 2022-04-14 PROCEDURE — 99223 1ST HOSP IP/OBS HIGH 75: CPT

## 2022-04-14 RX ORDER — INSULIN GLARGINE 100 [IU]/ML
5 INJECTION, SOLUTION SUBCUTANEOUS AT BEDTIME
Refills: 0 | Status: DISCONTINUED | OUTPATIENT
Start: 2022-04-14 | End: 2022-04-19

## 2022-04-14 RX ADMIN — ATORVASTATIN CALCIUM 20 MILLIGRAM(S): 80 TABLET, FILM COATED ORAL at 22:24

## 2022-04-14 RX ADMIN — LEVETIRACETAM 400 MILLIGRAM(S): 250 TABLET, FILM COATED ORAL at 05:30

## 2022-04-14 RX ADMIN — PANTOPRAZOLE SODIUM 40 MILLIGRAM(S): 20 TABLET, DELAYED RELEASE ORAL at 09:28

## 2022-04-14 RX ADMIN — INSULIN GLARGINE 5 UNIT(S): 100 INJECTION, SOLUTION SUBCUTANEOUS at 22:25

## 2022-04-14 RX ADMIN — Medication 200 MILLIGRAM(S): at 14:29

## 2022-04-14 RX ADMIN — GABAPENTIN 200 MILLIGRAM(S): 400 CAPSULE ORAL at 22:25

## 2022-04-14 RX ADMIN — Medication 650 MILLIGRAM(S): at 06:22

## 2022-04-14 RX ADMIN — LEVETIRACETAM 400 MILLIGRAM(S): 250 TABLET, FILM COATED ORAL at 20:00

## 2022-04-14 RX ADMIN — Medication 1000 UNIT(S): at 14:00

## 2022-04-14 RX ADMIN — GABAPENTIN 100 MILLIGRAM(S): 400 CAPSULE ORAL at 14:27

## 2022-04-14 RX ADMIN — Medication 650 MILLIGRAM(S): at 22:24

## 2022-04-14 RX ADMIN — AMLODIPINE BESYLATE 5 MILLIGRAM(S): 2.5 TABLET ORAL at 05:50

## 2022-04-14 RX ADMIN — Medication 650 MILLIGRAM(S): at 05:52

## 2022-04-14 RX ADMIN — LIDOCAINE 1 PATCH: 4 CREAM TOPICAL at 14:28

## 2022-04-14 RX ADMIN — Medication 1 MILLIGRAM(S): at 14:29

## 2022-04-14 RX ADMIN — Medication 3 UNIT(S): at 09:19

## 2022-04-14 RX ADMIN — Medication 650 MILLIGRAM(S): at 21:46

## 2022-04-14 RX ADMIN — Medication 650 MILLIGRAM(S): at 22:54

## 2022-04-14 NOTE — OCCUPATIONAL THERAPY INITIAL EVALUATION ADULT - STRENGTHENING, PT EVAL
Pt will increase BUE/BLE strength to 4/5 to assist with functional mobility and ADLs within 4 weeks.

## 2022-04-14 NOTE — CONSULT NOTE ADULT - ASSESSMENT
ANGELO STRATTON  82F hx HTN, mult myeloma, RA, initially seen 2da for ?punctate L occ tsah stable on repeat imaging iso now improving mild aphasia, got CTs for chronic LBP (which per pt has been improving, no new weakness). CT T/L w/ multilevel chronic comp fx sherrie at T9, T12, L2, L3, and carlley unstable T10 fishmouth deformity w/ fx line into b/l pedicles likely ustable, not seen on prior xray from 2021. Exam: AOx2, mild dysarthria, PERRL, EOMI, no facial, BUE 5/5, BLE 4/5 HF R>L pain concepcion, DF/PF 4/5 pain concepcion w/ paresthesias of soles b/l, +clonus, DTR wnl.  - adm medicine, q4h neuro checks  - flat at all times, spine precautions, log roll only  - needs TLSO brace at all times  - MRI T/L non con pending  - given extensive adjacent degenerative disease to fx if MRI shows instability, will have to discuss risks and benefits of small vs large fusion construct with family. Daughter is open to a smaller surgery.   - pain control per primary  - Please document medical risk/optimization for possible OR  - Daughter/HCP Binta [phone]     - Please obtain a TLSO brace. Please contact gaurav willams for a fitted brace: [phone]

## 2022-04-14 NOTE — PROGRESS NOTE ADULT - PROBLEM SELECTOR PLAN 8
dispo to [facility]    pt will need geriatric referral after rehab, lives alone and family has been unable to increase home care hours

## 2022-04-14 NOTE — PROGRESS NOTE ADULT - SUBJECTIVE AND OBJECTIVE BOX
Patient seen and examined  no complaints    No Known Allergies    Hospital Medications:   MEDICATIONS  (STANDING):  amLODIPine   Tablet 5 milliGRAM(s) Oral daily  atorvastatin 20 milliGRAM(s) Oral at bedtime  cholecalciferol 1000 Unit(s) Oral daily  dextrose 5%. 1000 milliLiter(s) (50 mL/Hr) IV Continuous <Continuous>  dextrose 5%. 1000 milliLiter(s) (100 mL/Hr) IV Continuous <Continuous>  dextrose 50% Injectable 25 Gram(s) IV Push once  dextrose 50% Injectable 12.5 Gram(s) IV Push once  folic acid 1 milliGRAM(s) Oral daily  gabapentin 200 milliGRAM(s) Oral at bedtime  gabapentin 100 milliGRAM(s) Oral daily  glucagon  Injectable 1 milliGRAM(s) IntraMuscular once  hydroxychloroquine 200 milliGRAM(s) Oral daily  insulin glargine Injectable (LANTUS) 6 Unit(s) SubCutaneous at bedtime  insulin lispro (ADMELOG) corrective regimen sliding scale   SubCutaneous three times a day before meals  insulin lispro (ADMELOG) corrective regimen sliding scale   SubCutaneous at bedtime  insulin lispro Injectable (ADMELOG) 3 Unit(s) SubCutaneous three times a day before meals  levETIRAcetam  IVPB 500 milliGRAM(s) IV Intermittent every 12 hours  lidocaine   4% Patch 1 Patch Transdermal daily  pantoprazole    Tablet 40 milliGRAM(s) Oral before breakfast        VITALS:  T(F): 98.6 (22 @ 05:20), Max: 99 (22 @ 01:15)  HR: 72 (22 @ 05:20)  BP: 121/67 (22 @ 05:20)  RR: 18 (22 @ 05:20)  SpO2: 97% (22 @ 05:20)  Wt(kg): --     @ 07:01  -   @ 07:00  --------------------------------------------------------  IN: 200 mL / OUT: 1800 mL / NET: -1600 mL        Weight (kg): 61.1 ( @ 19:35)  PHYSICAL EXAM:  Constitutional: NAD  HEENT: anicteric sclera, oropharynx clear, MMM  Neck: No JVD  Respiratory: CTAB, no wheezes, rales or rhonchi  Cardiovascular: S1, S2, RRR  Gastrointestinal: BS+, soft, NT/ND  Extremities: No cyanosis or clubbing. No peripheral edema  Neurological: A/O x 3, no focal deficits      LABS:      142  |  111<H>  |  20  ----------------------------<  146<H>  4.3   |  19<L>  |  1.26    Ca    9.2      2022 06:22    TPro  6.3  /  Alb  3.4  /  TBili  0.4  /  DBili      /  AST  16  /  ALT  20  /  AlkPhos  125<H>  04-13    Creatinine Trend: 1.26 <--, 1.34 <--, 1.46 <--, 1.76 <--, 2.31 <--                        10.0   4.90  )-----------( 187      ( 2022 06:24 )             30.4     Urine Studies:  Urinalysis Basic - ( 2022 04:40 )    Color: Colorless / Appearance: Clear / S.020 / pH:   Gluc:  / Ketone: Negative  / Bili: Negative / Urobili: Negative   Blood:  / Protein: Trace / Nitrite: Negative   Leuk Esterase: Negative / RBC: 2 /hpf / WBC 0 /HPF   Sq Epi:  / Non Sq Epi: 0 /hpf / Bacteria: Negative        RADIOLOGY & ADDITIONAL STUDIES:

## 2022-04-14 NOTE — PROGRESS NOTE ADULT - PROBLEM SELECTOR PLAN 1
Repeat CTH stable, MR without additional concerns. EEG neg for sz activity  Neuro & NSGY input appreciated  still concern SAH-related seizure, vs contribution of hyperglycemia to neuro symptoms, they are now resolving  - BP control  - Telemetry   - continue keppra   - Hold ASA and lovenox for now  - seizure precautions - speech and swallow apprec

## 2022-04-14 NOTE — PROGRESS NOTE ADULT - PROBLEM SELECTOR PLAN 3
suspect secondary to Dex , A1c is >11  - s/p IV fluid bolus   - basal/bolus insulin  -endo appreciated, unsure how to proceed given MM tx involving high dose steroid suspectat least in part secondary to Dex , A1c is >11  - s/p IV fluid bolus   - basal/bolus insulin w coverage sliding scale  -endo appreciated, unsure how to proceed given MM tx involving high dose steroid

## 2022-04-14 NOTE — PROGRESS NOTE ADULT - CONVERSATION DETAILS
d/w daughter on phone  pt full code for now  daughter will confer w pt and pt's son and get back to team

## 2022-04-14 NOTE — PROGRESS NOTE ADULT - PROBLEM SELECTOR PLAN 7
per pt has been going on since 1 yr, confirmed by daughter  no point tenderness but pt describes baseline significant constant pain  check CT given falls, SAH to r/o fx

## 2022-04-14 NOTE — OCCUPATIONAL THERAPY INITIAL EVALUATION ADULT - GENERAL OBSERVATIONS, REHAB EVAL
Pt presented with decrease BUE/BLE strength and poor balance impairing functional mobility and ADLs.

## 2022-04-14 NOTE — PROGRESS NOTE ADULT - ASSESSMENT
82 y.o. LHW with HTN, rheumatoid arthritis, multiple myeloma, who presented to OSH with c/o slurred speech and confusion, found to have a small left occipital SAH, transferred to [facility] for further management.   LKN 14:30 on 4/12/22. While in the ED a stroke code was activated at 03:56 for worsening dysarthria and right sided weakness. Per providers at bedside, symptoms have been inconsistent and somewhat fluctuating in quality since presentation, and the patient may have had seizure-like activity just prior to activation of the stroke code, for which received Keppra.  Initial VSS and wnl. On exam patient noted to be at baseline mental status, mildly dysarthric, but fluent and moving all extremities against gravity, with RUE drift noted. Follows all commands, however intermittently required repeated prompting to properly elicit actions on the right, which when done, was to the same degree as the left. Initial labs significant for serum glucose 530, BUN 48, Cr 2.31, sodium 132, , Hb 9.5.   CTH w/o: significant for small left occipital SAH noted to be stable on repeat imaging.   CTA H/N w/ does not demonstrate flow limiting stenosis or occlusion.   EEG neg   MRI with no evidence of L SAH, R frontal meningioma. no acute findings  MRA/Nova: unremarkable   NIHSS: 3  MRS: 3    Impression:  stroke mimic in the setting hyperglycemia vs seizure vs less likely acute stroke. Possible annita's paresis following reported witnessed seizure. Rule out other underlying toxic-metabolic or infectious etiologies. unclear etiology of SAH, not seen on MR     Recommendations:  - Stat CTH prn for worsening mental status or neuro exam  - TTE  - needs better glucose control   - Hold ASA and lovenox, use mechanical DVT prophylaxis for now  -  Keppra 500mg Q12H  - Strict BP control goal <160/90   - Appreciate Neurosurgery eval  - Telemetry Monitoring, Neuro checks/vitals per unit protocol  - NPO unless passes bedside swallow, otherwise swallow eval  - ISS and POCT glucose monitoring  - BG goal <180, avoid hypoglycemia  - PT/OT/SLP evaluation  - Fall, aspiration, seizure precautions  - Thank you for allowing me to participate in the care of this patient. Call with questions.   - d/c plan when able   Clem Roberto MD  Vascular Neurology .

## 2022-04-14 NOTE — CONSULT NOTE ADULT - SUBJECTIVE AND OBJECTIVE BOX
HPI:  Patient is an 82 year old female w/pmh  significant for HTN, multiple myeloma, RA on ASA81, remote h/o CVA no residual , presented  to  [facility] for altered mental state and slurred speech on day of admission,  patient was noted to have mixed aphasia , as well as right upper extremity weakness with positive pronator drift ,  she was evaluated for stroke , CT head showed possible sub arachnoid hemorrhage , and patient was transferred to [facility] for neurosurgical evaluation.   Per family, patient has been increasing confused over the past few days. On day prior to admission, patient right leg shaking  noticed by granddaughter. On day of admission, patient was in usual state of health per home attendant , later in the evening ,patient had a transient episode where she became confused , started slurring speech and talking nonsense. Prior to the incident , patient has no specific complaints , no recent illness , no fever or chills. No chest pain or SOB , and no palpitations. Per family , there were no recent falls or injuries. Patient currently reports lower back pain , which family states is chronic.   ED course: patient evaluated by neurosurgery , patient evaluated by neurology ,  patient had episode of Altered state , code stroke called and HCT was obtained, patient treated with Keppra    (13 Apr 2022 05:39)      PAST MEDICAL & SURGICAL HISTORY:  Rheumatoid arthritis    Varicose veins of lower extremity    Hypertension    Multiple myeloma    No significant past surgical history        FAMILY HISTORY:  FHx: stroke (Father)        Social History:    Outpatient Medications:    MEDICATIONS  (STANDING):  amLODIPine   Tablet 5 milliGRAM(s) Oral daily  atorvastatin 20 milliGRAM(s) Oral at bedtime  cholecalciferol 1000 Unit(s) Oral daily  dextrose 5%. 1000 milliLiter(s) (50 mL/Hr) IV Continuous <Continuous>  dextrose 5%. 1000 milliLiter(s) (100 mL/Hr) IV Continuous <Continuous>  dextrose 50% Injectable 25 Gram(s) IV Push once  dextrose 50% Injectable 12.5 Gram(s) IV Push once  folic acid 1 milliGRAM(s) Oral daily  gabapentin 200 milliGRAM(s) Oral at bedtime  gabapentin 100 milliGRAM(s) Oral daily  glucagon  Injectable 1 milliGRAM(s) IntraMuscular once  hydroxychloroquine 200 milliGRAM(s) Oral daily  insulin glargine Injectable (LANTUS) 6 Unit(s) SubCutaneous at bedtime  insulin lispro (ADMELOG) corrective regimen sliding scale   SubCutaneous three times a day before meals  insulin lispro (ADMELOG) corrective regimen sliding scale   SubCutaneous at bedtime  insulin lispro Injectable (ADMELOG) 3 Unit(s) SubCutaneous three times a day before meals  levETIRAcetam  IVPB 500 milliGRAM(s) IV Intermittent every 12 hours  lidocaine   4% Patch 1 Patch Transdermal daily  pantoprazole    Tablet 40 milliGRAM(s) Oral before breakfast    MEDICATIONS  (PRN):  acetaminophen     Tablet .. 650 milliGRAM(s) Oral every 6 hours PRN Temp greater or equal to 38C (100.4F), Mild Pain (1 - 3)  dextrose Oral Gel 15 Gram(s) Oral once PRN Blood Glucose LESS THAN 70 milliGRAM(s)/deciliter  melatonin 3 milliGRAM(s) Oral at bedtime PRN Insomnia  polyethylene glycol 3350 17 Gram(s) Oral daily PRN Constipation  senna 2 Tablet(s) Oral at bedtime PRN Constipation      Allergies    No Known Allergies    Intolerances      Review of Systems:  Constitutional: No fever, No change in weight  Eyes: No blurry vision  Neuro: No headache, No paresthesias  HEENT: No throat pain  Cardiovascular: No chest pain  Respiratory: No SOB  GI: No nausea or vomiting  : No polyuria  Skin: no rash  Psych: no depression  Endocrine: No polydipsia, No heat or cold intolerance, rest as noted in HPI  Hem/lymph: no swelling    All other review of systems negative      PHYSICAL EXAM:  VITALS: T(C): 36.8 (04-14-22 @ 13:28)  T(F): 98.3 (04-14-22 @ 13:28), Max: 99 (04-14-22 @ 01:15)  HR: 67 (04-14-22 @ 13:28) (66 - 96)  BP: 116/70 (04-14-22 @ 13:28) (116/70 - 140/62)  RR:  (16 - 20)  SpO2:  (96% - 100%)  Wt(kg): --  GENERAL: NAD at this time  EYES: No proptosis, EOMI  HEENT:  Atraumatic, Normocephalic,   THYROID: Normal size, no palpable nodules  RESPIRATORY: Clear to auscultation bilaterally, full excursion, non-labored  CARDIOVASCULAR: Regular rhythm; No murmurs; no peripheral edema  GI: Soft, nontender, non distended, normal bowel sounds  SKIN: Dry, intact, No rashes or lesions  MUSCULOSKELETAL: normal strength  NEURO: follows commands, no tremor, normal reflexes  PSYCH: Alert and oriented x 3, normal affect, normal mood  CUSHING'S SIGNS: no striae      POCT Blood Glucose.: 146 mg/dL (04-14-22 @ 11:26)  POCT Blood Glucose.: 132 mg/dL (04-14-22 @ 07:33)  POCT Blood Glucose.: 333 mg/dL (04-13-22 @ 21:08)  POCT Blood Glucose.: 334 mg/dL (04-13-22 @ 16:16)  POCT Blood Glucose.: 359 mg/dL (04-13-22 @ 16:13)  POCT Blood Glucose.: 272 mg/dL (04-13-22 @ 11:24)  POCT Blood Glucose.: 277 mg/dL (04-13-22 @ 07:19)  POCT Blood Glucose.: 350 mg/dL (04-13-22 @ 05:50)  POCT Blood Glucose.: 407 mg/dL (04-13-22 @ 03:54)  POCT Blood Glucose.: 512 mg/dL (04-12-22 @ 20:53)                              10.0   4.90  )-----------( 187      ( 14 Apr 2022 06:24 )             30.4       04-14    142  |  111<H>  |  20  ----------------------------<  146<H>  4.3   |  19<L>  |  1.26    EGFR if : x   EGFR if non : x     Ca    9.2      04-14    TPro  6.3  /  Alb  3.4  /  TBili  0.4  /  DBili  x   /  AST  16  /  ALT  20  /  AlkPhos  125<H>  04-13    Thyroid Function Tests:  04-13 @ 12:27 TSH 0.54 FreeT4 -- T3 -- Anti TPO -- Anti Thyroglobulin Ab -- TSI --          04-13 Chol 115 Direct LDL -- LDL calculated 42 HDL 58 Trig 74  Radiology:                  HPI:  Patient is an 82 year old female w/pmh  significant for HTN, multiple myeloma, RA on ASA81, remote h/o CVA no residual , presented  to  [facility] for altered mental state and slurred speech on day of admission,  patient was noted to have mixed aphasia , as well as right upper extremity weakness with positive pronator drift ,  she was evaluated for stroke , CT head showed possible sub arachnoid hemorrhage , and patient was transferred to [facility] for neurosurgical evaluation.   Per family, patient has been increasing confused over the past few days. On day prior to admission, patient right leg shaking  noticed by granddaughter. On day of admission, patient was in usual state of health per home attendant , later in the evening ,patient had a transient episode where she became confused , started slurring speech and talking nonsense. Prior to the incident , patient has no specific complaints , no recent illness , no fever or chills. No chest pain or SOB , and no palpitations. Per family , there were no recent falls or injuries. Patient currently reports lower back pain , which family states is chronic.   ED course: patient evaluated by neurosurgery , patient evaluated by neurology ,  patient had episode of Altered state , code stroke called and HCT was obtained, patient treated with [facility]   Endocrine consulted for A1c of 11.4% Pt. denies any history of diabetes. She receives decadron with chemo x 2 weeks. No reported hx of microvascular complications of diabetes. Does not check glucose at home. No reported hypoglycemia or symptoms of hypoglycemia. Diet has been high in carbs as she did not know about the diabetes. She reports her sister helps her with her medical care.       PAST MEDICAL & SURGICAL HISTORY:  Rheumatoid arthritis    Varicose veins of lower extremity    Hypertension    Multiple myeloma    No significant past surgical history        FAMILY HISTORY:  FHx: stroke (Father)        Social History: No tobacco or alcohol use    Outpatient Medications:  · 	melatonin 3 mg oral tablet: Last Dose Taken:  , 1 tab(s) orally once a day (at bedtime)  · 	cholecalciferol oral tablet: Last Dose Taken:  , 2000 unit(s) orally once a day  · 	menthol-benzocaine 3.6 mg-15 mg mucous membrane lozenge: Last Dose Taken:  , 1 cap(s) mucous membrane 2 times a day   · 	acetaminophen 325 mg oral tablet: Last Dose Taken:  , 2 tab(s) orally every 6 hours, As needed, Moderate Pain (4 - 6)  · 	amLODIPine 5 mg oral tablet: Last Dose Taken:  , 1 tab(s) orally once a day   · 	lidocaine 5% topical film: Last Dose Taken:  , Apply topically to affected area once a day  · 	senna oral tablet: Last Dose Taken:  , 2 tab(s) orally once a day (at bedtime), As Needed   · 	polyethylene glycol 3350 oral powder for reconstitution: Last Dose Taken:  , 17 gram(s) orally once a day, As needed, Constipation  · 	pantoprazole 40 mg oral delayed release tablet: Last Dose Taken:  , 1 tab(s) orally once a day (before a meal)  · 	folic acid 1 mg tablet: Last Dose Taken:  , 1 tab(s) orally once a day x 30 days   · 	gabapentin 100 mg oral capsule: Last Dose Taken:  , 2 cap(s) orally 2 times a day  · 	aspirin 81 mg oral tablet, chewable: Last Dose Taken:  , 1 tab(s) orally once a day  · 	atorvastatin 20 mg oral tablet: Last Dose Taken:  , 1 tab(s) orally once a day  · 	Pomalyst 2 mg oral capsule: Last Dose Taken:  , 1 cap(s) orally every other day  · 	hydroxychloroquine 200 mg oral tablet: Last Dose Taken:  , 1 tab(s) orally once a day  · 	dexamethasone 4 mg oral tablet: Last Dose Taken:  , 3 tab(s) orally 2 times a week, monday thursday      MEDICATIONS  (STANDING):  amLODIPine   Tablet 5 milliGRAM(s) Oral daily  atorvastatin 20 milliGRAM(s) Oral at bedtime  cholecalciferol 1000 Unit(s) Oral daily  dextrose 5%. 1000 milliLiter(s) (50 mL/Hr) IV Continuous <Continuous>  dextrose 5%. 1000 milliLiter(s) (100 mL/Hr) IV Continuous <Continuous>  dextrose 50% Injectable 25 Gram(s) IV Push once  dextrose 50% Injectable 12.5 Gram(s) IV Push once  folic acid 1 milliGRAM(s) Oral daily  gabapentin 200 milliGRAM(s) Oral at bedtime  gabapentin 100 milliGRAM(s) Oral daily  glucagon  Injectable 1 milliGRAM(s) IntraMuscular once  hydroxychloroquine 200 milliGRAM(s) Oral daily  insulin glargine Injectable (LANTUS) 6 Unit(s) SubCutaneous at bedtime  insulin lispro (ADMELOG) corrective regimen sliding scale   SubCutaneous three times a day before meals  insulin lispro (ADMELOG) corrective regimen sliding scale   SubCutaneous at bedtime  insulin lispro Injectable (ADMELOG) 3 Unit(s) SubCutaneous three times a day before meals  levETIRAcetam  IVPB 500 milliGRAM(s) IV Intermittent every 12 hours  lidocaine   4% Patch 1 Patch Transdermal daily  pantoprazole    Tablet 40 milliGRAM(s) Oral before breakfast    MEDICATIONS  (PRN):  acetaminophen     Tablet .. 650 milliGRAM(s) Oral every 6 hours PRN Temp greater or equal to 38C (100.4F), Mild Pain (1 - 3)  dextrose Oral Gel 15 Gram(s) Oral once PRN Blood Glucose LESS THAN 70 milliGRAM(s)/deciliter  melatonin 3 milliGRAM(s) Oral at bedtime PRN Insomnia  polyethylene glycol 3350 17 Gram(s) Oral daily PRN Constipation  senna 2 Tablet(s) Oral at bedtime PRN Constipation      Allergies    No Known Allergies    Intolerances      Review of Systems:  Constitutional: No fever, No change in weight  Eyes: No blurry vision  Neuro: No headache, No paresthesias  HEENT: No throat pain  Cardiovascular: No chest pain  Respiratory: No SOB  GI: No nausea or vomiting  : No polyuria  Skin: no rash  Psych: no depression  Endocrine: No polydipsia, No heat or cold intolerance, rest as noted in HPI  Hem/lymph: no swelling    All other review of systems negative      PHYSICAL EXAM:  VITALS: T(C): 36.8 (04-14-22 @ 13:28)  T(F): 98.3 (04-14-22 @ 13:28), Max: 99 (04-14-22 @ 01:15)  HR: 67 (04-14-22 @ 13:28) (66 - 96)  BP: 116/70 (04-14-22 @ 13:28) (116/70 - 140/62)  RR:  (16 - 20)  SpO2:  (96% - 100%)  Wt(kg): --  GENERAL: NAD at this time  EYES: No proptosis, EOMI  HEENT:  Atraumatic, Normocephalic,   THYROID: Normal size, no palpable nodules  RESPIRATORY: Clear to auscultation bilaterally, full excursion, non-labored  CARDIOVASCULAR: Regular rhythm; No murmurs; +LE peripheral edema  GI: Soft, nontender, non distended, normal bowel sounds  SKIN: Dry, intact, No rashes or lesions  MUSCULOSKELETAL: normal strength  NEURO: follows commands  PSYCH:  normal affect, normal mood  CUSHING'S SIGNS: no striae      POCT Blood Glucose.: 146 mg/dL (04-14-22 @ 11:26)  POCT Blood Glucose.: 132 mg/dL (04-14-22 @ 07:33)  POCT Blood Glucose.: 333 mg/dL (04-13-22 @ 21:08)  POCT Blood Glucose.: 334 mg/dL (04-13-22 @ 16:16)  POCT Blood Glucose.: 359 mg/dL (04-13-22 @ 16:13)  POCT Blood Glucose.: 272 mg/dL (04-13-22 @ 11:24)  POCT Blood Glucose.: 277 mg/dL (04-13-22 @ 07:19)  POCT Blood Glucose.: 350 mg/dL (04-13-22 @ 05:50)  POCT Blood Glucose.: 407 mg/dL (04-13-22 @ 03:54)  POCT Blood Glucose.: 512 mg/dL (04-12-22 @ 20:53)                              10.0   4.90  )-----------( 187      ( 14 Apr 2022 06:24 )             30.4       04-14    142  |  111<H>  |  20  ----------------------------<  146<H>  4.3   |  19<L>  |  1.26    EGFR if : x   EGFR if non : x     Ca    9.2      04-14    TPro  6.3  /  Alb  3.4  /  TBili  0.4  /  DBili  x   /  AST  16  /  ALT  20  /  AlkPhos  125<H>  04-13    Thyroid Function Tests:  04-13 @ 12:27 TSH 0.54 FreeT4 -- T3 -- Anti TPO -- Anti Thyroglobulin Ab -- TSI --          04-13 Chol 115 Direct LDL -- LDL calculated 42 HDL 58 Trig 74  Radiology:

## 2022-04-14 NOTE — OCCUPATIONAL THERAPY INITIAL EVALUATION ADULT - LIVES WITH, PROFILE
Pt lives in a  with granddaughter. 8 steps to enter. Bedroom and bath on ground floor. bathtub + showerchair

## 2022-04-14 NOTE — CONSULT NOTE ADULT - SUBJECTIVE AND OBJECTIVE BOX
p (1480)     HPI:  82F hx HTN, mult myeloma, RA, initially seen 2da for ?punctate L occ tsah stable on repeat imaging iso now improving mild aphasia, got CTs for chronic LBP (which per pt has been improving, no new weakness). CT T/L w/ multilevel chronic comp fx sherrie at T9, T12, L2, L3, and likley unstable T10 fishmouth deformity w/ fx line into b/l pedicles likely ustable, not seen on prior xray from 2021. Exam: AOx2, mild dysarthria, PERRL, EOMI, no facial, BUE 5/5, BLE 4/5 HF R>L pain concepcion, DF/PF 4/5 pain concepcion w/ paresthesias of soles b/l, +clonus, DTR wnl.    --Anticoagulation:    =====================  PAST MEDICAL HISTORY   Rheumatoid arthritis    Varicose veins of lower extremity    Hypertension    Multiple myeloma      PAST SURGICAL HISTORY   No significant past surgical history          MEDICATIONS:  Antibiotics:  hydroxychloroquine 200 milliGRAM(s) Oral daily    Neuro:  acetaminophen     Tablet .. 650 milliGRAM(s) Oral every 6 hours PRN  gabapentin 200 milliGRAM(s) Oral at bedtime  gabapentin 100 milliGRAM(s) Oral daily  levETIRAcetam  IVPB 500 milliGRAM(s) IV Intermittent every 12 hours  melatonin 3 milliGRAM(s) Oral at bedtime PRN    Other:  amLODIPine   Tablet 5 milliGRAM(s) Oral daily  atorvastatin 20 milliGRAM(s) Oral at bedtime  cholecalciferol 1000 Unit(s) Oral daily  dextrose 5%. 1000 milliLiter(s) IV Continuous <Continuous>  dextrose 5%. 1000 milliLiter(s) IV Continuous <Continuous>  dextrose 50% Injectable 25 Gram(s) IV Push once  dextrose 50% Injectable 12.5 Gram(s) IV Push once  dextrose Oral Gel 15 Gram(s) Oral once PRN  folic acid 1 milliGRAM(s) Oral daily  glucagon  Injectable 1 milliGRAM(s) IntraMuscular once  insulin glargine Injectable (LANTUS) 5 Unit(s) SubCutaneous at bedtime  insulin lispro (ADMELOG) corrective regimen sliding scale   SubCutaneous three times a day before meals  insulin lispro (ADMELOG) corrective regimen sliding scale   SubCutaneous at bedtime  insulin lispro Injectable (ADMELOG) 3 Unit(s) SubCutaneous three times a day before meals  pantoprazole    Tablet 40 milliGRAM(s) Oral before breakfast  polyethylene glycol 3350 17 Gram(s) Oral daily PRN  senna 2 Tablet(s) Oral at bedtime PRN      SOCIAL HISTORY:   Occupation:   Marital Status:     FAMILY HISTORY:  FHx: stroke (Father)        ROS: Negative except per HPI    LABS:  PT/INR - ( 12 Apr 2022 23:31 )   PT: 11.5 sec;   INR: 1.00 ratio         PTT - ( 12 Apr 2022 23:31 )  PTT:20.9 sec                        10.0   4.90  )-----------( 187      ( 14 Apr 2022 06:24 )             30.4     04-14    142  |  111<H>  |  20  ----------------------------<  146<H>  4.3   |  19<L>  |  1.26    Ca    9.2      14 Apr 2022 06:22    TPro  6.3  /  Alb  3.4  /  TBili  0.4  /  DBili  x   /  AST  16  /  ALT  20  /  AlkPhos  125<H>  04-13

## 2022-04-14 NOTE — PROGRESS NOTE ADULT - ASSESSMENT
82 y.o. LHW with a PMH of HTN, rheumatoid arthritis, multiple myeloma, who presented to OSH with c/o slurred speech and confusion, found to have a small left occipital SAH with DUC on CKD stage 3    1) Duc on CKD stage 3  ddx includes pre renal vs atn  Cr now continues to improve  b/l cr is 1.3 to 1.4mg/dl  trend bun/cr  avoid nephrotoxins  will monitor    2) HTN- bp stable  monitor bp    Dr Gandara   [phone]

## 2022-04-14 NOTE — OCCUPATIONAL THERAPY INITIAL EVALUATION ADULT - PERTINENT HX OF CURRENT PROBLEM, REHAB EVAL
PMHx: HTN, multiple myeloma, RA, remote h/o CVA. presented  to  [facility]. Pt with increasing confused x few days. Day prior to admission, RLE shaking noticed. On day of admission, pt in usual state of health, in the evening +transient episode of confusion, started slurring speech & talking nonsense. In ED, +episode of Altered state, code stroke called, CTH: possible SAH. pt transferred to [facility] for neurosurgical evaluation. In ED, +episode of Altered state, pt treated with Keppra, NIHSS 3

## 2022-04-14 NOTE — PROGRESS NOTE ADULT - SUBJECTIVE AND OBJECTIVE BOX
Neurology Progress Note    S: Patient seen and examined. No new events overnight. patient denied CP, SOB, HA or pain. eeg neg for seizures b    Medication:  acetaminophen     Tablet .. 650 milliGRAM(s) Oral every 6 hours PRN  amLODIPine   Tablet 5 milliGRAM(s) Oral daily  atorvastatin 20 milliGRAM(s) Oral at bedtime  cholecalciferol 1000 Unit(s) Oral daily  dextrose 5%. 1000 milliLiter(s) IV Continuous <Continuous>  dextrose 5%. 1000 milliLiter(s) IV Continuous <Continuous>  dextrose 50% Injectable 25 Gram(s) IV Push once  dextrose 50% Injectable 12.5 Gram(s) IV Push once  dextrose Oral Gel 15 Gram(s) Oral once PRN  folic acid 1 milliGRAM(s) Oral daily  gabapentin 200 milliGRAM(s) Oral at bedtime  gabapentin 100 milliGRAM(s) Oral daily  glucagon  Injectable 1 milliGRAM(s) IntraMuscular once  hydroxychloroquine 200 milliGRAM(s) Oral daily  insulin glargine Injectable (LANTUS) 6 Unit(s) SubCutaneous at bedtime  insulin lispro (ADMELOG) corrective regimen sliding scale   SubCutaneous three times a day before meals  insulin lispro (ADMELOG) corrective regimen sliding scale   SubCutaneous at bedtime  insulin lispro Injectable (ADMELOG) 3 Unit(s) SubCutaneous three times a day before meals  levETIRAcetam  IVPB 500 milliGRAM(s) IV Intermittent every 12 hours  lidocaine   4% Patch 1 Patch Transdermal daily  melatonin 3 milliGRAM(s) Oral at bedtime PRN  pantoprazole    Tablet 40 milliGRAM(s) Oral before breakfast  polyethylene glycol 3350 17 Gram(s) Oral daily PRN  senna 2 Tablet(s) Oral at bedtime PRN      Vitals:  Vital Signs Last 24 Hrs  T(C): 36.3 (2022 09:46), Max: 37.2 (2022 22:16)  T(F): 97.4 (2022 09:46), Max: 99 (2022 01:15)  HR: 66 (2022 09:46) (65 - 96)  BP: 134/79 (2022 09:46) (117/68 - 148/77)  BP(mean): --  RR: 16 (2022 09:46) (16 - 20)  SpO2: 98% (2022 09:46) (96% - 100%)    General Exam:   General Appearance: Appropriately dressed and in no acute distress       Head: Normocephalic, atraumatic and no dysmorphic features  Ear, Nose, and Throat: Moist mucous membranes  CVS: S1S2+  Resp: No SOB, no wheeze or rhonchi  Abd: soft NTND  Extremities: No edema, no cyanosis  Skin: No bruises, no rashes     Neurological Exam:  MS: Eyes open, alert, oriented to person, place and situation, not time ("march"). Reports she knows the current president was the  for [place] but can't recall his name. Normal affect. Follows all commands.  Language: Speech is mildly slurred, but fluent with good repetition & comprehension (able to name watch, phone, and pen).  CNs: PERRL (slightly sluggish, 3.5mm OU). BTT intact b/l. EOMI, no diplopia. V1-3 intact to LT. Well developed masseter muscles b/l. Facial movements normal and symmetric. Hearing grossly normal to conversation b/l. Symmetric palate elevation in midline. Gag reflex deferred. Tongue midline, normal movements.  Motor: Normal muscle bulk & tone. No noticeable tremor at rest.   RUE: Motor drift, grossly 4+/5  RLE: No drift, grossly 5/5  LUE/LLE: No drift, 5/5 throughout  Sensation: Intact to LT b/l throughout.  Cortical: Extinction on DSS (neglect): none.  Reflexes: 1+ biceps, brachioradialis, triceps,  patellar and achilles b/l. Plantar response flexor on right, withdraws on left.   Coordination: No dysmetria to FTN b/l.  Gait: Not assessed due to current medical condition/risk of fall.     I personally reviewed the below data/images/labs:      CBC Full  -  ( 2022 06:24 )  WBC Count : 4.90 K/uL  RBC Count : 3.58 M/uL  Hemoglobin : 10.0 g/dL  Hematocrit : 30.4 %  Platelet Count - Automated : 187 K/uL  Mean Cell Volume : 84.9 fl  Mean Cell Hemoglobin : 27.9 pg  Mean Cell Hemoglobin Concentration : 32.9 gm/dL  Auto Neutrophil # : x  Auto Lymphocyte # : x  Auto Monocyte # : x  Auto Eosinophil # : x  Auto Basophil # : x  Auto Neutrophil % : x  Auto Lymphocyte % : x  Auto Monocyte % : x  Auto Eosinophil % : x  Auto Basophil % : x        142  |  111<H>  |  20  ----------------------------<  146<H>  4.3   |  19<L>  |  1.26    Ca    9.2      2022 06:22    TPro  6.3  /  Alb  3.4  /  TBili  0.4  /  DBili  x   /  AST  16  /  ALT  20  /  AlkPhos  125<H>      LIVER FUNCTIONS - ( 2022 08:35 )  Alb: 3.4 g/dL / Pro: 6.3 g/dL / ALK PHOS: 125 U/L / ALT: 20 U/L / AST: 16 U/L / GGT: x           PT/INR - ( 2022 23:31 )   PT: 11.5 sec;   INR: 1.00 ratio         PTT - ( 2022 23:31 )  PTT:20.9 sec  Urinalysis Basic - ( 2022 04:40 )    Color: Colorless / Appearance: Clear / S.020 / pH: x  Gluc: x / Ketone: Negative  / Bili: Negative / Urobili: Negative   Blood: x / Protein: Trace / Nitrite: Negative   Leuk Esterase: Negative / RBC: 2 /hpf / WBC 0 /HPF   Sq Epi: x / Non Sq Epi: 0 /hpf / Bacteria: Negative    < from: MR Head w/wo IV Cont (22 @ 15:04) >  IMPRESSION: Age-appropriate involutional and ischemic gliotic changes.   Left occipital subarachnoid hemorrhage is not appreciated on this   examination as it may be isointense signal intensity CSF. No acute   infarcts or abnormal enhancement. Incidental small right frontal   pterional meningioma. Small vessel white matter ischemic changes. Normal   noninvasive MRA angiography.    < end of copied text >      CT ANGIO NECK & BRAIN (W)AW IC; CT PERFUSION W MAPS IC    PROCEDURE DATE: 2022  INTERPRETATION: HISTORY: Stroke code.  COMPARISON: None  TECHNIQUE: CT angiogram of the neck and brain was performed after administration of intravenous contrast. MIP and 3D reconstructions were performed. Perfusion maps were also generated and submitted for evaluation.  Total of 90 cc Omnipaque 350 intravenous contrast were administered without complication.    CT PERFUSION  There are symmetric time parameters, including mean transit time and Tmax, in the hemispheres bilaterally. No focal decrease in cerebral blood volume or cerebral blood flow. No regional perfusion abnormality is evident.    CTA BRAIN  INTERNAL CAROTID ARTERIES: Atheromatous irregularity and mild stenoses along the carotid siphons bilaterally. The intracranial segments of the ICA are patent without hemodynamically significant stenosis, occlusion, or aneurysm. The ICA bifurcations are unremarkable.  ANTERIOR CEREBRAL ARTERIES: No flow-limiting stenosis or occlusion. Anterior communicating artery is unremarkable without aneurysm.  MIDDLE CEREBRAL ARTERIES: No flow-limiting stenosis or occlusion. MCA bifurcations are unremarkable without aneurysm.  POSTERIOR CEREBRAL ARTERIES: No flow-limiting stenosis or occlusion. Posterior communicating arteries are not well seen bilaterally.  VERTEBROBASILAR SYSTEM: No flow-limiting stenosis or occlusion. Basilar tip is unremarkable.   Hypervascular right inferior frontal convexity extra-axial lesion measuring approximately 1 cm likely reflects a meningioma.    CTA NECK  RIGHT CAROTID SYSTEM: Normal in course and caliber without flow-limiting stenosis or occlusion. Calcified plaque along the bulb and ICA origin.  LEFT CAROTID SYSTEM: Normal in course and caliber without flow-limiting stenosis or occlusion. Calcified plaque along the bulb and ICA origin.  VERTEBRAL SYSTEM: Normal in course and caliber without flow-limiting stenosis or occlusion. Origin of the vertebral arteries are unremarkable.  AORTIC ARCH: Calcified plaque along the aortic arch. Bilateral calcified hilar and mediastinal nodes from prior disease. Scattered parenchymal granulomas are noted in the upper lobes bilaterally. Biapical interstitial prominence with hazy groundglass airspace opacity due to air trapping and/or dependent changes. Small interstitial edema may be present.    IMPRESSION:  CT PERFUSION: No regional perfusion abnormality.    CTA BRAIN: No associated vascular malformation or aneurysm associated with the small left occipital subarachnoid bleed.  Patent intracranial circulation. No flow-limiting stenosis or occlusion.  Hypervascular right inferior frontal convexity extra-axial lesion measuring approximately 1 cm likely reflects a meningioma.    CTA NECK: Patent cervical vasculature. No flow limiting stenosis or occlusion.  Sequelae of prior pulmonary granulomatous disease.    --- End of Report ---    CT BRAIN STROKE PROTOCOL    PROCEDURE DATE: 2022  INTERPRETATION: HISTORY: Stroke code. Slurred speech.  COMPARISON: CT head 2012  TECHNIQUE: Axial noncontrast CT images from the skull base to the vertex were obtained and submitted for interpretation. Coronal and sagittal reformatted images were performed. Bone and soft tissue windows were evaluated.    FINDINGS:  Faint hyperattenuation in the left occipital sulci (5-31) suggests small acute subarachnoid bleed. No local mass effect or midline shift. Mild widening of the left greater than right occipital sulci suggest chronic infarction inferiorly. Gray-white differentiation is maintained.  Mild to moderate chronic microvascular ischemic changes and vascular calcifications along the carotid siphons are noted.  Ventricles are normal in size for the patient's age without hydrocephalus. Basal cisterns are patent.  Visualized paranasal sinuses and mastoid air cells are clear. Calvarium is intact.    IMPRESSION:  Suggestion of small left occipital subarachnoid bleed. No local mass effect or midline shift.      CT Head No Cont (. @ 01:09)   IMPRESSION: No significant change when compared with prior study.      CT Head No Cont (22 @ 04:10)   IMPRESSION: No significant change when compared with prior study.  No CT evidence of acute, large transcortical infarct. MR brain can be obtained for further evaluation if clinical concern remains.

## 2022-04-14 NOTE — PROGRESS NOTE ADULT - SUBJECTIVE AND OBJECTIVE BOX
[facility] Division of Hospital Medicine  Hernán Garcia MD  Pager (M-F, 2S-4W): [phone]  Other Times:  [phone]    Patient is a 82y old  Female who presents with a chief complaint of AMS x1 day (2022 15:05)    SUBJECTIVE / OVERNIGHT EVENTS: no acute events. tele reviewed. speech is better, pt c/o chronic back pain  ADDITIONAL REVIEW OF SYSTEMS:    MEDICATIONS  (STANDING):  amLODIPine   Tablet 5 milliGRAM(s) Oral daily  atorvastatin 20 milliGRAM(s) Oral at bedtime  cholecalciferol 1000 Unit(s) Oral daily  dextrose 5%. 1000 milliLiter(s) (50 mL/Hr) IV Continuous <Continuous>  dextrose 5%. 1000 milliLiter(s) (100 mL/Hr) IV Continuous <Continuous>  dextrose 50% Injectable 25 Gram(s) IV Push once  dextrose 50% Injectable 12.5 Gram(s) IV Push once  folic acid 1 milliGRAM(s) Oral daily  gabapentin 200 milliGRAM(s) Oral at bedtime  gabapentin 100 milliGRAM(s) Oral daily  glucagon  Injectable 1 milliGRAM(s) IntraMuscular once  hydroxychloroquine 200 milliGRAM(s) Oral daily  insulin glargine Injectable (LANTUS) 5 Unit(s) SubCutaneous at bedtime  insulin lispro (ADMELOG) corrective regimen sliding scale   SubCutaneous three times a day before meals  insulin lispro (ADMELOG) corrective regimen sliding scale   SubCutaneous at bedtime  insulin lispro Injectable (ADMELOG) 3 Unit(s) SubCutaneous three times a day before meals  levETIRAcetam  IVPB 500 milliGRAM(s) IV Intermittent every 12 hours  lidocaine   4% Patch 1 Patch Transdermal daily  pantoprazole    Tablet 40 milliGRAM(s) Oral before breakfast    MEDICATIONS  (PRN):  acetaminophen     Tablet .. 650 milliGRAM(s) Oral every 6 hours PRN Temp greater or equal to 38C (100.4F), Mild Pain (1 - 3)  dextrose Oral Gel 15 Gram(s) Oral once PRN Blood Glucose LESS THAN 70 milliGRAM(s)/deciliter  melatonin 3 milliGRAM(s) Oral at bedtime PRN Insomnia  polyethylene glycol 3350 17 Gram(s) Oral daily PRN Constipation  senna 2 Tablet(s) Oral at bedtime PRN Constipation      CAPILLARY BLOOD GLUCOSE      POCT Blood Glucose.: 146 mg/dL (2022 11:26)  POCT Blood Glucose.: 132 mg/dL (2022 07:33)  POCT Blood Glucose.: 333 mg/dL (2022 21:08)  POCT Blood Glucose.: 334 mg/dL (2022 16:16)  POCT Blood Glucose.: 359 mg/dL (2022 16:13)    I&O's Summary    2022 07:01  -  2022 07:00  --------------------------------------------------------  IN: 200 mL / OUT: 1800 mL / NET: -1600 mL    2022 07:01  -  2022 15:27  --------------------------------------------------------  IN: 540 mL / OUT: 0 mL / NET: 540 mL        PHYSICAL EXAM:  Vital Signs Last 24 Hrs  T(C): 36.8 (2022 13:28), Max: 37.2 (2022 22:16)  T(F): 98.3 (2022 13:28), Max: 99 (2022 01:15)  HR: 67 (:28) (66 - 96)  BP: 116/70 (2022 13:28) (116/70 - 140/62)  BP(mean): --  RR: 16 (2022 13:28) (16 - 20)  SpO2: 97% (2022 13:28) (96% - 100%)  GENERAL: No acute distress, well-developed, alert  ENT: EOMI, PERRLA, conjunctiva and sclera clear,  moist mucosa no pharyngeal erythema or exudates   NECK: supple , no JVD   CHEST/LUNG: Clear to auscultation bilaterally; No wheeze, equal breath sounds bilaterally   BACK: No spinal point tenderness on detailed exam,  No CVA tenderness   HEART: Regular rate and rhythm; No murmurs, rubs, or gallops  ABDOMEN: Soft, Nontender, Nondistended; Bowel sounds present  EXTREMITIES:  No clubbing, cyanosis,+ 1 pitting edema  MSK: No joint swelling or effusions, ROM intact   PSYCH: Normal behavior/affect  NEUROLOGY:  AAOx3, non-focal, cranial nerves intact, interval improvement in aphasia/dysarthria  SKIN: Normal color, No rashes or lesions    LABS:                        10.0   4.90  )-----------( 187      ( 2022 06:24 )             30.4     04-14    142  |  111<H>  |  20  ----------------------------<  146<H>  4.3   |  19<L>  |  1.26    Ca    9.2      2022 06:22    TPro  6.3  /  Alb  3.4  /  TBili  0.4  /  DBili  x   /  AST  16  /  ALT  20  /  AlkPhos  125<H>  04-13    PT/INR - ( 2022 23:31 )   PT: 11.5 sec;   INR: 1.00 ratio         PTT - ( 2022 23:31 )  PTT:20.9 sec      Urinalysis Basic - ( 2022 04:40 )    Color: Colorless / Appearance: Clear / S.020 / pH: x  Gluc: x / Ketone: Negative  / Bili: Negative / Urobili: Negative   Blood: x / Protein: Trace / Nitrite: Negative   Leuk Esterase: Negative / RBC: 2 /hpf / WBC 0 /HPF   Sq Epi: x / Non Sq Epi: 0 /hpf / Bacteria: Negative          RADIOLOGY & ADDITIONAL TESTS:  Results Reviewed:   Imaging Personally Reviewed: < from: MR Head w/wo IV Cont (22 @ 15:04) >  IMPRESSION: Age-appropriate involutional and ischemic gliotic changes.   Left occipital subarachnoid hemorrhage is not appreciated on this   examination as it may be isointense signal intensity CSF. No acute   infarcts or abnormal enhancement. Incidental small right frontal   pterional meningioma. Small vessel white matter ischemic changes. Normal   noninvasive MRA angiography.    --- End of Report ---      < end of copied text >    Electrocardiogram Personally Reviewed:    Abnormal EEG study.    - Mild to moderate nonspecific diffuse or multifocal cerebral dysfunction.   - No epileptiform patterns or seizures recorded.    < from: Transthoracic Echocardiogram (22 @ 08:15) >  Conclusions:  1. Normal mitral valve. Minimal mitral regurgitation.  2. Calcified trileaflet aortic valve with normal opening.  Minimal aortic regurgitation.  3. Normal left ventricular systolic function. No segmental  wall motion abnormalities.  4. The right ventricle is not well visualized; grossly  normal right ventricular size and systolic function.  *** No previous Echo exam.    < end of copied text >      COORDINATION OF CARE:  Care Discussed with Consultants/Other Providers [Y/N]:  Prior or Outpatient Records Reviewed [Y/N]:

## 2022-04-14 NOTE — CONSULT NOTE ADULT - ASSESSMENT
81 y/o F w/ uncontrolled Type 2 DM exacerbated by decadron with chemo for MM treatment with hyperglycemia, HTN, HLD admitted for AMS ( high risk patient with severely uncontrolled DM w/ A1c of 11.4% at high risk of CVA and CAD with high level decision-making).

## 2022-04-14 NOTE — CONSULT NOTE ADULT - PROBLEM SELECTOR RECOMMENDATION 9
Diabetes Education and Nutrition Eval  Decrease Lantus to 5 units qhs (large drop overnight even with only 2 units of correction)  Monitor on Admelog 3 units qac  Low correction scale qac + bedtime  Goal glucose 100-180  Outpt. endo follow-up  Outpt. optho, podiatry, nephrology  Plan to d/c on insulin regimen timed around her dexamethasone to be determined.

## 2022-04-15 DIAGNOSIS — S22.009A UNSPECIFIED FRACTURE OF UNSPECIFIED THORACIC VERTEBRA, INITIAL ENCOUNTER FOR CLOSED FRACTURE: ICD-10-CM

## 2022-04-15 LAB
GLUCOSE BLDC GLUCOMTR-MCNC: 133 MG/DL — HIGH (ref 70–99)
GLUCOSE BLDC GLUCOMTR-MCNC: 153 MG/DL — HIGH (ref 70–99)
GLUCOSE BLDC GLUCOMTR-MCNC: 157 MG/DL — HIGH (ref 70–99)
GLUCOSE BLDC GLUCOMTR-MCNC: 307 MG/DL — HIGH (ref 70–99)

## 2022-04-15 PROCEDURE — 72148 MRI LUMBAR SPINE W/O DYE: CPT | Mod: 26

## 2022-04-15 PROCEDURE — 99233 SBSQ HOSP IP/OBS HIGH 50: CPT

## 2022-04-15 PROCEDURE — 72146 MRI CHEST SPINE W/O DYE: CPT | Mod: 26

## 2022-04-15 PROCEDURE — 99221 1ST HOSP IP/OBS SF/LOW 40: CPT

## 2022-04-15 RX ORDER — NYSTATIN CREAM 100000 [USP'U]/G
1 CREAM TOPICAL
Refills: 0 | Status: DISCONTINUED | OUTPATIENT
Start: 2022-04-15 | End: 2022-04-19

## 2022-04-15 RX ORDER — OXYCODONE HYDROCHLORIDE 5 MG/1
2.5 TABLET ORAL EVERY 4 HOURS
Refills: 0 | Status: DISCONTINUED | OUTPATIENT
Start: 2022-04-15 | End: 2022-04-19

## 2022-04-15 RX ORDER — ACETAMINOPHEN 500 MG
975 TABLET ORAL EVERY 8 HOURS
Refills: 0 | Status: DISCONTINUED | OUTPATIENT
Start: 2022-04-15 | End: 2022-04-19

## 2022-04-15 RX ORDER — NYSTATIN CREAM 100000 [USP'U]/G
1 CREAM TOPICAL
Refills: 0 | Status: DISCONTINUED | OUTPATIENT
Start: 2022-04-15 | End: 2022-04-18

## 2022-04-15 RX ADMIN — INSULIN GLARGINE 5 UNIT(S): 100 INJECTION, SOLUTION SUBCUTANEOUS at 21:59

## 2022-04-15 RX ADMIN — Medication 3 UNIT(S): at 07:42

## 2022-04-15 RX ADMIN — ATORVASTATIN CALCIUM 20 MILLIGRAM(S): 80 TABLET, FILM COATED ORAL at 22:00

## 2022-04-15 RX ADMIN — Medication 3 UNIT(S): at 11:32

## 2022-04-15 RX ADMIN — Medication 3 UNIT(S): at 16:58

## 2022-04-15 RX ADMIN — GABAPENTIN 100 MILLIGRAM(S): 400 CAPSULE ORAL at 11:32

## 2022-04-15 RX ADMIN — Medication 650 MILLIGRAM(S): at 08:43

## 2022-04-15 RX ADMIN — Medication 650 MILLIGRAM(S): at 14:47

## 2022-04-15 RX ADMIN — Medication 1 MILLIGRAM(S): at 11:34

## 2022-04-15 RX ADMIN — NYSTATIN CREAM 1 APPLICATION(S): 100000 CREAM TOPICAL at 22:00

## 2022-04-15 RX ADMIN — Medication 650 MILLIGRAM(S): at 14:17

## 2022-04-15 RX ADMIN — OXYCODONE HYDROCHLORIDE 2.5 MILLIGRAM(S): 5 TABLET ORAL at 17:18

## 2022-04-15 RX ADMIN — LEVETIRACETAM 400 MILLIGRAM(S): 250 TABLET, FILM COATED ORAL at 17:09

## 2022-04-15 RX ADMIN — LIDOCAINE 1 PATCH: 4 CREAM TOPICAL at 18:02

## 2022-04-15 RX ADMIN — Medication 1: at 07:42

## 2022-04-15 RX ADMIN — NYSTATIN CREAM 1 APPLICATION(S): 100000 CREAM TOPICAL at 17:09

## 2022-04-15 RX ADMIN — Medication 1000 UNIT(S): at 11:32

## 2022-04-15 RX ADMIN — Medication 975 MILLIGRAM(S): at 20:46

## 2022-04-15 RX ADMIN — PANTOPRAZOLE SODIUM 40 MILLIGRAM(S): 20 TABLET, DELAYED RELEASE ORAL at 06:10

## 2022-04-15 RX ADMIN — LEVETIRACETAM 400 MILLIGRAM(S): 250 TABLET, FILM COATED ORAL at 05:54

## 2022-04-15 RX ADMIN — Medication 1: at 16:59

## 2022-04-15 RX ADMIN — Medication 2: at 21:59

## 2022-04-15 RX ADMIN — Medication 3 MILLIGRAM(S): at 22:00

## 2022-04-15 RX ADMIN — OXYCODONE HYDROCHLORIDE 2.5 MILLIGRAM(S): 5 TABLET ORAL at 17:48

## 2022-04-15 RX ADMIN — Medication 975 MILLIGRAM(S): at 20:16

## 2022-04-15 RX ADMIN — Medication 650 MILLIGRAM(S): at 08:13

## 2022-04-15 RX ADMIN — LIDOCAINE 1 PATCH: 4 CREAM TOPICAL at 11:32

## 2022-04-15 RX ADMIN — Medication 200 MILLIGRAM(S): at 11:33

## 2022-04-15 RX ADMIN — GABAPENTIN 200 MILLIGRAM(S): 400 CAPSULE ORAL at 22:00

## 2022-04-15 RX ADMIN — AMLODIPINE BESYLATE 5 MILLIGRAM(S): 2.5 TABLET ORAL at 05:54

## 2022-04-15 NOTE — PROGRESS NOTE ADULT - SUBJECTIVE AND OBJECTIVE BOX
[facility] Division of Hospital Medicine  Hernán Garcia MD  Pager (M-F, 8A-2G): [phone]  Other Times:  [phone]    Patient is a 82y old  Female who presents with a chief complaint of AMS x1 day (15 Apr 2022 10:29)    SUBJECTIVE / OVERNIGHT EVENTS: pt feels "lousy" but no new complaints no cp or sob  ADDITIONAL REVIEW OF SYSTEMS:    MEDICATIONS  (STANDING):  amLODIPine   Tablet 5 milliGRAM(s) Oral daily  atorvastatin 20 milliGRAM(s) Oral at bedtime  cholecalciferol 1000 Unit(s) Oral daily  dextrose 5%. 1000 milliLiter(s) (50 mL/Hr) IV Continuous <Continuous>  dextrose 5%. 1000 milliLiter(s) (100 mL/Hr) IV Continuous <Continuous>  dextrose 50% Injectable 25 Gram(s) IV Push once  dextrose 50% Injectable 12.5 Gram(s) IV Push once  folic acid 1 milliGRAM(s) Oral daily  gabapentin 200 milliGRAM(s) Oral at bedtime  gabapentin 100 milliGRAM(s) Oral daily  glucagon  Injectable 1 milliGRAM(s) IntraMuscular once  hydroxychloroquine 200 milliGRAM(s) Oral daily  insulin glargine Injectable (LANTUS) 5 Unit(s) SubCutaneous at bedtime  insulin lispro (ADMELOG) corrective regimen sliding scale   SubCutaneous three times a day before meals  insulin lispro (ADMELOG) corrective regimen sliding scale   SubCutaneous at bedtime  insulin lispro Injectable (ADMELOG) 3 Unit(s) SubCutaneous three times a day before meals  levETIRAcetam  IVPB 500 milliGRAM(s) IV Intermittent every 12 hours  lidocaine   4% Patch 1 Patch Transdermal daily  pantoprazole    Tablet 40 milliGRAM(s) Oral before breakfast    MEDICATIONS  (PRN):  acetaminophen     Tablet .. 650 milliGRAM(s) Oral every 6 hours PRN Temp greater or equal to 38C (100.4F), Mild Pain (1 - 3)  dextrose Oral Gel 15 Gram(s) Oral once PRN Blood Glucose LESS THAN 70 milliGRAM(s)/deciliter  melatonin 3 milliGRAM(s) Oral at bedtime PRN Insomnia  polyethylene glycol 3350 17 Gram(s) Oral daily PRN Constipation  senna 2 Tablet(s) Oral at bedtime PRN Constipation      CAPILLARY BLOOD GLUCOSE      POCT Blood Glucose.: 133 mg/dL (15 Apr 2022 11:27)  POCT Blood Glucose.: 153 mg/dL (15 Apr 2022 07:26)  POCT Blood Glucose.: 246 mg/dL (14 Apr 2022 21:08)  POCT Blood Glucose.: 201 mg/dL (14 Apr 2022 16:09)    I&O's Summary    14 Apr 2022 07:01  -  15 Apr 2022 07:00  --------------------------------------------------------  IN: 540 mL / OUT: 800 mL / NET: -260 mL    15 Apr 2022 07:01  -  15 Apr 2022 13:20  --------------------------------------------------------  IN: 380 mL / OUT: 300 mL / NET: 80 mL    PHYSICAL EXAM:  Vital Signs Last 24 Hrs  T(C): 36.6 (15 Apr 2022 10:15), Max: 37.7 (14 Apr 2022 21:17)  T(F): 97.8 (15 Apr 2022 10:15), Max: 99.8 (14 Apr 2022 21:17)  HR: 67 (15 Apr 2022 10:15) (67 - 79)  BP: 156/64 (15 Apr 2022 10:15) (113/74 - 156/64)  BP(mean): --  RR: 18 (15 Apr 2022 10:15) (16 - 18)  SpO2: 99% (15 Apr 2022 10:15) (97% - 99%)  GENERAL: No acute distress, well-developed, alert  ENT: EOMI, PERRLA, conjunctiva and sclera clear,  moist mucosa no pharyngeal erythema or exudates   NECK: supple , no JVD   CHEST/LUNG: Clear to auscultation bilaterally; No wheeze, equal breath sounds bilaterally   BACK: No spinal point tenderness on detailed exam,  No CVA tenderness   HEART: Regular rate and rhythm; No murmurs, rubs, or gallops  ABDOMEN: Soft, Nontender, Nondistended; Bowel sounds present  EXTREMITIES:  No clubbing, cyanosis,  MSK: No joint swelling or effusions, ROM intact   PSYCH: Normal behavior/affect  NEUROLOGY:  AAOx3, non-focal, cranial nerves intact, interval improvement in aphasia/dysarthria  SKIN: Normal color, No rashes or lesions    LABS:                        10.0   4.90  )-----------( 187      ( 14 Apr 2022 06:24 )             30.4     04-14    142  |  111<H>  |  20  ----------------------------<  146<H>  4.3   |  19<L>  |  1.26    Ca    9.2      14 Apr 2022 06:22                  RADIOLOGY & ADDITIONAL TESTS:  Results Reviewed:   Imaging Personally Reviewed: < from: MR Thoracic Spine No Cont (04.15.22 @ 11:19) >  Impression: Multiple thoracic and lumbar vertebral body fractures. The   horizontal fracture through the T10 vertebral body includes all 3 columns   with fractures through the pedicles seen better on the CT of 4/14/2022   and is consistent with an unstable fracture although there is no evidence   of displacement. There is no bony retropulsion or canal compression.      < end of copied text >    Electrocardiogram Personally Reviewed:    COORDINATION OF CARE:  Care Discussed with Consultants/Other Providers [Y/N]:  Prior or Outpatient Records Reviewed [Y/N]:

## 2022-04-15 NOTE — CONSULT NOTE ADULT - CONSULT REASON
Duc on CKD stage 3
MM management
Risk Stratification for possible surgery
Stroke Code
back pain
ams  second opinion
ashanti
Uncontrolled Type 2 DM w/ hyperglycemia

## 2022-04-15 NOTE — PROGRESS NOTE ADULT - PROBLEM SELECTOR PLAN 1
unstable 3 column horizontal T10 spinal fracture  nsgy input apprec - bed rest, log roll only, OR planning  by RCRI risk is elevated, pt has no cardiac symptoms nor cardiac history, TTE reviewed wnl and EKG RBBB w/o acute ischemic changes  however did have elev trop in setting of SAH when arrived unstable 3 column horizontal T10 spinal fracture  nsgy input apprec - bed rest, log roll only, OR planning  by RCRI, risk is elevated, pt has no cardiac symptoms nor cardiac history, TTE reviewed wnl and EKG RBBB w/o acute ischemic changes  however did have mildly elev trop in setting of SAH when arrived, METS are difficult to ascertain  cards consulted for risk stratification unstable 3 column horizontal T10 spinal fracture  nsgy input apprec - bed rest, log roll only, OR planning  by RCRI, risk is elevated, pt has no cardiac symptoms nor cardiac history, TTE reviewed wnl and EKG RBBB w/o acute ischemic changes  however did have mildly elev trop in setting of SAH when arrived, METS are difficult to ascertain  cards consulted for risk stratification    multimodal pain control

## 2022-04-15 NOTE — PROGRESS NOTE ADULT - SUBJECTIVE AND OBJECTIVE BOX
Patient seen and examined at bedside.    --Anticoagulation--    T(C): 37.1 (04-15-22 @ 05:37), Max: 37.7 (04-14-22 @ 21:17)  HR: 76 (04-15-22 @ 05:37) (66 - 79)  BP: 132/77 (04-15-22 @ 05:37) (113/74 - 134/79)  RR: 18 (04-15-22 @ 05:37) (16 - 18)  SpO2: 98% (04-15-22 @ 05:37) (97% - 100%)  Wt(kg): --    Exam: AOx2, mild dysarthria, PERRL, EOMI, no facial, BUE 5/5, BLE 4/5 HF R>L pain concepcion, DF/PF 4/5 pain concepcion w/ paresthesias of soles b/l, +clonus, DTR wnl.    .  LABS:                         10.0   4.90  )-----------( 187      ( 14 Apr 2022 06:24 )             30.4     04-14    142  |  111<H>  |  20  ----------------------------<  146<H>  4.3   |  19<L>  |  1.26    Ca    9.2      14 Apr 2022 06:22    TPro  6.3  /  Alb  3.4  /  TBili  0.4  /  DBili  x   /  AST  16  /  ALT  20  /  AlkPhos  125<H>  04-13              RADIOLOGY, EKG & ADDITIONAL TESTS: Reviewed.

## 2022-04-15 NOTE — PATIENT PROFILE ADULT - NSPRESCRALCFREQ_GEN_A_NUR
Reg comp; Mom will begin visit at 11:35am.  
unable to complete, pt is cognitively impaired, pending family

## 2022-04-15 NOTE — PROGRESS NOTE ADULT - PROBLEM SELECTOR PLAN 9
dispo to [facility]    pt will need geriatric referral after rehab, lives alone and family has been unable to increase home care hours

## 2022-04-15 NOTE — PROGRESS NOTE ADULT - SUBJECTIVE AND OBJECTIVE BOX
[facility] - JOSE Joyce / JOSE Perez / SAE Lantigua/ JOSE Gandara/ JOSE Manzo/ AYLIN Deutsch / GERMAN Triana / MAI Roger  ---------------------------------------------------------------------------------------------------------------  seen and examined today for CKD  Interval : serum creatinine stable  VITALS:  T(F): 97.8 (04-15-22 @ 10:15), Max: 99.8 (04-14-22 @ 21:17)  HR: 67 (04-15-22 @ 10:15)  BP: 156/64 (04-15-22 @ 10:15)  RR: 18 (04-15-22 @ 10:15)  SpO2: 99% (04-15-22 @ 10:15)  Wt(kg): --    04-14 @ 07:01  -  04-15 @ 07:00  --------------------------------------------------------  IN: 540 mL / OUT: 800 mL / NET: -260 mL      Physical Exam :-  Constitutional: NAD  Neck: Supple.  Respiratory: Bilateral equal breath sounds,  Cardiovascular: S1, S2 normal,  Gastrointestinal: Bowel Sounds present, soft, non tender.  Extremities: No edema  Neurological: Alert and Oriented   Psychiatric: Normal mood, normal affect  Data:-  Allergies :   No Known Allergies    Hospital Medications:   MEDICATIONS  (STANDING):  amLODIPine   Tablet 5 milliGRAM(s) Oral daily  atorvastatin 20 milliGRAM(s) Oral at bedtime  cholecalciferol 1000 Unit(s) Oral daily  dextrose 5%. 1000 milliLiter(s) (50 mL/Hr) IV Continuous <Continuous>  dextrose 5%. 1000 milliLiter(s) (100 mL/Hr) IV Continuous <Continuous>  dextrose 50% Injectable 25 Gram(s) IV Push once  dextrose 50% Injectable 12.5 Gram(s) IV Push once  folic acid 1 milliGRAM(s) Oral daily  gabapentin 200 milliGRAM(s) Oral at bedtime  gabapentin 100 milliGRAM(s) Oral daily  glucagon  Injectable 1 milliGRAM(s) IntraMuscular once  hydroxychloroquine 200 milliGRAM(s) Oral daily  insulin glargine Injectable (LANTUS) 5 Unit(s) SubCutaneous at bedtime  insulin lispro (ADMELOG) corrective regimen sliding scale   SubCutaneous three times a day before meals  insulin lispro (ADMELOG) corrective regimen sliding scale   SubCutaneous at bedtime  insulin lispro Injectable (ADMELOG) 3 Unit(s) SubCutaneous three times a day before meals  levETIRAcetam  IVPB 500 milliGRAM(s) IV Intermittent every 12 hours  lidocaine   4% Patch 1 Patch Transdermal daily  pantoprazole    Tablet 40 milliGRAM(s) Oral before breakfast    04-14    142  |  111<H>  |  20  ----------------------------<  146<H>  4.3   |  19<L>  |  1.26    Ca    9.2      14 Apr 2022 06:22      Creatinine Trend: 1.26 <--, 1.34 <--, 1.46 <--, 1.76 <--, 2.31 <--                        10.0   4.90  )-----------( 187      ( 14 Apr 2022 06:24 )             30.4

## 2022-04-15 NOTE — PROGRESS NOTE ADULT - SUBJECTIVE AND OBJECTIVE BOX
Patient seen and examined. In brief, 82-yo F with multiple medical co-morbidities including uncontrolled type 2 DM with HbA1C 11.4, MM, RA, HTN, brought to hospital due to confusion and AMS, ?seizure. Known occipital SAH as well as multi-level compression fractures and possible T10 3-column injury. Currently c/o mechanical thoracic back pain that seems to localize fairly well to the T10 lower thoracic area. Has pain and tingling in feet that seem to be more c/w diabetic peripheral neuropathy, no e/o cord compression on the MRI.    On exam, grossly 4 to 4+/5 throughout, perhaps a little bit weaker in feet distally at 4- bilaterally. No clonus, toes downgoing, reflexes 1+.    MRI/CT demonstrates multiple compression fractures as well as a T10 3-column injury.    I had a discussion with patient in-person and her daughter separately over phone with regards to the current situation. While the T10 fracture is grossly unstable and we would recommend surgical stabilization due to her DISH and 3-column injury, her medical co-morbidities make the surgery prohibitively high risk. The patient expresses a clear desire for this to be surgically addressed but both the daughter and myself are in agreement that we should try a course of external orthosis to see how she does, and at the minimum that would buy a little bit of time to see if we can get her a little bit more medically optimized in terms of glycemic control.    - Recommend TLSO brace when OOB, OK to be without brace in bed.  - Will re-assess her level of pain control and function with the brace as outpatient with follow-up imaging to see where we are in terms of the T10 chance fracture.    I spent 30 min at bedside evaluating the patient and coordinating her care.

## 2022-04-15 NOTE — PROGRESS NOTE ADULT - PROBLEM SELECTOR PLAN 8
per pt has been going on since 1 yr, confirmed by daughter  no point tenderness but pt describes baseline significant constant pain  check CT given falls, SAH to r/o fx dispo to [facility]    pt will need geriatric referral after rehab, lives alone and family has been unable to increase home care hours

## 2022-04-15 NOTE — PROGRESS NOTE ADULT - ASSESSMENT
ANGELO STRATTON  82F hx HTN, mult myeloma, RA, initially seen 2da for ?punctate L occ tsah stable on repeat imaging iso now improving mild aphasia, got CTs for chronic LBP (which per pt has been improving, no new weakness). CT T/L w/ multilevel chronic comp fx sherrie at T9, T12, L2, L3, and likley unstable T10 fishmouth deformity w/ fx line into b/l pedicles likely ustable, not seen on prior xray from 2021. Exam: AOx2, mild dysarthria, PERRL, EOMI, no facial, BUE 5/5, BLE 4/5 HF R>L pain concepcion, DF/PF 4/5 pain concepcion w/ paresthesias of soles b/l, +clonus, DTR wnl.  - adm medicine, q4h neuro checks  - flat at all times, spine precautions, log roll only  - needs TLSO brace at all times  - MRI T/L non con pending - please obtain as soon as possible  - given extensive adjacent degenerative disease to fx if MRI shows instability, will have to discuss risks and benefits of small vs large fusion construct with family. Daughter is open to a smaller surgery.   - pain control per primary  - Please document medical risk/optimization for possible OR  - Daughter/HCP Binta [phone]     - Please obtain a TLSO brace. Please contact gaurav willams for a fitted brace: [phone]

## 2022-04-15 NOTE — CONSULT NOTE ADULT - CONSULT REQUESTED DATE/TIME
14-Apr-2022 23:01
13-Apr-2022 00:53
13-Apr-2022 13:33
13-Apr-2022 22:19
14-Apr-2022 15:05
15-Apr-2022 17:46
13-Apr-2022 12:39
13-Apr-2022 04:00

## 2022-04-15 NOTE — CONSULT NOTE ADULT - SUBJECTIVE AND OBJECTIVE BOX
DATE OF SERVICE: 04-15-22 @ 17:47    CHIEF COMPLAINT:Patient is a 82y old  Female who presents with a chief complaint of AMS x1 day (15 Apr 2022 15:07)      HISTORY OF PRESENT ILLNESS:  Patient is an 82 year old female w/pmh  significant for HTN, multiple myeloma, RA on ASA81, remote h/o CVA no residual , presented  to  [facility] for altered mental state and slurred speech on day of admission,  patient was noted to have mixed aphasia , as well as right upper extremity weakness with positive pronator drift ,  she was evaluated for stroke , CT head showed possible sub arachnoid hemorrhage , and patient was transferred to [facility] for neurosurgical evaluation.   Per family, patient has been increasing confused over the past few days. On day prior to admission, patient right leg shaking  noticed by granddaughter. On day of admission, patient was in usual state of health per home attendant , later in the evening ,patient had a transient episode where she became confused , started slurring speech and talking nonsense. Prior to the incident , patient has no specific complaints , no recent illness , no fever or chills. No chest pain or SOB , and no palpitations. Per family , there were no recent falls or injuries. Patient currently reports lower back pain , which family states is chronic.     PAST MEDICAL & SURGICAL HISTORY:  Rheumatoid arthritis  Varicose veins of lower extremity  Hypertension  Multiple myeloma    No significant past surgical history      MEDICATIONS:  amLODIPine   Tablet 5 milliGRAM(s) Oral daily  hydroxychloroquine 200 milliGRAM(s) Oral daily    acetaminophen     Tablet .. 975 milliGRAM(s) Oral every 8 hours  gabapentin 200 milliGRAM(s) Oral at bedtime  gabapentin 100 milliGRAM(s) Oral daily  levETIRAcetam  IVPB 500 milliGRAM(s) IV Intermittent every 12 hours  melatonin 3 milliGRAM(s) Oral at bedtime PRN  oxyCODONE    IR 2.5 milliGRAM(s) Oral every 4 hours PRN    pantoprazole    Tablet 40 milliGRAM(s) Oral before breakfast  polyethylene glycol 3350 17 Gram(s) Oral daily PRN  senna 2 Tablet(s) Oral at bedtime PRN    atorvastatin 20 milliGRAM(s) Oral at bedtime  dextrose 50% Injectable 25 Gram(s) IV Push once  dextrose 50% Injectable 12.5 Gram(s) IV Push once  dextrose Oral Gel 15 Gram(s) Oral once PRN  glucagon  Injectable 1 milliGRAM(s) IntraMuscular once  insulin glargine Injectable (LANTUS) 5 Unit(s) SubCutaneous at bedtime  insulin lispro (ADMELOG) corrective regimen sliding scale   SubCutaneous three times a day before meals  insulin lispro (ADMELOG) corrective regimen sliding scale   SubCutaneous at bedtime  insulin lispro Injectable (ADMELOG) 3 Unit(s) SubCutaneous three times a day before meals    cholecalciferol 1000 Unit(s) Oral daily  dextrose 5%. 1000 milliLiter(s) IV Continuous <Continuous>  dextrose 5%. 1000 milliLiter(s) IV Continuous <Continuous>  folic acid 1 milliGRAM(s) Oral daily  lidocaine   4% Patch 1 Patch Transdermal daily  nystatin Cream 1 Application(s) Topical two times a day  nystatin Powder 1 Application(s) Topical two times a day      FAMILY HISTORY:  FHx: stroke (Father)        Non-contributory    SOCIAL HISTORY:    not a current smoker    Allergies: No Known Allergies/ Intolerances    	    REVIEW OF SYSTEMS:  CONSTITUTIONAL: No fever  EYES: No eye pain, visual disturbances, or discharge  ENMT:  No difficulty hearing, tinnitus  NECK: No pain or stiffness  RESPIRATORY: No cough, wheezing,  CARDIOVASCULAR: No chest pain, palpitations, passing out, dizziness, or leg swelling  GASTROINTESTINAL:  No nausea, vomiting, diarrhea or constipation. No melena.  GENITOURINARY: No dysuria, hematuria  NEUROLOGICAL: No stroke like symptoms  SKIN: No burning or lesions   ENDOCRINE: No heat or cold intolerance  MUSCULOSKELETAL: + pain in thoracic spine  PSYCHIATRIC: No  anxiety, mood swings  HEME/LYMPH: No bleeding gums  ALLERGY AND IMMUNOLOGIC: No hives or eczema	    All other ROS negative    PHYSICAL EXAM:  T(C): 36.7 (04-15-22 @ 17:30), Max: 37.7 (04-14-22 @ 21:17)  HR: 69 (04-15-22 @ 17:30) (67 - 79)  BP: 125/72 (04-15-22 @ 17:30) (125/72 - 156/64)  RR: 18 (04-15-22 @ 17:30) (18 - 18)  SpO2: 98% (04-15-22 @ 17:30) (95% - 99%)  Wt(kg): --  I&O's Summary    14 Apr 2022 07:01  -  15 Apr 2022 07:00  --------------------------------------------------------  IN: 540 mL / OUT: 800 mL / NET: -260 mL    15 Apr 2022 07:01  -  15 Apr 2022 17:47  --------------------------------------------------------  IN: 380 mL / OUT: 300 mL / NET: 80 mL        Appearance: Normal	  HEENT:   Normal oral mucosa, EOMI	  Cardiovascular:  S1 S2, No JVD,    Respiratory: Lungs clear to auscultation	  Psychiatry: Alert  Gastrointestinal:  Soft, Non-tender, + BS	  Skin: No rashes   Neurologic: Non-focal  Extremities:  No edema  Vascular: Peripheral pulses palpable    	    	  	  CARDIAC MARKERS:  Labs personally reviewed by me                              10.0   4.90  )-----------( 187      ( 14 Apr 2022 06:24 )             30.4     04-14    142  |  111<H>  |  20  ----------------------------<  146<H>  4.3   |  19<L>  |  1.26    Ca    9.2      14 Apr 2022 06:22            EKG: Personally reviewed by me - SR with RBBB  Radiology: Personally reviewed by me -     Transthoracic Echocardiogram (04.14.22 @ 08:15)   Conclusions:  1. Normal mitral valve. Minimal mitral regurgitation.  2. Calcified trileaflet aortic valve with normal opening.  Minimal aortic regurgitation.  3. Normal left ventricular systolic function. No segmental  wall motion abnormalities.  4. The right ventricle is not well visualized; grossly  normal right ventricular size and systolic function.      Assessment /Plan:      Ms. Angelika James is an 82 F with PMH of  significant for HTN, multiple myeloma, RA on ASA81, remote h/o CVA no residual , p/w AMS to [facility] , found to have SAH t/f to [facility].    Problem/Plan -1  Problem: Cardiac Risk Stratification   Plan:  - Denies chest pain or shortness of breath  - Trop 65-64 probably i/s/o SAH  - EKG with no ischemic changes noted  - States she is able to walk with assistance several feet but exercise tolerance is limited by RA and chronic back pain  - TTE as noted above reveals normal LV systolic function and minimal MR  - Not in decompensated HF  - No evidence of tachy/deidra syndrome  -Patient with moderate-high cardiac risk to proceed with possible neurosurgery intervention    Problem/Plan -2  Problem: HTN  Plan:  -c/w amlodipine  -BP currently stable, cont to trend    Problem/Plan -3  Problem: HLD  Plan:  - LDL 42  - c/w atorvastatin 20mg PO daily    Problem/Plan -4  Problem: DM II  Plan:  - HgbA1C 114  - endocrine c/s appreciated  - ISS       Differential diagnosis and plan of care discussed with patient after the evaluation. Counseling on diet, nutritional counseling, weight management, exercise and medication compliance was done.   Advanced care planning/advanced directives discussed with patient/family. DNR status including forceful chest compressions to attempt to restart the heart, ventilator support/artificial breathing, electric shock, artificial nutrition, health care proxy, Molst form all discussed with pt. Pt wishes to consider. More than fifteen minutes spent on discussing advanced directives.     Sneha Miller [facility]  Blaine Wolfe DO [facility]  Cardiovascular Medicine  [address] [place] [address], [address]  Office [phone]  Cell [phone] DATE OF SERVICE: 04-15-22 @ 17:47    CHIEF COMPLAINT:Patient is a 82y old  Female who presents with a chief complaint of AMS x1 day (15 Apr 2022 15:07)      HISTORY OF PRESENT ILLNESS:  Patient is an 82 year old female w/pmh  significant for HTN, multiple myeloma, RA on ASA81, remote h/o CVA no residual , presented  to  [facility] for altered mental state and slurred speech on day of admission,  patient was noted to have mixed aphasia , as well as right upper extremity weakness with positive pronator drift ,  she was evaluated for stroke , CT head showed possible sub arachnoid hemorrhage , and patient was transferred to [facility] for neurosurgical evaluation.   Per family, patient has been increasing confused over the past few days. On day prior to admission, patient right leg shaking  noticed by granddaughter. On day of admission, patient was in usual state of health per home attendant , later in the evening ,patient had a transient episode where she became confused , started slurring speech and talking nonsense. Prior to the incident , patient has no specific complaints , no recent illness , no fever or chills. No chest pain or SOB , and no palpitations. Per family , there were no recent falls or injuries. Patient currently reports lower back pain , which family states is chronic.     PAST MEDICAL & SURGICAL HISTORY:  Rheumatoid arthritis  Varicose veins of lower extremity  Hypertension  Multiple myeloma    No significant past surgical history      MEDICATIONS:  amLODIPine   Tablet 5 milliGRAM(s) Oral daily  hydroxychloroquine 200 milliGRAM(s) Oral daily    acetaminophen     Tablet .. 975 milliGRAM(s) Oral every 8 hours  gabapentin 200 milliGRAM(s) Oral at bedtime  gabapentin 100 milliGRAM(s) Oral daily  levETIRAcetam  IVPB 500 milliGRAM(s) IV Intermittent every 12 hours  melatonin 3 milliGRAM(s) Oral at bedtime PRN  oxyCODONE    IR 2.5 milliGRAM(s) Oral every 4 hours PRN    pantoprazole    Tablet 40 milliGRAM(s) Oral before breakfast  polyethylene glycol 3350 17 Gram(s) Oral daily PRN  senna 2 Tablet(s) Oral at bedtime PRN    atorvastatin 20 milliGRAM(s) Oral at bedtime  dextrose 50% Injectable 25 Gram(s) IV Push once  dextrose 50% Injectable 12.5 Gram(s) IV Push once  dextrose Oral Gel 15 Gram(s) Oral once PRN  glucagon  Injectable 1 milliGRAM(s) IntraMuscular once  insulin glargine Injectable (LANTUS) 5 Unit(s) SubCutaneous at bedtime  insulin lispro (ADMELOG) corrective regimen sliding scale   SubCutaneous three times a day before meals  insulin lispro (ADMELOG) corrective regimen sliding scale   SubCutaneous at bedtime  insulin lispro Injectable (ADMELOG) 3 Unit(s) SubCutaneous three times a day before meals    cholecalciferol 1000 Unit(s) Oral daily  dextrose 5%. 1000 milliLiter(s) IV Continuous <Continuous>  dextrose 5%. 1000 milliLiter(s) IV Continuous <Continuous>  folic acid 1 milliGRAM(s) Oral daily  lidocaine   4% Patch 1 Patch Transdermal daily  nystatin Cream 1 Application(s) Topical two times a day  nystatin Powder 1 Application(s) Topical two times a day      FAMILY HISTORY:  FHx: stroke (Father)        Non-contributory    SOCIAL HISTORY:    not a current smoker    Allergies: No Known Allergies/ Intolerances    	    REVIEW OF SYSTEMS:  CONSTITUTIONAL: No fever  EYES: No eye pain, visual disturbances, or discharge  ENMT:  No difficulty hearing, tinnitus  NECK: No pain or stiffness  RESPIRATORY: No cough, wheezing,  CARDIOVASCULAR: No chest pain, palpitations, passing out, dizziness, or leg swelling  GASTROINTESTINAL:  No nausea, vomiting, diarrhea or constipation. No melena.  GENITOURINARY: No dysuria, hematuria  NEUROLOGICAL: No stroke like symptoms  SKIN: No burning or lesions   ENDOCRINE: No heat or cold intolerance  MUSCULOSKELETAL: + pain in thoracic spine  PSYCHIATRIC: No  anxiety, mood swings  HEME/LYMPH: No bleeding gums  ALLERGY AND IMMUNOLOGIC: No hives or eczema	    All other ROS negative    PHYSICAL EXAM:  T(C): 36.7 (04-15-22 @ 17:30), Max: 37.7 (04-14-22 @ 21:17)  HR: 69 (04-15-22 @ 17:30) (67 - 79)  BP: 125/72 (04-15-22 @ 17:30) (125/72 - 156/64)  RR: 18 (04-15-22 @ 17:30) (18 - 18)  SpO2: 98% (04-15-22 @ 17:30) (95% - 99%)  Wt(kg): --  I&O's Summary    14 Apr 2022 07:01  -  15 Apr 2022 07:00  --------------------------------------------------------  IN: 540 mL / OUT: 800 mL / NET: -260 mL    15 Apr 2022 07:01  -  15 Apr 2022 17:47  --------------------------------------------------------  IN: 380 mL / OUT: 300 mL / NET: 80 mL        Appearance: Normal	  HEENT:   Normal oral mucosa, EOMI	  Cardiovascular:  S1 S2, No JVD,    Respiratory: Lungs clear to auscultation	  Psychiatry: Alert  Gastrointestinal:  Soft, Non-tender, + BS	  Skin: No rashes   Neurologic: Non-focal  Extremities:  No edema  Vascular: Peripheral pulses palpable    	    	  	  CARDIAC MARKERS:  Labs personally reviewed by me                              10.0   4.90  )-----------( 187      ( 14 Apr 2022 06:24 )             30.4     04-14    142  |  111<H>  |  20  ----------------------------<  146<H>  4.3   |  19<L>  |  1.26    Ca    9.2      14 Apr 2022 06:22            EKG: Personally reviewed by me - SR with RBBB  Radiology: Personally reviewed by me -     Transthoracic Echocardiogram (04.14.22 @ 08:15)   Conclusions:  1. Normal mitral valve. Minimal mitral regurgitation.  2. Calcified trileaflet aortic valve with normal opening.  Minimal aortic regurgitation.  3. Normal left ventricular systolic function. No segmental  wall motion abnormalities.  4. The right ventricle is not well visualized; grossly  normal right ventricular size and systolic function.      Assessment /Plan:      Ms. Angelika James is an 82 F with PMH of  significant for HTN, multiple myeloma, RA on ASA81, remote h/o CVA no residual , p/w AMS to [facility] , found to have SAH t/f to [facility].    Problem/Plan -1  Problem: Cardiac Risk Stratification   Plan:  - Denies chest pain or shortness of breath  - Trop 65-64 probably i/s/o SAH  - EKG with no ischemic changes noted  - States she is able to walk with assistance several feet but exercise tolerance is limited by RA and chronic back pain  - TTE as noted above reveals normal LV systolic function and minimal MR  - Not in decompensated HF  - No evidence of tachy/deidra syndrome  -Patient with moderate risk for moderate risk neurosurgery intervention, no cardiac contraindication to proceed     Problem/Plan -2  Problem: HTN  Plan:  -c/w amlodipine  -BP currently stable, cont to trend    Problem/Plan -3  Problem: HLD  Plan:  - LDL 42  - c/w atorvastatin 20mg PO daily    Problem/Plan -4  Problem: DM II  Plan:  - HgbA1C 114  - endocrine c/s appreciated  - ISS       Differential diagnosis and plan of care discussed with patient after the evaluation. Counseling on diet, nutritional counseling, weight management, exercise and medication compliance was done.   Advanced care planning/advanced directives discussed with patient/family. DNR status including forceful chest compressions to attempt to restart the heart, ventilator support/artificial breathing, electric shock, artificial nutrition, health care proxy, Molst form all discussed with pt. Pt wishes to consider. More than fifteen minutes spent on discussing advanced directives.     Sneha Miller [facility]  Blaine Wolfe DO [facility]  Cardiovascular Medicine  [address] [place] [address], [address]  Office [phone]  Cell [phone]

## 2022-04-15 NOTE — PATIENT PROFILE ADULT - FALL HARM RISK - HARM RISK INTERVENTIONS

## 2022-04-15 NOTE — PROGRESS NOTE ADULT - PROBLEM SELECTOR PLAN 4
suspectat least in part secondary to Dex , A1c is >11  - s/p IV fluid bolus   - basal/bolus insulin w coverage sliding scale  -endo appreciated, unsure how to proceed given MM tx involving high dose steroid suspectat least in part secondary to Dex , A1c is >11  - s/p IV fluid bolus   - basal/bolus insulin w coverage sliding scale  -endo appreciated, f/u mgmt given MM tx involving high dose steroid

## 2022-04-15 NOTE — PROGRESS NOTE ADULT - SUBJECTIVE AND OBJECTIVE BOX
Neurology Progress Note    S: Patient seen and examined. No new events overnight. patient denied CP, SOB, HA or pain. eeg neg for seizures     Medication:  MEDICATIONS  (STANDING):  amLODIPine   Tablet 5 milliGRAM(s) Oral daily  atorvastatin 20 milliGRAM(s) Oral at bedtime  cholecalciferol 1000 Unit(s) Oral daily  dextrose 5%. 1000 milliLiter(s) (50 mL/Hr) IV Continuous <Continuous>  dextrose 5%. 1000 milliLiter(s) (100 mL/Hr) IV Continuous <Continuous>  dextrose 50% Injectable 25 Gram(s) IV Push once  dextrose 50% Injectable 12.5 Gram(s) IV Push once  folic acid 1 milliGRAM(s) Oral daily  gabapentin 200 milliGRAM(s) Oral at bedtime  gabapentin 100 milliGRAM(s) Oral daily  glucagon  Injectable 1 milliGRAM(s) IntraMuscular once  hydroxychloroquine 200 milliGRAM(s) Oral daily  insulin glargine Injectable (LANTUS) 5 Unit(s) SubCutaneous at bedtime  insulin lispro (ADMELOG) corrective regimen sliding scale   SubCutaneous three times a day before meals  insulin lispro (ADMELOG) corrective regimen sliding scale   SubCutaneous at bedtime  insulin lispro Injectable (ADMELOG) 3 Unit(s) SubCutaneous three times a day before meals  levETIRAcetam  IVPB 500 milliGRAM(s) IV Intermittent every 12 hours  lidocaine   4% Patch 1 Patch Transdermal daily  pantoprazole    Tablet 40 milliGRAM(s) Oral before breakfast    MEDICATIONS  (PRN):  acetaminophen     Tablet .. 650 milliGRAM(s) Oral every 6 hours PRN Temp greater or equal to 38C (100.4F), Mild Pain (1 - 3)  dextrose Oral Gel 15 Gram(s) Oral once PRN Blood Glucose LESS THAN 70 milliGRAM(s)/deciliter  melatonin 3 milliGRAM(s) Oral at bedtime PRN Insomnia  polyethylene glycol 3350 17 Gram(s) Oral daily PRN Constipation  senna 2 Tablet(s) Oral at bedtime PRN Constipation        Vitals:    Vital Signs Last 24 Hrs  T(C): 37.1 (04-15-22 @ 05:37), Max: 37.7 (22 @ 21:17)  T(F): 98.8 (04-15-22 @ 05:37), Max: 99.8 (22 @ 21:17)  HR: 76 (04-15-22 @ 05:37) (67 - 79)  BP: 132/77 (04-15-22 @ 05:37) (113/74 - 132/77)  BP(mean): --  RR: 18 (04-15-22 @ 05:37) (16 - 18)  SpO2: 98% (04-15-22 @ 05:37) (97% - 100%)        General Exam:   General Appearance: Appropriately dressed and in no acute distress       Head: Normocephalic, atraumatic and no dysmorphic features  Ear, Nose, and Throat: Moist mucous membranes  CVS: S1S2+  Resp: No SOB, no wheeze or rhonchi  Abd: soft NTND  Extremities: No edema, no cyanosis  Skin: No bruises, no rashes     Neurological Exam:  MS: Eyes open, alert, oriented to person, place and situation, not time ("march"). Reports she knows the current president was the  for [employer] but can't recall his name. Normal affect. Follows all commands.  Language: Speech is mildly slurred, but fluent with good repetition & comprehension (able to name watch, phone, and pen).  CNs: PERRL (slightly sluggish, 3.5mm OU). BTT intact b/l. EOMI, no diplopia. V1-3 intact to LT. Well developed masseter muscles b/l. Facial movements normal and symmetric. Hearing grossly normal to conversation b/l. Symmetric palate elevation in midline. Gag reflex deferred. Tongue midline, normal movements.  Motor: Normal muscle bulk & tone. No noticeable tremor at rest.   RUE: Motor drift, grossly 4+/5  RLE: No drift, grossly 5/5  LUE/LLE: No drift, 5/5 throughout  Sensation: Intact to LT b/l throughout.  Cortical: Extinction on DSS (neglect): none.  Reflexes: clonus in lowers    Coordination: No dysmetria to FTN b/l.  Gait: Not assessed due to current medical condition/risk of fall.     I personally reviewed the below data/images/labs:    no new labs   CBC Full  -  ( 2022 06:24 )  WBC Count : 4.90 K/uL  RBC Count : 3.58 M/uL  Hemoglobin : 10.0 g/dL  Hematocrit : 30.4 %  Platelet Count - Automated : 187 K/uL  Mean Cell Volume : 84.9 fl  Mean Cell Hemoglobin : 27.9 pg  Mean Cell Hemoglobin Concentration : 32.9 gm/dL  Auto Neutrophil # : x  Auto Lymphocyte # : x  Auto Monocyte # : x  Auto Eosinophil # : x  Auto Basophil # : x  Auto Neutrophil % : x  Auto Lymphocyte % : x  Auto Monocyte % : x  Auto Eosinophil % : x  Auto Basophil % : x    CBC Full  -  ( 2022 06:24 )  WBC Count : 4.90 K/uL  RBC Count : 3.58 M/uL  Hemoglobin : 10.0 g/dL  Hematocrit : 30.4 %  Platelet Count - Automated : 187 K/uL  Mean Cell Volume : 84.9 fl  Mean Cell Hemoglobin : 27.9 pg  Mean Cell Hemoglobin Concentration : 32.9 gm/dL  Auto Neutrophil # : x  Auto Lymphocyte # : x  Auto Monocyte # : x  Auto Eosinophil # : x  Auto Basophil # : x  Auto Neutrophil % : x  Auto Lymphocyte % : x  Auto Monocyte % : x  Auto Eosinophil % : x  Auto Basophil % : x    04-14    142  |  111<H>  |  20  ----------------------------<  146<H>  4.3   |  19<L>  |  1.26    Ca    9.2      2022 06:22    TPro  6.3  /  Alb  3.4  /  TBili  0.4  /  DBili  x   /  AST  16  /  ALT  20  /  AlkPhos  125<H>  0413    LIVER FUNCTIONS - ( 2022 08:35 )  Alb: 3.4 g/dL / Pro: 6.3 g/dL / ALK PHOS: 125 U/L / ALT: 20 U/L / AST: 16 U/L / GGT: x           PT/INR - ( 2022 23:31 )   PT: 11.5 sec;   INR: 1.00 ratio         PTT - ( 2022 23:31 )  PTT:20.9 sec  Urinalysis Basic - ( 2022 04:40 )    Color: Colorless / Appearance: Clear / S.020 / pH: x  Gluc: x / Ketone: Negative  / Bili: Negative / Urobili: Negative   Blood: x / Protein: Trace / Nitrite: Negative   Leuk Esterase: Negative / RBC: 2 /hpf / WBC 0 /HPF   Sq Epi: x / Non Sq Epi: 0 /hpf / Bacteria: Negative    < from: MR Head w/wo IV Cont (22 @ 15:04) >  IMPRESSION: Age-appropriate involutional and ischemic gliotic changes.   Left occipital subarachnoid hemorrhage is not appreciated on this   examination as it may be isointense signal intensity CSF. No acute   infarcts or abnormal enhancement. Incidental small right frontal   pterional meningioma. Small vessel white matter ischemic changes. Normal   noninvasive MRA angiography.    < end of copied text >      CT ANGIO NECK & BRAIN (W)AW IC; CT PERFUSION W MAPS IC    PROCEDURE DATE: 2022  INTERPRETATION: HISTORY: Stroke code.  COMPARISON: None  TECHNIQUE: CT angiogram of the neck and brain was performed after administration of intravenous contrast. MIP and 3D reconstructions were performed. Perfusion maps were also generated and submitted for evaluation.  Total of 90 cc Omnipaque 350 intravenous contrast were administered without complication.    CT PERFUSION  There are symmetric time parameters, including mean transit time and Tmax, in the hemispheres bilaterally. No focal decrease in cerebral blood volume or cerebral blood flow. No regional perfusion abnormality is evident.    CTA BRAIN  INTERNAL CAROTID ARTERIES: Atheromatous irregularity and mild stenoses along the carotid siphons bilaterally. The intracranial segments of the ICA are patent without hemodynamically significant stenosis, occlusion, or aneurysm. The ICA bifurcations are unremarkable.  ANTERIOR CEREBRAL ARTERIES: No flow-limiting stenosis or occlusion. Anterior communicating artery is unremarkable without aneurysm.  MIDDLE CEREBRAL ARTERIES: No flow-limiting stenosis or occlusion. MCA bifurcations are unremarkable without aneurysm.  POSTERIOR CEREBRAL ARTERIES: No flow-limiting stenosis or occlusion. Posterior communicating arteries are not well seen bilaterally.  VERTEBROBASILAR SYSTEM: No flow-limiting stenosis or occlusion. Basilar tip is unremarkable.   Hypervascular right inferior frontal convexity extra-axial lesion measuring approximately 1 cm likely reflects a meningioma.    CTA NECK  RIGHT CAROTID SYSTEM: Normal in course and caliber without flow-limiting stenosis or occlusion. Calcified plaque along the bulb and ICA origin.  LEFT CAROTID SYSTEM: Normal in course and caliber without flow-limiting stenosis or occlusion. Calcified plaque along the bulb and ICA origin.  VERTEBRAL SYSTEM: Normal in course and caliber without flow-limiting stenosis or occlusion. Origin of the vertebral arteries are unremarkable.  AORTIC ARCH: Calcified plaque along the aortic arch. Bilateral calcified hilar and mediastinal nodes from prior disease. Scattered parenchymal granulomas are noted in the upper lobes bilaterally. Biapical interstitial prominence with hazy groundglass airspace opacity due to air trapping and/or dependent changes. Small interstitial edema may be present.    IMPRESSION:  CT PERFUSION: No regional perfusion abnormality.    CTA BRAIN: No associated vascular malformation or aneurysm associated with the small left occipital subarachnoid bleed.  Patent intracranial circulation. No flow-limiting stenosis or occlusion.  Hypervascular right inferior frontal convexity extra-axial lesion measuring approximately 1 cm likely reflects a meningioma.    CTA NECK: Patent cervical vasculature. No flow limiting stenosis or occlusion.  Sequelae of prior pulmonary granulomatous disease.    --- End of Report ---    CT BRAIN STROKE PROTOCOL    PROCEDURE DATE: 2022  INTERPRETATION: HISTORY: Stroke code. Slurred speech.  COMPARISON: CT head 2012  TECHNIQUE: Axial noncontrast CT images from the skull base to the vertex were obtained and submitted for interpretation. Coronal and sagittal reformatted images were performed. Bone and soft tissue windows were evaluated.    FINDINGS:  Faint hyperattenuation in the left occipital sulci (5-31) suggests small acute subarachnoid bleed. No local mass effect or midline shift. Mild widening of the left greater than right occipital sulci suggest chronic infarction inferiorly. Gray-white differentiation is maintained.  Mild to moderate chronic microvascular ischemic changes and vascular calcifications along the carotid siphons are noted.  Ventricles are normal in size for the patient's age without hydrocephalus. Basal cisterns are patent.  Visualized paranasal sinuses and mastoid air cells are clear. Calvarium is intact.    IMPRESSION:  Suggestion of small left occipital subarachnoid bleed. No local mass effect or midline shift.      CT Head No Cont (22 @ 01:09)   IMPRESSION: No significant change when compared with prior study.  < from: CT Lumbar Spine No Cont (22 @ 17:49) >    ACC: [id number] EXAM:  CT LUMBAR SPINE                        ACC: [id number] EXAM:  CT THORACIC SPINE                          PROCEDURE DATE:  2022          INTERPRETATION:  INDICATIONS:  Back pain and fall    TECHNIQUE:  Serial axial images were obtained using multi slice helical   technique. Sagittal and coronal reformatted images were performed.    COMPARISON EXAMINATION: 2021 CT as well as plain film from 2021    FINDINGS:  VERTEBRAL BODIES AND DISCS:  Evidence of an acute fracture through the   T10 vertebral body with involvement of the posterior elements with   fractures involving the pedicles bilaterally and at least a 2 column   injury consistent with an unstable injury at this level. There is   evidence of a compression fracture at T12 that has a somewhat vertebral   plana appearance and this is identified on the patient's prior plain film   2021 and appears old. Compression deformity at T9 which is   suggested on the prior plain film as well. Compression deformity L2   suggested on the prior plain film as well. Fairly prominent compression   deformity L3 appears to be of evolving compared with the prior plain film   with more significant loss of height compared with the prior. Fracture   through L5 which appears somewhat recent in appearance can correlate with   MR as clinically warranted.  ALIGNMENT:  A kyphotic deformity at the T11-12 level.  SPINAL CANAL:  Multilevel canal stenosis in the lumbar spine region  MISCELLANEOUS:  Calcified uterine fibroids    IMPRESSION:  Unstable recent injury suggested at the T10 level with an   acute fracture identified extending into the posterior elements.   Suspicion of a recent fracture at L5 as well with fracture line   appreciated and evidence of loss ofheight at this level with impaction   seen as well. More chronic appearing compressions at T9, L2, L3.    Dr. Kumar discussed these findings with Dr. Matilde rodriguez of the   neurosurgical service on 2022 6:56 PM with read back.    --- End of Report ---            ROE KUMAR MD; Attending Radiologist  This document has been electronically signed. 2022  7:01PM    < end of copied text >      CT Head No Cont (22 @ 04:10)   IMPRESSION: No significant change when compared with prior study.  No CT evidence of acute, large transcortical infarct. MR brain can be obtained for further evaluation if clinical concern remains.  < from: Transthoracic Echocardiogram (22 @ 08:15) >    Patient name: ANGELO STRATTON  YOB: 1940   Age: 82 (F)   MR#: [id number]  Study Date: 2022  [facility]  Study quality: Technically difficult  Referring Physician: Ladonna Cain MD  Blood Pressure: 121/67 mmHg  Height: 157 cm  Weight: 61 kg  BSA: 1.6 m2  ------------------------------------------------------------------------  PROCEDURE: Transthoracic echocardiogram with 2-D, M-Mode  and complete spectral and color flow Doppler.  INDICATION:Other cerebrovascular disease (I67.89)  ------------------------------------------------------------------------  Dimensions:    Normal Values:  LA:     3.6    2.0 - 4.0 cm  Ao:     3.1    2.0 - 3.8 cm  SEPTUM: 1.2    0.6 - 1.2 cm  PWT:    1.2    0.6- 1.1 cm  LVIDd:  3.8    3.0 - 5.6 cm  LVIDs:  2.5    1.8 - 4.0 cm  Derived variables:  LVMI: 95 g/m2  RWT: 0.63  Fractional short: 34 %  EF (Visual Estimate): 65 %  Doppler Peak Velocity (m/sec): AoV=1.3  ------------------------------------------------------------------------  Observations:  Mitral Valve: Normal mitral valve. Minimal mitral  regurgitation.  Aortic Valve/Aorta: Calcified trileaflet aortic valve with  normal opening. Peak transaortic valve gradient equals 7 mm  Hg, mean transaortic valve gradient equals 3 mm Hg, aortic  valve velocity time integral equals 22 cm. Minimal aortic  regurgitation. Peak left ventricular outflow tract gradient  equals 6 mm Hg, mean gradient is equal to 2 mm Hg, LVOT  velocity time integral equals 20cm.  Aortic Root: 3.1 cm.  Left Atrium: Normal left atrium.  LA volume index = 23  cc/m2.  Left Ventricle: Normal left ventricular systolic function.  No segmental wall motion abnormalities. Mild concentric  left ventricular hypertrophy. Normal diastolic function.  Right Heart: Normal right atrium. The right ventricle is  not well visualized; grossly normal right ventricular size  and systolic function. Tricuspid valve not well visualized,  probably normal. Minimal tricuspid regurgitation. Normal  pulmonic valve. Mild pulmonic regurgitation.  Pericardium/Pleura: Thickened pericardium consistent with  prominent pericardial fat pad. No pericardial effusion.  Hemodynamic: Estimated right atrial pressure is 8 mm Hg.  Estimated right ventricular systolic pressure equals 27 mm  Hg, assuming right atrial pressure equals 8 mm Hg,  consistent with normal pulmonary pressures.  ------------------------------------------------------------------------  Conclusions:  1. Normal mitral valve. Minimal mitral regurgitation.  2. Calcified trileaflet aortic valve with normal opening.  Minimal aortic regurgitation.  3. Normal left ventricular systolic function. No segmental  wall motion abnormalities.  4. The right ventricle is not well visualized; grossly  normal right ventricular size and systolic function.  *** No previous Echo exam.  ------------------------------------------------------------------------  Confirmed on  2022 - 10:22:58 by Riddhi Buckner M.D.  ------------------------------------------------------------------------    < end of copied text >

## 2022-04-15 NOTE — PROGRESS NOTE ADULT - ASSESSMENT
82 y.o. LHW with HTN, rheumatoid arthritis, multiple myeloma, who presented to OSH with c/o slurred speech and confusion, found to have a small left occipital SAH, transferred to [facility] for further management.   LAURENN 14:30 on 4/12/22. While in the ED a stroke code was activated at 03:56 for worsening dysarthria and right sided weakness. Per providers at bedside, symptoms have been inconsistent and somewhat fluctuating in quality since presentation, and the patient may have had seizure-like activity just prior to activation of the stroke code, for which received Keppra.  Initial VSS and wnl. On exam patient noted to be at baseline mental status, mildly dysarthric, but fluent and moving all extremities against gravity, with RUE drift noted. Follows all commands, however intermittently required repeated prompting to properly elicit actions on the right, which when done, was to the same degree as the left. Initial labs significant for serum glucose 530, BUN 48, Cr 2.31, sodium 132, , Hb 9.5.   CTH w/o: significant for small left occipital SAH noted to be stable on repeat imaging.   CTA H/N w/ does not demonstrate flow limiting stenosis or occlusion.   EEG neg \  TTE as abov e   MRI with no evidence of L SAH, R frontal meningioma. no acute findings  MRA/Nova: unremarkable   NIHSS: 3  MRS: 3  CT T/L spine with recent T10 injury/fracture; also L5 fracture, chronic compressions T9, L2, L3   Impression:  stroke mimic in the setting hyperglycemia vs seizure vs less likely acute stroke. Possible annita's paresis following reported witnessed seizure. Rule out other underlying toxic-metabolic or infectious etiologies. unclear etiology of SAH, not seen on MR     Recommendations:  - neurosx called. TLSO brace. MRI T/L spine pending;   - any role for kyphoplasty?   - Stat CTH prn for worsening mental status or neuro exam  - needs better glucose control   - Hold ASA and lovenox, use mechanical DVT prophylaxis for now  -  Keppra 500mg Q12H  - Strict BP control goal <160/90   - Appreciate Neurosurgery eval  - Telemetry Monitoring, Neuro checks/vitals per unit protocol  - ISS and POCT glucose monitoring  - BG goal <180, avoid hypoglycemia  - PT/OT/SLP evaluation  - Fall, aspiration, seizure precautions  - Thank you for allowing me to participate in the care of this patient. Call with questions.   Clem Roberto MD  Vascular Neurology .

## 2022-04-15 NOTE — PROGRESS NOTE ADULT - ASSESSMENT
82 y.o. LHW with a PMH of HTN, rheumatoid arthritis, multiple myeloma, who presented to OSH with c/o slurred speech and confusion, found to have a small left occipital SAH with DUC on CKD stage 3    1) Duc on CKD stage 3  ddx includes pre renal vs atn  Cr stable  b/l cr is 1.3 to 1.4mg/dl  trend bun/cr  avoid nephrotoxins  will monitor    2) HTN- bp stable  monitor bp      For any question, call:  Cell # [phone]  Pager # [phone]  Callback # [phone]

## 2022-04-16 LAB
GLUCOSE BLDC GLUCOMTR-MCNC: 132 MG/DL — HIGH (ref 70–99)
GLUCOSE BLDC GLUCOMTR-MCNC: 161 MG/DL — HIGH (ref 70–99)
GLUCOSE BLDC GLUCOMTR-MCNC: 185 MG/DL — HIGH (ref 70–99)
GLUCOSE BLDC GLUCOMTR-MCNC: 90 MG/DL — SIGNIFICANT CHANGE UP (ref 70–99)

## 2022-04-16 PROCEDURE — 99232 SBSQ HOSP IP/OBS MODERATE 35: CPT

## 2022-04-16 RX ADMIN — LIDOCAINE 1 PATCH: 4 CREAM TOPICAL at 00:00

## 2022-04-16 RX ADMIN — Medication 975 MILLIGRAM(S): at 11:34

## 2022-04-16 RX ADMIN — LEVETIRACETAM 400 MILLIGRAM(S): 250 TABLET, FILM COATED ORAL at 17:32

## 2022-04-16 RX ADMIN — INSULIN GLARGINE 5 UNIT(S): 100 INJECTION, SOLUTION SUBCUTANEOUS at 21:46

## 2022-04-16 RX ADMIN — Medication 975 MILLIGRAM(S): at 20:09

## 2022-04-16 RX ADMIN — OXYCODONE HYDROCHLORIDE 2.5 MILLIGRAM(S): 5 TABLET ORAL at 17:46

## 2022-04-16 RX ADMIN — NYSTATIN CREAM 1 APPLICATION(S): 100000 CREAM TOPICAL at 05:21

## 2022-04-16 RX ADMIN — ATORVASTATIN CALCIUM 20 MILLIGRAM(S): 80 TABLET, FILM COATED ORAL at 21:46

## 2022-04-16 RX ADMIN — LIDOCAINE 1 PATCH: 4 CREAM TOPICAL at 17:30

## 2022-04-16 RX ADMIN — OXYCODONE HYDROCHLORIDE 2.5 MILLIGRAM(S): 5 TABLET ORAL at 18:16

## 2022-04-16 RX ADMIN — Medication 200 MILLIGRAM(S): at 11:31

## 2022-04-16 RX ADMIN — GABAPENTIN 200 MILLIGRAM(S): 400 CAPSULE ORAL at 21:46

## 2022-04-16 RX ADMIN — Medication 1 MILLIGRAM(S): at 11:32

## 2022-04-16 RX ADMIN — AMLODIPINE BESYLATE 5 MILLIGRAM(S): 2.5 TABLET ORAL at 05:21

## 2022-04-16 RX ADMIN — GABAPENTIN 100 MILLIGRAM(S): 400 CAPSULE ORAL at 11:31

## 2022-04-16 RX ADMIN — NYSTATIN CREAM 1 APPLICATION(S): 100000 CREAM TOPICAL at 05:22

## 2022-04-16 RX ADMIN — Medication 3 UNIT(S): at 08:05

## 2022-04-16 RX ADMIN — Medication 975 MILLIGRAM(S): at 04:42

## 2022-04-16 RX ADMIN — Medication 975 MILLIGRAM(S): at 05:15

## 2022-04-16 RX ADMIN — Medication 3 UNIT(S): at 11:54

## 2022-04-16 RX ADMIN — Medication 975 MILLIGRAM(S): at 12:04

## 2022-04-16 RX ADMIN — LIDOCAINE 1 PATCH: 4 CREAM TOPICAL at 11:31

## 2022-04-16 RX ADMIN — NYSTATIN CREAM 1 APPLICATION(S): 100000 CREAM TOPICAL at 17:33

## 2022-04-16 RX ADMIN — LEVETIRACETAM 400 MILLIGRAM(S): 250 TABLET, FILM COATED ORAL at 05:21

## 2022-04-16 RX ADMIN — PANTOPRAZOLE SODIUM 40 MILLIGRAM(S): 20 TABLET, DELAYED RELEASE ORAL at 05:21

## 2022-04-16 RX ADMIN — Medication 1: at 11:54

## 2022-04-16 RX ADMIN — Medication 1000 UNIT(S): at 11:30

## 2022-04-16 RX ADMIN — Medication 975 MILLIGRAM(S): at 20:45

## 2022-04-16 NOTE — CHART NOTE - NSCHARTNOTESELECT_GEN_ALL_CORE
Brief Progress
Neurosurgery/Event Note
Endocrinology/Event Note
NEUROLOGY Resident/Event Note
Neurosurgery/Event Note
Event Note

## 2022-04-16 NOTE — PROGRESS NOTE ADULT - PROBLEM SELECTOR PLAN 7
- continue amlodipine   - continue statin - continue amlodipine   - continue statin  - controlled now

## 2022-04-16 NOTE — PROGRESS NOTE ADULT - PROBLEM SELECTOR PLAN 1
unstable 3 column horizontal T10 spinal fracture  nsgy input apprec - bed rest, log roll only, OR planning  by RCRI, risk is elevated, pt has no cardiac symptoms nor cardiac history, TTE reviewed wnl and EKG RBBB w/o acute ischemic changes  however did have mildly elev trop in setting of SAH when arrived, METS are difficult to ascertain  cards consulted for risk stratification    multimodal pain control unstable 3 column horizontal T10 spinal fracture  nsgy input apprec - plan for conservative orthotics management for now, potential surgery in future outpt follow up  multimodal pain control

## 2022-04-16 NOTE — PROGRESS NOTE ADULT - SUBJECTIVE AND OBJECTIVE BOX
DATE OF SERVICE: 04-16-22 @ 14:15    Patient is a 82y old  Female who presents with a chief complaint of AMS x1 day (16 Apr 2022 11:47)      INTERVAL HISTORY: Feels ok.     REVIEW OF SYSTEMS:  CONSTITUTIONAL: No weakness  EYES/ENT: No visual changes;  No throat pain   NECK: No pain or stiffness  RESPIRATORY: No cough, wheezing; No shortness of breath  CARDIOVASCULAR: No chest pain or palpitations  GASTROINTESTINAL: No abdominal  pain. No nausea, vomiting, or hematemesis  GENITOURINARY: No dysuria, frequency or hematuria  NEUROLOGICAL: No stroke like symptoms  SKIN: No rashes    Telemetry personally reviewed: SB 1ST AVB 50-80s with PACs  	  MEDICATIONS:  amLODIPine   Tablet 5 milliGRAM(s) Oral daily        PHYSICAL EXAM:  T(C): 36.6 (04-16-22 @ 13:31), Max: 36.7 (04-15-22 @ 17:30)  HR: 73 (04-16-22 @ 13:31) (60 - 74)  BP: 112/70 (04-16-22 @ 13:31) (112/70 - 133/77)  RR: 17 (04-16-22 @ 13:31) (17 - 18)  SpO2: 99% (04-16-22 @ 13:31) (97% - 99%)  Wt(kg): --  I&O's Summary    15 Apr 2022 07:01  -  16 Apr 2022 07:00  --------------------------------------------------------  IN: 600 mL / OUT: 1100 mL / NET: -500 mL    16 Apr 2022 07:01  -  16 Apr 2022 14:15  --------------------------------------------------------  IN: 560 mL / OUT: 0 mL / NET: 560 mL          Appearance: In no distress	  HEENT:    PERRL, EOMI	  Cardiovascular:  S1 S2, No JVD  Respiratory: Lungs clear to auscultation	  Gastrointestinal:  Soft, Non-tender, + BS	  Vascularature:  No edema of LE  Psychiatric: Appropriate affect   Neuro: no acute focal deficits          Labs personally reviewed      ASSESSMENT/PLAN: 	     Ms. Angelika James is an 82 F with PMH of  significant for HTN, multiple myeloma, RA on ASA81, remote h/o CVA no residual , p/w AMS to [facility] , found to have SAH t/f to [facility].    Problem/Plan -1  Problem: Cardiac Risk Stratification   Plan:  - Denies chest pain or shortness of breath  - Trop 65-64 probably i/s/o SAH  - EKG with no ischemic changes noted  - States she is able to walk with assistance several feet but exercise tolerance is limited by RA and chronic back pain  - TTE as noted above reveals normal LV systolic function and minimal MR  - Not in decompensated HF  - No evidence of tachy/deidra syndrome  -Patient with moderate risk for moderate risk neurosurgery intervention, no cardiac contraindication to proceed     Problem/Plan -2  Problem: HTN  Plan:  -c/w amlodipine  -BP currently stable, cont to trend    Problem/Plan -3  Problem: HLD  Plan:  - LDL 42  - c/w atorvastatin 20mg PO daily    Problem/Plan -4  Problem: DM II  Plan:  - HgbA1C 114  - endocrine c/s appreciated  - ISS         Sneha Miller, AG-NP   Blaine Wolfe DO [facility]  Cardiovascular Medicine  [address] [place] [address], [address]  Office: [phone]  Cell: [phone]

## 2022-04-16 NOTE — CHART NOTE - NSCHARTNOTEFT_GEN_A_CORE
Patient was dispensed a TLSO rigid posterior panel extending from sacrococcygeal junction to scapular spine with a soft apron front. Angelika was educated on the care use and function of the orthosis. Contact info was given to patient. All went without incident.   Graham ERICKSON  [facility]  [phone]

## 2022-04-16 NOTE — PROGRESS NOTE ADULT - PROBLEM SELECTOR PLAN 4
suspectat least in part secondary to Dex , A1c is >11  - s/p IV fluid bolus   - basal/bolus insulin w coverage sliding scale  -endo appreciated, f/u mgmt given MM tx involving high dose steroid

## 2022-04-16 NOTE — PROGRESS NOTE ADULT - SUBJECTIVE AND OBJECTIVE BOX
[facility] Division of Hospital Medicine  Rod Woods  Contact on MS TEAMS    Patient is a 82y old  Female who presents with a chief complaint of AMS x1 day (16 Apr 2022 14:15)      SUBJECTIVE / OVERNIGHT EVENTS: No events overnight. Seen by Nsx, plan for TLSO and re-assess outpt prior to any surgery.     ADDITIONAL REVIEW OF SYSTEMS:    MEDICATIONS  (STANDING):  acetaminophen     Tablet .. 975 milliGRAM(s) Oral every 8 hours  amLODIPine   Tablet 5 milliGRAM(s) Oral daily  atorvastatin 20 milliGRAM(s) Oral at bedtime  cholecalciferol 1000 Unit(s) Oral daily  dextrose 5%. 1000 milliLiter(s) (50 mL/Hr) IV Continuous <Continuous>  dextrose 5%. 1000 milliLiter(s) (100 mL/Hr) IV Continuous <Continuous>  dextrose 50% Injectable 25 Gram(s) IV Push once  dextrose 50% Injectable 12.5 Gram(s) IV Push once  folic acid 1 milliGRAM(s) Oral daily  gabapentin 200 milliGRAM(s) Oral at bedtime  gabapentin 100 milliGRAM(s) Oral daily  glucagon  Injectable 1 milliGRAM(s) IntraMuscular once  hydroxychloroquine 200 milliGRAM(s) Oral daily  insulin glargine Injectable (LANTUS) 5 Unit(s) SubCutaneous at bedtime  insulin lispro (ADMELOG) corrective regimen sliding scale   SubCutaneous three times a day before meals  insulin lispro (ADMELOG) corrective regimen sliding scale   SubCutaneous at bedtime  insulin lispro Injectable (ADMELOG) 3 Unit(s) SubCutaneous three times a day before meals  levETIRAcetam  IVPB 500 milliGRAM(s) IV Intermittent every 12 hours  lidocaine   4% Patch 1 Patch Transdermal daily  nystatin Cream 1 Application(s) Topical two times a day  nystatin Powder 1 Application(s) Topical two times a day  pantoprazole    Tablet 40 milliGRAM(s) Oral before breakfast    MEDICATIONS  (PRN):  dextrose Oral Gel 15 Gram(s) Oral once PRN Blood Glucose LESS THAN 70 milliGRAM(s)/deciliter  melatonin 3 milliGRAM(s) Oral at bedtime PRN Insomnia  oxyCODONE    IR 2.5 milliGRAM(s) Oral every 4 hours PRN Severe Pain (7 - 10)  polyethylene glycol 3350 17 Gram(s) Oral daily PRN Constipation  senna 2 Tablet(s) Oral at bedtime PRN Constipation      CAPILLARY BLOOD GLUCOSE      POCT Blood Glucose.: 90 mg/dL (16 Apr 2022 16:24)  POCT Blood Glucose.: 161 mg/dL (16 Apr 2022 11:49)  POCT Blood Glucose.: 132 mg/dL (16 Apr 2022 07:37)  POCT Blood Glucose.: 307 mg/dL (15 Apr 2022 21:02)    I&O's Summary    15 Apr 2022 07:01  -  16 Apr 2022 07:00  --------------------------------------------------------  IN: 600 mL / OUT: 1100 mL / NET: -500 mL    16 Apr 2022 07:01  -  16 Apr 2022 18:33  --------------------------------------------------------  IN: 660 mL / OUT: 375 mL / NET: 285 mL        PHYSICAL EXAM:  Vital Signs Last 24 Hrs  T(C): 36.6 (16 Apr 2022 17:21), Max: 36.7 (15 Apr 2022 21:13)  T(F): 97.9 (16 Apr 2022 17:21), Max: 98.1 (15 Apr 2022 21:13)  HR: 78 (16 Apr 2022 17:21) (60 - 78)  BP: 132/77 (16 Apr 2022 17:21) (112/70 - 133/77)  BP(mean): --  RR: 18 (16 Apr 2022 17:21) (17 - 18)  SpO2: 98% (16 Apr 2022 17:21) (97% - 99%)  GENERAL: No acute distress, well-developed, alert  ENT: EOMI, PERRLA, conjunctiva and sclera clear,  moist mucosa no pharyngeal erythema or exudates   NECK: supple , no JVD   CHEST/LUNG: Clear to auscultation bilaterally; No wheeze, equal breath sounds bilaterally   BACK: No back tenderness;  No CVA tenderness   HEART: Regular rate and rhythm; No murmurs, rubs, or gallops  ABDOMEN: Soft, Nontender, Nondistended; Bowel sounds present  EXTREMITIES:  No clubbing, cyanosis,  MSK: No joint swelling or effusions, ROM intact   PSYCH: Normal behavior/affect  NEUROLOGY:  AAOx3, non-focal, cranial nerves intact, interval improvement in aphasia/dysarthria  SKIN: Normal color, No rashes or lesions    LABS:  POCT Blood Glucose.: 90 mg/dL (16 Apr 2022 16:24)  Complete Blood Count in AM (04.14.22 @ 06:24)    Nucleated RBC: 0 /100 WBCs    WBC Count: 4.90 K/uL    RBC Count: 3.58 M/uL    Hemoglobin: 10.0 g/dL    Hematocrit: 30.4 %    Mean Cell Volume: 84.9 fl    Mean Cell Hemoglobin: 27.9 pg    Mean Cell Hemoglobin Conc: 32.9 gm/dL    Red Cell Distrib Width: 17.1 %    Platelet Count - Automated: 187 K/uL    Basic Metabolic Panel in AM (04.14.22 @ 06:22)    Sodium, Serum: 142 mmol/L    Potassium, Serum: 4.3 mmol/L    Chloride, Serum: 111 mmol/L    Carbon Dioxide, Serum: 19 mmol/L    Anion Gap, Serum: 12 mmol/L    Blood Urea Nitrogen, Serum: 20 mg/dL    Creatinine, Serum: 1.26 mg/dL    Glucose, Serum: 146 mg/dL    Calcium, Total Serum: 9.2 mg/dL    eGFR: 43: The estimated glomerular filtration rate (eGFR) is calculated using the  2021 CKD-EPI creatinine equation, which does not have a coefficient for  race and is validated in individuals 18 years of age and older (N Engl J  Med 2021; 385:8709-2606). Creatinine-based eGFR may be inaccurate in  various situations including but not limited to extremes of muscle mass,  altered dietary protein intake, or medications that affect renal tubular  creatinine secretion. mL/min/1.73m2          RADIOLOGY & ADDITIONAL TESTS:  Results Reviewed:   Imaging Personally Reviewed:  Electrocardiogram Personally Reviewed:    COORDINATION OF CARE:  Care Discussed with Consultants/Other Providers [Y/N]:  Prior or Outpatient Records Reviewed [Y/N]:   [facility] Division of Hospital Medicine  Rod Woods  Contact on MS TEAMS    Patient is a 82y old  Female who presents with a chief complaint of AMS x1 day (16 Apr 2022 14:15)      SUBJECTIVE / OVERNIGHT EVENTS: No events overnight. Seen by Nsx, plan for TLSO and re-assess outpt prior to any surgery.     ADDITIONAL REVIEW OF SYSTEMS: endorses intermittent back pain, denies nausea, vomiting, diarrhea.     MEDICATIONS  (STANDING):  acetaminophen     Tablet .. 975 milliGRAM(s) Oral every 8 hours  amLODIPine   Tablet 5 milliGRAM(s) Oral daily  atorvastatin 20 milliGRAM(s) Oral at bedtime  cholecalciferol 1000 Unit(s) Oral daily  dextrose 5%. 1000 milliLiter(s) (50 mL/Hr) IV Continuous <Continuous>  dextrose 5%. 1000 milliLiter(s) (100 mL/Hr) IV Continuous <Continuous>  dextrose 50% Injectable 25 Gram(s) IV Push once  dextrose 50% Injectable 12.5 Gram(s) IV Push once  folic acid 1 milliGRAM(s) Oral daily  gabapentin 200 milliGRAM(s) Oral at bedtime  gabapentin 100 milliGRAM(s) Oral daily  glucagon  Injectable 1 milliGRAM(s) IntraMuscular once  hydroxychloroquine 200 milliGRAM(s) Oral daily  insulin glargine Injectable (LANTUS) 5 Unit(s) SubCutaneous at bedtime  insulin lispro (ADMELOG) corrective regimen sliding scale   SubCutaneous three times a day before meals  insulin lispro (ADMELOG) corrective regimen sliding scale   SubCutaneous at bedtime  insulin lispro Injectable (ADMELOG) 3 Unit(s) SubCutaneous three times a day before meals  levETIRAcetam  IVPB 500 milliGRAM(s) IV Intermittent every 12 hours  lidocaine   4% Patch 1 Patch Transdermal daily  nystatin Cream 1 Application(s) Topical two times a day  nystatin Powder 1 Application(s) Topical two times a day  pantoprazole    Tablet 40 milliGRAM(s) Oral before breakfast    MEDICATIONS  (PRN):  dextrose Oral Gel 15 Gram(s) Oral once PRN Blood Glucose LESS THAN 70 milliGRAM(s)/deciliter  melatonin 3 milliGRAM(s) Oral at bedtime PRN Insomnia  oxyCODONE    IR 2.5 milliGRAM(s) Oral every 4 hours PRN Severe Pain (7 - 10)  polyethylene glycol 3350 17 Gram(s) Oral daily PRN Constipation  senna 2 Tablet(s) Oral at bedtime PRN Constipation      CAPILLARY BLOOD GLUCOSE      POCT Blood Glucose.: 90 mg/dL (16 Apr 2022 16:24)  POCT Blood Glucose.: 161 mg/dL (16 Apr 2022 11:49)  POCT Blood Glucose.: 132 mg/dL (16 Apr 2022 07:37)  POCT Blood Glucose.: 307 mg/dL (15 Apr 2022 21:02)    I&O's Summary    15 Apr 2022 07:01  -  16 Apr 2022 07:00  --------------------------------------------------------  IN: 600 mL / OUT: 1100 mL / NET: -500 mL    16 Apr 2022 07:01  -  16 Apr 2022 18:33  --------------------------------------------------------  IN: 660 mL / OUT: 375 mL / NET: 285 mL        PHYSICAL EXAM:  Vital Signs Last 24 Hrs  T(C): 36.6 (16 Apr 2022 17:21), Max: 36.7 (15 Apr 2022 21:13)  T(F): 97.9 (16 Apr 2022 17:21), Max: 98.1 (15 Apr 2022 21:13)  HR: 78 (16 Apr 2022 17:21) (60 - 78)  BP: 132/77 (16 Apr 2022 17:21) (112/70 - 133/77)  BP(mean): --  RR: 18 (16 Apr 2022 17:21) (17 - 18)  SpO2: 98% (16 Apr 2022 17:21) (97% - 99%)  GENERAL: No acute distress, well-developed, alert  ENT: EOMI, PERRLA, conjunctiva and sclera clear,  moist mucosa no pharyngeal erythema or exudates   NECK: supple , no JVD   CHEST/LUNG: Clear to auscultation bilaterally; No wheeze, equal breath sounds bilaterally   BACK: No back tenderness;  No CVA tenderness   HEART: Regular rate and rhythm; No murmurs, rubs, or gallops  ABDOMEN: Soft, Nontender, Nondistended; Bowel sounds present  EXTREMITIES:  No clubbing, cyanosis,  MSK: No joint swelling or effusions, ROM intact   PSYCH: Normal behavior/affect  NEUROLOGY:  AAOx3, non-focal, cranial nerves intact, no aphasia  SKIN: Normal color, No rashes or lesions    LABS:  POCT Blood Glucose.: 90 mg/dL (16 Apr 2022 16:24)  Complete Blood Count in AM (04.14.22 @ 06:24)    Nucleated RBC: 0 /100 WBCs    WBC Count: 4.90 K/uL    RBC Count: 3.58 M/uL    Hemoglobin: 10.0 g/dL    Hematocrit: 30.4 %    Mean Cell Volume: 84.9 fl    Mean Cell Hemoglobin: 27.9 pg    Mean Cell Hemoglobin Conc: 32.9 gm/dL    Red Cell Distrib Width: 17.1 %    Platelet Count - Automated: 187 K/uL    Basic Metabolic Panel in AM (04.14.22 @ 06:22)    Sodium, Serum: 142 mmol/L    Potassium, Serum: 4.3 mmol/L    Chloride, Serum: 111 mmol/L    Carbon Dioxide, Serum: 19 mmol/L    Anion Gap, Serum: 12 mmol/L    Blood Urea Nitrogen, Serum: 20 mg/dL    Creatinine, Serum: 1.26 mg/dL    Glucose, Serum: 146 mg/dL    Calcium, Total Serum: 9.2 mg/dL    eGFR: 43: The estimated glomerular filtration rate (eGFR) is calculated using the  2021 CKD-EPI creatinine equation, which does not have a coefficient for  race and is validated in individuals 18 years of age and older (N Engl J  Med 2021; 385:4336-8161). Creatinine-based eGFR may be inaccurate in  various situations including but not limited to extremes of muscle mass,  altered dietary protein intake, or medications that affect renal tubular  creatinine secretion. mL/min/1.73m2          RADIOLOGY & ADDITIONAL TESTS:  Results Reviewed:   Imaging Personally Reviewed:  Electrocardiogram Personally Reviewed:    COORDINATION OF CARE:  Care Discussed with Consultants/Other Providers [Y/N]:  Prior or Outpatient Records Reviewed [Y/N]:

## 2022-04-16 NOTE — PROGRESS NOTE ADULT - PROBLEM SELECTOR PLAN 8
dispo to [facility]    pt will need geriatric referral after rehab, lives alone and family has been unable to increase home care hours

## 2022-04-16 NOTE — PROGRESS NOTE ADULT - ASSESSMENT
82 y.o. LHW with a PMH of HTN, rheumatoid arthritis, multiple myeloma, who presented to OSH with c/o slurred speech and confusion, found to have a small left occipital SAH with DUC on CKD stage 3    1) Duc on CKD stage 3  ddx includes pre renal vs atn  Cr stable  b/l cr is 1.3 to 1.4mg/dl  no labs to review today  trend bun/cr  avoid nephrotoxins  will monitor    2) HTN- bp stable  monitor bp      For any question, call:  Cell # [phone]

## 2022-04-16 NOTE — PROGRESS NOTE ADULT - SUBJECTIVE AND OBJECTIVE BOX
[facility] : Progress Note :: [phone], (office [phone]),   Dr Triana / Dr Deutsch / Dr Manzo / Dr Perez / Dr Nichol GARZA / Dr Lantigua / Dr Joyce / Dr Alberto layne  _____________________________________________________________________________________________    s/o low back pain   No Known Allergies    Hospital Medications:   MEDICATIONS  (STANDING):  acetaminophen     Tablet .. 975 milliGRAM(s) Oral every 8 hours  amLODIPine   Tablet 5 milliGRAM(s) Oral daily  atorvastatin 20 milliGRAM(s) Oral at bedtime  cholecalciferol 1000 Unit(s) Oral daily  dextrose 5%. 1000 milliLiter(s) (50 mL/Hr) IV Continuous <Continuous>  dextrose 5%. 1000 milliLiter(s) (100 mL/Hr) IV Continuous <Continuous>  dextrose 50% Injectable 25 Gram(s) IV Push once  dextrose 50% Injectable 12.5 Gram(s) IV Push once  folic acid 1 milliGRAM(s) Oral daily  gabapentin 200 milliGRAM(s) Oral at bedtime  gabapentin 100 milliGRAM(s) Oral daily  glucagon  Injectable 1 milliGRAM(s) IntraMuscular once  hydroxychloroquine 200 milliGRAM(s) Oral daily  insulin glargine Injectable (LANTUS) 5 Unit(s) SubCutaneous at bedtime  insulin lispro (ADMELOG) corrective regimen sliding scale   SubCutaneous three times a day before meals  insulin lispro (ADMELOG) corrective regimen sliding scale   SubCutaneous at bedtime  insulin lispro Injectable (ADMELOG) 3 Unit(s) SubCutaneous three times a day before meals  levETIRAcetam  IVPB 500 milliGRAM(s) IV Intermittent every 12 hours  lidocaine   4% Patch 1 Patch Transdermal daily  nystatin Cream 1 Application(s) Topical two times a day  nystatin Powder 1 Application(s) Topical two times a day  pantoprazole    Tablet 40 milliGRAM(s) Oral before breakfast        VITALS:  T(F): 97.6 (22 @ 09:40), Max: 98.1 (04-15-22 @ 17:30)  HR: 60 (22 @ 09:40)  BP: 133/77 (22 @ 09:40)  RR: 17 (22 @ 09:40)  SpO2: 97% (22 @ 09:40)  Wt(kg): --    04-15 @ 07:01  -   @ 07:00  --------------------------------------------------------  IN: 600 mL / OUT: 1100 mL / NET: -500 mL        PHYSICAL EXAM:  Constitutional: NAD  HEENT: anicteric sclera, oropharynx clear.  Neck: No JVD  Respiratory: CTAB, no wheezes, rales or rhonchi  Cardiovascular: S1, S2, RRR  Gastrointestinal: BS+, soft, NT/ND  Extremities:  No peripheral edema  Neurological: A/O x 3, no focal deficits  : No CVA tenderness. No neves.       LABS:        Creatinine Trend: 1.26 <--, 1.34 <--, 1.46 <--, 1.76 <--, 2.31 <--    Urine Studies:  Urinalysis Basic - ( 2022 04:40 )    Color: Colorless / Appearance: Clear / S.020 / pH:   Gluc:  / Ketone: Negative  / Bili: Negative / Urobili: Negative   Blood:  / Protein: Trace / Nitrite: Negative   Leuk Esterase: Negative / RBC: 2 /hpf / WBC 0 /HPF   Sq Epi:  / Non Sq Epi: 0 /hpf / Bacteria: Negative        RADIOLOGY & ADDITIONAL STUDIES:

## 2022-04-16 NOTE — PROGRESS NOTE ADULT - ASSESSMENT
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 , found to have SAH t/f to [facility]. Also with   82 F w/pmh  significant for HTN, multiple myeloma, RA on ASA81, remote h/o CVA no residual , p/w AMS to [facility] , found to have SAH t/f to [facility]. Also with t-spine fracture on MRI, pursuing conservative management with TLSO and plan to re-assess outpt.

## 2022-04-17 LAB
GLUCOSE BLDC GLUCOMTR-MCNC: 117 MG/DL — HIGH (ref 70–99)
GLUCOSE BLDC GLUCOMTR-MCNC: 134 MG/DL — HIGH (ref 70–99)
GLUCOSE BLDC GLUCOMTR-MCNC: 159 MG/DL — HIGH (ref 70–99)
GLUCOSE BLDC GLUCOMTR-MCNC: 81 MG/DL — SIGNIFICANT CHANGE UP (ref 70–99)

## 2022-04-17 PROCEDURE — 99232 SBSQ HOSP IP/OBS MODERATE 35: CPT

## 2022-04-17 RX ADMIN — GABAPENTIN 100 MILLIGRAM(S): 400 CAPSULE ORAL at 12:11

## 2022-04-17 RX ADMIN — NYSTATIN CREAM 1 APPLICATION(S): 100000 CREAM TOPICAL at 17:23

## 2022-04-17 RX ADMIN — NYSTATIN CREAM 1 APPLICATION(S): 100000 CREAM TOPICAL at 05:29

## 2022-04-17 RX ADMIN — ATORVASTATIN CALCIUM 20 MILLIGRAM(S): 80 TABLET, FILM COATED ORAL at 22:20

## 2022-04-17 RX ADMIN — Medication 1 MILLIGRAM(S): at 12:11

## 2022-04-17 RX ADMIN — NYSTATIN CREAM 1 APPLICATION(S): 100000 CREAM TOPICAL at 17:25

## 2022-04-17 RX ADMIN — AMLODIPINE BESYLATE 5 MILLIGRAM(S): 2.5 TABLET ORAL at 04:43

## 2022-04-17 RX ADMIN — Medication 200 MILLIGRAM(S): at 12:11

## 2022-04-17 RX ADMIN — Medication 3 UNIT(S): at 12:11

## 2022-04-17 RX ADMIN — LEVETIRACETAM 400 MILLIGRAM(S): 250 TABLET, FILM COATED ORAL at 17:25

## 2022-04-17 RX ADMIN — LIDOCAINE 1 PATCH: 4 CREAM TOPICAL at 19:30

## 2022-04-17 RX ADMIN — Medication 3 UNIT(S): at 08:03

## 2022-04-17 RX ADMIN — LIDOCAINE 1 PATCH: 4 CREAM TOPICAL at 00:00

## 2022-04-17 RX ADMIN — Medication 975 MILLIGRAM(S): at 21:30

## 2022-04-17 RX ADMIN — LEVETIRACETAM 400 MILLIGRAM(S): 250 TABLET, FILM COATED ORAL at 04:43

## 2022-04-17 RX ADMIN — GABAPENTIN 200 MILLIGRAM(S): 400 CAPSULE ORAL at 22:20

## 2022-04-17 RX ADMIN — Medication 975 MILLIGRAM(S): at 04:42

## 2022-04-17 RX ADMIN — Medication 975 MILLIGRAM(S): at 13:08

## 2022-04-17 RX ADMIN — LIDOCAINE 1 PATCH: 4 CREAM TOPICAL at 12:07

## 2022-04-17 RX ADMIN — Medication 1000 UNIT(S): at 12:09

## 2022-04-17 RX ADMIN — PANTOPRAZOLE SODIUM 40 MILLIGRAM(S): 20 TABLET, DELAYED RELEASE ORAL at 04:42

## 2022-04-17 RX ADMIN — Medication 975 MILLIGRAM(S): at 20:50

## 2022-04-17 RX ADMIN — INSULIN GLARGINE 5 UNIT(S): 100 INJECTION, SOLUTION SUBCUTANEOUS at 22:03

## 2022-04-17 RX ADMIN — Medication 975 MILLIGRAM(S): at 12:08

## 2022-04-17 NOTE — PROGRESS NOTE ADULT - PROBLEM SELECTOR PLAN 1
unstable 3 column horizontal T10 spinal fracture  nsgy input apprec - plan for conservative orthotics management for now, potential surgery in future outpt follow up  multimodal pain control

## 2022-04-17 NOTE — PROGRESS NOTE ADULT - SUBJECTIVE AND OBJECTIVE BOX
Neurology Progress Note    S: Patient seen and examined. No new events overnight. patient denied CP, SOB, HA or pain. eeg neg for seizures     Medication:  MEDICATIONS  (STANDING):  acetaminophen     Tablet .. 975 milliGRAM(s) Oral every 8 hours  amLODIPine   Tablet 5 milliGRAM(s) Oral daily  atorvastatin 20 milliGRAM(s) Oral at bedtime  cholecalciferol 1000 Unit(s) Oral daily  dextrose 5%. 1000 milliLiter(s) (50 mL/Hr) IV Continuous <Continuous>  dextrose 5%. 1000 milliLiter(s) (100 mL/Hr) IV Continuous <Continuous>  dextrose 50% Injectable 25 Gram(s) IV Push once  dextrose 50% Injectable 12.5 Gram(s) IV Push once  folic acid 1 milliGRAM(s) Oral daily  gabapentin 200 milliGRAM(s) Oral at bedtime  gabapentin 100 milliGRAM(s) Oral daily  glucagon  Injectable 1 milliGRAM(s) IntraMuscular once  hydroxychloroquine 200 milliGRAM(s) Oral daily  insulin glargine Injectable (LANTUS) 5 Unit(s) SubCutaneous at bedtime  insulin lispro (ADMELOG) corrective regimen sliding scale   SubCutaneous three times a day before meals  insulin lispro (ADMELOG) corrective regimen sliding scale   SubCutaneous at bedtime  insulin lispro Injectable (ADMELOG) 3 Unit(s) SubCutaneous three times a day before meals  levETIRAcetam  IVPB 500 milliGRAM(s) IV Intermittent every 12 hours  lidocaine   4% Patch 1 Patch Transdermal daily  nystatin Cream 1 Application(s) Topical two times a day  nystatin Powder 1 Application(s) Topical two times a day  pantoprazole    Tablet 40 milliGRAM(s) Oral before breakfast    MEDICATIONS  (PRN):  dextrose Oral Gel 15 Gram(s) Oral once PRN Blood Glucose LESS THAN 70 milliGRAM(s)/deciliter  melatonin 3 milliGRAM(s) Oral at bedtime PRN Insomnia  oxyCODONE    IR 2.5 milliGRAM(s) Oral every 4 hours PRN Severe Pain (7 - 10)  polyethylene glycol 3350 17 Gram(s) Oral daily PRN Constipation  senna 2 Tablet(s) Oral at bedtime PRN Constipation        Vitals:  Vital Signs Last 24 Hrs  T(C): 36.9 (2022 06:08), Max: 36.9 (2022 21:49)  T(F): 98.4 (2022 06:08), Max: 98.4 (2022 21:49)  HR: 72 (2022 06:08) (71 - 78)  BP: 135/65 (2022 06:08) (112/70 - 135/65)  BP(mean): --  RR: 18 (2022 06:08) (17 - 18)  SpO2: 98% (2022 06:08) (96% - 99%)      General Exam:   General Appearance: Appropriately dressed and in no acute distress       Head: Normocephalic, atraumatic and no dysmorphic features  Ear, Nose, and Throat: Moist mucous membranes  CVS: S1S2+  Resp: No SOB, no wheeze or rhonchi  Abd: soft NTND  Extremities: No edema, no cyanosis  Skin: No bruises, no rashes     Neurological Exam:  MS: Eyes open, alert, oriented to person, place and situation, not time ("march"). Reports she knows the current president was the  for [employer] but can't recall his name. Normal affect. Follows all commands.  Language: Speech is mildly slurred, but fluent with good repetition & comprehension (able to name watch, phone, and pen).  CNs: PERRL (slightly sluggish, 3.5mm OU). BTT intact b/l. EOMI, no diplopia. V1-3 intact to LT. Well developed masseter muscles b/l. Facial movements normal and symmetric. Hearing grossly normal to conversation b/l. Symmetric palate elevation in midline. Gag reflex deferred. Tongue midline, normal movements.  Motor: Normal muscle bulk & tone. No noticeable tremor at rest.   RUE: Motor drift, grossly 4+/5  RLE: No drift, grossly 5/5  LUE/LLE: No drift, 5/5 throughout  Sensation: Intact to LT b/l throughout.  Cortical: Extinction on DSS (neglect): none.  Reflexes: clonus in lowers    Coordination: No dysmetria to FTN b/l.  Gait: Not assessed due to current medical condition/risk of fall.     I personally reviewed the below data/images/labs:    no new labs   CBC Full  -  ( 2022 06:24 )  WBC Count : 4.90 K/uL  RBC Count : 3.58 M/uL  Hemoglobin : 10.0 g/dL  Hematocrit : 30.4 %  Platelet Count - Automated : 187 K/uL  Mean Cell Volume : 84.9 fl  Mean Cell Hemoglobin : 27.9 pg  Mean Cell Hemoglobin Concentration : 32.9 gm/dL  Auto Neutrophil # : x  Auto Lymphocyte # : x  Auto Monocyte # : x  Auto Eosinophil # : x  Auto Basophil # : x  Auto Neutrophil % : x  Auto Lymphocyte % : x  Auto Monocyte % : x  Auto Eosinophil % : x  Auto Basophil % : x    CBC Full  -  ( 2022 06:24 )  WBC Count : 4.90 K/uL  RBC Count : 3.58 M/uL  Hemoglobin : 10.0 g/dL  Hematocrit : 30.4 %  Platelet Count - Automated : 187 K/uL  Mean Cell Volume : 84.9 fl  Mean Cell Hemoglobin : 27.9 pg  Mean Cell Hemoglobin Concentration : 32.9 gm/dL  Auto Neutrophil # : x  Auto Lymphocyte # : x  Auto Monocyte # : x  Auto Eosinophil # : x  Auto Basophil # : x  Auto Neutrophil % : x  Auto Lymphocyte % : x  Auto Monocyte % : x  Auto Eosinophil % : x  Auto Basophil % : x    04-14    142  |  111<H>  |  20  ----------------------------<  146<H>  4.3   |  19<L>  |  1.26    Ca    9.2      2022 06:22    TPro  6.3  /  Alb  3.4  /  TBili  0.4  /  DBili  x   /  AST  16  /  ALT  20  /  AlkPhos  125<H>  04-13    LIVER FUNCTIONS - ( 2022 08:35 )  Alb: 3.4 g/dL / Pro: 6.3 g/dL / ALK PHOS: 125 U/L / ALT: 20 U/L / AST: 16 U/L / GGT: x           PT/INR - ( 2022 23:31 )   PT: 11.5 sec;   INR: 1.00 ratio         PTT - ( 2022 23:31 )  PTT:20.9 sec  Urinalysis Basic - ( 2022 04:40 )    Color: Colorless / Appearance: Clear / S.020 / pH: x  Gluc: x / Ketone: Negative  / Bili: Negative / Urobili: Negative   Blood: x / Protein: Trace / Nitrite: Negative   Leuk Esterase: Negative / RBC: 2 /hpf / WBC 0 /HPF   Sq Epi: x / Non Sq Epi: 0 /hpf / Bacteria: Negative    < from: MR Head w/wo IV Cont (22 @ 15:04) >  IMPRESSION: Age-appropriate involutional and ischemic gliotic changes.   Left occipital subarachnoid hemorrhage is not appreciated on this   examination as it may be isointense signal intensity CSF. No acute   infarcts or abnormal enhancement. Incidental small right frontal   pterional meningioma. Small vessel white matter ischemic changes. Normal   noninvasive MRA angiography.    < end of copied text >      CT ANGIO NECK & BRAIN (W)AW IC; CT PERFUSION W MAPS IC    PROCEDURE DATE: 2022  INTERPRETATION: HISTORY: Stroke code.  COMPARISON: None  TECHNIQUE: CT angiogram of the neck and brain was performed after administration of intravenous contrast. MIP and 3D reconstructions were performed. Perfusion maps were also generated and submitted for evaluation.  Total of 90 cc Omnipaque 350 intravenous contrast were administered without complication.    CT PERFUSION  There are symmetric time parameters, including mean transit time and Tmax, in the hemispheres bilaterally. No focal decrease in cerebral blood volume or cerebral blood flow. No regional perfusion abnormality is evident.    CTA BRAIN  INTERNAL CAROTID ARTERIES: Atheromatous irregularity and mild stenoses along the carotid siphons bilaterally. The intracranial segments of the ICA are patent without hemodynamically significant stenosis, occlusion, or aneurysm. The ICA bifurcations are unremarkable.  ANTERIOR CEREBRAL ARTERIES: No flow-limiting stenosis or occlusion. Anterior communicating artery is unremarkable without aneurysm.  MIDDLE CEREBRAL ARTERIES: No flow-limiting stenosis or occlusion. MCA bifurcations are unremarkable without aneurysm.  POSTERIOR CEREBRAL ARTERIES: No flow-limiting stenosis or occlusion. Posterior communicating arteries are not well seen bilaterally.  VERTEBROBASILAR SYSTEM: No flow-limiting stenosis or occlusion. Basilar tip is unremarkable.   Hypervascular right inferior frontal convexity extra-axial lesion measuring approximately 1 cm likely reflects a meningioma.    CTA NECK  RIGHT CAROTID SYSTEM: Normal in course and caliber without flow-limiting stenosis or occlusion. Calcified plaque along the bulb and ICA origin.  LEFT CAROTID SYSTEM: Normal in course and caliber without flow-limiting stenosis or occlusion. Calcified plaque along the bulb and ICA origin.  VERTEBRAL SYSTEM: Normal in course and caliber without flow-limiting stenosis or occlusion. Origin of the vertebral arteries are unremarkable.  AORTIC ARCH: Calcified plaque along the aortic arch. Bilateral calcified hilar and mediastinal nodes from prior disease. Scattered parenchymal granulomas are noted in the upper lobes bilaterally. Biapical interstitial prominence with hazy groundglass airspace opacity due to air trapping and/or dependent changes. Small interstitial edema may be present.    IMPRESSION:  CT PERFUSION: No regional perfusion abnormality.    CTA BRAIN: No associated vascular malformation or aneurysm associated with the small left occipital subarachnoid bleed.  Patent intracranial circulation. No flow-limiting stenosis or occlusion.  Hypervascular right inferior frontal convexity extra-axial lesion measuring approximately 1 cm likely reflects a meningioma.    CTA NECK: Patent cervical vasculature. No flow limiting stenosis or occlusion.  Sequelae of prior pulmonary granulomatous disease.    --- End of Report ---    CT BRAIN STROKE PROTOCOL    PROCEDURE DATE: 2022  INTERPRETATION: HISTORY: Stroke code. Slurred speech.  COMPARISON: CT head 2012  TECHNIQUE: Axial noncontrast CT images from the skull base to the vertex were obtained and submitted for interpretation. Coronal and sagittal reformatted images were performed. Bone and soft tissue windows were evaluated.    FINDINGS:  Faint hyperattenuation in the left occipital sulci (5-31) suggests small acute subarachnoid bleed. No local mass effect or midline shift. Mild widening of the left greater than right occipital sulci suggest chronic infarction inferiorly. Gray-white differentiation is maintained.  Mild to moderate chronic microvascular ischemic changes and vascular calcifications along the carotid siphons are noted.  Ventricles are normal in size for the patient's age without hydrocephalus. Basal cisterns are patent.  Visualized paranasal sinuses and mastoid air cells are clear. Calvarium is intact.    IMPRESSION:  Suggestion of small left occipital subarachnoid bleed. No local mass effect or midline shift.      CT Head No Cont (22 @ 01:09)   IMPRESSION: No significant change when compared with prior study.  < from: CT Lumbar Spine No Cont (22 @ 17:49) >    ACC: [id number] EXAM:  CT LUMBAR SPINE                        ACC: [id number] EXAM:  CT THORACIC SPINE                          PROCEDURE DATE:  2022          INTERPRETATION:  INDICATIONS:  Back pain and fall    TECHNIQUE:  Serial axial images were obtained using multi slice helical   technique. Sagittal and coronal reformatted images were performed.    COMPARISON EXAMINATION: 2021 CT as well as plain film from 2021    FINDINGS:  VERTEBRAL BODIES AND DISCS:  Evidence of an acute fracture through the   T10 vertebral body with involvement of the posterior elements with   fractures involving the pedicles bilaterally and at least a 2 column   injury consistent with an unstable injury at this level. There is   evidence of a compression fracture at T12 that has a somewhat vertebral   plana appearance and this is identified on the patient's prior plain film   2021 and appears old. Compression deformity at T9 which is   suggested on the prior plain film as well. Compression deformity L2   suggested on the prior plain film as well. Fairly prominent compression   deformity L3 appears to be of evolving compared with the prior plain film   with more significant loss of height compared with the prior. Fracture   through L5 which appears somewhat recent in appearance can correlate with   MR as clinically warranted.  ALIGNMENT:  A kyphotic deformity at the T11-12 level.  SPINAL CANAL:  Multilevel canal stenosis in the lumbar spine region  MISCELLANEOUS:  Calcified uterine fibroids    IMPRESSION:  Unstable recent injury suggested at the T10 level with an   acute fracture identified extending into the posterior elements.   Suspicion of a recent fracture at L5 as well with fracture line   appreciated and evidence of loss ofheight at this level with impaction   seen as well. More chronic appearing compressions at T9, L2, L3.    Dr. Kumar discussed these findings with Dr. Matilde rodriguez of the   neurosurgical service on 2022 6:56 PM with read back.    --- End of Report ---            ROE KUMAR MD; Attending Radiologist  This document has been electronically signed. 2022  7:01PM    < end of copied text >      CT Head No Cont (22 @ 04:10)   IMPRESSION: No significant change when compared with prior study.  No CT evidence of acute, large transcortical infarct. MR brain can be obtained for further evaluation if clinical concern remains.  < from: Transthoracic Echocardiogram (22 @ 08:15) >    Patient name: ANGELO STRATTON  YOB: 1940   Age: 82 (F)   MR#: [id number]  Study Date: 2022  [facility]  Study quality: Technically difficult  Referring Physician: Ladonna Cain MD  Blood Pressure: 121/67 mmHg  Height: 157 cm  Weight: 61 kg  BSA: 1.6 m2  ------------------------------------------------------------------------  PROCEDURE: Transthoracic echocardiogram with 2-D, M-Mode  and complete spectral and color flow Doppler.  INDICATION:Other cerebrovascular disease (I67.89)  ------------------------------------------------------------------------  Dimensions:    Normal Values:  LA:     3.6    2.0 - 4.0 cm  Ao:     3.1    2.0 - 3.8 cm  SEPTUM: 1.2    0.6 - 1.2 cm  PWT:    1.2    0.6- 1.1 cm  LVIDd:  3.8    3.0 - 5.6 cm  LVIDs:  2.5    1.8 - 4.0 cm  Derived variables:  LVMI: 95 g/m2  RWT: 0.63  Fractional short: 34 %  EF (Visual Estimate): 65 %  Doppler Peak Velocity (m/sec): AoV=1.3  ------------------------------------------------------------------------  Observations:  Mitral Valve: Normal mitral valve. Minimal mitral  regurgitation.  Aortic Valve/Aorta: Calcified trileaflet aortic valve with  normal opening. Peak transaortic valve gradient equals 7 mm  Hg, mean transaortic valve gradient equals 3 mm Hg, aortic  valve velocity time integral equals 22 cm. Minimal aortic  regurgitation. Peak left ventricular outflow tract gradient  equals 6 mm Hg, mean gradient is equal to 2 mm Hg, LVOT  velocity time integral equals 20cm.  Aortic Root: 3.1 cm.  Left Atrium: Normal left atrium.  LA volume index = 23  cc/m2.  Left Ventricle: Normal left ventricular systolic function.  No segmental wall motion abnormalities. Mild concentric  left ventricular hypertrophy. Normal diastolic function.  Right Heart: Normal right atrium. The right ventricle is  not well visualized; grossly normal right ventricular size  and systolic function. Tricuspid valve not well visualized,  probably normal. Minimal tricuspid regurgitation. Normal  pulmonic valve. Mild pulmonic regurgitation.  Pericardium/Pleura: Thickened pericardium consistent with  prominent pericardial fat pad. No pericardial effusion.  Hemodynamic: Estimated right atrial pressure is 8 mm Hg.  Estimated right ventricular systolic pressure equals 27 mm  Hg, assuming right atrial pressure equals 8 mm Hg,  consistent with normal pulmonary pressures.  ------------------------------------------------------------------------  Conclusions:  1. Normal mitral valve. Minimal mitral regurgitation.  2. Calcified trileaflet aortic valve with normal opening.  Minimal aortic regurgitation.  3. Normal left ventricular systolic function. No segmental  wall motion abnormalities.  4. The right ventricle is not well visualized; grossly  normal right ventricular size and systolic function.  *** No previous Echo exam.  ------------------------------------------------------------------------  Confirmed on  2022 - 10:22:58 by Riddhi Buckner M.D.  ------------------------------------------------------------------------    < from: MR Lumbar Spine No Cont (04.15.22 @ 11:19) >    ACC: [id number] EXAM:  MR SPINE LUMBAR                        ACC: [id number] EXAM:  MR SPINE THORACIC                          PROCEDURE DATE:  04/15/2022          INTERPRETATION:  CLINICAL INDICATION: Trauma with fractures    Magnetic resonance imaging of the thoracic and lumbosacral spine was   carried out with sagittal surface coil imaging from T1 to S2 using T1 and   fast spin echo T2 weighted images with and without fat saturation   technique with axial T1 and fast spin echo T2 weighted imaging on a 3.0   Demi magnet.    Comparison is made with the prior CT of 2022.    There is a mild compression deformity of the T9 vertebral body. There is   a horizontal fracture through the T10 vertebral body which extends from   the anterior to posterior cortex and into the pedicles bilaterally. This   is a 3 column injury seen better on the CT and is unstable. There is   widening at the fracture fragments involving the body. There is no bony   retropulsion or canal compression. There is no current malalignment.    There is a moderate predominantly chronic compression deformity of T12.   There is minimal bony retropulsion of the posterior superior cortex of   T12. Although there appears to be somewhat splaying of the spinous   processes at this level on the MRI there is calcification seen on CT,   this does not have the appearance of recent or unstable fracture. The   remainder of the thoracic vertebral bodies are unremarkable.    Conus terminates normally at the L1-2 level.    Evaluation of the lumbar spine demonstrates a mild compression forming at   L2 1 mild compression deformity of L5 and a moderate compression   deformity of L3. There is no bony retropulsion or canal compression. None   of these lumbar fractures appear unstable the posterior elements are   intact. There is no cauda equina compression.    The paraspinal soft tissues are remarkable.      Impression: Multiple thoracic and lumbar vertebral body fractures. The   horizontal fracture through the T10 vertebral body includes all 3 columns   with fractures through the pedicles seen better on the CT of 2022   and is consistent with an unstable fracture although there is no evidence   of displacement. There is no bony retropulsion or canal compression.                                  --- End of Report ---            CARLA ANN MD; Attending Radiologist  This document has been electronically signed. Apr 15 2022 12:58PM    < end of copied text >

## 2022-04-17 NOTE — PROGRESS NOTE ADULT - SUBJECTIVE AND OBJECTIVE BOX
DATE OF SERVICE: 04-17-22 @ 13:21    Patient is a 82y old  Female who presents with a chief complaint of AMS x1 day (17 Apr 2022 08:06)      INTERVAL HISTORY: Feels ok    REVIEW OF SYSTEMS:  CONSTITUTIONAL: No weakness  EYES/ENT: No visual changes;  No throat pain   NECK: No pain or stiffness  RESPIRATORY: No cough, wheezing; No shortness of breath  CARDIOVASCULAR: No chest pain or palpitations  GASTROINTESTINAL: No abdominal  pain. No nausea, vomiting, or hematemesis  GENITOURINARY: No dysuria, frequency or hematuria  NEUROLOGICAL: No stroke like symptoms  SKIN: No rashes    	  MEDICATIONS:  amLODIPine   Tablet 5 milliGRAM(s) Oral daily        PHYSICAL EXAM:  T(C): 36.9 (04-17-22 @ 06:08), Max: 36.9 (04-16-22 @ 21:49)  HR: 72 (04-17-22 @ 06:08) (71 - 78)  BP: 135/65 (04-17-22 @ 06:08) (112/70 - 135/65)  RR: 18 (04-17-22 @ 06:08) (17 - 18)  SpO2: 98% (04-17-22 @ 06:08) (96% - 99%)  Wt(kg): --  I&O's Summary    16 Apr 2022 07:01  -  17 Apr 2022 07:00  --------------------------------------------------------  IN: 660 mL / OUT: 375 mL / NET: 285 mL    17 Apr 2022 07:01  -  17 Apr 2022 13:21  --------------------------------------------------------  IN: 240 mL / OUT: 0 mL / NET: 240 mL          Appearance: In no distress	  HEENT:    PERRL, EOMI	  Cardiovascular:  S1 S2, No JVD  Respiratory: Lungs clear to auscultation	  Gastrointestinal:  Soft, Non-tender, + BS	  Vascularature:  No edema of LE  Psychiatric: Appropriate affect   Neuro: no acute focal deficits                     Labs personally reviewed      ASSESSMENT/PLAN: 	    ASSESSMENT/PLAN: 	     Ms. Angelika James is an 82 F with PMH of  significant for HTN, multiple myeloma, RA on ASA81, remote h/o CVA no residual , p/w AMS to [facility] , found to have SAH t/f to [facility].    Problem/Plan -1  Problem: Cardiac Risk Stratification   Plan:  - Denies chest pain or shortness of breath  - Trop 65-64 probably i/s/o SAH  - EKG with no ischemic changes noted  - States she is able to walk with assistance several feet but exercise tolerance is limited by RA and chronic back pain  - TTE as noted above reveals normal LV systolic function and minimal MR  - Not in decompensated HF  - No evidence of tachy/deidra syndrome  -Patient with moderate risk for moderate risk neurosurgery intervention, no cardiac contraindication to proceed   -As per ortho, no planned intervention at this time    Problem/Plan -2  Problem: HTN  Plan:  -c/w amlodipine  -BP currently stable, cont to trend    Problem/Plan -3  Problem: HLD  Plan:  - LDL 42  - c/w atorvastatin 20mg PO daily    Problem/Plan -4  Problem: DM II  Plan:  - HgbA1C 114  - endocrine c/s appreciated  - ISS         Sneha Miller, AG-NP   Blaine Wolfe DO [facility]  Cardiovascular Medicine  [address] [place] [address], [address]  Office: [phone]  Cell: [phone]

## 2022-04-17 NOTE — PROGRESS NOTE ADULT - ASSESSMENT
82 F w/pmh  significant for HTN, multiple myeloma, RA on ASA81, remote h/o CVA no residual , p/w AMS to [facility] , found to have SAH t/f to [facility]. Also with t-spine fracture on MRI, pursuing conservative management with TLSO and plan to re-assess outpt.

## 2022-04-17 NOTE — PROGRESS NOTE ADULT - ASSESSMENT
82 y.o. LHW with HTN, rheumatoid arthritis, multiple myeloma, who presented to OSH with c/o slurred speech and confusion, found to have a small left occipital SAH, transferred to [facility] for further management.   EVELIN 14:30 on 4/12/22. While in the ED a stroke code was activated at 03:56 for worsening dysarthria and right sided weakness. Per providers at bedside, symptoms have been inconsistent and somewhat fluctuating in quality since presentation, and the patient may have had seizure-like activity just prior to activation of the stroke code, for which received Keppra.  Initial VSS and wnl. On exam patient noted to be at baseline mental status, mildly dysarthric, but fluent and moving all extremities against gravity, with RUE drift noted. Follows all commands, however intermittently required repeated prompting to properly elicit actions on the right, which when done, was to the same degree as the left. Initial labs significant for serum glucose 530, BUN 48, Cr 2.31, sodium 132, , Hb 9.5.   CTH w/o: significant for small left occipital SAH noted to be stable on repeat imaging.   CTA H/N w/ does not demonstrate flow limiting stenosis or occlusion.   EEG neg \  TTE as abov e   MRI with no evidence of L SAH, R frontal meningioma. no acute findings  MRA/Nova: unremarkable   NIHSS: 3  MRS: 3  CT T/L spine with recent T10 injury/fracture; also L5 fracture, chronic compressions T9, L2, L3   MRI T?L spine multiple T and L vertebral body fractures. unstable fracutre no displacement no retropulsion   Impression:  stroke mimic in the setting hyperglycemia vs seizure vs less likely acute stroke. Possible annita's paresis following reported witnessed seizure. Rule out other underlying toxic-metabolic or infectious etiologies. unclear etiology of SAH, not seen on MR     Recommendations:  - neurosx called. TLSO brace.     - any role for kyphoplasty?   - Stat CTH prn for worsening mental status or neuro exam  - needs better glucose control   - Hold ASA and lovenox, use mechanical DVT prophylaxis for now  -  Keppra 500mg Q12H  - Strict BP control goal <160/90   - Telemetry Monitoring, Neuro checks/vitals per unit protocol  - ISS and POCT glucose monitoring  - BG goal <180, avoid hypoglycemia  - PT/OT/SLP evaluation  - Fall, aspiration, seizure precautions  - Thank you for allowing me to participate in the care of this patient. Call with questions.   Clem Roberto MD  Vascular Neurology .

## 2022-04-17 NOTE — PROGRESS NOTE ADULT - SUBJECTIVE AND OBJECTIVE BOX
[facility] Division of Hospital Medicine  Rod Woods  Contact on MS TEAMS    Patient is a 82y old  Female who presents with a chief complaint of AMS x1 day (17 Apr 2022 13:20)      SUBJECTIVE / OVERNIGHT EVENTS: no events overnight, endorses mid lower back pain improving with pain meds. No nausea, vomiting, diarrhea, abd pain .      MEDICATIONS  (STANDING):  acetaminophen     Tablet .. 975 milliGRAM(s) Oral every 8 hours  amLODIPine   Tablet 5 milliGRAM(s) Oral daily  atorvastatin 20 milliGRAM(s) Oral at bedtime  cholecalciferol 1000 Unit(s) Oral daily  dextrose 5%. 1000 milliLiter(s) (50 mL/Hr) IV Continuous <Continuous>  dextrose 5%. 1000 milliLiter(s) (100 mL/Hr) IV Continuous <Continuous>  dextrose 50% Injectable 25 Gram(s) IV Push once  dextrose 50% Injectable 12.5 Gram(s) IV Push once  folic acid 1 milliGRAM(s) Oral daily  gabapentin 200 milliGRAM(s) Oral at bedtime  gabapentin 100 milliGRAM(s) Oral daily  glucagon  Injectable 1 milliGRAM(s) IntraMuscular once  hydroxychloroquine 200 milliGRAM(s) Oral daily  insulin glargine Injectable (LANTUS) 5 Unit(s) SubCutaneous at bedtime  insulin lispro (ADMELOG) corrective regimen sliding scale   SubCutaneous three times a day before meals  insulin lispro (ADMELOG) corrective regimen sliding scale   SubCutaneous at bedtime  insulin lispro Injectable (ADMELOG) 3 Unit(s) SubCutaneous three times a day before meals  levETIRAcetam  IVPB 500 milliGRAM(s) IV Intermittent every 12 hours  lidocaine   4% Patch 1 Patch Transdermal daily  nystatin Cream 1 Application(s) Topical two times a day  nystatin Powder 1 Application(s) Topical two times a day  pantoprazole    Tablet 40 milliGRAM(s) Oral before breakfast    MEDICATIONS  (PRN):  dextrose Oral Gel 15 Gram(s) Oral once PRN Blood Glucose LESS THAN 70 milliGRAM(s)/deciliter  melatonin 3 milliGRAM(s) Oral at bedtime PRN Insomnia  oxyCODONE    IR 2.5 milliGRAM(s) Oral every 4 hours PRN Severe Pain (7 - 10)  polyethylene glycol 3350 17 Gram(s) Oral daily PRN Constipation  senna 2 Tablet(s) Oral at bedtime PRN Constipation      CAPILLARY BLOOD GLUCOSE      POCT Blood Glucose.: 81 mg/dL (17 Apr 2022 16:04)  POCT Blood Glucose.: 134 mg/dL (17 Apr 2022 12:00)  POCT Blood Glucose.: 117 mg/dL (17 Apr 2022 07:40)  POCT Blood Glucose.: 185 mg/dL (16 Apr 2022 21:29)    I&O's Summary    16 Apr 2022 07:01  -  17 Apr 2022 07:00  --------------------------------------------------------  IN: 660 mL / OUT: 375 mL / NET: 285 mL    17 Apr 2022 07:01  -  17 Apr 2022 17:56  --------------------------------------------------------  IN: 600 mL / OUT: 500 mL / NET: 100 mL        PHYSICAL EXAM:  Vital Signs Last 24 Hrs  T(C): 36.9 (17 Apr 2022 16:55), Max: 36.9 (16 Apr 2022 21:49)  T(F): 98.5 (17 Apr 2022 16:55), Max: 98.5 (17 Apr 2022 16:55)  HR: 75 (17 Apr 2022 16:55) (71 - 75)  BP: 129/77 (17 Apr 2022 16:55) (113/75 - 146/80)  BP(mean): --  RR: 18 (17 Apr 2022 16:55) (17 - 18)  SpO2: 98% (17 Apr 2022 16:55) (96% - 98%)  GENERAL: No acute distress, well-developed, alert  ENT: EOMI, PERRLA, conjunctiva and sclera clear,  moist mucosa no pharyngeal erythema or exudates   NECK: supple , no JVD   CHEST/LUNG: Clear to auscultation bilaterally; No wheeze, equal breath sounds bilaterally   BACK: No back tenderness;  No CVA tenderness   HEART: Regular rate and rhythm; No murmurs, rubs, or gallops  ABDOMEN: Soft, Nontender, Nondistended; Bowel sounds present  EXTREMITIES:  No clubbing, cyanosis, +back tenderness  MSK: No joint swelling or effusions, ROM intact   PSYCH: Normal behavior/affect  NEUROLOGY:  AAOx3, non-focal, cranial nerves intact, no aphasia  SKIN: Normal color, No rashes or lesions    LABS:                      RADIOLOGY & ADDITIONAL TESTS:  Results Reviewed:   Imaging Personally Reviewed:  Electrocardiogram Personally Reviewed:    COORDINATION OF CARE:  Care Discussed with Consultants/Other Providers [Y/N]:  Prior or Outpatient Records Reviewed [Y/N]:

## 2022-04-17 NOTE — PROGRESS NOTE ADULT - PROBLEM SELECTOR PLAN 8
dispo to [facility]    pt will need geriatric referral after rehab, lives alone and family has been unable to increase home care hours Yes

## 2022-04-18 LAB
ANION GAP SERPL CALC-SCNC: 12 MMOL/L — SIGNIFICANT CHANGE UP (ref 5–17)
BUN SERPL-MCNC: 16 MG/DL — SIGNIFICANT CHANGE UP (ref 7–23)
CALCIUM SERPL-MCNC: 9.1 MG/DL — SIGNIFICANT CHANGE UP (ref 8.4–10.5)
CHLORIDE SERPL-SCNC: 110 MMOL/L — HIGH (ref 96–108)
CO2 SERPL-SCNC: 19 MMOL/L — LOW (ref 22–31)
CREAT SERPL-MCNC: 1.31 MG/DL — HIGH (ref 0.5–1.3)
EGFR: 41 ML/MIN/1.73M2 — LOW
GLUCOSE BLDC GLUCOMTR-MCNC: 124 MG/DL — HIGH (ref 70–99)
GLUCOSE BLDC GLUCOMTR-MCNC: 126 MG/DL — HIGH (ref 70–99)
GLUCOSE BLDC GLUCOMTR-MCNC: 178 MG/DL — HIGH (ref 70–99)
GLUCOSE BLDC GLUCOMTR-MCNC: 97 MG/DL — SIGNIFICANT CHANGE UP (ref 70–99)
GLUCOSE SERPL-MCNC: 97 MG/DL — SIGNIFICANT CHANGE UP (ref 70–99)
HCT VFR BLD CALC: 30.8 % — LOW (ref 34.5–45)
HGB BLD-MCNC: 9.6 G/DL — LOW (ref 11.5–15.5)
MCHC RBC-ENTMCNC: 27.4 PG — SIGNIFICANT CHANGE UP (ref 27–34)
MCHC RBC-ENTMCNC: 31.2 GM/DL — LOW (ref 32–36)
MCV RBC AUTO: 88 FL — SIGNIFICANT CHANGE UP (ref 80–100)
NRBC # BLD: 0 /100 WBCS — SIGNIFICANT CHANGE UP (ref 0–0)
PLATELET # BLD AUTO: 199 K/UL — SIGNIFICANT CHANGE UP (ref 150–400)
POTASSIUM SERPL-MCNC: 4.6 MMOL/L — SIGNIFICANT CHANGE UP (ref 3.5–5.3)
POTASSIUM SERPL-SCNC: 4.6 MMOL/L — SIGNIFICANT CHANGE UP (ref 3.5–5.3)
RBC # BLD: 3.5 M/UL — LOW (ref 3.8–5.2)
RBC # FLD: 17.6 % — HIGH (ref 10.3–14.5)
SARS-COV-2 RNA SPEC QL NAA+PROBE: SIGNIFICANT CHANGE UP
SODIUM SERPL-SCNC: 141 MMOL/L — SIGNIFICANT CHANGE UP (ref 135–145)
WBC # BLD: 3.86 K/UL — SIGNIFICANT CHANGE UP (ref 3.8–10.5)
WBC # FLD AUTO: 3.86 K/UL — SIGNIFICANT CHANGE UP (ref 3.8–10.5)

## 2022-04-18 PROCEDURE — 99232 SBSQ HOSP IP/OBS MODERATE 35: CPT

## 2022-04-18 RX ORDER — INSULIN LISPRO 100/ML
3 VIAL (ML) SUBCUTANEOUS
Refills: 0 | Status: DISCONTINUED | OUTPATIENT
Start: 2022-04-19 | End: 2022-04-19

## 2022-04-18 RX ORDER — INSULIN LISPRO 100/ML
3 VIAL (ML) SUBCUTANEOUS
Refills: 0 | Status: DISCONTINUED | OUTPATIENT
Start: 2022-04-18 | End: 2022-04-19

## 2022-04-18 RX ORDER — INSULIN LISPRO 100/ML
2 VIAL (ML) SUBCUTANEOUS
Refills: 0 | Status: DISCONTINUED | OUTPATIENT
Start: 2022-04-18 | End: 2022-04-19

## 2022-04-18 RX ADMIN — Medication 1000 UNIT(S): at 11:59

## 2022-04-18 RX ADMIN — LIDOCAINE 1 PATCH: 4 CREAM TOPICAL at 00:00

## 2022-04-18 RX ADMIN — Medication 3 UNIT(S): at 17:00

## 2022-04-18 RX ADMIN — NYSTATIN CREAM 1 APPLICATION(S): 100000 CREAM TOPICAL at 05:13

## 2022-04-18 RX ADMIN — PANTOPRAZOLE SODIUM 40 MILLIGRAM(S): 20 TABLET, DELAYED RELEASE ORAL at 06:37

## 2022-04-18 RX ADMIN — Medication 975 MILLIGRAM(S): at 05:40

## 2022-04-18 RX ADMIN — ATORVASTATIN CALCIUM 20 MILLIGRAM(S): 80 TABLET, FILM COATED ORAL at 21:29

## 2022-04-18 RX ADMIN — GABAPENTIN 100 MILLIGRAM(S): 400 CAPSULE ORAL at 12:01

## 2022-04-18 RX ADMIN — Medication 975 MILLIGRAM(S): at 11:56

## 2022-04-18 RX ADMIN — Medication 200 MILLIGRAM(S): at 11:58

## 2022-04-18 RX ADMIN — AMLODIPINE BESYLATE 5 MILLIGRAM(S): 2.5 TABLET ORAL at 05:13

## 2022-04-18 RX ADMIN — Medication 1 MILLIGRAM(S): at 11:58

## 2022-04-18 RX ADMIN — LEVETIRACETAM 400 MILLIGRAM(S): 250 TABLET, FILM COATED ORAL at 05:14

## 2022-04-18 RX ADMIN — Medication 2 UNIT(S): at 11:55

## 2022-04-18 RX ADMIN — LEVETIRACETAM 400 MILLIGRAM(S): 250 TABLET, FILM COATED ORAL at 17:00

## 2022-04-18 RX ADMIN — Medication 3 UNIT(S): at 08:18

## 2022-04-18 RX ADMIN — NYSTATIN CREAM 1 APPLICATION(S): 100000 CREAM TOPICAL at 17:00

## 2022-04-18 RX ADMIN — GABAPENTIN 200 MILLIGRAM(S): 400 CAPSULE ORAL at 21:29

## 2022-04-18 RX ADMIN — LIDOCAINE 1 PATCH: 4 CREAM TOPICAL at 12:01

## 2022-04-18 RX ADMIN — LIDOCAINE 1 PATCH: 4 CREAM TOPICAL at 19:30

## 2022-04-18 RX ADMIN — Medication 975 MILLIGRAM(S): at 21:27

## 2022-04-18 RX ADMIN — Medication 975 MILLIGRAM(S): at 05:04

## 2022-04-18 RX ADMIN — Medication 975 MILLIGRAM(S): at 20:52

## 2022-04-18 RX ADMIN — INSULIN GLARGINE 5 UNIT(S): 100 INJECTION, SOLUTION SUBCUTANEOUS at 21:29

## 2022-04-18 NOTE — PROGRESS NOTE ADULT - SUBJECTIVE AND OBJECTIVE BOX
Diabetes Follow up note:    Chief complaint: T2DM    Interval Hx: BG values 80s-mid 100s on current insulin regimen. Pt seen at bedside. Reports feeling well. Tolerating POs w/good appetite. Was checking her glucose twice a week prior to admission but was not taking any meds for her DM. As outpt receives decadron twice weekly for her MM txment.     Review of Systems:  General: denies pain  GI: Tolerating POs. Denies N/V/D/Abd pain  CV: Denies CP/SOB  ENDO: No S&Sx of hypoglycemia    MEDS:  atorvastatin 20 milliGRAM(s) Oral at bedtime  insulin glargine Injectable (LANTUS) 5 Unit(s) SubCutaneous at bedtime  insulin lispro (ADMELOG) corrective regimen sliding scale   SubCutaneous three times a day before meals  insulin lispro (ADMELOG) corrective regimen sliding scale   SubCutaneous at bedtime  insulin lispro Injectable (ADMELOG) 2 Unit(s) SubCutaneous before lunch  insulin lispro Injectable (ADMELOG) 3 Unit(s) SubCutaneous before dinner    hydroxychloroquine 200 milliGRAM(s) Oral daily    Allergies    No Known Allergies        PE:  General: female lying in bed. NAD.   Vital Signs Last 24 Hrs  T(C): 37.1 (18 Apr 2022 10:31), Max: 37.1 (18 Apr 2022 10:31)  T(F): 98.8 (18 Apr 2022 10:31), Max: 98.8 (18 Apr 2022 10:31)  HR: 74 (18 Apr 2022 10:31) (67 - 92)  BP: 132/68 (18 Apr 2022 10:31) (129/77 - 146/80)  BP(mean): --  RR: 18 (18 Apr 2022 10:31) (17 - 18)  SpO2: 97% (18 Apr 2022 10:31) (96% - 98%)  Abd: Soft, NT,ND,   Extremities: Warm  Neuro: A&O X3    LABS:  POCT Blood Glucose.: 97 mg/dL (04-18-22 @ 11:24)  POCT Blood Glucose.: 126 mg/dL (04-18-22 @ 08:17)  POCT Blood Glucose.: 159 mg/dL (04-17-22 @ 21:06)  POCT Blood Glucose.: 81 mg/dL (04-17-22 @ 16:04)  POCT Blood Glucose.: 134 mg/dL (04-17-22 @ 12:00)  POCT Blood Glucose.: 117 mg/dL (04-17-22 @ 07:40)  POCT Blood Glucose.: 185 mg/dL (04-16-22 @ 21:29)  POCT Blood Glucose.: 90 mg/dL (04-16-22 @ 16:24)  POCT Blood Glucose.: 161 mg/dL (04-16-22 @ 11:49)  POCT Blood Glucose.: 132 mg/dL (04-16-22 @ 07:37)  POCT Blood Glucose.: 307 mg/dL (04-15-22 @ 21:02)  POCT Blood Glucose.: 157 mg/dL (04-15-22 @ 16:16)                            9.6    3.86  )-----------( 199      ( 18 Apr 2022 07:06 )             30.8       04-18    141  |  110<H>  |  16  ----------------------------<  97  4.6   |  19<L>  |  1.31<H>    Ca    9.1      18 Apr 2022 07:03        Thyroid Function Tests:  04-13 @ 12:27 TSH 0.54 FreeT4 -- T3 -- Anti TPO -- Anti Thyroglobulin Ab -- TSI --      A1C with Estimated Average Glucose Result: 11.4 % (04-14-22 @ 09:21)  A1C with Estimated Average Glucose Result: 11.5 % (04-13-22 @ 12:19)          Contact number: masha [phone] or [phone]

## 2022-04-18 NOTE — PROGRESS NOTE ADULT - ASSESSMENT
82 F w/pmh  significant for HTN, multiple myeloma, RA on ASA81, remote h/o CVA no residual , p/w AMS to [facility] , found to have SAH t/f to [facility]. Also with t-spine fracture on MRI, pursuing conservative management with TLSO and plan to re-assess outpt.

## 2022-04-18 NOTE — PROGRESS NOTE ADULT - PROBLEM SELECTOR PLAN 3
c/w statin    Can be reached via TEAMS/pager: [phone]   office:  [phone] (M-F 9a-5pm)               [phone] (nights/weekends)   [email or web address] password [email or web address]

## 2022-04-18 NOTE — PROGRESS NOTE ADULT - PROBLEM SELECTOR PROBLEM 8
Discharge planning issues Principal Discharge DX:	Transaminitis  Secondary Diagnosis:	Hyperbilirubinemia

## 2022-04-18 NOTE — PROGRESS NOTE ADULT - PROBLEM SELECTOR PLAN 7
- continue amlodipine   - continue statin  - improved - continue amlodipine   - continue statin  - improved    >Last BM reported 4/17. Continue miralax, senna

## 2022-04-18 NOTE — PROGRESS NOTE ADULT - PROBLEM SELECTOR PLAN 8
dispo to [facility]    patient will need geriatric referral after rehab, lives alone and family has been unable to increase home care hours

## 2022-04-18 NOTE — PROGRESS NOTE ADULT - SUBJECTIVE AND OBJECTIVE BOX
Date of Service   04-18-22 @ 14:37    Patient is a 82y old  Female who presents with a chief complaint of AMS x1 day (18 Apr 2022 13:13)      INTERVAL HISTORY: pt feels ok   TELEMETRY Personally reviewed: SR 60-70's PAC's     REVIEW OF SYSTEMS:   CONSTITUTIONAL: No weakness  EYES/ENT: No visual changes; No throat pain  Neck: No pain or stiffness  Respiratory: No cough, wheezing, No shortness of breath  CARDIOVASCULAR: no chest pain or palpitations  GASTROINTESTINAL: No abdominal pain, no nausea, vomiting or hematemesis  GENITOURINARY: No dysuria, frequency or hematuria  NEUROLOGICAL: No stroke like symptoms  SKIN: No rashes    	  MEDICATIONS:  amLODIPine   Tablet 5 milliGRAM(s) Oral daily        PHYSICAL EXAM:  T(C): 36.7 (04-18-22 @ 14:26), Max: 37.1 (04-18-22 @ 10:31)  HR: 71 (04-18-22 @ 14:26) (67 - 92)  BP: 115/66 (04-18-22 @ 14:26) (115/66 - 137/63)  RR: 18 (04-18-22 @ 14:26) (18 - 18)  SpO2: 98% (04-18-22 @ 14:26) (96% - 98%)  Wt(kg): --  I&O's Summary    17 Apr 2022 07:01  -  18 Apr 2022 07:00  --------------------------------------------------------  IN: 600 mL / OUT: 1100 mL / NET: -500 mL          Appearance: In no distress	  HEENT:    PERRL, EOMI	  Cardiovascular:  S1 S2, No JVD  Respiratory: Lungs clear to auscultation	  Gastrointestinal:  Soft, Non-tender, + BS	  Vascularature:  No edema of LE  Psychiatric: Appropriate affect   Neuro: no acute focal deficits                               9.6    3.86  )-----------( 199      ( 18 Apr 2022 07:06 )             30.8     04-18    141  |  110<H>  |  16  ----------------------------<  97  4.6   |  19<L>  |  1.31<H>    Ca    9.1      18 Apr 2022 07:03          Labs personally reviewed      ASSESSMENT/PLAN: 	   Ms. Angelika James is an 82 F with PMH of  significant for HTN, multiple myeloma, RA on ASA81, remote h/o CVA no residual , p/w AMS to [facility] , found to have SAH t/f to [facility].    Problem/Plan -1  Problem: Cardiac Risk Stratification   Plan:  - Denies chest pain or shortness of breath  - Trop 65-64 probably i/s/o SAH  - EKG with no ischemic changes noted  - States she is able to walk with assistance several feet but exercise tolerance is limited by RA and chronic back pain  - TTE as noted above reveals normal LV systolic function and minimal MR  - Not in decompensated HF  - No evidence of tachy/deidra syndrome  - Patient with moderate risk for moderate risk neurosurgery intervention, no cardiac contraindication to proceed   - As per ortho, no planned intervention at this time    Problem/Plan -2  Problem: HTN  Plan:  - c/w amlodipine  - BP currently stable, cont to trend  - Goal /90 per neuro     Problem/Plan -3  Problem: HLD  Plan:  - LDL 42  - c/w atorvastatin 20mg PO daily    Problem/Plan -4  Problem: DM II  Plan:  - HgbA1C 114  - endocrine c/s appreciated  - GIRMA WOLFF-BC   Blaine Wolfe DO [facility]  Cardiovascular Medicine  [address], [place], [address]  Office: [phone]

## 2022-04-18 NOTE — PROGRESS NOTE ADULT - SUBJECTIVE AND OBJECTIVE BOX
Patient seen and examined  no complains    No Known Allergies    Hospital Medications:   MEDICATIONS  (STANDING):  acetaminophen     Tablet .. 975 milliGRAM(s) Oral every 8 hours  amLODIPine   Tablet 5 milliGRAM(s) Oral daily  atorvastatin 20 milliGRAM(s) Oral at bedtime  cholecalciferol 1000 Unit(s) Oral daily  dextrose 5%. 1000 milliLiter(s) (50 mL/Hr) IV Continuous <Continuous>  dextrose 5%. 1000 milliLiter(s) (100 mL/Hr) IV Continuous <Continuous>  dextrose 50% Injectable 25 Gram(s) IV Push once  dextrose 50% Injectable 12.5 Gram(s) IV Push once  folic acid 1 milliGRAM(s) Oral daily  gabapentin 200 milliGRAM(s) Oral at bedtime  gabapentin 100 milliGRAM(s) Oral daily  glucagon  Injectable 1 milliGRAM(s) IntraMuscular once  hydroxychloroquine 200 milliGRAM(s) Oral daily  insulin glargine Injectable (LANTUS) 5 Unit(s) SubCutaneous at bedtime  insulin lispro (ADMELOG) corrective regimen sliding scale   SubCutaneous three times a day before meals  insulin lispro (ADMELOG) corrective regimen sliding scale   SubCutaneous at bedtime  insulin lispro Injectable (ADMELOG) 2 Unit(s) SubCutaneous before lunch  insulin lispro Injectable (ADMELOG) 3 Unit(s) SubCutaneous before dinner  levETIRAcetam  IVPB 500 milliGRAM(s) IV Intermittent every 12 hours  lidocaine   4% Patch 1 Patch Transdermal daily  nystatin Powder 1 Application(s) Topical two times a day  pantoprazole    Tablet 40 milliGRAM(s) Oral before breakfast        VITALS:  T(F): 98.8 (22 @ 10:31), Max: 98.8 (22 @ 10:31)  HR: 74 (22 @ 10:31)  BP: 132/68 (22 @ 10:31)  RR: 18 (22 @ 10:31)  SpO2: 97% (22 @ 10:31)  Wt(kg): --     @ 07:01  -   @ 07:00  --------------------------------------------------------  IN: 600 mL / OUT: 1100 mL / NET: -500 mL        PHYSICAL EXAM:  Constitutional: NAD  HEENT: anicteric sclera, oropharynx clear.  Neck: No JVD  Respiratory: CTAB, no wheezes, rales or rhonchi  Cardiovascular: S1, S2, RRR  Gastrointestinal: BS+, soft, NT/ND  Extremities:  No peripheral edema  Neurological: A/O x 3, no focal deficits  : No CVA tenderness. No neves.   LABS:      141  |  110<H>  |  16  ----------------------------<  97  4.6   |  19<L>  |  1.31<H>    Ca    9.1      2022 07:03      Creatinine Trend: 1.31 <--, 1.26 <--, 1.34 <--, 1.46 <--, 1.76 <--, 2.31 <--                        9.6    3.86  )-----------( 199      ( 2022 07:06 )             30.8     Urine Studies:  Urinalysis Basic - ( 2022 04:40 )    Color: Colorless / Appearance: Clear / S.020 / pH:   Gluc:  / Ketone: Negative  / Bili: Negative / Urobili: Negative   Blood:  / Protein: Trace / Nitrite: Negative   Leuk Esterase: Negative / RBC: 2 /hpf / WBC 0 /HPF   Sq Epi:  / Non Sq Epi: 0 /hpf / Bacteria: Negative        RADIOLOGY & ADDITIONAL STUDIES:

## 2022-04-18 NOTE — PROGRESS NOTE ADULT - ASSESSMENT
82 y.o. LHW with HTN, rheumatoid arthritis, multiple myeloma, who presented to OSH with c/o slurred speech and confusion, found to have a small left occipital SAH, transferred to [facility] for further management.   EVELIN 14:30 on 4/12/22. While in the ED a stroke code was activated at 03:56 for worsening dysarthria and right sided weakness. Per providers at bedside, symptoms have been inconsistent and somewhat fluctuating in quality since presentation, and the patient may have had seizure-like activity just prior to activation of the stroke code, for which received Keppra.  Initial VSS and wnl. On exam patient noted to be at baseline mental status, mildly dysarthric, but fluent and moving all extremities against gravity, with RUE drift noted. Follows all commands, however intermittently required repeated prompting to properly elicit actions on the right, which when done, was to the same degree as the left. Initial labs significant for serum glucose 530, BUN 48, Cr 2.31, sodium 132, , Hb 9.5.   CTH w/o: significant for small left occipital SAH noted to be stable on repeat imaging.   CTA H/N w/ does not demonstrate flow limiting stenosis or occlusion.   EEG neg \  TTE as abov e   MRI with no evidence of L SAH, R frontal meningioma. no acute findings  MRA/Nova: unremarkable   NIHSS: 3  MRS: 3  CT T/L spine with recent T10 injury/fracture; also L5 fracture, chronic compressions T9, L2, L3   MRI T/L spine multiple T and L vertebral body fractures. unstable fracutre no displacement no retropulsion   Impression:  stroke mimic in the setting hyperglycemia vs seizure vs less likely acute stroke. Possible annita's paresis following reported witnessed seizure. Rule out other underlying toxic-metabolic or infectious etiologies. unclear etiology of SAH, not seen on MR     Recommendations:  - neurosx called. TLSO brace.  f/u outpatient for possible future intervetnion   - any role for kyphoplasty?   - Stat CTH prn for worsening mental status or neuro exam  - needs better glucose control   - Hold ASA and lovenox, use mechanical DVT prophylaxis for now  -  Keppra 500mg Q12H  - Strict BP control goal <160/90   - Telemetry Monitoring, Neuro checks/vitals per unit protocol  - ISS and POCT glucose monitoring  - BG goal <180, avoid hypoglycemia  - PT/OT/SLP evaluation  - Fall, aspiration, seizure precautions  - Thank you for allowing me to participate in the care of this patient. Call with questions.   - d/c plan DEV Roberto MD  Vascular Neurology .

## 2022-04-18 NOTE — PROGRESS NOTE ADULT - PROBLEM SELECTOR PLAN 4
suspect at least in part secondary to Dex , A1c is >11  - s/p IV fluid bolus   - basal/bolus insulin w coverage sliding scale  - endo appreciated, f/u mgmt given MM tx involving high dose steroid  - discharge DM management plan per endo team

## 2022-04-18 NOTE — PROGRESS NOTE ADULT - ASSESSMENT
82 y.o. LHW with a PMH of HTN, rheumatoid arthritis, multiple myeloma, who presented to OSH with c/o slurred speech and confusion, found to have a small left occipital SAH with DUC on CKD stage 3    1) Duc on CKD stage 3  ddx includes pre renal vs atn  Cr stable  b/l cr is 1.3 to 1.4mg/dl  no labs to review today  trend bun/cr  avoid nephrotoxins  will monitor    2) HTN- bp stable  monitor bp      Dr Gandara  [phone]

## 2022-04-18 NOTE — PROGRESS NOTE ADULT - PROBLEM SELECTOR PLAN 1
-test BG AC/HS  -c/w Lantus 5 units QHS  -Adjust Admelog 3-2-3 w/meals  -c/w Admelog low correction scale AC and Low HS scale  -cons carb diet.   Outpt. endo follow-up  Outpt. optho, podiatry, nephrology  DC plan:  Recommend start Metformin 500mg BID.   Would add prandin 1mg TID w/meals on days receiving txment for MM w/decadron.

## 2022-04-18 NOTE — PROGRESS NOTE ADULT - SUBJECTIVE AND OBJECTIVE BOX
Neurology Progress Note    S: Patient seen and examined. No new events overnight. patient denied CP, SOB, HA or pain.      Medication:  MEDICATIONS  (STANDING):  acetaminophen     Tablet .. 975 milliGRAM(s) Oral every 8 hours  amLODIPine   Tablet 5 milliGRAM(s) Oral daily  atorvastatin 20 milliGRAM(s) Oral at bedtime  cholecalciferol 1000 Unit(s) Oral daily  dextrose 5%. 1000 milliLiter(s) (50 mL/Hr) IV Continuous <Continuous>  dextrose 5%. 1000 milliLiter(s) (100 mL/Hr) IV Continuous <Continuous>  dextrose 50% Injectable 25 Gram(s) IV Push once  dextrose 50% Injectable 12.5 Gram(s) IV Push once  folic acid 1 milliGRAM(s) Oral daily  gabapentin 200 milliGRAM(s) Oral at bedtime  gabapentin 100 milliGRAM(s) Oral daily  glucagon  Injectable 1 milliGRAM(s) IntraMuscular once  hydroxychloroquine 200 milliGRAM(s) Oral daily  insulin glargine Injectable (LANTUS) 5 Unit(s) SubCutaneous at bedtime  insulin lispro (ADMELOG) corrective regimen sliding scale   SubCutaneous three times a day before meals  insulin lispro (ADMELOG) corrective regimen sliding scale   SubCutaneous at bedtime  insulin lispro Injectable (ADMELOG) 3 Unit(s) SubCutaneous three times a day before meals  levETIRAcetam  IVPB 500 milliGRAM(s) IV Intermittent every 12 hours  lidocaine   4% Patch 1 Patch Transdermal daily  nystatin Powder 1 Application(s) Topical two times a day  pantoprazole    Tablet 40 milliGRAM(s) Oral before breakfast    MEDICATIONS  (PRN):  dextrose Oral Gel 15 Gram(s) Oral once PRN Blood Glucose LESS THAN 70 milliGRAM(s)/deciliter  melatonin 3 milliGRAM(s) Oral at bedtime PRN Insomnia  oxyCODONE    IR 2.5 milliGRAM(s) Oral every 4 hours PRN Severe Pain (7 - 10)  polyethylene glycol 3350 17 Gram(s) Oral daily PRN Constipation  senna 2 Tablet(s) Oral at bedtime PRN Constipation        Vitals:    Vital Signs Last 24 Hrs  T(C): 36.8 (2022 05:22), Max: 36.9 (2022 13:38)  T(F): 98.2 (2022 05:22), Max: 98.5 (2022 16:55)  HR: 67 (2022 05:22) (67 - 92)  BP: 137/63 (2022 05:22) (129/77 - 146/80)  BP(mean): --  RR: 18 (2022 05:22) (17 - 18)  SpO2: 97% (2022 05:22) (96% - 98%)    General Exam:   General Appearance: Appropriately dressed and in no acute distress       Head: Normocephalic, atraumatic and no dysmorphic features  Ear, Nose, and Throat: Moist mucous membranes  CVS: S1S2+  Resp: No SOB, no wheeze or rhonchi  Abd: soft NTND  Extremities: No edema, no cyanosis  Skin: No bruises, no rashes     Neurological Exam:  MS: Eyes open, alert, oriented to person, place and situation, not time ("march"). Reports she knows the current president was the  for [employer] but can't recall his name. Normal affect. Follows all commands.  Language: Speech is mildly slurred, but fluent with good repetition & comprehension (able to name watch, phone, and pen).  CNs: PERRL (slightly sluggish, 3.5mm OU). BTT intact b/l. EOMI, no diplopia. V1-3 intact to LT. Well developed masseter muscles b/l. Facial movements normal and symmetric. Hearing grossly normal to conversation b/l. Symmetric palate elevation in midline. Gag reflex deferred. Tongue midline, normal movements.  Motor: Normal muscle bulk & tone. No noticeable tremor at rest.   RUE: Motor drift, grossly 4+/5  RLE: No drift, grossly 5/5  LUE/LLE: No drift, 5/5 throughout  Sensation: Intact to LT b/l throughout.  Cortical: Extinction on DSS (neglect): none.  Reflexes: clonus in lowers    Coordination: No dysmetria to FTN b/l.  Gait: Not assessed due to current medical condition/risk of fall.     I personally reviewed the below data/images/labs:  CBC Full  -  ( 2022 07:06 )  WBC Count : 3.86 K/uL  RBC Count : 3.50 M/uL  Hemoglobin : 9.6 g/dL  Hematocrit : 30.8 %  Platelet Count - Automated : 199 K/uL  Mean Cell Volume : 88.0 fl  Mean Cell Hemoglobin : 27.4 pg  Mean Cell Hemoglobin Concentration : 31.2 gm/dL  Auto Neutrophil # : x  Auto Lymphocyte # : x  Auto Monocyte # : x  Auto Eosinophil # : x  Auto Basophil # : x  Auto Neutrophil % : x  Auto Lymphocyte % : x  Auto Monocyte % : x  Auto Eosinophil % : x  Auto Basophil % : x      04-18    141  |  110<H>  |  16  ----------------------------<  97  4.6   |  19<L>  |  1.31<H>    Ca    9.1      2022 07:03        Urinalysis Basic - ( 2022 04:40 )    Color: Colorless / Appearance: Clear / S.020 / pH: x  Gluc: x / Ketone: Negative  / Bili: Negative / Urobili: Negative   Blood: x / Protein: Trace / Nitrite: Negative   Leuk Esterase: Negative / RBC: 2 /hpf / WBC 0 /HPF   Sq Epi: x / Non Sq Epi: 0 /hpf / Bacteria: Negative    < from: MR Head w/wo IV Cont (22 @ 15:04) >  IMPRESSION: Age-appropriate involutional and ischemic gliotic changes.   Left occipital subarachnoid hemorrhage is not appreciated on this   examination as it may be isointense signal intensity CSF. No acute   infarcts or abnormal enhancement. Incidental small right frontal   pterional meningioma. Small vessel white matter ischemic changes. Normal   noninvasive MRA angiography.    < end of copied text >      CT ANGIO NECK & BRAIN (W)AW IC; CT PERFUSION W MAPS IC    PROCEDURE DATE: 2022  INTERPRETATION: HISTORY: Stroke code.  COMPARISON: None  TECHNIQUE: CT angiogram of the neck and brain was performed after administration of intravenous contrast. MIP and 3D reconstructions were performed. Perfusion maps were also generated and submitted for evaluation.  Total of 90 cc Omnipaque 350 intravenous contrast were administered without complication.    CT PERFUSION  There are symmetric time parameters, including mean transit time and Tmax, in the hemispheres bilaterally. No focal decrease in cerebral blood volume or cerebral blood flow. No regional perfusion abnormality is evident.    CTA BRAIN  INTERNAL CAROTID ARTERIES: Atheromatous irregularity and mild stenoses along the carotid siphons bilaterally. The intracranial segments of the ICA are patent without hemodynamically significant stenosis, occlusion, or aneurysm. The ICA bifurcations are unremarkable.  ANTERIOR CEREBRAL ARTERIES: No flow-limiting stenosis or occlusion. Anterior communicating artery is unremarkable without aneurysm.  MIDDLE CEREBRAL ARTERIES: No flow-limiting stenosis or occlusion. MCA bifurcations are unremarkable without aneurysm.  POSTERIOR CEREBRAL ARTERIES: No flow-limiting stenosis or occlusion. Posterior communicating arteries are not well seen bilaterally.  VERTEBROBASILAR SYSTEM: No flow-limiting stenosis or occlusion. Basilar tip is unremarkable.   Hypervascular right inferior frontal convexity extra-axial lesion measuring approximately 1 cm likely reflects a meningioma.    CTA NECK  RIGHT CAROTID SYSTEM: Normal in course and caliber without flow-limiting stenosis or occlusion. Calcified plaque along the bulb and ICA origin.  LEFT CAROTID SYSTEM: Normal in course and caliber without flow-limiting stenosis or occlusion. Calcified plaque along the bulb and ICA origin.  VERTEBRAL SYSTEM: Normal in course and caliber without flow-limiting stenosis or occlusion. Origin of the vertebral arteries are unremarkable.  AORTIC ARCH: Calcified plaque along the aortic arch. Bilateral calcified hilar and mediastinal nodes from prior disease. Scattered parenchymal granulomas are noted in the upper lobes bilaterally. Biapical interstitial prominence with hazy groundglass airspace opacity due to air trapping and/or dependent changes. Small interstitial edema may be present.    IMPRESSION:  CT PERFUSION: No regional perfusion abnormality.    CTA BRAIN: No associated vascular malformation or aneurysm associated with the small left occipital subarachnoid bleed.  Patent intracranial circulation. No flow-limiting stenosis or occlusion.  Hypervascular right inferior frontal convexity extra-axial lesion measuring approximately 1 cm likely reflects a meningioma.    CTA NECK: Patent cervical vasculature. No flow limiting stenosis or occlusion.  Sequelae of prior pulmonary granulomatous disease.    --- End of Report ---    CT BRAIN STROKE PROTOCOL    PROCEDURE DATE: 2022  INTERPRETATION: HISTORY: Stroke code. Slurred speech.  COMPARISON: CT head 2012  TECHNIQUE: Axial noncontrast CT images from the skull base to the vertex were obtained and submitted for interpretation. Coronal and sagittal reformatted images were performed. Bone and soft tissue windows were evaluated.    FINDINGS:  Faint hyperattenuation in the left occipital sulci (5-31) suggests small acute subarachnoid bleed. No local mass effect or midline shift. Mild widening of the left greater than right occipital sulci suggest chronic infarction inferiorly. Gray-white differentiation is maintained.  Mild to moderate chronic microvascular ischemic changes and vascular calcifications along the carotid siphons are noted.  Ventricles are normal in size for the patient's age without hydrocephalus. Basal cisterns are patent.  Visualized paranasal sinuses and mastoid air cells are clear. Calvarium is intact.    IMPRESSION:  Suggestion of small left occipital subarachnoid bleed. No local mass effect or midline shift.      CT Head No Cont (22 @ 01:09)   IMPRESSION: No significant change when compared with prior study.  < from: CT Lumbar Spine No Cont (22 @ 17:49) >    ACC: [id number] EXAM:  CT LUMBAR SPINE                        ACC: [id number] EXAM:  CT THORACIC SPINE                          PROCEDURE DATE:  2022          INTERPRETATION:  INDICATIONS:  Back pain and fall    TECHNIQUE:  Serial axial images were obtained using multi slice helical   technique. Sagittal and coronal reformatted images were performed.    COMPARISON EXAMINATION: 2021 CT as well as plain film from 2021    FINDINGS:  VERTEBRAL BODIES AND DISCS:  Evidence of an acute fracture through the   T10 vertebral body with involvement of the posterior elements with   fractures involving the pedicles bilaterally and at least a 2 column   injury consistent with an unstable injury at this level. There is   evidence of a compression fracture at T12 that has a somewhat vertebral   plana appearance and this is identified on the patient's prior plain film   2021 and appears old. Compression deformity at T9 which is   suggested on the prior plain film as well. Compression deformity L2   suggested on the prior plain film as well. Fairly prominent compression   deformity L3 appears to be of evolving compared with the prior plain film   with more significant loss of height compared with the prior. Fracture   through L5 which appears somewhat recent in appearance can correlate with   MR as clinically warranted.  ALIGNMENT:  A kyphotic deformity at the T11-12 level.  SPINAL CANAL:  Multilevel canal stenosis in the lumbar spine region  MISCELLANEOUS:  Calcified uterine fibroids    IMPRESSION:  Unstable recent injury suggested at the T10 level with an   acute fracture identified extending into the posterior elements.   Suspicion of a recent fracture at L5 as well with fracture line   appreciated and evidence of loss ofheight at this level with impaction   seen as well. More chronic appearing compressions at T9, L2, L3.    Dr. Kumar discussed these findings with Dr. Matilde rodriguez of the   neurosurgical service on 2022 6:56 PM with read back.    --- End of Report ---            ROE KUMAR MD; Attending Radiologist  This document has been electronically signed. 2022  7:01PM    < end of copied text >      CT Head No Cont (22 @ 04:10)   IMPRESSION: No significant change when compared with prior study.  No CT evidence of acute, large transcortical infarct. MR brain can be obtained for further evaluation if clinical concern remains.  < from: Transthoracic Echocardiogram (22 @ 08:15) >    Patient name: ANGELO STRATTON  YOB: 1940   Age: 82 (F)   MR#: [id number]  Study Date: 2022  [facility]  Study quality: Technically difficult  Referring Physician: Ladonna Cain MD  Blood Pressure: 121/67 mmHg  Height: 157 cm  Weight: 61 kg  BSA: 1.6 m2  ------------------------------------------------------------------------  PROCEDURE: Transthoracic echocardiogram with 2-D, M-Mode  and complete spectral and color flow Doppler.  INDICATION:Other cerebrovascular disease (I67.89)  ------------------------------------------------------------------------  Dimensions:    Normal Values:  LA:     3.6    2.0 - 4.0 cm  Ao:     3.1    2.0 - 3.8 cm  SEPTUM: 1.2    0.6 - 1.2 cm  PWT:    1.2    0.6- 1.1 cm  LVIDd:  3.8    3.0 - 5.6 cm  LVIDs:  2.5    1.8 - 4.0 cm  Derived variables:  LVMI: 95 g/m2  RWT: 0.63  Fractional short: 34 %  EF (Visual Estimate): 65 %  Doppler Peak Velocity (m/sec): AoV=1.3  ------------------------------------------------------------------------  Observations:  Mitral Valve: Normal mitral valve. Minimal mitral  regurgitation.  Aortic Valve/Aorta: Calcified trileaflet aortic valve with  normal opening. Peak transaortic valve gradient equals 7 mm  Hg, mean transaortic valve gradient equals 3 mm Hg, aortic  valve velocity time integral equals 22 cm. Minimal aortic  regurgitation. Peak left ventricular outflow tract gradient  equals 6 mm Hg, mean gradient is equal to 2 mm Hg, LVOT  velocity time integral equals 20cm.  Aortic Root: 3.1 cm.  Left Atrium: Normal left atrium.  LA volume index = 23  cc/m2.  Left Ventricle: Normal left ventricular systolic function.  No segmental wall motion abnormalities. Mild concentric  left ventricular hypertrophy. Normal diastolic function.  Right Heart: Normal right atrium. The right ventricle is  not well visualized; grossly normal right ventricular size  and systolic function. Tricuspid valve not well visualized,  probably normal. Minimal tricuspid regurgitation. Normal  pulmonic valve. Mild pulmonic regurgitation.  Pericardium/Pleura: Thickened pericardium consistent with  prominent pericardial fat pad. No pericardial effusion.  Hemodynamic: Estimated right atrial pressure is 8 mm Hg.  Estimated right ventricular systolic pressure equals 27 mm  Hg, assuming right atrial pressure equals 8 mm Hg,  consistent with normal pulmonary pressures.  ------------------------------------------------------------------------  Conclusions:  1. Normal mitral valve. Minimal mitral regurgitation.  2. Calcified trileaflet aortic valve with normal opening.  Minimal aortic regurgitation.  3. Normal left ventricular systolic function. No segmental  wall motion abnormalities.  4. The right ventricle is not well visualized; grossly  normal right ventricular size and systolic function.  *** No previous Echo exam.  ------------------------------------------------------------------------  Confirmed on  2022 - 10:22:58 by Riddhi Buckner M.D.  ------------------------------------------------------------------------    < from: MR Lumbar Spine No Cont (04.15.22 @ 11:19) >    ACC: [id number] EXAM:  MR SPINE LUMBAR                        ACC: [id number] EXAM:  MR SPINE THORACIC                          PROCEDURE DATE:  04/15/2022          INTERPRETATION:  CLINICAL INDICATION: Trauma with fractures    Magnetic resonance imaging of the thoracic and lumbosacral spine was   carried out with sagittal surface coil imaging from T1 to S2 using T1 and   fast spin echo T2 weighted images with and without fat saturation   technique with axial T1 and fast spin echo T2 weighted imaging on a 3.0   Demi magnet.    Comparison is made with the prior CT of 2022.    There is a mild compression deformity of the T9 vertebral body. There is   a horizontal fracture through the T10 vertebral body which extends from   the anterior to posterior cortex and into the pedicles bilaterally. This   is a 3 column injury seen better on the CT and is unstable. There is   widening at the fracture fragments involving the body. There is no bony   retropulsion or canal compression. There is no current malalignment.    There is a moderate predominantly chronic compression deformity of T12.   There is minimal bony retropulsion of the posterior superior cortex of   T12. Although there appears to be somewhat splaying of the spinous   processes at this level on the MRI there is calcification seen on CT,   this does not have the appearance of recent or unstable fracture. The   remainder of the thoracic vertebral bodies are unremarkable.    Conus terminates normally at the L1-2 level.    Evaluation of the lumbar spine demonstrates a mild compression forming at   L2 1 mild compression deformity of L5 and a moderate compression   deformity of L3. There is no bony retropulsion or canal compression. None   of these lumbar fractures appear unstable the posterior elements are   intact. There is no cauda equina compression.    The paraspinal soft tissues are remarkable.      Impression: Multiple thoracic and lumbar vertebral body fractures. The   horizontal fracture through the T10 vertebral body includes all 3 columns   with fractures through the pedicles seen better on the CT of 2022   and is consistent with an unstable fracture although there is no evidence   of displacement. There is no bony retropulsion or canal compression.                                  --- End of Report ---            CARLA ANN MD; Attending Radiologist  This document has been electronically signed. Apr 15 2022 12:58PM    < end of copied text >

## 2022-04-18 NOTE — PROGRESS NOTE ADULT - SUBJECTIVE AND OBJECTIVE BOX
[facility] Division of Hospital Medicine  Mitch Baum MD  Pager (HERIBERTO-F, 0V-5P): [phone]  Other Times:  [phone]    Patient is a 82y old  Female who presents with a chief complaint of AMS x1 day (18 Apr 2022 14:37)      SUBJECTIVE / OVERNIGHT EVENTS:    Patient was examined today. Reports having back pain - which she states is not new.     ADDITIONAL REVIEW OF SYSTEMS: rest of 10 point ros neg       MEDICATIONS  (STANDING):  acetaminophen     Tablet .. 975 milliGRAM(s) Oral every 8 hours  amLODIPine   Tablet 5 milliGRAM(s) Oral daily  atorvastatin 20 milliGRAM(s) Oral at bedtime  cholecalciferol 1000 Unit(s) Oral daily  dextrose 5%. 1000 milliLiter(s) (50 mL/Hr) IV Continuous <Continuous>  dextrose 5%. 1000 milliLiter(s) (100 mL/Hr) IV Continuous <Continuous>  dextrose 50% Injectable 25 Gram(s) IV Push once  dextrose 50% Injectable 12.5 Gram(s) IV Push once  folic acid 1 milliGRAM(s) Oral daily  gabapentin 200 milliGRAM(s) Oral at bedtime  gabapentin 100 milliGRAM(s) Oral daily  glucagon  Injectable 1 milliGRAM(s) IntraMuscular once  hydroxychloroquine 200 milliGRAM(s) Oral daily  insulin glargine Injectable (LANTUS) 5 Unit(s) SubCutaneous at bedtime  insulin lispro (ADMELOG) corrective regimen sliding scale   SubCutaneous three times a day before meals  insulin lispro (ADMELOG) corrective regimen sliding scale   SubCutaneous at bedtime  insulin lispro Injectable (ADMELOG) 2 Unit(s) SubCutaneous before lunch  insulin lispro Injectable (ADMELOG) 3 Unit(s) SubCutaneous before dinner  levETIRAcetam  IVPB 500 milliGRAM(s) IV Intermittent every 12 hours  lidocaine   4% Patch 1 Patch Transdermal daily  nystatin Powder 1 Application(s) Topical two times a day  pantoprazole    Tablet 40 milliGRAM(s) Oral before breakfast    MEDICATIONS  (PRN):  dextrose Oral Gel 15 Gram(s) Oral once PRN Blood Glucose LESS THAN 70 milliGRAM(s)/deciliter  melatonin 3 milliGRAM(s) Oral at bedtime PRN Insomnia  oxyCODONE    IR 2.5 milliGRAM(s) Oral every 4 hours PRN Severe Pain (7 - 10)  polyethylene glycol 3350 17 Gram(s) Oral daily PRN Constipation  senna 2 Tablet(s) Oral at bedtime PRN Constipation      CAPILLARY BLOOD GLUCOSE      POCT Blood Glucose.: 97 mg/dL (18 Apr 2022 11:24)  POCT Blood Glucose.: 126 mg/dL (18 Apr 2022 08:17)  POCT Blood Glucose.: 159 mg/dL (17 Apr 2022 21:06)  POCT Blood Glucose.: 81 mg/dL (17 Apr 2022 16:04)    I&O's Summary    17 Apr 2022 07:01  -  18 Apr 2022 07:00  --------------------------------------------------------  IN: 600 mL / OUT: 1100 mL / NET: -500 mL    18 Apr 2022 07:01  -  18 Apr 2022 14:53  --------------------------------------------------------  IN: 660 mL / OUT: 0 mL / NET: 660 mL        PHYSICAL EXAM:  Vital Signs Last 24 Hrs  T(C): 36.7 (18 Apr 2022 14:26), Max: 37.1 (18 Apr 2022 10:31)  T(F): 98 (18 Apr 2022 14:26), Max: 98.8 (18 Apr 2022 10:31)  HR: 71 (18 Apr 2022 14:26) (67 - 92)  BP: 115/66 (18 Apr 2022 14:26) (115/66 - 137/63)  BP(mean): --  RR: 18 (18 Apr 2022 14:26) (18 - 18)  SpO2: 98% (18 Apr 2022 14:26) (96% - 98%)      GENERAL: No acute distress, well-developed, alert  ENT: EOMI, PERRLA, conjunctiva and sclera clear,  moist mucosa no pharyngeal erythema or exudates   NECK: supple , no JVD   CHEST/LUNG: Clear to auscultation bilaterally; No wheeze, equal breath sounds bilaterally   BACK: No back tenderness;  No CVA tenderness   HEART: Regular rate and rhythm; No murmurs, rubs, or gallops  ABDOMEN: Soft, Nontender, Nondistended; Bowel sounds present  EXTREMITIES:  No clubbing, cyanosis, +back tenderness  MSK: No joint swelling or effusions, ROM intact   PSYCH: Normal behavior/affect  NEUROLOGY:  AAOx3, non-focal, cranial nerves intact, no aphasia  SKIN: Normal color, No rashes or lesions      LABS:                        9.6    3.86  )-----------( 199      ( 18 Apr 2022 07:06 )             30.8     04-18    141  |  110<H>  |  16  ----------------------------<  97  4.6   |  19<L>  |  1.31<H>    Ca    9.1      18 Apr 2022 07:03                  RADIOLOGY & ADDITIONAL TESTS:  Results Reviewed:   Imaging Personally Reviewed:  Electrocardiogram Personally Reviewed:    COORDINATION OF CARE:  Care Discussed with Consultants/Other Providers [Y/N]:  Prior or Outpatient Records Reviewed [Y/N]:   [facility] Division of Hospital Medicine  Mitch Baum MD  Pager (M-F, 7I-1H): [phone]  Other Times:  [phone]    Patient is a 82y old  Female who presents with a chief complaint of AMS x1 day (18 Apr 2022 14:37)      SUBJECTIVE / OVERNIGHT EVENTS:    Patient was examined today. Reports having back pain - which she states is not new. has a mild dry cough.     ADDITIONAL REVIEW OF SYSTEMS: rest of 10 point ros neg       MEDICATIONS  (STANDING):  acetaminophen     Tablet .. 975 milliGRAM(s) Oral every 8 hours  amLODIPine   Tablet 5 milliGRAM(s) Oral daily  atorvastatin 20 milliGRAM(s) Oral at bedtime  cholecalciferol 1000 Unit(s) Oral daily  dextrose 5%. 1000 milliLiter(s) (50 mL/Hr) IV Continuous <Continuous>  dextrose 5%. 1000 milliLiter(s) (100 mL/Hr) IV Continuous <Continuous>  dextrose 50% Injectable 25 Gram(s) IV Push once  dextrose 50% Injectable 12.5 Gram(s) IV Push once  folic acid 1 milliGRAM(s) Oral daily  gabapentin 200 milliGRAM(s) Oral at bedtime  gabapentin 100 milliGRAM(s) Oral daily  glucagon  Injectable 1 milliGRAM(s) IntraMuscular once  hydroxychloroquine 200 milliGRAM(s) Oral daily  insulin glargine Injectable (LANTUS) 5 Unit(s) SubCutaneous at bedtime  insulin lispro (ADMELOG) corrective regimen sliding scale   SubCutaneous three times a day before meals  insulin lispro (ADMELOG) corrective regimen sliding scale   SubCutaneous at bedtime  insulin lispro Injectable (ADMELOG) 2 Unit(s) SubCutaneous before lunch  insulin lispro Injectable (ADMELOG) 3 Unit(s) SubCutaneous before dinner  levETIRAcetam  IVPB 500 milliGRAM(s) IV Intermittent every 12 hours  lidocaine   4% Patch 1 Patch Transdermal daily  nystatin Powder 1 Application(s) Topical two times a day  pantoprazole    Tablet 40 milliGRAM(s) Oral before breakfast    MEDICATIONS  (PRN):  dextrose Oral Gel 15 Gram(s) Oral once PRN Blood Glucose LESS THAN 70 milliGRAM(s)/deciliter  melatonin 3 milliGRAM(s) Oral at bedtime PRN Insomnia  oxyCODONE    IR 2.5 milliGRAM(s) Oral every 4 hours PRN Severe Pain (7 - 10)  polyethylene glycol 3350 17 Gram(s) Oral daily PRN Constipation  senna 2 Tablet(s) Oral at bedtime PRN Constipation      CAPILLARY BLOOD GLUCOSE      POCT Blood Glucose.: 97 mg/dL (18 Apr 2022 11:24)  POCT Blood Glucose.: 126 mg/dL (18 Apr 2022 08:17)  POCT Blood Glucose.: 159 mg/dL (17 Apr 2022 21:06)  POCT Blood Glucose.: 81 mg/dL (17 Apr 2022 16:04)    I&O's Summary    17 Apr 2022 07:01  -  18 Apr 2022 07:00  --------------------------------------------------------  IN: 600 mL / OUT: 1100 mL / NET: -500 mL    18 Apr 2022 07:01  -  18 Apr 2022 14:53  --------------------------------------------------------  IN: 660 mL / OUT: 0 mL / NET: 660 mL        PHYSICAL EXAM:  Vital Signs Last 24 Hrs  T(C): 36.7 (18 Apr 2022 14:26), Max: 37.1 (18 Apr 2022 10:31)  T(F): 98 (18 Apr 2022 14:26), Max: 98.8 (18 Apr 2022 10:31)  HR: 71 (18 Apr 2022 14:26) (67 - 92)  BP: 115/66 (18 Apr 2022 14:26) (115/66 - 137/63)  BP(mean): --  RR: 18 (18 Apr 2022 14:26) (18 - 18)  SpO2: 98% (18 Apr 2022 14:26) (96% - 98%)      GENERAL: No acute distress, well-developed, alert  ENT: EOMI, PERRLA, conjunctiva and sclera clear,  moist mucosa no pharyngeal erythema or exudates   NECK: supple , no JVD   CHEST/LUNG: Clear to auscultation bilaterally; No wheeze, equal breath sounds bilaterally   BACK: No back tenderness;  No CVA tenderness   HEART: Regular rate and rhythm; No murmurs, rubs, or gallops  ABDOMEN: Soft, Nontender, Nondistended; Bowel sounds present  EXTREMITIES:  No clubbing, cyanosis, +back tenderness  MSK: No joint swelling or effusions, ROM intact   PSYCH: Normal behavior/affect  NEUROLOGY:  AAOx3, non-focal, cranial nerves intact, no aphasia  SKIN: Normal color, No rashes or lesions      LABS:                        9.6    3.86  )-----------( 199      ( 18 Apr 2022 07:06 )             30.8     04-18    141  |  110<H>  |  16  ----------------------------<  97  4.6   |  19<L>  |  1.31<H>    Ca    9.1      18 Apr 2022 07:03                  RADIOLOGY & ADDITIONAL TESTS:  Results Reviewed:   Imaging Personally Reviewed:  Electrocardiogram Personally Reviewed:    COORDINATION OF CARE:  Care Discussed with Consultants/Other Providers [Y/N]:  Prior or Outpatient Records Reviewed [Y/N]:

## 2022-04-18 NOTE — PROGRESS NOTE ADULT - ASSESSMENT
81 y/o F w/ uncontrolled Type 2 DM exacerbated by decadron with chemo for MM treatment with hyperglycemia, HTN, HLD admitted for AMS ( high risk patient with severely uncontrolled DM w/ A1c of 11.4% at high risk of CVA and CAD with high level decision-making).  Tolerating POs. BG values tightly controlled prior to dinner last 2 days. DC planning for DEV. BG goal (100-200mg/dl) given pt's age/comorbidities.

## 2022-04-19 ENCOUNTER — TRANSCRIPTION ENCOUNTER (OUTPATIENT)
Age: 82
End: 2022-04-19

## 2022-04-19 VITALS
SYSTOLIC BLOOD PRESSURE: 127 MMHG | TEMPERATURE: 98 F | OXYGEN SATURATION: 96 % | RESPIRATION RATE: 18 BRPM | DIASTOLIC BLOOD PRESSURE: 72 MMHG | HEART RATE: 74 BPM

## 2022-04-19 LAB
ANION GAP SERPL CALC-SCNC: 13 MMOL/L — SIGNIFICANT CHANGE UP (ref 5–17)
BUN SERPL-MCNC: 21 MG/DL — SIGNIFICANT CHANGE UP (ref 7–23)
CALCIUM SERPL-MCNC: 9 MG/DL — SIGNIFICANT CHANGE UP (ref 8.4–10.5)
CHLORIDE SERPL-SCNC: 110 MMOL/L — HIGH (ref 96–108)
CO2 SERPL-SCNC: 18 MMOL/L — LOW (ref 22–31)
CREAT SERPL-MCNC: 1.48 MG/DL — HIGH (ref 0.5–1.3)
EGFR: 35 ML/MIN/1.73M2 — LOW
GLUCOSE BLDC GLUCOMTR-MCNC: 119 MG/DL — HIGH (ref 70–99)
GLUCOSE BLDC GLUCOMTR-MCNC: 158 MG/DL — HIGH (ref 70–99)
GLUCOSE BLDC GLUCOMTR-MCNC: 93 MG/DL — SIGNIFICANT CHANGE UP (ref 70–99)
GLUCOSE SERPL-MCNC: 117 MG/DL — HIGH (ref 70–99)
POTASSIUM SERPL-MCNC: 4.7 MMOL/L — SIGNIFICANT CHANGE UP (ref 3.5–5.3)
POTASSIUM SERPL-SCNC: 4.7 MMOL/L — SIGNIFICANT CHANGE UP (ref 3.5–5.3)
SODIUM SERPL-SCNC: 141 MMOL/L — SIGNIFICANT CHANGE UP (ref 135–145)

## 2022-04-19 PROCEDURE — 80053 COMPREHEN METABOLIC PANEL: CPT

## 2022-04-19 PROCEDURE — 95816 EEG AWAKE AND DROWSY: CPT

## 2022-04-19 PROCEDURE — 85014 HEMATOCRIT: CPT

## 2022-04-19 PROCEDURE — 84132 ASSAY OF SERUM POTASSIUM: CPT

## 2022-04-19 PROCEDURE — 72128 CT CHEST SPINE W/O DYE: CPT

## 2022-04-19 PROCEDURE — 80061 LIPID PANEL: CPT

## 2022-04-19 PROCEDURE — 82607 VITAMIN B-12: CPT

## 2022-04-19 PROCEDURE — U0005: CPT

## 2022-04-19 PROCEDURE — 86850 RBC ANTIBODY SCREEN: CPT

## 2022-04-19 PROCEDURE — 72146 MRI CHEST SPINE W/O DYE: CPT

## 2022-04-19 PROCEDURE — 83036 HEMOGLOBIN GLYCOSYLATED A1C: CPT

## 2022-04-19 PROCEDURE — 86901 BLOOD TYPING SEROLOGIC RH(D): CPT

## 2022-04-19 PROCEDURE — 99285 EMERGENCY DEPT VISIT HI MDM: CPT | Mod: 25

## 2022-04-19 PROCEDURE — 72148 MRI LUMBAR SPINE W/O DYE: CPT

## 2022-04-19 PROCEDURE — 85025 COMPLETE CBC W/AUTO DIFF WBC: CPT

## 2022-04-19 PROCEDURE — 84484 ASSAY OF TROPONIN QUANT: CPT

## 2022-04-19 PROCEDURE — 85018 HEMOGLOBIN: CPT

## 2022-04-19 PROCEDURE — 82330 ASSAY OF CALCIUM: CPT

## 2022-04-19 PROCEDURE — 86900 BLOOD TYPING SEROLOGIC ABO: CPT

## 2022-04-19 PROCEDURE — 93005 ELECTROCARDIOGRAM TRACING: CPT

## 2022-04-19 PROCEDURE — 85730 THROMBOPLASTIN TIME PARTIAL: CPT

## 2022-04-19 PROCEDURE — 80307 DRUG TEST PRSMV CHEM ANLYZR: CPT

## 2022-04-19 PROCEDURE — 83605 ASSAY OF LACTIC ACID: CPT

## 2022-04-19 PROCEDURE — 36415 COLL VENOUS BLD VENIPUNCTURE: CPT

## 2022-04-19 PROCEDURE — U0003: CPT

## 2022-04-19 PROCEDURE — 85610 PROTHROMBIN TIME: CPT

## 2022-04-19 PROCEDURE — 99239 HOSP IP/OBS DSCHRG MGMT >30: CPT

## 2022-04-19 PROCEDURE — 97166 OT EVAL MOD COMPLEX 45 MIN: CPT

## 2022-04-19 PROCEDURE — 70553 MRI BRAIN STEM W/O & W/DYE: CPT

## 2022-04-19 PROCEDURE — 82962 GLUCOSE BLOOD TEST: CPT

## 2022-04-19 PROCEDURE — 82803 BLOOD GASES ANY COMBINATION: CPT

## 2022-04-19 PROCEDURE — 97162 PT EVAL MOD COMPLEX 30 MIN: CPT

## 2022-04-19 PROCEDURE — A9585: CPT

## 2022-04-19 PROCEDURE — 84295 ASSAY OF SERUM SODIUM: CPT

## 2022-04-19 PROCEDURE — 92523 SPEECH SOUND LANG COMPREHEN: CPT

## 2022-04-19 PROCEDURE — 72131 CT LUMBAR SPINE W/O DYE: CPT

## 2022-04-19 PROCEDURE — 80048 BASIC METABOLIC PNL TOTAL CA: CPT

## 2022-04-19 PROCEDURE — 92610 EVALUATE SWALLOWING FUNCTION: CPT

## 2022-04-19 PROCEDURE — 81001 URINALYSIS AUTO W/SCOPE: CPT

## 2022-04-19 PROCEDURE — 85027 COMPLETE CBC AUTOMATED: CPT

## 2022-04-19 PROCEDURE — 70544 MR ANGIOGRAPHY HEAD W/O DYE: CPT

## 2022-04-19 PROCEDURE — 82435 ASSAY OF BLOOD CHLORIDE: CPT

## 2022-04-19 PROCEDURE — 84443 ASSAY THYROID STIM HORMONE: CPT

## 2022-04-19 PROCEDURE — 97530 THERAPEUTIC ACTIVITIES: CPT

## 2022-04-19 PROCEDURE — 93306 TTE W/DOPPLER COMPLETE: CPT

## 2022-04-19 PROCEDURE — 70450 CT HEAD/BRAIN W/O DYE: CPT

## 2022-04-19 PROCEDURE — 82947 ASSAY GLUCOSE BLOOD QUANT: CPT

## 2022-04-19 RX ORDER — GABAPENTIN 400 MG/1
2 CAPSULE ORAL
Qty: 0 | Refills: 0 | DISCHARGE
Start: 2022-04-19

## 2022-04-19 RX ORDER — SENNA PLUS 8.6 MG/1
2 TABLET ORAL
Qty: 0 | Refills: 0 | DISCHARGE
Start: 2022-04-19

## 2022-04-19 RX ORDER — GABAPENTIN 400 MG/1
3 CAPSULE ORAL
Qty: 0 | Refills: 0 | DISCHARGE
Start: 2022-04-19

## 2022-04-19 RX ORDER — ACETAMINOPHEN 500 MG
3 TABLET ORAL
Qty: 0 | Refills: 0 | DISCHARGE
Start: 2022-04-19

## 2022-04-19 RX ORDER — DEXAMETHASONE 0.5 MG/5ML
3 ELIXIR ORAL
Qty: 0 | Refills: 0 | DISCHARGE

## 2022-04-19 RX ORDER — PANTOPRAZOLE SODIUM 20 MG/1
40 TABLET, DELAYED RELEASE ORAL
Refills: 0 | Status: DISCONTINUED | OUTPATIENT
Start: 2022-04-19 | End: 2022-04-19

## 2022-04-19 RX ORDER — CHOLECALCIFEROL (VITAMIN D3) 125 MCG
1000 CAPSULE ORAL
Qty: 0 | Refills: 0 | DISCHARGE
Start: 2022-04-19

## 2022-04-19 RX ORDER — PANTOPRAZOLE SODIUM 20 MG/1
40 TABLET, DELAYED RELEASE ORAL
Qty: 0 | Refills: 0 | DISCHARGE
Start: 2022-04-19

## 2022-04-19 RX ORDER — INSULIN LISPRO 100/ML
0 VIAL (ML) SUBCUTANEOUS
Qty: 0 | Refills: 0 | DISCHARGE
Start: 2022-04-19

## 2022-04-19 RX ORDER — INSULIN LISPRO 100/ML
2 VIAL (ML) SUBCUTANEOUS
Qty: 0 | Refills: 0 | DISCHARGE
Start: 2022-04-19

## 2022-04-19 RX ORDER — POMALIDOMIDE 1 MG/1
1 CAPSULE ORAL
Qty: 0 | Refills: 0 | DISCHARGE

## 2022-04-19 RX ORDER — INSULIN LISPRO 100/ML
3 VIAL (ML) SUBCUTANEOUS
Qty: 0 | Refills: 0 | DISCHARGE
Start: 2022-04-19

## 2022-04-19 RX ORDER — ATORVASTATIN CALCIUM 80 MG/1
1 TABLET, FILM COATED ORAL
Qty: 0 | Refills: 0 | DISCHARGE
Start: 2022-04-19

## 2022-04-19 RX ORDER — NYSTATIN CREAM 100000 [USP'U]/G
1 CREAM TOPICAL
Qty: 0 | Refills: 0 | DISCHARGE
Start: 2022-04-19

## 2022-04-19 RX ORDER — INSULIN GLARGINE 100 [IU]/ML
5 INJECTION, SOLUTION SUBCUTANEOUS
Qty: 0 | Refills: 0 | DISCHARGE
Start: 2022-04-19

## 2022-04-19 RX ORDER — ATORVASTATIN CALCIUM 80 MG/1
1 TABLET, FILM COATED ORAL
Qty: 0 | Refills: 0 | DISCHARGE

## 2022-04-19 RX ORDER — GABAPENTIN 400 MG/1
1 CAPSULE ORAL
Qty: 0 | Refills: 0 | DISCHARGE
Start: 2022-04-19

## 2022-04-19 RX ORDER — FOLIC ACID 0.8 MG
1 TABLET ORAL
Qty: 0 | Refills: 0 | DISCHARGE
Start: 2022-04-19

## 2022-04-19 RX ORDER — OXYCODONE HYDROCHLORIDE 5 MG/1
2.5 TABLET ORAL
Qty: 0 | Refills: 0 | DISCHARGE
Start: 2022-04-19

## 2022-04-19 RX ORDER — POLYETHYLENE GLYCOL 3350 17 G/17G
17 POWDER, FOR SOLUTION ORAL
Qty: 0 | Refills: 0 | DISCHARGE
Start: 2022-04-19

## 2022-04-19 RX ORDER — LIDOCAINE 4 G/100G
1 CREAM TOPICAL
Qty: 0 | Refills: 0 | DISCHARGE
Start: 2022-04-19

## 2022-04-19 RX ADMIN — NYSTATIN CREAM 1 APPLICATION(S): 100000 CREAM TOPICAL at 05:26

## 2022-04-19 RX ADMIN — AMLODIPINE BESYLATE 5 MILLIGRAM(S): 2.5 TABLET ORAL at 05:26

## 2022-04-19 RX ADMIN — Medication 975 MILLIGRAM(S): at 06:00

## 2022-04-19 RX ADMIN — GABAPENTIN 100 MILLIGRAM(S): 400 CAPSULE ORAL at 12:13

## 2022-04-19 RX ADMIN — Medication 3 UNIT(S): at 07:51

## 2022-04-19 RX ADMIN — PANTOPRAZOLE SODIUM 40 MILLIGRAM(S): 20 TABLET, DELAYED RELEASE ORAL at 06:44

## 2022-04-19 RX ADMIN — Medication 975 MILLIGRAM(S): at 05:25

## 2022-04-19 RX ADMIN — Medication 200 MILLIGRAM(S): at 12:13

## 2022-04-19 RX ADMIN — LEVETIRACETAM 400 MILLIGRAM(S): 250 TABLET, FILM COATED ORAL at 05:26

## 2022-04-19 RX ADMIN — Medication 1000 UNIT(S): at 12:20

## 2022-04-19 RX ADMIN — Medication 975 MILLIGRAM(S): at 12:12

## 2022-04-19 RX ADMIN — Medication 3 UNIT(S): at 16:57

## 2022-04-19 RX ADMIN — Medication 2 UNIT(S): at 12:09

## 2022-04-19 RX ADMIN — LIDOCAINE 1 PATCH: 4 CREAM TOPICAL at 12:13

## 2022-04-19 RX ADMIN — Medication 1: at 12:10

## 2022-04-19 RX ADMIN — Medication 1 MILLIGRAM(S): at 12:19

## 2022-04-19 RX ADMIN — LIDOCAINE 1 PATCH: 4 CREAM TOPICAL at 00:10

## 2022-04-19 NOTE — PROGRESS NOTE ADULT - SUBJECTIVE AND OBJECTIVE BOX
DATE OF SERVICE: 04-19-22 @ 15:45    Patient is a 82y old  Female who presents with a chief complaint of AMS x1 day (19 Apr 2022 14:28)      INTERVAL HISTORY: Feels ok    REVIEW OF SYSTEMS:  CONSTITUTIONAL: No weakness  EYES/ENT: No visual changes;  No throat pain   NECK: No pain or stiffness  RESPIRATORY: No cough, wheezing; No shortness of breath  CARDIOVASCULAR: No chest pain or palpitations  GASTROINTESTINAL: No abdominal  pain. No nausea, vomiting, or hematemesis  GENITOURINARY: No dysuria, frequency or hematuria  NEUROLOGICAL: No stroke like symptoms  SKIN: No rashes    TELEMETRY Personally reviewed: SR 60-80s  	  MEDICATIONS:  amLODIPine   Tablet 5 milliGRAM(s) Oral daily        PHYSICAL EXAM:  T(C): 36.6 (04-19-22 @ 14:15), Max: 37 (04-19-22 @ 10:31)  HR: 74 (04-19-22 @ 14:15) (68 - 74)  BP: 127/72 (04-19-22 @ 14:15) (120/77 - 127/72)  RR: 18 (04-19-22 @ 14:15) (18 - 18)  SpO2: 96% (04-19-22 @ 14:15) (96% - 100%)  Wt(kg): --  I&O's Summary    18 Apr 2022 07:01  -  19 Apr 2022 07:00  --------------------------------------------------------  IN: 660 mL / OUT: 900 mL / NET: -240 mL          Appearance: In no distress	  HEENT:    PERRL, EOMI	  Cardiovascular:  S1 S2, No JVD  Respiratory: Lungs clear to auscultation	  Gastrointestinal:  Soft, Non-tender, + BS	  Vascularature:  No edema of LE  Psychiatric: Appropriate affect   Neuro: no acute focal deficits                               9.6    3.86  )-----------( 199      ( 18 Apr 2022 07:06 )             30.8     04-19    141  |  110<H>  |  21  ----------------------------<  117<H>  4.7   |  18<L>  |  1.48<H>    Ca    9.0      19 Apr 2022 05:56          Labs personally reviewed      ASSESSMENT/PLAN: 	     Ms. Angelika James is an 82 F with PMH of  significant for HTN, multiple myeloma, RA on ASA81, remote h/o CVA no residual , p/w AMS to [facility] , found to have SAH t/f to [facility].    Problem/Plan -1  Problem: Cardiac Risk Stratification   Plan:  - Denies chest pain or shortness of breath  - Trop 65-64 probably i/s/o SAH  - EKG with no ischemic changes noted  - States she is able to walk with assistance several feet but exercise tolerance is limited by RA and chronic back pain  - TTE as noted above reveals normal LV systolic function and minimal MR  - Not in decompensated HF  - No evidence of tachy/deidra syndrome  - Patient with moderate risk for moderate risk neurosurgery intervention, no cardiac contraindication to proceed   - As per ortho, no planned intervention at this time    Problem/Plan -2  Problem: HTN  Plan:  - c/w amlodipine  - BP currently stable, cont to trend  - Goal /90 per neuro     Problem/Plan -3  Problem: HLD  Plan:  - LDL 42  - c/w atorvastatin 20mg PO daily    Problem/Plan -4  Problem: DM II  Plan:  - HgbA1C 114  - endocrine c/s appreciated  - CAREN Doyle-ASHLYN Wolfe DO [facility]  Cardiovascular Medicine  [address] [place] [address], [address]  Office: [phone]  Cell: [phone]

## 2022-04-19 NOTE — DISCHARGE NOTE PROVIDER - CARE PROVIDER_API CALL
Bailey Rosales)  Medical Oncology  [address] [place] [address]  Phone: [phone]  Fax: [phone]  Follow Up Time:     Liza Singh)  Neurosurgery  [address], [address]  [place] [address]  Phone: [phone]  Fax: [phone]  Follow Up Time:     [facility],   [address] [place] [address]  Phone: [phone]  Fax: (   )    -  Follow Up Time:

## 2022-04-19 NOTE — DISCHARGE NOTE PROVIDER - NSDCFUSCHEDAPPT_GEN_ALL_CORE_FT
ANGELO STRATTON ; 05/19/2022 ; ASHLYNP [facility]  ANGELO STRATTON ; 06/01/2022 ; NPP [facility]

## 2022-04-19 NOTE — DISCHARGE NOTE PROVIDER - HOSPITAL COURSE
82 F w / pmh x significant for HTN, multiple myeloma, RA on ASA81, remote h/o CVA no residual , p/w AMS to [facility] , found to have SAH t/f to [facility]. Also with t-spine fracture on MRI, pursuing conservative management with TLSO and plan to re-assess outpt.     Fracture of thoracic spine; unstable 3 column horizontal T10 spinal fracture  nsgy seen and followed  - plan for conservative orthotics management for now, potential surgery in future outpt follow up; multimodal pain control.    SAH (subarachnoid hemorrhage); Repeat CTH stable, MR without additional concerns. EEG neg for sz activity  Neuro & NSGY followed; still concern SAH-related seizure, vs contribution of hyperglycemia to neuro symptoms, they are now resolving  - BP control; continue keppra, hold ASA and lovenox for now  Seizure precautions - speech and swallow     Altered mental state;  transient likely seizure in setting of SAH poss seizure, and hyperglycemia as above - now resolved.    Uncontrolled type 2 diabetes mellitus with hyperglycemia; suspect at least in part secondary to Dex , A1c is >11  - s/p IV fluid bolus; basal/bolus insulin w coverage sliding scale  Seen and followed by endo.; f/u mgmt given MM tx involving high dose steroid and plan for f/u with endocrine    Rheumatoid arthritis; continue hydroxychloroquine    KAJAL on CKD - sCr overall stable.    Multiple myeloma; heme onc followed     HTN (hypertension); continue amlodipine - continue statin; mproved    Pt. had last BM reported 4/17 and continue miralax & senna  Discharge planning issues; dispo to DEV      82 F w / pmh x significant for HTN, multiple myeloma, RA on ASA81, remote h/o CVA no residual , p/w AMS to [facility] , found to have SAH t/f to [facility]. Also with t-spine fracture on MRI, pursuing conservative management with TLSO and plan to re-assess outpt.     Fracture of thoracic spine; unstable 3 column horizontal T10 spinal fracture  nsgy seen and followed  - plan for conservative orthotics management for now, potential surgery in future outpt follow up; multimodal pain control.    SAH (subarachnoid hemorrhage); Repeat CTH stable, MR without additional concerns. EEG neg for sz activity  Neuro & NSGY followed; still concern SAH-related seizure, vs contribution of hyperglycemia to neuro symptoms, they are now resolving  - BP control; continue keppra, hold ASA and lovenox for now  Seizure precautions - speech and swallow     Altered mental state;  transient likely seizure in setting of SAH poss seizure, and hyperglycemia as above - now resolved.    Uncontrolled type 2 diabetes mellitus with hyperglycemia; suspect at least in part secondary to Dex , A1c is >11  - s/p IV fluid bolus; basal/bolus insulin w coverage sliding scale  Seen and followed by endo.; f/u mgmt given MM tx involving high dose steroid and plan for f/u with endocrine    Rheumatoid arthritis; continue hydroxychloroquine    KAJAL on CKD - sCr overall stable.    Multiple myeloma; heme onc followed     HTN (hypertension); continue amlodipine - continue statin; mproved    Pt. had last BM reported 4/17 and continue miralax & senna  Discharge planning; dispo to [facility] on 4/19 (d/w Dr. De La Vega)

## 2022-04-19 NOTE — PROGRESS NOTE ADULT - ASSESSMENT
82 y.o. LHW with HTN, rheumatoid arthritis, multiple myeloma, who presented to OSH with c/o slurred speech and confusion, found to have a small left occipital SAH, transferred to [facility] for further management.   EVELIN 14:30 on 4/12/22. While in the ED a stroke code was activated at 03:56 for worsening dysarthria and right sided weakness. Per providers at bedside, symptoms have been inconsistent and somewhat fluctuating in quality since presentation, and the patient may have had seizure-like activity just prior to activation of the stroke code, for which received Keppra.  Initial VSS and wnl. On exam patient noted to be at baseline mental status, mildly dysarthric, but fluent and moving all extremities against gravity, with RUE drift noted. Follows all commands, however intermittently required repeated prompting to properly elicit actions on the right, which when done, was to the same degree as the left. Initial labs significant for serum glucose 530, BUN 48, Cr 2.31, sodium 132, , Hb 9.5.   CTH w/o: significant for small left occipital SAH noted to be stable on repeat imaging.   CTA H/N w/ does not demonstrate flow limiting stenosis or occlusion.   EEG neg \  TTE as abov e   MRI with no evidence of L SAH, R frontal meningioma. no acute findings  MRA/Nova: unremarkable   NIHSS: 3  MRS: 3  CT T/L spine with recent T10 injury/fracture; also L5 fracture, chronic compressions T9, L2, L3   MRI T/L spine multiple T and L vertebral body fractures. unstable fracutre no displacement no retropulsion   Impression:  stroke mimic in the setting hyperglycemia vs seizure vs less likely acute stroke. Possible annita's paresis following reported witnessed seizure. Rule out other underlying toxic-metabolic or infectious etiologies. unclear etiology of SAH, not seen on MR     Recommendations:  - neurosx called. TLSO brace.  f/u outpatient for possible future intervetnion   - any role for kyphoplasty?   - Stat CTH prn for worsening mental status or neuro exam  - needs better glucose control   - Hold ASA and lovenox, use mechanical DVT prophylaxis for now  -  Keppra 500mg Q12H  - Strict BP control goal <160/90   - Telemetry Monitoring, Neuro checks/vitals per unit protocol  - ISS and POCT glucose monitoring  - BG goal <180, avoid hypoglycemia  - PT/OT/SLP evaluation  - Fall, aspiration, seizure precautions  - Thank you for allowing me to participate in the care of this patient. Call with questions.   - d/c plan DEV awaiting bed   Clem Roberto MD  Vascular Neurology .

## 2022-04-19 NOTE — PROGRESS NOTE ADULT - SUBJECTIVE AND OBJECTIVE BOX
Neurology Progress Note    S: Patient seen and examined. No new events overnight. patient denied CP, SOB, HA or pain.      Medication:  MEDICATIONS  (STANDING):  acetaminophen     Tablet .. 975 milliGRAM(s) Oral every 8 hours  amLODIPine   Tablet 5 milliGRAM(s) Oral daily  atorvastatin 20 milliGRAM(s) Oral at bedtime  cholecalciferol 1000 Unit(s) Oral daily  dextrose 5%. 1000 milliLiter(s) (50 mL/Hr) IV Continuous <Continuous>  dextrose 5%. 1000 milliLiter(s) (100 mL/Hr) IV Continuous <Continuous>  dextrose 50% Injectable 25 Gram(s) IV Push once  dextrose 50% Injectable 12.5 Gram(s) IV Push once  folic acid 1 milliGRAM(s) Oral daily  gabapentin 200 milliGRAM(s) Oral at bedtime  gabapentin 100 milliGRAM(s) Oral daily  glucagon  Injectable 1 milliGRAM(s) IntraMuscular once  hydroxychloroquine 200 milliGRAM(s) Oral daily  insulin glargine Injectable (LANTUS) 5 Unit(s) SubCutaneous at bedtime  insulin lispro (ADMELOG) corrective regimen sliding scale   SubCutaneous three times a day before meals  insulin lispro (ADMELOG) corrective regimen sliding scale   SubCutaneous at bedtime  insulin lispro Injectable (ADMELOG) 3 Unit(s) SubCutaneous three times a day before meals  levETIRAcetam  IVPB 500 milliGRAM(s) IV Intermittent every 12 hours  lidocaine   4% Patch 1 Patch Transdermal daily  nystatin Powder 1 Application(s) Topical two times a day  pantoprazole    Tablet 40 milliGRAM(s) Oral before breakfast    MEDICATIONS  (PRN):  dextrose Oral Gel 15 Gram(s) Oral once PRN Blood Glucose LESS THAN 70 milliGRAM(s)/deciliter  melatonin 3 milliGRAM(s) Oral at bedtime PRN Insomnia  oxyCODONE    IR 2.5 milliGRAM(s) Oral every 4 hours PRN Severe Pain (7 - 10)  polyethylene glycol 3350 17 Gram(s) Oral daily PRN Constipation  senna 2 Tablet(s) Oral at bedtime PRN Constipation        Vitals:  `Vital Signs Last 24 Hrs  T(C): 36.7 (22 @ 04:58), Max: 37.1 (22 @ 10:31)  T(F): 98 (04-19-22 @ 04:58), Max: 98.8 (22 @ 10:31)  HR: 70 (22 @ 04:58) (68 - 74)  BP: 120/77 (22 @ 04:58) (115/66 - 132/68)  BP(mean): --  RR: 18 (22 @ 04:58) (18 - 18)  SpO2: 100% (22 @ 04:58) (97% - 100%)        General Exam:   General Appearance: Appropriately dressed and in no acute distress       Head: Normocephalic, atraumatic and no dysmorphic features  Ear, Nose, and Throat: Moist mucous membranes  CVS: S1S2+  Resp: No SOB, no wheeze or rhonchi  Abd: soft NTND  Extremities: No edema, no cyanosis  Skin: No bruises, no rashes     Neurological Exam:  MS: Eyes open, alert, oriented to person, place and situation, not time ("march"). Reports she knows the current president was the  for [employer] but can't recall his name. Normal affect. Follows all commands.  Language: Speech is mildly slurred, but fluent with good repetition & comprehension (able to name watch, phone, and pen).  CNs: PERRL (slightly sluggish, 3.5mm OU). BTT intact b/l. EOMI, no diplopia. V1-3 intact to LT. Well developed masseter muscles b/l. Facial movements normal and symmetric. Hearing grossly normal to conversation b/l. Symmetric palate elevation in midline. Gag reflex deferred. Tongue midline, normal movements.  Motor: Normal muscle bulk & tone. No noticeable tremor at rest.   RUE: Motor drift, grossly 4+/5  RLE: No drift, grossly 5/5  LUE/LLE: No drift, 5/5 throughout  Sensation: Intact to LT b/l throughout.  Cortical: Extinction on DSS (neglect): none.  Reflexes: clonus in lowers    Coordination: No dysmetria to FTN b/l.  Gait: Not assessed due to current medical condition/risk of fall.     I personally reviewed the below data/images/labs:  CBC Full  -  ( 2022 07:06 )  WBC Count : 3.86 K/uL  RBC Count : 3.50 M/uL  Hemoglobin : 9.6 g/dL  Hematocrit : 30.8 %  Platelet Count - Automated : 199 K/uL  Mean Cell Volume : 88.0 fl  Mean Cell Hemoglobin : 27.4 pg  Mean Cell Hemoglobin Concentration : 31.2 gm/dL  Auto Neutrophil # : x  Auto Lymphocyte # : x  Auto Monocyte # : x  Auto Eosinophil # : x  Auto Basophil # : x  Auto Neutrophil % : x  Auto Lymphocyte % : x  Auto Monocyte % : x  Auto Eosinophil % : x  Auto Basophil % : x    04-19    141  |  110<H>  |  21  ----------------------------<  117<H>  4.7   |  18<L>  |  1.48<H>    Ca    9.0      2022 05:56          Urinalysis Basic - ( 2022 04:40 )    Color: Colorless / Appearance: Clear / S.020 / pH: x  Gluc: x / Ketone: Negative  / Bili: Negative / Urobili: Negative   Blood: x / Protein: Trace / Nitrite: Negative   Leuk Esterase: Negative / RBC: 2 /hpf / WBC 0 /HPF   Sq Epi: x / Non Sq Epi: 0 /hpf / Bacteria: Negative    < from: MR Head w/wo IV Cont (22 @ 15:04) >  IMPRESSION: Age-appropriate involutional and ischemic gliotic changes.   Left occipital subarachnoid hemorrhage is not appreciated on this   examination as it may be isointense signal intensity CSF. No acute   infarcts or abnormal enhancement. Incidental small right frontal   pterional meningioma. Small vessel white matter ischemic changes. Normal   noninvasive MRA angiography.    < end of copied text >      CT ANGIO NECK & BRAIN (W)AW IC; CT PERFUSION W MAPS IC    PROCEDURE DATE: 2022  INTERPRETATION: HISTORY: Stroke code.  COMPARISON: None  TECHNIQUE: CT angiogram of the neck and brain was performed after administration of intravenous contrast. MIP and 3D reconstructions were performed. Perfusion maps were also generated and submitted for evaluation.  Total of 90 cc Omnipaque 350 intravenous contrast were administered without complication.    CT PERFUSION  There are symmetric time parameters, including mean transit time and Tmax, in the hemispheres bilaterally. No focal decrease in cerebral blood volume or cerebral blood flow. No regional perfusion abnormality is evident.    CTA BRAIN  INTERNAL CAROTID ARTERIES: Atheromatous irregularity and mild stenoses along the carotid siphons bilaterally. The intracranial segments of the ICA are patent without hemodynamically significant stenosis, occlusion, or aneurysm. The ICA bifurcations are unremarkable.  ANTERIOR CEREBRAL ARTERIES: No flow-limiting stenosis or occlusion. Anterior communicating artery is unremarkable without aneurysm.  MIDDLE CEREBRAL ARTERIES: No flow-limiting stenosis or occlusion. MCA bifurcations are unremarkable without aneurysm.  POSTERIOR CEREBRAL ARTERIES: No flow-limiting stenosis or occlusion. Posterior communicating arteries are not well seen bilaterally.  VERTEBROBASILAR SYSTEM: No flow-limiting stenosis or occlusion. Basilar tip is unremarkable.   Hypervascular right inferior frontal convexity extra-axial lesion measuring approximately 1 cm likely reflects a meningioma.    CTA NECK  RIGHT CAROTID SYSTEM: Normal in course and caliber without flow-limiting stenosis or occlusion. Calcified plaque along the bulb and ICA origin.  LEFT CAROTID SYSTEM: Normal in course and caliber without flow-limiting stenosis or occlusion. Calcified plaque along the bulb and ICA origin.  VERTEBRAL SYSTEM: Normal in course and caliber without flow-limiting stenosis or occlusion. Origin of the vertebral arteries are unremarkable.  AORTIC ARCH: Calcified plaque along the aortic arch. Bilateral calcified hilar and mediastinal nodes from prior disease. Scattered parenchymal granulomas are noted in the upper lobes bilaterally. Biapical interstitial prominence with hazy groundglass airspace opacity due to air trapping and/or dependent changes. Small interstitial edema may be present.    IMPRESSION:  CT PERFUSION: No regional perfusion abnormality.    CTA BRAIN: No associated vascular malformation or aneurysm associated with the small left occipital subarachnoid bleed.  Patent intracranial circulation. No flow-limiting stenosis or occlusion.  Hypervascular right inferior frontal convexity extra-axial lesion measuring approximately 1 cm likely reflects a meningioma.    CTA NECK: Patent cervical vasculature. No flow limiting stenosis or occlusion.  Sequelae of prior pulmonary granulomatous disease.    --- End of Report ---    CT BRAIN STROKE PROTOCOL    PROCEDURE DATE: 2022  INTERPRETATION: HISTORY: Stroke code. Slurred speech.  COMPARISON: CT head 2012  TECHNIQUE: Axial noncontrast CT images from the skull base to the vertex were obtained and submitted for interpretation. Coronal and sagittal reformatted images were performed. Bone and soft tissue windows were evaluated.    FINDINGS:  Faint hyperattenuation in the left occipital sulci (5-31) suggests small acute subarachnoid bleed. No local mass effect or midline shift. Mild widening of the left greater than right occipital sulci suggest chronic infarction inferiorly. Gray-white differentiation is maintained.  Mild to moderate chronic microvascular ischemic changes and vascular calcifications along the carotid siphons are noted.  Ventricles are normal in size for the patient's age without hydrocephalus. Basal cisterns are patent.  Visualized paranasal sinuses and mastoid air cells are clear. Calvarium is intact.    IMPRESSION:  Suggestion of small left occipital subarachnoid bleed. No local mass effect or midline shift.      CT Head No Cont (22 @ 01:09)   IMPRESSION: No significant change when compared with prior study.  < from: CT Lumbar Spine No Cont (22 @ 17:49) >    ACC: [id number] EXAM:  CT LUMBAR SPINE                        ACC: [id number] EXAM:  CT THORACIC SPINE                          PROCEDURE DATE:  2022          INTERPRETATION:  INDICATIONS:  Back pain and fall    TECHNIQUE:  Serial axial images were obtained using multi slice helical   technique. Sagittal and coronal reformatted images were performed.    COMPARISON EXAMINATION: 2021 CT as well as plain film from 2021    FINDINGS:  VERTEBRAL BODIES AND DISCS:  Evidence of an acute fracture through the   T10 vertebral body with involvement of the posterior elements with   fractures involving the pedicles bilaterally and at least a 2 column   injury consistent with an unstable injury at this level. There is   evidence of a compression fracture at T12 that has a somewhat vertebral   plana appearance and this is identified on the patient's prior plain film   2021 and appears old. Compression deformity at T9 which is   suggested on the prior plain film as well. Compression deformity L2   suggested on the prior plain film as well. Fairly prominent compression   deformity L3 appears to be of evolving compared with the prior plain film   with more significant loss of height compared with the prior. Fracture   through L5 which appears somewhat recent in appearance can correlate with   MR as clinically warranted.  ALIGNMENT:  A kyphotic deformity at the T11-12 level.  SPINAL CANAL:  Multilevel canal stenosis in the lumbar spine region  MISCELLANEOUS:  Calcified uterine fibroids    IMPRESSION:  Unstable recent injury suggested at the T10 level with an   acute fracture identified extending into the posterior elements.   Suspicion of a recent fracture at L5 as well with fracture line   appreciated and evidence of loss ofheight at this level with impaction   seen as well. More chronic appearing compressions at T9, L2, L3.    Dr. Kumar discussed these findings with Dr. Matilde rodriguez of the   neurosurgical service on 2022 6:56 PM with read back.    --- End of Report ---            ROE KUMAR MD; Attending Radiologist  This document has been electronically signed. 2022  7:01PM    < end of copied text >      CT Head No Cont (22 @ 04:10)   IMPRESSION: No significant change when compared with prior study.  No CT evidence of acute, large transcortical infarct. MR brain can be obtained for further evaluation if clinical concern remains.  < from: Transthoracic Echocardiogram (22 @ 08:15) >    Patient name: ANGELO STRATTON  YOB: 1940   Age: 82 (F)   MR#: [id number]  Study Date: 2022  [facility]  Study quality: Technically difficult  Referring Physician: Ladonna Cain MD  Blood Pressure: 121/67 mmHg  Height: 157 cm  Weight: 61 kg  BSA: 1.6 m2  ------------------------------------------------------------------------  PROCEDURE: Transthoracic echocardiogram with 2-D, M-Mode  and complete spectral and color flow Doppler.  INDICATION:Other cerebrovascular disease (I67.89)  ------------------------------------------------------------------------  Dimensions:    Normal Values:  LA:     3.6    2.0 - 4.0 cm  Ao:     3.1    2.0 - 3.8 cm  SEPTUM: 1.2    0.6 - 1.2 cm  PWT:    1.2    0.6- 1.1 cm  LVIDd:  3.8    3.0 - 5.6 cm  LVIDs:  2.5    1.8 - 4.0 cm  Derived variables:  LVMI: 95 g/m2  RWT: 0.63  Fractional short: 34 %  EF (Visual Estimate): 65 %  Doppler Peak Velocity (m/sec): AoV=1.3  ------------------------------------------------------------------------  Observations:  Mitral Valve: Normal mitral valve. Minimal mitral  regurgitation.  Aortic Valve/Aorta: Calcified trileaflet aortic valve with  normal opening. Peak transaortic valve gradient equals 7 mm  Hg, mean transaortic valve gradient equals 3 mm Hg, aortic  valve velocity time integral equals 22 cm. Minimal aortic  regurgitation. Peak left ventricular outflow tract gradient  equals 6 mm Hg, mean gradient is equal to 2 mm Hg, LVOT  velocity time integral equals 20cm.  Aortic Root: 3.1 cm.  Left Atrium: Normal left atrium.  LA volume index = 23  cc/m2.  Left Ventricle: Normal left ventricular systolic function.  No segmental wall motion abnormalities. Mild concentric  left ventricular hypertrophy. Normal diastolic function.  Right Heart: Normal right atrium. The right ventricle is  not well visualized; grossly normal right ventricular size  and systolic function. Tricuspid valve not well visualized,  probably normal. Minimal tricuspid regurgitation. Normal  pulmonic valve. Mild pulmonic regurgitation.  Pericardium/Pleura: Thickened pericardium consistent with  prominent pericardial fat pad. No pericardial effusion.  Hemodynamic: Estimated right atrial pressure is 8 mm Hg.  Estimated right ventricular systolic pressure equals 27 mm  Hg, assuming right atrial pressure equals 8 mm Hg,  consistent with normal pulmonary pressures.  ------------------------------------------------------------------------  Conclusions:  1. Normal mitral valve. Minimal mitral regurgitation.  2. Calcified trileaflet aortic valve with normal opening.  Minimal aortic regurgitation.  3. Normal left ventricular systolic function. No segmental  wall motion abnormalities.  4. The right ventricle is not well visualized; grossly  normal right ventricular size and systolic function.  *** No previous Echo exam.  ------------------------------------------------------------------------  Confirmed on  2022 - 10:22:58 by Riddhi Buckner M.D.  ------------------------------------------------------------------------    < from: MR Lumbar Spine No Cont (04.15.22 @ 11:19) >    ACC: [id number] EXAM:  MR SPINE LUMBAR                        ACC: [id number] EXAM:  MR SPINE THORACIC                          PROCEDURE DATE:  04/15/2022          INTERPRETATION:  CLINICAL INDICATION: Trauma with fractures    Magnetic resonance imaging of the thoracic and lumbosacral spine was   carried out with sagittal surface coil imaging from T1 to S2 using T1 and   fast spin echo T2 weighted images with and without fat saturation   technique with axial T1 and fast spin echo T2 weighted imaging on a 3.0   Demi magnet.    Comparison is made with the prior CT of 2022.    There is a mild compression deformity of the T9 vertebral body. There is   a horizontal fracture through the T10 vertebral body which extends from   the anterior to posterior cortex and into the pedicles bilaterally. This   is a 3 column injury seen better on the CT and is unstable. There is   widening at the fracture fragments involving the body. There is no bony   retropulsion or canal compression. There is no current malalignment.    There is a moderate predominantly chronic compression deformity of T12.   There is minimal bony retropulsion of the posterior superior cortex of   T12. Although there appears to be somewhat splaying of the spinous   processes at this level on the MRI there is calcification seen on CT,   this does not have the appearance of recent or unstable fracture. The   remainder of the thoracic vertebral bodies are unremarkable.    Conus terminates normally at the L1-2 level.    Evaluation of the lumbar spine demonstrates a mild compression forming at   L2 1 mild compression deformity of L5 and a moderate compression   deformity of L3. There is no bony retropulsion or canal compression. None   of these lumbar fractures appear unstable the posterior elements are   intact. There is no cauda equina compression.    The paraspinal soft tissues are remarkable.      Impression: Multiple thoracic and lumbar vertebral body fractures. The   horizontal fracture through the T10 vertebral body includes all 3 columns   with fractures through the pedicles seen better on the CT of 2022   and is consistent with an unstable fracture although there is no evidence   of displacement. There is no bony retropulsion or canal compression.                                  --- End of Report ---            CARLA ANN MD; Attending Radiologist  This document has been electronically signed. Apr 15 2022 12:58PM    < end of copied text >

## 2022-04-19 NOTE — PROGRESS NOTE ADULT - NSPROGADDITIONALINFOA_GEN_ALL_CORE
- Counseling on diet, exercise, and medication adherence was done  - Counseling on smoking cessation and alcohol consumption offered when appropriate.  - Pain assessed and judicious use of narcotics when appropriate was discussed.    - Stroke education given when appropriate.  - Importance of fall prevention discussed.   - Differential diagnosis and plan of care discussed with patient and/or family and primary team
26 minutes spent development of plan of care/coordination of care/glycemic control through review of labs, blood glucose values and vital signs.
Case and plan discussed with ACP: Malinda
Case and plan discussed with ACP: Malinda
spoke to daughter on phone, questions answered, discussed findings and plan of care
spoke to patient's children on phone, questions answered

## 2022-04-19 NOTE — PROGRESS NOTE ADULT - PROBLEM SELECTOR PROBLEM 2
HTN (hypertension)
SAH (subarachnoid hemorrhage)
Altered mental state
SAH (subarachnoid hemorrhage)

## 2022-04-19 NOTE — DISCHARGE NOTE NURSING/CASE MANAGEMENT/SOCIAL WORK - NSDCVIVACCINE_GEN_ALL_CORE_FT
COVID-19 vaccine, vector-nr, rS-Ad26, PF, 0.5 mL (Stamped); 29-Mar-2021 13:11; Maria D Hightower (Student, Nursing); Stamped; [id number] (Exp. Date: 29-Mar-2021); IntraMuscular; Deltoid Right.; 0.5 milliLiter(s);

## 2022-04-19 NOTE — PROGRESS NOTE ADULT - REASON FOR ADMISSION
AMS x1 day

## 2022-04-19 NOTE — DISCHARGE NOTE NURSING/CASE MANAGEMENT/SOCIAL WORK - PATIENT PORTAL LINK FT
You can access the FollowMyHealth Patient Portal offered by [facility] by registering at the following website: [email or web address]. By joining [facility]’s FollowMyHealth portal, you will also be able to view your health information using other applications (apps) compatible with our system.

## 2022-04-19 NOTE — PROGRESS NOTE ADULT - PROBLEM SELECTOR PLAN 2
Repeat CTH stable, MR without additional concerns. EEG neg for sz activity  Neuro & NSGY input appreciated  still concern SAH-related seizure, vs contribution of hyperglycemia to neuro symptoms, they are now resolving  - BP control  - Telemetry   - continue keppra   - Hold ASA and lovenox for now  - seizure precautions - speech and swallow apprec
Goal BP <140/90 c/w norvasc.
Repeat CTH stable, MR without additional concerns. EEG neg for sz activity  Neuro & NSGY input appreciated  still concern SAH-related seizure, vs contribution of hyperglycemia to neuro symptoms, they are now resolving  - BP control  - Telemetry   - continue keppra   - Hold ASA and lovenox for now  - seizure precautions - speech and swallow apprec
transient likely seizure in setting of SAH poss seizure, and hyperglycemia as above  now resolved
Repeat CTH stable, MR without additional concerns. EEG neg for sz activity  Neuro & NSGY input appreciated  still concern SAH-related seizure, vs contribution of hyperglycemia to neuro symptoms, they are now resolving  - BP control  - Telemetry   - continue keppra   - Hold ASA and lovenox for now  - seizure precautions - speech and swallow apprec

## 2022-04-19 NOTE — PROGRESS NOTE ADULT - PROBLEM SELECTOR PLAN 7
- continue amlodipine   - continue statin  - improved    >Last BM reported 4/17. Continue miralax, senna

## 2022-04-19 NOTE — DISCHARGE NOTE PROVIDER - PROVIDER TOKENS
PROVIDER:[TOKEN:[[id number]]],PROVIDER:[TOKEN:[[id number]]],FREE:[LAST:[[facility]],PHONE:[phone]],FAX:[phone]   [phone]    -],ADDRESS:[[address] [place] [address]]]

## 2022-04-19 NOTE — PROGRESS NOTE ADULT - SUBJECTIVE AND OBJECTIVE BOX
[facility] Division of Hospital Medicine  Mitch Baum MD  Pager (M-F, 8A-5P): [phone]  Other Times:  [phone]    Patient is a 82y old  Female who presents with a chief complaint of AMS x1 day (19 Apr 2022 12:40)      SUBJECTIVE / OVERNIGHT EVENTS:  ADDITIONAL REVIEW OF SYSTEMS:    MEDICATIONS  (STANDING):  acetaminophen     Tablet .. 975 milliGRAM(s) Oral every 8 hours  amLODIPine   Tablet 5 milliGRAM(s) Oral daily  atorvastatin 20 milliGRAM(s) Oral at bedtime  cholecalciferol 1000 Unit(s) Oral daily  dextrose 5%. 1000 milliLiter(s) (50 mL/Hr) IV Continuous <Continuous>  dextrose 5%. 1000 milliLiter(s) (100 mL/Hr) IV Continuous <Continuous>  dextrose 50% Injectable 25 Gram(s) IV Push once  dextrose 50% Injectable 12.5 Gram(s) IV Push once  folic acid 1 milliGRAM(s) Oral daily  gabapentin 200 milliGRAM(s) Oral at bedtime  gabapentin 100 milliGRAM(s) Oral daily  glucagon  Injectable 1 milliGRAM(s) IntraMuscular once  hydroxychloroquine 200 milliGRAM(s) Oral daily  insulin glargine Injectable (LANTUS) 5 Unit(s) SubCutaneous at bedtime  insulin lispro (ADMELOG) corrective regimen sliding scale   SubCutaneous three times a day before meals  insulin lispro (ADMELOG) corrective regimen sliding scale   SubCutaneous at bedtime  insulin lispro Injectable (ADMELOG) 3 Unit(s) SubCutaneous before breakfast  insulin lispro Injectable (ADMELOG) 2 Unit(s) SubCutaneous before lunch  insulin lispro Injectable (ADMELOG) 3 Unit(s) SubCutaneous before dinner  levETIRAcetam  IVPB 500 milliGRAM(s) IV Intermittent every 12 hours  lidocaine   4% Patch 1 Patch Transdermal daily  nystatin Powder 1 Application(s) Topical two times a day  pantoprazole   Suspension 40 milliGRAM(s) Oral before breakfast    MEDICATIONS  (PRN):  dextrose Oral Gel 15 Gram(s) Oral once PRN Blood Glucose LESS THAN 70 milliGRAM(s)/deciliter  melatonin 3 milliGRAM(s) Oral at bedtime PRN Insomnia  oxyCODONE    IR 2.5 milliGRAM(s) Oral every 4 hours PRN Severe Pain (7 - 10)  polyethylene glycol 3350 17 Gram(s) Oral daily PRN Constipation  senna 2 Tablet(s) Oral at bedtime PRN Constipation      CAPILLARY BLOOD GLUCOSE      POCT Blood Glucose.: 158 mg/dL (19 Apr 2022 11:35)  POCT Blood Glucose.: 119 mg/dL (19 Apr 2022 07:45)  POCT Blood Glucose.: 178 mg/dL (18 Apr 2022 21:11)  POCT Blood Glucose.: 124 mg/dL (18 Apr 2022 16:10)    I&O's Summary    18 Apr 2022 07:01  -  19 Apr 2022 07:00  --------------------------------------------------------  IN: 660 mL / OUT: 900 mL / NET: -240 mL        PHYSICAL EXAM:  Vital Signs Last 24 Hrs  T(C): 37 (19 Apr 2022 10:31), Max: 37 (19 Apr 2022 10:31)  T(F): 98.6 (19 Apr 2022 10:31), Max: 98.6 (19 Apr 2022 10:31)  HR: 68 (19 Apr 2022 10:31) (68 - 72)  BP: 122/74 (19 Apr 2022 10:31) (115/66 - 125/69)  BP(mean): --  RR: 18 (19 Apr 2022 10:31) (18 - 18)  SpO2: 100% (19 Apr 2022 10:31) (98% - 100%)  CONSTITUTIONAL: NAD, well-developed, well-groomed  EYES: PERRLA; conjunctiva and sclera clear  ENMT: Moist oral mucosa, no pharyngeal injection or exudates; normal dentition  NECK: Supple, no palpable masses; no thyromegaly  RESPIRATORY: Normal respiratory effort; lungs are clear to auscultation bilaterally  CARDIOVASCULAR: Regular rate and rhythm, normal S1 and S2, no murmur/rub/gallop; No lower extremity edema; Peripheral pulses are 2+ bilaterally  ABDOMEN: Nontender to palpation, normoactive bowel sounds, no rebound/guarding; No hepatosplenomegaly  MUSCULOSKELETAL:  Normal gait; no clubbing or cyanosis of digits; no joint swelling or tenderness to palpation  PSYCH: A+O to person, place, and time; affect appropriate  NEUROLOGY: CN 2-12 are intact and symmetric; no gross sensory deficits   SKIN: No rashes; no palpable lesions    LABS:                        9.6    3.86  )-----------( 199      ( 18 Apr 2022 07:06 )             30.8     04-19    141  |  110<H>  |  21  ----------------------------<  117<H>  4.7   |  18<L>  |  1.48<H>    Ca    9.0      19 Apr 2022 05:56                  RADIOLOGY & ADDITIONAL TESTS:  Results Reviewed:   Imaging Personally Reviewed:  Electrocardiogram Personally Reviewed:    COORDINATION OF CARE:  Care Discussed with Consultants/Other Providers [Y/N]:  Prior or Outpatient Records Reviewed [Y/N]:   [facility] Division of Hospital Medicine  Mitch Baum MD  Pager (M-F, 8A-5P): [phone]  Other Times:  [phone]    Patient is a 82y old  Female who presents with a chief complaint of AMS x1 day (19 Apr 2022 12:40)      SUBJECTIVE / OVERNIGHT EVENTS:    ADDITIONAL REVIEW OF SYSTEMS:    MEDICATIONS  (STANDING):  acetaminophen     Tablet .. 975 milliGRAM(s) Oral every 8 hours  amLODIPine   Tablet 5 milliGRAM(s) Oral daily  atorvastatin 20 milliGRAM(s) Oral at bedtime  cholecalciferol 1000 Unit(s) Oral daily  dextrose 5%. 1000 milliLiter(s) (50 mL/Hr) IV Continuous <Continuous>  dextrose 5%. 1000 milliLiter(s) (100 mL/Hr) IV Continuous <Continuous>  dextrose 50% Injectable 25 Gram(s) IV Push once  dextrose 50% Injectable 12.5 Gram(s) IV Push once  folic acid 1 milliGRAM(s) Oral daily  gabapentin 200 milliGRAM(s) Oral at bedtime  gabapentin 100 milliGRAM(s) Oral daily  glucagon  Injectable 1 milliGRAM(s) IntraMuscular once  hydroxychloroquine 200 milliGRAM(s) Oral daily  insulin glargine Injectable (LANTUS) 5 Unit(s) SubCutaneous at bedtime  insulin lispro (ADMELOG) corrective regimen sliding scale   SubCutaneous three times a day before meals  insulin lispro (ADMELOG) corrective regimen sliding scale   SubCutaneous at bedtime  insulin lispro Injectable (ADMELOG) 3 Unit(s) SubCutaneous before breakfast  insulin lispro Injectable (ADMELOG) 2 Unit(s) SubCutaneous before lunch  insulin lispro Injectable (ADMELOG) 3 Unit(s) SubCutaneous before dinner  levETIRAcetam  IVPB 500 milliGRAM(s) IV Intermittent every 12 hours  lidocaine   4% Patch 1 Patch Transdermal daily  nystatin Powder 1 Application(s) Topical two times a day  pantoprazole   Suspension 40 milliGRAM(s) Oral before breakfast    MEDICATIONS  (PRN):  dextrose Oral Gel 15 Gram(s) Oral once PRN Blood Glucose LESS THAN 70 milliGRAM(s)/deciliter  melatonin 3 milliGRAM(s) Oral at bedtime PRN Insomnia  oxyCODONE    IR 2.5 milliGRAM(s) Oral every 4 hours PRN Severe Pain (7 - 10)  polyethylene glycol 3350 17 Gram(s) Oral daily PRN Constipation  senna 2 Tablet(s) Oral at bedtime PRN Constipation      CAPILLARY BLOOD GLUCOSE      POCT Blood Glucose.: 158 mg/dL (19 Apr 2022 11:35)  POCT Blood Glucose.: 119 mg/dL (19 Apr 2022 07:45)  POCT Blood Glucose.: 178 mg/dL (18 Apr 2022 21:11)  POCT Blood Glucose.: 124 mg/dL (18 Apr 2022 16:10)    I&O's Summary    18 Apr 2022 07:01  -  19 Apr 2022 07:00  --------------------------------------------------------  IN: 660 mL / OUT: 900 mL / NET: -240 mL        PHYSICAL EXAM:  Vital Signs Last 24 Hrs  T(C): 37 (19 Apr 2022 10:31), Max: 37 (19 Apr 2022 10:31)  T(F): 98.6 (19 Apr 2022 10:31), Max: 98.6 (19 Apr 2022 10:31)  HR: 68 (19 Apr 2022 10:31) (68 - 72)  BP: 122/74 (19 Apr 2022 10:31) (115/66 - 125/69)  BP(mean): --  RR: 18 (19 Apr 2022 10:31) (18 - 18)  SpO2: 100% (19 Apr 2022 10:31) (98% - 100%)      GENERAL: No acute distress, well-developed, alert  ENT: EOMI, PERRLA, conjunctiva and sclera clear,  moist mucosa no pharyngeal erythema or exudates   NECK: supple , no JVD   CHEST/LUNG: Clear to auscultation bilaterally; No wheeze, equal breath sounds bilaterally   BACK: No back tenderness;  No CVA tenderness   HEART: Regular rate and rhythm; No murmurs, rubs, or gallops  ABDOMEN: Soft, Nontender, Nondistended; Bowel sounds present  EXTREMITIES:  No clubbing, cyanosis, +back tenderness  MSK: No joint swelling or effusions, ROM intact   PSYCH: Normal behavior/affect  NEUROLOGY:  AAOx3, non-focal, cranial nerves intact, no aphasia  SKIN: Normal color, No rashes or lesions    LABS:                        9.6    3.86  )-----------( 199      ( 18 Apr 2022 07:06 )             30.8     04-19    141  |  110<H>  |  21  ----------------------------<  117<H>  4.7   |  18<L>  |  1.48<H>    Ca    9.0      19 Apr 2022 05:56                  RADIOLOGY & ADDITIONAL TESTS:  Results Reviewed:   Imaging Personally Reviewed:  Electrocardiogram Personally Reviewed:    COORDINATION OF CARE:  Care Discussed with Consultants/Other Providers [Y/N]:  Prior or Outpatient Records Reviewed [Y/N]:

## 2022-04-19 NOTE — PROGRESS NOTE ADULT - PROBLEM SELECTOR PLAN 8
dispo to [facility]    patient will need geriatric referral after rehab, lives alone and family has been unable to increase home care hours dispo to [facility]    patient will need geriatric referral after rehab, lives alone and family has been unable to increase home care hours    Discharge planning >40mins

## 2022-04-19 NOTE — DISCHARGE NOTE NURSING/CASE MANAGEMENT/SOCIAL WORK - NSDCPEFALRISK_GEN_ALL_CORE
For information on Fall & Injury Prevention, visit: [email or web address] OR  [email or web address] OR  https://www.cdc.gov/steadi/patient.html

## 2022-04-19 NOTE — DISCHARGE NOTE PROVIDER - NSDCQMANTICOAGCONTRA_GEN_A_CORE
Patient does not have atrial fibrillation/flutter Patient does not have atrial fibrillation/flutter/Intracranial Hemorrhage/Other reason...

## 2022-04-19 NOTE — PROGRESS NOTE ADULT - SUBJECTIVE AND OBJECTIVE BOX
Patient seen and examined  no complaints    No Known Allergies    Hospital Medications:   MEDICATIONS  (STANDING):  acetaminophen     Tablet .. 975 milliGRAM(s) Oral every 8 hours  amLODIPine   Tablet 5 milliGRAM(s) Oral daily  atorvastatin 20 milliGRAM(s) Oral at bedtime  cholecalciferol 1000 Unit(s) Oral daily  dextrose 5%. 1000 milliLiter(s) (50 mL/Hr) IV Continuous <Continuous>  dextrose 5%. 1000 milliLiter(s) (100 mL/Hr) IV Continuous <Continuous>  dextrose 50% Injectable 25 Gram(s) IV Push once  dextrose 50% Injectable 12.5 Gram(s) IV Push once  folic acid 1 milliGRAM(s) Oral daily  gabapentin 200 milliGRAM(s) Oral at bedtime  gabapentin 100 milliGRAM(s) Oral daily  glucagon  Injectable 1 milliGRAM(s) IntraMuscular once  hydroxychloroquine 200 milliGRAM(s) Oral daily  insulin glargine Injectable (LANTUS) 5 Unit(s) SubCutaneous at bedtime  insulin lispro (ADMELOG) corrective regimen sliding scale   SubCutaneous three times a day before meals  insulin lispro (ADMELOG) corrective regimen sliding scale   SubCutaneous at bedtime  insulin lispro Injectable (ADMELOG) 3 Unit(s) SubCutaneous before breakfast  insulin lispro Injectable (ADMELOG) 2 Unit(s) SubCutaneous before lunch  insulin lispro Injectable (ADMELOG) 3 Unit(s) SubCutaneous before dinner  levETIRAcetam  IVPB 500 milliGRAM(s) IV Intermittent every 12 hours  lidocaine   4% Patch 1 Patch Transdermal daily  nystatin Powder 1 Application(s) Topical two times a day  pantoprazole   Suspension 40 milliGRAM(s) Oral before breakfast      VITALS:  T(F): 98.6 (22 @ 10:31), Max: 98.6 (22 @ 10:31)  HR: 68 (22 @ 10:31)  BP: 122/74 (22 @ 10:31)  RR: 18 (22 @ 10:31)  SpO2: 100% (22 @ 10:31)  Wt(kg): --     @ 07:01  -   @ 07:00  --------------------------------------------------------  IN: 660 mL / OUT: 900 mL / NET: -240 mL        PHYSICAL EXAM:  Constitutional: NAD  HEENT: anicteric sclera, oropharynx clear.  Neck: No JVD  Respiratory: CTAB, no wheezes, rales or rhonchi  Cardiovascular: S1, S2, RRR  Gastrointestinal: BS+, soft, NT/ND  Extremities:  No peripheral edema  Neurological: A/O x 3, no focal deficits  : No CVA tenderness. No neves.     LABS:      141  |  110<H>  |  21  ----------------------------<  117<H>  4.7   |  18<L>  |  1.48<H>    Ca    9.0      2022 05:56      Creatinine Trend: 1.48 <--, 1.31 <--, 1.26 <--, 1.34 <--, 1.46 <--, 1.76 <--, 2.31 <--                        9.6    3.86  )-----------( 199      ( 2022 07:06 )             30.8     Urine Studies:  Urinalysis Basic - ( 2022 04:40 )    Color: Colorless / Appearance: Clear / S.020 / pH:   Gluc:  / Ketone: Negative  / Bili: Negative / Urobili: Negative   Blood:  / Protein: Trace / Nitrite: Negative   Leuk Esterase: Negative / RBC: 2 /hpf / WBC 0 /HPF   Sq Epi:  / Non Sq Epi: 0 /hpf / Bacteria: Negative        RADIOLOGY & ADDITIONAL STUDIES:

## 2022-04-19 NOTE — PROGRESS NOTE ADULT - ASSESSMENT
82 y.o. LHW with a PMH of HTN, rheumatoid arthritis, multiple myeloma, who presented to OSH with c/o slurred speech and confusion, found to have a small left occipital SAH with DUC on CKD stage 3    1) Duc on CKD stage 3  ddx includes pre renal vs atn  Cr stable-->fluctuations expected  b/l cr is 1.3 to 1.4mg/dl  trend bun/cr  avoid nephrotoxins  will monitor    2) HTN- bp stable  monitor bp      Dr Gandara  [phone]

## 2022-04-19 NOTE — PROGRESS NOTE ADULT - ASSESSMENT
82 F w/pmh  significant for HTN, multiple myeloma, RA on ASA81, remote h/o CVA no residual , p/w AMS to [facility] , found to have SAH t/f to [facility]. Also with t-spine fracture on MRI, pursuing conservative management with TLSO and plan to re-assess outpt.

## 2022-04-19 NOTE — DISCHARGE NOTE PROVIDER - NSDCCPCAREPLAN_GEN_ALL_CORE_FT
PRINCIPAL DISCHARGE DIAGNOSIS  Diagnosis: Subarachnoid hemorrhage  Assessment and Plan of Treatment: AMS and transferred    SAH (subarachnoid hemorrhage); Repeat CTH stable, MR without additional concerns. EEG neg for sz activity  Neuro & NSGY followed; still concern SAH-related seizure, vs contribution of hyperglycemia to neuro symptoms, they are now resolving  - BP control; continue keppra, hold ASA and lovenox for now  Seizure precautions - speech and swallow      SECONDARY DISCHARGE DIAGNOSES  Diagnosis: Fracture of thoracic spine  Assessment and Plan of Treatment: Fracture of thoracic spine; unstable 3 column horizontal T10 spinal fracture  nsgy seen and followed  - plan for conservative orthotics management for now, potential surgery in future outpt follow up; multimodal pain control.    Diagnosis: Altered mental state  Assessment and Plan of Treatment: Altered mental state;  transient likely seizure in setting of SAH poss seizure, and hyperglycemia as above - now resolved.    Diagnosis: Uncontrolled type 2 diabetes mellitus with hyperglycemia  Assessment and Plan of Treatment: Uncontrolled type 2 diabetes mellitus with hyperglycemia; suspect at least in part secondary to Dex , A1c is >11  - s/p IV fluid bolus; basal/bolus insulin w coverage sliding scale  Seen and followed by endo.; f/u mgmt given MM tx involving high dose steroid and plan for f/u with endocrine    Diagnosis: Rheumatoid arthritis  Assessment and Plan of Treatment: Rheumatoid arthritis; continue hydroxychloroquine

## 2022-04-19 NOTE — DISCHARGE NOTE PROVIDER - CARE PROVIDERS DIRECT ADDRESSES
[email or web address],[email or web address],DirectAddress_Unknown

## 2022-04-19 NOTE — PROGRESS NOTE ADULT - PROBLEM SELECTOR PROBLEM 4
Uncontrolled type 2 diabetes mellitus with hyperglycemia
Rheumatoid arthritis
Uncontrolled type 2 diabetes mellitus with hyperglycemia

## 2022-04-19 NOTE — PROGRESS NOTE ADULT - PROBLEM SELECTOR PROBLEM 5
Rheumatoid arthritis
Multiple myeloma
Rheumatoid arthritis

## 2022-04-19 NOTE — PROGRESS NOTE ADULT - PROBLEM SELECTOR PROBLEM 6
Multiple myeloma
HTN (hypertension)
Multiple myeloma
(4) rarely moist
Multiple myeloma

## 2022-04-19 NOTE — PROGRESS NOTE ADULT - PROBLEM SELECTOR PROBLEM 1
Uncontrolled type 2 diabetes mellitus with hyperglycemia
Fracture of thoracic spine
Fracture of thoracic spine
SAH (subarachnoid hemorrhage)
Fracture of thoracic spine

## 2022-04-19 NOTE — DISCHARGE NOTE PROVIDER - NSDCMRMEDTOKEN_GEN_ALL_CORE_FT
acetaminophen 325 mg oral tablet: 2 tab(s) orally every 6 hours, As needed, Moderate Pain (4 - 6)  amLODIPine 5 mg oral tablet: 1 tab(s) orally once a day   aspirin 81 mg oral tablet, chewable: 1 tab(s) orally once a day  atorvastatin 20 mg oral tablet: 1 tab(s) orally once a day  cholecalciferol oral tablet: 2000 unit(s) orally once a day  dexamethasone 4 mg oral tablet: 3 tab(s) orally 2 times a week, monday thursday  folic acid 1 mg tablet: 1 tab(s) orally once a day x 30 days   gabapentin 100 mg oral capsule: 2 cap(s) orally 2 times a day  hydroxychloroquine 200 mg oral tablet: 1 tab(s) orally once a day  lidocaine 5% topical film: Apply topically to affected area once a day  melatonin 3 mg oral tablet: 1 tab(s) orally once a day (at bedtime)  menthol-benzocaine 3.6 mg-15 mg mucous membrane lozenge: 1 cap(s) mucous membrane 2 times a day   pantoprazole 40 mg oral delayed release tablet: 1 tab(s) orally once a day (before a meal)  polyethylene glycol 3350 oral powder for reconstitution: 17 gram(s) orally once a day, As needed, Constipation  Pomalyst 2 mg oral capsule: 1 cap(s) orally every other day  senna oral tablet: 2 tab(s) orally once a day (at bedtime), As Needed   TLSO BRACE - DX: UNSTABLE T-10 FRACTURE  CARL=99:    acetaminophen 325 mg oral tablet: 3 tab(s) orally every 8 hours  amLODIPine 5 mg oral tablet: 1 tab(s) orally once a day   atorvastatin 20 mg oral tablet: 1 tab(s) orally once a day (at bedtime)  cholecalciferol oral tablet: 1000 unit(s) orally once a day  folic acid 1 mg oral tablet: 1 tab(s) orally once a day  gabapentin 100 mg oral capsule: 1 cap(s) orally once a day  gabapentin 100 mg oral capsule: 2 cap(s) orally once a day (at bedtime)  hydroxychloroquine 200 mg oral tablet: 1 tab(s) orally once a day  insulin glargine 100 units/mL subcutaneous solution: 5 unit(s) subcutaneous once a day (at bedtime)  insulin lispro 100 units/mL injectable solution: injectable 3 times a day (before meals)  1 Unit(s) if Glucose 151 - 200  2 Unit(s) if Glucose 201 - 250  3 Unit(s) if Glucose 251 - 300  4 Unit(s) if Glucose 301 - 350  5 Unit(s) if Glucose 351 - 400  6 Unit(s) if Glucose Greater Than 400  insulin lispro 100 units/mL injectable solution: 2 unit(s) injectable once a day  BEFORE LUNCH  insulin lispro 100 units/mL injectable solution: 3 unit(s) injectable once a day  BEFORE BREAKFAST  insulin lispro 100 units/mL injectable solution: 3 unit(s) injectable once a day  BEFORE DINNER   insulin lispro 100 units/mL injectable solution: injectable once a day (at bedtime)  0 Unit(s) if Glucose 0 - 250  1 Unit(s) if Glucose 251 - 300  2 Unit(s) if Glucose 301 - 350  3 Unit(s) if Glucose 351 - 400  4 Unit(s) if Glucose Greater Than 400  Keppra 500 mg oral tablet: 1 tab(s) orally every 12 hours  lidocaine 4% topical film: Apply topically to affected area once a day  melatonin 3 mg oral tablet: 1 tab(s) orally once a day (at bedtime)  nystatin 100,000 units/g topical powder: 1 application topically 2 times a day  to affected area   oxyCODONE: 2.5 milligram(s) orally every 4 hours, As Needed for pain   pantoprazole 40 mg oral granule, delayed release: 40 milligram(s) orally once a day (before a meal) - before breakfast   polyethylene glycol 3350 oral powder for reconstitution: 17 gram(s) orally once a day, As needed, Constipation  senna oral tablet: 2 tab(s) orally once a day (at bedtime), As needed, Constipation  Monitor for BM  Hold for loose stool   TLSO BRACE - DX: UNSTABLE T-10 FRACTURE  CARL=99:

## 2022-04-19 NOTE — DISCHARGE NOTE NURSING/CASE MANAGEMENT/SOCIAL WORK - NURSING SECTION COMPLETE
pt BIBA s/p heroin OD, given total 8 of narcan intranasally prior to EMS arrival. EMS reports pt's O2 sat on room air was 74%, placed on 6L and brought up to 93%. pt's O2 sat upon arrival 85% on room air, 90% on 6L. pt brought into room and placed on NRB. MD Bennett at bedside
Patient/Caregiver provided printed discharge information.

## 2022-04-19 NOTE — PROGRESS NOTE ADULT - PROBLEM SELECTOR PROBLEM 7
HTN (hypertension)
Back pain

## 2022-05-16 ENCOUNTER — OUTPATIENT (OUTPATIENT)
Dept: OUTPATIENT SERVICES | Facility: HOSPITAL | Age: 82
LOS: 1 days | Discharge: HOME | End: 2022-05-16

## 2022-05-16 DIAGNOSIS — C90.00 MULTIPLE MYELOMA NOT HAVING ACHIEVED REMISSION: ICD-10-CM

## 2022-05-18 ENCOUNTER — APPOINTMENT (OUTPATIENT)
Dept: NEUROSURGERY | Facility: CLINIC | Age: 82
End: 2022-05-18
Payer: MEDICARE

## 2022-05-18 VITALS
HEART RATE: 69 BPM | WEIGHT: 132.28 LBS | OXYGEN SATURATION: 99 % | HEIGHT: 59.84 IN | SYSTOLIC BLOOD PRESSURE: 133 MMHG | BODY MASS INDEX: 25.97 KG/M2 | DIASTOLIC BLOOD PRESSURE: 76 MMHG

## 2022-05-18 DIAGNOSIS — S22.078A: ICD-10-CM

## 2022-05-18 PROCEDURE — 99214 OFFICE O/P EST MOD 30 MIN: CPT

## 2022-05-19 ENCOUNTER — RESULT REVIEW (OUTPATIENT)
Age: 82
End: 2022-05-19

## 2022-05-19 ENCOUNTER — APPOINTMENT (OUTPATIENT)
Dept: HEMATOLOGY ONCOLOGY | Facility: CLINIC | Age: 82
End: 2022-05-19
Payer: MEDICARE

## 2022-05-19 VITALS
SYSTOLIC BLOOD PRESSURE: 129 MMHG | OXYGEN SATURATION: 99 % | HEART RATE: 61 BPM | WEIGHT: 134 LBS | BODY MASS INDEX: 26.31 KG/M2 | TEMPERATURE: 96.9 F | DIASTOLIC BLOOD PRESSURE: 81 MMHG | RESPIRATION RATE: 15 BRPM

## 2022-05-19 LAB
BASOPHILS # BLD AUTO: 0.06 K/UL — SIGNIFICANT CHANGE UP (ref 0–0.2)
BASOPHILS NFR BLD AUTO: 1.5 % — SIGNIFICANT CHANGE UP (ref 0–2)
EOSINOPHIL # BLD AUTO: 0.1 K/UL — SIGNIFICANT CHANGE UP (ref 0–0.5)
EOSINOPHIL NFR BLD AUTO: 2.5 % — SIGNIFICANT CHANGE UP (ref 0–6)
HCT VFR BLD CALC: 31.1 % — LOW (ref 34.5–45)
HGB BLD-MCNC: 9.8 G/DL — LOW (ref 11.5–15.5)
IMM GRANULOCYTES NFR BLD AUTO: 0.2 % — SIGNIFICANT CHANGE UP (ref 0–1.5)
LYMPHOCYTES # BLD AUTO: 2.08 K/UL — SIGNIFICANT CHANGE UP (ref 1–3.3)
LYMPHOCYTES # BLD AUTO: 51.2 % — HIGH (ref 13–44)
MCHC RBC-ENTMCNC: 28.3 PG — SIGNIFICANT CHANGE UP (ref 27–34)
MCHC RBC-ENTMCNC: 31.5 G/DL — LOW (ref 32–36)
MCV RBC AUTO: 89.9 FL — SIGNIFICANT CHANGE UP (ref 80–100)
MONOCYTES # BLD AUTO: 0.42 K/UL — SIGNIFICANT CHANGE UP (ref 0–0.9)
MONOCYTES NFR BLD AUTO: 10.3 % — SIGNIFICANT CHANGE UP (ref 2–14)
NEUTROPHILS # BLD AUTO: 1.39 K/UL — LOW (ref 1.8–7.4)
NEUTROPHILS NFR BLD AUTO: 34.3 % — LOW (ref 43–77)
NRBC # BLD: 0 /100 WBCS — SIGNIFICANT CHANGE UP (ref 0–0)
PLATELET # BLD AUTO: 307 K/UL — SIGNIFICANT CHANGE UP (ref 150–400)
RBC # BLD: 3.46 M/UL — LOW (ref 3.8–5.2)
RBC # FLD: 18.5 % — HIGH (ref 10.3–14.5)
WBC # BLD: 4.06 K/UL — SIGNIFICANT CHANGE UP (ref 3.8–10.5)
WBC # FLD AUTO: 4.06 K/UL — SIGNIFICANT CHANGE UP (ref 3.8–10.5)

## 2022-05-19 PROCEDURE — 99214 OFFICE O/P EST MOD 30 MIN: CPT

## 2022-05-20 NOTE — PHYSICAL EXAM
[General Appearance - Alert] : alert [General Appearance - In No Acute Distress] : in no acute distress [Oriented To Time, Place, And Person] : oriented to person, place, and time [Limited Balance] : the patient's balance was impaired [No Visual Abnormalities] : no visible abnormailities [Straight-Leg Raise Test - Left] : straight leg raise of the left leg was positive [Straight-Leg Raise Test - Right] : straight leg raise of the right leg was positive [Neck Appearance] : the appearance of the neck was normal [] : no respiratory distress [Heart Rate And Rhythm] : heart rate was normal and rhythm regular [FreeTextEntry8] : Wheelchair dependent [Able to toe walk] : the patient was not able to toe walk [Able to heel walk] : the patient was not able to heel walk [FreeTextEntry1] : Wheelchair assist

## 2022-05-20 NOTE — REVIEW OF SYSTEMS
[As Noted in HPI] : as noted in HPI [Leg Weakness] : leg weakness [Poor Coordination] : poor coordination [Ataxia] : ataxia [Negative] : Respiratory [FreeTextEntry4] : diminished hearing

## 2022-05-20 NOTE — ASSESSMENT
[FreeTextEntry1] : MS. Angelika James is a 82 year old woman presenting with T10 Thoracic Compression fracture. \par No Surgical interventions recommended at this time. \par CUSTOM CLAM SHELL TLSO BRACE Orthotic MEDICALLY NECESSARY and Recommended  for the following reasons:\par Brace/Orthotics immobilized your spine during healing\par Stabilizes injured areas\par controls pain by restricting movements\par Instructions regarding usage provided to patient and family as applicable.\par \par She will follow up with hematologist for consideration of medications to restore bone.\par She will follow up in 2 months with new Thoracolumbar Xary.\par \par Please see Dr. Singh's dictation for details.\par I, Dr. Ching Singh evaluated the patient with the nurse practitioner Butch Leonard and established the plan of care. I personally discuss this patient with the nurse practitioner at the time of the visit. I agree with the assessment and plan as written, unless noted below.\par \par \par

## 2022-05-20 NOTE — REASON FOR VISIT
[New Patient Visit] : a new patient visit [Formal Caregiver] : formal caregiver [Family Member] : family member

## 2022-05-24 RX ORDER — DEXAMETHASONE 4 MG/1
4 TABLET ORAL
Qty: 24 | Refills: 7 | Status: DISCONTINUED | COMMUNITY
Start: 2022-02-22 | End: 2022-05-24

## 2022-05-24 RX ORDER — GLUCOSAMINE HCL/CHONDROITIN SU 500-400 MG
3 CAPSULE ORAL
Qty: 30 | Refills: 2 | Status: ACTIVE | COMMUNITY
Start: 2022-05-24

## 2022-05-24 RX ORDER — ACETAMINOPHEN 325 MG/1
325 TABLET, FILM COATED ORAL
Qty: 1 | Refills: 0 | Status: ACTIVE | COMMUNITY
Start: 2022-05-24

## 2022-05-24 RX ORDER — LIDOCAINE 40 MG/G
4 PATCH TOPICAL
Qty: 30 | Refills: 0 | Status: ACTIVE | COMMUNITY
Start: 2022-05-24

## 2022-05-24 RX ORDER — LIDOCAINE 5% 700 MG/1
5 PATCH TOPICAL
Qty: 10 | Refills: 0 | Status: DISCONTINUED | COMMUNITY
Start: 2021-11-08 | End: 2022-05-24

## 2022-05-24 RX ORDER — HYDROXYCHLOROQUINE SULFATE 200 MG/1
200 TABLET, FILM COATED ORAL DAILY
Qty: 30 | Refills: 0 | Status: ACTIVE | COMMUNITY
Start: 2022-05-24

## 2022-05-24 RX ORDER — SENNOSIDES 8.6 MG TABLETS 8.6 MG/1
8.6 TABLET ORAL
Qty: 60 | Refills: 1 | Status: ACTIVE | COMMUNITY
Start: 2022-05-24

## 2022-05-24 RX ORDER — ASPIRIN 81 MG/1
81 TABLET, CHEWABLE ORAL DAILY
Qty: 30 | Refills: 0 | Status: DISCONTINUED | COMMUNITY
Start: 2021-06-29 | End: 2022-05-24

## 2022-05-24 RX ORDER — PANTOPRAZOLE 40 MG/1
40 TABLET, DELAYED RELEASE ORAL DAILY
Qty: 30 | Refills: 3 | Status: ACTIVE | COMMUNITY
Start: 2022-05-24

## 2022-05-24 RX ORDER — ATORVASTATIN CALCIUM 20 MG/1
20 TABLET, FILM COATED ORAL DAILY
Qty: 30 | Refills: 0 | Status: ACTIVE | COMMUNITY
Start: 2022-05-24

## 2022-05-24 RX ORDER — MELATONIN 3 MG
25 MCG TABLET ORAL
Qty: 30 | Refills: 2 | Status: ACTIVE | COMMUNITY
Start: 2022-05-24

## 2022-05-24 NOTE — ASSESSMENT
[Supportive] : Goals of care discussed with patient: Supportive [FreeTextEntry1] : Ms. James is an 82 year old female with history of MM, RA, HTN, TIA. s/p treatment as per HPI...was  on observation...IGG 3370 (8/15/17). 2018 to 2020 treated again with VD.\par \par Admitted 4/13/22- 4/19/22. Currently in rehab. \par \par 1) MM\par Patient was on velcade 3 weeks on, 1 week off. Treatment was held and then discontinued. \par Discussed pomalyst 2 mg every other day on 6/29/21. \par Pomalyst application filled out and patient  started  pomalyst 2 mg every other day. Side effects explained to patient and family. IgG is down\par Daughter  on telephone and agree with plan of care. \par Continue Zometa 2 mg every 3  months. Scheduled for 6/1/22.\par Decadron Discontinued\par \par 2) Heme\par Anemia. No indication for transfusion. \par 5/19/22: WBC 4.06  H/H 9.8/31.1    ANC 1.39\par Continue folic acid 1 mg daily\par \par 3) HTN\par Continue amlodipine 2.5 mg daily\par Continue atorvastatin 20 mg daily\par \par 4) Pain- \par MRI Spine on 4/15/22- Multiple thoracic/lumbar vertebral body fractures \par Continue to follow up with neurosurgeon \par Continue gabapentin 200 mg BID\par Tylenol prn for pain\par Oxycodone 2.5 mg every 4 hours prn for pain\par Lidocaine 4% topical patch- apply 1 patch by topical route once daily to lower back for 12 hours\par Wear TLSO brace when OOB\par Patient is in rehab at this time. Continue physical therapy.\par Questions and concerns addressed. Reassurance provided.\par \par 5) Uncontrolled type 2 diabetes mellitus with hyperglycemia; suspect at least in part secondary to Dex \par Insulin Glargine-yfgn- Inject 5 units SQ daily at bedtime\par Humalog kwikPen- Inject 3 units SQ daily before breakfast, inject 2 units SQ daily before lunch\par \par 6)GI\par Continue:\par Protonix 40 mg daily\par Polyethylene glycol 3350  17 gram/dose daily\par Senna 8.6 mg- take two tablets daily at bedtime\par \par 7) Other\par Rheumatoid arthritis; continue hydroxychloroquine daily\par Continue Vitamin D3 25 mcg ( 1000 unit) - daily\par Continue keppra 500 mg tablet- take one tablet by mouth twice a day\par Continue melatonin 3 mg tablet- take one tablet once daily at bedtime\par \par 8) Plan\par Cont  pomalyst 2 mg every other\par Patient advised to contact immediately with any concerns or symptoms.\par Follow up with Dr Rosales in three months\par

## 2022-05-24 NOTE — HISTORY OF PRESENT ILLNESS
[de-identified] : Multiple Myeloma s/p Thal/ Dex 2008 to 2010.....TIA in 2009.....Velcade 2012 to 2013...treatment held due to neuropathy..resumed b/c of POD...tolerating thus far [de-identified] : Patient presents for a follow-up visit.  She is ambulating with a cane. Her son is with her...She was hospitalized and recently discharged from rehab s/p infectious complications. New vertebral compression fx...still with painShe reports a healthy appetite. She notes varicose veins bilaterally in her LE and generalized arthritic pain which is stable. She notes some stiffness in hands bilaterally.  She states she followed up with neurology for issues with her discs. She states her hip pain is stable. She off  Velcade three weeks on and 1 week off with Decadron. Denies fever/chills, night sweats, mouth sores, eye dryness, blurred vision, nausea, vomiting, diarrhea. No CP, SOB or LE edema. \par \par On 8/19//21, patient presents for a follow up visit. Overall patient is tolerating treatment.. and offers no acute concerns aside from continues low back pain similar to when she was hospitalized... Has generalized arthritic pain which is stable. Denies fever, chills, nausea, vomiting, diarrhea, rash, mouth sores, dysuria or any signs of active bleeding. Denies SOB, chest pain or B/L LE edema. Daughter  on telephone during today's visit. \par \par 2/16/22 Here with aide.fer on phone...back pain on and off..difficulty raising head up fully...using tylenol and lidocaine patch..tolerating pom dex...improved spep...\par \par Admitted 4/13/22- 4/19/22. 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 Also with t-spine fracture on MRI, pursuing conservative management with TLSO and plan to re-assess outpt. \par \par Fracture of thoracic spine; unstable 3 column horizontal T10 spinal fracture nsgy seen and followed  - plan for conservative orthotics management for now, potential surgery in future outpt follow up; multimodal pain control. \par \par SAH (subarachnoid hemorrhage); Repeat CTH stable, MR without additional concerns. EEG neg for sz activity. Neuro & NSGY followed; still concern SAH-related seizure, vs contribution of hyperglycemia to neuro symptoms, they are now resolving. \par - BP control; continue keppra, hold ASA and lovenox for now \par Seizure precautions - speech and swallow \par \par Altered mental state;  transient likely seizure in setting of SAH poss seizure, and hyperglycemia as above - now resolved. \par Uncontrolled type 2 diabetes mellitus with hyperglycemia; suspect at least in part secondary to Dex , A1c is >11 \par - s/p IV fluid bolus; basal/bolus insulin w coverage sliding scale \par Seen and followed by endo.; f/u mgmt given MM tx involving high dose steroid \par and plan for f/u with endocrine \par Rheumatoid arthritis; continue hydroxychloroquine \par KAJAL on CKD - sCr overall stable. \par Multiple myeloma; heme onc followed \par HTN (hypertension); continue amlodipine - continue statin; improved \par Pt. had last BM reported 4/17 and continue miralax & senna. Discharge planning; dispo to [facility] on 4/19 (d/w Dr. De La Vega) \par \par On 5/19/22, patient presents for a follow up visit. Patient is currently in rehab and is accompanied by health aide today. Daughter on speaker phone during today's visit. Angelika is in a wheelchair and has a back brace on. Recently admitted 4/13/22- 4/19/22. Overall well and offers no acute concerns. On pomalyst every other day. Denies fever, chills, nausea, vomiting, diarrhea, rash, mouth sores, dysuria or any signs of active bleeding. Denies SOB, chest pain or B/L LE Edema.

## 2022-05-24 NOTE — REVIEW OF SYSTEMS
[Joint Pain] : joint pain [Joint Stiffness] : joint stiffness [Difficulty Walking] : difficulty walking [Negative] : Constitutional [Fever] : no fever [Chills] : no chills [Night Sweats] : no night sweats [Fatigue] : no fatigue [Recent Change In Weight] : ~T no recent weight change [Cough] : no cough [Abdominal Pain] : no abdominal pain [Vomiting] : no vomiting [Confused] : no confusion [Dizziness] : no dizziness [Fainting] : no fainting [FreeTextEntry5] : bilateral LE varicose veins [FreeTextEntry9] : stable generalized arthritic. Pain of  lower back. Back brace on.  [de-identified] : In a wheelchair today

## 2022-05-24 NOTE — PHYSICAL EXAM
[Ambulatory and capable of all self care but unable to carry out any work activities] : Status 2- Ambulatory and capable of all self care but unable to carry out any work activities. Up and about more than 50% of waking hours [Normal] : affect appropriate [de-identified] : In a wheelchair. Back brace on.

## 2022-05-29 LAB
ALBUMIN MFR SERPL ELPH: 46.7 %
ALBUMIN SERPL ELPH-MCNC: 3.8 G/DL
ALBUMIN SERPL-MCNC: 3.3 G/DL
ALBUMIN/GLOB SERPL: 0.9 RATIO
ALP BLD-CCNC: 122 U/L
ALPHA1 GLOB MFR SERPL ELPH: 5.5 %
ALPHA1 GLOB SERPL ELPH-MCNC: 0.4 G/DL
ALPHA2 GLOB MFR SERPL ELPH: 11.6 %
ALPHA2 GLOB SERPL ELPH-MCNC: 0.8 G/DL
ALT SERPL-CCNC: 12 U/L
ANION GAP SERPL CALC-SCNC: 13 MMOL/L
AST SERPL-CCNC: 18 U/L
B-GLOBULIN MFR SERPL ELPH: 9.8 %
B-GLOBULIN SERPL ELPH-MCNC: 0.7 G/DL
B2 MICROGLOB SERPL-MCNC: 4.3 MG/L
BILIRUB SERPL-MCNC: 0.3 MG/DL
BUN SERPL-MCNC: 25 MG/DL
CALCIUM SERPL-MCNC: 10 MG/DL
CHLORIDE SERPL-SCNC: 111 MMOL/L
CO2 SERPL-SCNC: 20 MMOL/L
CREAT SERPL-MCNC: 1.37 MG/DL
DEPRECATED KAPPA LC FREE/LAMBDA SER: 50.2 RATIO
DEPRECATED KAPPA LC FREE/LAMBDA SER: 50.2 RATIO
EGFR: 39 ML/MIN/1.73M2
GAMMA GLOB FLD ELPH-MCNC: 1.9 G/DL
GAMMA GLOB MFR SERPL ELPH: 26.4 %
GLUCOSE SERPL-MCNC: 116 MG/DL
IGA SER QL IEP: 24 MG/DL
IGG SER QL IEP: 1930 MG/DL
IGM SER QL IEP: 33 MG/DL
INTERPRETATION SERPL IEP-IMP: NORMAL
KAPPA LC CSF-MCNC: 1.78 MG/DL
KAPPA LC CSF-MCNC: 1.78 MG/DL
KAPPA LC SERPL-MCNC: 89.35 MG/DL
KAPPA LC SERPL-MCNC: 89.35 MG/DL
LDH SERPL-CCNC: 177 U/L
M PROTEIN MFR SERPL ELPH: 19.8 %
M PROTEIN SPEC IFE-MCNC: NORMAL
MAGNESIUM SERPL-MCNC: 2.2 MG/DL
MONOCLON BAND OBS SERPL: 1.4 G/DL
POTASSIUM SERPL-SCNC: 4.9 MMOL/L
PROT SERPL-MCNC: 7.1 G/DL
SODIUM SERPL-SCNC: 144 MMOL/L

## 2022-06-11 ENCOUNTER — APPOINTMENT (OUTPATIENT)
Dept: INFUSION THERAPY | Facility: HOSPITAL | Age: 82
End: 2022-06-11

## 2022-06-27 ENCOUNTER — RESULT REVIEW (OUTPATIENT)
Age: 82
End: 2022-06-27

## 2022-06-27 ENCOUNTER — OUTPATIENT (OUTPATIENT)
Dept: OUTPATIENT SERVICES | Facility: HOSPITAL | Age: 82
LOS: 1 days | End: 2022-06-27
Payer: MEDICARE

## 2022-06-27 ENCOUNTER — APPOINTMENT (OUTPATIENT)
Dept: HEMATOLOGY ONCOLOGY | Facility: CLINIC | Age: 82
End: 2022-06-27

## 2022-06-27 DIAGNOSIS — C90.00 MULTIPLE MYELOMA NOT HAVING ACHIEVED REMISSION: ICD-10-CM

## 2022-06-27 LAB
ALBUMIN SERPL ELPH-MCNC: 4 G/DL
ALP BLD-CCNC: 107 U/L
ALT SERPL-CCNC: 10 U/L
ANION GAP SERPL CALC-SCNC: 13 MMOL/L
AST SERPL-CCNC: 17 U/L
B2 MICROGLOB SERPL-MCNC: 4.7 MG/L
BASOPHILS # BLD AUTO: 0.05 K/UL — SIGNIFICANT CHANGE UP (ref 0–0.2)
BASOPHILS NFR BLD AUTO: 1.5 % — SIGNIFICANT CHANGE UP (ref 0–2)
BILIRUB SERPL-MCNC: 0.3 MG/DL
BUN SERPL-MCNC: 29 MG/DL
CALCIUM SERPL-MCNC: 9.2 MG/DL
CHLORIDE SERPL-SCNC: 111 MMOL/L
CO2 SERPL-SCNC: 18 MMOL/L
CREAT SERPL-MCNC: 1.44 MG/DL
EGFR: 36 ML/MIN/1.73M2
EOSINOPHIL # BLD AUTO: 0.05 K/UL — SIGNIFICANT CHANGE UP (ref 0–0.5)
EOSINOPHIL NFR BLD AUTO: 1.5 % — SIGNIFICANT CHANGE UP (ref 0–6)
GLUCOSE SERPL-MCNC: 93 MG/DL
HCT VFR BLD CALC: 28.4 % — LOW (ref 34.5–45)
HGB BLD-MCNC: 9.1 G/DL — LOW (ref 11.5–15.5)
IMM GRANULOCYTES NFR BLD AUTO: 0.3 % — SIGNIFICANT CHANGE UP (ref 0–1.5)
LDH SERPL-CCNC: 191 U/L
LYMPHOCYTES # BLD AUTO: 1.11 K/UL — SIGNIFICANT CHANGE UP (ref 1–3.3)
LYMPHOCYTES # BLD AUTO: 32.4 % — SIGNIFICANT CHANGE UP (ref 13–44)
MAGNESIUM SERPL-MCNC: 2.6 MG/DL
MCHC RBC-ENTMCNC: 27.8 PG — SIGNIFICANT CHANGE UP (ref 27–34)
MCHC RBC-ENTMCNC: 32 G/DL — SIGNIFICANT CHANGE UP (ref 32–36)
MCV RBC AUTO: 86.9 FL — SIGNIFICANT CHANGE UP (ref 80–100)
MONOCYTES # BLD AUTO: 0.36 K/UL — SIGNIFICANT CHANGE UP (ref 0–0.9)
MONOCYTES NFR BLD AUTO: 10.5 % — SIGNIFICANT CHANGE UP (ref 2–14)
NEUTROPHILS # BLD AUTO: 1.85 K/UL — SIGNIFICANT CHANGE UP (ref 1.8–7.4)
NEUTROPHILS NFR BLD AUTO: 53.8 % — SIGNIFICANT CHANGE UP (ref 43–77)
NRBC # BLD: 0 /100 WBCS — SIGNIFICANT CHANGE UP (ref 0–0)
PLATELET # BLD AUTO: 272 K/UL — SIGNIFICANT CHANGE UP (ref 150–400)
POTASSIUM SERPL-SCNC: 5.1 MMOL/L
PROT SERPL-MCNC: 7.6 G/DL
RBC # BLD: 3.27 M/UL — LOW (ref 3.8–5.2)
RBC # FLD: 17.8 % — HIGH (ref 10.3–14.5)
SODIUM SERPL-SCNC: 142 MMOL/L
WBC # BLD: 3.43 K/UL — LOW (ref 3.8–10.5)
WBC # FLD AUTO: 3.43 K/UL — LOW (ref 3.8–10.5)

## 2022-06-27 PROCEDURE — 86850 RBC ANTIBODY SCREEN: CPT

## 2022-06-27 PROCEDURE — 86900 BLOOD TYPING SEROLOGIC ABO: CPT

## 2022-06-27 PROCEDURE — 86901 BLOOD TYPING SEROLOGIC RH(D): CPT

## 2022-06-28 LAB
DEPRECATED KAPPA LC FREE/LAMBDA SER: 51.51 RATIO
KAPPA LC CSF-MCNC: 1.4 MG/DL
KAPPA LC SERPL-MCNC: 72.11 MG/DL

## 2022-07-08 LAB
ALBUMIN MFR SERPL ELPH: 47.7 %
ALBUMIN SERPL-MCNC: 3.6 G/DL
ALBUMIN/GLOB SERPL: 0.9 RATIO
ALPHA1 GLOB MFR SERPL ELPH: 4.6 %
ALPHA1 GLOB SERPL ELPH-MCNC: 0.3 G/DL
ALPHA2 GLOB MFR SERPL ELPH: 9.7 %
ALPHA2 GLOB SERPL ELPH-MCNC: 0.7 G/DL
B-GLOBULIN MFR SERPL ELPH: 7.6 %
B-GLOBULIN SERPL ELPH-MCNC: 0.6 G/DL
DEPRECATED KAPPA LC FREE/LAMBDA SER: 51.51 RATIO
GAMMA GLOB FLD ELPH-MCNC: 2.3 G/DL
GAMMA GLOB MFR SERPL ELPH: 30.4 %
IGA SER QL IEP: 17 MG/DL
IGG SER QL IEP: 2189 MG/DL
IGM SER QL IEP: 31 MG/DL
INTERPRETATION SERPL IEP-IMP: NORMAL
KAPPA LC CSF-MCNC: 1.4 MG/DL
KAPPA LC SERPL-MCNC: 72.11 MG/DL
M PROTEIN MFR SERPL ELPH: 25.5 %
M PROTEIN SPEC IFE-MCNC: NORMAL
MONOCLON BAND OBS SERPL: 1.9 G/DL
PROT SERPL-MCNC: 7.6 G/DL
PROT SERPL-MCNC: 7.6 G/DL

## 2022-07-20 ENCOUNTER — APPOINTMENT (OUTPATIENT)
Dept: RADIOLOGY | Facility: IMAGING CENTER | Age: 82
End: 2022-07-20

## 2022-07-20 ENCOUNTER — RESULT REVIEW (OUTPATIENT)
Age: 82
End: 2022-07-20

## 2022-07-20 ENCOUNTER — OUTPATIENT (OUTPATIENT)
Dept: OUTPATIENT SERVICES | Facility: HOSPITAL | Age: 82
LOS: 1 days | End: 2022-07-20
Payer: MEDICARE

## 2022-07-20 DIAGNOSIS — Z00.8 ENCOUNTER FOR OTHER GENERAL EXAMINATION: ICD-10-CM

## 2022-07-20 PROCEDURE — 77080 DXA BONE DENSITY AXIAL: CPT | Mod: 26

## 2022-07-20 PROCEDURE — 77080 DXA BONE DENSITY AXIAL: CPT

## 2022-07-27 ENCOUNTER — APPOINTMENT (OUTPATIENT)
Dept: NEUROSURGERY | Facility: CLINIC | Age: 82
End: 2022-07-27

## 2022-07-27 VITALS
HEART RATE: 68 BPM | SYSTOLIC BLOOD PRESSURE: 105 MMHG | DIASTOLIC BLOOD PRESSURE: 58 MMHG | BODY MASS INDEX: 26.31 KG/M2 | HEIGHT: 59.84 IN | WEIGHT: 133.99 LBS | OXYGEN SATURATION: 98 %

## 2022-07-27 PROCEDURE — 99212 OFFICE O/P EST SF 10 MIN: CPT

## 2022-07-27 NOTE — SWALLOW BEDSIDE ASSESSMENT ADULT - SPECIFY REASON(S)
----- Message from Priti Joy sent at 7/27/2022  4:05 PM CDT -----  Regarding: med  [facility] [phone] needs clarification on direction for xanax.    
to clinically evaluate pt's rox-pharyngeal swallow mechanism and r/o dysphagia. Per consult, "stroke."

## 2022-07-28 NOTE — PHYSICAL EXAM
[General Appearance - Alert] : alert [General Appearance - In No Acute Distress] : in no acute distress [Deformity] : deformity [Oriented To Time, Place, And Person] : oriented to person, place, and time [Limited Balance] : the patient's balance was impaired [FreeTextEntry8] : Wheelchair dependent [No Visual Abnormalities] : no visible abnormailities [Straight-Leg Raise Test - Left] : straight leg raise of the left leg was positive [Straight-Leg Raise Test - Right] : straight leg raise of the right leg was positive [Able to toe walk] : the patient was not able to toe walk [Able to heel walk] : the patient was not able to heel walk [Neck Appearance] : the appearance of the neck was normal [] : no respiratory distress [Heart Rate And Rhythm] : heart rate was normal and rhythm regular [FreeTextEntry1] : Wheelchair assist

## 2022-07-28 NOTE — ASSESSMENT
[FreeTextEntry1] : Ms Angelika James\par She is presenting with multiple spinal fractures\par She can wear brace with activities\par Her MRI shows evidence of healing fracture\par \par Continued conservative management \par Follow up as needed.\par \par Please see Dr. Singh's dictation for details.\par I, Dr. Ching Singh evaluated the patient with the nurse practitioner Butch Leonard and established the plan of care. I personally discuss this patient with the nurse practitioner at the time of the visit. I agree with the assessment and plan as written, unless noted below.\par \par

## 2022-07-28 NOTE — REASON FOR VISIT
[Follow-Up: _____] : a [unfilled] follow-up visit [FreeTextEntry1] : Today she comes to visit with her daughter\par She is at home living with her daughter. She reports that she is doing much better.\par She is doing well with PT at home.

## 2022-08-12 ENCOUNTER — OUTPATIENT (OUTPATIENT)
Dept: OUTPATIENT SERVICES | Facility: HOSPITAL | Age: 82
LOS: 1 days | Discharge: HOME | End: 2022-08-12

## 2022-08-12 DIAGNOSIS — C90.00 MULTIPLE MYELOMA NOT HAVING ACHIEVED REMISSION: ICD-10-CM

## 2022-08-25 ENCOUNTER — APPOINTMENT (OUTPATIENT)
Dept: HEMATOLOGY ONCOLOGY | Facility: CLINIC | Age: 82
End: 2022-08-25

## 2022-09-01 ENCOUNTER — RESULT REVIEW (OUTPATIENT)
Age: 82
End: 2022-09-01

## 2022-09-01 ENCOUNTER — APPOINTMENT (OUTPATIENT)
Dept: INFUSION THERAPY | Facility: HOSPITAL | Age: 82
End: 2022-09-01

## 2022-09-01 ENCOUNTER — OUTPATIENT (OUTPATIENT)
Dept: OUTPATIENT SERVICES | Facility: HOSPITAL | Age: 82
LOS: 1 days | End: 2022-09-01
Payer: MEDICARE

## 2022-09-01 ENCOUNTER — APPOINTMENT (OUTPATIENT)
Dept: HEMATOLOGY ONCOLOGY | Facility: CLINIC | Age: 82
End: 2022-09-01

## 2022-09-01 DIAGNOSIS — C90.00 MULTIPLE MYELOMA NOT HAVING ACHIEVED REMISSION: ICD-10-CM

## 2022-09-01 LAB
BASOPHILS # BLD AUTO: 0.05 K/UL — SIGNIFICANT CHANGE UP (ref 0–0.2)
BASOPHILS NFR BLD AUTO: 1.5 % — SIGNIFICANT CHANGE UP (ref 0–2)
BUN SERPL-MCNC: 27 MG/DL — HIGH (ref 7–23)
CA-I BLDA-SCNC: 1.28 MMOL/L — SIGNIFICANT CHANGE UP (ref 1.12–1.3)
CHLORIDE SERPL-SCNC: 113 MMOL/L — HIGH (ref 96–108)
CO2 SERPL-SCNC: 18 MMOL/L — LOW (ref 22–31)
CREAT SERPL-MCNC: 2 MG/DL — HIGH (ref 0.5–1.3)
EOSINOPHIL # BLD AUTO: 0.09 K/UL — SIGNIFICANT CHANGE UP (ref 0–0.5)
EOSINOPHIL NFR BLD AUTO: 2.6 % — SIGNIFICANT CHANGE UP (ref 0–6)
GLUCOSE SERPL-MCNC: 113 MG/DL — HIGH (ref 70–99)
HCT VFR BLD CALC: 28.4 % — LOW (ref 34.5–45)
HGB BLD-MCNC: 9.4 G/DL — LOW (ref 11.5–15.5)
IMM GRANULOCYTES NFR BLD AUTO: 0.3 % — SIGNIFICANT CHANGE UP (ref 0–1.5)
LYMPHOCYTES # BLD AUTO: 1.39 K/UL — SIGNIFICANT CHANGE UP (ref 1–3.3)
LYMPHOCYTES # BLD AUTO: 40.4 % — SIGNIFICANT CHANGE UP (ref 13–44)
MCHC RBC-ENTMCNC: 27.6 PG — SIGNIFICANT CHANGE UP (ref 27–34)
MCHC RBC-ENTMCNC: 33.1 G/DL — SIGNIFICANT CHANGE UP (ref 32–36)
MCV RBC AUTO: 83.3 FL — SIGNIFICANT CHANGE UP (ref 80–100)
MONOCYTES # BLD AUTO: 0.37 K/UL — SIGNIFICANT CHANGE UP (ref 0–0.9)
MONOCYTES NFR BLD AUTO: 10.8 % — SIGNIFICANT CHANGE UP (ref 2–14)
NEUTROPHILS # BLD AUTO: 1.53 K/UL — LOW (ref 1.8–7.4)
NEUTROPHILS NFR BLD AUTO: 44.4 % — SIGNIFICANT CHANGE UP (ref 43–77)
NRBC # BLD: 0 /100 WBCS — SIGNIFICANT CHANGE UP (ref 0–0)
PLATELET # BLD AUTO: 171 K/UL — SIGNIFICANT CHANGE UP (ref 150–400)
POTASSIUM SERPL-MCNC: 4.3 MMOL/L — SIGNIFICANT CHANGE UP (ref 3.5–5.3)
POTASSIUM SERPL-SCNC: 4.3 MMOL/L — SIGNIFICANT CHANGE UP (ref 3.5–5.3)
RBC # BLD: 3.41 M/UL — LOW (ref 3.8–5.2)
RBC # FLD: 18 % — HIGH (ref 10.3–14.5)
SODIUM SERPL-SCNC: 141 MMOL/L — SIGNIFICANT CHANGE UP (ref 135–145)
WBC # BLD: 3.44 K/UL — LOW (ref 3.8–10.5)
WBC # FLD AUTO: 3.44 K/UL — LOW (ref 3.8–10.5)

## 2022-09-01 PROCEDURE — 86901 BLOOD TYPING SEROLOGIC RH(D): CPT

## 2022-09-01 PROCEDURE — 86900 BLOOD TYPING SEROLOGIC ABO: CPT

## 2022-09-01 PROCEDURE — 86850 RBC ANTIBODY SCREEN: CPT

## 2022-09-02 ENCOUNTER — NON-APPOINTMENT (OUTPATIENT)
Age: 82
End: 2022-09-02

## 2022-09-02 LAB
ALBUMIN SERPL ELPH-MCNC: 4.2 G/DL
ALP BLD-CCNC: 96 U/L
ALT SERPL-CCNC: 11 U/L
ANION GAP SERPL CALC-SCNC: 14 MMOL/L
AST SERPL-CCNC: 18 U/L
B2 MICROGLOB SERPL-MCNC: 4.7 MG/L
BILIRUB SERPL-MCNC: 0.2 MG/DL
BUN SERPL-MCNC: 29 MG/DL
CALCIUM SERPL-MCNC: 9.3 MG/DL
CHLORIDE SERPL-SCNC: 111 MMOL/L
CO2 SERPL-SCNC: 13 MMOL/L
CREAT SERPL-MCNC: 1.99 MG/DL
DEPRECATED KAPPA LC FREE/LAMBDA SER: 28.12 RATIO
EGFR: 25 ML/MIN/1.73M2
GLUCOSE SERPL-MCNC: 117 MG/DL
KAPPA LC CSF-MCNC: 1.76 MG/DL
KAPPA LC SERPL-MCNC: 49.49 MG/DL
LDH SERPL-CCNC: 189 U/L
MAGNESIUM SERPL-MCNC: 2.2 MG/DL
POTASSIUM SERPL-SCNC: 4.5 MMOL/L
PROT SERPL-MCNC: 7.3 G/DL
SODIUM SERPL-SCNC: 138 MMOL/L

## 2022-09-05 LAB
ALBUMIN MFR SERPL ELPH: 50.5 %
ALBUMIN SERPL-MCNC: 3.7 G/DL
ALBUMIN/GLOB SERPL: 1 RATIO
ALPHA1 GLOB MFR SERPL ELPH: 4.7 %
ALPHA1 GLOB SERPL ELPH-MCNC: 0.3 G/DL
ALPHA2 GLOB MFR SERPL ELPH: 9.6 %
ALPHA2 GLOB SERPL ELPH-MCNC: 0.7 G/DL
B-GLOBULIN MFR SERPL ELPH: 7.7 %
B-GLOBULIN SERPL ELPH-MCNC: 0.6 G/DL
DEPRECATED KAPPA LC FREE/LAMBDA SER: 28.12 RATIO
GAMMA GLOB FLD ELPH-MCNC: 2 G/DL
GAMMA GLOB MFR SERPL ELPH: 27.5 %
IGA SER QL IEP: 26 MG/DL
IGG SER QL IEP: 1731 MG/DL
IGM SER QL IEP: 41 MG/DL
INTERPRETATION SERPL IEP-IMP: NORMAL
KAPPA LC CSF-MCNC: 1.76 MG/DL
KAPPA LC SERPL-MCNC: 49.49 MG/DL
M PROTEIN MFR SERPL ELPH: 19.7 %
M PROTEIN SPEC IFE-MCNC: NORMAL
MONOCLON BAND OBS SERPL: 1.4 G/DL
PROT SERPL-MCNC: 7.3 G/DL
PROT SERPL-MCNC: 7.3 G/DL

## 2022-09-06 ENCOUNTER — INPATIENT (INPATIENT)
Facility: HOSPITAL | Age: 82
LOS: 8 days | Discharge: NURSING, REHAB OR LONG-TERM CARE | DRG: 682 | End: 2022-09-15
Attending: HOSPITALIST | Admitting: HOSPITALIST
Payer: MEDICARE

## 2022-09-06 VITALS
OXYGEN SATURATION: 96 % | TEMPERATURE: 98 F | HEART RATE: 76 BPM | WEIGHT: 160.06 LBS | RESPIRATION RATE: 16 BRPM | HEIGHT: 62 IN | DIASTOLIC BLOOD PRESSURE: 67 MMHG | SYSTOLIC BLOOD PRESSURE: 136 MMHG

## 2022-09-06 LAB
ALBUMIN SERPL ELPH-MCNC: 3 G/DL — LOW (ref 3.5–5)
ALP SERPL-CCNC: 75 U/L — SIGNIFICANT CHANGE UP (ref 40–120)
ALT FLD-CCNC: 22 U/L DA — SIGNIFICANT CHANGE UP (ref 10–60)
ANION GAP SERPL CALC-SCNC: 8 MMOL/L — SIGNIFICANT CHANGE UP (ref 5–17)
AST SERPL-CCNC: 81 U/L — HIGH (ref 10–40)
BILIRUB SERPL-MCNC: 0.3 MG/DL — SIGNIFICANT CHANGE UP (ref 0.2–1.2)
BUN SERPL-MCNC: 30 MG/DL — HIGH (ref 7–18)
CALCIUM SERPL-MCNC: 8.7 MG/DL — SIGNIFICANT CHANGE UP (ref 8.4–10.5)
CHLORIDE SERPL-SCNC: 113 MMOL/L — HIGH (ref 96–108)
CO2 SERPL-SCNC: 15 MMOL/L — LOW (ref 22–31)
CREAT SERPL-MCNC: 2.25 MG/DL — HIGH (ref 0.5–1.3)
EGFR: 21 ML/MIN/1.73M2 — LOW
GLUCOSE SERPL-MCNC: 129 MG/DL — HIGH (ref 70–99)
HCT VFR BLD CALC: 26.1 % — LOW (ref 34.5–45)
HGB BLD-MCNC: 8.7 G/DL — LOW (ref 11.5–15.5)
MCHC RBC-ENTMCNC: 27.4 PG — SIGNIFICANT CHANGE UP (ref 27–34)
MCHC RBC-ENTMCNC: 33.3 GM/DL — SIGNIFICANT CHANGE UP (ref 32–36)
MCV RBC AUTO: 82.1 FL — SIGNIFICANT CHANGE UP (ref 80–100)
PLATELET # BLD AUTO: 164 K/UL — SIGNIFICANT CHANGE UP (ref 150–400)
POTASSIUM SERPL-MCNC: 4.3 MMOL/L — SIGNIFICANT CHANGE UP (ref 3.5–5.3)
POTASSIUM SERPL-SCNC: 4.3 MMOL/L — SIGNIFICANT CHANGE UP (ref 3.5–5.3)
PROT SERPL-MCNC: 7.6 G/DL — SIGNIFICANT CHANGE UP (ref 6–8.3)
RBC # BLD: 3.18 M/UL — LOW (ref 3.8–5.2)
RBC # FLD: 18.3 % — HIGH (ref 10.3–14.5)
SARS-COV-2 RNA SPEC QL NAA+PROBE: SIGNIFICANT CHANGE UP
SODIUM SERPL-SCNC: 136 MMOL/L — SIGNIFICANT CHANGE UP (ref 135–145)
WBC # BLD: 2.78 K/UL — LOW (ref 3.8–10.5)
WBC # FLD AUTO: 2.78 K/UL — LOW (ref 3.8–10.5)

## 2022-09-06 PROCEDURE — 72170 X-RAY EXAM OF PELVIS: CPT | Mod: 26

## 2022-09-06 PROCEDURE — 99285 EMERGENCY DEPT VISIT HI MDM: CPT

## 2022-09-06 PROCEDURE — 71045 X-RAY EXAM CHEST 1 VIEW: CPT | Mod: 26

## 2022-09-06 PROCEDURE — 73650 X-RAY EXAM OF HEEL: CPT | Mod: 26,LT

## 2022-09-06 RX ORDER — ACETAMINOPHEN 500 MG
650 TABLET ORAL ONCE
Refills: 0 | Status: COMPLETED | OUTPATIENT
Start: 2022-09-06 | End: 2022-09-06

## 2022-09-06 RX ADMIN — Medication 650 MILLIGRAM(S): at 21:23

## 2022-09-06 NOTE — ED ADULT NURSE NOTE - NSIMPLEMENTINTERV_GEN_ALL_ED
Implemented All Fall Risk Interventions:  [place] to call system. Call bell, personal items and telephone within reach. Instruct patient to call for assistance. Room bathroom lighting operational. Non-slip footwear when patient is off stretcher. Physically safe environment: no spills, clutter or unnecessary equipment. Stretcher in lowest position, wheels locked, appropriate side rails in place. Provide visual cue, wrist band, yellow gown, etc. Monitor gait and stability. Monitor for mental status changes and reorient to person, place, and time. Review medications for side effects contributing to fall risk. Reinforce activity limits and safety measures with patient and family.

## 2022-09-06 NOTE — ED ADULT TRIAGE NOTE - CHIEF COMPLAINT QUOTE
patient states " I tripped on my foot and I fell,  I was using my walker "  NO LOC, unwitnessed fall

## 2022-09-06 NOTE — ED PROVIDER NOTE - OBJECTIVE STATEMENT
This is a 82-year-old female who is on aspirin 81mg currently under treatment for multiple myeloma.  Today she was dragging her bedside commode while holding her walker, and she fell onto her right side.  She notes that she did not lose consciousness did not throw up does not have a severe headache no double vision blurry vision weakness in her arms or legs.  She does state that she has inability to get up off the floor and even though she lives with her granddaughter and she has a life alert she was not noticed until her granddaughter came home from work.  On our discussion about goals of care, patient states she is strongly opposed to being admitted to the hospital.  She states that she does not want to go to rehab.

## 2022-09-06 NOTE — ED PROVIDER NOTE - PSYCHIATRIC, MLM
Alert and oriented to person, place, time/situation. normal mood and affect. no apparent risk to self or others. ttp L heel. no ttp to hips , chest sq, spine. from ue / le. pt w severe arthritic changes to bones. toenails w fungal

## 2022-09-06 NOTE — ED ADULT NURSE NOTE - NS PRO PASSIVE SMOKE EXP
Called pt to inform him of negative biopsy. Reviewed results with Dr. Hawkins, ordered to decrease prednisone to 7.5 mg daily. New script sent to [facility] in [facility].   No

## 2022-09-06 NOTE — ED ADULT NURSE NOTE - OBJECTIVE STATEMENT
patient reports  " I tripped on my foot and I fell,  while using my walker. denies LOC &  head trauma. pain scale 6/10 medication given as ordered

## 2022-09-07 DIAGNOSIS — C90.00 MULTIPLE MYELOMA NOT HAVING ACHIEVED REMISSION: ICD-10-CM

## 2022-09-07 DIAGNOSIS — E78.5 HYPERLIPIDEMIA, UNSPECIFIED: ICD-10-CM

## 2022-09-07 DIAGNOSIS — S32.000A WEDGE COMPRESSION FRACTURE OF UNSPECIFIED LUMBAR VERTEBRA, INITIAL ENCOUNTER FOR CLOSED FRACTURE: ICD-10-CM

## 2022-09-07 DIAGNOSIS — J18.9 PNEUMONIA, UNSPECIFIED ORGANISM: ICD-10-CM

## 2022-09-07 DIAGNOSIS — E11.9 TYPE 2 DIABETES MELLITUS WITHOUT COMPLICATIONS: ICD-10-CM
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
## 2022-09-07 DIAGNOSIS — Z29.9 ENCOUNTER FOR PROPHYLACTIC MEASURES, UNSPECIFIED: ICD-10-CM

## 2022-09-07 DIAGNOSIS — M25.559 PAIN IN UNSPECIFIED HIP: ICD-10-CM

## 2022-09-07 DIAGNOSIS — D64.9 ANEMIA, UNSPECIFIED: ICD-10-CM

## 2022-09-07 DIAGNOSIS — N18.9 CHRONIC KIDNEY DISEASE, UNSPECIFIED: ICD-10-CM

## 2022-09-07 DIAGNOSIS — I10 ESSENTIAL (PRIMARY) HYPERTENSION: ICD-10-CM

## 2022-09-07 DIAGNOSIS — M06.9 RHEUMATOID ARTHRITIS, UNSPECIFIED: ICD-10-CM

## 2022-09-07 DIAGNOSIS — R26.2 DIFFICULTY IN WALKING, NOT ELSEWHERE CLASSIFIED: ICD-10-CM

## 2022-09-07 LAB
ALBUMIN SERPL ELPH-MCNC: 2.6 G/DL — LOW (ref 3.5–5)
ALP SERPL-CCNC: 65 U/L — SIGNIFICANT CHANGE UP (ref 40–120)
ALT FLD-CCNC: 36 U/L DA — SIGNIFICANT CHANGE UP (ref 10–60)
ANION GAP SERPL CALC-SCNC: 9 MMOL/L — SIGNIFICANT CHANGE UP (ref 5–17)
APPEARANCE UR: CLEAR — SIGNIFICANT CHANGE UP
APPEARANCE UR: CLEAR — SIGNIFICANT CHANGE UP
AST SERPL-CCNC: 156 U/L — HIGH (ref 10–40)
BACTERIA # UR AUTO: ABNORMAL /HPF
BACTERIA # UR AUTO: ABNORMAL /HPF
BASOPHILS # BLD AUTO: 0.06 K/UL — SIGNIFICANT CHANGE UP (ref 0–0.2)
BASOPHILS NFR BLD AUTO: 2 % — SIGNIFICANT CHANGE UP (ref 0–2)
BILIRUB SERPL-MCNC: 0.3 MG/DL — SIGNIFICANT CHANGE UP (ref 0.2–1.2)
BILIRUB UR-MCNC: NEGATIVE — SIGNIFICANT CHANGE UP
BILIRUB UR-MCNC: NEGATIVE — SIGNIFICANT CHANGE UP
BUN SERPL-MCNC: 24 MG/DL — HIGH (ref 7–18)
CALCIUM SERPL-MCNC: 8.7 MG/DL — SIGNIFICANT CHANGE UP (ref 8.4–10.5)
CHLORIDE SERPL-SCNC: 116 MMOL/L — HIGH (ref 96–108)
CK SERPL-CCNC: 3499 U/L — HIGH (ref 21–215)
CO2 SERPL-SCNC: 16 MMOL/L — LOW (ref 22–31)
COLOR SPEC: YELLOW — SIGNIFICANT CHANGE UP
COLOR SPEC: YELLOW — SIGNIFICANT CHANGE UP
CREAT SERPL-MCNC: 1.92 MG/DL — HIGH (ref 0.5–1.3)
DIFF PNL FLD: ABNORMAL
DIFF PNL FLD: ABNORMAL
EGFR: 26 ML/MIN/1.73M2 — LOW
EOSINOPHIL # BLD AUTO: 0 K/UL — SIGNIFICANT CHANGE UP (ref 0–0.5)
EOSINOPHIL NFR BLD AUTO: 0 % — SIGNIFICANT CHANGE UP (ref 0–6)
EPI CELLS # UR: SIGNIFICANT CHANGE UP /HPF
EPI CELLS # UR: SIGNIFICANT CHANGE UP /HPF
GLUCOSE BLDC GLUCOMTR-MCNC: 119 MG/DL — HIGH (ref 70–99)
GLUCOSE BLDC GLUCOMTR-MCNC: 83 MG/DL — SIGNIFICANT CHANGE UP (ref 70–99)
GLUCOSE BLDC GLUCOMTR-MCNC: 97 MG/DL — SIGNIFICANT CHANGE UP (ref 70–99)
GLUCOSE BLDC GLUCOMTR-MCNC: 98 MG/DL — SIGNIFICANT CHANGE UP (ref 70–99)
GLUCOSE BLDC GLUCOMTR-MCNC: 99 MG/DL — SIGNIFICANT CHANGE UP (ref 70–99)
GLUCOSE SERPL-MCNC: 124 MG/DL — HIGH (ref 70–99)
GLUCOSE UR QL: NEGATIVE — SIGNIFICANT CHANGE UP
GLUCOSE UR QL: NEGATIVE — SIGNIFICANT CHANGE UP
HCT VFR BLD CALC: 25.3 % — LOW (ref 34.5–45)
HGB BLD-MCNC: 8.4 G/DL — LOW (ref 11.5–15.5)
KETONES UR-MCNC: NEGATIVE — SIGNIFICANT CHANGE UP
KETONES UR-MCNC: NEGATIVE — SIGNIFICANT CHANGE UP
LEUKOCYTE ESTERASE UR-ACNC: NEGATIVE — SIGNIFICANT CHANGE UP
LEUKOCYTE ESTERASE UR-ACNC: NEGATIVE — SIGNIFICANT CHANGE UP
LYMPHOCYTES # BLD AUTO: 0.42 K/UL — LOW (ref 1–3.3)
LYMPHOCYTES # BLD AUTO: 15 % — SIGNIFICANT CHANGE UP (ref 13–44)
MCHC RBC-ENTMCNC: 27.8 PG — SIGNIFICANT CHANGE UP (ref 27–34)
MCHC RBC-ENTMCNC: 33.2 GM/DL — SIGNIFICANT CHANGE UP (ref 32–36)
MCV RBC AUTO: 83.8 FL — SIGNIFICANT CHANGE UP (ref 80–100)
MONOCYTES # BLD AUTO: 0.39 K/UL — SIGNIFICANT CHANGE UP (ref 0–0.9)
MONOCYTES NFR BLD AUTO: 14 % — SIGNIFICANT CHANGE UP (ref 2–14)
NEUTROPHILS # BLD AUTO: 1.89 K/UL — SIGNIFICANT CHANGE UP (ref 1.8–7.4)
NEUTROPHILS NFR BLD AUTO: 68 % — SIGNIFICANT CHANGE UP (ref 43–77)
NITRITE UR-MCNC: NEGATIVE — SIGNIFICANT CHANGE UP
NITRITE UR-MCNC: NEGATIVE — SIGNIFICANT CHANGE UP
NRBC # BLD: 0 /100 WBCS — SIGNIFICANT CHANGE UP (ref 0–0)
PH UR: 5 — SIGNIFICANT CHANGE UP (ref 5–8)
PH UR: 5 — SIGNIFICANT CHANGE UP (ref 5–8)
PLATELET # BLD AUTO: 139 K/UL — LOW (ref 150–400)
POTASSIUM SERPL-MCNC: 4 MMOL/L — SIGNIFICANT CHANGE UP (ref 3.5–5.3)
POTASSIUM SERPL-SCNC: 4 MMOL/L — SIGNIFICANT CHANGE UP (ref 3.5–5.3)
PROT SERPL-MCNC: 6.9 G/DL — SIGNIFICANT CHANGE UP (ref 6–8.3)
PROT UR-MCNC: 30 MG/DL
PROT UR-MCNC: 30 MG/DL
RBC # BLD: 3.02 M/UL — LOW (ref 3.8–5.2)
RBC # FLD: 18.4 % — HIGH (ref 10.3–14.5)
RBC CASTS # UR COMP ASSIST: ABNORMAL /HPF (ref 0–2)
RBC CASTS # UR COMP ASSIST: ABNORMAL /HPF (ref 0–2)
SODIUM SERPL-SCNC: 141 MMOL/L — SIGNIFICANT CHANGE UP (ref 135–145)
SP GR SPEC: 1.01 — SIGNIFICANT CHANGE UP (ref 1.01–1.02)
SP GR SPEC: 1.01 — SIGNIFICANT CHANGE UP (ref 1.01–1.02)
TROPONIN I, HIGH SENSITIVITY RESULT: 67.6 NG/L — HIGH
UROBILINOGEN FLD QL: NEGATIVE — SIGNIFICANT CHANGE UP
UROBILINOGEN FLD QL: NEGATIVE — SIGNIFICANT CHANGE UP
WBC # BLD: 1.72 K/UL — LOW (ref 3.8–10.5)
WBC # FLD AUTO: 1.72 K/UL — LOW (ref 3.8–10.5)
WBC UR QL: SIGNIFICANT CHANGE UP /HPF (ref 0–5)
WBC UR QL: SIGNIFICANT CHANGE UP /HPF (ref 0–5)

## 2022-09-07 PROCEDURE — 12345: CPT | Mod: NC

## 2022-09-07 PROCEDURE — 99223 1ST HOSP IP/OBS HIGH 75: CPT | Mod: GC

## 2022-09-07 PROCEDURE — 70450 CT HEAD/BRAIN W/O DYE: CPT | Mod: 26,MA

## 2022-09-07 PROCEDURE — 72125 CT NECK SPINE W/O DYE: CPT | Mod: 26,MA

## 2022-09-07 RX ORDER — INSULIN LISPRO 100/ML
VIAL (ML) SUBCUTANEOUS AT BEDTIME
Refills: 0 | Status: DISCONTINUED | OUTPATIENT
Start: 2022-09-07 | End: 2022-09-15

## 2022-09-07 RX ORDER — GABAPENTIN 400 MG/1
200 CAPSULE ORAL
Refills: 0 | Status: DISCONTINUED | OUTPATIENT
Start: 2022-09-07 | End: 2022-09-15

## 2022-09-07 RX ORDER — INSULIN LISPRO 100/ML
VIAL (ML) SUBCUTANEOUS
Refills: 0 | Status: DISCONTINUED | OUTPATIENT
Start: 2022-09-07 | End: 2022-09-15

## 2022-09-07 RX ORDER — AZITHROMYCIN 500 MG/1
500 TABLET, FILM COATED ORAL DAILY
Refills: 0 | Status: COMPLETED | OUTPATIENT
Start: 2022-09-07 | End: 2022-09-11

## 2022-09-07 RX ORDER — OXYCODONE HYDROCHLORIDE 5 MG/1
2.5 TABLET ORAL EVERY 4 HOURS
Refills: 0 | Status: DISCONTINUED | OUTPATIENT
Start: 2022-09-07 | End: 2022-09-07

## 2022-09-07 RX ORDER — SODIUM BICARBONATE 1 MEQ/ML
0.06 SYRINGE (ML) INTRAVENOUS
Qty: 75 | Refills: 0 | Status: DISCONTINUED | OUTPATIENT
Start: 2022-09-07 | End: 2022-09-08

## 2022-09-07 RX ORDER — ATORVASTATIN CALCIUM 80 MG/1
20 TABLET, FILM COATED ORAL AT BEDTIME
Refills: 0 | Status: DISCONTINUED | OUTPATIENT
Start: 2022-09-07 | End: 2022-09-15

## 2022-09-07 RX ORDER — PANTOPRAZOLE SODIUM 20 MG/1
40 TABLET, DELAYED RELEASE ORAL
Refills: 0 | Status: DISCONTINUED | OUTPATIENT
Start: 2022-09-07 | End: 2022-09-15

## 2022-09-07 RX ORDER — ACETAMINOPHEN 500 MG
650 TABLET ORAL EVERY 6 HOURS
Refills: 0 | Status: DISCONTINUED | OUTPATIENT
Start: 2022-09-07 | End: 2022-09-15

## 2022-09-07 RX ORDER — LANOLIN ALCOHOL/MO/W.PET/CERES
3 CREAM (GRAM) TOPICAL AT BEDTIME
Refills: 0 | Status: DISCONTINUED | OUTPATIENT
Start: 2022-09-07 | End: 2022-09-15

## 2022-09-07 RX ORDER — INSULIN LISPRO 100/ML
3 VIAL (ML) SUBCUTANEOUS
Refills: 0 | Status: DISCONTINUED | OUTPATIENT
Start: 2022-09-07 | End: 2022-09-08

## 2022-09-07 RX ORDER — POLYETHYLENE GLYCOL 3350 17 G/17G
17 POWDER, FOR SOLUTION ORAL DAILY
Refills: 0 | Status: DISCONTINUED | OUTPATIENT
Start: 2022-09-07 | End: 2022-09-15

## 2022-09-07 RX ORDER — FOLIC ACID 0.8 MG
1 TABLET ORAL DAILY
Refills: 0 | Status: DISCONTINUED | OUTPATIENT
Start: 2022-09-07 | End: 2022-09-15

## 2022-09-07 RX ORDER — SENNA PLUS 8.6 MG/1
2 TABLET ORAL AT BEDTIME
Refills: 0 | Status: DISCONTINUED | OUTPATIENT
Start: 2022-09-07 | End: 2022-09-15

## 2022-09-07 RX ORDER — MORPHINE SULFATE 50 MG/1
2 CAPSULE, EXTENDED RELEASE ORAL ONCE
Refills: 0 | Status: DISCONTINUED | OUTPATIENT
Start: 2022-09-07 | End: 2022-09-07

## 2022-09-07 RX ORDER — ONDANSETRON 8 MG/1
4 TABLET, FILM COATED ORAL EVERY 8 HOURS
Refills: 0 | Status: DISCONTINUED | OUTPATIENT
Start: 2022-09-07 | End: 2022-09-15

## 2022-09-07 RX ORDER — HYDROXYCHLOROQUINE SULFATE 200 MG
200 TABLET ORAL DAILY
Refills: 0 | Status: DISCONTINUED | OUTPATIENT
Start: 2022-09-07 | End: 2022-09-15

## 2022-09-07 RX ORDER — LIDOCAINE 4 G/100G
1 CREAM TOPICAL DAILY
Refills: 0 | Status: DISCONTINUED | OUTPATIENT
Start: 2022-09-07 | End: 2022-09-15

## 2022-09-07 RX ORDER — INSULIN LISPRO 100/ML
2 VIAL (ML) SUBCUTANEOUS
Refills: 0 | Status: DISCONTINUED | OUTPATIENT
Start: 2022-09-07 | End: 2022-09-08

## 2022-09-07 RX ORDER — AZITHROMYCIN 500 MG/1
500 TABLET, FILM COATED ORAL ONCE
Refills: 0 | Status: COMPLETED | OUTPATIENT
Start: 2022-09-07 | End: 2022-09-07

## 2022-09-07 RX ORDER — HEPARIN SODIUM 5000 [USP'U]/ML
5000 INJECTION INTRAVENOUS; SUBCUTANEOUS EVERY 12 HOURS
Refills: 0 | Status: DISCONTINUED | OUTPATIENT
Start: 2022-09-07 | End: 2022-09-15

## 2022-09-07 RX ORDER — CEFTRIAXONE 500 MG/1
1000 INJECTION, POWDER, FOR SOLUTION INTRAMUSCULAR; INTRAVENOUS ONCE
Refills: 0 | Status: COMPLETED | OUTPATIENT
Start: 2022-09-07 | End: 2022-09-07

## 2022-09-07 RX ORDER — SODIUM CHLORIDE 9 MG/ML
500 INJECTION INTRAMUSCULAR; INTRAVENOUS; SUBCUTANEOUS ONCE
Refills: 0 | Status: COMPLETED | OUTPATIENT
Start: 2022-09-07 | End: 2022-09-07

## 2022-09-07 RX ORDER — INSULIN GLARGINE 100 [IU]/ML
5 INJECTION, SOLUTION SUBCUTANEOUS AT BEDTIME
Refills: 0 | Status: DISCONTINUED | OUTPATIENT
Start: 2022-09-07 | End: 2022-09-08

## 2022-09-07 RX ORDER — CHOLECALCIFEROL (VITAMIN D3) 125 MCG
1000 CAPSULE ORAL DAILY
Refills: 0 | Status: DISCONTINUED | OUTPATIENT
Start: 2022-09-07 | End: 2022-09-15

## 2022-09-07 RX ORDER — SODIUM CHLORIDE 9 MG/ML
1000 INJECTION INTRAMUSCULAR; INTRAVENOUS; SUBCUTANEOUS
Refills: 0 | Status: DISCONTINUED | OUTPATIENT
Start: 2022-09-07 | End: 2022-09-08

## 2022-09-07 RX ORDER — LEVETIRACETAM 250 MG/1
500 TABLET, FILM COATED ORAL
Refills: 0 | Status: DISCONTINUED | OUTPATIENT
Start: 2022-09-07 | End: 2022-09-15

## 2022-09-07 RX ORDER — AMLODIPINE BESYLATE 2.5 MG/1
2.5 TABLET ORAL DAILY
Refills: 0 | Status: DISCONTINUED | OUTPATIENT
Start: 2022-09-07 | End: 2022-09-15

## 2022-09-07 RX ORDER — CEFTRIAXONE 500 MG/1
1000 INJECTION, POWDER, FOR SOLUTION INTRAMUSCULAR; INTRAVENOUS EVERY 24 HOURS
Refills: 0 | Status: COMPLETED | OUTPATIENT
Start: 2022-09-07 | End: 2022-09-11

## 2022-09-07 RX ADMIN — HEPARIN SODIUM 5000 UNIT(S): 5000 INJECTION INTRAVENOUS; SUBCUTANEOUS at 18:19

## 2022-09-07 RX ADMIN — CEFTRIAXONE 100 MILLIGRAM(S): 500 INJECTION, POWDER, FOR SOLUTION INTRAMUSCULAR; INTRAVENOUS at 18:26

## 2022-09-07 RX ADMIN — Medication 1000 UNIT(S): at 13:39

## 2022-09-07 RX ADMIN — PANTOPRAZOLE SODIUM 40 MILLIGRAM(S): 20 TABLET, DELAYED RELEASE ORAL at 06:59

## 2022-09-07 RX ADMIN — ATORVASTATIN CALCIUM 20 MILLIGRAM(S): 80 TABLET, FILM COATED ORAL at 22:47

## 2022-09-07 RX ADMIN — Medication 650 MILLIGRAM(S): at 00:28

## 2022-09-07 RX ADMIN — SODIUM CHLORIDE 500 MILLILITER(S): 9 INJECTION INTRAMUSCULAR; INTRAVENOUS; SUBCUTANEOUS at 03:15

## 2022-09-07 RX ADMIN — LEVETIRACETAM 500 MILLIGRAM(S): 250 TABLET, FILM COATED ORAL at 06:59

## 2022-09-07 RX ADMIN — Medication 650 MILLIGRAM(S): at 23:52

## 2022-09-07 RX ADMIN — AMLODIPINE BESYLATE 2.5 MILLIGRAM(S): 2.5 TABLET ORAL at 06:58

## 2022-09-07 RX ADMIN — SODIUM CHLORIDE 70 MILLILITER(S): 9 INJECTION INTRAMUSCULAR; INTRAVENOUS; SUBCUTANEOUS at 06:57

## 2022-09-07 RX ADMIN — Medication 1 MILLIGRAM(S): at 13:17

## 2022-09-07 RX ADMIN — LEVETIRACETAM 500 MILLIGRAM(S): 250 TABLET, FILM COATED ORAL at 20:24

## 2022-09-07 RX ADMIN — HEPARIN SODIUM 5000 UNIT(S): 5000 INJECTION INTRAVENOUS; SUBCUTANEOUS at 06:59

## 2022-09-07 RX ADMIN — AZITHROMYCIN 500 MILLIGRAM(S): 500 TABLET, FILM COATED ORAL at 03:52

## 2022-09-07 RX ADMIN — Medication 3 MILLIGRAM(S): at 22:47

## 2022-09-07 RX ADMIN — Medication 60 MEQ/KG/HR: at 18:19

## 2022-09-07 RX ADMIN — SENNA PLUS 2 TABLET(S): 8.6 TABLET ORAL at 22:48

## 2022-09-07 RX ADMIN — GABAPENTIN 200 MILLIGRAM(S): 400 CAPSULE ORAL at 18:19

## 2022-09-07 RX ADMIN — CEFTRIAXONE 100 MILLIGRAM(S): 500 INJECTION, POWDER, FOR SOLUTION INTRAMUSCULAR; INTRAVENOUS at 03:52

## 2022-09-07 RX ADMIN — Medication 200 MILLIGRAM(S): at 13:39

## 2022-09-07 RX ADMIN — GABAPENTIN 200 MILLIGRAM(S): 400 CAPSULE ORAL at 06:58

## 2022-09-07 RX ADMIN — MORPHINE SULFATE 2 MILLIGRAM(S): 50 CAPSULE, EXTENDED RELEASE ORAL at 03:52

## 2022-09-07 RX ADMIN — AZITHROMYCIN 500 MILLIGRAM(S): 500 TABLET, FILM COATED ORAL at 18:19

## 2022-09-07 RX ADMIN — MORPHINE SULFATE 2 MILLIGRAM(S): 50 CAPSULE, EXTENDED RELEASE ORAL at 06:48

## 2022-09-07 RX ADMIN — LIDOCAINE 1 PATCH: 4 CREAM TOPICAL at 21:46

## 2022-09-07 RX ADMIN — INSULIN GLARGINE 5 UNIT(S): 100 INJECTION, SOLUTION SUBCUTANEOUS at 22:47

## 2022-09-07 RX ADMIN — LIDOCAINE 1 PATCH: 4 CREAM TOPICAL at 13:16

## 2022-09-07 RX ADMIN — POLYETHYLENE GLYCOL 3350 17 GRAM(S): 17 POWDER, FOR SOLUTION ORAL at 13:17

## 2022-09-07 NOTE — H&P ADULT - ASSESSMENT
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 Admitted for ambulatory dysfunction.    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 Admitted for ambulatory dysfunction.    *Please confirm BP med with granddaughter

## 2022-09-07 NOTE — H&P ADULT - NSHPPHYSICALEXAM_GEN_ALL_CORE
GENERAL: NAD  HEAD:  Atraumatic, Normocephalic  EYES: R periorbital erythema, minimally tender. EOMI, PERRLA, conjunctiva and sclera clear  ENMT: No tonsillar erythema, exudates, or enlargement; Moist mucous membranes  NECK: Supple, normal appearance, No JVD; Normal thyroid; Trachea midline  NERVOUS SYSTEM: Alert & Oriented X3, No sensation changes. R hip ROM limited due to pain.  CHEST/LUNG: Lungs clear to auscultation bilaterally, No rales, rhonchi, wheezing   HEART: Regular rate and rhythm; No murmurs, rubs, or gallops  ABDOMEN: Soft, Nontender, Nondistended; Bowel sounds present  EXTREMITIES:  2+ Peripheral Pulses, No clubbing, cyanosis, or edema  LYMPH: No lymphadenopathy noted  SKIN: R knee skin abrasion; Good capillary refill GENERAL: NAD  HEAD:  Atraumatic, Normocephalic  EYES: R periorbital erythema, minimally tender. EOMI, PERRLA, conjunctiva and sclera clear  ENMT: No tonsillar erythema, exudates, or enlargement; Moist mucous membranes  NECK: Supple, normal appearance, No JVD; Normal thyroid; Trachea midline  NERVOUS SYSTEM: Alert & Oriented X3, No sensation changes. R hip ROM limited due to pain.  CHEST/LUNG: BL course lung sounds present, No rales, rhonchi, wheezing   HEART: Regular rate and rhythm; No murmurs, rubs, or gallops  ABDOMEN: Soft, Nontender, Nondistended; Bowel sounds present  EXTREMITIES:  2+ Peripheral Pulses, No clubbing, cyanosis, or edema  LYMPH: No lymphadenopathy noted  SKIN: R knee skin abrasion; Good capillary refill

## 2022-09-07 NOTE — H&P ADULT - PROBLEM SELECTOR PLAN 11
- Hep SQ  - PPI  - Keppra 500 BID  - Melatonin, vitamin D  - Laxatives - C/w Atorvastatin 20mg daily

## 2022-09-07 NOTE — H&P ADULT - PROBLEM SELECTOR PLAN 10
- Hep SQ  - PPI  - Keppra 500 BID  - Melatonin, vitamin D  - Laxatives - C/w Atorvastatin 20mg daily - C/w hydroxychloroquine

## 2022-09-07 NOTE — H&P ADULT - PROBLEM SELECTOR PLAN 8
- C/w aspirin - Home med Amlodipine 2.5mg daily  - C/w home meds with parameters  - Continue to monitor - Follows outpt onc  - As of 9/2/22, Zometa(q 3 months) is currently on hold due to elevated Cr  - C/w Polymast 2mg EVERY OTHER DAY

## 2022-09-07 NOTE — H&P ADULT - ATTENDING COMMENTS
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 Patient was using walker at home and while dragging her bedside commode, she fell on her right side. Patient denies any dizziness or weakness during the fall. She could not get up and granddaughter found the patient at home. According to the granddaughter, patient's mental status is at her baseline. Patient endorses chronic back pain(was recently found to have multiple compression fractures so uses brace), intermittent constipation.     Vital Signs Last 24 Hrs  T(C): 37.3 (07 Sep 2022 07:35), Max: 37.9 (07 Sep 2022 06:33)  T(F): 99.2 (07 Sep 2022 07:35), Max: 100.2 (07 Sep 2022 06:33)  HR: 86 (07 Sep 2022 07:35) (76 - 86)  BP: 163/69 (07 Sep 2022 07:35) (136/67 - 163/69)  BP(mean): --  RR: 20 (07 Sep 2022 07:35) (16 - 20)  SpO2: 95% (07 Sep 2022 07:35) (95% - 97%)  Physical exam as above    Labs and imaging studies noted.    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    Mechanical fall  Rhabdomyolysis  KAJAL  Possible pneumonia   Lumbar compression fracture  Multiple myeloma  RA  Type 2 DM  Anemia- near baseline likely ACD  HTN   h/o CVAo residual weakness    - mechanical fall as described above  - c/o right hip pain> no obvious fracture on Xray> follow official read. PT cecilia;l if fracture  - noted KAJAL, rhabdomyolysis  - c/w iv fluids, repeat CPK, BMP  - coarse breath sounds> CXR showing chromic changes. h/o recurrent bronchitis.  - follow RVP  - started empirically on iv ceftriaxone, and azithromycin, consider d/c if negative work up, no cough.  - SSI  -c/w TLSO brace  - resume home meds for RA, MM, HTN as discussed above.

## 2022-09-07 NOTE — H&P ADULT - PROBLEM SELECTOR PLAN 9
- C/w Atorvastatin 20mg daily - C/w aspirin - C/w hydroxychloroquine - Home med Amlodipine 2.5mg daily  - C/w home meds with parameters  - Continue to monitor

## 2022-09-07 NOTE — H&P ADULT - PROBLEM SELECTOR PLAN 7
- Home med Amlodipine 2.5mg daily  - C/w home meds with parameters  - Continue to monitor - Follows outpt onc  - As of 9/2/22, Zometa(q 3 months) is currently on hold due to elevated Cr  - C/w Polymast 2mg EVERY OTHER DAY - P/w Hgb 8.7, near her baseline  - No sign of active bleeding

## 2022-09-07 NOTE — CHART NOTE - NSCHARTNOTEFT_GEN_A_CORE
83 yo female  lives with granddaughter, has HHA, uses walker and cane, PMHx of multiple myeloma on Polymast 2mg every other day,  recurrent  Bronchitis, RA, DM, HTN,  CVA no residual weakness, chronic back pain(was recently found to have multiple compression fractures, uses TLSO brace,   presented sp a mechanical fall co right hip pain. Pt was unable to get up    In ED   Elevated CK 3499,   Creatinine 2.25 (Baseline Cr 1.25 with CKD stage 3B  bicarb 16   CT head  negative  CT C spine negative for acute pathology   XR pelvis no obvious fracture of hip (pending official misty   CXR no acute findings  On exam pt with nayan wheezing and rhonchi possibly due to recurrent bronchitis vs atypical pneumonia     Admitted for ambulatory dysfunction sp fall, KAJAL on CKD, rhabdomyolysis and possible PNA   Started on Ceftriaxone and Zithromax     Pt seen at bedside, alert, oriented, co feeling congested.   Also states has pain to right hip and lower back when trying to change position   Creatinine tending down 2.25-->1.92         OBJECTIVE:  Vital Signs Last 24 Hrs  T(C): 37.6 (07 Sep 2022 15:42), Max: 37.9 (07 Sep 2022 06:33)  T(F): 99.7 (07 Sep 2022 15:42), Max: 100.2 (07 Sep 2022 06:33)  HR: 84 (07 Sep 2022 15:42) (72 - 86)  BP: 143/69 (07 Sep 2022 15:42) (136/67 - 163/69)  BP(mean): --  RR: 16 (07 Sep 2022 15:42) (16 - 20)  SpO2: 96% (07 Sep 2022 15:42) (95% - 98%)    Parameters below as of 07 Sep 2022 15:42  Patient On (Oxygen Delivery Method): room air        FOCUSED PHYSICAL EXAM:  Neuro: awake, alert, oriented x 3.   Cardiovascular: Pulses +2 B/L in lower and upper extremities, HR regular, BP stable, No edema.  Respiratory: Respirations regular, breath sounds: nayan rhonchi and scattered wheezing   GI: Abdomen soft, non-tender, positive bowel sounds.  : no bladder distention noted. No complaints at this time.  MSK: tenderness over right hip   Skin: Dry, intact, + bruising, + right periorbital ecchymosis, no diaphoresis.          LABS:                        8.4    1.72  )-----------( 139      ( 07 Sep 2022 10:28 )             25.3   CARDIAC MARKERS ( 06 Sep 2022 23:00 )  x     / x     / 3499 U/L / x     / x        09-07    141  |  116<H>  |  24<H>  ----------------------------<  124<H>  4.0   |  16<L>  |  1.92<H>    Ca    8.7      07 Sep 2022 10:28    TPro  6.9  /  Alb  2.6<L>  /  TBili  0.3  /  DBili  x   /  AST  156<H>  /  ALT  36  /  AlkPhos  65  09-07          ASSESSMENT AND PLAN:     83 yo female  lives with granddaughter, has HHA, uses walker and cane, PMHx of multiple myeloma on Polymast 2mg every other day,  recurent  Bronchitis, RA, DM, HTN,  CVA no residual weakness, chronic back pain(was recently found to have multiple compression fractures, uses TLSO brace,   presented sp a mechanical fall co right hip pain. Pt was unable to get up    Admitted for ambulatory dysfunction sp fall, KAJAL on CKD, rhabdomyolysis and possible PNA   Started on Ceftriaxone and Zithromax       - c/w  empirical treatment for PNA:  ceftriaxone, and azithromycin  - cotntinue IV fluids  - started on sodium bicarbonate infusion for low bicarb   - follow up official xray of pelvis, consult ortho if there is a fx   - monitor CK and creatinine BMP   - follow up urine culture and urinalysis   - monitor blood sugar, continue insuline sliding scale   - resume home meds for RA, MM, HTN   - heparin sq for DVT proph   - fall precaution       FOLLOW UP / RESULT: 83 yo female  lives with granddaughter, has HHA, uses walker and cane, PMHx of multiple myeloma on Polymast 2mg every other day,  recurrent  Bronchitis, RA, DM, HTN,  CVA no residual weakness, chronic back pain(was recently found to have multiple compression fractures, uses TLSO brace,   presented sp a mechanical fall co right hip pain. Pt was unable to get up    In ED   Elevated CK 3499,   Creatinine 2.25 (Baseline Cr 1.25 with CKD stage 3B  bicarb 16   CT head  negative  CT C spine negative for acute pathology   XR pelvis no obvious fracture of hip (pending official misty   CXR no acute findings  On exam pt with nayan wheezing and rhonchi possibly due to recurrent bronchitis vs atypical pneumonia     Admitted for ambulatory dysfunction sp fall, KAJAL on CKD, rhabdomyolysis and possible PNA   Started on Ceftriaxone and Zithromax     Pt seen at bedside, alert, oriented, co feeling congested.   Also states has pain to right hip and lower back when trying to change position   Creatinine tending down 2.25-->1.92   Bladder scan showed 500 ml of urine, will place neves         OBJECTIVE:  Vital Signs Last 24 Hrs  T(C): 37.6 (07 Sep 2022 15:42), Max: 37.9 (07 Sep 2022 06:33)  T(F): 99.7 (07 Sep 2022 15:42), Max: 100.2 (07 Sep 2022 06:33)  HR: 84 (07 Sep 2022 15:42) (72 - 86)  BP: 143/69 (07 Sep 2022 15:42) (136/67 - 163/69)  BP(mean): --  RR: 16 (07 Sep 2022 15:42) (16 - 20)  SpO2: 96% (07 Sep 2022 15:42) (95% - 98%)    Parameters below as of 07 Sep 2022 15:42  Patient On (Oxygen Delivery Method): room air        FOCUSED PHYSICAL EXAM:  Neuro: awake, alert, oriented x 3.   Cardiovascular: Pulses +2 B/L in lower and upper extremities, HR regular, BP stable, No edema.  Respiratory: Respirations regular, breath sounds: nayan rhonchi and scattered wheezing   GI: Abdomen soft, non-tender, positive bowel sounds.  : no bladder distention noted. No complaints at this time.  MSK: tenderness over right hip   Skin: Dry, intact, + bruising, + right periorbital ecchymosis, no diaphoresis.          LABS:                        8.4    1.72  )-----------( 139      ( 07 Sep 2022 10:28 )             25.3   CARDIAC MARKERS ( 06 Sep 2022 23:00 )  x     / x     / 3499 U/L / x     / x        09-07    141  |  116<H>  |  24<H>  ----------------------------<  124<H>  4.0   |  16<L>  |  1.92<H>    Ca    8.7      07 Sep 2022 10:28    TPro  6.9  /  Alb  2.6<L>  /  TBili  0.3  /  DBili  x   /  AST  156<H>  /  ALT  36  /  AlkPhos  65  09-07          ASSESSMENT AND PLAN:     83 yo female  lives with granddaughter, has HHA, uses walker and cane, PMHx of multiple myeloma on Polymast 2mg every other day,  recurent  Bronchitis, RA, DM, HTN,  CVA no residual weakness, chronic back pain(was recently found to have multiple compression fractures, uses TLSO brace,   presented sp a mechanical fall co right hip pain. Pt was unable to get up    Admitted for ambulatory dysfunction sp fall, KAJAL on CKD, rhabdomyolysis and possible PNA   Started on Ceftriaxone and Zithromax       - c/w  empirical treatment for PNA:  ceftriaxone, and azithromycin  - cotntinue IV fluids  - started on sodium bicarbonate infusion for low bicarb   - follow up official xray of pelvis, consult ortho if there is a fx   - monitor CK and creatinine BMP   - follow up ua, urine culture    - follow up urine culture and urinalysis   - monitor blood sugar, continue insuline sliding scale   - resume home meds for RA, MM, HTN   - heparin sq for DVT proph   - fall precaution 83 yo female  lives with granddaughter, has HHA, uses walker and cane, PMHx of multiple myeloma on Polymast 2mg every other day,  recurrent  Bronchitis, RA, DM, HTN,  CVA no residual weakness, chronic back pain(was recently found to have multiple compression fractures, uses TLSO brace,   presented sp a mechanical fall co right hip pain. Pt was unable to get up    In ED   Elevated CK 3499,   Creatinine 2.25 (Baseline Cr 1.25 with CKD stage 3B  bicarb 16   CT head  negative  CT C spine negative for acute pathology   XR pelvis no obvious fracture of hip (pending official misty   CXR no acute findings  On exam pt with nayan wheezing and rhonchi possibly due to recurrent bronchitis vs atypical pneumonia     Admitted for ambulatory dysfunction sp fall, KAJAL on CKD, rhabdomyolysis and possible PNA   Started on Ceftriaxone and Zithromax     Pt seen at bedside, alert, oriented, co feeling congested.   Also states has pain to right hip and lower back when trying to change position   Creatinine tending down 2.25-->1.92   Bladder scan showed 500 ml of urine, will place neves         OBJECTIVE:  Vital Signs Last 24 Hrs  T(C): 37.6 (07 Sep 2022 15:42), Max: 37.9 (07 Sep 2022 06:33)  T(F): 99.7 (07 Sep 2022 15:42), Max: 100.2 (07 Sep 2022 06:33)  HR: 84 (07 Sep 2022 15:42) (72 - 86)  BP: 143/69 (07 Sep 2022 15:42) (136/67 - 163/69)  BP(mean): --  RR: 16 (07 Sep 2022 15:42) (16 - 20)  SpO2: 96% (07 Sep 2022 15:42) (95% - 98%)    Parameters below as of 07 Sep 2022 15:42  Patient On (Oxygen Delivery Method): room air        FOCUSED PHYSICAL EXAM:  Neuro: awake, alert, oriented x 3.   Cardiovascular: Pulses +2 B/L in lower and upper extremities, HR regular, BP stable, No edema.  Respiratory: Respirations regular, breath sounds: nayan rhonchi and scattered wheezing   GI: Abdomen soft, non-tender, positive bowel sounds.  : no bladder distention noted. No complaints at this time.  MSK: tenderness over right hip   Skin: Dry, intact, + bruising, + right periorbital ecchymosis, no diaphoresis.          LABS:                        8.4    1.72  )-----------( 139      ( 07 Sep 2022 10:28 )             25.3   CARDIAC MARKERS ( 06 Sep 2022 23:00 )  x     / x     / 3499 U/L / x     / x        09-07    141  |  116<H>  |  24<H>  ----------------------------<  124<H>  4.0   |  16<L>  |  1.92<H>    Ca    8.7      07 Sep 2022 10:28    TPro  6.9  /  Alb  2.6<L>  /  TBili  0.3  /  DBili  x   /  AST  156<H>  /  ALT  36  /  AlkPhos  65  09-07          ASSESSMENT AND PLAN:     83 yo female  lives with granddaughter, has HHA, uses walker and cane, PMHx of multiple myeloma on Polymast 2mg every other day,  recurent  Bronchitis, RA, DM, HTN,  CVA no residual weakness, chronic back pain(was recently found to have multiple compression fractures, uses TLSO brace,   presented sp a mechanical fall co right hip pain. Pt was unable to get up    Admitted for ambulatory dysfunction sp fall, KAJAL on CKD, rhabdomyolysis and possible PNA   Started on Ceftriaxone and Zithromax       - c/w  empirical treatment for PNA:  ceftriaxone, and azithromycin  - cotntinue IV fluids  - started on sodium bicarbonate infusion for low bicarb   - follow up official xray of pelvis, consult ortho if there is a fx   - monitor CK and creatinine BMP   - follow up ua, urine culture    - follow up urine culture and urinalysis   - monitor blood sugar, continue insuline sliding scale   - resume home meds for RA, MM, HTN   - heparin sq for DVT proph   - fall precaution    Attending addendum:  Patient admitted s/p mechanical fall due to tripping without LOC  Admitted for ambulatory dysfunction sp fall, KAJAL on CKD, rhabdomyolysis and possible PNA     Patient appeared in discomfort with abdominal distension requested Bladder scan which showed urinary retention   Place Neves catheter  Metabolic acidosis noted; start IVF with Bicarbonate drip  Monitor renal fn; if remain elevated; Renal consult and USG tomorrow  Monitor CK levels closely  Continue antibiotics empirically  Follow up Cultures  F/U Hip and Pelvis X-Rays; if negative PT consult  Discussed with Patient and ACP Geetha and RN    Hospitalist colleague to assume care AM

## 2022-09-07 NOTE — H&P ADULT - PROBLEM SELECTOR PLAN 4
- Home med Lantus 5U at night, 3U lispro before morning, 2U lispro before lunch, 3U lispro before dinner  - Starting Sliding scale  - FS achs  - Diabetic diet - Has hx of recurrent bronchitis  - COVID negative  - BL coarse lung sounds present  - F/u RVP  - F/u CXR  - Azithromycin + Ceftriaxone - Has hx of recurrent bronchitis  - COVID negative  - BL coarse lung sounds present  - F/u RVP  - F/u CXR, maybe atypical pneumonia  - Azithromycin + Ceftriaxone

## 2022-09-07 NOTE — H&P ADULT - HISTORY OF PRESENT ILLNESS
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 Patient was using walker at home and while dragging her bedside commode, she fell on her right side. Patient denies any dizziness or weakness during the fall. She could not get up and granddaughter found the patient at home. According to the granddaughter, patient's mental status is at her baseline. Patient endorses chronic back pain(was recently found to have multiple compression fractures so uses brace), intermittent constipation. She denies any headache, chest pain, SOB, abdominal pain, diarrhea, dysuria, hematuria, or bloody stool. Patient takes iron pills so she sometimes sees dark stool. Patient is a 82F, with HHA, lives with granddaughter, uses walker and cane, PMHx of multiple myeloma, intermittent Bronchitis, RA, DM, HTN, and remote h/o CVA no residual, who comes in with a mechanical fall. Patient was using walker at home and while dragging her bedside commode, she fell on her right side. Patient denies any dizziness or weakness during the fall. She could not get up and granddaughter found the patient at home. According to the granddaughter, patient's mental status is at her baseline. Patient endorses chronic back pain(was recently found to have multiple compression fractures so uses brace), intermittent constipation. She denies any headache, chest pain, SOB, abdominal pain, diarrhea, dysuria, hematuria, or bloody stool. Patient takes iron pills so she sometimes sees dark stool.

## 2022-09-07 NOTE — H&P ADULT - PROBLEM SELECTOR PLAN 3
- P/w Cr 2.25 (Baseline Cr 1.25 with CKD stage 3B)  - Gentle IVF  - F/u Cr and consider urine Na/Cr if no improvement in Cr

## 2022-09-07 NOTE — H&P ADULT - PROBLEM SELECTOR PLAN 5
- MRI on 4/15/22 = multiple thoracic/lumbar vertebral body fractures  - Has outpt neurosurgery f/u  - C/w Gabapentin 200 BID, tylenol, oxycodone 2.5mg q4hrs for pain, lidocaine patch,  - TLSO brace when OOB - Home med Lantus 5U at night, 3U lispro before morning, 2U lispro before lunch, 3U lispro before dinner  - Starting Sliding scale  - FS achs  - Diabetic diet

## 2022-09-07 NOTE — H&P ADULT - PROBLEM SELECTOR PLAN 2
- As above - As above    - Elevated CK 3499, F/u UA to check for myoglobinuria to r/o for rhabdomyolysis  - Gentle IVF for now

## 2022-09-07 NOTE — H&P ADULT - NSHPREVIEWOFSYSTEMS_GEN_ALL_CORE
CONSTITUTIONAL: No fever, weight loss, or fatigue  EYES: No eye pain, visual disturbances, or discharge  ENT:  No difficulty hearing, tinnitus, vertigo; No sinus or throat pain  NECK: No pain or stiffness  RESPIRATORY: No cough, wheezing, chills or hemoptysis; No Shortness of Breath  CARDIOVASCULAR: No chest pain, palpitations, passing out, dizziness, or leg swelling  GASTROINTESTINAL: No abdominal or epigastric pain. No nausea, vomiting, or hematemesis; No diarrhea or constipation. No melena or hematochezia.  GENITOURINARY: No dysuria, frequency, hematuria, or incontinence  NEUROLOGICAL: No headaches, memory loss, loss of strength, numbness, or tremors  SKIN: No itching, burning, rashes, or lesions   LYMPH Nodes: No enlarged glands  ENDOCRINE: No heat or cold intolerance; No hair loss  MUSCULOSKELETAL: No joint pain or swelling; (+)Acute R hip pain, (+)chronic back pain  PSYCHIATRIC: No depression, anxiety, mood swings, or difficulty sleeping  HEME/LYMPH: No easy bruising, or bleeding gums  ALLERGY AND IMMUNOLOGIC: No hives or eczema

## 2022-09-07 NOTE — H&P ADULT - PROBLEM SELECTOR PLAN 6
- Follows outpt onc  - As of 9/2/22, Zometa(q 3 months) is currently on hold due to elevated Cr  - C/w Polymast 2mg EVERY OTHER DAY - MRI on 4/15/22 = multiple thoracic/lumbar vertebral body fractures  - Has outpt neurosurgery f/u  - C/w Gabapentin 200 BID, tylenol, oxycodone 2.5mg q4hrs for pain, lidocaine patch,  - TLSO brace when OOB

## 2022-09-07 NOTE — H&P ADULT - PROBLEM SELECTOR PLAN 1
- P/w mechanical fall  - CT head = negative  - CT C spine = Negative for acute path. 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  - No obvious fracture of hip  - F/u hip xray  - F/u PT - P/w mechanical fall  - CT head = negative  - CT C spine = Negative for acute path. 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  - No obvious fracture of hip  - F/u hip xray  - F/u PT  - F/u UCx

## 2022-09-08 LAB
ANION GAP SERPL CALC-SCNC: 9 MMOL/L — SIGNIFICANT CHANGE UP (ref 5–17)
BUN SERPL-MCNC: 28 MG/DL — HIGH (ref 7–18)
CALCIUM SERPL-MCNC: 9.1 MG/DL — SIGNIFICANT CHANGE UP (ref 8.4–10.5)
CHLORIDE SERPL-SCNC: 114 MMOL/L — HIGH (ref 96–108)
CK SERPL-CCNC: 4588 U/L — HIGH (ref 21–215)
CO2 SERPL-SCNC: 20 MMOL/L — LOW (ref 22–31)
CREAT SERPL-MCNC: 1.76 MG/DL — HIGH (ref 0.5–1.3)
CULTURE RESULTS: NO GROWTH — SIGNIFICANT CHANGE UP
EGFR: 29 ML/MIN/1.73M2 — LOW
GLUCOSE BLDC GLUCOMTR-MCNC: 108 MG/DL — HIGH (ref 70–99)
GLUCOSE BLDC GLUCOMTR-MCNC: 66 MG/DL — LOW (ref 70–99)
GLUCOSE BLDC GLUCOMTR-MCNC: 72 MG/DL — SIGNIFICANT CHANGE UP (ref 70–99)
GLUCOSE BLDC GLUCOMTR-MCNC: 75 MG/DL — SIGNIFICANT CHANGE UP (ref 70–99)
GLUCOSE SERPL-MCNC: 69 MG/DL — LOW (ref 70–99)
HCT VFR BLD CALC: 26.1 % — LOW (ref 34.5–45)
HGB BLD-MCNC: 8.7 G/DL — LOW (ref 11.5–15.5)
HPIV3 RNA SPEC QL NAA+PROBE: DETECTED
MCHC RBC-ENTMCNC: 27.4 PG — SIGNIFICANT CHANGE UP (ref 27–34)
MCHC RBC-ENTMCNC: 33.3 GM/DL — SIGNIFICANT CHANGE UP (ref 32–36)
MCV RBC AUTO: 82.1 FL — SIGNIFICANT CHANGE UP (ref 80–100)
NRBC # BLD: 0 /100 WBCS — SIGNIFICANT CHANGE UP (ref 0–0)
PLATELET # BLD AUTO: 157 K/UL — SIGNIFICANT CHANGE UP (ref 150–400)
POTASSIUM SERPL-MCNC: 4.1 MMOL/L — SIGNIFICANT CHANGE UP (ref 3.5–5.3)
POTASSIUM SERPL-SCNC: 4.1 MMOL/L — SIGNIFICANT CHANGE UP (ref 3.5–5.3)
RAPID RVP RESULT: DETECTED
RBC # BLD: 3.18 M/UL — LOW (ref 3.8–5.2)
RBC # FLD: 17.7 % — HIGH (ref 10.3–14.5)
SARS-COV-2 RNA SPEC QL NAA+PROBE: SIGNIFICANT CHANGE UP
SODIUM SERPL-SCNC: 143 MMOL/L — SIGNIFICANT CHANGE UP (ref 135–145)
SPECIMEN SOURCE: SIGNIFICANT CHANGE UP
TROPONIN I, HIGH SENSITIVITY RESULT: 58.3 NG/L — HIGH
WBC # BLD: 2.24 K/UL — LOW (ref 3.8–10.5)
WBC # FLD AUTO: 2.24 K/UL — LOW (ref 3.8–10.5)

## 2022-09-08 PROCEDURE — 99233 SBSQ HOSP IP/OBS HIGH 50: CPT | Mod: GC

## 2022-09-08 RX ORDER — SODIUM CHLORIDE 9 MG/ML
500 INJECTION INTRAMUSCULAR; INTRAVENOUS; SUBCUTANEOUS
Refills: 0 | Status: DISCONTINUED | OUTPATIENT
Start: 2022-09-08 | End: 2022-09-09

## 2022-09-08 RX ORDER — AMLODIPINE BESYLATE 2.5 MG/1
1 TABLET ORAL
Qty: 0 | Refills: 0 | DISCHARGE

## 2022-09-08 RX ADMIN — LIDOCAINE 1 PATCH: 4 CREAM TOPICAL at 22:03

## 2022-09-08 RX ADMIN — SODIUM CHLORIDE 100 MILLILITER(S): 9 INJECTION INTRAMUSCULAR; INTRAVENOUS; SUBCUTANEOUS at 10:57

## 2022-09-08 RX ADMIN — POLYETHYLENE GLYCOL 3350 17 GRAM(S): 17 POWDER, FOR SOLUTION ORAL at 12:18

## 2022-09-08 RX ADMIN — HEPARIN SODIUM 5000 UNIT(S): 5000 INJECTION INTRAVENOUS; SUBCUTANEOUS at 06:28

## 2022-09-08 RX ADMIN — LIDOCAINE 1 PATCH: 4 CREAM TOPICAL at 01:46

## 2022-09-08 RX ADMIN — Medication 1000 UNIT(S): at 14:45

## 2022-09-08 RX ADMIN — AZITHROMYCIN 500 MILLIGRAM(S): 500 TABLET, FILM COATED ORAL at 12:18

## 2022-09-08 RX ADMIN — LEVETIRACETAM 500 MILLIGRAM(S): 250 TABLET, FILM COATED ORAL at 18:53

## 2022-09-08 RX ADMIN — CEFTRIAXONE 100 MILLIGRAM(S): 500 INJECTION, POWDER, FOR SOLUTION INTRAMUSCULAR; INTRAVENOUS at 19:08

## 2022-09-08 RX ADMIN — ATORVASTATIN CALCIUM 20 MILLIGRAM(S): 80 TABLET, FILM COATED ORAL at 22:02

## 2022-09-08 RX ADMIN — Medication 200 MILLIGRAM(S): at 12:17

## 2022-09-08 RX ADMIN — Medication 3 MILLIGRAM(S): at 22:03

## 2022-09-08 RX ADMIN — GABAPENTIN 200 MILLIGRAM(S): 400 CAPSULE ORAL at 18:28

## 2022-09-08 RX ADMIN — GABAPENTIN 200 MILLIGRAM(S): 400 CAPSULE ORAL at 06:28

## 2022-09-08 RX ADMIN — PANTOPRAZOLE SODIUM 40 MILLIGRAM(S): 20 TABLET, DELAYED RELEASE ORAL at 06:31

## 2022-09-08 RX ADMIN — HEPARIN SODIUM 5000 UNIT(S): 5000 INJECTION INTRAVENOUS; SUBCUTANEOUS at 18:25

## 2022-09-08 RX ADMIN — Medication 60 MEQ/KG/HR: at 06:29

## 2022-09-08 RX ADMIN — Medication 1 MILLIGRAM(S): at 12:18

## 2022-09-08 RX ADMIN — AMLODIPINE BESYLATE 2.5 MILLIGRAM(S): 2.5 TABLET ORAL at 06:29

## 2022-09-08 RX ADMIN — LEVETIRACETAM 500 MILLIGRAM(S): 250 TABLET, FILM COATED ORAL at 06:28

## 2022-09-08 RX ADMIN — Medication 60 MEQ/KG/HR: at 08:07

## 2022-09-08 RX ADMIN — LIDOCAINE 1 PATCH: 4 CREAM TOPICAL at 12:19

## 2022-09-08 NOTE — PATIENT PROFILE ADULT - FALL HARM RISK - HARM RISK INTERVENTIONS

## 2022-09-08 NOTE — PROGRESS NOTE ADULT - PROBLEM SELECTOR PLAN 11
- Home med Lantus 5U at night, 3U lispro before morning, 2U lispro before lunch, 3U lispro before dinner  - Starting Sliding scale  - FS achs  - Diabetic diet. - Home med Lantus 5U at night, 3U lispro before morning, 2U lispro before lunch, 3U lispro before dinner. Hold standing insulin as patient is hypoglycemic  - Starting Sliding scale  - FS achs  - Diabetic diet.

## 2022-09-08 NOTE — PROGRESS NOTE ADULT - PROBLEM SELECTOR PLAN 5
- Has hx of recurrent bronchitis  - COVID negative  - BL coarse lung sounds present  - RVP (+) - Parainfluenza 3  - F/u CXR, maybe atypical pneumonia  - Azithromycin + Ceftriaxone.

## 2022-09-08 NOTE — PROGRESS NOTE ADULT - PROBLEM SELECTOR PLAN 3
- P/w Cr 1.76 (Baseline Cr 1.25 with CKD stage 3B)  - Gentle IVF  - F/u Cr and consider urine Na/Cr if no improvement in Cr.

## 2022-09-08 NOTE — PROGRESS NOTE ADULT - PROBLEM SELECTOR PLAN 4
- Home med Lantus 5U at night, 3U lispro before morning, 2U lispro before lunch, 3U lispro before dinner  - Starting Sliding scale  - FS achs  - Diabetic diet. - Home med Lantus 5U at night, 3U lispro before morning, 2U lispro before lunch, 3U lispro before dinner. Will dc as patient is hypoglycemic  - Starting Sliding scale  - FS achs  - Diabetic diet.

## 2022-09-08 NOTE — PROGRESS NOTE ADULT - PROBLEM SELECTOR PLAN 1
- P/w mechanical fall  - CT head = negative  - CT C spine = Negative for acute path. 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  - No obvious fracture of hip  - F/u hip xray  - F/u PT  - F/u UCx. - P/w mechanical fall  - CT head = negative  - CT C spine = Negative for acute path. 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  - No obvious fracture of hip  - hip xray negative  - F/u PT  - F/u UCx.

## 2022-09-08 NOTE — PROGRESS NOTE ADULT - SUBJECTIVE AND OBJECTIVE BOX
PGY-1 Progress Note discussed with attending    PAGER #: [[phone]] TILL 5:00 PM  PLEASE CONTACT ON CALL TEAM:  - On Call Team (Please refer to Nicola) FROM 5:00 PM - 8:30PM  - Nightfloat Team FROM 8:30 -7:30 AM    CC: Patient is a 82y old  Female who presents with a chief complaint of Ambulatory dysfunction s/p fall (07 Sep 2022 04:47)      OVERNIGHT EVENTS:    SUBJECTIVE / INTERVAL HPI: Patient is a 82F, with HHA, lives with granddaughter, uses walker and cane, PMHx of multiple myeloma, intermittent Bronchitis, RA, DM, HTN, and remote h/o CVA no residual, who comes in with a mechanical fall. Patient was using walker at home and while dragging her bedside commode, she fell on her right side. Patient denies any dizziness or weakness during the fall. She could not get up and granddaughter found the patient at home. According to the granddaughter, patient's mental status is at her baseline. Patient endorses chronic back pain(was recently found to have multiple compression fractures so uses brace), intermittent constipation. She denies any headache, chest pain, SOB, abdominal pain, diarrhea, dysuria, hematuria, or bloody stool. Patient takes iron pills so she sometimes sees dark stool.    Patient seen and examined at bedside. Denies, headache, vision changes, chest pain, shortness of breath, abdominal pain, nausea, vomiting, diarrhea, constipation, and dysuria.     ROS:    CONSTITUTIONAL: No fever, weight loss, or fatigue  EYES: No eye pain, visual disturbances, or discharge  ENT:  No difficulty hearing, tinnitus, vertigo; No sinus or throat pain  NECK: No pain or stiffness  RESPIRATORY: No cough, wheezing, chills or hemoptysis; No Shortness of Breath  CARDIOVASCULAR: No chest pain, palpitations, passing out, dizziness, or leg swelling  GASTROINTESTINAL: No abdominal or epigastric pain. No nausea, vomiting, or hematemesis; No diarrhea or constipation. No melena or hematochezia.  GENITOURINARY: No dysuria, frequency, hematuria, or incontinence  NEUROLOGICAL: No headaches, memory loss, loss of strength, numbness, or tremors  SKIN: No itching, burning, rashes, or lesions   LYMPH Nodes: No enlarged glands  ENDOCRINE: No heat or cold intolerance; No hair loss  MUSCULOSKELETAL: No joint pain or swelling; (+)Acute R hip pain, (+)chronic back pain  PSYCHIATRIC: No depression, anxiety, mood swings, or difficulty sleeping  HEME/LYMPH: No easy bruising, or bleeding gums  ALLERGY AND IMMUNOLOGIC: No hives or eczema    VITAL SIGNS:  Vital Signs Last 24 Hrs  T(C): 36.8 (08 Sep 2022 08:35), Max: 37.9 (07 Sep 2022 20:28)  T(F): 98.3 (08 Sep 2022 08:35), Max: 100.2 (07 Sep 2022 20:28)  HR: 69 (08 Sep 2022 08:35) (69 - 86)  BP: 136/77 (08 Sep 2022 08:35) (136/77 - 155/74)  BP(mean): --  RR: 18 (08 Sep 2022 08:35) (16 - 20)  SpO2: 98% (08 Sep 2022 08:35) (95% - 98%)    Parameters below as of 08 Sep 2022 08:35  Patient On (Oxygen Delivery Method): room air    PHYSICAL EXAM:    GENERAL: NAD  HEAD:  Atraumatic, Normocephalic  EYES: R periorbital erythema, minimally tender. EOMI, PERRLA, conjunctiva and sclera clear  ENMT: No tonsillar erythema, exudates, or enlargement; Moist mucous membranes  NECK: Supple, normal appearance, No JVD; Normal thyroid; Trachea midline  NERVOUS SYSTEM: Alert & Oriented X3, No sensation changes. (+) R hip ROM limited due to pain.  CHEST/LUNG: BL course lung sounds present, No rales, rhonchi, wheezing   HEART: Regular rate and rhythm; No murmurs, rubs, or gallops  ABDOMEN: Soft, Nontender, Nondistended; Bowel sounds present  EXTREMITIES:  2+ Peripheral Pulses, No clubbing, cyanosis, or edema  LYMPH: No lymphadenopathy noted  SKIN: (+) R knee skin abrasion; Good capillary refill    MEDICATIONS:  MEDICATIONS  (STANDING):  amLODIPine   Tablet 2.5 milliGRAM(s) Oral daily  atorvastatin 20 milliGRAM(s) Oral at bedtime  azithromycin   Tablet 500 milliGRAM(s) Oral daily  cefTRIAXone   IVPB 1000 milliGRAM(s) IV Intermittent every 24 hours  cholecalciferol 1000 Unit(s) Oral daily  folic acid 1 milliGRAM(s) Oral daily  gabapentin 200 milliGRAM(s) Oral two times a day  heparin   Injectable 5000 Unit(s) SubCutaneous every 12 hours  hydroxychloroquine 200 milliGRAM(s) Oral daily  insulin glargine Injectable (LANTUS) 5 Unit(s) SubCutaneous at bedtime  insulin lispro (ADMELOG) corrective regimen sliding scale   SubCutaneous three times a day before meals  insulin lispro (ADMELOG) corrective regimen sliding scale   SubCutaneous at bedtime  insulin lispro Injectable (ADMELOG) 3 Unit(s) SubCutaneous before breakfast  insulin lispro Injectable (ADMELOG) 2 Unit(s) SubCutaneous before lunch  insulin lispro Injectable (ADMELOG) 3 Unit(s) SubCutaneous before dinner  levETIRAcetam 500 milliGRAM(s) Oral two times a day  lidocaine   4% Patch 1 Patch Transdermal daily  melatonin 3 milliGRAM(s) Oral at bedtime  pantoprazole    Tablet 40 milliGRAM(s) Oral before breakfast  polyethylene glycol 3350 17 Gram(s) Oral daily  senna 2 Tablet(s) Oral at bedtime  sodium bicarbonate  Infusion 0.062 mEq/kG/Hr (60 mL/Hr) IV Continuous <Continuous>  sodium chloride 0.9%. 1000 milliLiter(s) (70 mL/Hr) IV Continuous <Continuous>    MEDICATIONS  (PRN):  acetaminophen     Tablet .. 650 milliGRAM(s) Oral every 6 hours PRN Temp greater or equal to 38C (100.4F), Mild Pain (1 - 3), Moderate Pain (4 - 6)  aluminum hydroxide/magnesium hydroxide/simethicone Suspension 30 milliLiter(s) Oral every 4 hours PRN Dyspepsia  ondansetron Injectable 4 milliGRAM(s) IV Push every 8 hours PRN Nausea and/or Vomiting  oxyCODONE    IR 2.5 milliGRAM(s) Oral every 4 hours PRN Severe Pain (7 - 10)      ALLERGIES:  Allergies    No Known Allergies    Intolerances        LABS:                        8.7    2.24  )-----------( 157      ( 08 Sep 2022 06:12 )             26.1     09-08    143  |  114<H>  |  28<H>  ----------------------------<  69<L>  4.1   |  20<L>  |  1.76<H>    Ca    9.1      08 Sep 2022 06:12    TPro  6.9  /  Alb  2.6<L>  /  TBili  0.3  /  DBili  x   /  AST  156<H>  /  ALT  36  /  AlkPhos  65  -      Urinalysis Basic - ( 07 Sep 2022 22:30 )    Color: Yellow / Appearance: Clear / S.010 / pH: x  Gluc: x / Ketone: Negative  / Bili: Negative / Urobili: Negative   Blood: x / Protein: 30 mg/dL / Nitrite: Negative   Leuk Esterase: Negative / RBC: 2-5 /HPF / WBC 0-2 /HPF   Sq Epi: x / Non Sq Epi: Few /HPF / Bacteria: Few /HPF      CAPILLARY BLOOD GLUCOSE      POCT Blood Glucose.: 72 mg/dL (08 Sep 2022 07:37)      RADIOLOGY & ADDITIONAL TESTS: Reviewed.    < from: CT Head No Cont (22 @ 01:26) >  IMPRESSION:    CT HEAD:  No evidence of large territory acute infarct, intracranial hemorrhage, or   mass effect.    CT CERVICAL SPINE:  No acute displaced fracture or listhesis. Previously reported thoracic   spine fractures from MRI in 2022 are not well visualized in the   current exam due to limited field-of-view.    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    < end of copied text >  < from: Xray Chest 1 View AP/PA (22 @ 21:44) >    IMPRESSION:    No acute infiltrate. Cardiomegaly.    < end of copied text >   PGY-1 Progress Note discussed with attending    PAGER #: [[phone]] TILL 5:00 PM  PLEASE CONTACT ON CALL TEAM:  - On Call Team (Please refer to Nicola) FROM 5:00 PM - 8:30PM  - Nightfloat Team FROM 8:30 -7:30 AM    CC: Patient is a 82y old  Female who presents with a chief complaint of Ambulatory dysfunction s/p fall (07 Sep 2022 04:47)      OVERNIGHT EVENTS:    SUBJECTIVE / INTERVAL HPI: Patient is a 82F, with HHA, lives with granddaughter, uses walker and cane, PMHx of multiple myeloma, intermittent Bronchitis, RA, DM, HTN, and remote h/o CVA no residual, who comes in with a mechanical fall. Patient was using walker at home and while dragging her bedside commode, she fell on her right side. Patient denies any dizziness or weakness during the fall. She could not get up and granddaughter found the patient at home. According to the granddaughter, patient's mental status is at her baseline. Patient endorses chronic back pain(was recently found to have multiple compression fractures so uses brace), intermittent constipation. She denies any headache, chest pain, SOB, abdominal pain, diarrhea, dysuria, hematuria, or bloody stool. Patient takes iron pills so she sometimes sees dark stool.    Patient seen and examined at bedside. Denies, headache, vision changes, chest pain, shortness of breath, abdominal pain, nausea, vomiting, diarrhea, constipation, and dysuria.     ROS:    CONSTITUTIONAL: No fever, weight loss, or fatigue  EYES: No eye pain, visual disturbances, or discharge  ENT:  No difficulty hearing, tinnitus, vertigo; No sinus or throat pain  NECK: No pain or stiffness  RESPIRATORY: + productive cough, + wheezing, No chills or hemoptysis; No Shortness of Breath  CARDIOVASCULAR: No chest pain, palpitations, passing out, dizziness, or leg swelling  GASTROINTESTINAL: No abdominal or epigastric pain. No nausea, vomiting, or hematemesis; No diarrhea or constipation. No melena or hematochezia.  GENITOURINARY: No dysuria, frequency, hematuria, or incontinence  NEUROLOGICAL: No headaches, memory loss, loss of strength, numbness, or tremors  SKIN: No itching, burning, rashes, or lesions   LYMPH Nodes: No enlarged glands  ENDOCRINE: No heat or cold intolerance; No hair loss  MUSCULOSKELETAL: No joint pain or swelling; (+)Acute R hip pain, (+)chronic back pain  PSYCHIATRIC: No depression, anxiety, mood swings, or difficulty sleeping  HEME/LYMPH: No easy bruising, or bleeding gums  ALLERGY AND IMMUNOLOGIC: No hives or eczema    VITAL SIGNS:  Vital Signs Last 24 Hrs  T(C): 36.8 (08 Sep 2022 08:35), Max: 37.9 (07 Sep 2022 20:28)  T(F): 98.3 (08 Sep 2022 08:35), Max: 100.2 (07 Sep 2022 20:28)  HR: 69 (08 Sep 2022 08:35) (69 - 86)  BP: 136/77 (08 Sep 2022 08:35) (136/77 - 155/74)  BP(mean): --  RR: 18 (08 Sep 2022 08:35) (16 - 20)  SpO2: 98% (08 Sep 2022 08:35) (95% - 98%)    Parameters below as of 08 Sep 2022 08:35  Patient On (Oxygen Delivery Method): room air    PHYSICAL EXAM:    GENERAL: NAD  HEAD:  Atraumatic, Normocephalic  EYES: R periorbital erythema, minimally tender. EOMI, PERRLA, conjunctiva and sclera clear  ENMT: No tonsillar erythema, exudates, or enlargement; Moist mucous membranes  NECK: Supple, normal appearance, No JVD; Normal thyroid; Trachea midline  NERVOUS SYSTEM: Alert & Oriented X3, No sensation changes. (+) R hip ROM limited due to pain.  CHEST/LUNG: BL course crackles present, No rales, rhonchi, wheezing   HEART: Regular rate and rhythm; No murmurs, rubs, or gallops  ABDOMEN: Soft, Nontender, Nondistended; Bowel sounds present  EXTREMITIES:  2+ Peripheral Pulses, No clubbing, cyanosis, or edema  LYMPH: No lymphadenopathy noted  SKIN: (+) R knee skin abrasion; Good capillary refill    MEDICATIONS:  MEDICATIONS  (STANDING):  amLODIPine   Tablet 2.5 milliGRAM(s) Oral daily  atorvastatin 20 milliGRAM(s) Oral at bedtime  azithromycin   Tablet 500 milliGRAM(s) Oral daily  cefTRIAXone   IVPB 1000 milliGRAM(s) IV Intermittent every 24 hours  cholecalciferol 1000 Unit(s) Oral daily  folic acid 1 milliGRAM(s) Oral daily  gabapentin 200 milliGRAM(s) Oral two times a day  heparin   Injectable 5000 Unit(s) SubCutaneous every 12 hours  hydroxychloroquine 200 milliGRAM(s) Oral daily  insulin glargine Injectable (LANTUS) 5 Unit(s) SubCutaneous at bedtime  insulin lispro (ADMELOG) corrective regimen sliding scale   SubCutaneous three times a day before meals  insulin lispro (ADMELOG) corrective regimen sliding scale   SubCutaneous at bedtime  insulin lispro Injectable (ADMELOG) 3 Unit(s) SubCutaneous before breakfast  insulin lispro Injectable (ADMELOG) 2 Unit(s) SubCutaneous before lunch  insulin lispro Injectable (ADMELOG) 3 Unit(s) SubCutaneous before dinner  levETIRAcetam 500 milliGRAM(s) Oral two times a day  lidocaine   4% Patch 1 Patch Transdermal daily  melatonin 3 milliGRAM(s) Oral at bedtime  pantoprazole    Tablet 40 milliGRAM(s) Oral before breakfast  polyethylene glycol 3350 17 Gram(s) Oral daily  senna 2 Tablet(s) Oral at bedtime  sodium bicarbonate  Infusion 0.062 mEq/kG/Hr (60 mL/Hr) IV Continuous <Continuous>  sodium chloride 0.9%. 1000 milliLiter(s) (70 mL/Hr) IV Continuous <Continuous>    MEDICATIONS  (PRN):  acetaminophen     Tablet .. 650 milliGRAM(s) Oral every 6 hours PRN Temp greater or equal to 38C (100.4F), Mild Pain (1 - 3), Moderate Pain (4 - 6)  aluminum hydroxide/magnesium hydroxide/simethicone Suspension 30 milliLiter(s) Oral every 4 hours PRN Dyspepsia  ondansetron Injectable 4 milliGRAM(s) IV Push every 8 hours PRN Nausea and/or Vomiting  oxyCODONE    IR 2.5 milliGRAM(s) Oral every 4 hours PRN Severe Pain (7 - 10)      ALLERGIES:  Allergies    No Known Allergies    Intolerances        LABS:                        8.7    2.24  )-----------( 157      ( 08 Sep 2022 06:12 )             26.1     09-08    143  |  114<H>  |  28<H>  ----------------------------<  69<L>  4.1   |  20<L>  |  1.76<H>    Ca    9.1      08 Sep 2022 06:12    TPro  6.9  /  Alb  2.6<L>  /  TBili  0.3  /  DBili  x   /  AST  156<H>  /  ALT  36  /  AlkPhos  65  -      Urinalysis Basic - ( 07 Sep 2022 22:30 )    Color: Yellow / Appearance: Clear / S.010 / pH: x  Gluc: x / Ketone: Negative  / Bili: Negative / Urobili: Negative   Blood: x / Protein: 30 mg/dL / Nitrite: Negative   Leuk Esterase: Negative / RBC: 2-5 /HPF / WBC 0-2 /HPF   Sq Epi: x / Non Sq Epi: Few /HPF / Bacteria: Few /HPF      CAPILLARY BLOOD GLUCOSE      POCT Blood Glucose.: 72 mg/dL (08 Sep 2022 07:37)      RADIOLOGY & ADDITIONAL TESTS: Reviewed.    < from: CT Head No Cont (22 @ 01:26) >  IMPRESSION:    CT HEAD:  No evidence of large territory acute infarct, intracranial hemorrhage, or   mass effect.    CT CERVICAL SPINE:  No acute displaced fracture or listhesis. Previously reported thoracic   spine fractures from MRI in 2022 are not well visualized in the   current exam due to limited field-of-view.    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    < end of copied text >  < from: Xray Chest 1 View AP/PA (22 @ 21:44) >    IMPRESSION:    No acute infiltrate. Cardiomegaly.    < end of copied text >

## 2022-09-08 NOTE — ADVANCED PRACTICE NURSE CONSULT - ASSESSMENT
This is a 82yr old female patient admitted for Hip Pain, presenting with a healing Stage 2 Pressure Injury to the Coccyx with red tissue, drainage, and periwound maceration

## 2022-09-08 NOTE — PROGRESS NOTE ADULT - PROBLEM SELECTOR PLAN 6
- Follows outpt onc  - As of 9/2/22, Zometa(q 3 months) is currently on hold due to elevated Cr  - C/w Polymast 2mg EVERY OTHER DAY. - Follows outpt onc  - As of 9/2/22, Zometa(q 3 months) is currently on hold due to elevated Cr  - Hold Pomalyst 2mg EVERY OTHER DAY given active infection

## 2022-09-09 LAB
ANION GAP SERPL CALC-SCNC: 7 MMOL/L — SIGNIFICANT CHANGE UP (ref 5–17)
ANISOCYTOSIS BLD QL: SLIGHT — SIGNIFICANT CHANGE UP
BASOPHILS # BLD AUTO: 0.03 K/UL — SIGNIFICANT CHANGE UP (ref 0–0.2)
BASOPHILS NFR BLD AUTO: 1 % — SIGNIFICANT CHANGE UP (ref 0–2)
BUN SERPL-MCNC: 20 MG/DL — HIGH (ref 7–18)
CALCIUM SERPL-MCNC: 8.8 MG/DL — SIGNIFICANT CHANGE UP (ref 8.4–10.5)
CHLORIDE SERPL-SCNC: 111 MMOL/L — HIGH (ref 96–108)
CK SERPL-CCNC: 2557 U/L — HIGH (ref 21–215)
CO2 SERPL-SCNC: 24 MMOL/L — SIGNIFICANT CHANGE UP (ref 22–31)
CREAT SERPL-MCNC: 1.61 MG/DL — HIGH (ref 0.5–1.3)
CULTURE RESULTS: NO GROWTH — SIGNIFICANT CHANGE UP
EGFR: 32 ML/MIN/1.73M2 — LOW
EOSINOPHIL # BLD AUTO: 0.03 K/UL — SIGNIFICANT CHANGE UP (ref 0–0.5)
EOSINOPHIL NFR BLD AUTO: 1 % — SIGNIFICANT CHANGE UP (ref 0–6)
GLUCOSE BLDC GLUCOMTR-MCNC: 108 MG/DL — HIGH (ref 70–99)
GLUCOSE BLDC GLUCOMTR-MCNC: 114 MG/DL — HIGH (ref 70–99)
GLUCOSE BLDC GLUCOMTR-MCNC: 83 MG/DL — SIGNIFICANT CHANGE UP (ref 70–99)
GLUCOSE BLDC GLUCOMTR-MCNC: 93 MG/DL — SIGNIFICANT CHANGE UP (ref 70–99)
GLUCOSE SERPL-MCNC: 77 MG/DL — SIGNIFICANT CHANGE UP (ref 70–99)
HCT VFR BLD CALC: 25.1 % — LOW (ref 34.5–45)
HGB BLD-MCNC: 8.3 G/DL — LOW (ref 11.5–15.5)
LYMPHOCYTES # BLD AUTO: 1.46 K/UL — SIGNIFICANT CHANGE UP (ref 1–3.3)
LYMPHOCYTES # BLD AUTO: 53 % — HIGH (ref 13–44)
MAGNESIUM SERPL-MCNC: 2.1 MG/DL — SIGNIFICANT CHANGE UP (ref 1.6–2.6)
MANUAL SMEAR VERIFICATION: SIGNIFICANT CHANGE UP
MCHC RBC-ENTMCNC: 27.3 PG — SIGNIFICANT CHANGE UP (ref 27–34)
MCHC RBC-ENTMCNC: 33.1 GM/DL — SIGNIFICANT CHANGE UP (ref 32–36)
MCV RBC AUTO: 82.6 FL — SIGNIFICANT CHANGE UP (ref 80–100)
METAMYELOCYTES # FLD: 1 % — HIGH (ref 0–0)
MONOCYTES # BLD AUTO: 0.17 K/UL — SIGNIFICANT CHANGE UP (ref 0–0.9)
MONOCYTES NFR BLD AUTO: 6 % — SIGNIFICANT CHANGE UP (ref 2–14)
NEUTROPHILS # BLD AUTO: 1.05 K/UL — LOW (ref 1.8–7.4)
NEUTROPHILS NFR BLD AUTO: 37 % — LOW (ref 43–77)
NEUTS BAND # BLD: 1 % — SIGNIFICANT CHANGE UP (ref 0–8)
NRBC # BLD: 0 /100 — SIGNIFICANT CHANGE UP (ref 0–0)
OVALOCYTES BLD QL SMEAR: SLIGHT — SIGNIFICANT CHANGE UP
PHOSPHATE SERPL-MCNC: 4.1 MG/DL — SIGNIFICANT CHANGE UP (ref 2.5–4.5)
PLAT MORPH BLD: NORMAL — SIGNIFICANT CHANGE UP
PLATELET # BLD AUTO: 162 K/UL — SIGNIFICANT CHANGE UP (ref 150–400)
PLATELET COUNT - ESTIMATE: NORMAL — SIGNIFICANT CHANGE UP
POIKILOCYTOSIS BLD QL AUTO: SLIGHT — SIGNIFICANT CHANGE UP
POTASSIUM SERPL-MCNC: 4 MMOL/L — SIGNIFICANT CHANGE UP (ref 3.5–5.3)
POTASSIUM SERPL-SCNC: 4 MMOL/L — SIGNIFICANT CHANGE UP (ref 3.5–5.3)
RBC # BLD: 3.04 M/UL — LOW (ref 3.8–5.2)
RBC # FLD: 17.8 % — HIGH (ref 10.3–14.5)
RBC BLD AUTO: ABNORMAL
SODIUM SERPL-SCNC: 142 MMOL/L — SIGNIFICANT CHANGE UP (ref 135–145)
SPECIMEN SOURCE: SIGNIFICANT CHANGE UP
WBC # BLD: 2.75 K/UL — LOW (ref 3.8–10.5)
WBC # FLD AUTO: 2.75 K/UL — LOW (ref 3.8–10.5)

## 2022-09-09 PROCEDURE — ZZZZZ: CPT

## 2022-09-09 RX ORDER — SODIUM CHLORIDE 9 MG/ML
500 INJECTION INTRAMUSCULAR; INTRAVENOUS; SUBCUTANEOUS ONCE
Refills: 0 | Status: COMPLETED | OUTPATIENT
Start: 2022-09-09 | End: 2022-09-09

## 2022-09-09 RX ORDER — GUAIFENESIN/DEXTROMETHORPHAN 600MG-30MG
10 TABLET, EXTENDED RELEASE 12 HR ORAL EVERY 4 HOURS
Refills: 0 | Status: DISCONTINUED | OUTPATIENT
Start: 2022-09-09 | End: 2022-09-15

## 2022-09-09 RX ADMIN — LIDOCAINE 1 PATCH: 4 CREAM TOPICAL at 21:00

## 2022-09-09 RX ADMIN — HEPARIN SODIUM 5000 UNIT(S): 5000 INJECTION INTRAVENOUS; SUBCUTANEOUS at 17:30

## 2022-09-09 RX ADMIN — PANTOPRAZOLE SODIUM 40 MILLIGRAM(S): 20 TABLET, DELAYED RELEASE ORAL at 06:05

## 2022-09-09 RX ADMIN — LEVETIRACETAM 500 MILLIGRAM(S): 250 TABLET, FILM COATED ORAL at 17:30

## 2022-09-09 RX ADMIN — ATORVASTATIN CALCIUM 20 MILLIGRAM(S): 80 TABLET, FILM COATED ORAL at 21:40

## 2022-09-09 RX ADMIN — LEVETIRACETAM 500 MILLIGRAM(S): 250 TABLET, FILM COATED ORAL at 06:04

## 2022-09-09 RX ADMIN — Medication 3 MILLIGRAM(S): at 21:40

## 2022-09-09 RX ADMIN — CEFTRIAXONE 100 MILLIGRAM(S): 500 INJECTION, POWDER, FOR SOLUTION INTRAMUSCULAR; INTRAVENOUS at 17:30

## 2022-09-09 RX ADMIN — LIDOCAINE 1 PATCH: 4 CREAM TOPICAL at 11:32

## 2022-09-09 RX ADMIN — HEPARIN SODIUM 5000 UNIT(S): 5000 INJECTION INTRAVENOUS; SUBCUTANEOUS at 06:04

## 2022-09-09 RX ADMIN — Medication 200 MILLIGRAM(S): at 11:27

## 2022-09-09 RX ADMIN — GABAPENTIN 200 MILLIGRAM(S): 400 CAPSULE ORAL at 06:09

## 2022-09-09 RX ADMIN — SODIUM CHLORIDE 500 MILLILITER(S): 9 INJECTION INTRAMUSCULAR; INTRAVENOUS; SUBCUTANEOUS at 09:34

## 2022-09-09 RX ADMIN — Medication 1 MILLIGRAM(S): at 11:27

## 2022-09-09 RX ADMIN — LIDOCAINE 1 PATCH: 4 CREAM TOPICAL at 00:47

## 2022-09-09 RX ADMIN — GABAPENTIN 200 MILLIGRAM(S): 400 CAPSULE ORAL at 17:30

## 2022-09-09 RX ADMIN — Medication 1000 UNIT(S): at 11:27

## 2022-09-09 RX ADMIN — Medication 10 MILLILITER(S): at 12:26

## 2022-09-09 RX ADMIN — AMLODIPINE BESYLATE 2.5 MILLIGRAM(S): 2.5 TABLET ORAL at 06:04

## 2022-09-09 RX ADMIN — AZITHROMYCIN 500 MILLIGRAM(S): 500 TABLET, FILM COATED ORAL at 11:27

## 2022-09-09 NOTE — DIETITIAN INITIAL EVALUATION ADULT - NUTRITION DIAGNOSIS
REQUESTED OF: DR Faith    Chart reviewed, Hospital Day 3    ADRIAN FENG 86yMale  HPI:  85 yo M with hx of CVA (ICH) 3 years ago with no residual deficits, Prostate cancer, Anemia presents to ED for right sided weakness started 6 pm. As per son at bedside, patient was driving to [employer] and felt "not right". So he pulled over to the side and he couldn't get out of the car because of weakness. So wife called EMS who brought him. Patient is not taking ASA or AC at home.   No hx of trauma.     In ED, patient was found to have 4.2 cm frontotemporal intraparenchymal hemorrhage with intraventricular extension. Adjacent mass effect with left to right midline shift of 4 mm. Effacement of the suprasellar cistern.  Patient is also found to have SBP ~ 200. Patient was intubated for airway protection and started on nicardipine drip. (17 Jan 2020 22:24)        PAST MEDICAL & SURGICAL HISTORY:  CVA (cerebral vascular accident)  Prostate cancer  Anemia      Subjective and Objective:  Today,  Discussed with ICU resident pulmonary fellow    Focused Palliative Care Evaluation:                   Symptoms:                                      Pain                                      Dyspnea                                     N/V                                     Appetite                                     Anxiety                                     Other _____________________                     Support Devices:              PHYSICAL EXAM:      Constitutional: Pt appears critically ill     Respiratory:    Cardiovascular:    Gastrointestinal:    Genitourinary:    Rectal:    Extremities:    Vascular:    Neurological:    Skin:    Lymph Nodes:    Musculoskeletal:    Psychiatric:        T(C): 37.7, Max: 38 (15:54)  HR: 80 (50 - 92)  BP: 144/68 (91/52 - 161/84)  RR: 21 (0 - 31)  SpO2: 100% (99% - 100%)      LABS/STUDIES:  01-20    149<H>  |  116<H>  |  28<H>  ----------------------------<  153<H>  3.4<L>   |  22  |  1.1    Ca    8.5      20 Jan 2020 04:30  Mg     2.1     01-20    TPro  5.8<L>  /  Alb  3.3<L>  /  TBili  0.5  /  DBili  x   /  AST  34  /  ALT  8   /  AlkPhos  36  01-20                            7.7    6.73  )-----------( 191      ( 20 Jan 2020 04:30 )             23.9       MEDICATIONS  (STANDING):    MEDICATIONS  (PRN):          iStop:         PPS  Level    __________       Note PPS = Palliative Performance Scale; (c)2001, [facility] Society       Range from 100% meaning Full ambulation/self-care/intake/Level of Consicous                                                                              to        10% meaning Bedbound/Unable to do any activity/extensive disease /Total Care/ No PO intake/ LOC=Full/drowsy/+/-confusion        (0% = death)                     Prior to acute illness, patient's functionality reportedly REQUESTED OF: DR Faith    Chart reviewed, Hospital Day 3    ADRIAN FENG 86yMale  HPI:  87 yo M with hx of CVA (ICH) 3 years ago with no residual deficits, Prostate cancer, Anemia presents to ED for right sided weakness started 6 pm. As per son at bedside, patient was driving to [employer] and felt "not right". So he pulled over to the side and he couldn't get out of the car because of weakness. So wife called EMS who brought him. Patient is not taking ASA or AC at home.   No hx of trauma.     In ED, patient was found to have 4.2 cm frontotemporal intraparenchymal hemorrhage with intraventricular extension. Adjacent mass effect with left to right midline shift of 4 mm. Effacement of the suprasellar cistern.  Patient is also found to have SBP ~ 200. Patient was intubated for airway protection and started on nicardipine drip. (17 Jan 2020 22:24)        PAST MEDICAL & SURGICAL HISTORY:  CVA (cerebral vascular accident)  Prostate cancer  Anemia      Subjective and Objective:  Today,  Discussed with ICU resident pulmonary fellow    Focused Palliative Care Evaluation:                   Symptoms:                                      Pain withdraws to painful stimuli                                     Dyspnea vented breathes over vent                                                   Support Devices:              PHYSICAL EXAM:      Constitutional: Pt appears critically ill     Respiratory: vented secretions    Cardiovascular:(-) JVD    Gastrointestinal: large soft (+) BS    Genitourinary: garcia in situ    Extremities: mild edema, left lower extremity wrapped     Neurological: not responsive to verbal commands, moves left side only, no purposeful movement     Skin: see nursing assessment        T(C): 37.7, Max: 38 (15:54)  HR: 80 (50 - 92)  BP: 144/68 (91/52 - 161/84)  RR: 21 (0 - 31)  SpO2: 100% (99% - 100%)      LABS/STUDIES:  01-20    149<H>  |  116<H>  |  28<H>  ----------------------------<  153<H>  3.4<L>   |  22  |  1.1    Ca    8.5      20 Jan 2020 04:30  Mg     2.1     01-20    TPro  5.8<L>  /  Alb  3.3<L>  /  TBili  0.5  /  DBili  x   /  AST  34  /  ALT  8   /  AlkPhos  36  01-20                            7.7    6.73  )-----------( 191      ( 20 Jan 2020 04:30 )             23.9       MEDICATIONS  (STANDING):    MEDICATIONS  (PRN):          iStop: #: [id number] - no controlled substance rxs over last 12 months        PPS  Level   10%       Note PPS = Palliative Performance Scale; (c)2001, [facility] Society       Range from 100% meaning Full ambulation/self-care/intake/Level of Consicous                                                                              to        10% meaning Bedbound/Unable to do any activity/extensive disease /Total Care/ No PO intake/ LOC=Full/drowsy/+/-confusion        (0% = death)                     Prior to acute illness, patient's functionality reportedly pt has past history of CVA 3 yrs ago w full recovery, pt was driving and independent. Pt's family said he was clear he never wanted to live on machines and be kept alive if poor prognosis REQUESTED OF: DR Prabhakar    Chart reviewed, Hospital Day 3    ADRIAN FENG 86yMale  HPI:  85 yo M with hx of CVA (ICH) 3 years ago with no residual deficits, Prostate cancer, Anemia presents to ED for right sided weakness started 6 pm. As per son at bedside, patient was driving to [employer] and felt "not right". So he pulled over to the side and he couldn't get out of the car because of weakness. So wife called EMS who brought him. Patient is not taking ASA or AC at home.   No hx of trauma.     In ED, patient was found to have 4.2 cm frontotemporal intraparenchymal hemorrhage with intraventricular extension. Adjacent mass effect with left to right midline shift of 4 mm. Effacement of the suprasellar cistern.  Patient is also found to have SBP ~ 200. Patient was intubated for airway protection and started on nicardipine drip. (17 Jan 2020 22:24)        PAST MEDICAL & SURGICAL HISTORY:  CVA (cerebral vascular accident)  Prostate cancer  Anemia      Subjective and Objective:  Today,  Discussed with ICU resident pulmonary fellow    Focused Palliative Care Evaluation:                   Symptoms:                                      Pain withdraws to painful stimuli                                     Dyspnea vented breathes over vent                                                   Support Devices:              PHYSICAL EXAM:      Constitutional: Pt appears critically ill     Respiratory: vented secretions    Cardiovascular:(-) JVD    Gastrointestinal: large soft (+) BS    Genitourinary: garcia in situ    Extremities: mild edema, left lower extremity wrapped     Neurological: not responsive to verbal commands, moves left side only, no purposeful movement     Skin: see nursing assessment        T(C): 37.7, Max: 38 (15:54)  HR: 80 (50 - 92)  BP: 144/68 (91/52 - 161/84)  RR: 21 (0 - 31)  SpO2: 100% (99% - 100%)      LABS/STUDIES:  01-20    149<H>  |  116<H>  |  28<H>  ----------------------------<  153<H>  3.4<L>   |  22  |  1.1    Ca    8.5      20 Jan 2020 04:30  Mg     2.1     01-20    TPro  5.8<L>  /  Alb  3.3<L>  /  TBili  0.5  /  DBili  x   /  AST  34  /  ALT  8   /  AlkPhos  36  01-20                            7.7    6.73  )-----------( 191      ( 20 Jan 2020 04:30 )             23.9       MEDICATIONS  (STANDING):    MEDICATIONS  (PRN):          iStop: #: [id number] - no controlled substance rxs over last 12 months        PPS  Level   10%       Note PPS = Palliative Performance Scale; (c)2001, [facility] Society       Range from 100% meaning Full ambulation/self-care/intake/Level of Consicous                                                                              to        10% meaning Bedbound/Unable to do any activity/extensive disease /Total Care/ No PO intake/ LOC=Full/drowsy/+/-confusion        (0% = death)                     Prior to acute illness, patient's functionality reportedly pt has past history of CVA 3 yrs ago w full recovery, pt was driving and independent. Pt's family said he was clear he never wanted to live on machines and be kept alive if poor prognosis yes...

## 2022-09-09 NOTE — DIETITIAN INITIAL EVALUATION ADULT - ADD RECOMMEND
Diet Mighty shake 4 oz Po BID for meal completion. MVI w/minerals and Vitamin C 500mg BID for skin integrity.

## 2022-09-09 NOTE — PHYSICAL THERAPY INITIAL EVALUATION ADULT - ADDITIONAL COMMENTS
uses a TLSO brace; also reports using a rolling walker at home; she stated that she also has a single-tip cane, rollator, wheelchair, and hospital bed at home

## 2022-09-09 NOTE — DIETITIAN INITIAL EVALUATION ADULT - PERTINENT MEDS FT
MEDICATIONS  (STANDING):  amLODIPine   Tablet 2.5 milliGRAM(s) Oral daily  atorvastatin 20 milliGRAM(s) Oral at bedtime  azithromycin   Tablet 500 milliGRAM(s) Oral daily  cefTRIAXone   IVPB 1000 milliGRAM(s) IV Intermittent every 24 hours  cholecalciferol 1000 Unit(s) Oral daily  folic acid 1 milliGRAM(s) Oral daily  gabapentin 200 milliGRAM(s) Oral two times a day  heparin   Injectable 5000 Unit(s) SubCutaneous every 12 hours  hydroxychloroquine 200 milliGRAM(s) Oral daily  insulin lispro (ADMELOG) corrective regimen sliding scale   SubCutaneous three times a day before meals  insulin lispro (ADMELOG) corrective regimen sliding scale   SubCutaneous at bedtime  levETIRAcetam 500 milliGRAM(s) Oral two times a day  lidocaine   4% Patch 1 Patch Transdermal daily  melatonin 3 milliGRAM(s) Oral at bedtime  pantoprazole    Tablet 40 milliGRAM(s) Oral before breakfast  polyethylene glycol 3350 17 Gram(s) Oral daily  senna 2 Tablet(s) Oral at bedtime    MEDICATIONS  (PRN):  acetaminophen     Tablet .. 650 milliGRAM(s) Oral every 6 hours PRN Temp greater or equal to 38C (100.4F), Mild Pain (1 - 3), Moderate Pain (4 - 6)  aluminum hydroxide/magnesium hydroxide/simethicone Suspension 30 milliLiter(s) Oral every 4 hours PRN Dyspepsia  guaifenesin/dextromethorphan Oral Liquid 10 milliLiter(s) Oral every 4 hours PRN Cough  ondansetron Injectable 4 milliGRAM(s) IV Push every 8 hours PRN Nausea and/or Vomiting  oxyCODONE    IR 2.5 milliGRAM(s) Oral every 4 hours PRN Severe Pain (7 - 10)

## 2022-09-09 NOTE — PROGRESS NOTE ADULT - SUBJECTIVE AND OBJECTIVE BOX
PGY-1 Progress Note discussed with attending    PAGER #: [phone]] TILL 5:00 PM  PLEASE CONTACT ON CALL TEAM:  - On Call Team (Please refer to Nicola) FROM 5:00 PM - 8:30PM  - Nightfloat Team FROM 8:30 -7:30 AM    CC: Patient is a 82y old  Female who presents with a chief complaint of Ambulatory dysfunction s/p fall (07 Sep 2022 04:47)      OVERNIGHT EVENTS: no overnight events to report.    SUBJECTIVE / INTERVAL HPI: Patient is a 82F, with HHA, lives with granddaughter, uses walker and cane, PMHx of multiple myeloma, intermittent Bronchitis, RA, DM, HTN, and remote h/o CVA no residual, who comes in with a mechanical fall. Patient was using walker at home and while dragging her bedside commode, she fell on her right side. Patient denies any dizziness or weakness during the fall. She could not get up and granddaughter found the patient at home. According to the granddaughter, patient's mental status is at her baseline. Patient endorses chronic back pain(was recently found to have multiple compression fractures so uses brace), intermittent constipation. She denies any headache, chest pain, SOB, abdominal pain, diarrhea, dysuria, hematuria, or bloody stool. Patient takes iron pills so she sometimes sees dark stool.    Patient seen and examined at bedside. Patient admits to poor appetite, cough with sputum, some fatigue, and an episode of diarrhea. ROS:    CONSTITUTIONAL: No fever, weight loss (+) mild fatigue  EYES: No eye pain, visual disturbances, or discharge  ENT:  No difficulty hearing, tinnitus, vertigo; No sinus or throat pain  NECK: No pain or stiffness  RESPIRATORY:  No chills or hemoptysis; No Shortness of Breath; (+) productive cough, (+) wheezing,  CARDIOVASCULAR: No chest pain, palpitations, passing out, dizziness, or leg swelling  GASTROINTESTINAL: No abdominal or epigastric pain. No nausea, vomiting, or hematemesis; constipation. No melena or hematochezia. (+) one episode of diarrhea  GENITOURINARY: No dysuria, frequency, hematuria, or incontinence  NEUROLOGICAL: No headaches, memory loss, loss of strength, numbness, or tremors  SKIN: No itching, burning, rashes, or lesions   LYMPH Nodes: No enlarged glands  ENDOCRINE: No heat or cold intolerance; No hair loss  MUSCULOSKELETAL: No joint pain or swelling; (+) Acute R hip pain, (+) chronic back pain  PSYCHIATRIC: No depression, anxiety, mood swings, or difficulty sleeping  HEME/LYMPH: No easy bruising, or bleeding gums  ALLERGY AND IMMUNOLOGIC: No hives or eczema    ICU Vital Signs Last 24 Hrs  T(C): 36.6 (09 Sep 2022 07:27), Max: 37.1 (08 Sep 2022 21:09)  T(F): 97.8 (09 Sep 2022 07:27), Max: 98.8 (08 Sep 2022 21:09)  HR: 57 (09 Sep 2022 07:) (57 - 78)  BP: 142/52 (09 Sep 2022 07:) (134/52 - 150/76)  BP(mean): --  ABP: --  ABP(mean): --  RR: 19 (09 Sep 2022 07:) (18 - 19)  SpO2: 97% (09 Sep 2022 07:) (93% - 97%)    O2 Parameters below as of 09 Sep 2022 07:27  Patient On (Oxygen Delivery Method): room air    PHYSICAL EXAM:    GENERAL: NAD  HEAD:  Atraumatic, Normocephalic  EYES: R periorbital erythema, minimally tender. EOMI, PERRLA, conjunctiva and sclera clear  ENMT: No tonsillar erythema, exudates, or enlargement; Moist mucous membranes  NECK: Supple, normal appearance, No JVD; Normal thyroid; Trachea midline  NERVOUS SYSTEM: Alert & Oriented X3, No sensation changes. (+) R hip ROM limited due to pain.  CHEST/LUNG: (+) BL course crackles present, mild wheezing, cough appreciatedf  HEART: Regular rate and rhythm; No murmurs, rubs, or gallops  ABDOMEN: Soft, Nontender, Nondistended; Bowel sounds present  EXTREMITIES:  2+ Peripheral Pulses, No clubbing, cyanosis, or edema  LYMPH: No lymphadenopathy noted  SKIN: (+) R knee skin abrasion; Good capillary refill    MEDICATIONS  (STANDING):  amLODIPine   Tablet 2.5 milliGRAM(s) Oral daily  atorvastatin 20 milliGRAM(s) Oral at bedtime  azithromycin   Tablet 500 milliGRAM(s) Oral daily  cefTRIAXone   IVPB 1000 milliGRAM(s) IV Intermittent every 24 hours  cholecalciferol 1000 Unit(s) Oral daily  folic acid 1 milliGRAM(s) Oral daily  gabapentin 200 milliGRAM(s) Oral two times a day  heparin   Injectable 5000 Unit(s) SubCutaneous every 12 hours  hydroxychloroquine 200 milliGRAM(s) Oral daily  insulin lispro (ADMELOG) corrective regimen sliding scale   SubCutaneous three times a day before meals  insulin lispro (ADMELOG) corrective regimen sliding scale   SubCutaneous at bedtime  levETIRAcetam 500 milliGRAM(s) Oral two times a day  lidocaine   4% Patch 1 Patch Transdermal daily  melatonin 3 milliGRAM(s) Oral at bedtime  pantoprazole    Tablet 40 milliGRAM(s) Oral before breakfast  polyethylene glycol 3350 17 Gram(s) Oral daily  senna 2 Tablet(s) Oral at bedtime  sodium chloride 0.9% Bolus 500 milliLiter(s) IV Bolus once    MEDICATIONS  (PRN):  acetaminophen     Tablet .. 650 milliGRAM(s) Oral every 6 hours PRN Temp greater or equal to 38C (100.4F), Mild Pain (1 - 3), Moderate Pain (4 - 6)  aluminum hydroxide/magnesium hydroxide/simethicone Suspension 30 milliLiter(s) Oral every 4 hours PRN Dyspepsia  ondansetron Injectable 4 milliGRAM(s) IV Push every 8 hours PRN Nausea and/or Vomiting  oxyCODONE    IR 2.5 milliGRAM(s) Oral every 4 hours PRN Severe Pain (7 - 10)      ALLERGIES:  Allergies    No Known Allergies    Intolerances        LABS:                        8.3    2.75  )-----------( 162      ( 09 Sep 2022 05:33 )             25.1   BMI: BMI (kg/m2): 29.3 (22 @ 20:27)  HbA1c: A1C with Estimated Average Glucose Result: 11.4 % (22 @ 09:21)    Glucose: POCT Blood Glucose.: 83 mg/dL (22 @ 07:30)    BP: 142/52 (22 @ 07:27) (134/52 - 163/69)  Lipid Panel: Date/Time: 22 @ 12:20  Cholesterol, Serum: 115  Direct LDL: --  HDL Cholesterol, Serum: 58  Total Cholesterol/HDL Ration Measurement: --  Triglycerides, Serum: 74        142  |  111<H>  |  20<H>  ----------------------------<  77  4.0   |  24  |  1.61<H>    Ca    8.8      09 Sep 2022 05:33  Phos  4.1       Mg     2.1         TPro  6.9  /  Alb  2.6<L>  /  TBili  0.3  /  DBili  x   /  AST  156<H>  /  ALT  36  /  AlkPhos  65        Urinalysis Basic - ( 07 Sep 2022 22:30 )    Color: Yellow / Appearance: Clear / S.010 / pH: x  Gluc: x / Ketone: Negative  / Bili: Negative / Urobili: Negative   Blood: x / Protein: 30 mg/dL / Nitrite: Negative   Leuk Esterase: Negative / RBC: 2-5 /HPF / WBC 0-2 /HPF   Sq Epi: x / Non Sq Epi: Few /HPF / Bacteria: Few /HPF      CAPILLARY BLOOD GLUCOSE      POCT Blood Glucose.: 72 mg/dL (08 Sep 2022 07:37)      RADIOLOGY & ADDITIONAL TESTS: Reviewed.    < from: CT Head No Cont (22 @ 01:26) >  IMPRESSION:    CT HEAD:  No evidence of large territory acute infarct, intracranial hemorrhage, or   mass effect.    CT CERVICAL SPINE:  No acute displaced fracture or listhesis. Previously reported thoracic   spine fractures from MRI in 2022 are not well visualized in the   current exam due to limited field-of-view.    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    < end of copied text >  < from: Xray Chest 1 View AP/PA (22 @ 21:44) >    IMPRESSION:    No acute infiltrate. Cardiomegaly.    < end of copied text >   PGY-1 Progress Note discussed with attending    PAGER #: [[phone]] TILL 5:00 PM  PLEASE CONTACT ON CALL TEAM:  - On Call Team (Please refer to Nicola) FROM 5:00 PM - 8:30PM  - Nightfloat Team FROM 8:30 -7:30 AM    CC: Patient is a 82y old  Female who presents with a chief complaint of Ambulatory dysfunction s/p fall (07 Sep 2022 04:47)      OVERNIGHT EVENTS: no overnight events to report.    SUBJECTIVE / INTERVAL HPI Patient seen and examined at bedside. Complains of upper respiratory irritation with productive cough and some fatigue. Patient reports poor appetite, and an episode of diarrhea after receiving stool softener yesterday. Denies headache, chest pain, SOB, abdominal pain, dysuria, hematuria, or bloody stool.    ROS:  CONSTITUTIONAL: No fever, weight loss (+) mild fatigue  EYES: No eye pain, visual disturbances, or discharge  ENT:  No difficulty hearing, tinnitus, vertigo; No sinus or throat pain  NECK: No pain or stiffness  RESPIRATORY:  No chills or hemoptysis; No Shortness of Breath; (+) productive cough, (+) wheezing,  CARDIOVASCULAR: No chest pain, palpitations, passing out, dizziness, or leg swelling  GASTROINTESTINAL: No abdominal or epigastric pain. No nausea, vomiting, or hematemesis; constipation. No melena or hematochezia. (+) one episode of diarrhea  GENITOURINARY: No dysuria, frequency, hematuria, or incontinence  NEUROLOGICAL: No headaches, memory loss, loss of strength, numbness, or tremors  SKIN: No itching, burning, rashes, or lesions   LYMPH Nodes: No enlarged glands  ENDOCRINE: No heat or cold intolerance; No hair loss  MUSCULOSKELETAL: No joint pain or swelling; (+) Acute R hip pain, (+) chronic back pain  PSYCHIATRIC: No depression, anxiety, mood swings, or difficulty sleeping  HEME/LYMPH: No easy bruising, or bleeding gums  ALLERGY AND IMMUNOLOGIC: No hives or eczema    ICU Vital Signs Last 24 Hrs  T(C): 36.6 (09 Sep 2022 07:27), Max: 37.1 (08 Sep 2022 21:09)  T(F): 97.8 (09 Sep 2022 07:27), Max: 98.8 (08 Sep 2022 21:09)  HR: 57 (09 Sep 2022 07:27) (57 - 78)  BP: 142/52 (09 Sep 2022 07:27) (134/52 - 150/76)  BP(mean): --  ABP: --  ABP(mean): --  RR: 19 (09 Sep 2022 07:27) (18 - 19)  SpO2: 97% (09 Sep 2022 07:27) (93% - 97%)    O2 Parameters below as of 09 Sep 2022 07:27  Patient On (Oxygen Delivery Method): room air    PHYSICAL EXAM:    GENERAL: NAD  HEAD:  Atraumatic, Normocephalic  EYES: R periorbital erythema, minimally tender. EOMI, PERRLA, conjunctiva and sclera clear  ENMT: No tonsillar erythema, exudates, or enlargement; Moist mucous membranes  NECK: Supple, normal appearance, No JVD; Normal thyroid; Trachea midline  NERVOUS SYSTEM: Alert & Oriented X3, No sensation changes. (+) R hip ROM limited due to pain.  CHEST/LUNG: (+) BL course crackles present, mild wheezing, cough appreciatedf  HEART: Regular rate and rhythm; No murmurs, rubs, or gallops  ABDOMEN: Soft, Nontender, Nondistended; Bowel sounds present  EXTREMITIES:  2+ Peripheral Pulses, No clubbing, cyanosis, or edema  LYMPH: No lymphadenopathy noted  SKIN: (+) R knee skin abrasion; Good capillary refill    MEDICATIONS  (STANDING):  amLODIPine   Tablet 2.5 milliGRAM(s) Oral daily  atorvastatin 20 milliGRAM(s) Oral at bedtime  azithromycin   Tablet 500 milliGRAM(s) Oral daily  cefTRIAXone   IVPB 1000 milliGRAM(s) IV Intermittent every 24 hours  cholecalciferol 1000 Unit(s) Oral daily  folic acid 1 milliGRAM(s) Oral daily  gabapentin 200 milliGRAM(s) Oral two times a day  heparin   Injectable 5000 Unit(s) SubCutaneous every 12 hours  hydroxychloroquine 200 milliGRAM(s) Oral daily  insulin lispro (ADMELOG) corrective regimen sliding scale   SubCutaneous three times a day before meals  insulin lispro (ADMELOG) corrective regimen sliding scale   SubCutaneous at bedtime  levETIRAcetam 500 milliGRAM(s) Oral two times a day  lidocaine   4% Patch 1 Patch Transdermal daily  melatonin 3 milliGRAM(s) Oral at bedtime  pantoprazole    Tablet 40 milliGRAM(s) Oral before breakfast  polyethylene glycol 3350 17 Gram(s) Oral daily  senna 2 Tablet(s) Oral at bedtime  sodium chloride 0.9% Bolus 500 milliLiter(s) IV Bolus once    MEDICATIONS  (PRN):  acetaminophen     Tablet .. 650 milliGRAM(s) Oral every 6 hours PRN Temp greater or equal to 38C (100.4F), Mild Pain (1 - 3), Moderate Pain (4 - 6)  aluminum hydroxide/magnesium hydroxide/simethicone Suspension 30 milliLiter(s) Oral every 4 hours PRN Dyspepsia  ondansetron Injectable 4 milliGRAM(s) IV Push every 8 hours PRN Nausea and/or Vomiting  oxyCODONE    IR 2.5 milliGRAM(s) Oral every 4 hours PRN Severe Pain (7 - 10)      ALLERGIES:  Allergies    No Known Allergies    Intolerances        LABS:                        8.3    2.75  )-----------( 162      ( 09 Sep 2022 05:33 )             25.1   BMI: BMI (kg/m2): 29.3 (22 @ 20:27)  HbA1c: A1C with Estimated Average Glucose Result: 11.4 % (22 @ 09:21)    Glucose: POCT Blood Glucose.: 83 mg/dL (22 @ 07:30)    BP: 142/52 (22 @ 07:27) (134/52 - 163/69)  Lipid Panel: Date/Time: 22 @ 12:20  Cholesterol, Serum: 115  Direct LDL: --  HDL Cholesterol, Serum: 58  Total Cholesterol/HDL Ration Measurement: --  Triglycerides, Serum: 74        142  |  111<H>  |  20<H>  ----------------------------<  77  4.0   |  24  |  1.61<H>    Ca    8.8      09 Sep 2022 05:33  Phos  4.1       Mg     2.1         TPro  6.9  /  Alb  2.6<L>  /  TBili  0.3  /  DBili  x   /  AST  156<H>  /  ALT  36  /  AlkPhos  65        Urinalysis Basic - ( 07 Sep 2022 22:30 )    Color: Yellow / Appearance: Clear / S.010 / pH: x  Gluc: x / Ketone: Negative  / Bili: Negative / Urobili: Negative   Blood: x / Protein: 30 mg/dL / Nitrite: Negative   Leuk Esterase: Negative / RBC: 2-5 /HPF / WBC 0-2 /HPF   Sq Epi: x / Non Sq Epi: Few /HPF / Bacteria: Few /HPF      CAPILLARY BLOOD GLUCOSE      POCT Blood Glucose.: 72 mg/dL (08 Sep 2022 07:37)      RADIOLOGY & ADDITIONAL TESTS: Reviewed.    < from: CT Head No Cont (22 @ 01:26) >  IMPRESSION:    CT HEAD:  No evidence of large territory acute infarct, intracranial hemorrhage, or   mass effect.    CT CERVICAL SPINE:  No acute displaced fracture or listhesis. Previously reported thoracic   spine fractures from MRI in 2022 are not well visualized in the   current exam due to limited field-of-view.    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    < end of copied text >  < from: Xray Chest 1 View AP/PA (22 @ 21:44) >    IMPRESSION:    No acute infiltrate. Cardiomegaly.    < end of copied text >

## 2022-09-09 NOTE — DIETITIAN INITIAL EVALUATION ADULT - PERTINENT LABORATORY DATA
09-09    142  |  111<H>  |  20<H>  ----------------------------<  77  4.0   |  24  |  1.61<H>    Ca    8.8      09 Sep 2022 05:33  Phos  4.1     09-09  Mg     2.1     09-09    POCT Blood Glucose.: 114 mg/dL (09-09-22 @ 11:29)  A1C with Estimated Average Glucose Result: 11.4 % (04-14-22 @ 09:21)  A1C with Estimated Average Glucose Result: 11.5 % (04-13-22 @ 12:19)

## 2022-09-09 NOTE — PROGRESS NOTE ADULT - PROBLEM SELECTOR PLAN 11
- Home med Lantus 5U at night, 3U lispro before morning, 2U lispro before lunch, 3U lispro before dinner. Hold standing insulin as patient is hypoglycemic  - Starting Sliding scale  - FS achs  - Diabetic diet.

## 2022-09-09 NOTE — PHYSICAL THERAPY INITIAL EVALUATION ADULT - GENERAL OBSERVATIONS, REHAB EVAL
awake, alert, NAD; + awake, alert, NAD; +peripheral IV access on right forearm; +indwelling urethral catheter; +TLSO brace during standing and ambulation

## 2022-09-09 NOTE — DIETITIAN INITIAL EVALUATION ADULT - FACTORS AFF FOOD INTAKE
acute to chronic illness influenza pain multiple myeloma Rheumatoid arthritis Anemia PNA Dm KAJAL on CKD advanced age./other (specify)

## 2022-09-09 NOTE — PROGRESS NOTE ADULT - PROBLEM SELECTOR PLAN 4
- Home med Lantus 5U at night, 3U lispro before morning, 2U lispro before lunch, 3U lispro before dinner. Will dc as patient is hypoglycemic  - Starting Sliding scale  - FS achs  - Diabetic diet.

## 2022-09-09 NOTE — DIETITIAN INITIAL EVALUATION ADULT - NSICDXPASTMEDICALHX_GEN_ALL_CORE_FT
Render In Strict Bullet Format?: No Initiate Treatment: -\\n- Adapalene: Apply a pea-sized amount in a thin layer to entire face nightly. Can start every 3rd night and increase to nightly as tolerated. Detail Level: Zone Plan: Pending negative pregnancy test. Discussed doing one more UPT in 30 days after completion of Accutane. Reviewed restarting multivitamins when completed accutane. Follow up prn. PAST MEDICAL HISTORY:  Hypertension     Multiple myeloma     Rheumatoid arthritis     Varicose veins of lower extremity

## 2022-09-09 NOTE — DIETITIAN INITIAL EVALUATION ADULT - OTHER INFO
82 years old female visited early today sleeping. Pt lives home with granddaughter able to feed self with set up. As per nursing Po intake fair. Tolerates easy to chew diet with no chewing or swallowing difficulties.

## 2022-09-09 NOTE — PROGRESS NOTE ADULT - PROBLEM SELECTOR PLAN 3
- P/w Cr 1.61 (Baseline Cr 1.25 with CKD stage 3B)  - Gentle IVF  - F/u Cr and consider urine Na/Cr if no improvement in Cr.

## 2022-09-09 NOTE — PROGRESS NOTE ADULT - PROBLEM SELECTOR PLAN 1
- P/w mechanical fall  - CT head = negative  - CT C spine = Negative for acute path. 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  - No obvious fracture of hip  - hip xray negative  - F/u PT  - F/u UCx.

## 2022-09-09 NOTE — PROGRESS NOTE ADULT - PROBLEM SELECTOR PLAN 6
- Follows outpt onc  - As of 9/2/22, Zometa(q 3 months) is currently on hold due to elevated Cr  - Hold Pomalyst 2mg EVERY OTHER DAY given active infection

## 2022-09-09 NOTE — PHYSICAL THERAPY INITIAL EVALUATION ADULT - LIVES WITH, PROFILE
family in the first floor of a private house; has HHA services and receives skilled PT services at home recently

## 2022-09-10 ENCOUNTER — TRANSCRIPTION ENCOUNTER (OUTPATIENT)
Age: 82
End: 2022-09-10

## 2022-09-10 DIAGNOSIS — Z02.9 ENCOUNTER FOR ADMINISTRATIVE EXAMINATIONS, UNSPECIFIED: ICD-10-CM

## 2022-09-10 LAB
ALBUMIN SERPL ELPH-MCNC: 2.6 G/DL — LOW (ref 3.5–5)
ALP SERPL-CCNC: 63 U/L — SIGNIFICANT CHANGE UP (ref 40–120)
ALT FLD-CCNC: 38 U/L DA — SIGNIFICANT CHANGE UP (ref 10–60)
ANION GAP SERPL CALC-SCNC: 9 MMOL/L — SIGNIFICANT CHANGE UP (ref 5–17)
ANISOCYTOSIS BLD QL: SLIGHT — SIGNIFICANT CHANGE UP
AST SERPL-CCNC: 79 U/L — HIGH (ref 10–40)
BASOPHILS # BLD AUTO: 0 K/UL — SIGNIFICANT CHANGE UP (ref 0–0.2)
BASOPHILS NFR BLD AUTO: 0 % — SIGNIFICANT CHANGE UP (ref 0–2)
BILIRUB SERPL-MCNC: 0.3 MG/DL — SIGNIFICANT CHANGE UP (ref 0.2–1.2)
BUN SERPL-MCNC: 18 MG/DL — SIGNIFICANT CHANGE UP (ref 7–18)
CALCIUM SERPL-MCNC: 8.3 MG/DL — LOW (ref 8.4–10.5)
CHLORIDE SERPL-SCNC: 109 MMOL/L — HIGH (ref 96–108)
CK SERPL-CCNC: 1280 U/L — HIGH (ref 21–215)
CO2 SERPL-SCNC: 22 MMOL/L — SIGNIFICANT CHANGE UP (ref 22–31)
CREAT SERPL-MCNC: 1.56 MG/DL — HIGH (ref 0.5–1.3)
EGFR: 33 ML/MIN/1.73M2 — LOW
EOSINOPHIL # BLD AUTO: 0.07 K/UL — SIGNIFICANT CHANGE UP (ref 0–0.5)
EOSINOPHIL NFR BLD AUTO: 2 % — SIGNIFICANT CHANGE UP (ref 0–6)
GLUCOSE BLDC GLUCOMTR-MCNC: 102 MG/DL — HIGH (ref 70–99)
GLUCOSE BLDC GLUCOMTR-MCNC: 159 MG/DL — HIGH (ref 70–99)
GLUCOSE BLDC GLUCOMTR-MCNC: 95 MG/DL — SIGNIFICANT CHANGE UP (ref 70–99)
GLUCOSE BLDC GLUCOMTR-MCNC: 97 MG/DL — SIGNIFICANT CHANGE UP (ref 70–99)
GLUCOSE SERPL-MCNC: 85 MG/DL — SIGNIFICANT CHANGE UP (ref 70–99)
HCT VFR BLD CALC: 26.2 % — LOW (ref 34.5–45)
HGB BLD-MCNC: 8.7 G/DL — LOW (ref 11.5–15.5)
LYMPHOCYTES # BLD AUTO: 1.27 K/UL — SIGNIFICANT CHANGE UP (ref 1–3.3)
LYMPHOCYTES # BLD AUTO: 39 % — SIGNIFICANT CHANGE UP (ref 13–44)
MAGNESIUM SERPL-MCNC: 2.2 MG/DL — SIGNIFICANT CHANGE UP (ref 1.6–2.6)
MANUAL SMEAR VERIFICATION: SIGNIFICANT CHANGE UP
MCHC RBC-ENTMCNC: 27.4 PG — SIGNIFICANT CHANGE UP (ref 27–34)
MCHC RBC-ENTMCNC: 33.2 GM/DL — SIGNIFICANT CHANGE UP (ref 32–36)
MCV RBC AUTO: 82.6 FL — SIGNIFICANT CHANGE UP (ref 80–100)
MONOCYTES # BLD AUTO: 0.23 K/UL — SIGNIFICANT CHANGE UP (ref 0–0.9)
MONOCYTES NFR BLD AUTO: 7 % — SIGNIFICANT CHANGE UP (ref 2–14)
NEUTROPHILS # BLD AUTO: 1.7 K/UL — LOW (ref 1.8–7.4)
NEUTROPHILS NFR BLD AUTO: 52 % — SIGNIFICANT CHANGE UP (ref 43–77)
NRBC # BLD: 0 /100 — SIGNIFICANT CHANGE UP (ref 0–0)
PHOSPHATE SERPL-MCNC: 3.8 MG/DL — SIGNIFICANT CHANGE UP (ref 2.5–4.5)
PLAT MORPH BLD: NORMAL — SIGNIFICANT CHANGE UP
PLATELET # BLD AUTO: 157 K/UL — SIGNIFICANT CHANGE UP (ref 150–400)
PLATELET COUNT - ESTIMATE: NORMAL — SIGNIFICANT CHANGE UP
POIKILOCYTOSIS BLD QL AUTO: SLIGHT — SIGNIFICANT CHANGE UP
POTASSIUM SERPL-MCNC: 4 MMOL/L — SIGNIFICANT CHANGE UP (ref 3.5–5.3)
POTASSIUM SERPL-SCNC: 4 MMOL/L — SIGNIFICANT CHANGE UP (ref 3.5–5.3)
PROT SERPL-MCNC: 6.7 G/DL — SIGNIFICANT CHANGE UP (ref 6–8.3)
RBC # BLD: 3.17 M/UL — LOW (ref 3.8–5.2)
RBC # FLD: 17.3 % — HIGH (ref 10.3–14.5)
RBC BLD AUTO: ABNORMAL
SODIUM SERPL-SCNC: 140 MMOL/L — SIGNIFICANT CHANGE UP (ref 135–145)
WBC # BLD: 3.26 K/UL — LOW (ref 3.8–10.5)
WBC # FLD AUTO: 3.26 K/UL — LOW (ref 3.8–10.5)

## 2022-09-10 PROCEDURE — 99232 SBSQ HOSP IP/OBS MODERATE 35: CPT | Mod: GC

## 2022-09-10 RX ADMIN — POLYETHYLENE GLYCOL 3350 17 GRAM(S): 17 POWDER, FOR SOLUTION ORAL at 12:10

## 2022-09-10 RX ADMIN — LEVETIRACETAM 500 MILLIGRAM(S): 250 TABLET, FILM COATED ORAL at 17:05

## 2022-09-10 RX ADMIN — Medication 3 MILLIGRAM(S): at 22:03

## 2022-09-10 RX ADMIN — LIDOCAINE 1 PATCH: 4 CREAM TOPICAL at 20:00

## 2022-09-10 RX ADMIN — AZITHROMYCIN 500 MILLIGRAM(S): 500 TABLET, FILM COATED ORAL at 12:10

## 2022-09-10 RX ADMIN — ATORVASTATIN CALCIUM 20 MILLIGRAM(S): 80 TABLET, FILM COATED ORAL at 22:03

## 2022-09-10 RX ADMIN — LEVETIRACETAM 500 MILLIGRAM(S): 250 TABLET, FILM COATED ORAL at 06:02

## 2022-09-10 RX ADMIN — Medication 1 MILLIGRAM(S): at 12:10

## 2022-09-10 RX ADMIN — LIDOCAINE 1 PATCH: 4 CREAM TOPICAL at 12:10

## 2022-09-10 RX ADMIN — Medication 200 MILLIGRAM(S): at 12:10

## 2022-09-10 RX ADMIN — GABAPENTIN 200 MILLIGRAM(S): 400 CAPSULE ORAL at 06:05

## 2022-09-10 RX ADMIN — AMLODIPINE BESYLATE 2.5 MILLIGRAM(S): 2.5 TABLET ORAL at 06:02

## 2022-09-10 RX ADMIN — CEFTRIAXONE 100 MILLIGRAM(S): 500 INJECTION, POWDER, FOR SOLUTION INTRAMUSCULAR; INTRAVENOUS at 17:05

## 2022-09-10 RX ADMIN — HEPARIN SODIUM 5000 UNIT(S): 5000 INJECTION INTRAVENOUS; SUBCUTANEOUS at 17:05

## 2022-09-10 RX ADMIN — Medication 1000 UNIT(S): at 12:09

## 2022-09-10 RX ADMIN — HEPARIN SODIUM 5000 UNIT(S): 5000 INJECTION INTRAVENOUS; SUBCUTANEOUS at 06:02

## 2022-09-10 RX ADMIN — LIDOCAINE 1 PATCH: 4 CREAM TOPICAL at 00:14

## 2022-09-10 RX ADMIN — PANTOPRAZOLE SODIUM 40 MILLIGRAM(S): 20 TABLET, DELAYED RELEASE ORAL at 06:02

## 2022-09-10 RX ADMIN — GABAPENTIN 200 MILLIGRAM(S): 400 CAPSULE ORAL at 17:05

## 2022-09-10 NOTE — DISCHARGE NOTE PROVIDER - NSDCMRMEDTOKEN_GEN_ALL_CORE_FT
acetaminophen 325 mg oral tablet: 3 tab(s) orally every 8 hours  amLODIPine 10 mg oral tablet: 1 tab(s) orally once a day  atorvastatin 20 mg oral tablet: 1 tab(s) orally once a day (at bedtime)  calcitriol 0.25 mcg oral capsule: 1 cap(s) orally once a day  cholecalciferol oral tablet: 1000 unit(s) orally once a day  ferrous sulfate 325 mg (65 mg elemental iron) oral delayed release tablet: 1 tab(s) orally once a day  folic acid 1 mg oral tablet: 1 tab(s) orally once a day  gabapentin 100 mg oral capsule: 1 cap(s) orally 3 times a day  hydroxychloroquine 200 mg oral tablet: 1 tab(s) orally once a day  insulin glargine 100 units/mL subcutaneous solution: 5 unit(s) subcutaneous once a day (at bedtime)  insulin lispro 100 units/mL injectable solution: injectable 3 times a day (before meals)  1 Unit(s) if Glucose 151 - 200  2 Unit(s) if Glucose 201 - 250  3 Unit(s) if Glucose 251 - 300  4 Unit(s) if Glucose 301 - 350  5 Unit(s) if Glucose 351 - 400  6 Unit(s) if Glucose Greater Than 400  insulin lispro 100 units/mL injectable solution: 2 unit(s) injectable once a day  BEFORE LUNCH  insulin lispro 100 units/mL injectable solution: 3 unit(s) injectable once a day  BEFORE BREAKFAST  insulin lispro 100 units/mL injectable solution: 3 unit(s) injectable once a day  BEFORE DINNER   insulin lispro 100 units/mL injectable solution: injectable once a day (at bedtime)  0 Unit(s) if Glucose 0 - 250  1 Unit(s) if Glucose 251 - 300  2 Unit(s) if Glucose 301 - 350  3 Unit(s) if Glucose 351 - 400  4 Unit(s) if Glucose Greater Than 400  Keppra 500 mg oral tablet: 1 tab(s) orally every 12 hours  lidocaine 4% patch: Apply topically to affected area once a day on lower back, leave on for 12 hours, take off for 12hours  melatonin 3 mg oral tablet: 1 tab(s) orally once a day (at bedtime)  nystatin 100,000 units/g topical powder: 1 application topically 2 times a day  to affected area   oxyCODONE: 2.5 milligram(s) orally every 4 hours, As Needed for pain   pantoprazole 40 mg oral granule, delayed release: 40 milligram(s) orally once a day (before a meal) - before breakfast   polyethylene glycol 3350 oral powder for reconstitution: 17 gram(s) orally once a day, As needed, Constipation  Pomalyst 2 mg oral capsule: 1 cap(s) orally every other day  senna oral tablet: 2 tab(s) orally once a day (at bedtime), As needed, Constipation  Monitor for BM  Hold for loose stool   TLSO BRACE - DX: UNSTABLE T-10 FRACTURE  CARL=99:    acetaminophen 325 mg oral tablet: 2 tab(s) orally every 6 hours, As needed, Temp greater or equal to 38C (100.4F), Mild Pain (1 - 3), Moderate Pain (4 - 6)  albuterol 90 mcg/inh inhalation aerosol: 2 puff(s) inhaled every 6 hours as needed for shortness of breath  amLODIPine 2.5 mg oral tablet: 1 tab(s) orally once a day  atorvastatin 20 mg oral tablet: 1 tab(s) orally once a day (at bedtime)  calcitriol 0.25 mcg oral capsule: 1 cap(s) orally once a day  cholecalciferol oral tablet: 1000 unit(s) orally once a day  ferrous sulfate 325 mg (65 mg elemental iron) oral delayed release tablet: 1 tab(s) orally once a day  folic acid 1 mg oral tablet: 1 tab(s) orally once a day  gabapentin 100 mg oral capsule: 2 cap(s) orally 2 times a day  hydroxychloroquine 200 mg oral tablet: 1 tab(s) orally once a day  insulin lispro 100 units/mL injectable solution: 1 Unit(s) if Glucose 151 - 200  2 Unit(s) if Glucose 201 - 250  3 Unit(s) if Glucose 251 - 300  4 Unit(s) if Glucose 301 - 350  5 Unit(s) if Glucose 351 - 400  6 Unit(s) if Glucose Greater Than 400  levETIRAcetam 500 mg oral tablet: 1 tab(s) orally 2 times a day  lidocaine 4% topical film: Apply topically to affected area once a day  melatonin 3 mg oral tablet: 1 tab(s) orally once a day (at bedtime)  oxyCODONE: 2.5 milligram(s) orally every 4 hours, As Needed for pain   pantoprazole 40 mg oral delayed release tablet: 1 tab(s) orally once a day (before a meal)  polyethylene glycol 3350 oral powder for reconstitution: 17 gram(s) orally once a day  Pomalyst 2 mg oral capsule: 1 cap(s) orally every other day  senna leaf extract oral tablet: 2 tab(s) orally once a day (at bedtime)   acetaminophen 325 mg oral tablet: 2 tab(s) orally every 6 hours, As needed, Temp greater or equal to 38C (100.4F), Mild Pain (1 - 3), Moderate Pain (4 - 6)  albuterol 90 mcg/inh inhalation aerosol: 2 puff(s) inhaled every 6 hours as needed for shortness of breath  amLODIPine 2.5 mg oral tablet: 1 tab(s) orally once a day  atorvastatin 20 mg oral tablet: 1 tab(s) orally once a day (at bedtime)  calcitriol 0.25 mcg oral capsule: 1 cap(s) orally once a day  cholecalciferol oral tablet: 1000 unit(s) orally once a day  ferrous sulfate 325 mg (65 mg elemental iron) oral delayed release tablet: 1 tab(s) orally once a day  folic acid 1 mg oral tablet: 1 tab(s) orally once a day  gabapentin 100 mg oral capsule: 2 cap(s) orally 2 times a day  hydroxychloroquine 200 mg oral tablet: 1 tab(s) orally once a day  insulin lispro 100 units/mL injectable solution: 1 Unit(s) if Glucose 151 - 200  2 Unit(s) if Glucose 201 - 250  3 Unit(s) if Glucose 251 - 300  4 Unit(s) if Glucose 301 - 350  5 Unit(s) if Glucose 351 - 400  6 Unit(s) if Glucose Greater Than 400  levETIRAcetam 500 mg oral tablet: 1 tab(s) orally 2 times a day  lidocaine 4% topical film: Apply topically to affected area once a day  melatonin 3 mg oral tablet: 1 tab(s) orally once a day (at bedtime)  oxyCODONE: 2.5 milligram(s) orally every 4 hours, As Needed for pain   pantoprazole 40 mg oral delayed release tablet: 1 tab(s) orally once a day (before a meal)  polyethylene glycol 3350 oral powder for reconstitution: 17 gram(s) orally once a day  senna leaf extract oral tablet: 2 tab(s) orally once a day (at bedtime)

## 2022-09-10 NOTE — DISCHARGE NOTE PROVIDER - HOSPITAL COURSE
Patient is a 82F, with HHA, lives with granddaughter, uses walker and cane, PMHx of multiple myeloma, intermittent Bronchitis, RA, DM, HTN, and remote h/o CVA no residual, who comes in with a mechanical fall. Admitted for ambulatory dysfunction.    Ambulatory dysfunction.   ·  Plan: - P/w mechanical fall  - CT head = negative  - CT C spine = Negative for acute path. 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  - No obvious fracture of hip  - hip xray negative  - F/u PT  - F/u UCx.     Problem/Plan - 2:  ·  Problem: Fall.   ·  Plan: - As above.  - Elevated CK 2557 (today)   - UA: notes some RBC & blood  - Gentle IVF for now.     Problem/Plan - 3:  ·  Problem: Acute kidney injury superimposed on CKD.   ·  Plan: - P/w Cr 1.61 (Baseline Cr 1.25 with CKD stage 3B)  - Gentle IVF  - F/u Cr and consider urine Na/Cr if no improvement in Cr.     Problem/Plan - 4:  ·  Problem: Lumbar compression fracture.   ·  Plan: - Home med Lantus 5U at night, 3U lispro before morning, 2U lispro before lunch, 3U lispro before dinner. Will dc as patient is hypoglycemic  - Starting Sliding scale  - FS achs  - Diabetic diet.     Problem/Plan - 5:  ·  Problem: Pneumonia.   ·  Plan: - Has hx of recurrent bronchitis  - COVID negative  - BL coarse lung sounds present  - RVP (+) - Parainfluenza 3  - F/u CXR, maybe atypical pneumonia  - Azithromycin + Ceftriaxone.     Problem/Plan - 6:  ·  Problem: Multiple myeloma.   ·  Plan: - Follows outpt onc  - As of 9/2/22, Zometa(q 3 months) is currently on hold due to elevated Cr  - Hold Pomalyst 2mg EVERY OTHER DAY given active infection.     Problem/Plan - 7:  ·  Problem: Anemia.   ·  Plan: - P/w Hgb 8.7, near her baseline  - No sign of active bleeding.     Problem/Plan - 8:  ·  Problem: Rheumatoid arthritis.   ·  Plan: - C/w hydroxychloroquine.     Problem/Plan - 9:  ·  Problem: HTN (hypertension).   ·  Plan: - Home med Amlodipine 2.5mg daily  - C/w home meds with parameters  - Continue to monitor.     Problem/Plan - 10:  ·  Problem: HLD (hyperlipidemia).   ·  Plan; - C/w Atorvastatin 20mg daily.     Problem/Plan - 11:  ·  Problem: DM (diabetes mellitus).   ·  Plan: - Home med Lantus 5U at night, 3U lispro before morning, 2U lispro before lunch, 3U lispro before dinner. Hold standing insulin as patient is hypoglycemic  - Starting Sliding scale  - FS achs  - Diabetic diet.   Patient is a 82F, with HHA, lives with granddaughter, uses walker and cane, PMHx of multiple myeloma, intermittent Bronchitis, RA, DM, HTN, and remote h/o CVA no residual, who comes in with a mechanical fall. Admitted for ambulatory dysfunction.    Ambulatory dysfunction/Fall  Patient presented after mechanical fall at home. CT head was negative. 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  No obvious signs of fracture of hip and hip xray negative. PT recommended home PT.    Rhabodmyolysis  Patient presented after prolonged immobilization post fall. Found to have. Elevated CK. UA showed some RBC & blood. CK downtrended with gentle IVF.    Acute kidney injury superimposed on CKD  Patient has history CKD stage 3B with baseline Cr 1.25 presented with Cr 1.61. Treated with gentle IVF with return to baseline     Diabetes  Patient with history of diabetes on home med Lantus 5U at night, 3U lispro before morning, 2U lispro before lunch, 3U lispro before dinner. Discontinued patients home insulin regimen as patient was hypoglycemic. Started on sliding scale insulin with FS achs and diabetic diet.    Pneumonia  Patient has hx of recurrent bronchitis presented with productive cough and abnormal breath sounds. Started on  Azithromycin + Ceftriaxone. as empiric coverage for bacterial PNA. COVID PCR was negative. RVP was positive for parainfluenza 3. CXR showed cardiomegaly, stable 5 mm right lower lobe calcified granuloma, no acute infiltrate, no pneumothorax. no pleural effusion and normal pulmonary vasculature. Continued on antibiotics for bacterial PNA prophylaxis/treatment in setting of leukopenia likely 2/2 to multiple myeloma.     Multiple myeloma  Patient has history of multiple myeloma and follows with outpatient heme/onc. As of 9/2/22 receiving Zometa(q 3 months) but currently on hold due to elevated Cr. Held home Pomalyst 2mg EVERY OTHER DAY given active infection.    Anemia  Patient p/w Hgb 8.7, which is near her baseline. No sign of active bleeding. Monitored with daily CBC.    Rheumatoid arthritis  Patient has history of RA on home medication hydroxychloroquine. Continued on home medication.     HTN (hypertension)  Patient has history of HTN on home med Amlodipine 2.5mg daily. Continued on home medication with parameters.     HLD (hyperlipidemia)  Patient has history of HLD on home medication Atorvastatin 20mg daily. Continued on home medication    Patient is stable for discharge and is advised to follow up with PCP as outpatient.  Please refer to patient's complete medical chart with documents for a full hospital course, for this is only a brief summary.       Patient is a 82F, with HHA, lives with granddaughter, uses walker and cane, PMHx of multiple myeloma, intermittent Bronchitis, RA, DM, HTN, and remote h/o CVA no residual, who comes in with a mechanical fall. Admitted for ambulatory dysfunction.    Ambulatory dysfunction/Fall  Patient presented after mechanical fall at home. CT head was negative. 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  No obvious signs of fracture of hip and hip xray negative. PT recommended home PT.    Rhabodmyolysis  Patient presented after prolonged immobilization post fall. Found to have. Elevated CK. UA showed some RBC & blood. CK downtrended with gentle IVF.    Acute kidney injury superimposed on CKD  Patient has history CKD stage 3B with baseline Cr 1.25 presented with Cr 1.61. Treated with gentle IVF with return to baseline     Diabetes  Patient with history of diabetes on home med Lantus 5U at night, 3U lispro before morning, 2U lispro before lunch, 3U lispro before dinner. Discontinued patients home insulin regimen as patient was hypoglycemic. Started on sliding scale insulin with FS achs and diabetic diet.    Pneumonia  Patient has hx of recurrent bronchitis presented with productive cough and abnormal breath sounds. Started on  Azithromycin + Ceftriaxone. as empiric coverage for bacterial PNA. COVID PCR was negative. RVP was positive for parainfluenza 3. CXR showed cardiomegaly, stable 5 mm right lower lobe calcified granuloma, no acute infiltrate, no pneumothorax. no pleural effusion and normal pulmonary vasculature. Continued on antibiotics for bacterial PNA prophylaxis/treatment in setting of leukopenia likely 2/2 to multiple myeloma.     Multiple myeloma  Patient has history of multiple myeloma and follows with outpatient heme/onc. As of 9/2/22 receiving Zometa(q 3 months) but currently on hold due to elevated Cr. Held home Pomalyst 2mg EVERY OTHER DAY given active infection.    Anemia  Patient p/w Hgb 8.7, which is near her baseline. No sign of active bleeding. Monitored with daily CBC.    Rheumatoid arthritis  Patient has history of RA on home medication hydroxychloroquine. Continued on home medication.     HTN (hypertension)  Patient has history of HTN on home med Amlodipine 2.5mg daily. Continued on home medication with parameters.     HLD (hyperlipidemia)  Patient has history of HLD on home medication Atorvastatin 20mg daily. Continued on home medication    Patient is stable for discharge to [facility] and is advised to follow up with PCP as outpatient.  Please refer to patient's complete medical chart with documents for a full hospital course, for this is only a brief summary.       Patient is a 82F, with HHA, lives with granddaughter, uses walker and cane, PMHx of multiple myeloma, intermittent Bronchitis, RA, DM, HTN, and remote h/o CVA no residual, who comes in with a mechanical fall. Admitted for ambulatory dysfunction.    Ambulatory dysfunction/Fall  Patient presented after mechanical fall at home. CT head was negative. 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  No obvious signs of fracture of hip and hip xray negative. PT recommended home PT.    Rhabodmyolysis  Patient presented after prolonged immobilization post fall. Found to have. Elevated CK. UA showed some RBC & blood. CK downtrended with gentle IVF.    Acute kidney injury superimposed on CKD  Patient has history CKD stage 3B with baseline Cr 1.25 presented with Cr 1.61. Treated with gentle IVF with return to baseline     Diabetes  Patient with history of diabetes on home med Lantus 5U at night, 3U lispro before morning, 2U lispro before lunch, 3U lispro before dinner. Discontinued patients home insulin regimen as patient was hypoglycemic. Started on sliding scale insulin with FS achs and diabetic diet.    Pneumonia  Patient has hx of recurrent bronchitis presented with productive cough and abnormal breath sounds. Started on  Azithromycin + Ceftriaxone. as empiric coverage for bacterial PNA. COVID PCR was negative. RVP was positive for parainfluenza 3. CXR showed cardiomegaly, stable 5 mm right lower lobe calcified granuloma, no acute infiltrate, no pneumothorax. no pleural effusion and normal pulmonary vasculature. Continued on antibiotics for bacterial PNA prophylaxis/treatment in setting of leukopenia likely 2/2 to multiple myeloma.     Multiple myeloma  Patient has history of multiple myeloma and follows with outpatient heme/onc. As of 9/2/22 receiving Zometa(q 3 months) but currently on hold due to elevated Cr. Patient is not taking any medications at this time.     Anemia  Patient p/w Hgb 8.7, which is near her baseline. No sign of active bleeding. Monitored with daily CBC.    Rheumatoid arthritis  Patient has history of RA on home medication hydroxychloroquine. Continued on home medication.     HTN (hypertension)  Patient has history of HTN on home med Amlodipine 2.5mg daily. Continued on home medication with parameters.     HLD (hyperlipidemia)  Patient has history of HLD on home medication Atorvastatin 20mg daily. Continued on home medication    Patient is stable for discharge to [facility] and is advised to follow up with PCP as outpatient.  Please refer to patient's complete medical chart with documents for a full hospital course, for this is only a brief summary.

## 2022-09-10 NOTE — PROGRESS NOTE ADULT - PROBLEM SELECTOR PLAN 11
- Home med Lantus 5U at night, 3U lispro before morning, 2U lispro before lunch, 3U lispro before dinner. Hold standing insulin as patient is hypoglycemic  - Starting Sliding scale  - FS achs  - Diabetic diet. - C/w Atorvastatin 20mg daily.

## 2022-09-10 NOTE — PROGRESS NOTE ADULT - PROBLEM SELECTOR PLAN 12
- Hep SQ  - PPI  - Keppra 500 BID  - Melatonin, vitamin D  - Laxatives. - Home med Lantus 5U at night, 3U lispro before morning, 2U lispro before lunch, 3U lispro before dinner. Hold standing insulin as patient is hypoglycemic  - Starting Sliding scale  - FS achs  - Diabetic diet.

## 2022-09-10 NOTE — PROGRESS NOTE ADULT - PROBLEM SELECTOR PLAN 9
- Home med Amlodipine 2.5mg daily  - C/w home meds with parameters  - Continue to monitor. - C/w hydroxychloroquine.

## 2022-09-10 NOTE — PROGRESS NOTE ADULT - PROBLEM SELECTOR PLAN 2
- As above.  - Elevated CK 2557 (today)   - UA: notes some RBC & blood  - Gentle IVF for now. - Has hx of recurrent bronchitis  - COVID negative  - BL coarse lung sounds present  - RVP (+) - Parainfluenza 3  - F/u CXR, maybe atypical pneumonia  - Azithromycin + Ceftriaxone.

## 2022-09-10 NOTE — PROGRESS NOTE ADULT - PROBLEM SELECTOR PLAN 5
- Has hx of recurrent bronchitis  - COVID negative  - BL coarse lung sounds present  - RVP (+) - Parainfluenza 3  - F/u CXR, maybe atypical pneumonia  - Azithromycin + Ceftriaxone. - Home med Lantus 5U at night, 3U lispro before morning, 2U lispro before lunch, 3U lispro before dinner. Will dc as patient is hypoglycemic  - Starting Sliding scale  - FS achs  - Diabetic diet.

## 2022-09-10 NOTE — DISCHARGE NOTE PROVIDER - ATTENDING DISCHARGE PHYSICAL EXAMINATION:
83 yo F, with HHA, lives with granddaughter, uses walker and cane, PMHx of multiple myeloma, intermittent Bronchitis, RA, DM, HTN, and remote h/o CVA no residual, who comes in with a mechanical fall, right hip pain, noted to have rhabdomyolysis, KAJAL.       Parainfluenza w/ suspected superimposed bacterial infection   Mechanical fall  Rhabdomyolysis  KAJAL  Lumbar compression fracture  Multiple myeloma  RA  Type 2 DM  Anemia- near baseline likely ACD  HTN   h/o CVAo residual weakness    Plan  - Completed abx course.   - s/pDuoneb, wheezing improved.   - Cr downtrended, stable.  - RVP with + parainfluenza, symptomatic treatment. Patient improved  -c/w TLSO brace  - PT eval recommended DEV, dc to DEV today  -DC Plan in am pending DEV VS home w/ PT      Physical exam:  NAD  Lungs CTAB, no wheezing  Abd soft nontender  No c/c/e

## 2022-09-10 NOTE — PROGRESS NOTE ADULT - PROBLEM SELECTOR PLAN 6
- Follows outpt onc  - As of 9/2/22, Zometa(q 3 months) is currently on hold due to elevated Cr  - Hold Pomalyst 2mg EVERY OTHER DAY given active infection Patients children report need for more HHA hours  -Currently with HHA 7hrs/day  -Patients son lives in [place] and daughter lives over 1 hour away with busy schedule

## 2022-09-10 NOTE — DISCHARGE NOTE PROVIDER - NSDCFUSCHEDAPPT_GEN_ALL_CORE_FT
[facility]ales [facility]  [facility]  Scheduled Appointment: 09/19/2022    Bailey Rosales [facility]  [facility]  Scheduled Appointment: 09/19/2022

## 2022-09-10 NOTE — PROGRESS NOTE ADULT - PROBLEM SELECTOR PLAN 10
- C/w Atorvastatin 20mg daily. - Home med Amlodipine 2.5mg daily  - C/w home meds with parameters  - Continue to monitor.

## 2022-09-10 NOTE — PROGRESS NOTE ADULT - SUBJECTIVE AND OBJECTIVE BOX
PGY-1 Progress Note discussed with attending    PAGER #: [[phone]] TILL 5:00 PM  PLEASE CONTACT ON CALL TEAM:  - On Call Team (Please refer to Nicola) FROM 5:00 PM - 8:30PM  - Nightfloat Team FROM 8:30 -7:30 AM    CC: Patient is a 82y old  Female who presents with a chief complaint of Hip pain     (09 Sep 2022 14:01)      OVERNIGHT EVENTS:    SUBJECTIVE / INTERVAL HPI: Patient seen and examined at bedside. Denies, headache, vision changes, chest pain, shortness of breath, abdominal pain, nausea, vomiting, diarrhea, constipation, and dysuria.     ROS: negative unless otherwise stated above.    VITAL SIGNS:  Vital Signs Last 24 Hrs  T(C): 37 (10 Sep 2022 07:30), Max: 37.7 (09 Sep 2022 21:18)  T(F): 98.6 (10 Sep 2022 07:30), Max: 99.8 (09 Sep 2022 21:18)  HR: 60 (10 Sep 2022 07:30) (60 - 78)  BP: 160/73 (10 Sep 2022 07:30) (130/50 - 160/73)  BP(mean): --  RR: 17 (10 Sep 2022 07:30) (17 - 19)  SpO2: 95% (10 Sep 2022 07:30) (91% - 96%)    Parameters below as of 10 Sep 2022 07:30  Patient On (Oxygen Delivery Method): room air        PHYSICAL EXAM:    General: WDWN  HEENT: NC/AT; PERRL, anicteric sclera; MMM  Neck: supple  Cardiovascular: +S1/S2; RRR  Respiratory: CTA B/L; no W/R/R  Gastrointestinal: soft, NT/ND; +BSx4  Extremities: WWP; no edema, clubbing or cyanosis  Vascular: 2+ radial, DP/PT pulses B/L  Skin: Warm, dry, good turgor, no rashes, ecchymoses, or petichea   Neurological: AAOx3; no focal deficits    MEDICATIONS:  MEDICATIONS  (STANDING):  amLODIPine   Tablet 2.5 milliGRAM(s) Oral daily  atorvastatin 20 milliGRAM(s) Oral at bedtime  azithromycin   Tablet 500 milliGRAM(s) Oral daily  cefTRIAXone   IVPB 1000 milliGRAM(s) IV Intermittent every 24 hours  cholecalciferol 1000 Unit(s) Oral daily  folic acid 1 milliGRAM(s) Oral daily  gabapentin 200 milliGRAM(s) Oral two times a day  heparin   Injectable 5000 Unit(s) SubCutaneous every 12 hours  hydroxychloroquine 200 milliGRAM(s) Oral daily  insulin lispro (ADMELOG) corrective regimen sliding scale   SubCutaneous three times a day before meals  insulin lispro (ADMELOG) corrective regimen sliding scale   SubCutaneous at bedtime  levETIRAcetam 500 milliGRAM(s) Oral two times a day  lidocaine   4% Patch 1 Patch Transdermal daily  melatonin 3 milliGRAM(s) Oral at bedtime  pantoprazole    Tablet 40 milliGRAM(s) Oral before breakfast  polyethylene glycol 3350 17 Gram(s) Oral daily  senna 2 Tablet(s) Oral at bedtime    MEDICATIONS  (PRN):  acetaminophen     Tablet .. 650 milliGRAM(s) Oral every 6 hours PRN Temp greater or equal to 38C (100.4F), Mild Pain (1 - 3), Moderate Pain (4 - 6)  aluminum hydroxide/magnesium hydroxide/simethicone Suspension 30 milliLiter(s) Oral every 4 hours PRN Dyspepsia  guaifenesin/dextromethorphan Oral Liquid 10 milliLiter(s) Oral every 4 hours PRN Cough  ondansetron Injectable 4 milliGRAM(s) IV Push every 8 hours PRN Nausea and/or Vomiting  oxyCODONE    IR 2.5 milliGRAM(s) Oral every 4 hours PRN Severe Pain (7 - 10)      ALLERGIES:  Allergies    No Known Allergies    Intolerances        LABS:                        8.7    3.26  )-----------( 157      ( 10 Sep 2022 05:09 )             26.2     09-10    140  |  109<H>  |  18  ----------------------------<  85  4.0   |  22  |  1.56<H>    Ca    8.3<L>      10 Sep 2022 05:09  Phos  3.8     09-10  Mg     2.2     09-10    TPro  6.7  /  Alb  2.6<L>  /  TBili  0.3  /  DBili  x   /  AST  79<H>  /  ALT  38  /  AlkPhos  63  09-10        CAPILLARY BLOOD GLUCOSE      POCT Blood Glucose.: 95 mg/dL (10 Sep 2022 07:40)      RADIOLOGY & ADDITIONAL TESTS: Reviewed. PGY-1 Progress Note discussed with attending    PAGER #: [[phone]] TILL 5:00 PM  PLEASE CONTACT ON CALL TEAM:  - On Call Team (Please refer to Nicola) FROM 5:00 PM - 8:30PM  - Nightfloat Team FROM 8:30 -7:30 AM    CC: Patient is a 82y old  Female who presents with a chief complaint of Hip pain     (09 Sep 2022 14:01)      OVERNIGHT EVENTS:    SUBJECTIVE / INTERVAL HPI: Patient seen and examined at bedside. Denies, headache, vision changes, chest pain, shortness of breath, abdominal pain, nausea, vomiting, diarrhea, constipation, and dysuria.     ROS: negative unless otherwise stated above.    VITAL SIGNS:  Vital Signs Last 24 Hrs  T(C): 37 (10 Sep 2022 07:30), Max: 37.7 (09 Sep 2022 21:18)  T(F): 98.6 (10 Sep 2022 07:30), Max: 99.8 (09 Sep 2022 21:18)  HR: 60 (10 Sep 2022 07:30) (60 - 78)  BP: 160/73 (10 Sep 2022 07:30) (130/50 - 160/73)  BP(mean): --  RR: 17 (10 Sep 2022 07:30) (17 - 19)  SpO2: 95% (10 Sep 2022 07:30) (91% - 96%)    Parameters below as of 10 Sep 2022 07:30  Patient On (Oxygen Delivery Method): room air        PHYSICAL EXAM:    General: WDWN  HEENT: NC/AT; PERRL, anicteric sclera; MMM  Neck: supple  Cardiovascular: +S1/S2; RRR  Respiratory: b/l rales  R.L   Gastrointestinal: soft, NT/ND; +BSx4  Extremities: WWP; no edema, clubbing or cyanosis  Vascular: 2+ radial, DP/PT pulses B/L  Skin: Warm, dry, good turgor, no rashes, ecchymoses, or petichea   Neurological: AAOx3; no focal deficits    MEDICATIONS:  MEDICATIONS  (STANDING):  amLODIPine   Tablet 2.5 milliGRAM(s) Oral daily  atorvastatin 20 milliGRAM(s) Oral at bedtime  azithromycin   Tablet 500 milliGRAM(s) Oral daily  cefTRIAXone   IVPB 1000 milliGRAM(s) IV Intermittent every 24 hours  cholecalciferol 1000 Unit(s) Oral daily  folic acid 1 milliGRAM(s) Oral daily  gabapentin 200 milliGRAM(s) Oral two times a day  heparin   Injectable 5000 Unit(s) SubCutaneous every 12 hours  hydroxychloroquine 200 milliGRAM(s) Oral daily  insulin lispro (ADMELOG) corrective regimen sliding scale   SubCutaneous three times a day before meals  insulin lispro (ADMELOG) corrective regimen sliding scale   SubCutaneous at bedtime  levETIRAcetam 500 milliGRAM(s) Oral two times a day  lidocaine   4% Patch 1 Patch Transdermal daily  melatonin 3 milliGRAM(s) Oral at bedtime  pantoprazole    Tablet 40 milliGRAM(s) Oral before breakfast  polyethylene glycol 3350 17 Gram(s) Oral daily  senna 2 Tablet(s) Oral at bedtime    MEDICATIONS  (PRN):  acetaminophen     Tablet .. 650 milliGRAM(s) Oral every 6 hours PRN Temp greater or equal to 38C (100.4F), Mild Pain (1 - 3), Moderate Pain (4 - 6)  aluminum hydroxide/magnesium hydroxide/simethicone Suspension 30 milliLiter(s) Oral every 4 hours PRN Dyspepsia  guaifenesin/dextromethorphan Oral Liquid 10 milliLiter(s) Oral every 4 hours PRN Cough  ondansetron Injectable 4 milliGRAM(s) IV Push every 8 hours PRN Nausea and/or Vomiting  oxyCODONE    IR 2.5 milliGRAM(s) Oral every 4 hours PRN Severe Pain (7 - 10)      ALLERGIES:  Allergies    No Known Allergies    Intolerances        LABS:                        8.7    3.26  )-----------( 157      ( 10 Sep 2022 05:09 )             26.2     09-10    140  |  109<H>  |  18  ----------------------------<  85  4.0   |  22  |  1.56<H>    Ca    8.3<L>      10 Sep 2022 05:09  Phos  3.8     09-10  Mg     2.2     09-10    TPro  6.7  /  Alb  2.6<L>  /  TBili  0.3  /  DBili  x   /  AST  79<H>  /  ALT  38  /  AlkPhos  63  09-10        CAPILLARY BLOOD GLUCOSE      POCT Blood Glucose.: 95 mg/dL (10 Sep 2022 07:40)      RADIOLOGY & ADDITIONAL TESTS: Reviewed. PGY-1 Progress Note discussed with attending    PAGER #: [[phone]] TILL 5:00 PM  PLEASE CONTACT ON CALL TEAM:  - On Call Team (Please refer to Nicola) FROM 5:00 PM - 8:30PM  - Nightfloat Team FROM 8:30 -7:30 AM    CC: Patient is a 82y old  Female who presents with a chief complaint of Hip pain     (09 Sep 2022 14:01)      OVERNIGHT EVENTS:    SUBJECTIVE / INTERVAL HPI: Patient seen and examined at bedside. Complains of productive cough. Denies, headache, vision changes, chest pain, shortness of breath, abdominal pain, nausea, vomiting, diarrhea, constipation, and dysuria.     ROS: negative unless otherwise stated above.    VITAL SIGNS:  Vital Signs Last 24 Hrs  T(C): 37 (10 Sep 2022 07:30), Max: 37.7 (09 Sep 2022 21:18)  T(F): 98.6 (10 Sep 2022 07:30), Max: 99.8 (09 Sep 2022 21:18)  HR: 60 (10 Sep 2022 07:30) (60 - 78)  BP: 160/73 (10 Sep 2022 07:30) (130/50 - 160/73)  BP(mean): --  RR: 17 (10 Sep 2022 07:30) (17 - 19)  SpO2: 95% (10 Sep 2022 07:30) (91% - 96%)    Parameters below as of 10 Sep 2022 07:30  Patient On (Oxygen Delivery Method): room air        PHYSICAL EXAM:    GENERAL: NAD  HEAD:  Atraumatic, Normocephalic  EYES: R periorbital erythema, minimally tender. EOMI, PERRLA, conjunctiva and sclera clear  ENMT: No tonsillar erythema, exudates, or enlargement; Moist mucous membranes  NECK: Supple, normal appearance, No JVD; Normal thyroid; Trachea midline  NERVOUS SYSTEM: Alert & Oriented X3, No sensation changes. (+) R hip ROM limited due to pain.  CHEST/LUNG: (+) BL course crackles present, mild wheezing, cough appreciatedf  HEART: Regular rate and rhythm; No murmurs, rubs, or gallops  ABDOMEN: Soft, Nontender, Nondistended; Bowel sounds present  EXTREMITIES:  2+ Peripheral Pulses, No clubbing, cyanosis, or edema  LYMPH: No lymphadenopathy noted  SKIN: (+) R knee skin abrasion; Good capillary refill    MEDICATIONS:  MEDICATIONS  (STANDING):  amLODIPine   Tablet 2.5 milliGRAM(s) Oral daily  atorvastatin 20 milliGRAM(s) Oral at bedtime  azithromycin   Tablet 500 milliGRAM(s) Oral daily  cefTRIAXone   IVPB 1000 milliGRAM(s) IV Intermittent every 24 hours  cholecalciferol 1000 Unit(s) Oral daily  folic acid 1 milliGRAM(s) Oral daily  gabapentin 200 milliGRAM(s) Oral two times a day  heparin   Injectable 5000 Unit(s) SubCutaneous every 12 hours  hydroxychloroquine 200 milliGRAM(s) Oral daily  insulin lispro (ADMELOG) corrective regimen sliding scale   SubCutaneous three times a day before meals  insulin lispro (ADMELOG) corrective regimen sliding scale   SubCutaneous at bedtime  levETIRAcetam 500 milliGRAM(s) Oral two times a day  lidocaine   4% Patch 1 Patch Transdermal daily  melatonin 3 milliGRAM(s) Oral at bedtime  pantoprazole    Tablet 40 milliGRAM(s) Oral before breakfast  polyethylene glycol 3350 17 Gram(s) Oral daily  senna 2 Tablet(s) Oral at bedtime    MEDICATIONS  (PRN):  acetaminophen     Tablet .. 650 milliGRAM(s) Oral every 6 hours PRN Temp greater or equal to 38C (100.4F), Mild Pain (1 - 3), Moderate Pain (4 - 6)  aluminum hydroxide/magnesium hydroxide/simethicone Suspension 30 milliLiter(s) Oral every 4 hours PRN Dyspepsia  guaifenesin/dextromethorphan Oral Liquid 10 milliLiter(s) Oral every 4 hours PRN Cough  ondansetron Injectable 4 milliGRAM(s) IV Push every 8 hours PRN Nausea and/or Vomiting  oxyCODONE    IR 2.5 milliGRAM(s) Oral every 4 hours PRN Severe Pain (7 - 10)      ALLERGIES:  Allergies    No Known Allergies    Intolerances        LABS:                        8.7    3.26  )-----------( 157      ( 10 Sep 2022 05:09 )             26.2     09-10    140  |  109<H>  |  18  ----------------------------<  85  4.0   |  22  |  1.56<H>    Ca    8.3<L>      10 Sep 2022 05:09  Phos  3.8     09-10  Mg     2.2     09-10    TPro  6.7  /  Alb  2.6<L>  /  TBili  0.3  /  DBili  x   /  AST  79<H>  /  ALT  38  /  AlkPhos  63  09-10        CAPILLARY BLOOD GLUCOSE      POCT Blood Glucose.: 95 mg/dL (10 Sep 2022 07:40)      RADIOLOGY & ADDITIONAL TESTS: Reviewed.

## 2022-09-10 NOTE — DISCHARGE NOTE PROVIDER - NSDCCPCAREPLAN_GEN_ALL_CORE_FT
PRINCIPAL DISCHARGE DIAGNOSIS  Diagnosis: Pneumonia  Assessment and Plan of Treatment:       SECONDARY DISCHARGE DIAGNOSES  Diagnosis: Ambulatory dysfunction  Assessment and Plan of Treatment:     Diagnosis: Acute kidney injury superimposed on CKD  Assessment and Plan of Treatment: You were diagnosed with acute kidney injury superimposed on known chronic kidney disease. KAJAL (acute kidney injury) is a condition in which the kidneys suddenly can't filter waste from the blood. Acute renal failure develops rapidly over a few hours or daysl. It's most common in those who are critically ill and already hospitalized. Symptoms include decreased urinary output, swelling due to fluid retention, nausea, fatigue, and shortness of breath. Sometimes symptoms may be subtle or may not appear at all. In addition to addressing the underlying cause, treatments include fluids, medication, and dialysis. A marker of your kidney function is your serum BUN/Creatinine ratio which elevates when the kidney gets injured. Your Creatinine was elevated and then improved down to baseline. You were treated with IV fluids until it normalized.  Follow up with your PCP and Nephrologist in a week from       Diagnosis: Rhabdomyolysis  Assessment and Plan of Treatment:     Diagnosis: DM (diabetes mellitus)  Assessment and Plan of Treatment: You have a history of diabetes.   Your HbA1c was XXXX during this admission.  You need to continue monitoring your blood sugar levels closely and maintain healthy lifestyle by eating healthy diabetic regimen, weight loss and exercise regularly as tolerated.  Please continue to take your medications as prescribed.   Please follow up with your PCP/Endocrinologist within a week of discharge.      Diagnosis: HTN (hypertension)  Assessment and Plan of Treatment: You have a history of Hypertension.   On this admission, your Blood Pressure was adequately controlled with your home medication AMLODIPINE.  Your blood pressure target is 120-140/80-90. To care for your blood pressure at home maintain a healthy lifestyle, eat a low salt diet, avoid fatty food, try to lose weight, and stay active as tolerated 30 mins X 3 times per week.  Notify your doctor if you have any of the following symptoms:   (Dizziness, Lightheadedness, Blurry vision, Headache, Chest pain, Shortness of breath.)  Please continue taking your home medications and follow-up with your PCP in 1 week from discharge to adjust medications as needed.      Diagnosis: HLD (hyperlipidemia)  Assessment and Plan of Treatment:     Diagnosis: Rheumatoid arthritis  Assessment and Plan of Treatment:      PRINCIPAL DISCHARGE DIAGNOSIS  Diagnosis: Pneumonia  Assessment and Plan of Treatment: yere were diagnosed with Pneumonia which is an infection of the lungs. you have a viral infection from parainfulenza virus. symtoms for viral pneumonia incluide fever, chills, shortness of breath, chest tightness, coughing and sneezing. you had a chest x-ray done which did not show signs of clasic bacterial pneumonia.  you completed a course of antibiotics of azithromycin and ceftriaxone for possible overlying bacterial pneumonia.      SECONDARY DISCHARGE DIAGNOSES  Diagnosis: Ambulatory dysfunction  Assessment and Plan of Treatment: you presented to the hospital after you fell down.  a CT of your head and neck was done and did not show any acute injury.  you had a hip x-ray which did not show any obious fractures in your hip.   You were seen by physical therapy who reccomned you go to a sub acute rehab fascility.    Diagnosis: Acute kidney injury superimposed on CKD  Assessment and Plan of Treatment: You were diagnosed with acute kidney injury superimposed on known chronic kidney disease. KAJAL (acute kidney injury) is a condition in which the kidneys suddenly can't filter waste from the blood. Acute renal failure develops rapidly over a few hours or daysl. It's most common in those who are critically ill and already hospitalized. Symptoms include decreased urinary output, swelling due to fluid retention, nausea, fatigue, and shortness of breath. Sometimes symptoms may be subtle or may not appear at all. In addition to addressing the underlying cause, treatments include fluids, medication, and dialysis. A marker of your kidney function is your serum BUN/Creatinine ratio which elevates when the kidney gets injured. Your Creatinine was elevated and then improved down to baseline. You were treated with IV fluids until it normalized.  Follow up with your PCP and Nephrologist in a week from       Diagnosis: HTN (hypertension)  Assessment and Plan of Treatment: You have a history of Hypertension.   On this admission, your Blood Pressure was adequately controlled with your home medication AMLODIPINE.  Your blood pressure target is 120-140/80-90. To care for your blood pressure at home maintain a healthy lifestyle, eat a low salt diet, avoid fatty food, try to lose weight, and stay active as tolerated 30 mins X 3 times per week.  Notify your doctor if you have any of the following symptoms:   (Dizziness, Lightheadedness, Blurry vision, Headache, Chest pain, Shortness of breath.)  Please continue taking your home medications and follow-up with your PCP in 1 week from discharge to adjust medications as needed.      Diagnosis: HLD (hyperlipidemia)  Assessment and Plan of Treatment: you have a history of hyperlipidemia.  this is a condition where your cholesterol in your blood is elevated.  you take atorvastatin at home.  please CONTINUE TAKING ATORVASTAIN 20MG ONCE A DAY AT BEDTIME and follow-up with your PCP in 1 week from discharge to adjust medications as needed.    Diagnosis: Rheumatoid arthritis  Assessment and Plan of Treatment: you have a history of Rheumatoid arthritis and were taking hydroxychloroquine at home.  please CONTINUE YOUR HYDROXYCHLOROQUINE 200MG DAILY and follow-up with your PCP in 1 week from discharge to adjust medications as needed.    Diagnosis: DM (diabetes mellitus)  Assessment and Plan of Treatment: You have a history of diabetes.   Your HbA1c was XXXX during this admission.  You need to continue monitoring your blood sugar levels closely and maintain healthy lifestyle by eating healthy diabetic regimen, weight loss and exercise regularly as tolerated.  Please continue to take your medications as prescribed.   Please follow up with your PCP/Endocrinologist within a week of discharge.      Diagnosis: Rhabdomyolysis  Assessment and Plan of Treatment: You were diagnosed with Rhabdomyolysis which means that your muslces were breaking down and a component of the muscle known as myoglobin became trapped with your kidneys which caused temporal injury to the kidney. You were treated with IV fluids to help your kidneys filter out the myoglobin and monitored marker known as creatine kinase (CK) for improvement till normalization. Please follow up with your PCP in a week from discharge.     PRINCIPAL DISCHARGE DIAGNOSIS  Diagnosis: Pneumonia  Assessment and Plan of Treatment: yere were diagnosed with Pneumonia which is an infection of the lungs. you have a viral infection from parainfulenza virus. symtoms for viral pneumonia incluide fever, chills, shortness of breath, chest tightness, coughing and sneezing. you had a chest x-ray done which did not show signs of clasic bacterial pneumonia.  you completed a course of antibiotics of azithromycin and ceftriaxone for possible overlying bacterial pneumonia.      SECONDARY DISCHARGE DIAGNOSES  Diagnosis: Ambulatory dysfunction  Assessment and Plan of Treatment: you presented to the hospital after you fell down.  a CT of your head and neck was done and did not show any acute injury.  you had a hip x-ray which did not show any obious fractures in your hip.   You were seen by physical therapy who reccomned you go to a sub acute rehab fascility.    Diagnosis: Acute kidney injury superimposed on CKD  Assessment and Plan of Treatment: You were diagnosed with acute kidney injury superimposed on known chronic kidney disease. KAJAL (acute kidney injury) is a condition in which the kidneys suddenly can't filter waste from the blood. Acute renal failure develops rapidly over a few hours or daysl. It's most common in those who are critically ill and already hospitalized. Symptoms include decreased urinary output, swelling due to fluid retention, nausea, fatigue, and shortness of breath. Sometimes symptoms may be subtle or may not appear at all. In addition to addressing the underlying cause, treatments include fluids, medication, and dialysis. A marker of your kidney function is your serum BUN/Creatinine ratio which elevates when the kidney gets injured. Your Creatinine was elevated and then improved down to baseline. You were treated with IV fluids until it normalized.  Follow up with your PCP and Nephrologist in a week from       Diagnosis: HTN (hypertension)  Assessment and Plan of Treatment: You have a history of Hypertension.   On this admission, your Blood Pressure was adequately controlled with your home medication AMLODIPINE.  Your blood pressure target is 120-140/80-90. To care for your blood pressure at home maintain a healthy lifestyle, eat a low salt diet, avoid fatty food, try to lose weight, and stay active as tolerated 30 mins X 3 times per week.  Notify your doctor if you have any of the following symptoms:   (Dizziness, Lightheadedness, Blurry vision, Headache, Chest pain, Shortness of breath.)  Please continue taking your home medications and follow-up with your PCP in 1 week from discharge to adjust medications as needed.      Diagnosis: HLD (hyperlipidemia)  Assessment and Plan of Treatment: you have a history of hyperlipidemia.  this is a condition where your cholesterol in your blood is elevated.  you take atorvastatin at home.  please CONTINUE TAKING ATORVASTAIN 20MG ONCE A DAY AT BEDTIME and follow-up with your PCP in 1 week from discharge to adjust medications as needed.    Diagnosis: Rheumatoid arthritis  Assessment and Plan of Treatment: you have a history of Rheumatoid arthritis and were taking hydroxychloroquine at home.  please CONTINUE YOUR HYDROXYCHLOROQUINE 200MG DAILY and follow-up with your PCP in 1 week from discharge to adjust medications as needed.    Diagnosis: DM (diabetes mellitus)  Assessment and Plan of Treatment: You have a history of diabetes.   You need to continue monitoring your blood sugar levels closely and maintain healthy lifestyle by eating healthy diabetic regimen, weight loss and exercise regularly as tolerated.  Please continue to take your medications as prescribed.   Please follow up with your PCP/Endocrinologist within a week of discharge.      Diagnosis: Rhabdomyolysis  Assessment and Plan of Treatment: You were diagnosed with Rhabdomyolysis which means that your muslces were breaking down and a component of the muscle known as myoglobin became trapped with your kidneys which caused temporal injury to the kidney. You were treated with IV fluids to help your kidneys filter out the myoglobin and monitored marker known as creatine kinase (CK) for improvement till normalization. Please follow up with your PCP in a week from discharge.     PRINCIPAL DISCHARGE DIAGNOSIS  Diagnosis: Pneumonia  Assessment and Plan of Treatment: You were diagnosed with Pneumonia which is an infection of the lungs. You had a viral infection due to Parainfulenza virus. Symtoms for viral pneumonia include fever, chills, shortness of breath, chest tightness, coughing and sneezing. You had a chest x-ray done which did not show signs of classic bacterial pneumonia. You completed a course of empiric antibiotics with ZITHROMAX AND ROCEPHIN for possible superimposed bacterial pneumonia. Your symptoms improved significantly. You are being discharged on ALBUTEROL INHALER 2 PUFFS EVERY 6 HOURS IF SHORT OF BREATH OR WHEEZING.   Please follow up with your PCP in a week from discharge.        SECONDARY DISCHARGE DIAGNOSES  Diagnosis: Ambulatory dysfunction  Assessment and Plan of Treatment: You presented to the hospital after a fall.  A CT of your head and neck was done and did not show any acute injury.  You had a hip x-ray which did not show any obvious fractures. You were seen by physical therapy who recommended home physical therapy. Please change positions slowly and use assisstance if needed.   Please follow up with your PCP in a week from discharge.      Diagnosis: Rhabdomyolysis  Assessment and Plan of Treatment: You were diagnosed with Rhabdomyolysis which means that your muslces were breaking down and a component of the muscle known as myoglobin became trapped in your kidneys which caused temporal kidney injury. You were treated with IV fluids to help your kidneys filter out the myoglobin and monitored closely kidney function till it normalized.   Please follow up with your PCP in a week from discharge.    Diagnosis: Acute kidney injury superimposed on CKD  Assessment and Plan of Treatment: You were diagnosed with acute kidney injury superimposed on known chronic kidney disease. KAJAL (acute kidney injury) is a condition in which the kidneys suddenly can't filter waste from the blood. Acute renal failure develops rapidly over a few hours or days.  A marker of your kidney function is your serum BUN/Creatinine ratio which elevates when the kidney gets injured. Your Creatinine was elevated and then improved down to baseline. You were treated with IV fluids until it normalized.  Please follow up with your PCP and Nephrologist in a week from discharge.      Diagnosis: HTN (hypertension)  Assessment and Plan of Treatment: You have a history of Hypertension. On this admission, your Blood Pressure was adequately controlled with your home medication AMLODIPINE 2.5 MG DAILY.  Your blood pressure target is 120-140/80-90. To care for your blood pressure at home maintain a healthy lifestyle, eat a low salt diet, avoid fatty food, try to lose weight, and stay active as tolerated 30 mins X 3 times per week.  Notify your doctor if you have any of the following symptoms:   (Dizziness, Lightheadedness, Blurry vision, Headache, Chest pain, Shortness of breath.)  Please continue taking your HOME medications and follow-up with your PCP in 1 week from discharge to adjust medications as needed.      Diagnosis: HLD (hyperlipidemia)  Assessment and Plan of Treatment: You have a history of hyperlipidemia. This is a condition where your cholesterol in your blood is elevated.  You take atorvastatin at home. PLEASE CONTINUE TAKING ATORVASTAIN 20MG AT BEDTIME and follow-up with your PCP in 1 week from discharge to adjust medications as needed.    Diagnosis: Rheumatoid arthritis  Assessment and Plan of Treatment: You have a history of Rheumatoid arthritis. Please continue taking your home medication HYDROXYCHLOROQUINE 200 MG DAILY and follow-up with your PCP in 1 week from discharge to adjust medications as needed.    Diagnosis: DM (diabetes mellitus)  Assessment and Plan of Treatment: You have a history of diabetes. You need to continue monitoring your blood sugar levels closely and maintain healthy lifestyle by eating healthy diabetic regimen, weight loss and stay active as tolerated.  Please continue to take your medications as prescribed and follow up with your PCP/Endocrinologist within a week of discharge.       PRINCIPAL DISCHARGE DIAGNOSIS  Diagnosis: Pneumonia  Assessment and Plan of Treatment: You were diagnosed with Pneumonia which is an infection of the lungs. You had a viral infection due to Parainfulenza virus. Symtoms for viral pneumonia include fever, chills, shortness of breath, chest tightness, coughing and sneezing. You had a chest x-ray done which did not show signs of classic bacterial pneumonia. You completed a course of empiric antibiotics with ZITHROMAX AND ROCEPHIN for possible superimposed bacterial pneumonia. Your symptoms improved significantly. You are being discharged on ALBUTEROL INHALER 2 PUFFS EVERY 6 HOURS IF SHORT OF BREATH OR WHEEZING.   Please follow up with your PCP in a week from discharge.        SECONDARY DISCHARGE DIAGNOSES  Diagnosis: Ambulatory dysfunction  Assessment and Plan of Treatment: You presented to the hospital after a fall.  A CT of your head and neck was done and did not show any acute injury.  You had a hip x-ray which did not show any obvious fractures. You were seen by physical therapy who recommended home physical therapy. Please change positions slowly and use assisstance if needed.   Please follow up with your PCP in a week from discharge.      Diagnosis: Rhabdomyolysis  Assessment and Plan of Treatment: You were diagnosed with Rhabdomyolysis which means that your muslces were breaking down and a component of the muscle known as myoglobin became trapped in your kidneys which caused temporal kidney injury. You were treated with IV fluids to help your kidneys filter out the myoglobin and monitored closely kidney function till it normalized.   Please follow up with your PCP in a week from discharge.    Diagnosis: Acute kidney injury superimposed on CKD  Assessment and Plan of Treatment: You were diagnosed with acute kidney injury superimposed on known chronic kidney disease. KAJAL (acute kidney injury) is a condition in which the kidneys suddenly can't filter waste from the blood. Acute renal failure develops rapidly over a few hours or days.  A marker of your kidney function is your serum BUN/Creatinine ratio which elevates when the kidney gets injured. Your Creatinine was elevated and then improved down to baseline. You were treated with IV fluids until it normalized.  Please follow up with your PCP and Nephrologist in a week from discharge.      Diagnosis: HTN (hypertension)  Assessment and Plan of Treatment: You have a history of Hypertension. On this admission, your Blood Pressure was adequately controlled with your home medication AMLODIPINE 2.5 MG DAILY.  Your blood pressure target is 120-140/80-90. To care for your blood pressure at home maintain a healthy lifestyle, eat a low salt diet, avoid fatty food, try to lose weight, and stay active as tolerated 30 mins X 3 times per week.  Notify your doctor if you have any of the following symptoms:   (Dizziness, Lightheadedness, Blurry vision, Headache, Chest pain, Shortness of breath.)  Please continue taking your HOME medications and follow-up with your PCP in 1 week from discharge to adjust medications as needed.      Diagnosis: HLD (hyperlipidemia)  Assessment and Plan of Treatment: You have a history of hyperlipidemia. This is a condition where your cholesterol in your blood is elevated.  You take atorvastatin at home. PLEASE CONTINUE TAKING ATORVASTAIN 20MG AT BEDTIME and follow-up with your PCP in 1 week from discharge to adjust medications as needed.    Diagnosis: Rheumatoid arthritis  Assessment and Plan of Treatment: You have a history of Rheumatoid arthritis. Please continue taking your home medication HYDROXYCHLOROQUINE 200 MG DAILY and follow-up with your PCP in 1 week from discharge to adjust medications as needed.    Diagnosis: DM (diabetes mellitus)  Assessment and Plan of Treatment: You have a history of diabetes. You need to continue monitoring your blood sugar levels closely and maintain healthy lifestyle by eating healthy diabetic regimen, weight loss and stay active as tolerated.  Please continue to take your medications as prescribed and follow up with your PCP/Endocrinologist within a week of discharge.      Diagnosis: Multiple myeloma  Assessment and Plan of Treatment: You have history of multiple myeloma. Please follow up with your PCP and Heme oncologist in a week from discharge for further recommendations.       PRINCIPAL DISCHARGE DIAGNOSIS  Diagnosis: Pneumonia  Assessment and Plan of Treatment: You were diagnosed with Pneumonia which is an infection of the lungs. You had a viral infection due to Parainfulenza virus. Symtoms for viral pneumonia include fever, chills, shortness of breath, chest tightness, coughing and sneezing. You had a chest x-ray done which did not show signs of classic bacterial pneumonia. You completed a course of empiric antibiotics with ZITHROMAX AND ROCEPHIN for possible superimposed bacterial pneumonia. Your symptoms improved significantly. You are being discharged on ALBUTEROL INHALER 2 PUFFS EVERY 6 HOURS IF SHORT OF BREATH OR WHEEZING.   Please follow up with your PCP in a week from discharge.        SECONDARY DISCHARGE DIAGNOSES  Diagnosis: Ambulatory dysfunction  Assessment and Plan of Treatment: You presented to the hospital after a fall.  A CT of your head and neck was done and did not show any acute injury.  You had a hip x-ray which did not show any obvious fractures. You were seen by physical therapy who recommended home physical therapy. Please change positions slowly and use assisstance if needed.   Please follow up with your PCP in a week from discharge.      Diagnosis: Rhabdomyolysis  Assessment and Plan of Treatment: You were diagnosed with Rhabdomyolysis which means that your muslces were breaking down and a component of the muscle known as myoglobin became trapped in your kidneys which caused temporal kidney injury. You were treated with IV fluids to help your kidneys filter out the myoglobin and monitored closely kidney function till it normalized.   Please follow up with your PCP in a week from discharge.    Diagnosis: Acute kidney injury superimposed on CKD  Assessment and Plan of Treatment: You were diagnosed with acute kidney injury superimposed on known chronic kidney disease. KAJAL (acute kidney injury) is a condition in which the kidneys suddenly can't filter waste from the blood. Acute renal failure develops rapidly over a few hours or days.  A marker of your kidney function is your serum BUN/Creatinine ratio which elevates when the kidney gets injured. Your Creatinine was elevated and then improved down to baseline. You were treated with IV fluids until it normalized.  Please follow up with your PCP and Nephrologist in a week from discharge.      Diagnosis: HTN (hypertension)  Assessment and Plan of Treatment: You have a history of Hypertension. On this admission, your Blood Pressure was adequately controlled with your home medication AMLODIPINE 2.5 MG DAILY.  Your blood pressure target is 120-140/80-90. To care for your blood pressure at home maintain a healthy lifestyle, eat a low salt diet, avoid fatty food, try to lose weight, and stay active as tolerated 30 mins X 3 times per week.  Notify your doctor if you have any of the following symptoms:   (Dizziness, Lightheadedness, Blurry vision, Headache, Chest pain, Shortness of breath.)  Please continue taking your HOME medications and follow-up with your PCP in 1 week from discharge to adjust medications as needed.      Diagnosis: HLD (hyperlipidemia)  Assessment and Plan of Treatment: You have a history of hyperlipidemia. This is a condition where your cholesterol in your blood is elevated.  You take atorvastatin at home. PLEASE CONTINUE TAKING ATORVASTAIN 20MG AT BEDTIME and follow-up with your PCP in 1 week from discharge to adjust medications as needed.    Diagnosis: Rheumatoid arthritis  Assessment and Plan of Treatment: You have a history of Rheumatoid arthritis. Please continue taking your home medication HYDROXYCHLOROQUINE 200 MG DAILY and follow-up with your PCP in 1 week from discharge to adjust medications as needed.    Diagnosis: DM (diabetes mellitus)  Assessment and Plan of Treatment: You have a history of diabetes. You need to continue monitoring your blood sugar levels closely and maintain healthy lifestyle by eating healthy diabetic regimen, weight loss and stay active as tolerated.  Please continue with sliding scale as follows:  1 Unit(s) if Glucose 151 - 200  2 Unit(s) if Glucose 201 - 250  3 Unit(s) if Glucose 251 - 300  4 Unit(s) if Glucose 301 - 350  5 Unit(s) if Glucose 351 - 400  6 Unit(s) if Glucose Greater Than 400  Please follow up with your PCP/Endocrinologist within a week of discharge.      Diagnosis: Multiple myeloma  Assessment and Plan of Treatment: You have history of multiple myeloma. Please follow up with your PCP and Heme oncologist in a week from discharge for further recommendations.

## 2022-09-10 NOTE — PROGRESS NOTE ADULT - PROBLEM SELECTOR PLAN 4
- Home med Lantus 5U at night, 3U lispro before morning, 2U lispro before lunch, 3U lispro before dinner. Will dc as patient is hypoglycemic  - Starting Sliding scale  - FS achs  - Diabetic diet. - As above.  - Elevated CK 2557 (today)   - UA: notes some RBC & blood  - Gentle IVF for now.

## 2022-09-10 NOTE — PROGRESS NOTE ADULT - PROBLEM SELECTOR PLAN 7
- P/w Hgb 8.7, near her baseline  - No sign of active bleeding. - Follows outpt onc  - As of 9/2/22, Zometa(q 3 months) is currently on hold due to elevated Cr  - Hold Pomalyst 2mg EVERY OTHER DAY given active infection

## 2022-09-11 LAB
ALBUMIN SERPL ELPH-MCNC: 2.7 G/DL — LOW (ref 3.5–5)
ALP SERPL-CCNC: 67 U/L — SIGNIFICANT CHANGE UP (ref 40–120)
ALT FLD-CCNC: 34 U/L DA — SIGNIFICANT CHANGE UP (ref 10–60)
ANION GAP SERPL CALC-SCNC: 8 MMOL/L — SIGNIFICANT CHANGE UP (ref 5–17)
AST SERPL-CCNC: 53 U/L — HIGH (ref 10–40)
BILIRUB SERPL-MCNC: 0.2 MG/DL — SIGNIFICANT CHANGE UP (ref 0.2–1.2)
BUN SERPL-MCNC: 21 MG/DL — HIGH (ref 7–18)
CALCIUM SERPL-MCNC: 8.9 MG/DL — SIGNIFICANT CHANGE UP (ref 8.4–10.5)
CHLORIDE SERPL-SCNC: 110 MMOL/L — HIGH (ref 96–108)
CO2 SERPL-SCNC: 22 MMOL/L — SIGNIFICANT CHANGE UP (ref 22–31)
CREAT SERPL-MCNC: 1.8 MG/DL — HIGH (ref 0.5–1.3)
EGFR: 28 ML/MIN/1.73M2 — LOW
GLUCOSE BLDC GLUCOMTR-MCNC: 100 MG/DL — HIGH (ref 70–99)
GLUCOSE BLDC GLUCOMTR-MCNC: 105 MG/DL — HIGH (ref 70–99)
GLUCOSE BLDC GLUCOMTR-MCNC: 121 MG/DL — HIGH (ref 70–99)
GLUCOSE BLDC GLUCOMTR-MCNC: 95 MG/DL — SIGNIFICANT CHANGE UP (ref 70–99)
GLUCOSE SERPL-MCNC: 100 MG/DL — HIGH (ref 70–99)
HCT VFR BLD CALC: 25.6 % — LOW (ref 34.5–45)
HGB BLD-MCNC: 8.5 G/DL — LOW (ref 11.5–15.5)
MAGNESIUM SERPL-MCNC: 2.4 MG/DL — SIGNIFICANT CHANGE UP (ref 1.6–2.6)
MCHC RBC-ENTMCNC: 27.8 PG — SIGNIFICANT CHANGE UP (ref 27–34)
MCHC RBC-ENTMCNC: 33.2 GM/DL — SIGNIFICANT CHANGE UP (ref 32–36)
MCV RBC AUTO: 83.7 FL — SIGNIFICANT CHANGE UP (ref 80–100)
NRBC # BLD: 0 /100 WBCS — SIGNIFICANT CHANGE UP (ref 0–0)
PHOSPHATE SERPL-MCNC: 3.6 MG/DL — SIGNIFICANT CHANGE UP (ref 2.5–4.5)
PLATELET # BLD AUTO: 167 K/UL — SIGNIFICANT CHANGE UP (ref 150–400)
POTASSIUM SERPL-MCNC: 4 MMOL/L — SIGNIFICANT CHANGE UP (ref 3.5–5.3)
POTASSIUM SERPL-SCNC: 4 MMOL/L — SIGNIFICANT CHANGE UP (ref 3.5–5.3)
PROT SERPL-MCNC: 7 G/DL — SIGNIFICANT CHANGE UP (ref 6–8.3)
RBC # BLD: 3.06 M/UL — LOW (ref 3.8–5.2)
RBC # FLD: 17.6 % — HIGH (ref 10.3–14.5)
SODIUM SERPL-SCNC: 140 MMOL/L — SIGNIFICANT CHANGE UP (ref 135–145)
WBC # BLD: 3.23 K/UL — LOW (ref 3.8–10.5)
WBC # FLD AUTO: 3.23 K/UL — LOW (ref 3.8–10.5)

## 2022-09-11 PROCEDURE — 99232 SBSQ HOSP IP/OBS MODERATE 35: CPT

## 2022-09-11 RX ORDER — IPRATROPIUM/ALBUTEROL SULFATE 18-103MCG
3 AEROSOL WITH ADAPTER (GRAM) INHALATION EVERY 6 HOURS
Refills: 0 | Status: DISCONTINUED | OUTPATIENT
Start: 2022-09-11 | End: 2022-09-15

## 2022-09-11 RX ADMIN — AMLODIPINE BESYLATE 2.5 MILLIGRAM(S): 2.5 TABLET ORAL at 06:17

## 2022-09-11 RX ADMIN — LIDOCAINE 1 PATCH: 4 CREAM TOPICAL at 00:27

## 2022-09-11 RX ADMIN — LEVETIRACETAM 500 MILLIGRAM(S): 250 TABLET, FILM COATED ORAL at 18:23

## 2022-09-11 RX ADMIN — Medication 200 MILLIGRAM(S): at 12:34

## 2022-09-11 RX ADMIN — Medication 1 MILLIGRAM(S): at 12:34

## 2022-09-11 RX ADMIN — LIDOCAINE 1 PATCH: 4 CREAM TOPICAL at 12:35

## 2022-09-11 RX ADMIN — Medication 1000 UNIT(S): at 12:34

## 2022-09-11 RX ADMIN — Medication 3 MILLIGRAM(S): at 21:54

## 2022-09-11 RX ADMIN — PANTOPRAZOLE SODIUM 40 MILLIGRAM(S): 20 TABLET, DELAYED RELEASE ORAL at 06:18

## 2022-09-11 RX ADMIN — HEPARIN SODIUM 5000 UNIT(S): 5000 INJECTION INTRAVENOUS; SUBCUTANEOUS at 06:18

## 2022-09-11 RX ADMIN — SENNA PLUS 2 TABLET(S): 8.6 TABLET ORAL at 21:54

## 2022-09-11 RX ADMIN — LEVETIRACETAM 500 MILLIGRAM(S): 250 TABLET, FILM COATED ORAL at 06:18

## 2022-09-11 RX ADMIN — AZITHROMYCIN 500 MILLIGRAM(S): 500 TABLET, FILM COATED ORAL at 12:34

## 2022-09-11 RX ADMIN — ATORVASTATIN CALCIUM 20 MILLIGRAM(S): 80 TABLET, FILM COATED ORAL at 21:54

## 2022-09-11 RX ADMIN — GABAPENTIN 200 MILLIGRAM(S): 400 CAPSULE ORAL at 18:19

## 2022-09-11 RX ADMIN — POLYETHYLENE GLYCOL 3350 17 GRAM(S): 17 POWDER, FOR SOLUTION ORAL at 12:35

## 2022-09-11 RX ADMIN — CEFTRIAXONE 100 MILLIGRAM(S): 500 INJECTION, POWDER, FOR SOLUTION INTRAMUSCULAR; INTRAVENOUS at 18:20

## 2022-09-11 RX ADMIN — HEPARIN SODIUM 5000 UNIT(S): 5000 INJECTION INTRAVENOUS; SUBCUTANEOUS at 18:20

## 2022-09-11 RX ADMIN — GABAPENTIN 200 MILLIGRAM(S): 400 CAPSULE ORAL at 06:18

## 2022-09-11 RX ADMIN — LIDOCAINE 1 PATCH: 4 CREAM TOPICAL at 20:00

## 2022-09-11 NOTE — PROGRESS NOTE ADULT - SUBJECTIVE AND OBJECTIVE BOX
Patient is a 82y old  Female who presents with a chief complaint of Ambulatory dysfunction s/p fall (10 Sep 2022 11:33)      INTERVAL HPI/OVERNIGHT EVENTS: no events noted overnight.    MEDICATIONS  (STANDING):  amLODIPine   Tablet 2.5 milliGRAM(s) Oral daily  atorvastatin 20 milliGRAM(s) Oral at bedtime  cefTRIAXone   IVPB 1000 milliGRAM(s) IV Intermittent every 24 hours  cholecalciferol 1000 Unit(s) Oral daily  folic acid 1 milliGRAM(s) Oral daily  gabapentin 200 milliGRAM(s) Oral two times a day  heparin   Injectable 5000 Unit(s) SubCutaneous every 12 hours  hydroxychloroquine 200 milliGRAM(s) Oral daily  insulin lispro (ADMELOG) corrective regimen sliding scale   SubCutaneous three times a day before meals  insulin lispro (ADMELOG) corrective regimen sliding scale   SubCutaneous at bedtime  levETIRAcetam 500 milliGRAM(s) Oral two times a day  lidocaine   4% Patch 1 Patch Transdermal daily  melatonin 3 milliGRAM(s) Oral at bedtime  pantoprazole    Tablet 40 milliGRAM(s) Oral before breakfast  polyethylene glycol 3350 17 Gram(s) Oral daily  senna 2 Tablet(s) Oral at bedtime    MEDICATIONS  (PRN):  acetaminophen     Tablet .. 650 milliGRAM(s) Oral every 6 hours PRN Temp greater or equal to 38C (100.4F), Mild Pain (1 - 3), Moderate Pain (4 - 6)  aluminum hydroxide/magnesium hydroxide/simethicone Suspension 30 milliLiter(s) Oral every 4 hours PRN Dyspepsia  guaifenesin/dextromethorphan Oral Liquid 10 milliLiter(s) Oral every 4 hours PRN Cough  ondansetron Injectable 4 milliGRAM(s) IV Push every 8 hours PRN Nausea and/or Vomiting  oxyCODONE    IR 2.5 milliGRAM(s) Oral every 4 hours PRN Severe Pain (7 - 10)      __________________________________________________  REVIEW OF SYSTEMS:    CONSTITUTIONAL: No fever,   EYES: no acute visual disturbances  NECK: No pain or stiffness  RESPIRATORY: No cough; No shortness of breath  CARDIOVASCULAR: No chest pain, no palpitations  GASTROINTESTINAL: No pain. No nausea or vomiting; No diarrhea   NEUROLOGICAL: No headache or numbness, no tremors  MUSCULOSKELETAL: No joint pain, no muscle pain  GENITOURINARY: no dysuria, no frequency, no hesitancy  PSYCHIATRY: no depression , no anxiety  ALL OTHER  ROS negative        Vital Signs Last 24 Hrs  T(C): 36.9 (11 Sep 2022 11:22), Max: 37.2 (11 Sep 2022 08:41)  T(F): 98.5 (11 Sep 2022 11:22), Max: 98.9 (11 Sep 2022 08:41)  HR: 62 (11 Sep 2022 11:22) (56 - 67)  BP: 136/57 (11 Sep 2022 11:22) (108/47 - 139/80)  BP(mean): 83 (11 Sep 2022 11:22) (83 - 91)  RR: 18 (11 Sep 2022 11:22) (16 - 18)  SpO2: 97% (11 Sep 2022 11:22) (94% - 97%)    Parameters below as of 11 Sep 2022 11:22  Patient On (Oxygen Delivery Method): room air        ________________________________________________  PHYSICAL EXAM:  GENERAL: NAD  HEENT: Normocephalic;  conjunctivae and sclerae clear; moist mucous membranes;   NECK : supple  CHEST/LUNG: Clear to auscultation bilaterally with good air entry   HEART: S1 S2  regular; no murmurs, gallops or rubs  ABDOMEN: Soft, Nontender, Nondistended; Bowel sounds present  EXTREMITIES: no cyanosis; no edema; no calf tenderness  SKIN: warm and dry; no rash  NERVOUS SYSTEM:  Awake and alert; Oriented  to place, person and time ; no new deficits    _________________________________________________  LABS:                        8.5    3.23  )-----------( 167      ( 11 Sep 2022 07:05 )             25.6     09-11    140  |  110<H>  |  21<H>  ----------------------------<  100<H>  4.0   |  22  |  1.80<H>    Ca    8.9      11 Sep 2022 07:05  Phos  3.6     09-11  Mg     2.4     09-11    TPro  7.0  /  Alb  2.7<L>  /  TBili  0.2  /  DBili  x   /  AST  53<H>  /  ALT  34  /  AlkPhos  67  09-11        CAPILLARY BLOOD GLUCOSE      POCT Blood Glucose.: 105 mg/dL (11 Sep 2022 11:39)  POCT Blood Glucose.: 100 mg/dL (11 Sep 2022 08:11)  POCT Blood Glucose.: 159 mg/dL (10 Sep 2022 21:08)  POCT Blood Glucose.: 102 mg/dL (10 Sep 2022 16:56)        RADIOLOGY & ADDITIONAL TESTS:    Imaging Personally Reviewed:  YES    Consultant(s) Notes Reviewed:   YES    Care Discussed with Consultants : YES     Plan of care was discussed with patient and /or primary care giver; all questions and concerns were addressed and care was aligned with patient's wishes.

## 2022-09-12 LAB
ALBUMIN SERPL ELPH-MCNC: 2.7 G/DL — LOW (ref 3.5–5)
ALP SERPL-CCNC: 67 U/L — SIGNIFICANT CHANGE UP (ref 40–120)
ALT FLD-CCNC: 33 U/L DA — SIGNIFICANT CHANGE UP (ref 10–60)
ANION GAP SERPL CALC-SCNC: 7 MMOL/L — SIGNIFICANT CHANGE UP (ref 5–17)
AST SERPL-CCNC: 41 U/L — HIGH (ref 10–40)
BILIRUB SERPL-MCNC: 0.2 MG/DL — SIGNIFICANT CHANGE UP (ref 0.2–1.2)
BUN SERPL-MCNC: 22 MG/DL — HIGH (ref 7–18)
CALCIUM SERPL-MCNC: 9.1 MG/DL — SIGNIFICANT CHANGE UP (ref 8.4–10.5)
CHLORIDE SERPL-SCNC: 110 MMOL/L — HIGH (ref 96–108)
CO2 SERPL-SCNC: 23 MMOL/L — SIGNIFICANT CHANGE UP (ref 22–31)
CREAT SERPL-MCNC: 1.81 MG/DL — HIGH (ref 0.5–1.3)
EGFR: 28 ML/MIN/1.73M2 — LOW
GLUCOSE BLDC GLUCOMTR-MCNC: 130 MG/DL — HIGH (ref 70–99)
GLUCOSE BLDC GLUCOMTR-MCNC: 77 MG/DL — SIGNIFICANT CHANGE UP (ref 70–99)
GLUCOSE BLDC GLUCOMTR-MCNC: 89 MG/DL — SIGNIFICANT CHANGE UP (ref 70–99)
GLUCOSE BLDC GLUCOMTR-MCNC: 91 MG/DL — SIGNIFICANT CHANGE UP (ref 70–99)
GLUCOSE SERPL-MCNC: 91 MG/DL — SIGNIFICANT CHANGE UP (ref 70–99)
HCT VFR BLD CALC: 27.9 % — LOW (ref 34.5–45)
HGB BLD-MCNC: 9 G/DL — LOW (ref 11.5–15.5)
MAGNESIUM SERPL-MCNC: 2.7 MG/DL — HIGH (ref 1.6–2.6)
MCHC RBC-ENTMCNC: 27.4 PG — SIGNIFICANT CHANGE UP (ref 27–34)
MCHC RBC-ENTMCNC: 32.3 GM/DL — SIGNIFICANT CHANGE UP (ref 32–36)
MCV RBC AUTO: 84.8 FL — SIGNIFICANT CHANGE UP (ref 80–100)
NRBC # BLD: 0 /100 WBCS — SIGNIFICANT CHANGE UP (ref 0–0)
PHOSPHATE SERPL-MCNC: 3.9 MG/DL — SIGNIFICANT CHANGE UP (ref 2.5–4.5)
PLATELET # BLD AUTO: 179 K/UL — SIGNIFICANT CHANGE UP (ref 150–400)
POTASSIUM SERPL-MCNC: 4 MMOL/L — SIGNIFICANT CHANGE UP (ref 3.5–5.3)
POTASSIUM SERPL-SCNC: 4 MMOL/L — SIGNIFICANT CHANGE UP (ref 3.5–5.3)
PROT SERPL-MCNC: 7.4 G/DL — SIGNIFICANT CHANGE UP (ref 6–8.3)
RBC # BLD: 3.29 M/UL — LOW (ref 3.8–5.2)
RBC # FLD: 17.8 % — HIGH (ref 10.3–14.5)
SODIUM SERPL-SCNC: 140 MMOL/L — SIGNIFICANT CHANGE UP (ref 135–145)
WBC # BLD: 3.37 K/UL — LOW (ref 3.8–10.5)
WBC # FLD AUTO: 3.37 K/UL — LOW (ref 3.8–10.5)

## 2022-09-12 PROCEDURE — 99232 SBSQ HOSP IP/OBS MODERATE 35: CPT | Mod: GC

## 2022-09-12 RX ADMIN — PANTOPRAZOLE SODIUM 40 MILLIGRAM(S): 20 TABLET, DELAYED RELEASE ORAL at 05:58

## 2022-09-12 RX ADMIN — Medication 1 MILLIGRAM(S): at 11:53

## 2022-09-12 RX ADMIN — Medication 3 MILLILITER(S): at 20:27

## 2022-09-12 RX ADMIN — Medication 200 MILLIGRAM(S): at 11:53

## 2022-09-12 RX ADMIN — Medication 3 MILLILITER(S): at 15:18

## 2022-09-12 RX ADMIN — LIDOCAINE 1 PATCH: 4 CREAM TOPICAL at 20:05

## 2022-09-12 RX ADMIN — POLYETHYLENE GLYCOL 3350 17 GRAM(S): 17 POWDER, FOR SOLUTION ORAL at 11:53

## 2022-09-12 RX ADMIN — HEPARIN SODIUM 5000 UNIT(S): 5000 INJECTION INTRAVENOUS; SUBCUTANEOUS at 05:55

## 2022-09-12 RX ADMIN — Medication 3 MILLIGRAM(S): at 21:42

## 2022-09-12 RX ADMIN — Medication 1000 UNIT(S): at 11:53

## 2022-09-12 RX ADMIN — LEVETIRACETAM 500 MILLIGRAM(S): 250 TABLET, FILM COATED ORAL at 17:38

## 2022-09-12 RX ADMIN — HEPARIN SODIUM 5000 UNIT(S): 5000 INJECTION INTRAVENOUS; SUBCUTANEOUS at 17:39

## 2022-09-12 RX ADMIN — ATORVASTATIN CALCIUM 20 MILLIGRAM(S): 80 TABLET, FILM COATED ORAL at 21:42

## 2022-09-12 RX ADMIN — GABAPENTIN 200 MILLIGRAM(S): 400 CAPSULE ORAL at 05:55

## 2022-09-12 RX ADMIN — AMLODIPINE BESYLATE 2.5 MILLIGRAM(S): 2.5 TABLET ORAL at 05:55

## 2022-09-12 RX ADMIN — LIDOCAINE 1 PATCH: 4 CREAM TOPICAL at 01:00

## 2022-09-12 RX ADMIN — SENNA PLUS 2 TABLET(S): 8.6 TABLET ORAL at 21:43

## 2022-09-12 RX ADMIN — LIDOCAINE 1 PATCH: 4 CREAM TOPICAL at 15:30

## 2022-09-12 RX ADMIN — LEVETIRACETAM 500 MILLIGRAM(S): 250 TABLET, FILM COATED ORAL at 05:56

## 2022-09-12 RX ADMIN — GABAPENTIN 200 MILLIGRAM(S): 400 CAPSULE ORAL at 17:40

## 2022-09-12 NOTE — PROGRESS NOTE ADULT - SUBJECTIVE AND OBJECTIVE BOX
PGY-1 Progress Note discussed with attending      PLEASE CONTACT ON CALL TEAM:  - On Call Team (Please refer to Nicola) FROM 5:00 PM - 8:30PM  - Nightfloat Team FROM 8:30 -7:30 AM    CHIEF COMPLAINT & BRIEF HOSPITAL COURSE:      INTERVAL HPI/OVERNIGHT EVENTS:       REVIEW OF SYSTEMS:  CONSTITUTIONAL: No fever, weight loss, or fatigue  RESPIRATORY: No cough, wheezing, chills or hemoptysis; No shortness of breath  CARDIOVASCULAR: No chest pain, palpitations, dizziness, or leg swelling  GASTROINTESTINAL: No abdominal pain. No nausea, vomiting, or hematemesis; No diarrhea or constipation. No melena or hematochezia.  GENITOURINARY: No dysuria or hematuria, urinary frequency  NEUROLOGICAL: No headaches, memory loss, loss of strength, numbness, or tremors  SKIN: No itching, burning, rashes, or lesions     MEDICATIONS  (STANDING):  albuterol/ipratropium for Nebulization 3 milliLiter(s) Nebulizer every 6 hours  amLODIPine   Tablet 2.5 milliGRAM(s) Oral daily  atorvastatin 20 milliGRAM(s) Oral at bedtime  cholecalciferol 1000 Unit(s) Oral daily  folic acid 1 milliGRAM(s) Oral daily  gabapentin 200 milliGRAM(s) Oral two times a day  heparin   Injectable 5000 Unit(s) SubCutaneous every 12 hours  hydroxychloroquine 200 milliGRAM(s) Oral daily  insulin lispro (ADMELOG) corrective regimen sliding scale   SubCutaneous three times a day before meals  insulin lispro (ADMELOG) corrective regimen sliding scale   SubCutaneous at bedtime  levETIRAcetam 500 milliGRAM(s) Oral two times a day  lidocaine   4% Patch 1 Patch Transdermal daily  melatonin 3 milliGRAM(s) Oral at bedtime  pantoprazole    Tablet 40 milliGRAM(s) Oral before breakfast  polyethylene glycol 3350 17 Gram(s) Oral daily  senna 2 Tablet(s) Oral at bedtime    MEDICATIONS  (PRN):  acetaminophen     Tablet .. 650 milliGRAM(s) Oral every 6 hours PRN Temp greater or equal to 38C (100.4F), Mild Pain (1 - 3), Moderate Pain (4 - 6)  aluminum hydroxide/magnesium hydroxide/simethicone Suspension 30 milliLiter(s) Oral every 4 hours PRN Dyspepsia  guaifenesin/dextromethorphan Oral Liquid 10 milliLiter(s) Oral every 4 hours PRN Cough  ondansetron Injectable 4 milliGRAM(s) IV Push every 8 hours PRN Nausea and/or Vomiting  oxyCODONE    IR 2.5 milliGRAM(s) Oral every 4 hours PRN Severe Pain (7 - 10)      Vital Signs Last 24 Hrs  T(C): 35.9 (12 Sep 2022 10:52), Max: 36.7 (11 Sep 2022 19:58)  T(F): 96.7 (12 Sep 2022 10:52), Max: 98.1 (11 Sep 2022 19:58)  HR: 74 (12 Sep 2022 10:52) (59 - 74)  BP: 137/83 (12 Sep 2022 10:52) (131/77 - 138/76)  BP(mean): 95 (12 Sep 2022 10:52) (90 - 95)  RR: 18 (12 Sep 2022 10:52) (18 - 18)  SpO2: 98% (12 Sep 2022 10:52) (95% - 100%)    Parameters below as of 12 Sep 2022 10:52  Patient On (Oxygen Delivery Method): room air        PHYSICAL EXAMINATION:  GENERAL: NAD, well built  HEAD:  Atraumatic, Normocephalic  EYES:  conjunctiva and sclera clear  NECK: Supple, No JVD, Normal thyroid  CHEST/LUNG: Clear to auscultation. Clear to percussion bilaterally; No rales, rhonchi, wheezing, or rubs  HEART: Regular rate and rhythm; No murmurs, rubs, or gallops  ABDOMEN: Soft, Nontender, Nondistended; Bowel sounds present, no pain or masses on palpation  NERVOUS SYSTEM:  Alert & Oriented X3  : voiding well  EXTREMITIES:  2+ Peripheral Pulses, No clubbing, cyanosis, or edema  SKIN: warm dry                          9.0    3.37  )-----------( 179      ( 12 Sep 2022 06:47 )             27.9     09-12    140  |  110<H>  |  22<H>  ----------------------------<  91  4.0   |  23  |  1.81<H>    Ca    9.1      12 Sep 2022 06:47  Phos  3.9     09-12  Mg     2.7     09-12    TPro  7.4  /  Alb  2.7<L>  /  TBili  0.2  /  DBili  x   /  AST  41<H>  /  ALT  33  /  AlkPhos  67  09-12    LIVER FUNCTIONS - ( 12 Sep 2022 06:47 )  Alb: 2.7 g/dL / Pro: 7.4 g/dL / ALK PHOS: 67 U/L / ALT: 33 U/L DA / AST: 41 U/L / GGT: x                   I&O's Summary    11 Sep 2022 07:01  -  12 Sep 2022 07:00  --------------------------------------------------------  IN: 0 mL / OUT: 700 mL / NET: -700 mL            CAPILLARY BLOOD GLUCOSE      RADIOLOGY & ADDITIONAL TESTS:                   PGY-1 Progress Note discussed with attending      PLEASE CONTACT ON CALL TEAM:  - On Call Team (Please refer to Nicola) FROM 5:00 PM - 8:30PM  - Nightfloat Team FROM 8:30 -7:30 AM    CHIEF COMPLAINT & BRIEF HOSPITAL COURSE:      INTERVAL HPI/OVERNIGHT EVENTS: patient examined at bedside.  no acute events overnight.  patient stable for d/c to [facility] vs home with HHA, pending repeat PT re-eval.      REVIEW OF SYSTEMS:  CONSTITUTIONAL: No fever, weight loss, or fatigue  RESPIRATORY: No cough, wheezing, chills or hemoptysis; No shortness of breath  CARDIOVASCULAR: No chest pain, palpitations, dizziness, or leg swelling  GASTROINTESTINAL: No abdominal pain. No nausea, vomiting, or hematemesis; No diarrhea or constipation. No melena or hematochezia.  GENITOURINARY: No dysuria or hematuria, urinary frequency  NEUROLOGICAL: No headaches, memory loss, loss of strength, numbness, or tremors  SKIN: No itching, burning, rashes, or lesions     MEDICATIONS  (STANDING):  albuterol/ipratropium for Nebulization 3 milliLiter(s) Nebulizer every 6 hours  amLODIPine   Tablet 2.5 milliGRAM(s) Oral daily  atorvastatin 20 milliGRAM(s) Oral at bedtime  cholecalciferol 1000 Unit(s) Oral daily  folic acid 1 milliGRAM(s) Oral daily  gabapentin 200 milliGRAM(s) Oral two times a day  heparin   Injectable 5000 Unit(s) SubCutaneous every 12 hours  hydroxychloroquine 200 milliGRAM(s) Oral daily  insulin lispro (ADMELOG) corrective regimen sliding scale   SubCutaneous three times a day before meals  insulin lispro (ADMELOG) corrective regimen sliding scale   SubCutaneous at bedtime  levETIRAcetam 500 milliGRAM(s) Oral two times a day  lidocaine   4% Patch 1 Patch Transdermal daily  melatonin 3 milliGRAM(s) Oral at bedtime  pantoprazole    Tablet 40 milliGRAM(s) Oral before breakfast  polyethylene glycol 3350 17 Gram(s) Oral daily  senna 2 Tablet(s) Oral at bedtime    MEDICATIONS  (PRN):  acetaminophen     Tablet .. 650 milliGRAM(s) Oral every 6 hours PRN Temp greater or equal to 38C (100.4F), Mild Pain (1 - 3), Moderate Pain (4 - 6)  aluminum hydroxide/magnesium hydroxide/simethicone Suspension 30 milliLiter(s) Oral every 4 hours PRN Dyspepsia  guaifenesin/dextromethorphan Oral Liquid 10 milliLiter(s) Oral every 4 hours PRN Cough  ondansetron Injectable 4 milliGRAM(s) IV Push every 8 hours PRN Nausea and/or Vomiting  oxyCODONE    IR 2.5 milliGRAM(s) Oral every 4 hours PRN Severe Pain (7 - 10)      Vital Signs Last 24 Hrs  T(C): 35.9 (12 Sep 2022 10:52), Max: 36.7 (11 Sep 2022 19:58)  T(F): 96.7 (12 Sep 2022 10:52), Max: 98.1 (11 Sep 2022 19:58)  HR: 74 (12 Sep 2022 10:52) (59 - 74)  BP: 137/83 (12 Sep 2022 10:52) (131/77 - 138/76)  BP(mean): 95 (12 Sep 2022 10:52) (90 - 95)  RR: 18 (12 Sep 2022 10:52) (18 - 18)  SpO2: 98% (12 Sep 2022 10:52) (95% - 100%)    Parameters below as of 12 Sep 2022 10:52  Patient On (Oxygen Delivery Method): room air        PHYSICAL EXAMINATION:  GENERAL: NAD  HEAD:  Atraumatic, Normocephalic  EYES: R periorbital erythema, minimally tender. EOMI, PERRLA, conjunctiva and sclera clear  ENMT: No tonsillar erythema, exudates, or enlargement; Moist mucous membranes  NECK: Supple, normal appearance, No JVD; Normal thyroid; Trachea midline  NERVOUS SYSTEM: Alert & Oriented X3, No sensation changes. (+) R hip ROM limited due to pain.  CHEST/LUNG: (+) BL course crackles present, mild wheezing, cough appreciated   HEART: Regular rate and rhythm; No murmurs, rubs, or gallops  ABDOMEN: Soft, Nontender, Nondistended; Bowel sounds present  EXTREMITIES:  2+ Peripheral Pulses, No clubbing, cyanosis, or edema  LYMPH: No lymphadenopathy noted  SKIN: (+) R knee skin abrasion; Good capillary refill                          9.0    3.37  )-----------( 179      ( 12 Sep 2022 06:47 )             27.9     09-12    140  |  110<H>  |  22<H>  ----------------------------<  91  4.0   |  23  |  1.81<H>    Ca    9.1      12 Sep 2022 06:47  Phos  3.9     09-12  Mg     2.7     09-12    TPro  7.4  /  Alb  2.7<L>  /  TBili  0.2  /  DBili  x   /  AST  41<H>  /  ALT  33  /  AlkPhos  67  09-12    LIVER FUNCTIONS - ( 12 Sep 2022 06:47 )  Alb: 2.7 g/dL / Pro: 7.4 g/dL / ALK PHOS: 67 U/L / ALT: 33 U/L DA / AST: 41 U/L / GGT: x                   I&O's Summary    11 Sep 2022 07:01  -  12 Sep 2022 07:00  --------------------------------------------------------  IN: 0 mL / OUT: 700 mL / NET: -700 mL            CAPILLARY BLOOD GLUCOSE      RADIOLOGY & ADDITIONAL TESTS:

## 2022-09-12 NOTE — PROGRESS NOTE ADULT - PROBLEM SELECTOR PLAN 6
Patients children report need for more HHA hours  -Currently with HHA 7hrs/day  -Patients son lives in [place] and daughter lives over 1 hour away with busy schedule

## 2022-09-12 NOTE — PROGRESS NOTE ADULT - PROBLEM SELECTOR PLAN 1
- P/w mechanical fall  - CT head = negative  - CT C spine = Negative for acute path. 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  - No obvious fracture of hip  - hip xray negative  - F/u PT  - F/u UCx. - P/w mechanical fall  - CT head = negative  - CT C spine = Negative for acute path. 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  - No obvious fracture of hip  - hip xray negative  - PT - reccomends DEV  - F/u UCx - no growth to date

## 2022-09-12 NOTE — PROGRESS NOTE ADULT - PROBLEM SELECTOR PLAN 2
- Has hx of recurrent bronchitis  - COVID negative  - BL coarse lung sounds present  - RVP (+) - Parainfluenza 3  - F/u CXR, maybe atypical pneumonia  - Azithromycin + Ceftriaxone. - Has hx of recurrent bronchitis  - COVID negative  - BL coarse lung sounds present  - RVP (+) - Parainfluenza 3  - F/u CXR, maybe atypical pneumonia  - Completed course of Azithromycin + Ceftriaxone.

## 2022-09-12 NOTE — PROGRESS NOTE ADULT - ATTENDING COMMENTS
Patient seen and examined. Case discussed with housestaff and MS3. Patient complains of cough with white sputum. On exam, coarse breath sounds are present. RVP is positive for parainfluenza 3 for which the patient has been placed on contact and droplet isolation precautions per hospital policy. Given patient is immunosuppressed from her multiple myeloma, will continue with IV ceftriaxone and po azithromycin for now for possible supraimposed bacterial pneumonia. Currently, patient is saturating well on room air at rest. Hold Pomalyst and multiple myeloma medications in the setting of active infection. Patient also noted to be hypoglycemic today. Will dc standing Humalog and Lantus and start sliding scale for now. Patient also noted to have non-traumatic rhabdomyolysis for which IVF will be given. Trend CK levels. Cards, Rosa Isela, to see patient for elevated troponin, but suspect likely in the setting of rhabdomyolysis. PT eval pending.
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    Parainfluenza  Mechanical fall  Rhabdomyolysis  KAJAL  Lumbar compression fracture  Multiple myeloma  RA  Type 2 DM  Anemia- near baseline likely ACD  HTN   h/o CVAo residual weakness    Plan  - continue ceftriaxone and azithro to cover for possible superimposed bacterial PNA given immune compromised status   - CK downtrending  - s/p IVF hydration, hold off on further IVF. Patient tolerating PO intake well  - Cr downtrending, stable  - RVP with + parainfluenza, symptomatic treatment   -c/w TLSO brace  - PT home PT, patient has limited hrs of HHA. Requested PT re-eval as patient's family concerned that she may have another fall at home. She has limited HHA hrs and lives alone. CM to follow.
83 yo F, with HHA, lives with granddaughter, uses walker and cane, PMHx of multiple myeloma, intermittent Bronchitis, RA, DM, HTN, and remote h/o CVA no residual, who comes in with a mechanical fall, right hip pain, noted to have rhabdomyolysis, KAJAL.      Pt seen and examined, feels well. No new complaints.     Labs reviewed    PE as above     A/P:   Parainfluenza w/ suspected superimposed bacterial infection   Mechanical fall  Rhabdomyolysis  KAJAL  Lumbar compression fracture  Multiple myeloma  RA  Type 2 DM  Anemia- near baseline likely ACD  HTN   h/o CVAo residual weakness    Plan  - Completed abx course.   -C/w Duoneb, wheezing improved.   - CK downtrended   - Cr downtrending, stable  - RVP with + parainfluenza, symptomatic treatment   -c/w TLSO brace  - PT re-eval recommended DEV. Discussed with CM, patient has been in DEV before and has used most days with only 20 days remaining. Discussed with family that will attempt to get approval for DEV and if not then patient will get DC home w/ home PT and HHA. Her grand-daughter also lives w/ her   -DC Plan in am pending DEV VS home w/ PT.

## 2022-09-13 LAB
GLUCOSE BLDC GLUCOMTR-MCNC: 114 MG/DL — HIGH (ref 70–99)
GLUCOSE BLDC GLUCOMTR-MCNC: 139 MG/DL — HIGH (ref 70–99)
GLUCOSE BLDC GLUCOMTR-MCNC: 90 MG/DL — SIGNIFICANT CHANGE UP (ref 70–99)
GLUCOSE BLDC GLUCOMTR-MCNC: 98 MG/DL — SIGNIFICANT CHANGE UP (ref 70–99)
SARS-COV-2 RNA SPEC QL NAA+PROBE: SIGNIFICANT CHANGE UP

## 2022-09-13 PROCEDURE — ZZZZZ: CPT

## 2022-09-13 RX ORDER — POLYETHYLENE GLYCOL 3350 17 G/17G
17 POWDER, FOR SOLUTION ORAL
Qty: 0 | Refills: 0 | DISCHARGE
Start: 2022-09-13

## 2022-09-13 RX ORDER — AMLODIPINE BESYLATE 2.5 MG/1
1 TABLET ORAL
Qty: 0 | Refills: 0 | DISCHARGE
Start: 2022-09-13

## 2022-09-13 RX ORDER — ACETAMINOPHEN 500 MG
2 TABLET ORAL
Qty: 0 | Refills: 0 | DISCHARGE
Start: 2022-09-13

## 2022-09-13 RX ORDER — GABAPENTIN 400 MG/1
2 CAPSULE ORAL
Qty: 0 | Refills: 0 | DISCHARGE
Start: 2022-09-13

## 2022-09-13 RX ORDER — CHOLECALCIFEROL (VITAMIN D3) 125 MCG
1000 CAPSULE ORAL
Qty: 0 | Refills: 0 | DISCHARGE
Start: 2022-09-13

## 2022-09-13 RX ORDER — LEVETIRACETAM 250 MG/1
1 TABLET, FILM COATED ORAL
Qty: 0 | Refills: 0 | DISCHARGE
Start: 2022-09-13

## 2022-09-13 RX ORDER — LANOLIN ALCOHOL/MO/W.PET/CERES
1 CREAM (GRAM) TOPICAL
Qty: 0 | Refills: 0 | DISCHARGE
Start: 2022-09-13

## 2022-09-13 RX ORDER — INSULIN LISPRO 100/ML
1 VIAL (ML) SUBCUTANEOUS
Qty: 0 | Refills: 0 | DISCHARGE
Start: 2022-09-13

## 2022-09-13 RX ORDER — FOLIC ACID 0.8 MG
1 TABLET ORAL
Qty: 0 | Refills: 0 | DISCHARGE
Start: 2022-09-13

## 2022-09-13 RX ORDER — LIDOCAINE 4 G/100G
1 CREAM TOPICAL
Qty: 0 | Refills: 0 | DISCHARGE

## 2022-09-13 RX ORDER — LEVETIRACETAM 250 MG/1
1 TABLET, FILM COATED ORAL
Qty: 0 | Refills: 0 | DISCHARGE

## 2022-09-13 RX ORDER — LIDOCAINE 4 G/100G
1 CREAM TOPICAL
Qty: 0 | Refills: 0 | DISCHARGE
Start: 2022-09-13

## 2022-09-13 RX ORDER — POMALIDOMIDE 1 MG/1
1 CAPSULE ORAL
Qty: 0 | Refills: 0 | DISCHARGE

## 2022-09-13 RX ORDER — AMLODIPINE BESYLATE 2.5 MG/1
1 TABLET ORAL
Qty: 0 | Refills: 0 | DISCHARGE

## 2022-09-13 RX ORDER — ALBUTEROL 90 UG/1
2 AEROSOL, METERED ORAL
Qty: 1 | Refills: 0
Start: 2022-09-13 | End: 2022-10-12

## 2022-09-13 RX ORDER — PANTOPRAZOLE SODIUM 20 MG/1
1 TABLET, DELAYED RELEASE ORAL
Qty: 0 | Refills: 0 | DISCHARGE
Start: 2022-09-13

## 2022-09-13 RX ORDER — SENNA PLUS 8.6 MG/1
2 TABLET ORAL
Qty: 0 | Refills: 0 | DISCHARGE
Start: 2022-09-13

## 2022-09-13 RX ADMIN — LEVETIRACETAM 500 MILLIGRAM(S): 250 TABLET, FILM COATED ORAL at 17:55

## 2022-09-13 RX ADMIN — GABAPENTIN 200 MILLIGRAM(S): 400 CAPSULE ORAL at 17:58

## 2022-09-13 RX ADMIN — Medication 3 MILLIGRAM(S): at 21:53

## 2022-09-13 RX ADMIN — POLYETHYLENE GLYCOL 3350 17 GRAM(S): 17 POWDER, FOR SOLUTION ORAL at 12:06

## 2022-09-13 RX ADMIN — LIDOCAINE 1 PATCH: 4 CREAM TOPICAL at 12:05

## 2022-09-13 RX ADMIN — Medication 1000 UNIT(S): at 12:06

## 2022-09-13 RX ADMIN — GABAPENTIN 200 MILLIGRAM(S): 400 CAPSULE ORAL at 05:42

## 2022-09-13 RX ADMIN — LEVETIRACETAM 500 MILLIGRAM(S): 250 TABLET, FILM COATED ORAL at 05:40

## 2022-09-13 RX ADMIN — Medication 3 MILLILITER(S): at 08:46

## 2022-09-13 RX ADMIN — Medication 200 MILLIGRAM(S): at 12:05

## 2022-09-13 RX ADMIN — Medication 1 MILLIGRAM(S): at 12:06

## 2022-09-13 RX ADMIN — Medication 3 MILLILITER(S): at 15:20

## 2022-09-13 RX ADMIN — SENNA PLUS 2 TABLET(S): 8.6 TABLET ORAL at 21:51

## 2022-09-13 RX ADMIN — HEPARIN SODIUM 5000 UNIT(S): 5000 INJECTION INTRAVENOUS; SUBCUTANEOUS at 05:40

## 2022-09-13 RX ADMIN — PANTOPRAZOLE SODIUM 40 MILLIGRAM(S): 20 TABLET, DELAYED RELEASE ORAL at 05:40

## 2022-09-13 RX ADMIN — Medication 3 MILLILITER(S): at 20:24

## 2022-09-13 RX ADMIN — ATORVASTATIN CALCIUM 20 MILLIGRAM(S): 80 TABLET, FILM COATED ORAL at 21:57

## 2022-09-13 RX ADMIN — LIDOCAINE 1 PATCH: 4 CREAM TOPICAL at 03:01

## 2022-09-13 RX ADMIN — HEPARIN SODIUM 5000 UNIT(S): 5000 INJECTION INTRAVENOUS; SUBCUTANEOUS at 17:55

## 2022-09-13 NOTE — PROGRESS NOTE ADULT - PROBLEM SELECTOR PLAN 2
- Has hx of recurrent bronchitis  - COVID negative  - BL coarse lung sounds present  - RVP (+) - Parainfluenza 3  - F/u CXR, maybe atypical pneumonia  - Completed course of Azithromycin + Ceftriaxone.

## 2022-09-13 NOTE — PROGRESS NOTE ADULT - PROBLEM SELECTOR PLAN 3
- P/w Cr 1.61 (Baseline Cr 1.25 with CKD stage 3B)  - Gentle IVF  - F/u Cr and consider urine Na/Cr if no improvement in Cr. - P/w Cr 1.61 (Baseline Cr 1.25 with CKD stage 3B)  - Gentle IVF  - Cr - 1.81

## 2022-09-13 NOTE — PROGRESS NOTE ADULT - TIME BILLING
83 yo F, with HHA, lives with granddaughter, uses walker and cane, PMHx of multiple myeloma, intermittent Bronchitis, RA, DM, HTN, and remote h/o CVA no residual, who comes in with a mechanical fall, right hip pain, noted to have rhabdomyolysis, KAJAL.    Parainfluenza  Mechanical fall  Rhabdomyolysis  KAJAL  Lumbar compression fracture  Multiple myeloma  RA  Type 2 DM  Anemia- near baseline likely ACD  HTN   h/o CVAo residual weakness  - continue ceftriaxone and azithro to cover for possible superimposed bacterial PNA given immune compromised status   - CK downtrending, will dc  - s/p IVF hydration, hold off on further IVF. Patient tolerating PO intake well  - Cr downtrending, stable  - RVP with + parainfluenza, symptomatic treatment   - PT eval pending  -c/w TLSO brace  - continue home health    Asked CM to [facility] for likely discharge home tomorrow
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    A/P:   Parainfluenza w/ suspected superimposed bacterial infection   Mechanical fall  Rhabdomyolysis  KAJAL  Lumbar compression fracture  Multiple myeloma  RA  Type 2 DM  Anemia- near baseline likely ACD  HTN   h/o CVAo residual weakness    Plan  - Completed abx course.   -C/w Duoneb, wheezing improved.   - CK downtrended   - Cr downtrending, stable  - RVP with + parainfluenza, symptomatic treatment   -c/w TLSO brace  -DC Plan in am pending DEV

## 2022-09-13 NOTE — PROGRESS NOTE ADULT - SUBJECTIVE AND OBJECTIVE BOX
PGY-1 Progress Note discussed with attending      PLEASE CONTACT ON CALL TEAM:  - On Call Team (Please refer to Nicola) FROM 5:00 PM - 8:30PM  - Nightfloat Team FROM 8:30 -7:30 AM    CHIEF COMPLAINT & BRIEF HOSPITAL COURSE:      INTERVAL HPI/OVERNIGHT EVENTS:       REVIEW OF SYSTEMS:  CONSTITUTIONAL: No fever, weight loss, or fatigue  RESPIRATORY: No cough, wheezing, chills or hemoptysis; No shortness of breath  CARDIOVASCULAR: No chest pain, palpitations, dizziness, or leg swelling  GASTROINTESTINAL: No abdominal pain. No nausea, vomiting, or hematemesis; No diarrhea or constipation. No melena or hematochezia.  GENITOURINARY: No dysuria or hematuria, urinary frequency  NEUROLOGICAL: No headaches, memory loss, loss of strength, numbness, or tremors  SKIN: No itching, burning, rashes, or lesions     MEDICATIONS  (STANDING):  albuterol/ipratropium for Nebulization 3 milliLiter(s) Nebulizer every 6 hours  amLODIPine   Tablet 2.5 milliGRAM(s) Oral daily  atorvastatin 20 milliGRAM(s) Oral at bedtime  cholecalciferol 1000 Unit(s) Oral daily  folic acid 1 milliGRAM(s) Oral daily  gabapentin 200 milliGRAM(s) Oral two times a day  heparin   Injectable 5000 Unit(s) SubCutaneous every 12 hours  hydroxychloroquine 200 milliGRAM(s) Oral daily  insulin lispro (ADMELOG) corrective regimen sliding scale   SubCutaneous three times a day before meals  insulin lispro (ADMELOG) corrective regimen sliding scale   SubCutaneous at bedtime  levETIRAcetam 500 milliGRAM(s) Oral two times a day  lidocaine   4% Patch 1 Patch Transdermal daily  melatonin 3 milliGRAM(s) Oral at bedtime  pantoprazole    Tablet 40 milliGRAM(s) Oral before breakfast  polyethylene glycol 3350 17 Gram(s) Oral daily  senna 2 Tablet(s) Oral at bedtime    MEDICATIONS  (PRN):  acetaminophen     Tablet .. 650 milliGRAM(s) Oral every 6 hours PRN Temp greater or equal to 38C (100.4F), Mild Pain (1 - 3), Moderate Pain (4 - 6)  aluminum hydroxide/magnesium hydroxide/simethicone Suspension 30 milliLiter(s) Oral every 4 hours PRN Dyspepsia  guaifenesin/dextromethorphan Oral Liquid 10 milliLiter(s) Oral every 4 hours PRN Cough  ondansetron Injectable 4 milliGRAM(s) IV Push every 8 hours PRN Nausea and/or Vomiting  oxyCODONE    IR 2.5 milliGRAM(s) Oral every 4 hours PRN Severe Pain (7 - 10)      Vital Signs Last 24 Hrs  T(C): 36.9 (13 Sep 2022 11:13), Max: 36.9 (13 Sep 2022 11:13)  T(F): 98.4 (13 Sep 2022 11:13), Max: 98.4 (13 Sep 2022 11:13)  HR: 64 (13 Sep 2022 11:13) (60 - 72)  BP: 109/66 (13 Sep 2022 11:13) (103/53 - 123/56)  BP(mean): --  RR: 18 (13 Sep 2022 11:13) (18 - 18)  SpO2: 96% (13 Sep 2022 11:13) (96% - 100%)    Parameters below as of 13 Sep 2022 11:13  Patient On (Oxygen Delivery Method): room air        PHYSICAL EXAMINATION:  GENERAL: NAD, well built  HEAD:  Atraumatic, Normocephalic  EYES:  conjunctiva and sclera clear  NECK: Supple, No JVD, Normal thyroid  CHEST/LUNG: Clear to auscultation. Clear to percussion bilaterally; No rales, rhonchi, wheezing, or rubs  HEART: Regular rate and rhythm; No murmurs, rubs, or gallops  ABDOMEN: Soft, Nontender, Nondistended; Bowel sounds present, no pain or masses on palpation  NERVOUS SYSTEM:  Alert & Oriented X3  : voiding well  EXTREMITIES:  2+ Peripheral Pulses, No clubbing, cyanosis, or edema  SKIN: warm dry                          9.0    3.37  )-----------( 179      ( 12 Sep 2022 06:47 )             27.9     09-12    140  |  110<H>  |  22<H>  ----------------------------<  91  4.0   |  23  |  1.81<H>    Ca    9.1      12 Sep 2022 06:47  Phos  3.9     09-12  Mg     2.7     09-12    TPro  7.4  /  Alb  2.7<L>  /  TBili  0.2  /  DBili  x   /  AST  41<H>  /  ALT  33  /  AlkPhos  67  09-12    LIVER FUNCTIONS - ( 12 Sep 2022 06:47 )  Alb: 2.7 g/dL / Pro: 7.4 g/dL / ALK PHOS: 67 U/L / ALT: 33 U/L DA / AST: 41 U/L / GGT: x                   I&O's Summary    12 Sep 2022 07:01  -  13 Sep 2022 07:00  --------------------------------------------------------  IN: 250 mL / OUT: 300 mL / NET: -50 mL    13 Sep 2022 07:01  -  13 Sep 2022 15:53  --------------------------------------------------------  IN: 0 mL / OUT: 400 mL / NET: -400 mL            CAPILLARY BLOOD GLUCOSE      RADIOLOGY & ADDITIONAL TESTS:                   PGY-1 Progress Note discussed with attending      PLEASE CONTACT ON CALL TEAM:  - On Call Team (Please refer to Nicola) FROM 5:00 PM - 8:30PM  - Nightfloat Team FROM 8:30 -7:30 AM      INTERVAL HPI/OVERNIGHT EVENTS: no acute evetns overnight.  patient examied at bedside.  she denies any complaints and says she is ready to get out of the hospital.  She is medically stable for discharge to [facility]      REVIEW OF SYSTEMS:  CONSTITUTIONAL: No fever, weight loss, or fatigue  RESPIRATORY: No cough, wheezing, chills or hemoptysis; No shortness of breath  CARDIOVASCULAR: No chest pain, palpitations, dizziness, or leg swelling  GASTROINTESTINAL: No abdominal pain. No nausea, vomiting, or hematemesis; No diarrhea or constipation. No melena or hematochezia.  GENITOURINARY: No dysuria or hematuria, urinary frequency  NEUROLOGICAL: No headaches, memory loss, loss of strength, numbness, or tremors  SKIN: No itching, burning, rashes, or lesions     MEDICATIONS  (STANDING):  albuterol/ipratropium for Nebulization 3 milliLiter(s) Nebulizer every 6 hours  amLODIPine   Tablet 2.5 milliGRAM(s) Oral daily  atorvastatin 20 milliGRAM(s) Oral at bedtime  cholecalciferol 1000 Unit(s) Oral daily  folic acid 1 milliGRAM(s) Oral daily  gabapentin 200 milliGRAM(s) Oral two times a day  heparin   Injectable 5000 Unit(s) SubCutaneous every 12 hours  hydroxychloroquine 200 milliGRAM(s) Oral daily  insulin lispro (ADMELOG) corrective regimen sliding scale   SubCutaneous three times a day before meals  insulin lispro (ADMELOG) corrective regimen sliding scale   SubCutaneous at bedtime  levETIRAcetam 500 milliGRAM(s) Oral two times a day  lidocaine   4% Patch 1 Patch Transdermal daily  melatonin 3 milliGRAM(s) Oral at bedtime  pantoprazole    Tablet 40 milliGRAM(s) Oral before breakfast  polyethylene glycol 3350 17 Gram(s) Oral daily  senna 2 Tablet(s) Oral at bedtime    MEDICATIONS  (PRN):  acetaminophen     Tablet .. 650 milliGRAM(s) Oral every 6 hours PRN Temp greater or equal to 38C (100.4F), Mild Pain (1 - 3), Moderate Pain (4 - 6)  aluminum hydroxide/magnesium hydroxide/simethicone Suspension 30 milliLiter(s) Oral every 4 hours PRN Dyspepsia  guaifenesin/dextromethorphan Oral Liquid 10 milliLiter(s) Oral every 4 hours PRN Cough  ondansetron Injectable 4 milliGRAM(s) IV Push every 8 hours PRN Nausea and/or Vomiting  oxyCODONE    IR 2.5 milliGRAM(s) Oral every 4 hours PRN Severe Pain (7 - 10)      Vital Signs Last 24 Hrs  T(C): 36.9 (13 Sep 2022 11:13), Max: 36.9 (13 Sep 2022 11:13)  T(F): 98.4 (13 Sep 2022 11:13), Max: 98.4 (13 Sep 2022 11:13)  HR: 64 (13 Sep 2022 11:13) (60 - 72)  BP: 109/66 (13 Sep 2022 11:13) (103/53 - 123/56)  BP(mean): --  RR: 18 (13 Sep 2022 11:13) (18 - 18)  SpO2: 96% (13 Sep 2022 11:13) (96% - 100%)    Parameters below as of 13 Sep 2022 11:13  Patient On (Oxygen Delivery Method): room air        PHYSICAL EXAMINATION:  GENERAL: NAD  HEAD:  Atraumatic, Normocephalic  EYES: R periorbital erythema, minimally tender. EOMI, PERRLA, conjunctiva and sclera clear  ENMT: No tonsillar erythema, exudates, or enlargement; Moist mucous membranes  NECK: Supple, normal appearance, No JVD; Normal thyroid; Trachea midline  NERVOUS SYSTEM: Alert & Oriented X3, No sensation changes. (+) R hip ROM limited due to pain.  CHEST/LUNG: (+) BL course crackles present, mild wheezing, cough appreciated   HEART: Regular rate and rhythm; No murmurs, rubs, or gallops  ABDOMEN: Soft, Nontender, Nondistended; Bowel sounds present  EXTREMITIES:  2+ Peripheral Pulses, No clubbing, cyanosis, or edema  LYMPH: No lymphadenopathy noted  SKIN: (+) R knee skin abrasion; Good capillary refill                          9.0    3.37  )-----------( 179      ( 12 Sep 2022 06:47 )             27.9     09-12    140  |  110<H>  |  22<H>  ----------------------------<  91  4.0   |  23  |  1.81<H>    Ca    9.1      12 Sep 2022 06:47  Phos  3.9     09-12  Mg     2.7     09-12    TPro  7.4  /  Alb  2.7<L>  /  TBili  0.2  /  DBili  x   /  AST  41<H>  /  ALT  33  /  AlkPhos  67  09-12    LIVER FUNCTIONS - ( 12 Sep 2022 06:47 )  Alb: 2.7 g/dL / Pro: 7.4 g/dL / ALK PHOS: 67 U/L / ALT: 33 U/L DA / AST: 41 U/L / GGT: x                   I&O's Summary    12 Sep 2022 07:01  -  13 Sep 2022 07:00  --------------------------------------------------------  IN: 250 mL / OUT: 300 mL / NET: -50 mL    13 Sep 2022 07:01  -  13 Sep 2022 15:53  --------------------------------------------------------  IN: 0 mL / OUT: 400 mL / NET: -400 mL            CAPILLARY BLOOD GLUCOSE      RADIOLOGY & ADDITIONAL TESTS:

## 2022-09-13 NOTE — PROGRESS NOTE ADULT - PROBLEM SELECTOR PLAN 1
- P/w mechanical fall  - CT head = negative  - CT C spine = Negative for acute path. 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  - No obvious fracture of hip  - hip xray negative  - PT - reccomends DEV  - F/u UCx - no growth to date - P/w mechanical fall  - CT head = negative  - CT C spine = Negative for acute path. 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  - No obvious fracture of hip  - hip xray negative  - PT - recommends DEV  - F/u UCx - no growth to date

## 2022-09-14 LAB
GLUCOSE BLDC GLUCOMTR-MCNC: 111 MG/DL — HIGH (ref 70–99)
GLUCOSE BLDC GLUCOMTR-MCNC: 114 MG/DL — HIGH (ref 70–99)
GLUCOSE BLDC GLUCOMTR-MCNC: 114 MG/DL — HIGH (ref 70–99)
GLUCOSE BLDC GLUCOMTR-MCNC: 161 MG/DL — HIGH (ref 70–99)

## 2022-09-14 PROCEDURE — ZZZZZ: CPT

## 2022-09-14 RX ADMIN — GABAPENTIN 200 MILLIGRAM(S): 400 CAPSULE ORAL at 18:33

## 2022-09-14 RX ADMIN — LEVETIRACETAM 500 MILLIGRAM(S): 250 TABLET, FILM COATED ORAL at 05:46

## 2022-09-14 RX ADMIN — ATORVASTATIN CALCIUM 20 MILLIGRAM(S): 80 TABLET, FILM COATED ORAL at 21:47

## 2022-09-14 RX ADMIN — SENNA PLUS 2 TABLET(S): 8.6 TABLET ORAL at 21:48

## 2022-09-14 RX ADMIN — Medication 3 MILLILITER(S): at 16:11

## 2022-09-14 RX ADMIN — LEVETIRACETAM 500 MILLIGRAM(S): 250 TABLET, FILM COATED ORAL at 18:32

## 2022-09-14 RX ADMIN — HEPARIN SODIUM 5000 UNIT(S): 5000 INJECTION INTRAVENOUS; SUBCUTANEOUS at 18:35

## 2022-09-14 RX ADMIN — Medication 3 MILLILITER(S): at 03:32

## 2022-09-14 RX ADMIN — Medication 1000 UNIT(S): at 13:42

## 2022-09-14 RX ADMIN — Medication 3 MILLILITER(S): at 08:56

## 2022-09-14 RX ADMIN — POLYETHYLENE GLYCOL 3350 17 GRAM(S): 17 POWDER, FOR SOLUTION ORAL at 18:33

## 2022-09-14 RX ADMIN — LIDOCAINE 1 PATCH: 4 CREAM TOPICAL at 18:33

## 2022-09-14 RX ADMIN — PANTOPRAZOLE SODIUM 40 MILLIGRAM(S): 20 TABLET, DELAYED RELEASE ORAL at 05:48

## 2022-09-14 RX ADMIN — Medication 3 MILLIGRAM(S): at 21:48

## 2022-09-14 RX ADMIN — Medication 200 MILLIGRAM(S): at 13:43

## 2022-09-14 RX ADMIN — LIDOCAINE 1 PATCH: 4 CREAM TOPICAL at 05:47

## 2022-09-14 RX ADMIN — AMLODIPINE BESYLATE 2.5 MILLIGRAM(S): 2.5 TABLET ORAL at 05:46

## 2022-09-14 RX ADMIN — GABAPENTIN 200 MILLIGRAM(S): 400 CAPSULE ORAL at 05:46

## 2022-09-14 RX ADMIN — HEPARIN SODIUM 5000 UNIT(S): 5000 INJECTION INTRAVENOUS; SUBCUTANEOUS at 05:46

## 2022-09-14 RX ADMIN — Medication 1 MILLIGRAM(S): at 13:43

## 2022-09-14 NOTE — PROGRESS NOTE ADULT - SUBJECTIVE AND OBJECTIVE BOX
PGY-1 Progress Note discussed with attending      PLEASE CONTACT ON CALL TEAM:  - On Call Team (Please refer to Nicola) FROM 5:00 PM - 8:30PM  - Nightfloat Team FROM 8:30 -7:30 AM    INTERVAL HPI/OVERNIGHT EVENTS: No acute events overnight.  patient examined at bedside.  she is A&Ox3.  she denies any complaints and states she is ready to leave the hospital.  I spoke with her regarding being discharged to [facility] vs Home with a home health aid.  she is ok with either option, I explained that the patients family would prefer [facility].      REVIEW OF SYSTEMS:  CONSTITUTIONAL: No fever, weight loss, or fatigue  RESPIRATORY: No cough, wheezing, chills or hemoptysis; No shortness of breath  CARDIOVASCULAR: No chest pain, palpitations, dizziness, or leg swelling  GASTROINTESTINAL: No abdominal pain. No nausea, vomiting, or hematemesis; No diarrhea or constipation. No melena or hematochezia.  GENITOURINARY: No dysuria or hematuria, urinary frequency  NEUROLOGICAL: No headaches, memory loss, loss of strength, numbness, or tremors  SKIN: No itching, burning, rashes, or lesions     MEDICATIONS  (STANDING):  albuterol/ipratropium for Nebulization 3 milliLiter(s) Nebulizer every 6 hours  amLODIPine   Tablet 2.5 milliGRAM(s) Oral daily  atorvastatin 20 milliGRAM(s) Oral at bedtime  cholecalciferol 1000 Unit(s) Oral daily  folic acid 1 milliGRAM(s) Oral daily  gabapentin 200 milliGRAM(s) Oral two times a day  heparin   Injectable 5000 Unit(s) SubCutaneous every 12 hours  hydroxychloroquine 200 milliGRAM(s) Oral daily  insulin lispro (ADMELOG) corrective regimen sliding scale   SubCutaneous three times a day before meals  insulin lispro (ADMELOG) corrective regimen sliding scale   SubCutaneous at bedtime  levETIRAcetam 500 milliGRAM(s) Oral two times a day  lidocaine   4% Patch 1 Patch Transdermal daily  melatonin 3 milliGRAM(s) Oral at bedtime  pantoprazole    Tablet 40 milliGRAM(s) Oral before breakfast  polyethylene glycol 3350 17 Gram(s) Oral daily  senna 2 Tablet(s) Oral at bedtime    MEDICATIONS  (PRN):  acetaminophen     Tablet .. 650 milliGRAM(s) Oral every 6 hours PRN Temp greater or equal to 38C (100.4F), Mild Pain (1 - 3), Moderate Pain (4 - 6)  aluminum hydroxide/magnesium hydroxide/simethicone Suspension 30 milliLiter(s) Oral every 4 hours PRN Dyspepsia  guaifenesin/dextromethorphan Oral Liquid 10 milliLiter(s) Oral every 4 hours PRN Cough  ondansetron Injectable 4 milliGRAM(s) IV Push every 8 hours PRN Nausea and/or Vomiting  oxyCODONE    IR 2.5 milliGRAM(s) Oral every 4 hours PRN Severe Pain (7 - 10)      Vital Signs Last 24 Hrs  T(C): 36.4 (14 Sep 2022 11:30), Max: 37.6 (13 Sep 2022 23:38)  T(F): 97.5 (14 Sep 2022 11:30), Max: 99.6 (13 Sep 2022 23:38)  HR: 80 (14 Sep 2022 11:30) (59 - 90)  BP: 118/51 (14 Sep 2022 11:30) (111/53 - 124/67)  BP(mean): --  RR: 19 (14 Sep 2022 11:30) (18 - 19)  SpO2: 99% (14 Sep 2022 11:30) (96% - 99%)    Parameters below as of 14 Sep 2022 11:30  Patient On (Oxygen Delivery Method): room air        PHYSICAL EXAMINATION:  GENERAL: NAD  HEAD:  Atraumatic, Normocephalic  EYES: R periorbital erythema, minimally tender. EOMI, PERRLA, conjunctiva and sclera clear  ENMT: No tonsillar erythema, exudates, or enlargement; Moist mucous membranes  NECK: Supple, normal appearance, No JVD; Normal thyroid; Trachea midline  NERVOUS SYSTEM: Alert & Oriented X3, No sensation changes. (+) R hip ROM limited due to pain.  CHEST/LUNG: (+) B/L mild wheezing; no murmurs rubs, or gallops  HEART: Regular rate and rhythm; No murmurs, rubs, or gallops  ABDOMEN: Soft, Nontender, Nondistended; Bowel sounds present  EXTREMITIES:  2+ Peripheral Pulses, No clubbing, cyanosis, or edema  LYMPH: No lymphadenopathy noted  SKIN: (+) R knee skin abrasion; Good capillary refill                      I&O's Summary    13 Sep 2022 07:01  -  14 Sep 2022 07:00  --------------------------------------------------------  IN: 0 mL / OUT: 850 mL / NET: -850 mL    14 Sep 2022 07:01  -  14 Sep 2022 16:14  --------------------------------------------------------  IN: 0 mL / OUT: 400 mL / NET: -400 mL            CAPILLARY BLOOD GLUCOSE      RADIOLOGY & ADDITIONAL TESTS:

## 2022-09-14 NOTE — PROGRESS NOTE ADULT - PROBLEM SELECTOR PLAN 1
- P/w mechanical fall  - CT head = negative  - CT C spine = Negative for acute path. 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  - No obvious fracture of hip  - hip xray negative  - PT - recommends DEV  - F/u UCx - no growth to date

## 2022-09-15 ENCOUNTER — TRANSCRIPTION ENCOUNTER (OUTPATIENT)
Age: 82
End: 2022-09-15

## 2022-09-15 VITALS
HEART RATE: 66 BPM | TEMPERATURE: 98 F | SYSTOLIC BLOOD PRESSURE: 141 MMHG | OXYGEN SATURATION: 100 % | RESPIRATION RATE: 18 BRPM | DIASTOLIC BLOOD PRESSURE: 61 MMHG

## 2022-09-15 LAB
GLUCOSE BLDC GLUCOMTR-MCNC: 100 MG/DL — HIGH (ref 70–99)
GLUCOSE BLDC GLUCOMTR-MCNC: 143 MG/DL — HIGH (ref 70–99)
GLUCOSE BLDC GLUCOMTR-MCNC: 94 MG/DL — SIGNIFICANT CHANGE UP (ref 70–99)

## 2022-09-15 PROCEDURE — ZZZZZ: CPT

## 2022-09-15 RX ADMIN — Medication 1000 UNIT(S): at 13:49

## 2022-09-15 RX ADMIN — Medication 3 MILLILITER(S): at 08:13

## 2022-09-15 RX ADMIN — POLYETHYLENE GLYCOL 3350 17 GRAM(S): 17 POWDER, FOR SOLUTION ORAL at 13:50

## 2022-09-15 RX ADMIN — LEVETIRACETAM 500 MILLIGRAM(S): 250 TABLET, FILM COATED ORAL at 05:09

## 2022-09-15 RX ADMIN — LIDOCAINE 1 PATCH: 4 CREAM TOPICAL at 13:50

## 2022-09-15 RX ADMIN — HEPARIN SODIUM 5000 UNIT(S): 5000 INJECTION INTRAVENOUS; SUBCUTANEOUS at 18:34

## 2022-09-15 RX ADMIN — LIDOCAINE 1 PATCH: 4 CREAM TOPICAL at 05:17

## 2022-09-15 RX ADMIN — PANTOPRAZOLE SODIUM 40 MILLIGRAM(S): 20 TABLET, DELAYED RELEASE ORAL at 05:09

## 2022-09-15 RX ADMIN — LIDOCAINE 1 PATCH: 4 CREAM TOPICAL at 05:16

## 2022-09-15 RX ADMIN — GABAPENTIN 200 MILLIGRAM(S): 400 CAPSULE ORAL at 05:08

## 2022-09-15 RX ADMIN — HEPARIN SODIUM 5000 UNIT(S): 5000 INJECTION INTRAVENOUS; SUBCUTANEOUS at 05:08

## 2022-09-15 RX ADMIN — AMLODIPINE BESYLATE 2.5 MILLIGRAM(S): 2.5 TABLET ORAL at 05:08

## 2022-09-15 RX ADMIN — Medication 200 MILLIGRAM(S): at 17:25

## 2022-09-15 RX ADMIN — LEVETIRACETAM 500 MILLIGRAM(S): 250 TABLET, FILM COATED ORAL at 18:34

## 2022-09-15 RX ADMIN — Medication 3 MILLILITER(S): at 16:17

## 2022-09-15 RX ADMIN — Medication 1 MILLIGRAM(S): at 13:49

## 2022-09-15 RX ADMIN — GABAPENTIN 200 MILLIGRAM(S): 400 CAPSULE ORAL at 18:34

## 2022-09-15 NOTE — PROGRESS NOTE ADULT - PROBLEM SELECTOR PROBLEM 1
Ambulatory dysfunction

## 2022-09-15 NOTE — PROGRESS NOTE ADULT - PROBLEM SELECTOR PLAN 1
- P/w mechanical fall  - CT head = negative  - CT C spine = Negative for acute path. 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  - No obvious fracture of hip  - hip xray negative  - PT - recommends DEV  - UCx - no growth to date

## 2022-09-15 NOTE — PROGRESS NOTE ADULT - REASON FOR ADMISSION
Ambulatory dysfunction s/p fall

## 2022-09-15 NOTE — DISCHARGE NOTE NURSING/CASE MANAGEMENT/SOCIAL WORK - NSDCVIVACCINE_GEN_ALL_CORE_FT
COVID-19 vaccine, vector-nr, [id number], PF, 0.5 mL ([employer]); 29-Mar-2021 13:11; Maria D Hightower (Student, Nursing); [employer]; [id number] (Exp. Date: 29-Mar-2021); IntraMuscular; Deltoid Right.; 0.5 milliLiter(s);

## 2022-09-15 NOTE — DISCHARGE NOTE NURSING/CASE MANAGEMENT/SOCIAL WORK - PATIENT PORTAL LINK FT
You can access the FollowMyHealth Patient Portal offered by [facility] by registering at the following website: [email or web address]. By joining [employer]’s FollowMyHealth portal, you will also be able to view your health information using other applications (apps) compatible with our system.

## 2022-09-15 NOTE — PROGRESS NOTE ADULT - PROBLEM SELECTOR PLAN 6
Patients children report need for more HHA hours  -Currently with HHA 7hrs/day  -Patients son lives in [place] and daughter lives over 1 hour away with busy schedule  - Patient stable for discharge to [facility] to [facility]

## 2022-09-15 NOTE — PROGRESS NOTE ADULT - ASSESSMENT
83 yo F, with HHA, lives with granddaughter, uses walker and cane, PMHx of multiple myeloma, intermittent Bronchitis, RA, DM, HTN, and remote h/o CVA no residual, who comes in with a mechanical fall, right hip pain, noted to have rhabdomyolysis, KAJAL.    Parainfluenza  Mechanical fall  Rhabdomyolysis  KAJAL  Lumbar compression fracture  Multiple myeloma  RA  Type 2 DM  Anemia- near baseline likely ACD  HTN   h/o CVAo residual weakness    Plan  - continue ceftriaxone and azithro to cover for possible superimposed bacterial PNA given immune compromised status, last day today  -Add duonebs, had wheezing   - CK downtrended   - Cr downtrending, stable  - RVP with + parainfluenza, symptomatic treatment   -c/w TLSO brace  - PT home PT, patient has limited hrs of HHA. Requested PT re-eval as patient's family concerned that she may have another fall at home. She has limited HHA hrs and lives alone. CM to follow.  
Patient is a 82F, with HHA, lives with granddaughter, uses walker and cane, PMHx of multiple myeloma, intermittent Bronchitis, RA, DM, HTN, and remote h/o CVA no residual, who comes in with a mechanical fall. Admitted for ambulatory dysfunction.

## 2022-09-15 NOTE — PROGRESS NOTE ADULT - SUBJECTIVE AND OBJECTIVE BOX
PGY-1 Progress Note discussed with attending      PLEASE CONTACT ON CALL TEAM:  - On Call Team (Please refer to Nicola) FROM 5:00 PM - 8:30PM  - Nightfloat Team FROM 8:30 -7:30 AM      INTERVAL HPI/OVERNIGHT EVENTS: No acute events overnight.  melissa examined at bedside.  she denies any complaints.  she is A&Ox3, lying in bed comfortably.      REVIEW OF SYSTEMS:  CONSTITUTIONAL: No fever, weight loss, or fatigue  RESPIRATORY: No cough, wheezing, chills or hemoptysis; No shortness of breath  CARDIOVASCULAR: No chest pain, palpitations, dizziness, or leg swelling  GASTROINTESTINAL: No abdominal pain. No nausea, vomiting, or hematemesis; No diarrhea or constipation. No melena or hematochezia.  GENITOURINARY: No dysuria or hematuria, urinary frequency  NEUROLOGICAL: No headaches, memory loss, loss of strength, numbness, or tremors  SKIN: No itching, burning, rashes, or lesions     MEDICATIONS  (STANDING):  albuterol/ipratropium for Nebulization 3 milliLiter(s) Nebulizer every 6 hours  amLODIPine   Tablet 2.5 milliGRAM(s) Oral daily  atorvastatin 20 milliGRAM(s) Oral at bedtime  cholecalciferol 1000 Unit(s) Oral daily  folic acid 1 milliGRAM(s) Oral daily  gabapentin 200 milliGRAM(s) Oral two times a day  heparin   Injectable 5000 Unit(s) SubCutaneous every 12 hours  hydroxychloroquine 200 milliGRAM(s) Oral daily  insulin lispro (ADMELOG) corrective regimen sliding scale   SubCutaneous three times a day before meals  insulin lispro (ADMELOG) corrective regimen sliding scale   SubCutaneous at bedtime  levETIRAcetam 500 milliGRAM(s) Oral two times a day  lidocaine   4% Patch 1 Patch Transdermal daily  melatonin 3 milliGRAM(s) Oral at bedtime  pantoprazole    Tablet 40 milliGRAM(s) Oral before breakfast  polyethylene glycol 3350 17 Gram(s) Oral daily  senna 2 Tablet(s) Oral at bedtime    MEDICATIONS  (PRN):  acetaminophen     Tablet .. 650 milliGRAM(s) Oral every 6 hours PRN Temp greater or equal to 38C (100.4F), Mild Pain (1 - 3), Moderate Pain (4 - 6)  aluminum hydroxide/magnesium hydroxide/simethicone Suspension 30 milliLiter(s) Oral every 4 hours PRN Dyspepsia  guaifenesin/dextromethorphan Oral Liquid 10 milliLiter(s) Oral every 4 hours PRN Cough  ondansetron Injectable 4 milliGRAM(s) IV Push every 8 hours PRN Nausea and/or Vomiting      Vital Signs Last 24 Hrs  T(C): 36.5 (15 Sep 2022 16:35), Max: 37.3 (14 Sep 2022 20:10)  T(F): 97.7 (15 Sep 2022 16:35), Max: 99.2 (14 Sep 2022 20:10)  HR: 66 (15 Sep 2022 16:35) (65 - 78)  BP: 141/61 (15 Sep 2022 16:35) (119/86 - 144/75)  BP(mean): --  RR: 18 (15 Sep 2022 16:35) (17 - 20)  SpO2: 100% (15 Sep 2022 16:35) (96% - 100%)    Parameters below as of 15 Sep 2022 16:35  Patient On (Oxygen Delivery Method): room air        PHYSICAL EXAMINATION:  GENERAL: NAD  HEAD:  Atraumatic, Normocephalic  EYES: R periorbital erythema, minimally tender. EOMI, PERRLA, conjunctiva and sclera clear  ENMT: No tonsillar erythema, exudates, or enlargement; Moist mucous membranes  NECK: Supple, normal appearance, No JVD; Normal thyroid; Trachea midline  NERVOUS SYSTEM: Alert & Oriented X3, No sensation changes. (+) R hip ROM limited due to pain.  CHEST/LUNG: (+) BL course crackles present, mild wheezing, cough appreciated   HEART: Regular rate and rhythm; No murmurs, rubs, or gallops  ABDOMEN: Soft, Nontender, Nondistended; Bowel sounds present  EXTREMITIES:  2+ Peripheral Pulses, No clubbing, cyanosis, or edema  LYMPH: No lymphadenopathy noted  SKIN: (+) R knee skin abrasion; Good capillary refill                      I&O's Summary    14 Sep 2022 07:01  -  15 Sep 2022 07:00  --------------------------------------------------------  IN: 0 mL / OUT: 400 mL / NET: -400 mL            CAPILLARY BLOOD GLUCOSE      RADIOLOGY & ADDITIONAL TESTS:

## 2022-09-15 NOTE — DISCHARGE NOTE NURSING/CASE MANAGEMENT/SOCIAL WORK - NSDCPEFALRISK_GEN_ALL_CORE
For information on Fall & Injury Prevention, visit: [email or web address] OR  [email or web address] OR  https://www.cdc.gov/steadi/patient.html

## 2022-09-19 ENCOUNTER — APPOINTMENT (OUTPATIENT)
Dept: HEMATOLOGY ONCOLOGY | Facility: CLINIC | Age: 82
End: 2022-09-19

## 2022-09-19 ENCOUNTER — APPOINTMENT (OUTPATIENT)
Dept: INFUSION THERAPY | Facility: HOSPITAL | Age: 82
End: 2022-09-19

## 2022-09-20 PROCEDURE — 72170 X-RAY EXAM OF PELVIS: CPT

## 2022-09-20 PROCEDURE — 87086 URINE CULTURE/COLONY COUNT: CPT

## 2022-09-20 PROCEDURE — 72125 CT NECK SPINE W/O DYE: CPT | Mod: MA

## 2022-09-20 PROCEDURE — 73650 X-RAY EXAM OF HEEL: CPT

## 2022-09-20 PROCEDURE — 0225U NFCT DS DNA&RNA 21 SARSCOV2: CPT

## 2022-09-20 PROCEDURE — 87635 SARS-COV-2 COVID-19 AMP PRB: CPT

## 2022-09-20 PROCEDURE — 71045 X-RAY EXAM CHEST 1 VIEW: CPT

## 2022-09-20 PROCEDURE — 70450 CT HEAD/BRAIN W/O DYE: CPT | Mod: MA

## 2022-09-20 PROCEDURE — 80048 BASIC METABOLIC PNL TOTAL CA: CPT

## 2022-09-20 PROCEDURE — 94640 AIRWAY INHALATION TREATMENT: CPT

## 2022-09-20 PROCEDURE — 85027 COMPLETE CBC AUTOMATED: CPT

## 2022-09-20 PROCEDURE — 81001 URINALYSIS AUTO W/SCOPE: CPT

## 2022-09-20 PROCEDURE — 84484 ASSAY OF TROPONIN QUANT: CPT

## 2022-09-20 PROCEDURE — 85025 COMPLETE CBC W/AUTO DIFF WBC: CPT

## 2022-09-20 PROCEDURE — 80053 COMPREHEN METABOLIC PANEL: CPT

## 2022-09-20 PROCEDURE — 83735 ASSAY OF MAGNESIUM: CPT

## 2022-09-20 PROCEDURE — 99285 EMERGENCY DEPT VISIT HI MDM: CPT | Mod: 25

## 2022-09-20 PROCEDURE — 82550 ASSAY OF CK (CPK): CPT

## 2022-09-20 PROCEDURE — 93005 ELECTROCARDIOGRAM TRACING: CPT

## 2022-09-20 PROCEDURE — 82962 GLUCOSE BLOOD TEST: CPT

## 2022-09-20 PROCEDURE — 36415 COLL VENOUS BLD VENIPUNCTURE: CPT

## 2022-09-20 PROCEDURE — 84100 ASSAY OF PHOSPHORUS: CPT

## 2022-09-20 NOTE — PATIENT PROFILE ADULT - NSPROPTRIGHTREPNAME_GEN_A__NUR
S:  Mr. Adrian seen and examined.  He had a left total knee arthroplasty yesterday.  Postoperatively he developed Afib and was admitted to the hospitalist service.  He has been getting physical therapy and has been ambulating in the hallway.  He stated his pain is well controlled.      O:  Neurovascularly intact.    Dressing with some mild blood tinging.    Incision clean dry intact.    Good knee motion with relatively little pain.    Mild effusion.    Calves soft.    Homans negative.    Laboratory:  Hemoglobin  11.5; hematocrit 32.7    A:  Left total knee arthroplasty, POD #1.    P:   Continue with physical therapy ambulate with weight-bearing at tolerance to the left lower extremity.  Continue with occupational therapy with ADLs.      Continue to do extension exercises this was discussed with the patient.    Change dressing today to fresh clean silver alginate dressing.      May shower do not soak in a tub.    All medical treatment per hospitalist.    Patient is orthopedically stable for discharge home may be discharged home when okay with hospitalist.    Orthopedic discharge instructions:    A.Walk with a walker with weight-bearing at tolerance to the left lower extremity.     B.Elevate and ice left knee.     C.Continue to work on extension exercises to the left knee.     D.Do not remove dressing.     E.May shower do not soak in a tub.  May allow water to run over dressing it is a sealed waterproof dressing.     F.Follow-up in the office in approximately 2 weeks.  
Binta James

## 2022-10-06 NOTE — OCCUPATIONAL THERAPY INITIAL EVALUATION ADULT - BALANCE TRAINING, PT EVAL
Is This A New Presentation, Or A Follow-Up?: Skin Lesions
How Severe Is Your Skin Lesion?: mild
Pt will increase dynamic sitting and standing to fair plus to assist with functional mobility and ADLs within 4 weeks.

## 2022-10-27 ENCOUNTER — OUTPATIENT (OUTPATIENT)
Dept: OUTPATIENT SERVICES | Facility: HOSPITAL | Age: 82
LOS: 1 days | Discharge: HOME | End: 2022-10-27

## 2022-10-27 DIAGNOSIS — C90.00 MULTIPLE MYELOMA NOT HAVING ACHIEVED REMISSION: ICD-10-CM

## 2022-11-03 ENCOUNTER — INPATIENT (INPATIENT)
Facility: HOSPITAL | Age: 82
LOS: 5 days | Discharge: NURSING, REHAB OR LONG-TERM CARE | DRG: 682 | End: 2022-11-09
Attending: STUDENT IN AN ORGANIZED HEALTH CARE EDUCATION/TRAINING PROGRAM | Admitting: STUDENT IN AN ORGANIZED HEALTH CARE EDUCATION/TRAINING PROGRAM
Payer: MEDICARE

## 2022-11-03 ENCOUNTER — APPOINTMENT (OUTPATIENT)
Dept: HEMATOLOGY ONCOLOGY | Facility: CLINIC | Age: 82
End: 2022-11-03

## 2022-11-03 PROCEDURE — 99284 EMERGENCY DEPT VISIT MOD MDM: CPT

## 2022-11-04 VITALS
HEIGHT: 62 IN | DIASTOLIC BLOOD PRESSURE: 63 MMHG | WEIGHT: 125 LBS | TEMPERATURE: 99 F | OXYGEN SATURATION: 94 % | RESPIRATION RATE: 16 BRPM | SYSTOLIC BLOOD PRESSURE: 111 MMHG | HEART RATE: 77 BPM

## 2022-11-04 DIAGNOSIS — I10 ESSENTIAL (PRIMARY) HYPERTENSION: ICD-10-CM

## 2022-11-04 DIAGNOSIS — C90.00 MULTIPLE MYELOMA NOT HAVING ACHIEVED REMISSION: ICD-10-CM

## 2022-11-04 DIAGNOSIS — E11.9 TYPE 2 DIABETES MELLITUS WITHOUT COMPLICATIONS: ICD-10-CM

## 2022-11-04 DIAGNOSIS — G93.40 ENCEPHALOPATHY, UNSPECIFIED: ICD-10-CM

## 2022-11-04 DIAGNOSIS — M06.9 RHEUMATOID ARTHRITIS, UNSPECIFIED: ICD-10-CM

## 2022-11-04 DIAGNOSIS — Z29.9 ENCOUNTER FOR PROPHYLACTIC MEASURES, UNSPECIFIED: ICD-10-CM

## 2022-11-04 DIAGNOSIS — Z86.73 PERSONAL HISTORY OF TRANSIENT ISCHEMIC ATTACK (TIA), AND CEREBRAL INFARCTION WITHOUT RESIDUAL DEFICITS: ICD-10-CM

## 2022-11-04 DIAGNOSIS — F03.90 UNSPECIFIED DEMENTIA, UNSPECIFIED SEVERITY, WITHOUT BEHAVIORAL DISTURBANCE, PSYCHOTIC DISTURBANCE, MOOD DISTURBANCE, AND ANXIETY: ICD-10-CM

## 2022-11-04 DIAGNOSIS — N17.9 ACUTE KIDNEY FAILURE, UNSPECIFIED: ICD-10-CM

## 2022-11-04 DIAGNOSIS — Z74.09 OTHER REDUCED MOBILITY: ICD-10-CM

## 2022-11-04 DIAGNOSIS — M54.9 DORSALGIA, UNSPECIFIED: ICD-10-CM

## 2022-11-04 DIAGNOSIS — E78.5 HYPERLIPIDEMIA, UNSPECIFIED: ICD-10-CM

## 2022-11-04 DIAGNOSIS — R41.82 ALTERED MENTAL STATUS, UNSPECIFIED: ICD-10-CM

## 2022-11-04 DIAGNOSIS — R41.0 DISORIENTATION, UNSPECIFIED: ICD-10-CM

## 2022-11-04 LAB
ALBUMIN SERPL ELPH-MCNC: 2.6 G/DL — LOW (ref 3.5–5)
ALP SERPL-CCNC: 62 U/L — SIGNIFICANT CHANGE UP (ref 40–120)
ALT FLD-CCNC: 14 U/L DA — SIGNIFICANT CHANGE UP (ref 10–60)
ANION GAP SERPL CALC-SCNC: 9 MMOL/L — SIGNIFICANT CHANGE UP (ref 5–17)
APPEARANCE UR: CLEAR — SIGNIFICANT CHANGE UP
AST SERPL-CCNC: 27 U/L — SIGNIFICANT CHANGE UP (ref 10–40)
BACTERIA # UR AUTO: NEGATIVE /HPF — SIGNIFICANT CHANGE UP
BASOPHILS # BLD AUTO: 0.01 K/UL — SIGNIFICANT CHANGE UP (ref 0–0.2)
BASOPHILS NFR BLD AUTO: 0.2 % — SIGNIFICANT CHANGE UP (ref 0–2)
BILIRUB SERPL-MCNC: 0.3 MG/DL — SIGNIFICANT CHANGE UP (ref 0.2–1.2)
BILIRUB UR-MCNC: NEGATIVE — SIGNIFICANT CHANGE UP
BUN SERPL-MCNC: 52 MG/DL — HIGH (ref 7–18)
CALCIUM SERPL-MCNC: 8.9 MG/DL — SIGNIFICANT CHANGE UP (ref 8.4–10.5)
CHLORIDE SERPL-SCNC: 109 MMOL/L — HIGH (ref 96–108)
CO2 SERPL-SCNC: 17 MMOL/L — LOW (ref 22–31)
COLOR SPEC: YELLOW — SIGNIFICANT CHANGE UP
COMMENT - URINE: SIGNIFICANT CHANGE UP
CREAT SERPL-MCNC: 2.62 MG/DL — HIGH (ref 0.5–1.3)
DIFF PNL FLD: ABNORMAL
EGFR: 18 ML/MIN/1.73M2 — LOW
EOSINOPHIL # BLD AUTO: 0.04 K/UL — SIGNIFICANT CHANGE UP (ref 0–0.5)
EOSINOPHIL NFR BLD AUTO: 0.8 % — SIGNIFICANT CHANGE UP (ref 0–6)
EPI CELLS # UR: ABNORMAL /HPF
GLUCOSE BLDC GLUCOMTR-MCNC: 108 MG/DL — HIGH (ref 70–99)
GLUCOSE BLDC GLUCOMTR-MCNC: 93 MG/DL — SIGNIFICANT CHANGE UP (ref 70–99)
GLUCOSE BLDC GLUCOMTR-MCNC: 98 MG/DL — SIGNIFICANT CHANGE UP (ref 70–99)
GLUCOSE SERPL-MCNC: 123 MG/DL — HIGH (ref 70–99)
GLUCOSE UR QL: NEGATIVE — SIGNIFICANT CHANGE UP
GRAN CASTS # UR COMP ASSIST: ABNORMAL /LPF
HCT VFR BLD CALC: 27.1 % — LOW (ref 34.5–45)
HGB BLD-MCNC: 8.8 G/DL — LOW (ref 11.5–15.5)
HYALINE CASTS # UR AUTO: ABNORMAL /LPF
IMM GRANULOCYTES NFR BLD AUTO: 0.4 % — SIGNIFICANT CHANGE UP (ref 0–0.9)
KETONES UR-MCNC: NEGATIVE — SIGNIFICANT CHANGE UP
LACTATE SERPL-SCNC: 0.8 MMOL/L — SIGNIFICANT CHANGE UP (ref 0.7–2)
LEUKOCYTE ESTERASE UR-ACNC: ABNORMAL
LYMPHOCYTES # BLD AUTO: 1.13 K/UL — SIGNIFICANT CHANGE UP (ref 1–3.3)
LYMPHOCYTES # BLD AUTO: 22.5 % — SIGNIFICANT CHANGE UP (ref 13–44)
MAGNESIUM SERPL-MCNC: 2.7 MG/DL — HIGH (ref 1.6–2.6)
MCHC RBC-ENTMCNC: 28.9 PG — SIGNIFICANT CHANGE UP (ref 27–34)
MCHC RBC-ENTMCNC: 32.5 GM/DL — SIGNIFICANT CHANGE UP (ref 32–36)
MCV RBC AUTO: 88.9 FL — SIGNIFICANT CHANGE UP (ref 80–100)
MONOCYTES # BLD AUTO: 0.2 K/UL — SIGNIFICANT CHANGE UP (ref 0–0.9)
MONOCYTES NFR BLD AUTO: 4 % — SIGNIFICANT CHANGE UP (ref 2–14)
NEUTROPHILS # BLD AUTO: 3.63 K/UL — SIGNIFICANT CHANGE UP (ref 1.8–7.4)
NEUTROPHILS NFR BLD AUTO: 72.1 % — SIGNIFICANT CHANGE UP (ref 43–77)
NITRITE UR-MCNC: NEGATIVE — SIGNIFICANT CHANGE UP
NRBC # BLD: 0 /100 WBCS — SIGNIFICANT CHANGE UP (ref 0–0)
PH UR: 5 — SIGNIFICANT CHANGE UP (ref 5–8)
PLATELET # BLD AUTO: 204 K/UL — SIGNIFICANT CHANGE UP (ref 150–400)
POTASSIUM SERPL-MCNC: 4.6 MMOL/L — SIGNIFICANT CHANGE UP (ref 3.5–5.3)
POTASSIUM SERPL-SCNC: 4.6 MMOL/L — SIGNIFICANT CHANGE UP (ref 3.5–5.3)
PROT SERPL-MCNC: 8.3 G/DL — SIGNIFICANT CHANGE UP (ref 6–8.3)
PROT UR-MCNC: 30 MG/DL
RBC # BLD: 3.05 M/UL — LOW (ref 3.8–5.2)
RBC # FLD: 16.3 % — HIGH (ref 10.3–14.5)
RBC CASTS # UR COMP ASSIST: SIGNIFICANT CHANGE UP /HPF (ref 0–2)
SARS-COV-2 RNA SPEC QL NAA+PROBE: SIGNIFICANT CHANGE UP
SODIUM SERPL-SCNC: 135 MMOL/L — SIGNIFICANT CHANGE UP (ref 135–145)
SP GR SPEC: 1.01 — SIGNIFICANT CHANGE UP (ref 1.01–1.02)
TROPONIN I, HIGH SENSITIVITY RESULT: 26.9 NG/L — SIGNIFICANT CHANGE UP
UROBILINOGEN FLD QL: NEGATIVE — SIGNIFICANT CHANGE UP
WBC # BLD: 5.03 K/UL — SIGNIFICANT CHANGE UP (ref 3.8–10.5)
WBC # FLD AUTO: 5.03 K/UL — SIGNIFICANT CHANGE UP (ref 3.8–10.5)
WBC UR QL: SIGNIFICANT CHANGE UP /HPF (ref 0–5)

## 2022-11-04 PROCEDURE — 71045 X-RAY EXAM CHEST 1 VIEW: CPT | Mod: 26

## 2022-11-04 PROCEDURE — 99223 1ST HOSP IP/OBS HIGH 75: CPT | Mod: FS

## 2022-11-04 PROCEDURE — 99223 1ST HOSP IP/OBS HIGH 75: CPT

## 2022-11-04 PROCEDURE — 95816 EEG AWAKE AND DROWSY: CPT | Mod: 26

## 2022-11-04 PROCEDURE — 70450 CT HEAD/BRAIN W/O DYE: CPT | Mod: 26,MA

## 2022-11-04 RX ORDER — ATORVASTATIN CALCIUM 80 MG/1
20 TABLET, FILM COATED ORAL AT BEDTIME
Refills: 0 | Status: DISCONTINUED | OUTPATIENT
Start: 2022-11-04 | End: 2022-11-09

## 2022-11-04 RX ORDER — LEVETIRACETAM 250 MG/1
500 TABLET, FILM COATED ORAL
Refills: 0 | Status: DISCONTINUED | OUTPATIENT
Start: 2022-11-04 | End: 2022-11-09

## 2022-11-04 RX ORDER — SENNA PLUS 8.6 MG/1
2 TABLET ORAL AT BEDTIME
Refills: 0 | Status: DISCONTINUED | OUTPATIENT
Start: 2022-11-04 | End: 2022-11-09

## 2022-11-04 RX ORDER — AMLODIPINE BESYLATE 2.5 MG/1
2.5 TABLET ORAL DAILY
Refills: 0 | Status: DISCONTINUED | OUTPATIENT
Start: 2022-11-04 | End: 2022-11-09

## 2022-11-04 RX ORDER — OXYCODONE HYDROCHLORIDE 5 MG/1
2.5 TABLET ORAL EVERY 6 HOURS
Refills: 0 | Status: ACTIVE | OUTPATIENT
Start: 2022-11-04 | End: 2022-11-11

## 2022-11-04 RX ORDER — SODIUM CHLORIDE 9 MG/ML
1000 INJECTION INTRAMUSCULAR; INTRAVENOUS; SUBCUTANEOUS ONCE
Refills: 0 | Status: COMPLETED | OUTPATIENT
Start: 2022-11-04 | End: 2022-11-04

## 2022-11-04 RX ORDER — DEXTROSE 50 % IN WATER 50 %
15 SYRINGE (ML) INTRAVENOUS ONCE
Refills: 0 | Status: DISCONTINUED | OUTPATIENT
Start: 2022-11-04 | End: 2022-11-09

## 2022-11-04 RX ORDER — HEPARIN SODIUM 5000 [USP'U]/ML
5000 INJECTION INTRAVENOUS; SUBCUTANEOUS EVERY 12 HOURS
Refills: 0 | Status: DISCONTINUED | OUTPATIENT
Start: 2022-11-04 | End: 2022-11-09

## 2022-11-04 RX ORDER — INSULIN LISPRO 100/ML
VIAL (ML) SUBCUTANEOUS AT BEDTIME
Refills: 0 | Status: DISCONTINUED | OUTPATIENT
Start: 2022-11-04 | End: 2022-11-09

## 2022-11-04 RX ORDER — CALCITRIOL 0.5 UG/1
0.25 CAPSULE ORAL DAILY
Refills: 0 | Status: DISCONTINUED | OUTPATIENT
Start: 2022-11-04 | End: 2022-11-09

## 2022-11-04 RX ORDER — ACETAMINOPHEN 500 MG
650 TABLET ORAL EVERY 6 HOURS
Refills: 0 | Status: DISCONTINUED | OUTPATIENT
Start: 2022-11-04 | End: 2022-11-09

## 2022-11-04 RX ORDER — TRAMADOL HYDROCHLORIDE 50 MG/1
25 TABLET ORAL EVERY 8 HOURS
Refills: 0 | Status: DISCONTINUED | OUTPATIENT
Start: 2022-11-04 | End: 2022-11-09

## 2022-11-04 RX ORDER — LANOLIN ALCOHOL/MO/W.PET/CERES
3 CREAM (GRAM) TOPICAL AT BEDTIME
Refills: 0 | Status: DISCONTINUED | OUTPATIENT
Start: 2022-11-04 | End: 2022-11-09

## 2022-11-04 RX ORDER — POMALIDOMIDE 1 MG/1
1 CAPSULE ORAL
Qty: 0 | Refills: 0 | DISCHARGE

## 2022-11-04 RX ORDER — SODIUM CHLORIDE 9 MG/ML
1000 INJECTION, SOLUTION INTRAVENOUS
Refills: 0 | Status: DISCONTINUED | OUTPATIENT
Start: 2022-11-04 | End: 2022-11-09

## 2022-11-04 RX ORDER — PANTOPRAZOLE SODIUM 20 MG/1
40 TABLET, DELAYED RELEASE ORAL
Refills: 0 | Status: DISCONTINUED | OUTPATIENT
Start: 2022-11-04 | End: 2022-11-09

## 2022-11-04 RX ORDER — INSULIN LISPRO 100/ML
VIAL (ML) SUBCUTANEOUS
Refills: 0 | Status: DISCONTINUED | OUTPATIENT
Start: 2022-11-04 | End: 2022-11-09

## 2022-11-04 RX ORDER — POLYETHYLENE GLYCOL 3350 17 G/17G
17 POWDER, FOR SOLUTION ORAL DAILY
Refills: 0 | Status: DISCONTINUED | OUTPATIENT
Start: 2022-11-04 | End: 2022-11-09

## 2022-11-04 RX ORDER — MEMANTINE HYDROCHLORIDE 10 MG/1
5 TABLET ORAL AT BEDTIME
Refills: 0 | Status: DISCONTINUED | OUTPATIENT
Start: 2022-11-04 | End: 2022-11-09

## 2022-11-04 RX ORDER — FERROUS SULFATE 325(65) MG
325 TABLET ORAL DAILY
Refills: 0 | Status: DISCONTINUED | OUTPATIENT
Start: 2022-11-04 | End: 2022-11-09

## 2022-11-04 RX ORDER — GLUCAGON INJECTION, SOLUTION 0.5 MG/.1ML
1 INJECTION, SOLUTION SUBCUTANEOUS ONCE
Refills: 0 | Status: DISCONTINUED | OUTPATIENT
Start: 2022-11-04 | End: 2022-11-09

## 2022-11-04 RX ORDER — DEXTROSE 50 % IN WATER 50 %
25 SYRINGE (ML) INTRAVENOUS ONCE
Refills: 0 | Status: DISCONTINUED | OUTPATIENT
Start: 2022-11-04 | End: 2022-11-09

## 2022-11-04 RX ORDER — FOLIC ACID 0.8 MG
1 TABLET ORAL DAILY
Refills: 0 | Status: DISCONTINUED | OUTPATIENT
Start: 2022-11-04 | End: 2022-11-09

## 2022-11-04 RX ORDER — LIDOCAINE 4 G/100G
1 CREAM TOPICAL DAILY
Refills: 0 | Status: DISCONTINUED | OUTPATIENT
Start: 2022-11-04 | End: 2022-11-09

## 2022-11-04 RX ORDER — KETOROLAC TROMETHAMINE 30 MG/ML
15 SYRINGE (ML) INJECTION ONCE
Refills: 0 | Status: DISCONTINUED | OUTPATIENT
Start: 2022-11-04 | End: 2022-11-04

## 2022-11-04 RX ADMIN — Medication 325 MILLIGRAM(S): at 13:45

## 2022-11-04 RX ADMIN — LEVETIRACETAM 500 MILLIGRAM(S): 250 TABLET, FILM COATED ORAL at 17:43

## 2022-11-04 RX ADMIN — ATORVASTATIN CALCIUM 20 MILLIGRAM(S): 80 TABLET, FILM COATED ORAL at 21:11

## 2022-11-04 RX ADMIN — HEPARIN SODIUM 5000 UNIT(S): 5000 INJECTION INTRAVENOUS; SUBCUTANEOUS at 17:42

## 2022-11-04 RX ADMIN — LIDOCAINE 1 PATCH: 4 CREAM TOPICAL at 13:44

## 2022-11-04 RX ADMIN — LIDOCAINE 1 PATCH: 4 CREAM TOPICAL at 19:43

## 2022-11-04 RX ADMIN — Medication 15 MILLIGRAM(S): at 06:05

## 2022-11-04 RX ADMIN — SODIUM CHLORIDE 1000 MILLILITER(S): 9 INJECTION INTRAMUSCULAR; INTRAVENOUS; SUBCUTANEOUS at 03:26

## 2022-11-04 RX ADMIN — Medication 1 MILLIGRAM(S): at 13:45

## 2022-11-04 RX ADMIN — SODIUM CHLORIDE 1000 MILLILITER(S): 9 INJECTION INTRAMUSCULAR; INTRAVENOUS; SUBCUTANEOUS at 06:05

## 2022-11-04 RX ADMIN — MEMANTINE HYDROCHLORIDE 5 MILLIGRAM(S): 10 TABLET ORAL at 21:11

## 2022-11-04 RX ADMIN — CALCITRIOL 0.25 MICROGRAM(S): 0.5 CAPSULE ORAL at 15:29

## 2022-11-04 RX ADMIN — SENNA PLUS 2 TABLET(S): 8.6 TABLET ORAL at 21:11

## 2022-11-04 RX ADMIN — Medication 15 MILLIGRAM(S): at 03:26

## 2022-11-04 RX ADMIN — POLYETHYLENE GLYCOL 3350 17 GRAM(S): 17 POWDER, FOR SOLUTION ORAL at 13:45

## 2022-11-04 NOTE — H&P ADULT - ATTENDING COMMENTS
83 yo F w PMH dementia, multiple myeloma (on pomalyst), RA,, HTN, HLD, DM, remote CVA with no residual deficits, with recent admission to [facility] for mechanical fall brought in by daughter for episodes of confusion with difficulty ambulating. Patient was recently discharged from SNF at home, even at SNF patient was having hallucinations and delirium. Patient has had difficulty urinating  In the ED found to have 800cc urinary retention neves placed.    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   #KAJAL on CKD, post-renal 2/2 urinary rentetion  #Ambulatory dysfunction  #HTN  #HLD  #remote CVA with no residual deficits  #Multiple myeloma   #RA  #HTN  #HLD  - CT head negative for any acute pathology  - 800 cc urinary rentetion, neves placed  - monitor repeat BMP, urine lytes  - neurology eval  - PT eval   - resume home meds  - 81 yo F w PMH dementia, multiple myeloma (on pomalyst), RA,, HTN, HLD, DM, remote CVA with no residual deficits, with recent admission to [facility] for mechanical fall brought in by daughter for episodes of confusion with difficulty ambulating. Patient was recently discharged from SNF at home, even at SNF patient was having hallucinations and delirium. Patient has had difficulty urinating  In the ED found to have 800cc urinary retention neves placed.    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

## 2022-11-04 NOTE — ED ADULT TRIAGE NOTE - CHIEF COMPLAINT QUOTE
BIBA accompanied by pt's daughter, daughter states she is concerned about weakness in nayan LE and AMS since last night, states pt normally stands and walks with assistance and is sharp mentally but not since last night

## 2022-11-04 NOTE — H&P ADULT - PROBLEM SELECTOR PLAN 8
Pt previously on home insulin, no longer takes it. Does not appear to be on home antihyperglycemics.    -hold home gabapentin 200 mg BID ISO KAJAL  -FSG ACHS  -JUDY, low dose for now, will monitor and titrate insulin dose given FSG  -consistent carb diet

## 2022-11-04 NOTE — CONSULT NOTE ADULT - SUBJECTIVE AND OBJECTIVE BOX
NEUROLOGY CONSULT NOTE    NAME:  ANGELO STRATTON      ASSESSMENT:  82F with acute worsening of mental status in the setting of dementia, concerning for toxic/metabolic encephalopathy in the setting of pain medication use, with component of delirium while in an outside facility      RECOMMENDATIONS:    - Continue Q4H Vital signs and Neurochecks for at least 24 hours    - Check Vitamin B12, Folate, and TSH levels, and treat if abnormal (patient has a history of low Vitamin B12 level last year)    - Minimize use of opiates to treat pain symptoms to help minimize the risk of drug-induced encephalopathy    - If encephalopathy persists despite the above steps, may consider MRI Brain to evaluate for acute intracranial abnormalities    - PT/OT    - DVT ppx: SCDs, Heparin          NOTE TO BE COMPLETED - PLEASE REFER TO ABOVE ONLY AND IGNORE INFORMATION BELOW    *******************************      CHIEF COMPLAINT:  Patient is a 82y old  Female who presents with a chief complaint of     HPI:  Pt is an 81 yo F w PMH multiple myeloma (on pomalyst), RA, dementia?, HTN, HLD, DM (not on medications), remote CVA with no residual deficits, s/p mechanical fall in 9/2022, discharged from [facility] 10/31, who was brought to [facility] after acute change in mental status, speech, and ambulation status. As per collateral obtained from daughter, pt was discharged from [facility] on Mon, 10/31, and was ambulatory, though her mental status was slightly confused, with pt not being aware of location. As per daughter, pt was hallucinating in [facility], and reported hearing daughter's voice. Daughter states that she became concerned when pt began to speak exhibit increased slurred and aphasic(?) speech yesterday morning with "dreamy" and "dazed" eyes. These episodes resolved, however, but pt was not able to walk as she typically does with walker and assistance. Daughter endorses that pt continues to have back pain from her fall in 9/2022, and that this pain is typically responsive to Tylenol, but if not, gives 2.5 mg of oxycodone sparingly with good effect. Endorsed that pt urinated yesterday morning as per HHA, but had no urine output since the afternoon/evening. On interview this morning, pt was somnolent, but endorsed feeling the urge to urinate but is not able to. She denied headache, dizziness, changes in vision, chest pain, dyspnea, abdominal pain, back pain, pain in legs. (04 Nov 2022 10:15)      NEURO HPI:DD      PAST MEDICAL & SURGICAL HISTORY:  Rheumatoid arthritis  Varicose veins of lower extremity  Hypertension  Multiple myeloma  HLD (hyperlipidemia)  Cerebrovascular accident (CVA), remote hx, no residual deficits  No significant past surgical history      MEDICATIONS:  acetaminophen     Tablet .. 650 milliGRAM(s) Oral every 6 hours PRN  amLODIPine   Tablet 2.5 milliGRAM(s) Oral daily  atorvastatin 20 milliGRAM(s) Oral at bedtime  calcitriol   Capsule 0.25 MICROGram(s) Oral daily  dextrose 5%. 1000 milliLiter(s) IV Continuous <Continuous>  dextrose 50% Injectable 25 Gram(s) IV Push once  dextrose Oral Gel 15 Gram(s) Oral once PRN  ferrous    sulfate 325 milliGRAM(s) Oral daily  folic acid 1 milliGRAM(s) Oral daily  glucagon  Injectable 1 milliGRAM(s) IntraMuscular once  heparin   Injectable 5000 Unit(s) SubCutaneous every 12 hours  insulin lispro (ADMELOG) corrective regimen sliding scale   SubCutaneous three times a day before meals  insulin lispro (ADMELOG) corrective regimen sliding scale   SubCutaneous at bedtime  levETIRAcetam 500 milliGRAM(s) Oral two times a day  lidocaine   4% Patch 1 Patch Transdermal daily  melatonin 3 milliGRAM(s) Oral at bedtime  memantine 5 milliGRAM(s) Oral at bedtime  oxyCODONE    Solution 2.5 milliGRAM(s) Oral every 6 hours PRN  pantoprazole    Tablet 40 milliGRAM(s) Oral before breakfast  polyethylene glycol 3350 17 Gram(s) Oral daily  senna 2 Tablet(s) Oral at bedtime  traMADol 25 milliGRAM(s) Oral every 8 hours PRN      ALLERGIES:  No Known Allergies      FAMILY HISTORY:  FHx: stroke (Father)          SOCIAL HISTORY:  Denies alcohol, tobacco, or illicit drug use      REVIEW OF SYSTEMS:  GENERAL: No fever, weight changes, fatigue  EYES: No eye pain or discharge  EAR/NOSE/MOUTH/THROAT: No sinus or throat pain; No difficulty hearing  NECK: No pain or stiffness  RESPIRATORY: No cough, wheezing, chills, or hemoptysis  CARDIOVASCULAR: No chest pain, palpitations, shortness of breath, or dyspnea on exertion  GASTROINTESTINAL: No abdominal pain, nausea, vomiting, hematemesis, diarrhea, or constipation  GENITOURINARY: No dysuria, frequency, hematuria, or incontinence  SKIN: No rashes or lesions  ENDOCRINE: No heat or cold intolerance  HEMATOLOGIC: No easy bruising or bleeding  PSYCHIATRIC: No depression, anxiety, or mood swings  MUSCULOSKELETAL: No joint pain or swelling  NEUROLOGICAL: As per HPI        OBJECTIVE:    Vital Signs Last 24 Hrs  T(C): 36.5 (11-04-22 @ 07:40), Max: 37 (11-04-22 @ 00:14)  HR: 89 (11-04-22 @ 07:40) (67 - 89)  BP: 126/65 (11-04-22 @ 07:40) (111/63 - 126/65)  RR: 18 (11-04-22 @ 07:40) (16 - 18)  SpO2: 97% (11-04-22 @ 07:40) (94% - 97%)      General Examination:  General: No acute distress  HEENT: Atraumatic, Normocephalic  Respiratory: CTA B/l.  No crackles, rhonchi, or wheezes.  Cardiovascular: RRR.  Normal S1 & S2.  Normal b/l radial and pedal pulses.    Neurological Examination:  General / Mental Status: AAO x 3.  No aphasia or dysarthria.  Naming and repetition intact.  Cranial Nerves: VFF x 4.  PERRL.  EOMI x 2, No nystagmus or diplopia.  B/l V1-V3 equal and intact to light touch and pinprick.  Symmetric facial movement and palate elevation.  B/l hearing equal to finger rub.  5/5 strength with b/l sternocleidomastoid & trapezius.  Midline tongue protrusion, with no atrophy or fasciculations.  Motor: Normal bulk & tone in all four extremities.  5/5 strength throughout all four extremities.  No downward drift, rigidity, spasticity, or tremors in any of the four extremities.  Sensory: Intact to light touch and pinprick in all four extremities.  Negative Romberg.  Reflex: 2+ and symmetric at b/l biceps, triceps, brachioradialis, patellae, and ankles.  Downgoing toes b/l.  Coordination: No dysmetria with b/l finger-to-nose and heel raise tests.  Symmetric rapid alternating movements b/l.  Gait: Normal, narrow-based gait.  No difficulty with tiptoe, heel, and tandem gaits.        LABORATORY VALUES:                        8.8    5.03  )-----------( 204      ( 04 Nov 2022 03:19 )             27.1      11-04    135  |  109<H>  |  52<H>  ----------------------------<  123<H>  4.6   |  17<L>  |  2.62<H>    Ca    8.9      04 Nov 2022 03:19    Mg     2.7     11-04    TPro  8.3  /  Alb  2.6<L>  /  TBili  0.3  /  DBili  x   /  AST  27  /  ALT  14  /  AlkPhos 62  11-04                NEUROIMAGING:          Please contact the Neurology consult service with any neurological questions.    Marcus Collier MD   of Neurology  [facility] [employer] at [facility] NEUROLOGY CONSULT NOTE    NAME:  ANGELO STRATTON      ASSESSMENT:  82F with acute worsening of mental status in the setting of dementia, concerning for toxic/metabolic encephalopathy in the setting of pain medication use vs. infection, with component of delirium while in an outside facility      RECOMMENDATIONS:    - Continue Q4H Vital signs and Neurochecks for at least 24 hours    - Check Vitamin B12, Folate, and TSH levels, and treat if abnormal (patient has a history of low Vitamin B12 level last year)    - Minimize use of opiates to treat pain symptoms to help minimize the risk of drug-induced encephalopathy    - If encephalopathy persists despite the above steps, may consider MRI Brain to evaluate for acute intracranial abnormalities    - PT/OT    - DVT ppx: SCDs, Heparin          *******************************      CHIEF COMPLAINT:  Patient is a 82y old  Female who presents with a chief complaint of     HPI:  Pt is an 81 yo F w PMH multiple myeloma (on pomalyst), RA, dementia?, HTN, HLD, DM (not on medications), remote CVA with no residual deficits, s/p mechanical fall in 9/2022, discharged from [facility] 10/31, who was brought to [facility] after acute change in mental status, speech, and ambulation status. As per collateral obtained from daughter, pt was discharged from [facility] on Mon, 10/31, and was ambulatory, though her mental status was slightly confused, with pt not being aware of location. As per daughter, pt was hallucinating in [facility], and reported hearing daughter's voice. Daughter states that she became concerned when pt began to speak exhibit increased slurred and aphasic(?) speech yesterday morning with "dreamy" and "dazed" eyes. These episodes resolved, however, but pt was not able to walk as she typically does with walker and assistance. Daughter endorses that pt continues to have back pain from her fall in 9/2022, and that this pain is typically responsive to Tylenol, but if not, gives 2.5 mg of oxycodone sparingly with good effect. Endorsed that pt urinated yesterday morning as per A, but had no urine output since the afternoon/evening. On interview this morning, pt was somnolent, but endorsed feeling the urge to urinate but is not able to. She denied headache, dizziness, changes in vision, chest pain, dyspnea, abdominal pain, back pain, pain in legs. (04 Nov 2022 10:15)      NEURO HPI:  82F with history of dementia and stroke, presenting from a subacute rehabilitation center due to a change in mental status, including auditory hallucinations. The patient was also reported to have slurred speech, although this was not present when the patient presented to the ED.      PAST MEDICAL & SURGICAL HISTORY:  Rheumatoid arthritis  Varicose veins of lower extremity  Hypertension  Multiple myeloma  HLD (hyperlipidemia)  Cerebrovascular accident (CVA), remote hx, no residual deficits  No significant past surgical history      MEDICATIONS:  acetaminophen     Tablet .. 650 milliGRAM(s) Oral every 6 hours PRN  amLODIPine   Tablet 2.5 milliGRAM(s) Oral daily  atorvastatin 20 milliGRAM(s) Oral at bedtime  calcitriol   Capsule 0.25 MICROGram(s) Oral daily  dextrose 5%. 1000 milliLiter(s) IV Continuous <Continuous>  dextrose 50% Injectable 25 Gram(s) IV Push once  dextrose Oral Gel 15 Gram(s) Oral once PRN  ferrous    sulfate 325 milliGRAM(s) Oral daily  folic acid 1 milliGRAM(s) Oral daily  glucagon  Injectable 1 milliGRAM(s) IntraMuscular once  heparin   Injectable 5000 Unit(s) SubCutaneous every 12 hours  insulin lispro (ADMELOG) corrective regimen sliding scale   SubCutaneous three times a day before meals  insulin lispro (ADMELOG) corrective regimen sliding scale   SubCutaneous at bedtime  levETIRAcetam 500 milliGRAM(s) Oral two times a day  lidocaine   4% Patch 1 Patch Transdermal daily  melatonin 3 milliGRAM(s) Oral at bedtime  memantine 5 milliGRAM(s) Oral at bedtime  oxyCODONE    Solution 2.5 milliGRAM(s) Oral every 6 hours PRN  pantoprazole    Tablet 40 milliGRAM(s) Oral before breakfast  polyethylene glycol 3350 17 Gram(s) Oral daily  senna 2 Tablet(s) Oral at bedtime  traMADol 25 milliGRAM(s) Oral every 8 hours PRN      ALLERGIES:  No Known Allergies      FAMILY HISTORY:  FHx: stroke (Father)      SOCIAL HISTORY:  Denies alcohol, tobacco, or illicit drug use      REVIEW OF SYSTEMS: Limited due to encephalopathy  GENERAL: No fever  EYES: No eye discharge  EAR/NOSE/MOUTH/THROAT: No sinus discharge  NECK: No stiffness  RESPIRATORY: No cough  CARDIOVASCULAR: No shortness of breath  GASTROINTESTINAL: No nausea, vomiting, hematemesis  GENITOURINARY: Urinary urgency present; No hematuria  SKIN: No rashes or lesions  ENDOCRINE: No reported heat or cold intolerance  HEMATOLOGIC: No reported easy bruising or bleeding  PSYCHIATRIC: Recent auditory hallucinations; No known depression or anxiety  MUSCULOSKELETAL: Diffuse joint pains reported; No joint swelling  NEUROLOGICAL: As per HPI          OBJECTIVE:    Vital Signs Last 24 Hrs  T(C): 36.5 (11-04-22 @ 07:40), Max: 37 (11-04-22 @ 00:14)  HR: 89 (11-04-22 @ 07:40) (67 - 89)  BP: 126/65 (11-04-22 @ 07:40) (111/63 - 126/65)  RR: 18 (11-04-22 @ 07:40) (16 - 18)  SpO2: 97% (11-04-22 @ 07:40) (94% - 97%)      General Exam:  General: No apparent acute distress  Respiratory: CTAB/l.  No crackles, rhonchi, or wheezes.  Cardiovascular: RRR, No murmurs, Full b/l radial and pedal pulses    Neurological Exam:  General / Mental Status: Minimally verbal, without apparent aphasia or dysarthria.  Does not follow commands.  Neurological exam is limited.  Cranial Nerves: PERRL, Blink to threat present b/l.  Tracks finger movements with eyes b/l, without apparent nystagmus.  Grimaces to pinprick at b/l V1-V3.  No response to snap at b/l ears.  No apparent facial asymmetry.  Midline palate and tongue.  No resistance to passive motion of neck b/l.  Motor: Diminished bulk and tone in all four extremities.  All four extremities drift to bed after passive elevation, limited by poor effort.  No spontaneous movement, rigidity, or spasticity in any of the four extremities.  Sensation: Withdraws all four extremities to pinch.  Reflexes: 1+ and symmetric at b/l biceps, triceps, brachioradialis, patellae, and ankles.  Toes mute b/l.  Unable to assess coordination, Romberg sign, or gait due to encephalopathy.      NIHSS Score:    LOC - 0  LOC Questions - 0  LOC Commands - 0  Gaze Preference - 0  Visual Fields - 0  Facial Palsy - 0  Motor Arm Left - 2  Motor Arm Right - 2  Motor Leg Left - 2  Motor Leg Right - 2  Limb Ataxia - 0  Sensory - 0  Language - 0  Speech - 0  Extinction - 0    NIHSS Score Total: 8 - No IV tPA because patient presented outside the time window    Modified Hale Scale: 4            LABORATORY VALUES:                        8.8    5.03  )-----------( 204      ( 04 Nov 2022 03:19 )             27.1      11-04    135  |  109<H>  |  52<H>  ----------------------------<  123<H>  4.6   |  17<L>  |  2.62<H>    Ca    8.9      04 Nov 2022 03:19    Mg     2.7     11-04    TPro  8.3  /  Alb  2.6<L>  /  TBili  0.3  /  DBili  x   /  AST  27  /  ALT  14  /  AlkPhos 62  11-04            NEUROIMAGING:      CT Head (11/4/22):  - No acute intracranial abnormality  - Chronic microvascular changes          Please contact the Neurology consult service with any neurological questions.    Marcus Collier MD   of Neurology  [facility] [employer] [facility] at [facility]

## 2022-11-04 NOTE — ED ADULT TRIAGE NOTE - NS ED NURSE AMBULANCES
Addendum Note by Sabrina Escobar RN at 1/28/2021  4:17 PM     Author: Sabrina Escobar RN Service: -- Author Type: Registered Nurse    Filed: 1/28/2021  4:17 PM Encounter Date: 1/27/2021 Status: Signed    : Sabrina Escobar RN (Registered Nurse)    Addended by: SABRINA ESCOBAR on: 1/28/2021 04:17 PM        Modules accepted: Orders        
FDNY

## 2022-11-04 NOTE — CONSULT NOTE ADULT - TIME BILLING
I counseled the primary team about the patient's differential diagnoses, and testing and treatment steps indicated for workup and management of the patient's encephalopathy.

## 2022-11-04 NOTE — H&P ADULT - HISTORY OF PRESENT ILLNESS
Pt is an 83 yo F w PMH multiple myeloma (on pomalyst), RA, dementia?, HTN, HLD, DM (not on medications), remote CVA with no residual deficits, s/p mechanical fall in 9/2022, discharged from [facility] 10/31, who was brought to [facility] after acute change in mental status, speech, and ambulation status. As per collateral obtained from daughter, pt was discharged from [facility] on Mon, 10/31, and was ambulatory, though her mental status was slightly confused, with pt not being aware of location. As per daughter, pt was hallucinating in [facility], and reported hearing daughter's voice. Daughter states that she became concerned when pt began to speak exhibit increased slurred and aphasic(?) speech yesterday morning with "dreamy" and "dazed" eyes. These episodes resolved, however, but pt was not able to walk as she typically does with walker and assistance.     Daughter endorses that pt continues to have back pain from her fall in 9/2022, and that this pain is typically responsive to tyelnol, but if not, gives 2.5 mg of oxycodone sparingly with good effect. Endorsed that pt urinated yesterday morning as per HHA, but had no urine output since the afternoon/evening. On interview this morning, pt was somnolent, but endorsed feeling the urge to urinate but is not able to. She denied headache, dizziness, changes in vision, chest pain, dyspnea, abdominal pain, back pain, pain in legs.

## 2022-11-04 NOTE — H&P ADULT - NSICDXPASTMEDICALHX_GEN_ALL_CORE_FT
PAST MEDICAL HISTORY:  Cerebrovascular accident (CVA) remote hx, no residual deficits    HLD (hyperlipidemia)     Hypertension     Multiple myeloma     Rheumatoid arthritis     Varicose veins of lower extremity

## 2022-11-04 NOTE — ED PROVIDER NOTE - CLINICAL SUMMARY MEDICAL DECISION MAKING FREE TEXT BOX
82 year old female with confusion and weakness. PE as above.  labs, ua, cxr, ct head, fluid bolus, reassess

## 2022-11-04 NOTE — H&P ADULT - ASSESSMENT
Pt is an 83 yo F w PMH multiple myeloma (on pomalyst), RA, dementia?, HTN, HLD, DM (not on medications), remote CVA with no residual deficits, s/p mechanical fall in 9/2022, discharged from [facility] 10/31, who was brought to [facility] after acute change in mental status, speech, and ambulation status, admitted for further workup. Pt is an 81 yo F w PMH multiple myeloma (on pomalyst), RA, dementia?, HTN, HLD, DM (not on medications), remote CVA with no residual deficits, s/p mechanical fall in 9/2022, discharged from [facility] 10/31, admitted for workup of encephalopathy.

## 2022-11-04 NOTE — H&P ADULT - PROBLEM SELECTOR PLAN 7
Pt was recently admitted to [facility] s/p mechanical fall in 9/2022. Finished DEV course, d/c 3 days ago home. Still complaining of back pain from fall.  As per daughter pt gets tylenol and has been getting oxycodone 2.5 mg for severe pain PRN    -lidocaine patch q12h, tylenol mild-moderate pain, oxycodone 2.5 mg severe pain Pt was recently admitted to [facility] s/p mechanical fall in 9/2022. Finished DEV course, d/c 3 days ago home. Still complaining of back pain from fall.  As per daughter pt gets tylenol for mild pain and has been getting oxycodone 2.5 mg for severe pain PRN    -lidocaine patch q12h, tylenol mild pain, tramadol moderate pain, oxycodone 2.5 mg severe pain

## 2022-11-04 NOTE — H&P ADULT - NSHPSOCIALHISTORY_GEN_ALL_CORE
Deny tobacco, alcohol, drug use. Lives at home with family, has hha, ambulates w walker and assistance, partially dependent on ADL

## 2022-11-04 NOTE — H&P ADULT - PROBLEM SELECTOR PLAN 1
SCr on admission 2.61, baseline approx 1.8 as per prev admission  Likely 2/2 acute urinary retention and combination of nephrotoxic agents (lisinopril, pomalyst)    s/p 2L NS in ED  s/p15 mg toradol in ED    On exam, pt had notable tenderness to suprapubic area, no urination since 11/3 a.m.  Straight cath attempted overnight (11/3-11/4) by multiple RN, pt difficult to cath  Bladder scan on 11/4 am revealed 832 cc, neves cath ordered, ___ cc drained.    -consult urology to place neves???  -consider oxybutynin for urinary spasm, consider flomax    -nephrology  ____, appreciate recs  -gentle hydration 70 cc LR/hr  -no nephrotoxic agents    f/u u/a, ucx SCr on admission 2.61, baseline approx 1.8 as per prev admission  Likely 2/2 acute urinary retention and combination of nephrotoxic agents (lisinopril, pomalyst)    s/p 2L NS in ED  s/p15 mg toradol in ED    On exam, pt had notable tenderness to suprapubic area, no urination since 11/3 a.m.  Straight cath attempted overnight (11/3-11/4) by multiple RN, pt difficult to cath  Bladder scan on 11/4 am revealed 832 cc, neves cath placed, ___ cc drained.    -consult urology to place neves???  -consider oxybutynin for urinary spasm, consider flomax    -nephrology  ____, appreciate recs  -gentle hydration 70 cc LR/hr  -no nephrotoxic agents    f/u u/a, ucx SCr on admission 2.61, baseline approx 1.8 as per prev admission  Likely 2/2 acute urinary retention and combination of nephrotoxic agents (lisinopril, pomalyst)    s/p 2L NS in ED  s/p15 mg toradol in ED    On exam, pt had notable tenderness to suprapubic area, no urination since 11/3 a.m.  Straight cath attempted overnight (11/3-11/4) by multiple RN, pt difficult to cath  Bladder scan on 11/4 am revealed 832 cc, neves cath placed, ___ cc drained.    -consult urology to place neves???  -consider oxybutynin for urinary spasm, consider flomax    -gentle hydration 70 cc LR/hr  -no nephrotoxic agents    f/u u/a, ucx SCr on admission 2.61, baseline approx 1.8 as per prev admission  Likely 2/2 acute urinary retention and combination of nephrotoxic agents (lisinopril, pomalyst)    s/p 2L NS in ED  s/p15 mg toradol in ED    On exam, pt had notable tenderness to suprapubic area, no urination since 11/3 a.m.  Straight cath attempted overnight (11/3-11/4) by multiple RN, pt difficult to cath  Bladder scan on 11/4 am revealed 832 cc, neves cath placed    -gentle hydration 70 cc LR/hr  -no nephrotoxic agents    f/u u/a, ucx

## 2022-11-04 NOTE — ED ADULT TRIAGE NOTE - TEMPERATURE IN CELSIUS (DEGREES C)
POSTOPERATIVE INSTRUCTIONS FOR  D&C    After your surgery it is normal to experience:    •	Vaginal bleeding- can last 1-2 weeks and should not be heavier than a period. It may come and go and be red, brown or pink. Use pads, not tampons.  •	Cramping- Is common especially within the first 24 hours. This should be relieved by taking over the counter motrin, advil or Tylenol.      Restrictions: For 10-14 days after the surgery you should not do/use any of the following:    •	Tampons  •	Sex  •	Vigorous gym exercise  •	Swimming  pools and tub baths  •	Wait a day or two before going back to work    Anesthesia Precautions:  For the next 12 hours do not:   •	drive a car,  •	 operate power tools or machinery,  •	drink alcohol, beer, or wine,   •	make important personal or business decisions    Diet:   •	Resume Regular diet but Progress diet slowly     Physician Notification- Warning signs to look out for  •	Heavy Vaginal Bleeding   •	Shortness of breath or chest pain  •	Severe Abdominal Pain  •	Persistent nausea and vomiting  •	Pain not relieved by medications  •	Fever greater than 100.5®F  •	Inability to tolerate liquids or foods  •	Unable to urinate after 8 hours    Discharge and Disposition  •	Discharge to home    Follow Up Care:  •	In the event that you develop a complication and you are unable to reach your own physician, you may contact:  [phone] or go to the nearest Emergency Room.   •	Follow up as scheduled with Dr. Daily for telehealth follow up appointment 8/10 at 8:20AM.
37

## 2022-11-04 NOTE — H&P ADULT - PROBLEM SELECTOR PLAN 5
-home pomalyst every other day, restarted 3 days ago once pt back home from [facility]  -hold for now ISO KAJAL    -QMA consult, appreciate recs

## 2022-11-04 NOTE — H&P ADULT - NSHPPHYSICALEXAM_GEN_ALL_CORE
T(C): 36.5 (11-04-22 @ 07:40), Max: 37 (11-04-22 @ 00:14)  HR: 89 (11-04-22 @ 07:40) (67 - 89)  BP: 126/65 (11-04-22 @ 07:40) (111/63 - 126/65)  RR: 18 (11-04-22 @ 07:40) (16 - 18)  SpO2: 97% (11-04-22 @ 07:40) (94% - 97%)    CONSTITUTIONAL: casually groomed, no apparent distress, somnolent    HEENT: normotramuatic, acephalic, PERRL and symmetric, EOMI, No conjunctival or scleral injection, non-icteric. Oral mucosa with moist membranes. No external nasal lesions; nasal mucosa not inflamed; no pharyngeal injection or exudates.               Neck: supple, symmetric and without tracheal deviation; thyroid gland not enlarged and without palpable masses    RESPIRATORY: No respiratory distress, no use of accessory muscles; CTA b/l, no wheezes, rales or rhonchi    CARDIOVASCULAR: RRRR, +S1S2,  no JVD; no peripheral edema  	Vascular: carotid pulse palpable, radial pulse palpable    GASTROINTESTINAL: +BS, Soft, no rebound, no guarding; No palpable masses; no hepatosplenomegaly; no hernia palpated;    MUSCULOSKELETAL: No digital clubbing or cyanosis; examination of the (head/neck, spine/ribs/pelvis, RUE, LUE, RLE, LLE) without misalignment,  no spinal tenderness    SKIN: No rashes or ulcers noted; no subcutaneous nodules or induration palpable    NEUROLOGIC: CN II-XII intact;  sensation intact in upper and lower extremities b/l to light touch; Babinski down b/l. Pt somnolent, unable to fully cooperate w neuro exam    PSYCHIATRIC: A+O x 1, oriented to self only, somnolent    LYMPHATIC: No cervical LAD or tenderness    : +suprapubic tenderness and firmness T(C): 36.5 (11-04-22 @ 07:40), Max: 37 (11-04-22 @ 00:14)  HR: 89 (11-04-22 @ 07:40) (67 - 89)  BP: 126/65 (11-04-22 @ 07:40) (111/63 - 126/65)  RR: 18 (11-04-22 @ 07:40) (16 - 18)  SpO2: 97% (11-04-22 @ 07:40) (94% - 97%)    CONSTITUTIONAL: casually groomed, no apparent distress, somnolent    HEENT: normotramuatic, acephalic, PERRL and symmetric, EOMI, No conjunctival or scleral injection, non-icteric. Oral mucosa with moist membranes. No external nasal lesions; nasal mucosa not inflamed; no pharyngeal injection or exudates.               Neck: supple, symmetric and without tracheal deviation; thyroid gland not enlarged and without palpable masses    RESPIRATORY: No respiratory distress, no use of accessory muscles; CTA b/l, no wheezes, rales or rhonchi    CARDIOVASCULAR: RRRR, +S1S2,  no JVD; no peripheral edema  	Vascular: carotid pulse palpable, radial pulse palpable    GASTROINTESTINAL: +BS, Soft, no rebound, no guarding; No palpable masses; no hepatosplenomegaly; no hernia palpated;    MUSCULOSKELETAL: No digital clubbing or cyanosis; examination of the (head/neck, spine/ribs/pelvis, RUE, LUE, RLE, LLE) without misalignment,  no spinal tenderness    SKIN: No rashes or ulcers noted; no subcutaneous nodules or induration palpable    NEUROLOGIC: CN II-XII intact;  sensation intact in upper and lower extremities b/l to light touch; Babinski down b/l. Pt somnolent, ***unable to fully cooperate w neuro exam***    PSYCHIATRIC: A+O x 1, oriented to self only, somnolent    LYMPHATIC: No cervical LAD or tenderness    : +suprapubic tenderness and firmness

## 2022-11-04 NOTE — ED PROVIDER NOTE - OBJECTIVE STATEMENT
82 year old female PMH MM, RA, DM, HTN, CVA, mild dementia coming in with confusion and unable to ambulate. pt got out of rehab 3 days ago and was doing well and able to ambulate with walker and assistance but since yesterday with generalized weakness and unable to ambulate and confused. family states pt has been eating and drinking but now needing assistance to do that, as well. denies cp, sob, cough, fevers, chills, sweats, ha, abd pains, n/v/d/C, urinary complaints. states still with back pain and RLE pain since the fall prior to rehab.

## 2022-11-04 NOTE — H&P ADULT - PROBLEM SELECTOR PLAN 3
Pt takes lisinopril 5 mg and amlodipine 2.5 mg at home  -hold lisinopril for mohan  -amlodipine w parameters  -monitor as per protocol

## 2022-11-04 NOTE — H&P ADULT - PROBLEM SELECTOR PLAN 2
Delirium vs new CVA vs infection    As per daughter, pt exhibited increased confusion, slurred speech, and possibly aphasic speech.   As per daughter, when pt was d/c from [facility], was ambulatory, but had decreased ambulation function which prompted her to bring her to hospital.     -Pt has remote hx of CVA, no residual deficits on home ASA 81 mg  -CTH neg for acute changes  -on exam 11/4, pt's speech was WNL    -neurology evaluation, apprec recs  -PT evaluation, apprec recs  -f/u ucx Delirium vs new CVA vs infection    As per daughter, pt exhibited increased confusion, slurred speech, and possibly aphasic speech. Speech ssx have since resolved.  As per daughter, when pt was d/c from [facility], was ambulatory, but had decreased ambulation function which prompted her to bring her to hospital.     -Pt has remote hx of CVA, no residual deficits on home ASA 81 mg  -CTH neg for acute changes  -lactate neg, WBC WNL, afebrile  -CXR unremarkable      -PT evaluation, apprec recs  -f/u ucx, u/a Delirium vs new CVA vs infection vs progression of underlying dementia    As per daughter, pt exhibited increased confusion, slurred speech, and possibly aphasic speech. Speech ssx have since resolved.  As per daughter, when pt was d/c from [facility], was ambulatory, but had decreased ambulation function which prompted her to bring her to hospital.     -Pt has remote hx of CVA, no residual deficits on home ASA 81 mg  -CTH neg for acute changes  -lactate neg, WBC WNL, afebrile  -CXR unremarkable    -PT evaluation, apprec recs  -f/u ucx, u/a  -neuro Dr. Collier, appreciate recs

## 2022-11-05 LAB
A1C WITH ESTIMATED AVERAGE GLUCOSE RESULT: 5.8 % — HIGH (ref 4–5.6)
ALBUMIN SERPL ELPH-MCNC: 2.3 G/DL — LOW (ref 3.5–5)
ALP SERPL-CCNC: 68 U/L — SIGNIFICANT CHANGE UP (ref 40–120)
ALT FLD-CCNC: 14 U/L DA — SIGNIFICANT CHANGE UP (ref 10–60)
ANION GAP SERPL CALC-SCNC: 9 MMOL/L — SIGNIFICANT CHANGE UP (ref 5–17)
AST SERPL-CCNC: 24 U/L — SIGNIFICANT CHANGE UP (ref 10–40)
BASOPHILS # BLD AUTO: 0.01 K/UL — SIGNIFICANT CHANGE UP (ref 0–0.2)
BASOPHILS NFR BLD AUTO: 0.3 % — SIGNIFICANT CHANGE UP (ref 0–2)
BILIRUB SERPL-MCNC: 0.2 MG/DL — SIGNIFICANT CHANGE UP (ref 0.2–1.2)
BUN SERPL-MCNC: 41 MG/DL — HIGH (ref 7–18)
CALCIUM SERPL-MCNC: 9.4 MG/DL — SIGNIFICANT CHANGE UP (ref 8.4–10.5)
CHLORIDE SERPL-SCNC: 115 MMOL/L — HIGH (ref 96–108)
CO2 SERPL-SCNC: 19 MMOL/L — LOW (ref 22–31)
CREAT SERPL-MCNC: 1.83 MG/DL — HIGH (ref 0.5–1.3)
EGFR: 27 ML/MIN/1.73M2 — LOW
EOSINOPHIL # BLD AUTO: 0.11 K/UL — SIGNIFICANT CHANGE UP (ref 0–0.5)
EOSINOPHIL NFR BLD AUTO: 2.8 % — SIGNIFICANT CHANGE UP (ref 0–6)
ESTIMATED AVERAGE GLUCOSE: 120 MG/DL — HIGH (ref 68–114)
GLUCOSE BLDC GLUCOMTR-MCNC: 116 MG/DL — HIGH (ref 70–99)
GLUCOSE BLDC GLUCOMTR-MCNC: 116 MG/DL — HIGH (ref 70–99)
GLUCOSE BLDC GLUCOMTR-MCNC: 93 MG/DL — SIGNIFICANT CHANGE UP (ref 70–99)
GLUCOSE SERPL-MCNC: 115 MG/DL — HIGH (ref 70–99)
HCT VFR BLD CALC: 25.4 % — LOW (ref 34.5–45)
HGB BLD-MCNC: 8.3 G/DL — LOW (ref 11.5–15.5)
IMM GRANULOCYTES NFR BLD AUTO: 0.3 % — SIGNIFICANT CHANGE UP (ref 0–0.9)
LYMPHOCYTES # BLD AUTO: 1.88 K/UL — SIGNIFICANT CHANGE UP (ref 1–3.3)
LYMPHOCYTES # BLD AUTO: 47.4 % — HIGH (ref 13–44)
MAGNESIUM SERPL-MCNC: 2.7 MG/DL — HIGH (ref 1.6–2.6)
MCHC RBC-ENTMCNC: 28.5 PG — SIGNIFICANT CHANGE UP (ref 27–34)
MCHC RBC-ENTMCNC: 32.7 GM/DL — SIGNIFICANT CHANGE UP (ref 32–36)
MCV RBC AUTO: 87.3 FL — SIGNIFICANT CHANGE UP (ref 80–100)
MONOCYTES # BLD AUTO: 0.18 K/UL — SIGNIFICANT CHANGE UP (ref 0–0.9)
MONOCYTES NFR BLD AUTO: 4.5 % — SIGNIFICANT CHANGE UP (ref 2–14)
NEUTROPHILS # BLD AUTO: 1.78 K/UL — LOW (ref 1.8–7.4)
NEUTROPHILS NFR BLD AUTO: 44.7 % — SIGNIFICANT CHANGE UP (ref 43–77)
NRBC # BLD: 0 /100 WBCS — SIGNIFICANT CHANGE UP (ref 0–0)
PHOSPHATE SERPL-MCNC: 3.2 MG/DL — SIGNIFICANT CHANGE UP (ref 2.5–4.5)
PLATELET # BLD AUTO: 218 K/UL — SIGNIFICANT CHANGE UP (ref 150–400)
POTASSIUM SERPL-MCNC: 4.7 MMOL/L — SIGNIFICANT CHANGE UP (ref 3.5–5.3)
POTASSIUM SERPL-SCNC: 4.7 MMOL/L — SIGNIFICANT CHANGE UP (ref 3.5–5.3)
PROT SERPL-MCNC: 8 G/DL — SIGNIFICANT CHANGE UP (ref 6–8.3)
RBC # BLD: 2.91 M/UL — LOW (ref 3.8–5.2)
RBC # FLD: 16 % — HIGH (ref 10.3–14.5)
SODIUM SERPL-SCNC: 143 MMOL/L — SIGNIFICANT CHANGE UP (ref 135–145)
WBC # BLD: 3.97 K/UL — SIGNIFICANT CHANGE UP (ref 3.8–10.5)
WBC # FLD AUTO: 3.97 K/UL — SIGNIFICANT CHANGE UP (ref 3.8–10.5)

## 2022-11-05 PROCEDURE — 99233 SBSQ HOSP IP/OBS HIGH 50: CPT

## 2022-11-05 RX ADMIN — HEPARIN SODIUM 5000 UNIT(S): 5000 INJECTION INTRAVENOUS; SUBCUTANEOUS at 06:28

## 2022-11-05 RX ADMIN — LIDOCAINE 1 PATCH: 4 CREAM TOPICAL at 01:43

## 2022-11-05 RX ADMIN — LIDOCAINE 1 PATCH: 4 CREAM TOPICAL at 18:37

## 2022-11-05 RX ADMIN — ATORVASTATIN CALCIUM 20 MILLIGRAM(S): 80 TABLET, FILM COATED ORAL at 21:58

## 2022-11-05 RX ADMIN — Medication 3 MILLIGRAM(S): at 21:58

## 2022-11-05 RX ADMIN — LIDOCAINE 1 PATCH: 4 CREAM TOPICAL at 23:19

## 2022-11-05 RX ADMIN — LEVETIRACETAM 500 MILLIGRAM(S): 250 TABLET, FILM COATED ORAL at 06:36

## 2022-11-05 RX ADMIN — LIDOCAINE 1 PATCH: 4 CREAM TOPICAL at 11:51

## 2022-11-05 RX ADMIN — Medication 1 MILLIGRAM(S): at 11:50

## 2022-11-05 RX ADMIN — Medication 325 MILLIGRAM(S): at 11:50

## 2022-11-05 RX ADMIN — LEVETIRACETAM 500 MILLIGRAM(S): 250 TABLET, FILM COATED ORAL at 17:05

## 2022-11-05 RX ADMIN — POLYETHYLENE GLYCOL 3350 17 GRAM(S): 17 POWDER, FOR SOLUTION ORAL at 11:50

## 2022-11-05 RX ADMIN — HEPARIN SODIUM 5000 UNIT(S): 5000 INJECTION INTRAVENOUS; SUBCUTANEOUS at 17:04

## 2022-11-05 RX ADMIN — MEMANTINE HYDROCHLORIDE 5 MILLIGRAM(S): 10 TABLET ORAL at 21:58

## 2022-11-05 RX ADMIN — SENNA PLUS 2 TABLET(S): 8.6 TABLET ORAL at 21:58

## 2022-11-05 RX ADMIN — CALCITRIOL 0.25 MICROGRAM(S): 0.5 CAPSULE ORAL at 13:12

## 2022-11-05 RX ADMIN — PANTOPRAZOLE SODIUM 40 MILLIGRAM(S): 20 TABLET, DELAYED RELEASE ORAL at 06:36

## 2022-11-05 NOTE — EEG REPORT - NS EEG TEXT BOX
[facility]   COMPREHENSIVE EPILEPSY CENTER   REPORT OF ROUTINE VIDEO EEG     [facility]: [address] [place] [address] [address], [place] [address], Ph#: [phone]  [facility]: [id number], [place] [address], Ph#: [phone]  Office: [address] [place] [address], [facility] [address], [place] [address] Ph#: [phone]    Patient Name: ANGELO STRATTON  Age and : 82y (40)  MRN #: [id number]  Location: [address]  Referring Physician: Kiel Maxwell    Study Date: 22    _____________________________________________________________  TECHNICAL INFORMATION    Placement and Labeling of Electrodes:  The EEG was performed utilizing 20 channels referential EEG connections (coronal over temporal over parasagittal montage) using all standard 10-20 electrode placements with EKG.  Recording was at a sampling rate of 256 samples per second per channel.  Time synchronized digital video recording was done simultaneously with EEG recording.  A low light infrared camera was used for low light recording.  Hayes and seizure detection algorithms were utilized.    _____________________________________________________________  HISTORY    Patient is a 82y old  Female who presents with a chief complaint of     PERTINENT MEDICATION:  MEDICATIONS  (STANDING):  amLODIPine   Tablet 2.5 milliGRAM(s) Oral daily  atorvastatin 20 milliGRAM(s) Oral at bedtime  calcitriol   Capsule 0.25 MICROGram(s) Oral daily  dextrose 5%. 1000 milliLiter(s) (50 mL/Hr) IV Continuous <Continuous>  dextrose 50% Injectable 25 Gram(s) IV Push once  ferrous    sulfate 325 milliGRAM(s) Oral daily  folic acid 1 milliGRAM(s) Oral daily  glucagon  Injectable 1 milliGRAM(s) IntraMuscular once  heparin   Injectable 5000 Unit(s) SubCutaneous every 12 hours  insulin lispro (ADMELOG) corrective regimen sliding scale   SubCutaneous three times a day before meals  insulin lispro (ADMELOG) corrective regimen sliding scale   SubCutaneous at bedtime  levETIRAcetam 500 milliGRAM(s) Oral two times a day  lidocaine   4% Patch 1 Patch Transdermal daily  melatonin 3 milliGRAM(s) Oral at bedtime  memantine 5 milliGRAM(s) Oral at bedtime  pantoprazole    Tablet 40 milliGRAM(s) Oral before breakfast  polyethylene glycol 3350 17 Gram(s) Oral daily  senna 2 Tablet(s) Oral at bedtime    _____________________________________________________________  STUDY INTERPRETATION    Findings: The background was continuous, spontaneously variable and reactive. During wakefulness, the posterior dominant rhythm consisted of symmetric, poorly-modulated 6-7 Hz activity, with amplitude to 30 uV, that attenuated to eye opening.  Low amplitude frontal beta was noted in wakefulness.    Background Slowing:  -Continuous diffuse theta and polymorphic delta slowing.    Focal Slowing:   None was present.    Sleep Background:  Drowsiness was characterized by fragmentation, attenuation, and slowing of the background activity.      Other Non-Epileptiform Findings:  None were present.    Interictal Epileptiform Activity:   None were present.    Events:  Clinical events: None recorded.  Seizures: None recorded.    Activation Procedures:   Hyperventilation was not performed.    Photic stimulation was performed and did not elicit any abnormality.     Artifacts:  Intermittent myogenic and movement artifacts were noted.    ECG:  The heart rate on single channel ECG was predominantly between 60-80 BPM.    _____________________________________________________________  EEG SUMMARY/CLASSIFICATION  Abnormal EEG in the awake, drowsy and asleep states.  - Moderate generalized slowing.    _____________________________________________________________  EEG IMPRESSION/CLINICAL CORRELATE    Abnormal EEG study.  1. Moderate nonspecific diffuse or multifocal cerebral dysfunction.   2. No epileptiform pattern or seizure seen.    Leroy Chun MD  Neurology Attending Physician

## 2022-11-05 NOTE — PATIENT PROFILE ADULT - FALL HARM RISK - HARM RISK INTERVENTIONS

## 2022-11-05 NOTE — PROGRESS NOTE ADULT - SUBJECTIVE AND OBJECTIVE BOX
[facility]  Patient is a 82y old  Female who presents with a chief complaint of     SUBJECTIVE / OVERNIGHT EVENTS:  No acute events over night. Reports feeling well and denies any complaints at this time. Currently A/Ox2 but forgetful. Denies any fevers/chills, headache, CP, SOB, abd pain, N/V/D, constipation, or leg swelling.       MEDICATIONS  (STANDING):  amLODIPine   Tablet 2.5 milliGRAM(s) Oral daily  atorvastatin 20 milliGRAM(s) Oral at bedtime  calcitriol   Capsule 0.25 MICROGram(s) Oral daily  dextrose 5%. 1000 milliLiter(s) (50 mL/Hr) IV Continuous <Continuous>  dextrose 50% Injectable 25 Gram(s) IV Push once  ferrous    sulfate 325 milliGRAM(s) Oral daily  folic acid 1 milliGRAM(s) Oral daily  glucagon  Injectable 1 milliGRAM(s) IntraMuscular once  heparin   Injectable 5000 Unit(s) SubCutaneous every 12 hours  insulin lispro (ADMELOG) corrective regimen sliding scale   SubCutaneous three times a day before meals  insulin lispro (ADMELOG) corrective regimen sliding scale   SubCutaneous at bedtime  levETIRAcetam 500 milliGRAM(s) Oral two times a day  lidocaine   4% Patch 1 Patch Transdermal daily  melatonin 3 milliGRAM(s) Oral at bedtime  memantine 5 milliGRAM(s) Oral at bedtime  pantoprazole    Tablet 40 milliGRAM(s) Oral before breakfast  polyethylene glycol 3350 17 Gram(s) Oral daily  senna 2 Tablet(s) Oral at bedtime    MEDICATIONS  (PRN):  acetaminophen     Tablet .. 650 milliGRAM(s) Oral every 6 hours PRN Temp greater or equal to 38C (100.4F), Mild Pain (1 - 3)  dextrose Oral Gel 15 Gram(s) Oral once PRN Blood Glucose LESS THAN 70 milliGRAM(s)/deciliter  oxyCODONE    Solution 2.5 milliGRAM(s) Oral every 6 hours PRN Severe Pain (7 - 10)  traMADol 25 milliGRAM(s) Oral every 8 hours PRN Moderate Pain (4 - 6)          OBJECTIVE:  Vital Signs Last 24 Hrs  T(C): 36.8 (2022 11:42), Max: 37.1 (2022 20:15)  T(F): 98.3 (2022 11:42), Max: 98.8 (2022 20:15)  HR: 69 (2022 11:42) (67 - 71)  BP: 126/48 (2022 11:42) (106/65 - 126/48)  BP(mean): 78 (2022 20:15) (78 - 78)  RR: 14 (2022 11:42) (14 - 18)  SpO2: 99% (2022 11:42) (96% - 99%)    Parameters below as of 2022 11:42  Patient On (Oxygen Delivery Method): room air      PHYSICAL EXAM:  GENERAL: NAD, well-developed  HEAD:  Atraumatic, Normocephalic  EYES: conjunctiva and sclera clear  NECK: Supple, No JVD  CHEST/LUNG: Clear to auscultation bilaterally; No wheeze  HEART: Regular rate and rhythm; No murmurs, rubs, or gallops  ABDOMEN: Soft, Nontender, Nondistended; Bowel sounds present  EXTREMITIES:  No clubbing, cyanosis, or edema  PSYCH: AAOx2  NEUROLOGY: non-focal  SKIN: No rashes or lesions    CAPILLARY BLOOD GLUCOSE      POCT Blood Glucose.: 93 mg/dL (2022 07:39)  POCT Blood Glucose.: 108 mg/dL (2022 21:07)  POCT Blood Glucose.: 98 mg/dL (2022 17:43)    I&O's Summary    2022 07:01  -  2022 07:00  --------------------------------------------------------  IN: 0 mL / OUT: 1175 mL / NET: -1175 mL              LABS:                        8.3    3.97  )-----------( 218      ( 2022 05:25 )             25.4     11-05    143  |  115<H>  |  41<H>  ----------------------------<  115<H>  4.7   |  19<L>  |  1.83<H>    Ca    9.4      2022 05:25  Phos  3.2       Mg     2.7         TPro  8.0  /  Alb  2.3<L>  /  TBili  0.2  /  DBili  x   /  AST  24  /  ALT  14  /  AlkPhos  68            Urinalysis Basic - ( 2022 11:10 )    Color: Yellow / Appearance: Clear / S.015 / pH: x  Gluc: x / Ketone: Negative  / Bili: Negative / Urobili: Negative   Blood: x / Protein: 30 mg/dL / Nitrite: Negative   Leuk Esterase: Trace / RBC: 0-2 /HPF / WBC 3-5 /HPF   Sq Epi: x / Non Sq Epi: Occasional /HPF / Bacteria: Negative /HPF            RADIOLOGY & ADDITIONAL TESTS:

## 2022-11-06 LAB
ANION GAP SERPL CALC-SCNC: 11 MMOL/L — SIGNIFICANT CHANGE UP (ref 5–17)
BUN SERPL-MCNC: 25 MG/DL — HIGH (ref 7–18)
CALCIUM SERPL-MCNC: 9 MG/DL — SIGNIFICANT CHANGE UP (ref 8.4–10.5)
CHLORIDE SERPL-SCNC: 115 MMOL/L — HIGH (ref 96–108)
CO2 SERPL-SCNC: 17 MMOL/L — LOW (ref 22–31)
CREAT SERPL-MCNC: 1.39 MG/DL — HIGH (ref 0.5–1.3)
EGFR: 38 ML/MIN/1.73M2 — LOW
GLUCOSE BLDC GLUCOMTR-MCNC: 102 MG/DL — HIGH (ref 70–99)
GLUCOSE BLDC GLUCOMTR-MCNC: 108 MG/DL — HIGH (ref 70–99)
GLUCOSE BLDC GLUCOMTR-MCNC: 127 MG/DL — HIGH (ref 70–99)
GLUCOSE BLDC GLUCOMTR-MCNC: 97 MG/DL — SIGNIFICANT CHANGE UP (ref 70–99)
GLUCOSE SERPL-MCNC: 99 MG/DL — SIGNIFICANT CHANGE UP (ref 70–99)
HCT VFR BLD CALC: 24.6 % — LOW (ref 34.5–45)
HGB BLD-MCNC: 8.1 G/DL — LOW (ref 11.5–15.5)
MAGNESIUM SERPL-MCNC: 2.2 MG/DL — SIGNIFICANT CHANGE UP (ref 1.6–2.6)
MCHC RBC-ENTMCNC: 28.5 PG — SIGNIFICANT CHANGE UP (ref 27–34)
MCHC RBC-ENTMCNC: 32.9 GM/DL — SIGNIFICANT CHANGE UP (ref 32–36)
MCV RBC AUTO: 86.6 FL — SIGNIFICANT CHANGE UP (ref 80–100)
NRBC # BLD: 0 /100 WBCS — SIGNIFICANT CHANGE UP (ref 0–0)
PHOSPHATE SERPL-MCNC: 3.2 MG/DL — SIGNIFICANT CHANGE UP (ref 2.5–4.5)
PLATELET # BLD AUTO: 225 K/UL — SIGNIFICANT CHANGE UP (ref 150–400)
POTASSIUM SERPL-MCNC: 4.2 MMOL/L — SIGNIFICANT CHANGE UP (ref 3.5–5.3)
POTASSIUM SERPL-SCNC: 4.2 MMOL/L — SIGNIFICANT CHANGE UP (ref 3.5–5.3)
RBC # BLD: 2.84 M/UL — LOW (ref 3.8–5.2)
RBC # FLD: 15.6 % — HIGH (ref 10.3–14.5)
SODIUM SERPL-SCNC: 143 MMOL/L — SIGNIFICANT CHANGE UP (ref 135–145)
WBC # BLD: 4.14 K/UL — SIGNIFICANT CHANGE UP (ref 3.8–10.5)
WBC # FLD AUTO: 4.14 K/UL — SIGNIFICANT CHANGE UP (ref 3.8–10.5)

## 2022-11-06 PROCEDURE — 99233 SBSQ HOSP IP/OBS HIGH 50: CPT

## 2022-11-06 RX ADMIN — Medication 3 MILLIGRAM(S): at 22:27

## 2022-11-06 RX ADMIN — HEPARIN SODIUM 5000 UNIT(S): 5000 INJECTION INTRAVENOUS; SUBCUTANEOUS at 17:00

## 2022-11-06 RX ADMIN — MEMANTINE HYDROCHLORIDE 5 MILLIGRAM(S): 10 TABLET ORAL at 22:27

## 2022-11-06 RX ADMIN — PANTOPRAZOLE SODIUM 40 MILLIGRAM(S): 20 TABLET, DELAYED RELEASE ORAL at 07:48

## 2022-11-06 RX ADMIN — LEVETIRACETAM 500 MILLIGRAM(S): 250 TABLET, FILM COATED ORAL at 05:23

## 2022-11-06 RX ADMIN — Medication 325 MILLIGRAM(S): at 12:57

## 2022-11-06 RX ADMIN — Medication 1 MILLIGRAM(S): at 12:57

## 2022-11-06 RX ADMIN — LEVETIRACETAM 500 MILLIGRAM(S): 250 TABLET, FILM COATED ORAL at 17:00

## 2022-11-06 RX ADMIN — HEPARIN SODIUM 5000 UNIT(S): 5000 INJECTION INTRAVENOUS; SUBCUTANEOUS at 05:28

## 2022-11-06 RX ADMIN — LIDOCAINE 1 PATCH: 4 CREAM TOPICAL at 12:57

## 2022-11-06 RX ADMIN — ATORVASTATIN CALCIUM 20 MILLIGRAM(S): 80 TABLET, FILM COATED ORAL at 22:27

## 2022-11-06 RX ADMIN — CALCITRIOL 0.25 MICROGRAM(S): 0.5 CAPSULE ORAL at 17:01

## 2022-11-06 RX ADMIN — LIDOCAINE 1 PATCH: 4 CREAM TOPICAL at 20:00

## 2022-11-06 NOTE — PROGRESS NOTE ADULT - SUBJECTIVE AND OBJECTIVE BOX
[facility]  Patient is a 82y old  Female who presents with a chief complaint of Acute encephelopathy possibly toxic metabolic in the setting of KAJAL (05 Nov 2022 14:16)      SUBJECTIVE / OVERNIGHT EVENTS:  No acute events over night. Denies any fevers/chills, headache, CP, SOB, abd pain, N/V/D, constipation, or leg swelling.       MEDICATIONS  (STANDING):  amLODIPine   Tablet 2.5 milliGRAM(s) Oral daily  atorvastatin 20 milliGRAM(s) Oral at bedtime  calcitriol   Capsule 0.25 MICROGram(s) Oral daily  dextrose 5%. 1000 milliLiter(s) (50 mL/Hr) IV Continuous <Continuous>  dextrose 50% Injectable 25 Gram(s) IV Push once  ferrous    sulfate 325 milliGRAM(s) Oral daily  folic acid 1 milliGRAM(s) Oral daily  glucagon  Injectable 1 milliGRAM(s) IntraMuscular once  heparin   Injectable 5000 Unit(s) SubCutaneous every 12 hours  insulin lispro (ADMELOG) corrective regimen sliding scale   SubCutaneous three times a day before meals  insulin lispro (ADMELOG) corrective regimen sliding scale   SubCutaneous at bedtime  levETIRAcetam 500 milliGRAM(s) Oral two times a day  lidocaine   4% Patch 1 Patch Transdermal daily  melatonin 3 milliGRAM(s) Oral at bedtime  memantine 5 milliGRAM(s) Oral at bedtime  pantoprazole    Tablet 40 milliGRAM(s) Oral before breakfast  polyethylene glycol 3350 17 Gram(s) Oral daily  senna 2 Tablet(s) Oral at bedtime    MEDICATIONS  (PRN):  acetaminophen     Tablet .. 650 milliGRAM(s) Oral every 6 hours PRN Temp greater or equal to 38C (100.4F), Mild Pain (1 - 3)  dextrose Oral Gel 15 Gram(s) Oral once PRN Blood Glucose LESS THAN 70 milliGRAM(s)/deciliter  oxyCODONE    Solution 2.5 milliGRAM(s) Oral every 6 hours PRN Severe Pain (7 - 10)  traMADol 25 milliGRAM(s) Oral every 8 hours PRN Moderate Pain (4 - 6)          OBJECTIVE:  Vital Signs Last 24 Hrs  T(C): 37.1 (06 Nov 2022 11:45), Max: 37.7 (06 Nov 2022 05:00)  T(F): 98.8 (06 Nov 2022 11:45), Max: 99.8 (06 Nov 2022 05:00)  HR: 69 (06 Nov 2022 11:45) (69 - 73)  BP: 135/57 (06 Nov 2022 11:45) (129/54 - 136/57)  BP(mean): --  RR: 14 (06 Nov 2022 11:45) (14 - 17)  SpO2: 97% (06 Nov 2022 11:45) (97% - 99%)    Parameters below as of 06 Nov 2022 11:45  Patient On (Oxygen Delivery Method): room air      PHYSICAL EXAM:  GENERAL: NAD, well-developed  HEAD:  Atraumatic, Normocephalic  EYES: conjunctiva and sclera clear  NECK: Supple, No JVD  CHEST/LUNG: Clear to auscultation bilaterally; No wheeze  HEART: Regular rate and rhythm; No murmurs, rubs, or gallops  ABDOMEN: Soft, Nontender, Nondistended; Bowel sounds present  EXTREMITIES:  No clubbing, cyanosis, or edema  PSYCH: AAOx2  NEUROLOGY: non-focal  SKIN: No rashes or lesions      CAPILLARY BLOOD GLUCOSE      POCT Blood Glucose.: 97 mg/dL (06 Nov 2022 12:08)  POCT Blood Glucose.: 102 mg/dL (06 Nov 2022 07:40)  POCT Blood Glucose.: 116 mg/dL (05 Nov 2022 21:31)  POCT Blood Glucose.: 116 mg/dL (05 Nov 2022 16:35)    I&O's Summary    05 Nov 2022 08:01  -  06 Nov 2022 07:00  --------------------------------------------------------  IN: 0 mL / OUT: 800 mL / NET: -800 mL    06 Nov 2022 07:01  -  06 Nov 2022 14:20  --------------------------------------------------------  IN: 0 mL / OUT: 875 mL / NET: -875 mL              LABS:                        8.1    4.14  )-----------( 225      ( 06 Nov 2022 08:20 )             24.6     11-06    143  |  115<H>  |  25<H>  ----------------------------<  99  4.2   |  17<L>  |  1.39<H>    Ca    9.0      06 Nov 2022 08:20  Phos  3.2     11-06  Mg     2.2     11-06    TPro  8.0  /  Alb  2.3<L>  /  TBili  0.2  /  DBili  x   /  AST  24  /  ALT  14  /  AlkPhos  68  11-05                Culture - Urine (collected 04 Nov 2022 11:10)  Source: Catheterized Catheterized  Preliminary Report (06 Nov 2022 10:37):    10,000 - 49,000 CFU/mL Gram Negative Rods        RADIOLOGY & ADDITIONAL TESTS:

## 2022-11-06 NOTE — PHYSICAL THERAPY INITIAL EVALUATION ADULT - DIAGNOSIS, PT EVAL
Asnis
Patient presented with pain and weakness on L LE, Impairments in sitting balance and was unable to perform OOB transfers at this time

## 2022-11-07 DIAGNOSIS — G92.8 OTHER TOXIC ENCEPHALOPATHY: ICD-10-CM

## 2022-11-07 LAB
ANION GAP SERPL CALC-SCNC: 8 MMOL/L — SIGNIFICANT CHANGE UP (ref 5–17)
BUN SERPL-MCNC: 22 MG/DL — HIGH (ref 7–18)
CA-I BLD-SCNC: 4.9 MG/DL — SIGNIFICANT CHANGE UP (ref 4.5–5.6)
CALCIUM SERPL-MCNC: 9.3 MG/DL — SIGNIFICANT CHANGE UP (ref 8.4–10.5)
CHLORIDE SERPL-SCNC: 114 MMOL/L — HIGH (ref 96–108)
CO2 SERPL-SCNC: 21 MMOL/L — LOW (ref 22–31)
CREAT SERPL-MCNC: 1.48 MG/DL — HIGH (ref 0.5–1.3)
EGFR: 35 ML/MIN/1.73M2 — LOW
GLUCOSE BLDC GLUCOMTR-MCNC: 102 MG/DL — HIGH (ref 70–99)
GLUCOSE BLDC GLUCOMTR-MCNC: 111 MG/DL — HIGH (ref 70–99)
GLUCOSE BLDC GLUCOMTR-MCNC: 127 MG/DL — HIGH (ref 70–99)
GLUCOSE BLDC GLUCOMTR-MCNC: 130 MG/DL — HIGH (ref 70–99)
GLUCOSE SERPL-MCNC: 108 MG/DL — HIGH (ref 70–99)
HCT VFR BLD CALC: 25.8 % — LOW (ref 34.5–45)
HGB BLD-MCNC: 8.3 G/DL — LOW (ref 11.5–15.5)
MAGNESIUM SERPL-MCNC: 2.3 MG/DL — SIGNIFICANT CHANGE UP (ref 1.6–2.6)
MCHC RBC-ENTMCNC: 28.1 PG — SIGNIFICANT CHANGE UP (ref 27–34)
MCHC RBC-ENTMCNC: 32.2 GM/DL — SIGNIFICANT CHANGE UP (ref 32–36)
MCV RBC AUTO: 87.5 FL — SIGNIFICANT CHANGE UP (ref 80–100)
NRBC # BLD: 0 /100 WBCS — SIGNIFICANT CHANGE UP (ref 0–0)
PHOSPHATE SERPL-MCNC: 3.7 MG/DL — SIGNIFICANT CHANGE UP (ref 2.5–4.5)
PLATELET # BLD AUTO: 223 K/UL — SIGNIFICANT CHANGE UP (ref 150–400)
POTASSIUM SERPL-MCNC: 4.7 MMOL/L — SIGNIFICANT CHANGE UP (ref 3.5–5.3)
POTASSIUM SERPL-SCNC: 4.7 MMOL/L — SIGNIFICANT CHANGE UP (ref 3.5–5.3)
RBC # BLD: 2.95 M/UL — LOW (ref 3.8–5.2)
RBC # FLD: 15.7 % — HIGH (ref 10.3–14.5)
SODIUM SERPL-SCNC: 143 MMOL/L — SIGNIFICANT CHANGE UP (ref 135–145)
WBC # BLD: 4.79 K/UL — SIGNIFICANT CHANGE UP (ref 3.8–10.5)
WBC # FLD AUTO: 4.79 K/UL — SIGNIFICANT CHANGE UP (ref 3.8–10.5)

## 2022-11-07 PROCEDURE — 99232 SBSQ HOSP IP/OBS MODERATE 35: CPT | Mod: FS

## 2022-11-07 RX ADMIN — PANTOPRAZOLE SODIUM 40 MILLIGRAM(S): 20 TABLET, DELAYED RELEASE ORAL at 05:39

## 2022-11-07 RX ADMIN — LEVETIRACETAM 500 MILLIGRAM(S): 250 TABLET, FILM COATED ORAL at 17:36

## 2022-11-07 RX ADMIN — MEMANTINE HYDROCHLORIDE 5 MILLIGRAM(S): 10 TABLET ORAL at 22:39

## 2022-11-07 RX ADMIN — ATORVASTATIN CALCIUM 20 MILLIGRAM(S): 80 TABLET, FILM COATED ORAL at 22:39

## 2022-11-07 RX ADMIN — Medication 325 MILLIGRAM(S): at 11:58

## 2022-11-07 RX ADMIN — LIDOCAINE 1 PATCH: 4 CREAM TOPICAL at 19:43

## 2022-11-07 RX ADMIN — Medication 650 MILLIGRAM(S): at 22:44

## 2022-11-07 RX ADMIN — LIDOCAINE 1 PATCH: 4 CREAM TOPICAL at 23:43

## 2022-11-07 RX ADMIN — HEPARIN SODIUM 5000 UNIT(S): 5000 INJECTION INTRAVENOUS; SUBCUTANEOUS at 05:39

## 2022-11-07 RX ADMIN — Medication 1 MILLIGRAM(S): at 11:58

## 2022-11-07 RX ADMIN — LIDOCAINE 1 PATCH: 4 CREAM TOPICAL at 00:06

## 2022-11-07 RX ADMIN — CALCITRIOL 0.25 MICROGRAM(S): 0.5 CAPSULE ORAL at 15:26

## 2022-11-07 RX ADMIN — SENNA PLUS 2 TABLET(S): 8.6 TABLET ORAL at 22:38

## 2022-11-07 RX ADMIN — LIDOCAINE 1 PATCH: 4 CREAM TOPICAL at 11:58

## 2022-11-07 RX ADMIN — Medication 3 MILLIGRAM(S): at 22:41

## 2022-11-07 RX ADMIN — HEPARIN SODIUM 5000 UNIT(S): 5000 INJECTION INTRAVENOUS; SUBCUTANEOUS at 17:36

## 2022-11-07 RX ADMIN — POLYETHYLENE GLYCOL 3350 17 GRAM(S): 17 POWDER, FOR SOLUTION ORAL at 11:58

## 2022-11-07 RX ADMIN — Medication 650 MILLIGRAM(S): at 23:43

## 2022-11-07 RX ADMIN — LEVETIRACETAM 500 MILLIGRAM(S): 250 TABLET, FILM COATED ORAL at 05:40

## 2022-11-07 NOTE — PROGRESS NOTE ADULT - PROBLEM SELECTOR PLAN 2
- P/w SCr on admission 2.61, baseline approx 1.8 as per prev admission  - Post-renal 2/2 urinary retention  - S/p IVF  - (+) Zapata will need TOV in AM

## 2022-11-07 NOTE — PROGRESS NOTE ADULT - SUBJECTIVE AND OBJECTIVE BOX
++++++++++++++++++++NOTE INCOMPLETE++++++++++++++++++++++++    NP Note discussed with  primary attending    Patient is a 82y old  Female who presents with a chief complaint of Acute encephelopathy possibly toxic metabolic in the setting of KAJAL (05 Nov 2022 14:16)      INTERVAL HPI/OVERNIGHT EVENTS: Pt reports to "feeling much better."  Denies new complaints or concerns.      MEDICATIONS  (STANDING):  amLODIPine   Tablet 2.5 milliGRAM(s) Oral daily  atorvastatin 20 milliGRAM(s) Oral at bedtime  calcitriol   Capsule 0.25 MICROGram(s) Oral daily  dextrose 5%. 1000 milliLiter(s) (50 mL/Hr) IV Continuous <Continuous>  dextrose 50% Injectable 25 Gram(s) IV Push once  ferrous    sulfate 325 milliGRAM(s) Oral daily  folic acid 1 milliGRAM(s) Oral daily  glucagon  Injectable 1 milliGRAM(s) IntraMuscular once  heparin   Injectable 5000 Unit(s) SubCutaneous every 12 hours  insulin lispro (ADMELOG) corrective regimen sliding scale   SubCutaneous three times a day before meals  insulin lispro (ADMELOG) corrective regimen sliding scale   SubCutaneous at bedtime  levETIRAcetam 500 milliGRAM(s) Oral two times a day  lidocaine   4% Patch 1 Patch Transdermal daily  melatonin 3 milliGRAM(s) Oral at bedtime  memantine 5 milliGRAM(s) Oral at bedtime  pantoprazole    Tablet 40 milliGRAM(s) Oral before breakfast  polyethylene glycol 3350 17 Gram(s) Oral daily  senna 2 Tablet(s) Oral at bedtime    MEDICATIONS  (PRN):  acetaminophen     Tablet .. 650 milliGRAM(s) Oral every 6 hours PRN Temp greater or equal to 38C (100.4F), Mild Pain (1 - 3)  dextrose Oral Gel 15 Gram(s) Oral once PRN Blood Glucose LESS THAN 70 milliGRAM(s)/deciliter  oxyCODONE    Solution 2.5 milliGRAM(s) Oral every 6 hours PRN Severe Pain (7 - 10)  traMADol 25 milliGRAM(s) Oral every 8 hours PRN Moderate Pain (4 - 6)      __________________________________________________  REVIEW OF SYSTEMS:    CONSTITUTIONAL: No fever  EYES: No acute visual disturbances  NECK: No pain or stiffness  RESPIRATORY: No cough; No shortness of breath  CARDIOVASCULAR: No chest pain, no palpitations  GASTROINTESTINAL: No pain. No nausea or vomiting.  No diarrhea   NEUROLOGICAL: No headache or numbness, no tremors  MUSCULOSKELETAL: No joint pain, no muscle pain  GENITOURINARY: No dysuria, no frequency, no hesitancy  PSYCHIATRY: No depression , no anxiety  ALL OTHER  ROS negative        Vital Signs Last 24 Hrs  T(C): 36.6 (07 Nov 2022 12:30), Max: 37.1 (06 Nov 2022 20:54)  T(F): 97.8 (07 Nov 2022 12:30), Max: 98.8 (06 Nov 2022 20:54)  HR: 73 (07 Nov 2022 12:30) (65 - 74)  BP: 129/52 (07 Nov 2022 12:30) (120/48 - 129/52)  BP(mean): --  RR: 20 (07 Nov 2022 12:30) (20 - 20)  SpO2: 100% (07 Nov 2022 12:30) (100% - 100%)    Parameters below as of 07 Nov 2022 12:30  Patient On (Oxygen Delivery Method): room air        ________________________________________________  PHYSICAL EXAM:  GENERAL: NAD  HEENT: Normocephalic;  conjunctivae and sclerae clear; moist mucous membranes   NECK : Supple  CHEST/LUNG: Clear to auscultation bilaterally with good air entry   HEART: S1 S2  regular; no murmurs, gallops or rubs  ABDOMEN: Soft, Nontender, Nondistended; Bowel sounds present x 4 quad  EXTREMITIES: No cyanosis; no edema; no calf tenderness  SKIN: Warm and dry; no rash  NERVOUS SYSTEM:  Awake and alert; Oriented to place, person and time; no new deficits    _________________________________________________  LABS:                        8.3    4.79  )-----------( 223      ( 07 Nov 2022 06:34 )             25.8     11-07    143  |  114<H>  |  22<H>  ----------------------------<  108<H>  4.7   |  21<L>  |  1.48<H>    Ca    9.3      07 Nov 2022 06:34  Phos  3.7     11-07  Mg     2.3     11-07          CAPILLARY BLOOD GLUCOSE      POCT Blood Glucose.: 127 mg/dL (07 Nov 2022 11:45)  POCT Blood Glucose.: 102 mg/dL (07 Nov 2022 08:09)  POCT Blood Glucose.: 127 mg/dL (06 Nov 2022 22:30)  POCT Blood Glucose.: 108 mg/dL (06 Nov 2022 16:57)        RADIOLOGY & ADDITIONAL TESTS:    Imaging Personally Reviewed:  YES/NO    Consultant(s) Notes Reviewed:   YES/ No    Care Discussed with Consultants :     Plan of care was discussed with patient and /or primary care giver; all questions and concerns were addressed and care was aligned with patient's wishes.     NP Note discussed with  primary attending    Patient is a 82y old  Female who presents with a chief complaint of Acute encephelopathy possibly toxic metabolic in the setting of KAJAL (05 Nov 2022 14:16)      INTERVAL HPI/OVERNIGHT EVENTS: Pt reports to "feeling much better."  Denies new complaints or concerns.      MEDICATIONS  (STANDING):  amLODIPine   Tablet 2.5 milliGRAM(s) Oral daily  atorvastatin 20 milliGRAM(s) Oral at bedtime  calcitriol   Capsule 0.25 MICROGram(s) Oral daily  dextrose 5%. 1000 milliLiter(s) (50 mL/Hr) IV Continuous <Continuous>  dextrose 50% Injectable 25 Gram(s) IV Push once  ferrous    sulfate 325 milliGRAM(s) Oral daily  folic acid 1 milliGRAM(s) Oral daily  glucagon  Injectable 1 milliGRAM(s) IntraMuscular once  heparin   Injectable 5000 Unit(s) SubCutaneous every 12 hours  insulin lispro (ADMELOG) corrective regimen sliding scale   SubCutaneous three times a day before meals  insulin lispro (ADMELOG) corrective regimen sliding scale   SubCutaneous at bedtime  levETIRAcetam 500 milliGRAM(s) Oral two times a day  lidocaine   4% Patch 1 Patch Transdermal daily  melatonin 3 milliGRAM(s) Oral at bedtime  memantine 5 milliGRAM(s) Oral at bedtime  pantoprazole    Tablet 40 milliGRAM(s) Oral before breakfast  polyethylene glycol 3350 17 Gram(s) Oral daily  senna 2 Tablet(s) Oral at bedtime    MEDICATIONS  (PRN):  acetaminophen     Tablet .. 650 milliGRAM(s) Oral every 6 hours PRN Temp greater or equal to 38C (100.4F), Mild Pain (1 - 3)  dextrose Oral Gel 15 Gram(s) Oral once PRN Blood Glucose LESS THAN 70 milliGRAM(s)/deciliter  oxyCODONE    Solution 2.5 milliGRAM(s) Oral every 6 hours PRN Severe Pain (7 - 10)  traMADol 25 milliGRAM(s) Oral every 8 hours PRN Moderate Pain (4 - 6)      __________________________________________________  REVIEW OF SYSTEMS: Limited exam in demented pt     CONSTITUTIONAL: No fever  EYES: No acute visual disturbances  NECK: No pain or stiffness  RESPIRATORY: No cough; No shortness of breath  CARDIOVASCULAR: No chest pain, no palpitations  GASTROINTESTINAL: No pain. No nausea or vomiting.  No diarrhea   NEUROLOGICAL: No headache or numbness, no tremors  MUSCULOSKELETAL: No joint pain, no muscle pain  GENITOURINARY: No dysuria, no frequency, no hesitancy  PSYCHIATRY: No depression , no anxiety  ALL OTHER  ROS negative        Vital Signs Last 24 Hrs  T(C): 36.6 (07 Nov 2022 12:30), Max: 37.1 (06 Nov 2022 20:54)  T(F): 97.8 (07 Nov 2022 12:30), Max: 98.8 (06 Nov 2022 20:54)  HR: 73 (07 Nov 2022 12:30) (65 - 74)  BP: 129/52 (07 Nov 2022 12:30) (120/48 - 129/52)  RR: 20 (07 Nov 2022 12:30) (20 - 20)  SpO2: 100% (07 Nov 2022 12:30) (100% - 100%)    Parameters below as of 07 Nov 2022 12:30  Patient On (Oxygen Delivery Method): room air        ________________________________________________  PHYSICAL EXAM:  GENERAL: NAD  HEENT: Normocephalic;  conjunctivae and sclerae clear; moist mucous membranes   NECK : Supple  CHEST/LUNG: Clear to auscultation bilaterally with good air entry   HEART: S1 S2  regular; no murmurs, gallops or rubs  ABDOMEN: Soft, Nontender, Nondistended; Bowel sounds present x 4 quad.  (+) Zapata.    EXTREMITIES: No cyanosis; no edema; no calf tenderness  SKIN: Warm and dry; no rash  NERVOUS SYSTEM:  Awake and alert; Oriented to place & person, not time; no new deficits    _________________________________________________  LABS:                        8.3    4.79  )-----------( 223      ( 07 Nov 2022 06:34 )             25.8     11-07    143  |  114<H>  |  22<H>  ----------------------------<  108<H>  4.7   |  21<L>  |  1.48<H>    Ca    9.3      07 Nov 2022 06:34  Phos  3.7     11-07  Mg     2.3     11-07          CAPILLARY BLOOD GLUCOSE      POCT Blood Glucose.: 127 mg/dL (07 Nov 2022 11:45)  POCT Blood Glucose.: 102 mg/dL (07 Nov 2022 08:09)  POCT Blood Glucose.: 127 mg/dL (06 Nov 2022 22:30)  POCT Blood Glucose.: 108 mg/dL (06 Nov 2022 16:57)        RADIOLOGY & ADDITIONAL TESTS:    Imaging Personally Reviewed:  YES    Consultant(s) Notes Reviewed:   YES    Care Discussed with Consultants :     Plan of care was discussed with patient and /or primary care giver; all questions and concerns were addressed and care was aligned with patient's wishes.

## 2022-11-07 NOTE — CHART NOTE - NSCHARTNOTEFT_GEN_A_CORE
NP rec'vd  pt's dory-Rocky # [phone] NP rec'vd msg pt's son-Rocky # [phone] called for update.  NP called son back and he conference called his sister-Viridiana.  Son and dtr informed NP that they spoke w/ Attending and all questions were answered.  NP verbalized understanding.

## 2022-11-08 DIAGNOSIS — Z02.9 ENCOUNTER FOR ADMINISTRATIVE EXAMINATIONS, UNSPECIFIED: ICD-10-CM

## 2022-11-08 LAB
-  AMIKACIN: SIGNIFICANT CHANGE UP
-  AMOXICILLIN/CLAVULANIC ACID: SIGNIFICANT CHANGE UP
-  AMPICILLIN/SULBACTAM: SIGNIFICANT CHANGE UP
-  AMPICILLIN: SIGNIFICANT CHANGE UP
-  AZTREONAM: SIGNIFICANT CHANGE UP
-  CEFAZOLIN: SIGNIFICANT CHANGE UP
-  CEFEPIME: SIGNIFICANT CHANGE UP
-  CEFOXITIN: SIGNIFICANT CHANGE UP
-  CEFTRIAXONE: SIGNIFICANT CHANGE UP
-  CIPROFLOXACIN: SIGNIFICANT CHANGE UP
-  ERTAPENEM: SIGNIFICANT CHANGE UP
-  GENTAMICIN: SIGNIFICANT CHANGE UP
-  IMIPENEM: SIGNIFICANT CHANGE UP
-  LEVOFLOXACIN: SIGNIFICANT CHANGE UP
-  MEROPENEM: SIGNIFICANT CHANGE UP
-  NITROFURANTOIN: SIGNIFICANT CHANGE UP
-  PIPERACILLIN/TAZOBACTAM: SIGNIFICANT CHANGE UP
-  TOBRAMYCIN: SIGNIFICANT CHANGE UP
-  TRIMETHOPRIM/SULFAMETHOXAZOLE: SIGNIFICANT CHANGE UP
ANION GAP SERPL CALC-SCNC: 4 MMOL/L — LOW (ref 5–17)
BUN SERPL-MCNC: 22 MG/DL — HIGH (ref 7–18)
CALCIUM SERPL-MCNC: 9.4 MG/DL — SIGNIFICANT CHANGE UP (ref 8.4–10.5)
CO2 SERPL-SCNC: 24 MMOL/L — SIGNIFICANT CHANGE UP (ref 22–31)
CULTURE RESULTS: SIGNIFICANT CHANGE UP
EGFR: 38 ML/MIN/1.73M2 — LOW
GLUCOSE BLDC GLUCOMTR-MCNC: 104 MG/DL — HIGH (ref 70–99)
GLUCOSE BLDC GLUCOMTR-MCNC: 105 MG/DL — HIGH (ref 70–99)
GLUCOSE BLDC GLUCOMTR-MCNC: 111 MG/DL — HIGH (ref 70–99)
GLUCOSE BLDC GLUCOMTR-MCNC: 150 MG/DL — HIGH (ref 70–99)
HCT VFR BLD CALC: 29.6 % — LOW (ref 34.5–45)
HGB BLD-MCNC: 9.4 G/DL — LOW (ref 11.5–15.5)
LEVETIRACETAM SERPL-MCNC: 44.3 UG/ML — HIGH (ref 10–40)
MCV RBC AUTO: 88.1 FL — SIGNIFICANT CHANGE UP (ref 80–100)
METHOD TYPE: SIGNIFICANT CHANGE UP
NRBC # BLD: 0 /100 WBCS — SIGNIFICANT CHANGE UP (ref 0–0)
ORGANISM # SPEC MICROSCOPIC CNT: SIGNIFICANT CHANGE UP
ORGANISM # SPEC MICROSCOPIC CNT: SIGNIFICANT CHANGE UP
POTASSIUM SERPL-MCNC: 4.5 MMOL/L — SIGNIFICANT CHANGE UP (ref 3.5–5.3)
POTASSIUM SERPL-SCNC: 4.5 MMOL/L — SIGNIFICANT CHANGE UP (ref 3.5–5.3)
RBC # BLD: 3.36 M/UL — LOW (ref 3.8–5.2)
SPECIMEN SOURCE: SIGNIFICANT CHANGE UP
WBC # BLD: 4.28 K/UL — SIGNIFICANT CHANGE UP (ref 3.8–10.5)
WBC # FLD AUTO: 4.28 K/UL — SIGNIFICANT CHANGE UP (ref 3.8–10.5)

## 2022-11-08 PROCEDURE — 99232 SBSQ HOSP IP/OBS MODERATE 35: CPT | Mod: FS

## 2022-11-08 RX ORDER — OXYCODONE AND ACETAMINOPHEN 5; 325 MG/1; MG/1
1 TABLET ORAL EVERY 6 HOURS
Refills: 0 | Status: DISCONTINUED | OUTPATIENT
Start: 2022-11-08 | End: 2022-11-09

## 2022-11-08 RX ADMIN — SENNA PLUS 2 TABLET(S): 8.6 TABLET ORAL at 21:30

## 2022-11-08 RX ADMIN — HEPARIN SODIUM 5000 UNIT(S): 5000 INJECTION INTRAVENOUS; SUBCUTANEOUS at 17:24

## 2022-11-08 RX ADMIN — LEVETIRACETAM 500 MILLIGRAM(S): 250 TABLET, FILM COATED ORAL at 06:32

## 2022-11-08 RX ADMIN — Medication 650 MILLIGRAM(S): at 08:22

## 2022-11-08 RX ADMIN — LEVETIRACETAM 500 MILLIGRAM(S): 250 TABLET, FILM COATED ORAL at 17:24

## 2022-11-08 RX ADMIN — Medication 650 MILLIGRAM(S): at 22:12

## 2022-11-08 RX ADMIN — Medication 3 MILLIGRAM(S): at 21:30

## 2022-11-08 RX ADMIN — Medication 650 MILLIGRAM(S): at 09:30

## 2022-11-08 RX ADMIN — LIDOCAINE 1 PATCH: 4 CREAM TOPICAL at 20:15

## 2022-11-08 RX ADMIN — MEMANTINE HYDROCHLORIDE 5 MILLIGRAM(S): 10 TABLET ORAL at 21:30

## 2022-11-08 RX ADMIN — OXYCODONE AND ACETAMINOPHEN 1 TABLET(S): 5; 325 TABLET ORAL at 12:30

## 2022-11-08 RX ADMIN — OXYCODONE AND ACETAMINOPHEN 1 TABLET(S): 5; 325 TABLET ORAL at 11:22

## 2022-11-08 RX ADMIN — PANTOPRAZOLE SODIUM 40 MILLIGRAM(S): 20 TABLET, DELAYED RELEASE ORAL at 06:32

## 2022-11-08 RX ADMIN — Medication 650 MILLIGRAM(S): at 21:29

## 2022-11-08 RX ADMIN — HEPARIN SODIUM 5000 UNIT(S): 5000 INJECTION INTRAVENOUS; SUBCUTANEOUS at 06:31

## 2022-11-08 RX ADMIN — ATORVASTATIN CALCIUM 20 MILLIGRAM(S): 80 TABLET, FILM COATED ORAL at 21:29

## 2022-11-08 NOTE — PROGRESS NOTE ADULT - PROBLEM SELECTOR PLAN 1
- CTH neg for acute changes  - Lactate, UA & Ucx neg, WBC WNL, afebrile  - CXR unremarkable  - PT recommended DEV   - Neuro-Dr. Collier consulted, appreciated
- CTH neg for acute changes  - Lactate, UA & Ucx neg, WBC WNL, afebrile  - CXR unremarkable  - PT recommended DEV   - Neuro-Dr. Collier consulted, appreciated

## 2022-11-08 NOTE — PROGRESS NOTE ADULT - PROBLEM SELECTOR PLAN 6
- Pt previously on home insulin, no longer takes it. Does not appear to be on home antihyperglycemics.  - FS stable   - Holding home Gabapentin 200 mg BID d/t KAJAL  - Continue to monitor FS
- Pt previously on home insulin, no longer takes it. Does not appear to be on home antihyperglycemics.  - FS stable   - Holding home Gabapentin 200 mg BID d/t KAJAL  - Continue to monitor FS

## 2022-11-08 NOTE — PROGRESS NOTE ADULT - PROBLEM SELECTOR PLAN 2
- P/w SCr on admission 2.61, baseline approx 1.8 as per prev admission  - Post-renal 2/2 urinary retention  - S/p IVF  - (+) Zapata will need TOV in AM - P/w SCr on admission 2.61, baseline approx 1.8 as per prev admission  - Post-renal 2/2 urinary retention  - S/p IVF  - (+) Zapata-TOV in progress.  Monitor for void.

## 2022-11-08 NOTE — PROGRESS NOTE ADULT - PROBLEM SELECTOR PLAN 11
- During dc planning there's an issue-pt's on Pomalyst every other day.  Per CM following, rehab ? if pt will continue on med.  Apparently, medication costs 20K.  Attending to discuss medication w/ family-? can med be held while in rehab.

## 2022-11-08 NOTE — PROGRESS NOTE ADULT - PROBLEM SELECTOR PLAN 9
H/o HTN on Lisinopril 5 mg and Amlodipine 2.5 mg at home  - Holding Lisinopril d/t KAJAL   - C/w Amlodipine  - Continue to monitor BP
H/o HTN on Lisinopril 5 mg and Amlodipine 2.5 mg at home  - Holding Lisinopril d/t KAJAL   - C/w Amlodipine  - Continue to monitor BP

## 2022-11-08 NOTE — PROGRESS NOTE ADULT - ASSESSMENT
83 yo F w PMH multiple myeloma (on pomalyst), RA, dementia?, HTN, HLD, DM (not on medications), remote CVA with no residual deficits presenting with confusion and difficulty ambulating. Recently discharged from SNF to home. Admit for toxic metabolic encephalopathy in the setting of KAJAL on CKD, Delirium on dementia and ambulatory dysfunction    #Toxic metabolic encephalopathy  #KAJAL on CKD, post-renal 2/2 urinary retention  #Progressive dementia   #Ambulatory dysfunction  #HTN  #HLD  #remote CVA with no residual deficits  #Multiple myeloma   #RA  #HTN  #HLD  CTH neg, EEG neg, Urinary retention, UA neg  - Monitor BMP s/p IVF, improving - continue to monitor  - Zapata placed, TOV prior to DC  - f/u Ucx  - continue home meds  - PT  - CM consult  
83 yo F w PMH multiple myeloma (on pomalyst), RA, dementia?, HTN, HLD, DM (not on medications), remote CVA with no residual deficits presenting with confusion and difficulty ambulating. Recently discharged from SNF to home. Admit for toxic metabolic encephalopathy in the setting of KAJAL on CKD, Delirium on dementia and ambulatory dysfunction    #Toxic metabolic encephalopathy  #KAJAL on CKD, post-renal 2/2 urinary retention  #Progressive dementia   #Ambulatory dysfunction  #HTN  #HLD  #remote CVA with no residual deficits  #Multiple myeloma   #RA  #HTN  #HLD  CTH neg, EEG neg, Urinary retention, UA neg  - Monitor BMP s/p IVF, improved - continue to monitor  - Zapata placed, TOV prior to DC  - f/u Ucx  - continue home meds  - PT  - CM consult
82 year old Female w/ PMH of multiple myeloma (on pomalyst), RA, dementia?, HTN, HLD, DM (not on medications), remote CVA with no residual deficits.   Presented with confusion and difficulty ambulating.  Recently discharged from SNF to home.  Admitted for Toxic Metabolic Encephalopathy in the setting of KAJAL on CKD, Delirium on dementia and ambulatory dysfunction.    
82 year old Female w/ PMH of multiple myeloma (on pomalyst), RA, dementia?, HTN, HLD, DM (not on medications), remote CVA with no residual deficits.   Presented with confusion and difficulty ambulating.  Recently discharged from SNF to home.  Admitted for Toxic Metabolic Encephalopathy in the setting of KAJAL on CKD, Delirium on dementia and ambulatory dysfunction.

## 2022-11-08 NOTE — PROGRESS NOTE ADULT - PROBLEM SELECTOR PLAN 8
- Home Pomalyst every other day, restarted 3 days ago once pt back home from [facility]  - Holding home Pomalyst d/t KAJAL
- Home Pomalyst every other day, restarted 3 days ago once pt back home from [facility]  - Holding home Pomalyst d/t KAJAL

## 2022-11-08 NOTE — PROGRESS NOTE ADULT - SUBJECTIVE AND OBJECTIVE BOX
NP Note discussed with  primary attending    Patient is a 82y old  Female who presents with a chief complaint of Acute encephelopathy possibly toxic metabolic in the setting of KAJAL (05 Nov 2022 14:16)      INTERVAL HPI/OVERNIGHT EVENTS: Reports yesterday she had "really bad" leg pain, pt holding left leg.  Reports that the pain was not that bad now b/c of the pain medication.  Pt unable to quantify the pain.  Denies HA, dizziness, SOB, or CP.      MEDICATIONS  (STANDING):  amLODIPine   Tablet 2.5 milliGRAM(s) Oral daily  atorvastatin 20 milliGRAM(s) Oral at bedtime  calcitriol   Capsule 0.25 MICROGram(s) Oral daily  dextrose 5%. 1000 milliLiter(s) (50 mL/Hr) IV Continuous <Continuous>  dextrose 50% Injectable 25 Gram(s) IV Push once  ferrous    sulfate 325 milliGRAM(s) Oral daily  folic acid 1 milliGRAM(s) Oral daily  glucagon  Injectable 1 milliGRAM(s) IntraMuscular once  heparin   Injectable 5000 Unit(s) SubCutaneous every 12 hours  insulin lispro (ADMELOG) corrective regimen sliding scale   SubCutaneous three times a day before meals  insulin lispro (ADMELOG) corrective regimen sliding scale   SubCutaneous at bedtime  levETIRAcetam 500 milliGRAM(s) Oral two times a day  lidocaine   4% Patch 1 Patch Transdermal daily  melatonin 3 milliGRAM(s) Oral at bedtime  memantine 5 milliGRAM(s) Oral at bedtime  pantoprazole    Tablet 40 milliGRAM(s) Oral before breakfast  polyethylene glycol 3350 17 Gram(s) Oral daily  senna 2 Tablet(s) Oral at bedtime    MEDICATIONS  (PRN):  acetaminophen     Tablet .. 650 milliGRAM(s) Oral every 6 hours PRN Temp greater or equal to 38C (100.4F), Mild Pain (1 - 3)  dextrose Oral Gel 15 Gram(s) Oral once PRN Blood Glucose LESS THAN 70 milliGRAM(s)/deciliter  oxycodone    5 mG/acetaminophen 325 mG 1 Tablet(s) Oral every 6 hours PRN Severe Pain (7 - 10)  traMADol 25 milliGRAM(s) Oral every 8 hours PRN Moderate Pain (4 - 6)      __________________________________________________  REVIEW OF SYSTEMS: Limited exam in demented pt   CONSTITUTIONAL: No fever  EYES: No acute visual disturbances  NECK: No pain or stiffness  RESPIRATORY: No cough; No shortness of breath  CARDIOVASCULAR: No chest pain, no palpitations  GASTROINTESTINAL: No pain. No nausea or vomiting.  No diarrhea   NEUROLOGICAL: No headache or numbness, no tremors  MUSCULOSKELETAL: (+) Left hip and leg pain    GENITOURINARY: No dysuria, no frequency, no hesitancy  PSYCHIATRY: No depression , no anxiety  ALL OTHER  ROS negative      Vital Signs Last 24 Hrs  T(C): 36.6 (08 Nov 2022 12:00), Max: 36.6 (08 Nov 2022 12:00)  T(F): 97.8 (08 Nov 2022 12:00), Max: 97.8 (08 Nov 2022 12:00)  HR: 67 (08 Nov 2022 12:00) (63 - 67)  BP: 146/73 (08 Nov 2022 12:00) (126/46 - 146/73)  RR: 18 (08 Nov 2022 12:00) (18 - 20)  SpO2: 100% (08 Nov 2022 12:00) (98% - 100%)    Parameters below as of 08 Nov 2022 12:00  Patient On (Oxygen Delivery Method): room air        ________________________________________________  PHYSICAL EXAM:  GENERAL: NAD  HEENT: Normocephalic;  conjunctivae and sclerae clear; moist mucous membranes   NECK : Supple  CHEST/LUNG: Clear to auscultation bilaterally with good air entry   HEART: S1 S2  regular; no murmurs, gallops or rubs  ABDOMEN: Soft, Nontender, Nondistended; Bowel sounds present x 4 quad.  (+) Zapata.    EXTREMITIES: No cyanosis; no edema; no calf tenderness  SKIN: Warm and dry; no rash  NERVOUS SYSTEM:  Awake and alert; Oriented to place & person, not time; no new deficits   _________________________________________________  LABS:                        9.4    4.28  )-----------( 242      ( 08 Nov 2022 10:42 )             29.6     11-08    139  |  111<H>  |  22<H>  ----------------------------<  169<H>  4.5   |  24  |  1.40<H>    Ca    9.4      08 Nov 2022 10:42  Phos  3.7     11-07  Mg     2.3     11-07          CAPILLARY BLOOD GLUCOSE      POCT Blood Glucose.: 150 mg/dL (08 Nov 2022 11:53)  POCT Blood Glucose.: 111 mg/dL (08 Nov 2022 08:14)  POCT Blood Glucose.: 130 mg/dL (07 Nov 2022 21:31)  POCT Blood Glucose.: 111 mg/dL (07 Nov 2022 16:53)        RADIOLOGY & ADDITIONAL TESTS:    Imaging Personally Reviewed:  YES    Consultant(s) Notes Reviewed:   YES    Care Discussed with Consultants :     Plan of care was discussed with patient and /or primary care giver; all questions and concerns were addressed and care was aligned with patient's wishes.

## 2022-11-08 NOTE — PROGRESS NOTE ADULT - NS ATTEND AMEND GEN_ALL_CORE FT
81 yo F w PMH multiple myeloma (on pomalyst), RA, dementia?, HTN, HLD, DM (not on medications), remote CVA with no residual deficits presenting with confusion and difficulty ambulating. Recently discharged from SNF to home. Admit for toxic metabolic encephalopathy in the setting of KAJAL on CKD, Delirium on dementia and ambulatory dysfunction    #Toxic metabolic encephalopathy  #KAJAL on CKD, post-renal 2/2 urinary retention  #Progressive dementia   #Ambulatory dysfunction  #HTN  #HLD  #remote CVA with no residual deficits  #Multiple myeloma   #RA  #HTN  #HLD  CTH neg, EEG neg, Urinary retention, UA neg  - Monitor BMP s/p IVF, improving - continue to monitor  - Zapata placed, TOV tomorrow AM  - f/u Ucx  - continue home meds  - PT - DEV - referrals sent - pending acceptance  - CM consult
81 yo F w PMH multiple myeloma (on pomalyst), RA, dementia?, HTN, HLD, DM (not on medications), remote CVA with no residual deficits presenting with confusion and difficulty ambulating. Recently discharged from SNF to home. Admit for toxic metabolic encephalopathy in the setting of KAJAL on CKD, Delirium on dementia and ambulatory dysfunction    #Toxic metabolic encephalopathy  #KAJAL on CKD, post-renal 2/2 urinary retention  #Progressive dementia   #Ambulatory dysfunction  #HTN  #HLD  #remote CVA with no residual deficits  #Multiple myeloma   #RA  #HTN  #HLD  CTH neg, EEG neg, Urinary retention, UA neg  - Monitor BMP s/p IVF, improving - continue to monitor  - Zapata placed, TOV tomorrow AM  - f/u Ucx  - continue home meds  - PT - DEV - referrals sent - pending acceptance  - CM consult.    - Discussed with daughter smita regarding holding Pomalyst while in DEV. Daughter understands and agrees with plan to hold Pomalyst while in DEV and to reinstate once ready with outpatient oncologist. She informs me that this was the plan during her last admission too and that her outpatient oncologist also informed her to hold medication until follows up with them outpatient. Pending DEV

## 2022-11-08 NOTE — PROGRESS NOTE ADULT - PROBLEM SELECTOR PLAN 10
- C/w Heparin for DVT ppx  - C/w Protonix for GI ppx
- C/w Heparin for DVT ppx  - C/w Protonix for GI ppx

## 2022-11-08 NOTE — PROGRESS NOTE ADULT - PROBLEM SELECTOR PLAN 7
- Home Hydroxychloroquine 200 mg   - Holding home Hydroxychloroquine d/t KAJAL
- Home Hydroxychloroquine 200 mg   - Holding home Hydroxychloroquine d/t KAJAL

## 2022-11-09 ENCOUNTER — TRANSCRIPTION ENCOUNTER (OUTPATIENT)
Age: 82
End: 2022-11-09

## 2022-11-09 VITALS
RESPIRATION RATE: 18 BRPM | HEART RATE: 73 BPM | DIASTOLIC BLOOD PRESSURE: 58 MMHG | TEMPERATURE: 98 F | OXYGEN SATURATION: 100 % | SYSTOLIC BLOOD PRESSURE: 152 MMHG

## 2022-11-09 LAB
ANION GAP SERPL CALC-SCNC: 8 MMOL/L — SIGNIFICANT CHANGE UP (ref 5–17)
BUN SERPL-MCNC: 28 MG/DL — HIGH (ref 7–18)
CALCIUM SERPL-MCNC: 9.9 MG/DL — SIGNIFICANT CHANGE UP (ref 8.4–10.5)
CHLORIDE SERPL-SCNC: 108 MMOL/L — SIGNIFICANT CHANGE UP (ref 96–108)
CO2 SERPL-SCNC: 23 MMOL/L — SIGNIFICANT CHANGE UP (ref 22–31)
CREAT SERPL-MCNC: 1.45 MG/DL — HIGH (ref 0.5–1.3)
EGFR: 36 ML/MIN/1.73M2 — LOW
GLUCOSE BLDC GLUCOMTR-MCNC: 117 MG/DL — HIGH (ref 70–99)
GLUCOSE BLDC GLUCOMTR-MCNC: 94 MG/DL — SIGNIFICANT CHANGE UP (ref 70–99)
GLUCOSE SERPL-MCNC: 104 MG/DL — HIGH (ref 70–99)
HCT VFR BLD CALC: 28.1 % — LOW (ref 34.5–45)
HGB BLD-MCNC: 9.2 G/DL — LOW (ref 11.5–15.5)
MCHC RBC-ENTMCNC: 28.5 PG — SIGNIFICANT CHANGE UP (ref 27–34)
MCHC RBC-ENTMCNC: 32.7 GM/DL — SIGNIFICANT CHANGE UP (ref 32–36)
MCV RBC AUTO: 87 FL — SIGNIFICANT CHANGE UP (ref 80–100)
NRBC # BLD: 0 /100 WBCS — SIGNIFICANT CHANGE UP (ref 0–0)
PLATELET # BLD AUTO: 279 K/UL — SIGNIFICANT CHANGE UP (ref 150–400)
POTASSIUM SERPL-MCNC: 5 MMOL/L — SIGNIFICANT CHANGE UP (ref 3.5–5.3)
POTASSIUM SERPL-SCNC: 5 MMOL/L — SIGNIFICANT CHANGE UP (ref 3.5–5.3)
RBC # BLD: 3.23 M/UL — LOW (ref 3.8–5.2)
RBC # FLD: 15.6 % — HIGH (ref 10.3–14.5)
SARS-COV-2 RNA SPEC QL NAA+PROBE: SIGNIFICANT CHANGE UP
SODIUM SERPL-SCNC: 139 MMOL/L — SIGNIFICANT CHANGE UP (ref 135–145)
WBC # BLD: 4.96 K/UL — SIGNIFICANT CHANGE UP (ref 3.8–10.5)
WBC # FLD AUTO: 4.96 K/UL — SIGNIFICANT CHANGE UP (ref 3.8–10.5)

## 2022-11-09 PROCEDURE — 96374 THER/PROPH/DIAG INJ IV PUSH: CPT

## 2022-11-09 PROCEDURE — 83735 ASSAY OF MAGNESIUM: CPT

## 2022-11-09 PROCEDURE — 85027 COMPLETE CBC AUTOMATED: CPT

## 2022-11-09 PROCEDURE — 83036 HEMOGLOBIN GLYCOSYLATED A1C: CPT

## 2022-11-09 PROCEDURE — 87086 URINE CULTURE/COLONY COUNT: CPT

## 2022-11-09 PROCEDURE — 83605 ASSAY OF LACTIC ACID: CPT

## 2022-11-09 PROCEDURE — 80053 COMPREHEN METABOLIC PANEL: CPT

## 2022-11-09 PROCEDURE — 71045 X-RAY EXAM CHEST 1 VIEW: CPT

## 2022-11-09 PROCEDURE — 85025 COMPLETE CBC W/AUTO DIFF WBC: CPT

## 2022-11-09 PROCEDURE — 87640 STAPH A DNA AMP PROBE: CPT

## 2022-11-09 PROCEDURE — 87186 SC STD MICRODIL/AGAR DIL: CPT

## 2022-11-09 PROCEDURE — 84100 ASSAY OF PHOSPHORUS: CPT

## 2022-11-09 PROCEDURE — 81001 URINALYSIS AUTO W/SCOPE: CPT

## 2022-11-09 PROCEDURE — 87635 SARS-COV-2 COVID-19 AMP PRB: CPT

## 2022-11-09 PROCEDURE — 95819 EEG AWAKE AND ASLEEP: CPT

## 2022-11-09 PROCEDURE — 36415 COLL VENOUS BLD VENIPUNCTURE: CPT

## 2022-11-09 PROCEDURE — 87641 MR-STAPH DNA AMP PROBE: CPT

## 2022-11-09 PROCEDURE — 82330 ASSAY OF CALCIUM: CPT

## 2022-11-09 PROCEDURE — 99239 HOSP IP/OBS DSCHRG MGMT >30: CPT

## 2022-11-09 PROCEDURE — 87077 CULTURE AEROBIC IDENTIFY: CPT

## 2022-11-09 PROCEDURE — 70450 CT HEAD/BRAIN W/O DYE: CPT | Mod: MA

## 2022-11-09 PROCEDURE — 84484 ASSAY OF TROPONIN QUANT: CPT

## 2022-11-09 PROCEDURE — 99285 EMERGENCY DEPT VISIT HI MDM: CPT

## 2022-11-09 PROCEDURE — 95957 EEG DIGITAL ANALYSIS: CPT

## 2022-11-09 PROCEDURE — 80048 BASIC METABOLIC PNL TOTAL CA: CPT

## 2022-11-09 PROCEDURE — 93005 ELECTROCARDIOGRAM TRACING: CPT

## 2022-11-09 PROCEDURE — 80177 DRUG SCRN QUAN LEVETIRACETAM: CPT

## 2022-11-09 PROCEDURE — 82962 GLUCOSE BLOOD TEST: CPT

## 2022-11-09 RX ORDER — POMALIDOMIDE 1 MG/1
1 CAPSULE ORAL
Qty: 0 | Refills: 0 | DISCHARGE

## 2022-11-09 RX ORDER — LISINOPRIL 2.5 MG/1
1 TABLET ORAL
Qty: 0 | Refills: 0 | DISCHARGE

## 2022-11-09 RX ORDER — HYDROXYCHLOROQUINE SULFATE 200 MG
1 TABLET ORAL
Qty: 0 | Refills: 0 | DISCHARGE

## 2022-11-09 RX ORDER — HEPARIN SODIUM 5000 [USP'U]/ML
5000 INJECTION INTRAVENOUS; SUBCUTANEOUS
Qty: 0 | Refills: 0 | DISCHARGE
Start: 2022-11-09

## 2022-11-09 RX ORDER — OXYCODONE AND ACETAMINOPHEN 5; 325 MG/1; MG/1
1 TABLET ORAL
Qty: 0 | Refills: 0 | DISCHARGE
Start: 2022-11-09

## 2022-11-09 RX ORDER — INSULIN LISPRO 100/ML
0 VIAL (ML) SUBCUTANEOUS
Qty: 0 | Refills: 0 | DISCHARGE
Start: 2022-11-09

## 2022-11-09 RX ORDER — TRAMADOL HYDROCHLORIDE 50 MG/1
0.5 TABLET ORAL
Qty: 0 | Refills: 0 | DISCHARGE
Start: 2022-11-09

## 2022-11-09 RX ORDER — LIDOCAINE 4 G/100G
1 CREAM TOPICAL
Qty: 0 | Refills: 0 | DISCHARGE
Start: 2022-11-09

## 2022-11-09 RX ADMIN — HEPARIN SODIUM 5000 UNIT(S): 5000 INJECTION INTRAVENOUS; SUBCUTANEOUS at 06:53

## 2022-11-09 RX ADMIN — LIDOCAINE 1 PATCH: 4 CREAM TOPICAL at 12:41

## 2022-11-09 RX ADMIN — Medication 325 MILLIGRAM(S): at 12:40

## 2022-11-09 RX ADMIN — Medication 1 MILLIGRAM(S): at 12:40

## 2022-11-09 RX ADMIN — CALCITRIOL 0.25 MICROGRAM(S): 0.5 CAPSULE ORAL at 12:40

## 2022-11-09 RX ADMIN — POLYETHYLENE GLYCOL 3350 17 GRAM(S): 17 POWDER, FOR SOLUTION ORAL at 12:40

## 2022-11-09 RX ADMIN — PANTOPRAZOLE SODIUM 40 MILLIGRAM(S): 20 TABLET, DELAYED RELEASE ORAL at 06:53

## 2022-11-09 RX ADMIN — LEVETIRACETAM 500 MILLIGRAM(S): 250 TABLET, FILM COATED ORAL at 06:53

## 2022-11-09 RX ADMIN — LIDOCAINE 1 PATCH: 4 CREAM TOPICAL at 00:12

## 2022-11-09 NOTE — DISCHARGE NOTE PROVIDER - NSDCCPCAREPLAN_GEN_ALL_CORE_FT
PRINCIPAL DISCHARGE DIAGNOSIS  Diagnosis: Toxic metabolic encephalopathy  Assessment and Plan of Treatment: due to acute kidney injury due to urinary retention   Head CT negative for acute neurologic finding.   No infection source.   You were treated with hydration, indwelling neves catheter.   Your mental status improved   Kidney function improved.   Voiding freely  Maintain adequate hydration, nutrition.   Physical therapy  Monitor kidney function at next facility.   Report to your doctor or seek immediate medical attention if you develop any changes in your condition and or worsening signs/symptoms (such as and not limited :change in mental status, problems with urinating  ).  Further treatment and management per medical staff at next facility.   Follow-up with your primary care physician  within 1 week after rehab. Call for appointment.        SECONDARY DISCHARGE DIAGNOSES  Diagnosis: Acute kidney injury superimposed on CKD  Assessment and Plan of Treatment: Avoid taking (NSAIDs) - (ex: Ibuprofen, Advil, Celebrex, Naprosyn)  Avoid taking any nephrotoxic agents (can harm kidneys) - Intravenous contrast for diagnostic testing, combination cold medications.  Have all medications adjusted for your renal function by your Health Care Provider.  Blood pressure control is important.  Take all medication as prescribed.      Diagnosis: Generalized weakness  Assessment and Plan of Treatment: Maintain adequate hydration, nutrition.   Physical therapy     Report to your doctor or seek immediate medical attention if you develop any changes in your condition and or worsening signs/symptoms (such as and not limited :change in mental status, problems with urinating  ).  Further treatment and management per medical staff at next facility.   Follow-up with your primary care physician  within 1 week after rehab. Call for appointment.      Diagnosis: Dementia  Assessment and Plan of Treatment: Dementia care: Create a safe environment. Remove locks on bathroom doors. Cover electrical outlets, use childproof latched on cabinets. Install childproof devices to keep doors and windows secured. Childproof stoves , ovens, medications, water temperature on water heaters. Secure knives, lighters, matches, power tools, and guns. Falls precautions, free from clutter, remove throw rugs. Insure adequate lighting. Install grab rails as needed. Obtain life line, use baby monitors. Provide 24hr /7 day per week supervision Reduce confusion. Keep familiar objects and people around. Use night lights or dim lights at night. Use reminders, notes, or directions for daily activities or tasks. Keep a simple, consistent routine for waking, meals, bathing, dressing, and bedtime. Create a calm, quiet environment. Place large clocks and calendars prominently.  Display emergency numbers and home address near all telephones. Ensure a regular walking or physical activity schedule. Involve the person in daily activities as much as possible. Limit napping during the day.  Limit caffeine. Attend social events that stimulate rather than overwhelm the senses. Encourage good nutrition and hydration. Reduce distractions during meal times and snacks. Monitor chewing and swallowing ability. Continue with routine vision, hearing, dental, and medical screenings. All medications per MD only. If change in behavior or patient becomes more confused or letharic seek medical attention.    Any new problem with brain function happens. This includes problems with balance, speech, or falling a lot.   New or worsened confusion develops. New or worsened sleepiness develops. Staying awake becomes hard to do. This could indicate an infection if fever develops.      Diagnosis: HLD (hyperlipidemia)  Assessment and Plan of Treatment: Continue with your cholesterol medications. Eat a heart healthy diet that is low in saturated fats and salt, and includes whole grains, fruits, vegetables and lean protein; exercise regularly (consult with your physician or cardiologist first); maintain a heart healthy weight; if you smoke - quit (A resource to help you stop smoking is the [facility] – phone number 400-975-8775.). Continue to follow with your primary physician or cardiologist.      Diagnosis: Hypertension  Assessment and Plan of Treatment: Low salt diet  Activity as tolerated.  Take all medication as prescribed.  Follow up with your medical doctor for routine blood pressure monitoring at your next visit.  Notify your doctor if you have any of the following symptoms:   Dizziness, Lightheadedness, Blurry vision, Headache, Chest pain, Shortness of breath      Diagnosis: Rheumatoid arthritis  Assessment and Plan of Treatment:     Diagnosis: Back pain  Assessment and Plan of Treatment:     Diagnosis: Multiple myeloma  Assessment and Plan of Treatment:      PRINCIPAL DISCHARGE DIAGNOSIS  Diagnosis: Toxic metabolic encephalopathy  Assessment and Plan of Treatment: due to acute kidney injury due to urinary retention   Head CT negative for acute neurologic finding.   No infection source.   You were treated with hydration, indwelling neves catheter.   Your mental status improved   Kidney function improved.   Voiding freely  Maintain adequate hydration, nutrition.   Physical therapy  Monitor kidney function at next facility.   Report to your doctor or seek immediate medical attention if you develop any changes in your condition and or worsening signs/symptoms (such as and not limited :change in mental status, problems with urinating  ).  Further treatment and management per medical staff at next facility.   Follow-up with your primary care physician  within 1 week after rehab. Call for appointment.        SECONDARY DISCHARGE DIAGNOSES  Diagnosis: Acute kidney injury superimposed on CKD  Assessment and Plan of Treatment: Avoid taking (NSAIDs) - (ex: Ibuprofen, Advil, Celebrex, Naprosyn)  Avoid taking any nephrotoxic agents (can harm kidneys) - Intravenous contrast for diagnostic testing, combination cold medications.  Have all medications adjusted for your renal function by your Health Care Provider.  Blood pressure control is important.  Take all medication as prescribed.      Diagnosis: Generalized weakness  Assessment and Plan of Treatment: Maintain adequate hydration, nutrition.   Physical therapy     Report to your doctor or seek immediate medical attention if you develop any changes in your condition and or worsening signs/symptoms (such as and not limited :change in mental status, problems with urinating  ).  Further treatment and management per medical staff at next facility.   Follow-up with your primary care physician  within 1 week after rehab. Call for appointment.      Diagnosis: Dementia  Assessment and Plan of Treatment: Dementia care: Create a safe environment. Remove locks on bathroom doors. Cover electrical outlets, use childproof latched on cabinets. Install childproof devices to keep doors and windows secured. Childproof stoves , ovens, medications, water temperature on water heaters. Secure knives, lighters, matches, power tools, and guns. Falls precautions, free from clutter, remove throw rugs. Insure adequate lighting. Install grab rails as needed. Obtain life line, use baby monitors. Provide 24hr /7 day per week supervision Reduce confusion. Keep familiar objects and people around. Use night lights or dim lights at night. Use reminders, notes, or directions for daily activities or tasks. Keep a simple, consistent routine for waking, meals, bathing, dressing, and bedtime. Create a calm, quiet environment. Place large clocks and calendars prominently.  Display emergency numbers and home address near all telephones. Ensure a regular walking or physical activity schedule. Involve the person in daily activities as much as possible. Limit napping during the day.  Limit caffeine. Attend social events that stimulate rather than overwhelm the senses. Encourage good nutrition and hydration. Reduce distractions during meal times and snacks. Monitor chewing and swallowing ability. Continue with routine vision, hearing, dental, and medical screenings. All medications per MD only. If change in behavior or patient becomes more confused or letharic seek medical attention.    Any new problem with brain function happens. This includes problems with balance, speech, or falling a lot.   New or worsened confusion develops. New or worsened sleepiness develops. Staying awake becomes hard to do. This could indicate an infection if fever develops.      Diagnosis: HLD (hyperlipidemia)  Assessment and Plan of Treatment: Continue with your cholesterol medications. Eat a heart healthy diet that is low in saturated fats and salt, and includes whole grains, fruits, vegetables and lean protein; exercise regularly (consult with your physician or cardiologist first); maintain a heart healthy weight; if you smoke - quit (A resource to help you stop smoking is the [facility] Tobacco [facility] – phone number [phone].). Continue to follow with your primary physician or cardiologist.      Diagnosis: Diabetes  Assessment and Plan of Treatment: Monitor glucoses three times daily before meals, and at bedtime.   Cover with insulin lispro per corrective scale.       Diagnosis: Hypertension  Assessment and Plan of Treatment: Low salt diet  Activity as tolerated.  Take all medication as prescribed.   Do not resume  lisinopril due to acute kidney injury.   Follow up with your medical doctor for routine blood pressure monitoring at your next visit.  Notify your doctor if you have any of the following symptoms:   Dizziness, Lightheadedness, Blurry vision, Headache, Chest pain, Shortness of breath      Diagnosis: Rheumatoid arthritis  Assessment and Plan of Treatment: Continue current pain regimen.   Do not resume   hydroxychloroquine due to acute kidney injury.   Follow-up with your primary care physician within 1 week after rehab. Call for appointment.      Diagnosis: Back pain  Assessment and Plan of Treatment: chronic back pain likely due to multiple myeloma and rheumatoid arthritis.   Continue current pain regimen.   Do not resume   hydroxychloroquine and pomalyst due to acute kidney injury.   Follow-up with your primary care physician and Oncologist within 1 week after rehab. Call for appointment.      Diagnosis: Multiple myeloma  Assessment and Plan of Treatment: Do not resume    pomalyst due to acute kidney injury.   Follow-up with your primary care physician and Oncologist within 1 week after rehab. Call for appointment.

## 2022-11-09 NOTE — DISCHARGE NOTE NURSING/CASE MANAGEMENT/SOCIAL WORK - PATIENT PORTAL LINK FT
You can access the FollowMyHealth Patient Portal offered by [facility] by registering at the following website: [email or web address]. By joining [employer]’s FollowMyHealth portal, you will also be able to view your health information using other applications (apps) compatible with our system.

## 2022-11-09 NOTE — DISCHARGE NOTE PROVIDER - NSDCCAREPROVSEEN_GEN_ALL_CORE_FT
Alberto Roger, Allyson Ferrari, Ryan Khanna, Tasneem Collier, Marcus Donis, Ghazala Neal, Iza Cat, Maria Luisa Bhakta, Elyse Frey, Keyana Chun, Leroy Liang, Jamari HYDE

## 2022-11-09 NOTE — DISCHARGE NOTE PROVIDER - NSDCQMSTROKE_NEU_ALL_CORE
Followed up with Kristie today to confirm he is accepting of ongoing palliative care at discharge. He welcomes palliative, he is aware [facility] will call him and arrange a time to come to his home. Order placed.  Palliative care will sign of No

## 2022-11-09 NOTE — DISCHARGE NOTE PROVIDER - HOSPITAL COURSE
82 year old Female w/ PMH of multiple myeloma (on pomalyst), RA (on hydroxychloroquine, on hold d/t KAJAL) dementia?, HTN, HLD, DM (not on medications), remote CVA with no residual deficits.   Presented with confusion and difficulty ambulating.  Recently discharged from SNF to home.  Admitted for Toxic Metabolic Encephalopathy in the setting of KAJAL on CKD, Delirium on dementia and ambulatory dysfunction.  CTH neg for acute changes  - Lactate, UA & Ucx neg, WBC WNL, afebrile.  CXR unremarkable.  Acute kidney injury superimposed on CKD. SCr on admission 2.61, baseline approx 1.8 as per prev admission, Post-renal 2/2 urinary retention  - S/p IVF. Serum Creat downtrending. Zapata discontinued. Passed TOV.    PT recommended DEV      82 year old Female w/ PMH of multiple myeloma (on pomalyst), RA (on hydroxychloroquine, on hold d/t KAJAL) dementia?, HTN, HLD, DM (not on medications), remote CVA with no residual deficits.   Presented with confusion and difficulty ambulating.  Recently discharged from SNF to home.  Admitted for Toxic Metabolic Encephalopathy in the setting of KAJAL on CKD, Delirium on dementia and ambulatory dysfunction.  CTH neg for acute changes  - Lactate, UA & Ucx neg, WBC WNL, afebrile.  CXR unremarkable.  Acute kidney injury superimposed on CKD. SCr on admission 2.61, baseline approx 1.8 as per prev admission, Post-renal 2/2 urinary retention  - S/p IVF. Serum Creat downtrending. Zapata discontinued. Passed TOV.   Pt with back pain likely 2/2 to MM . continue pain regimen: Tramadol and percocet per Pain Mgmt .Holding home Gabapentin 200 mg BID d/t KAJAL   continue bowel regimen to prevent opioid induced constipation. Holding home Gabapentin 200 mg BID d/t KAJAL   Pt having BM's . Pt on pomalyst for MM, on hold  d/t KAJAL. H/o HTN on Lisinopril 5 mg and Amlodipine 2.5 mg at home.  Holding Lisinopril d/t KAJAL . C/w Amlodipine       PT recommended [facility]     Pt is medically optimized for discharge to [facility]. Continue medications as instructed. Follow-up with PCP.   This is brief summary of patient's hospitalization. Refer to medical record for complete details of hospital course.        `

## 2022-11-09 NOTE — DISCHARGE NOTE NURSING/CASE MANAGEMENT/SOCIAL WORK - NSDCPEFALRISK_GEN_ALL_CORE
For information on Fall & Injury Prevention, visit: [email or web address] OR  [email or web address] OR  https://www.cdc.gov/steadi/patient.html

## 2022-11-09 NOTE — DISCHARGE NOTE PROVIDER - NSDCMRMEDTOKEN_GEN_ALL_CORE_FT
acetaminophen 325 mg oral tablet: 2 tab(s) orally every 6 hours, As needed, Temp greater or equal to 38C (100.4F), Mild Pain (1 - 3), Moderate Pain (4 - 6)  amLODIPine 2.5 mg oral tablet: 1 tab(s) orally once a day  aspirin 81 mg oral tablet: 1 tab(s) orally once a day  atorvastatin 20 mg oral tablet: 1 tab(s) orally once a day (at bedtime)  calcitriol 0.25 mcg oral capsule: 1 cap(s) orally once a day  ferrous sulfate 325 mg (65 mg elemental iron) oral delayed release tablet: 1 tab(s) orally once a day  folic acid 1 mg oral tablet: 1 tab(s) orally once a day  heparin: 5000 unit(s) subcutaneous every 12 hours  hydroxychloroquine 200 mg oral tablet: 1 tab(s) orally once a day. DO NOT RESUME UNTIL OKAY WITH YOUR DOCTOR.   insulin lispro 100 units/mL injectable solution: subcutaneous 4 times a day - 200  2 Unit(s) if Glucose 201 - 250  3 Unit(s) if Glucose 251 - 300  4 Unit(s) if Glucose 301 - 350  5 Unit(s) if Glucose 351 - 400  6 Unit(s) if Glucose Greater Than 400    -------------  Monitor glucose at bedtime  and cover with  Insulin lispro subcutaneously per corrective scale  :     0 Unit(s) if Glucose 0 - 250  1 Unit(s) if Glucose 251 - 300  2 Unit(s) if Glucose 301 - 350  3 Unit(s) if Glucose 351 - 400  4 Unit(s) if Glucose Greater Than 400  levETIRAcetam 500 mg oral tablet: 1 tab(s) orally 2 times a day  lidocaine 4% topical film: Apply topically to affected area once a day  melatonin 3 mg oral tablet: 1 tab(s) orally once a day (at bedtime)  memantine 5 mg oral tablet: 1 tab(s) orally once a day (at bedtime)  oxycodone-acetaminophen 5 mg-325 mg oral tablet: 1 tab(s) orally every 6 hours, As needed, Severe Pain (7 - 10)  pantoprazole 40 mg oral delayed release tablet: 1 tab(s) orally once a day (before a meal)  polyethylene glycol 3350 oral powder for reconstitution: 17 gram(s) orally once a day  Pomalyst 2 mg oral capsule: 1 cap(s) orally every other day. DO NOT RESUME UNTIL AFTER REHAB AND OKAYED BY YOUR ONCOLOGIST  senna leaf extract oral tablet: 2 tab(s) orally once a day (at bedtime)  traMADol 50 mg oral tablet: 0.5 tab(s) orally every 8 hours, As needed, Moderate Pain (4 - 6)

## 2022-11-09 NOTE — DISCHARGE NOTE PROVIDER - ATTENDING DISCHARGE PHYSICAL EXAMINATION:
81 yo F w PMH multiple myeloma (on pomalyst), RA, dementia?, HTN, HLD, DM (not on medications), remote CVA with no residual deficits presenting with confusion and difficulty ambulating. Recently discharged from SNF to home. Admit for toxic metabolic encephalopathy in the setting of KAJAL on CKD, Delirium on dementia and ambulatory dysfunction. Initial neves catheter was placed with significant urine output. Cr improved with IVF and patient passed TOV. Encephalopathy improved. CTH neg, EEG neg, UA neg. No infectious etiology/symptoms and monitored off antibiotics with improvement of menta status. PT recommended DEV. Discussed with daughter regarding holding Pomalyst while in DEV. Daughter understands and agrees with plan to hold Pomalyst while in DEV and to reinstate once ready with outpatient oncologist. She informs me that this was the plan during her last admission too and that her outpatient oncologist also informed her to hold medication until follows up with them outpatient.  PHYSICAL EXAM:  GENERAL: NAD  HEENT: Normocephalic;  conjunctivae and sclerae clear; moist mucous membranes   NECK : Supple  CHEST/LUNG: Clear to auscultation bilaterally with good air entry   HEART: S1 S2  regular; no murmurs, gallops or rubs  ABDOMEN: Soft, Nontender, Nondistended; Bowel sounds present x 4 quad.  EXTREMITIES: No cyanosis; no edema; no calf tenderness  SKIN: Warm and dry; no rash  NERVOUS SYSTEM:  Awake and alert; Oriented to place & person, not time; no new deficits

## 2022-11-09 NOTE — DISCHARGE NOTE PROVIDER - NSDCQMPCI_CARD_ALL_CORE
GI Progress Note    Gabi Arauz is a 80 y.o. male patient. 1. Chronic congestive heart failure, unspecified heart failure type ([address]    2. Dyspnea on exertion    3. GI bleed  4. Anemia, blood loss  5. CAD, s/p CABG  6. Atrial fibrillation    Admit Date: 5/5/2019    Subjective:       Per nurse last evening he had more bleed. H and H however remained stable    Voices no complaint.    Patient is willing to go ahead with colonoscopy in am after discussion with his son  Prep is tolerated this pm with any problem and no signs of aspiration        ROS:  Cardiovascular ROS: no chest pain or dyspnea on exertion  Gastrointestinal ROS: as above  Respiratory ROS: no cough, shortness of breath, or wheezing    Scheduled Meds:   furosemide  60 mg Oral BID    pantoprazole  40 mg Oral QAM AC    allopurinol  100 mg Oral Daily    atorvastatin  40 mg Oral QAM    docusate sodium  100 mg Oral Daily    escitalopram  5 mg Oral Daily    fenofibrate  54 mg Oral Daily    levothyroxine  100 mcg Oral Daily    therapeutic multivitamin-minerals  1 tablet Oral Daily    vitamin D  1,000 Units Oral Daily    sodium chloride flush  10 mL Intravenous 2 times per day    diltiazem  120 mg Oral Daily    metoprolol succinate  100 mg Oral Daily       Continuous Infusions:   sodium chloride 50 mL/hr at 05/11/19 0830       PRN Meds:  acetaminophen, ALPRAZolam, fluticasone, melatonin, polyethylene glycol, sodium chloride flush, magnesium hydroxide, ondansetron      Objective:       Patient Vitals for the past 24 hrs:   BP Temp Temp src Pulse Resp SpO2 Weight   05/11/19 1341 (!) 168/63 97.5 °F (36.4 °C) Oral 59 18 96 % --   05/11/19 0808 (!) 177/63 97.5 °F (36.4 °C) Axillary 56 18 98 % --   05/11/19 0319 (!) 160/57 96.9 °F (36.1 °C) Axillary 55 16 93 % 121 lb 4.1 oz (55 kg)   05/10/19 2343 (!) 140/63 98.3 °F (36.8 °C) Oral 66 16 93 % --   05/10/19 2004 132/62 98.2 °F (36.8 °C) Oral 56 18 98 % --   05/10/19 1726 (!) 176/65 -- and Dr Jennifer Iniguez  Also had lengthy discussion with his RN regarding prep issues and others    Chau Fernandez MD  5/11/2019  2:21 PM No

## 2022-11-09 NOTE — DISCHARGE NOTE NURSING/CASE MANAGEMENT/SOCIAL WORK - NSDCVIVACCINE_GEN_ALL_CORE_FT
COVID-19 vaccine, vector-nr, rS-Ad26, PF, 0.5 mL (P21); 29-Mar-2021 13:11; Maria D Hightower (Student, Nursing); P21; [id number] (Exp. Date: 29-Mar-2021); IntraMuscular; Deltoid Right.; 0.5 milliLiter(s);

## 2022-11-10 LAB
MRSA PCR RESULT.: SIGNIFICANT CHANGE UP
S AUREUS DNA NOSE QL NAA+PROBE: SIGNIFICANT CHANGE UP

## 2022-12-07 NOTE — PATIENT PROFILE ADULT - SAFE PLACE TO LIVE
Lov: 3/9/22  Nov: 1/3/23
Requested Prescriptions     Pending Prescriptions Disp Refills    atorvastatin (LIPITOR) 20 mg tablet 90 Tablet 0     Si pill nightly         3/9/2022 is LAST OFFICE VISIT     Future Appointments   Date Time Provider Juan Miguel Quiroga   1/3/2023  3:00 PM Ilene Boas, DO [facility]B   2/3/2023  2:40 PM Sim Nieves,  [facility]       [facility] ([address] [address]  Phone: [phone] Fax: [phone]
no

## 2023-01-01 ENCOUNTER — OUTPATIENT (OUTPATIENT)
Dept: OUTPATIENT SERVICES | Facility: HOSPITAL | Age: 83
LOS: 1 days | Discharge: HOME | End: 2023-01-01

## 2023-01-01 ENCOUNTER — TRANSCRIPTION ENCOUNTER (OUTPATIENT)
Age: 83
End: 2023-01-01

## 2023-01-01 ENCOUNTER — APPOINTMENT (OUTPATIENT)
Dept: HEMATOLOGY ONCOLOGY | Facility: CLINIC | Age: 83
End: 2023-01-01

## 2023-01-01 ENCOUNTER — OUTPATIENT (OUTPATIENT)
Dept: OUTPATIENT SERVICES | Facility: HOSPITAL | Age: 83
LOS: 1 days | End: 2023-01-01
Payer: MEDICARE

## 2023-01-01 ENCOUNTER — RX RENEWAL (OUTPATIENT)
Age: 83
End: 2023-01-01

## 2023-01-01 ENCOUNTER — RESULT REVIEW (OUTPATIENT)
Age: 83
End: 2023-01-01

## 2023-01-01 ENCOUNTER — LABORATORY RESULT (OUTPATIENT)
Age: 83
End: 2023-01-01

## 2023-01-01 ENCOUNTER — OUTPATIENT (OUTPATIENT)
Dept: OUTPATIENT SERVICES | Facility: HOSPITAL | Age: 83
LOS: 1 days | End: 2023-01-01

## 2023-01-01 ENCOUNTER — APPOINTMENT (OUTPATIENT)
Dept: HEMATOLOGY ONCOLOGY | Facility: CLINIC | Age: 83
End: 2023-01-01
Payer: MEDICARE

## 2023-01-01 ENCOUNTER — INPATIENT (INPATIENT)
Facility: HOSPITAL | Age: 83
LOS: 4 days | Discharge: HOME | DRG: 872 | End: 2023-05-17
Attending: STUDENT IN AN ORGANIZED HEALTH CARE EDUCATION/TRAINING PROGRAM | Admitting: STUDENT IN AN ORGANIZED HEALTH CARE EDUCATION/TRAINING PROGRAM
Payer: MEDICARE

## 2023-01-01 ENCOUNTER — APPOINTMENT (OUTPATIENT)
Dept: INFUSION THERAPY | Facility: HOSPITAL | Age: 83
End: 2023-01-01

## 2023-01-01 VITALS
OXYGEN SATURATION: 98 % | HEIGHT: 63 IN | HEART RATE: 102 BPM | DIASTOLIC BLOOD PRESSURE: 53 MMHG | WEIGHT: 119.93 LBS | RESPIRATION RATE: 18 BRPM | SYSTOLIC BLOOD PRESSURE: 103 MMHG

## 2023-01-01 VITALS
RESPIRATION RATE: 16 BRPM | HEART RATE: 65 BPM | SYSTOLIC BLOOD PRESSURE: 113 MMHG | TEMPERATURE: 96.8 F | DIASTOLIC BLOOD PRESSURE: 71 MMHG | OXYGEN SATURATION: 99 %

## 2023-01-01 VITALS
HEART RATE: 76 BPM | DIASTOLIC BLOOD PRESSURE: 69 MMHG | SYSTOLIC BLOOD PRESSURE: 112 MMHG | TEMPERATURE: 98 F | RESPIRATION RATE: 18 BRPM | OXYGEN SATURATION: 100 %

## 2023-01-01 VITALS
TEMPERATURE: 96.3 F | DIASTOLIC BLOOD PRESSURE: 74 MMHG | OXYGEN SATURATION: 94 % | RESPIRATION RATE: 16 BRPM | HEART RATE: 63 BPM | SYSTOLIC BLOOD PRESSURE: 125 MMHG

## 2023-01-01 VITALS
TEMPERATURE: 97 F | DIASTOLIC BLOOD PRESSURE: 76 MMHG | OXYGEN SATURATION: 98 % | SYSTOLIC BLOOD PRESSURE: 166 MMHG | RESPIRATION RATE: 16 BRPM | HEART RATE: 64 BPM

## 2023-01-01 DIAGNOSIS — R50.9 FEVER, UNSPECIFIED: ICD-10-CM

## 2023-01-01 DIAGNOSIS — R78.81 BACTEREMIA: ICD-10-CM

## 2023-01-01 DIAGNOSIS — M06.9 RHEUMATOID ARTHRITIS, UNSPECIFIED: ICD-10-CM

## 2023-01-01 DIAGNOSIS — C90.00 MULTIPLE MYELOMA NOT HAVING ACHIEVED REMISSION: ICD-10-CM

## 2023-01-01 DIAGNOSIS — I10 ESSENTIAL (PRIMARY) HYPERTENSION: ICD-10-CM

## 2023-01-01 DIAGNOSIS — N17.9 ACUTE KIDNEY FAILURE, UNSPECIFIED: ICD-10-CM

## 2023-01-01 DIAGNOSIS — D63.8 ANEMIA IN OTHER CHRONIC DISEASES CLASSIFIED ELSEWHERE: ICD-10-CM

## 2023-01-01 DIAGNOSIS — Z02.9 ENCOUNTER FOR ADMINISTRATIVE EXAMINATIONS, UNSPECIFIED: ICD-10-CM

## 2023-01-01 DIAGNOSIS — A41.9 SEPSIS, UNSPECIFIED ORGANISM: ICD-10-CM

## 2023-01-01 DIAGNOSIS — R53.1 WEAKNESS: ICD-10-CM

## 2023-01-01 DIAGNOSIS — Z29.9 ENCOUNTER FOR PROPHYLACTIC MEASURES, UNSPECIFIED: ICD-10-CM

## 2023-01-01 DIAGNOSIS — R33.9 RETENTION OF URINE, UNSPECIFIED: ICD-10-CM

## 2023-01-01 DIAGNOSIS — F03.90 UNSPECIFIED DEMENTIA WITHOUT BEHAVIORAL DISTURBANCE: ICD-10-CM

## 2023-01-01 LAB
-  AMIKACIN: SIGNIFICANT CHANGE UP
-  AMPICILLIN/SULBACTAM: SIGNIFICANT CHANGE UP
-  AMPICILLIN: SIGNIFICANT CHANGE UP
-  AZTREONAM: SIGNIFICANT CHANGE UP
-  CEFAZOLIN: SIGNIFICANT CHANGE UP
-  CEFEPIME: SIGNIFICANT CHANGE UP
-  CEFOXITIN: SIGNIFICANT CHANGE UP
-  CEFTRIAXONE: SIGNIFICANT CHANGE UP
-  CIPROFLOXACIN: SIGNIFICANT CHANGE UP
-  ERTAPENEM: SIGNIFICANT CHANGE UP
-  GENTAMICIN: SIGNIFICANT CHANGE UP
-  IMIPENEM: SIGNIFICANT CHANGE UP
-  K. PNEUMONIAE GROUP: SIGNIFICANT CHANGE UP
-  LEVOFLOXACIN: SIGNIFICANT CHANGE UP
-  MEROPENEM: SIGNIFICANT CHANGE UP
-  PIPERACILLIN/TAZOBACTAM: SIGNIFICANT CHANGE UP
-  TOBRAMYCIN: SIGNIFICANT CHANGE UP
-  TRIMETHOPRIM/SULFAMETHOXAZOLE: SIGNIFICANT CHANGE UP
ALBUMIN MFR SERPL ELPH: 47.7 %
ALBUMIN MFR SERPL ELPH: 49.8 %
ALBUMIN MFR SERPL ELPH: 51.2 %
ALBUMIN SERPL ELPH-MCNC: 1.9 G/DL — LOW (ref 3.5–5)
ALBUMIN SERPL ELPH-MCNC: 2.3 G/DL — LOW (ref 3.5–5)
ALBUMIN SERPL ELPH-MCNC: 3 G/DL
ALBUMIN SERPL ELPH-MCNC: 3.5 G/DL
ALBUMIN SERPL ELPH-MCNC: 3.6 G/DL
ALBUMIN SERPL ELPH-MCNC: 4 G/DL
ALBUMIN SERPL-MCNC: 3.3 G/DL
ALBUMIN SERPL-MCNC: 3.4 G/DL
ALBUMIN SERPL-MCNC: 3.6 G/DL
ALBUMIN/GLOB SERPL: 0.9 RATIO
ALBUMIN/GLOB SERPL: 1 RATIO
ALBUMIN/GLOB SERPL: 1.1 RATIO
ALP BLD-CCNC: 110 U/L
ALP BLD-CCNC: 76 U/L
ALP BLD-CCNC: 89 U/L
ALP BLD-CCNC: 98 U/L
ALP SERPL-CCNC: 57 U/L — SIGNIFICANT CHANGE UP (ref 40–120)
ALP SERPL-CCNC: 82 U/L — SIGNIFICANT CHANGE UP (ref 40–120)
ALPHA1 GLOB MFR SERPL ELPH: 4.9 %
ALPHA1 GLOB MFR SERPL ELPH: 5 %
ALPHA1 GLOB MFR SERPL ELPH: 5.4 %
ALPHA1 GLOB SERPL ELPH-MCNC: 0.3 G/DL
ALPHA1 GLOB SERPL ELPH-MCNC: 0.4 G/DL
ALPHA1 GLOB SERPL ELPH-MCNC: 0.4 G/DL
ALPHA2 GLOB MFR SERPL ELPH: 10.6 %
ALPHA2 GLOB MFR SERPL ELPH: 11.8 %
ALPHA2 GLOB MFR SERPL ELPH: 12.3 %
ALPHA2 GLOB SERPL ELPH-MCNC: 0.7 G/DL
ALPHA2 GLOB SERPL ELPH-MCNC: 0.8 G/DL
ALPHA2 GLOB SERPL ELPH-MCNC: 0.9 G/DL
ALT FLD-CCNC: 18 U/L DA — SIGNIFICANT CHANGE UP (ref 10–60)
ALT FLD-CCNC: 23 U/L DA — SIGNIFICANT CHANGE UP (ref 10–60)
ALT SERPL-CCNC: 11 U/L
ALT SERPL-CCNC: 12 U/L
ALT SERPL-CCNC: 9 U/L
ALT SERPL-CCNC: 9 U/L
ANA TITR SER: NEGATIVE — SIGNIFICANT CHANGE UP
ANION GAP SERPL CALC-SCNC: 10 MMOL/L
ANION GAP SERPL CALC-SCNC: 10 MMOL/L
ANION GAP SERPL CALC-SCNC: 10 MMOL/L — SIGNIFICANT CHANGE UP (ref 5–17)
ANION GAP SERPL CALC-SCNC: 14 MMOL/L
ANION GAP SERPL CALC-SCNC: 15 MMOL/L
ANION GAP SERPL CALC-SCNC: 4 MMOL/L — LOW (ref 5–17)
ANION GAP SERPL CALC-SCNC: 5 MMOL/L — SIGNIFICANT CHANGE UP (ref 5–17)
APPEARANCE UR: ABNORMAL
APTT BLD: 21.1 SEC — LOW (ref 27.5–35.5)
AST SERPL-CCNC: 110 U/L — HIGH (ref 10–40)
AST SERPL-CCNC: 12 U/L
AST SERPL-CCNC: 13 U/L
AST SERPL-CCNC: 13 U/L
AST SERPL-CCNC: 5 U/L
AST SERPL-CCNC: 68 U/L — HIGH (ref 10–40)
B PERT DNA SPEC QL NAA+PROBE: SIGNIFICANT CHANGE UP
B-GLOBULIN MFR SERPL ELPH: 7.6 %
B-GLOBULIN MFR SERPL ELPH: 9.4 %
B-GLOBULIN MFR SERPL ELPH: 9.4 %
B-GLOBULIN SERPL ELPH-MCNC: 0.5 G/DL
B-GLOBULIN SERPL ELPH-MCNC: 0.6 G/DL
B-GLOBULIN SERPL ELPH-MCNC: 0.7 G/DL
B2 MICROGLOB SERPL-MCNC: 4.9 MG/L
BASOPHILS # BLD AUTO: 0 K/UL — SIGNIFICANT CHANGE UP (ref 0–0.2)
BASOPHILS # BLD AUTO: 0 K/UL — SIGNIFICANT CHANGE UP (ref 0–0.2)
BASOPHILS # BLD AUTO: 0.01 K/UL — SIGNIFICANT CHANGE UP (ref 0–0.2)
BASOPHILS # BLD AUTO: 0.02 K/UL — SIGNIFICANT CHANGE UP (ref 0–0.2)
BASOPHILS # BLD AUTO: 0.03 K/UL — SIGNIFICANT CHANGE UP (ref 0–0.2)
BASOPHILS # BLD AUTO: 0.03 K/UL — SIGNIFICANT CHANGE UP (ref 0–0.2)
BASOPHILS # BLD AUTO: 0.07 K/UL — SIGNIFICANT CHANGE UP (ref 0–0.2)
BASOPHILS NFR BLD AUTO: 0 % — SIGNIFICANT CHANGE UP (ref 0–2)
BASOPHILS NFR BLD AUTO: 0 % — SIGNIFICANT CHANGE UP (ref 0–2)
BASOPHILS NFR BLD AUTO: 0.4 % — SIGNIFICANT CHANGE UP (ref 0–2)
BASOPHILS NFR BLD AUTO: 0.8 % — SIGNIFICANT CHANGE UP (ref 0–2)
BASOPHILS NFR BLD AUTO: 0.9 % — SIGNIFICANT CHANGE UP (ref 0–2)
BASOPHILS NFR BLD AUTO: 0.9 % — SIGNIFICANT CHANGE UP (ref 0–2)
BASOPHILS NFR BLD AUTO: 1.8 % — SIGNIFICANT CHANGE UP (ref 0–2)
BILIRUB SERPL-MCNC: 0.2 MG/DL
BILIRUB SERPL-MCNC: 0.2 MG/DL — SIGNIFICANT CHANGE UP (ref 0.2–1.2)
BILIRUB SERPL-MCNC: 0.3 MG/DL — SIGNIFICANT CHANGE UP (ref 0.2–1.2)
BILIRUB UR-MCNC: NEGATIVE — SIGNIFICANT CHANGE UP
BLD GP AB SCN SERPL QL: SIGNIFICANT CHANGE UP
BUN SERPL-MCNC: 27 MG/DL
BUN SERPL-MCNC: 29 MG/DL
BUN SERPL-MCNC: 31 MG/DL — HIGH (ref 7–18)
BUN SERPL-MCNC: 32 MG/DL — HIGH (ref 7–18)
BUN SERPL-MCNC: 38 MG/DL
BUN SERPL-MCNC: 41 MG/DL
BUN SERPL-MCNC: 42 MG/DL — HIGH (ref 7–18)
BUN SERPL-MCNC: 47 MG/DL — HIGH (ref 7–18)
BUN SERPL-MCNC: 48 MG/DL — HIGH (ref 7–18)
C PNEUM DNA SPEC QL NAA+PROBE: SIGNIFICANT CHANGE UP
CALCIUM SERPL-MCNC: 7.6 MG/DL — LOW (ref 8.4–10.5)
CALCIUM SERPL-MCNC: 8.2 MG/DL — LOW (ref 8.4–10.5)
CALCIUM SERPL-MCNC: 8.4 MG/DL
CALCIUM SERPL-MCNC: 8.4 MG/DL
CALCIUM SERPL-MCNC: 8.6 MG/DL
CALCIUM SERPL-MCNC: 9 MG/DL
CALCIUM SERPL-MCNC: 9.1 MG/DL — SIGNIFICANT CHANGE UP (ref 8.4–10.5)
CALCIUM SERPL-MCNC: 9.1 MG/DL — SIGNIFICANT CHANGE UP (ref 8.4–10.5)
CALCIUM SERPL-MCNC: 9.3 MG/DL — SIGNIFICANT CHANGE UP (ref 8.4–10.5)
CHLORIDE SERPL-SCNC: 109 MMOL/L
CHLORIDE SERPL-SCNC: 111 MMOL/L
CHLORIDE SERPL-SCNC: 111 MMOL/L
CHLORIDE SERPL-SCNC: 113 MMOL/L — HIGH (ref 96–108)
CHLORIDE SERPL-SCNC: 114 MMOL/L
CHLORIDE SERPL-SCNC: 116 MMOL/L — HIGH (ref 96–108)
CHLORIDE SERPL-SCNC: 118 MMOL/L — HIGH (ref 96–108)
CHLORIDE SERPL-SCNC: 119 MMOL/L — HIGH (ref 96–108)
CHLORIDE SERPL-SCNC: 121 MMOL/L — HIGH (ref 96–108)
CO2 SERPL-SCNC: 15 MMOL/L — LOW (ref 22–31)
CO2 SERPL-SCNC: 16 MMOL/L
CO2 SERPL-SCNC: 16 MMOL/L
CO2 SERPL-SCNC: 16 MMOL/L — LOW (ref 22–31)
CO2 SERPL-SCNC: 17 MMOL/L
CO2 SERPL-SCNC: 19 MMOL/L
CO2 SERPL-SCNC: 19 MMOL/L — LOW (ref 22–31)
CO2 SERPL-SCNC: 20 MMOL/L — LOW (ref 22–31)
CO2 SERPL-SCNC: 20 MMOL/L — LOW (ref 22–31)
COLOR SPEC: YELLOW — SIGNIFICANT CHANGE UP
CREAT SERPL-MCNC: 1.59 MG/DL
CREAT SERPL-MCNC: 1.73 MG/DL
CREAT SERPL-MCNC: 1.9 MG/DL
CREAT SERPL-MCNC: 1.95 MG/DL
CREAT SERPL-MCNC: 1.95 MG/DL — HIGH (ref 0.5–1.3)
CREAT SERPL-MCNC: 2.03 MG/DL — HIGH (ref 0.5–1.3)
CREAT SERPL-MCNC: 2.52 MG/DL — HIGH (ref 0.5–1.3)
CREAT SERPL-MCNC: 2.54 MG/DL — HIGH (ref 0.5–1.3)
CREAT SERPL-MCNC: 2.58 MG/DL — HIGH (ref 0.5–1.3)
CULTURE RESULTS: SIGNIFICANT CHANGE UP
DACRYOCYTES BLD QL SMEAR: SLIGHT — SIGNIFICANT CHANGE UP
DEPRECATED KAPPA LC FREE/LAMBDA SER: 46.24 RATIO
DEPRECATED KAPPA LC FREE/LAMBDA SER: 62.04 RATIO
DEPRECATED KAPPA LC FREE/LAMBDA SER: 63.06 RATIO
DIFF PNL FLD: ABNORMAL
EGFR: 18 ML/MIN/1.73M2 — LOW
EGFR: 24 ML/MIN/1.73M2 — LOW
EGFR: 25 ML/MIN/1.73M2
EGFR: 25 ML/MIN/1.73M2 — LOW
EGFR: 26 ML/MIN/1.73M2
EGFR: 29 ML/MIN/1.73M2
EGFR: 32 ML/MIN/1.73M2
ELLIPTOCYTES BLD QL SMEAR: SLIGHT — SIGNIFICANT CHANGE UP
EOSINOPHIL # BLD AUTO: 0 K/UL — SIGNIFICANT CHANGE UP (ref 0–0.5)
EOSINOPHIL # BLD AUTO: 0 K/UL — SIGNIFICANT CHANGE UP (ref 0–0.5)
EOSINOPHIL # BLD AUTO: 0.01 K/UL — SIGNIFICANT CHANGE UP (ref 0–0.5)
EOSINOPHIL # BLD AUTO: 0.02 K/UL — SIGNIFICANT CHANGE UP (ref 0–0.5)
EOSINOPHIL # BLD AUTO: 0.11 K/UL — SIGNIFICANT CHANGE UP (ref 0–0.5)
EOSINOPHIL NFR BLD AUTO: 0 % — SIGNIFICANT CHANGE UP (ref 0–6)
EOSINOPHIL NFR BLD AUTO: 0 % — SIGNIFICANT CHANGE UP (ref 0–6)
EOSINOPHIL NFR BLD AUTO: 0.4 % — SIGNIFICANT CHANGE UP (ref 0–6)
EOSINOPHIL NFR BLD AUTO: 0.6 % — SIGNIFICANT CHANGE UP (ref 0–6)
EOSINOPHIL NFR BLD AUTO: 0.6 % — SIGNIFICANT CHANGE UP (ref 0–6)
EOSINOPHIL NFR BLD AUTO: 0.8 % — SIGNIFICANT CHANGE UP (ref 0–6)
EOSINOPHIL NFR BLD AUTO: 2.8 % — SIGNIFICANT CHANGE UP (ref 0–6)
EPO SERPL-MCNC: 22.6 MIU/ML — HIGH (ref 2.6–18.5)
FERRITIN SERPL-MCNC: 283 NG/ML — HIGH (ref 15–150)
FLUAV H1 2009 PAND RNA SPEC QL NAA+PROBE: SIGNIFICANT CHANGE UP
FLUAV H1 RNA SPEC QL NAA+PROBE: SIGNIFICANT CHANGE UP
FLUAV H3 RNA SPEC QL NAA+PROBE: SIGNIFICANT CHANGE UP
FLUAV SUBTYP SPEC NAA+PROBE: SIGNIFICANT CHANGE UP
FLUBV RNA SPEC QL NAA+PROBE: SIGNIFICANT CHANGE UP
FOLATE SERPL-MCNC: >20 NG/ML — SIGNIFICANT CHANGE UP
GAMMA GLOB FLD ELPH-MCNC: 1.4 G/DL
GAMMA GLOB FLD ELPH-MCNC: 1.8 G/DL
GAMMA GLOB FLD ELPH-MCNC: 2 G/DL
GAMMA GLOB MFR SERPL ELPH: 21.7 %
GAMMA GLOB MFR SERPL ELPH: 24 %
GAMMA GLOB MFR SERPL ELPH: 29.2 %
GLUCOSE SERPL-MCNC: 100 MG/DL
GLUCOSE SERPL-MCNC: 104 MG/DL — HIGH (ref 70–99)
GLUCOSE SERPL-MCNC: 116 MG/DL — HIGH (ref 70–99)
GLUCOSE SERPL-MCNC: 208 MG/DL — HIGH (ref 70–99)
GLUCOSE SERPL-MCNC: 214 MG/DL
GLUCOSE SERPL-MCNC: 214 MG/DL
GLUCOSE SERPL-MCNC: 242 MG/DL
GLUCOSE SERPL-MCNC: 93 MG/DL — SIGNIFICANT CHANGE UP (ref 70–99)
GLUCOSE SERPL-MCNC: 95 MG/DL — SIGNIFICANT CHANGE UP (ref 70–99)
GLUCOSE UR QL: NEGATIVE — SIGNIFICANT CHANGE UP
GRAM STN FLD: SIGNIFICANT CHANGE UP
HADV DNA SPEC QL NAA+PROBE: SIGNIFICANT CHANGE UP
HCOV PNL SPEC NAA+PROBE: SIGNIFICANT CHANGE UP
HCT VFR BLD CALC: 21.1 % — LOW (ref 34.5–45)
HCT VFR BLD CALC: 22 % — LOW (ref 34.5–45)
HCT VFR BLD CALC: 23.5 % — LOW (ref 34.5–45)
HCT VFR BLD CALC: 23.7 % — LOW (ref 34.5–45)
HCT VFR BLD CALC: 26.4 % — LOW (ref 34.5–45)
HCT VFR BLD CALC: 27.5 % — LOW (ref 34.5–45)
HCT VFR BLD CALC: 29.2 % — LOW (ref 34.5–45)
HCT VFR BLD CALC: 29.5 % — LOW (ref 34.5–45)
HCT VFR BLD CALC: 31.3 % — LOW (ref 34.5–45)
HCT VFR BLD CALC: 31.3 % — LOW (ref 34.5–45)
HCT VFR BLD CALC: 34.9 % — SIGNIFICANT CHANGE UP (ref 34.5–45)
HGB BLD-MCNC: 10 G/DL — LOW (ref 11.5–15.5)
HGB BLD-MCNC: 10.4 G/DL — LOW (ref 11.5–15.5)
HGB BLD-MCNC: 10.4 G/DL — LOW (ref 11.5–15.5)
HGB BLD-MCNC: 11.2 G/DL — LOW (ref 11.5–15.5)
HGB BLD-MCNC: 6.8 G/DL — CRITICAL LOW (ref 11.5–15.5)
HGB BLD-MCNC: 7.1 G/DL — LOW (ref 11.5–15.5)
HGB BLD-MCNC: 7.5 G/DL — LOW (ref 11.5–15.5)
HGB BLD-MCNC: 7.6 G/DL — LOW (ref 11.5–15.5)
HGB BLD-MCNC: 9.1 G/DL — LOW (ref 11.5–15.5)
HGB BLD-MCNC: 9.1 G/DL — LOW (ref 11.5–15.5)
HGB BLD-MCNC: 9.5 G/DL — LOW (ref 11.5–15.5)
HMPV RNA SPEC QL NAA+PROBE: SIGNIFICANT CHANGE UP
HPIV1 RNA SPEC QL NAA+PROBE: SIGNIFICANT CHANGE UP
HPIV2 RNA SPEC QL NAA+PROBE: SIGNIFICANT CHANGE UP
HPIV3 RNA SPEC QL NAA+PROBE: SIGNIFICANT CHANGE UP
HPIV4 RNA SPEC QL NAA+PROBE: SIGNIFICANT CHANGE UP
HYPOCHROMIA BLD QL: SLIGHT — SIGNIFICANT CHANGE UP
IGA SER QL IEP: 30 MG/DL
IGA SER QL IEP: 35 MG/DL
IGA SER QL IEP: 44 MG/DL
IGG SER QL IEP: 1328 MG/DL
IGG SER QL IEP: 1776 MG/DL
IGG SER QL IEP: 1977 MG/DL
IGM SER QL IEP: 41 MG/DL
IGM SER QL IEP: 43 MG/DL
IGM SER QL IEP: 48 MG/DL
IMM GRANULOCYTES NFR BLD AUTO: 0.3 % — SIGNIFICANT CHANGE UP (ref 0–0.9)
IMM GRANULOCYTES NFR BLD AUTO: 0.4 % — SIGNIFICANT CHANGE UP (ref 0–0.9)
IMM GRANULOCYTES NFR BLD AUTO: 0.9 % — SIGNIFICANT CHANGE UP (ref 0–0.9)
IMM GRANULOCYTES NFR BLD AUTO: 0.9 % — SIGNIFICANT CHANGE UP (ref 0–0.9)
IMM GRANULOCYTES NFR BLD AUTO: 1.7 % — HIGH (ref 0–0.9)
INR BLD: 1.28 RATIO — HIGH (ref 0.88–1.16)
INTERPRETATION SERPL IEP-IMP: NORMAL
IRON SATN MFR SERPL: 28 % — SIGNIFICANT CHANGE UP (ref 15–50)
IRON SATN MFR SERPL: 56 UG/DL — SIGNIFICANT CHANGE UP (ref 40–150)
KAPPA LC CSF-MCNC: 1.69 MG/DL
KAPPA LC CSF-MCNC: 1.72 MG/DL
KAPPA LC CSF-MCNC: 2.02 MG/DL
KAPPA LC SERPL-MCNC: 104.85 MG/DL
KAPPA LC SERPL-MCNC: 108.47 MG/DL
KAPPA LC SERPL-MCNC: 93.4 MG/DL
KETONES UR-MCNC: NEGATIVE — SIGNIFICANT CHANGE UP
LACTATE SERPL-SCNC: 1.3 MMOL/L — SIGNIFICANT CHANGE UP (ref 0.7–2)
LACTATE SERPL-SCNC: 3.6 MMOL/L — HIGH (ref 0.7–2)
LACTATE SERPL-SCNC: 3.7 MMOL/L — HIGH (ref 0.7–2)
LDH SERPL-CCNC: 161 U/L
LDH SERPL-CCNC: 166 U/L
LDH SERPL-CCNC: 190 U/L
LEUKOCYTE ESTERASE UR-ACNC: ABNORMAL
LYMPHOCYTES # BLD AUTO: 0.14 K/UL — LOW (ref 1–3.3)
LYMPHOCYTES # BLD AUTO: 0.28 K/UL — LOW (ref 1–3.3)
LYMPHOCYTES # BLD AUTO: 0.6 K/UL — LOW (ref 1–3.3)
LYMPHOCYTES # BLD AUTO: 0.6 K/UL — LOW (ref 1–3.3)
LYMPHOCYTES # BLD AUTO: 0.69 K/UL — LOW (ref 1–3.3)
LYMPHOCYTES # BLD AUTO: 0.78 K/UL — LOW (ref 1–3.3)
LYMPHOCYTES # BLD AUTO: 1.81 K/UL — SIGNIFICANT CHANGE UP (ref 1–3.3)
LYMPHOCYTES # BLD AUTO: 12.2 % — LOW (ref 13–44)
LYMPHOCYTES # BLD AUTO: 18.8 % — SIGNIFICANT CHANGE UP (ref 13–44)
LYMPHOCYTES # BLD AUTO: 18.8 % — SIGNIFICANT CHANGE UP (ref 13–44)
LYMPHOCYTES # BLD AUTO: 26.6 % — SIGNIFICANT CHANGE UP (ref 13–44)
LYMPHOCYTES # BLD AUTO: 27 % — SIGNIFICANT CHANGE UP (ref 13–44)
LYMPHOCYTES # BLD AUTO: 45.5 % — HIGH (ref 13–44)
LYMPHOCYTES # BLD AUTO: 8 % — LOW (ref 13–44)
M PROTEIN MFR SERPL ELPH: 14.3 %
M PROTEIN MFR SERPL ELPH: 19.4 %
M PROTEIN MFR SERPL ELPH: 22.6 %
M PROTEIN SPEC IFE-MCNC: NORMAL
MAGNESIUM SERPL-MCNC: 2 MG/DL — SIGNIFICANT CHANGE UP (ref 1.6–2.6)
MAGNESIUM SERPL-MCNC: 2.1 MG/DL
MAGNESIUM SERPL-MCNC: 2.2 MG/DL
MAGNESIUM SERPL-MCNC: 2.4 MG/DL
MCHC RBC-ENTMCNC: 29.1 PG — SIGNIFICANT CHANGE UP (ref 27–34)
MCHC RBC-ENTMCNC: 29.2 PG — SIGNIFICANT CHANGE UP (ref 27–34)
MCHC RBC-ENTMCNC: 29.2 PG — SIGNIFICANT CHANGE UP (ref 27–34)
MCHC RBC-ENTMCNC: 29.3 PG — SIGNIFICANT CHANGE UP (ref 27–34)
MCHC RBC-ENTMCNC: 29.7 PG — SIGNIFICANT CHANGE UP (ref 27–34)
MCHC RBC-ENTMCNC: 30.4 PG — SIGNIFICANT CHANGE UP (ref 27–34)
MCHC RBC-ENTMCNC: 30.4 PG — SIGNIFICANT CHANGE UP (ref 27–34)
MCHC RBC-ENTMCNC: 31 PG — SIGNIFICANT CHANGE UP (ref 27–34)
MCHC RBC-ENTMCNC: 31 PG — SIGNIFICANT CHANGE UP (ref 27–34)
MCHC RBC-ENTMCNC: 31.9 G/DL — LOW (ref 32–36)
MCHC RBC-ENTMCNC: 32.1 G/DL — SIGNIFICANT CHANGE UP (ref 32–36)
MCHC RBC-ENTMCNC: 32.1 GM/DL — SIGNIFICANT CHANGE UP (ref 32–36)
MCHC RBC-ENTMCNC: 32.2 GM/DL — SIGNIFICANT CHANGE UP (ref 32–36)
MCHC RBC-ENTMCNC: 32.3 GM/DL — SIGNIFICANT CHANGE UP (ref 32–36)
MCHC RBC-ENTMCNC: 32.5 G/DL — SIGNIFICANT CHANGE UP (ref 32–36)
MCHC RBC-ENTMCNC: 33.1 GM/DL — SIGNIFICANT CHANGE UP (ref 32–36)
MCHC RBC-ENTMCNC: 33.2 G/DL — SIGNIFICANT CHANGE UP (ref 32–36)
MCHC RBC-ENTMCNC: 33.2 G/DL — SIGNIFICANT CHANGE UP (ref 32–36)
MCHC RBC-ENTMCNC: 33.9 GM/DL — SIGNIFICANT CHANGE UP (ref 32–36)
MCHC RBC-ENTMCNC: 34.5 GM/DL — SIGNIFICANT CHANGE UP (ref 32–36)
MCV RBC AUTO: 86.3 FL — SIGNIFICANT CHANGE UP (ref 80–100)
MCV RBC AUTO: 86.3 FL — SIGNIFICANT CHANGE UP (ref 80–100)
MCV RBC AUTO: 87.9 FL — SIGNIFICANT CHANGE UP (ref 80–100)
MCV RBC AUTO: 90.2 FL — SIGNIFICANT CHANGE UP (ref 80–100)
MCV RBC AUTO: 90.8 FL — SIGNIFICANT CHANGE UP (ref 80–100)
MCV RBC AUTO: 90.9 FL — SIGNIFICANT CHANGE UP (ref 80–100)
MCV RBC AUTO: 91.4 FL — SIGNIFICANT CHANGE UP (ref 80–100)
MCV RBC AUTO: 93.2 FL — SIGNIFICANT CHANGE UP (ref 80–100)
MCV RBC AUTO: 93.2 FL — SIGNIFICANT CHANGE UP (ref 80–100)
MCV RBC AUTO: 93.6 FL — SIGNIFICANT CHANGE UP (ref 80–100)
MCV RBC AUTO: 94.8 FL — SIGNIFICANT CHANGE UP (ref 80–100)
METHOD TYPE: SIGNIFICANT CHANGE UP
METHOD TYPE: SIGNIFICANT CHANGE UP
MONOCLON BAND OBS SERPL: 0.9 G/DL
MONOCLON BAND OBS SERPL: 1.4 G/DL
MONOCLON BAND OBS SERPL: 1.6 G/DL
MONOCYTES # BLD AUTO: 0.05 K/UL — SIGNIFICANT CHANGE UP (ref 0–0.9)
MONOCYTES # BLD AUTO: 0.1 K/UL — SIGNIFICANT CHANGE UP (ref 0–0.9)
MONOCYTES # BLD AUTO: 0.1 K/UL — SIGNIFICANT CHANGE UP (ref 0–0.9)
MONOCYTES # BLD AUTO: 0.11 K/UL — SIGNIFICANT CHANGE UP (ref 0–0.9)
MONOCYTES # BLD AUTO: 0.23 K/UL — SIGNIFICANT CHANGE UP (ref 0–0.9)
MONOCYTES # BLD AUTO: 0.3 K/UL — SIGNIFICANT CHANGE UP (ref 0–0.9)
MONOCYTES # BLD AUTO: 0.35 K/UL — SIGNIFICANT CHANGE UP (ref 0–0.9)
MONOCYTES NFR BLD AUTO: 1.9 % — LOW (ref 2–14)
MONOCYTES NFR BLD AUTO: 13.1 % — SIGNIFICANT CHANGE UP (ref 2–14)
MONOCYTES NFR BLD AUTO: 3.1 % — SIGNIFICANT CHANGE UP (ref 2–14)
MONOCYTES NFR BLD AUTO: 3.1 % — SIGNIFICANT CHANGE UP (ref 2–14)
MONOCYTES NFR BLD AUTO: 6 % — SIGNIFICANT CHANGE UP (ref 2–14)
MONOCYTES NFR BLD AUTO: 8 % — SIGNIFICANT CHANGE UP (ref 2–14)
MONOCYTES NFR BLD AUTO: 8.8 % — SIGNIFICANT CHANGE UP (ref 2–14)
NEUTROPHILS # BLD AUTO: 1.51 K/UL — LOW (ref 1.8–7.4)
NEUTROPHILS # BLD AUTO: 1.63 K/UL — LOW (ref 1.8–7.4)
NEUTROPHILS # BLD AUTO: 1.65 K/UL — LOW (ref 1.8–7.4)
NEUTROPHILS # BLD AUTO: 1.8 K/UL — SIGNIFICANT CHANGE UP (ref 1.8–7.4)
NEUTROPHILS # BLD AUTO: 1.89 K/UL — SIGNIFICANT CHANGE UP (ref 1.8–7.4)
NEUTROPHILS # BLD AUTO: 2.42 K/UL — SIGNIFICANT CHANGE UP (ref 1.8–7.4)
NEUTROPHILS # BLD AUTO: 2.42 K/UL — SIGNIFICANT CHANGE UP (ref 1.8–7.4)
NEUTROPHILS NFR BLD AUTO: 40.8 % — LOW (ref 43–77)
NEUTROPHILS NFR BLD AUTO: 65 % — SIGNIFICANT CHANGE UP (ref 43–77)
NEUTROPHILS NFR BLD AUTO: 69.5 % — SIGNIFICANT CHANGE UP (ref 43–77)
NEUTROPHILS NFR BLD AUTO: 72.2 % — SIGNIFICANT CHANGE UP (ref 43–77)
NEUTROPHILS NFR BLD AUTO: 75.7 % — SIGNIFICANT CHANGE UP (ref 43–77)
NEUTROPHILS NFR BLD AUTO: 75.7 % — SIGNIFICANT CHANGE UP (ref 43–77)
NEUTROPHILS NFR BLD AUTO: 77 % — SIGNIFICANT CHANGE UP (ref 43–77)
NITRITE UR-MCNC: NEGATIVE — SIGNIFICANT CHANGE UP
NRBC # BLD: 0 /100 WBCS — SIGNIFICANT CHANGE UP (ref 0–0)
NRBC # BLD: 0 /100 — SIGNIFICANT CHANGE UP (ref 0–0)
NRBC # BLD: SIGNIFICANT CHANGE UP /100 WBCS (ref 0–0)
ORGANISM # SPEC MICROSCOPIC CNT: SIGNIFICANT CHANGE UP
PH UR: 6 — SIGNIFICANT CHANGE UP (ref 5–8)
PHOSPHATE SERPL-MCNC: 3.8 MG/DL — SIGNIFICANT CHANGE UP (ref 2.5–4.5)
PLAT MORPH BLD: NORMAL — SIGNIFICANT CHANGE UP
PLATELET # BLD AUTO: 117 K/UL — LOW (ref 150–400)
PLATELET # BLD AUTO: 135 K/UL — LOW (ref 150–400)
PLATELET # BLD AUTO: 144 K/UL — LOW (ref 150–400)
PLATELET # BLD AUTO: 147 K/UL — LOW (ref 150–400)
PLATELET # BLD AUTO: 150 K/UL — SIGNIFICANT CHANGE UP (ref 150–400)
PLATELET # BLD AUTO: 155 K/UL — SIGNIFICANT CHANGE UP (ref 150–400)
PLATELET # BLD AUTO: 163 K/UL — SIGNIFICANT CHANGE UP (ref 150–400)
PLATELET # BLD AUTO: 168 K/UL — SIGNIFICANT CHANGE UP (ref 150–400)
PLATELET # BLD AUTO: 170 K/UL — SIGNIFICANT CHANGE UP (ref 150–400)
POIKILOCYTOSIS BLD QL AUTO: SLIGHT — SIGNIFICANT CHANGE UP
POTASSIUM SERPL-MCNC: 4.4 MMOL/L — SIGNIFICANT CHANGE UP (ref 3.5–5.3)
POTASSIUM SERPL-MCNC: 4.6 MMOL/L — SIGNIFICANT CHANGE UP (ref 3.5–5.3)
POTASSIUM SERPL-MCNC: 4.7 MMOL/L — SIGNIFICANT CHANGE UP (ref 3.5–5.3)
POTASSIUM SERPL-MCNC: 4.8 MMOL/L — SIGNIFICANT CHANGE UP (ref 3.5–5.3)
POTASSIUM SERPL-MCNC: 4.9 MMOL/L — SIGNIFICANT CHANGE UP (ref 3.5–5.3)
POTASSIUM SERPL-SCNC: 4.4 MMOL/L — SIGNIFICANT CHANGE UP (ref 3.5–5.3)
POTASSIUM SERPL-SCNC: 4.5 MMOL/L
POTASSIUM SERPL-SCNC: 4.6 MMOL/L
POTASSIUM SERPL-SCNC: 4.6 MMOL/L — SIGNIFICANT CHANGE UP (ref 3.5–5.3)
POTASSIUM SERPL-SCNC: 4.7 MMOL/L — SIGNIFICANT CHANGE UP (ref 3.5–5.3)
POTASSIUM SERPL-SCNC: 4.8 MMOL/L — SIGNIFICANT CHANGE UP (ref 3.5–5.3)
POTASSIUM SERPL-SCNC: 4.9 MMOL/L
POTASSIUM SERPL-SCNC: 4.9 MMOL/L — SIGNIFICANT CHANGE UP (ref 3.5–5.3)
POTASSIUM SERPL-SCNC: 5.3 MMOL/L
PROT SERPL-MCNC: 6.5 G/DL — SIGNIFICANT CHANGE UP (ref 6–8.3)
PROT SERPL-MCNC: 6.6 G/DL
PROT SERPL-MCNC: 7 G/DL
PROT SERPL-MCNC: 7.1 G/DL
PROT SERPL-MCNC: 7.1 G/DL
PROT SERPL-MCNC: 7.3 G/DL
PROT SERPL-MCNC: 7.3 G/DL
PROT SERPL-MCNC: 7.4 G/DL — SIGNIFICANT CHANGE UP (ref 6–8.3)
PROT UR-MCNC: 30 MG/DL
PROTHROM AB SERPL-ACNC: 15.3 SEC — HIGH (ref 10.5–13.4)
RAPID RVP RESULT: SIGNIFICANT CHANGE UP
RBC # BLD: 2.32 M/UL — LOW (ref 3.8–5.2)
RBC # BLD: 2.44 M/UL — LOW (ref 3.8–5.2)
RBC # BLD: 2.57 M/UL — LOW (ref 3.8–5.2)
RBC # BLD: 2.61 M/UL — LOW (ref 3.8–5.2)
RBC # BLD: 3.06 M/UL — LOW (ref 3.8–5.2)
RBC # BLD: 3.12 M/UL — LOW (ref 3.8–5.2)
RBC # BLD: 3.13 M/UL — LOW (ref 3.8–5.2)
RBC # BLD: 3.36 M/UL — LOW (ref 3.8–5.2)
RBC # BLD: 3.36 M/UL — LOW (ref 3.8–5.2)
RBC # BLD: 3.42 M/UL — LOW (ref 3.8–5.2)
RBC # BLD: 3.68 M/UL — LOW (ref 3.8–5.2)
RBC # FLD: 14.7 % — HIGH (ref 10.3–14.5)
RBC # FLD: 15 % — HIGH (ref 10.3–14.5)
RBC # FLD: 15 % — HIGH (ref 10.3–14.5)
RBC # FLD: 15.6 % — HIGH (ref 10.3–14.5)
RBC # FLD: 17.1 % — HIGH (ref 10.3–14.5)
RBC # FLD: 17.5 % — HIGH (ref 10.3–14.5)
RBC # FLD: 17.8 % — HIGH (ref 10.3–14.5)
RBC # FLD: 18.3 % — HIGH (ref 10.3–14.5)
RBC # FLD: 18.5 % — HIGH (ref 10.3–14.5)
RBC # FLD: 18.5 % — HIGH (ref 10.3–14.5)
RBC # FLD: 18.6 % — HIGH (ref 10.3–14.5)
RBC BLD AUTO: ABNORMAL
RHEUMATOID FACT SERPL-ACNC: 28 IU/ML — HIGH (ref 0–13)
RSV RNA SPEC QL NAA+PROBE: SIGNIFICANT CHANGE UP
RV+EV RNA SPEC QL NAA+PROBE: SIGNIFICANT CHANGE UP
SARS-COV-2 RNA SPEC QL NAA+PROBE: SIGNIFICANT CHANGE UP
SCHISTOCYTES BLD QL AUTO: SLIGHT — SIGNIFICANT CHANGE UP
SODIUM SERPL-SCNC: 138 MMOL/L
SODIUM SERPL-SCNC: 139 MMOL/L
SODIUM SERPL-SCNC: 139 MMOL/L — SIGNIFICANT CHANGE UP (ref 135–145)
SODIUM SERPL-SCNC: 140 MMOL/L — SIGNIFICANT CHANGE UP (ref 135–145)
SODIUM SERPL-SCNC: 141 MMOL/L
SODIUM SERPL-SCNC: 141 MMOL/L — SIGNIFICANT CHANGE UP (ref 135–145)
SODIUM SERPL-SCNC: 142 MMOL/L
SODIUM SERPL-SCNC: 142 MMOL/L — SIGNIFICANT CHANGE UP (ref 135–145)
SODIUM SERPL-SCNC: 142 MMOL/L — SIGNIFICANT CHANGE UP (ref 135–145)
SP GR SPEC: 1.01 — SIGNIFICANT CHANGE UP (ref 1.01–1.02)
SPECIMEN SOURCE: SIGNIFICANT CHANGE UP
TIBC SERPL-MCNC: 200 UG/DL — LOW (ref 250–450)
TSH SERPL-MCNC: 1.15 UU/ML — SIGNIFICANT CHANGE UP (ref 0.34–4.82)
UIBC SERPL-MCNC: 144 UG/DL — SIGNIFICANT CHANGE UP (ref 110–370)
UROBILINOGEN FLD QL: NEGATIVE — SIGNIFICANT CHANGE UP
VIT B12 SERPL-MCNC: 622 PG/ML — SIGNIFICANT CHANGE UP (ref 232–1245)
WBC # BLD: 1.76 K/UL — LOW (ref 3.8–10.5)
WBC # BLD: 2.29 K/UL — LOW (ref 3.8–10.5)
WBC # BLD: 2.32 K/UL — LOW (ref 3.8–10.5)
WBC # BLD: 2.47 K/UL — LOW (ref 3.8–10.5)
WBC # BLD: 2.59 K/UL — LOW (ref 3.8–10.5)
WBC # BLD: 2.9 K/UL — LOW (ref 3.8–10.5)
WBC # BLD: 2.93 K/UL — LOW (ref 3.8–10.5)
WBC # BLD: 2.94 K/UL — LOW (ref 3.8–10.5)
WBC # BLD: 3.2 K/UL — LOW (ref 3.8–10.5)
WBC # BLD: 3.2 K/UL — LOW (ref 3.8–10.5)
WBC # BLD: 3.98 K/UL — SIGNIFICANT CHANGE UP (ref 3.8–10.5)
WBC # FLD AUTO: 1.76 K/UL — LOW (ref 3.8–10.5)
WBC # FLD AUTO: 2.29 K/UL — LOW (ref 3.8–10.5)
WBC # FLD AUTO: 2.32 K/UL — LOW (ref 3.8–10.5)
WBC # FLD AUTO: 2.47 K/UL — LOW (ref 3.8–10.5)
WBC # FLD AUTO: 2.59 K/UL — LOW (ref 3.8–10.5)
WBC # FLD AUTO: 2.9 K/UL — LOW (ref 3.8–10.5)
WBC # FLD AUTO: 2.93 K/UL — LOW (ref 3.8–10.5)
WBC # FLD AUTO: 2.94 K/UL — LOW (ref 3.8–10.5)
WBC # FLD AUTO: 3.2 K/UL — LOW (ref 3.8–10.5)
WBC # FLD AUTO: 3.2 K/UL — LOW (ref 3.8–10.5)
WBC # FLD AUTO: 3.98 K/UL — SIGNIFICANT CHANGE UP (ref 3.8–10.5)

## 2023-01-01 PROCEDURE — 93005 ELECTROCARDIOGRAM TRACING: CPT

## 2023-01-01 PROCEDURE — 86901 BLOOD TYPING SEROLOGIC RH(D): CPT

## 2023-01-01 PROCEDURE — 36415 COLL VENOUS BLD VENIPUNCTURE: CPT

## 2023-01-01 PROCEDURE — 86900 BLOOD TYPING SEROLOGIC ABO: CPT

## 2023-01-01 PROCEDURE — 86038 ANTINUCLEAR ANTIBODIES: CPT

## 2023-01-01 PROCEDURE — 87040 BLOOD CULTURE FOR BACTERIA: CPT

## 2023-01-01 PROCEDURE — 80048 BASIC METABOLIC PNL TOTAL CA: CPT

## 2023-01-01 PROCEDURE — 85027 COMPLETE CBC AUTOMATED: CPT

## 2023-01-01 PROCEDURE — 36430 TRANSFUSION BLD/BLD COMPNT: CPT

## 2023-01-01 PROCEDURE — 82746 ASSAY OF FOLIC ACID SERUM: CPT

## 2023-01-01 PROCEDURE — 84443 ASSAY THYROID STIM HORMONE: CPT

## 2023-01-01 PROCEDURE — 99239 HOSP IP/OBS DSCHRG MGMT >30: CPT

## 2023-01-01 PROCEDURE — 99233 SBSQ HOSP IP/OBS HIGH 50: CPT

## 2023-01-01 PROCEDURE — 87150 DNA/RNA AMPLIFIED PROBE: CPT

## 2023-01-01 PROCEDURE — 99285 EMERGENCY DEPT VISIT HI MDM: CPT | Mod: 25

## 2023-01-01 PROCEDURE — 99223 1ST HOSP IP/OBS HIGH 75: CPT | Mod: GC

## 2023-01-01 PROCEDURE — 87186 SC STD MICRODIL/AGAR DIL: CPT

## 2023-01-01 PROCEDURE — 86850 RBC ANTIBODY SCREEN: CPT

## 2023-01-01 PROCEDURE — 71045 X-RAY EXAM CHEST 1 VIEW: CPT

## 2023-01-01 PROCEDURE — 99232 SBSQ HOSP IP/OBS MODERATE 35: CPT | Mod: FS

## 2023-01-01 PROCEDURE — 86431 RHEUMATOID FACTOR QUANT: CPT

## 2023-01-01 PROCEDURE — 99214 OFFICE O/P EST MOD 30 MIN: CPT

## 2023-01-01 PROCEDURE — 71045 X-RAY EXAM CHEST 1 VIEW: CPT | Mod: 26

## 2023-01-01 PROCEDURE — 85730 THROMBOPLASTIN TIME PARTIAL: CPT

## 2023-01-01 PROCEDURE — 82607 VITAMIN B-12: CPT

## 2023-01-01 PROCEDURE — 81001 URINALYSIS AUTO W/SCOPE: CPT

## 2023-01-01 PROCEDURE — 86923 COMPATIBILITY TEST ELECTRIC: CPT

## 2023-01-01 PROCEDURE — 82728 ASSAY OF FERRITIN: CPT

## 2023-01-01 PROCEDURE — 70450 CT HEAD/BRAIN W/O DYE: CPT | Mod: 26,MG

## 2023-01-01 PROCEDURE — 83550 IRON BINDING TEST: CPT

## 2023-01-01 PROCEDURE — 84100 ASSAY OF PHOSPHORUS: CPT

## 2023-01-01 PROCEDURE — 83540 ASSAY OF IRON: CPT

## 2023-01-01 PROCEDURE — 82668 ASSAY OF ERYTHROPOIETIN: CPT

## 2023-01-01 PROCEDURE — 87077 CULTURE AEROBIC IDENTIFY: CPT

## 2023-01-01 PROCEDURE — 85610 PROTHROMBIN TIME: CPT

## 2023-01-01 PROCEDURE — 83605 ASSAY OF LACTIC ACID: CPT

## 2023-01-01 PROCEDURE — G1004: CPT

## 2023-01-01 PROCEDURE — 83735 ASSAY OF MAGNESIUM: CPT

## 2023-01-01 PROCEDURE — 70450 CT HEAD/BRAIN W/O DYE: CPT | Mod: MG

## 2023-01-01 PROCEDURE — 80053 COMPREHEN METABOLIC PANEL: CPT

## 2023-01-01 PROCEDURE — 99232 SBSQ HOSP IP/OBS MODERATE 35: CPT

## 2023-01-01 PROCEDURE — 85025 COMPLETE CBC W/AUTO DIFF WBC: CPT

## 2023-01-01 PROCEDURE — 99285 EMERGENCY DEPT VISIT HI MDM: CPT

## 2023-01-01 PROCEDURE — 0225U NFCT DS DNA&RNA 21 SARSCOV2: CPT

## 2023-01-01 PROCEDURE — P9040: CPT

## 2023-01-01 RX ORDER — AMLODIPINE BESYLATE 2.5 MG/1
2.5 TABLET ORAL DAILY
Refills: 0 | Status: DISCONTINUED | OUTPATIENT
Start: 2023-01-01 | End: 2023-01-01

## 2023-01-01 RX ORDER — CEFEPIME 1 G/1
1000 INJECTION, POWDER, FOR SOLUTION INTRAMUSCULAR; INTRAVENOUS ONCE
Refills: 0 | Status: COMPLETED | OUTPATIENT
Start: 2023-01-01 | End: 2023-01-01

## 2023-01-01 RX ORDER — ONDANSETRON 8 MG/1
4 TABLET, FILM COATED ORAL EVERY 8 HOURS
Refills: 0 | Status: DISCONTINUED | OUTPATIENT
Start: 2023-01-01 | End: 2023-01-01

## 2023-01-01 RX ORDER — FOLIC ACID 0.8 MG
1 TABLET ORAL DAILY
Refills: 0 | Status: DISCONTINUED | OUTPATIENT
Start: 2023-01-01 | End: 2023-01-01

## 2023-01-01 RX ORDER — SODIUM BICARBONATE 1 MEQ/ML
650 SYRINGE (ML) INTRAVENOUS
Refills: 0 | Status: DISCONTINUED | OUTPATIENT
Start: 2023-01-01 | End: 2023-01-01

## 2023-01-01 RX ORDER — VANCOMYCIN HCL 1 G
1000 VIAL (EA) INTRAVENOUS ONCE
Refills: 0 | Status: COMPLETED | OUTPATIENT
Start: 2023-01-01 | End: 2023-01-01

## 2023-01-01 RX ORDER — ATORVASTATIN CALCIUM 80 MG/1
20 TABLET, FILM COATED ORAL AT BEDTIME
Refills: 0 | Status: DISCONTINUED | OUTPATIENT
Start: 2023-01-01 | End: 2023-01-01

## 2023-01-01 RX ORDER — HYDROXYCHLOROQUINE SULFATE 200 MG
200 TABLET ORAL DAILY
Refills: 0 | Status: DISCONTINUED | OUTPATIENT
Start: 2023-01-01 | End: 2023-01-01

## 2023-01-01 RX ORDER — HEPARIN SODIUM 5000 [USP'U]/ML
5000 INJECTION INTRAVENOUS; SUBCUTANEOUS EVERY 8 HOURS
Refills: 0 | Status: DISCONTINUED | OUTPATIENT
Start: 2023-01-01 | End: 2023-01-01

## 2023-01-01 RX ORDER — CALCITRIOL 0.5 UG/1
1 CAPSULE ORAL
Qty: 0 | Refills: 0 | DISCHARGE

## 2023-01-01 RX ORDER — CEFUROXIME AXETIL 250 MG
1 TABLET ORAL
Qty: 8 | Refills: 0
Start: 2023-01-01 | End: 2023-01-01

## 2023-01-01 RX ORDER — LANOLIN ALCOHOL/MO/W.PET/CERES
3 CREAM (GRAM) TOPICAL AT BEDTIME
Refills: 0 | Status: DISCONTINUED | OUTPATIENT
Start: 2023-01-01 | End: 2023-01-01

## 2023-01-01 RX ORDER — CEFTRIAXONE 500 MG/1
2000 INJECTION, POWDER, FOR SOLUTION INTRAMUSCULAR; INTRAVENOUS EVERY 24 HOURS
Refills: 0 | Status: DISCONTINUED | OUTPATIENT
Start: 2023-01-01 | End: 2023-01-01

## 2023-01-01 RX ORDER — LEVETIRACETAM 250 MG/1
500 TABLET, FILM COATED ORAL
Refills: 0 | Status: DISCONTINUED | OUTPATIENT
Start: 2023-01-01 | End: 2023-01-01

## 2023-01-01 RX ORDER — ASPIRIN/CALCIUM CARB/MAGNESIUM 324 MG
81 TABLET ORAL DAILY
Refills: 0 | Status: DISCONTINUED | OUTPATIENT
Start: 2023-01-01 | End: 2023-01-01

## 2023-01-01 RX ORDER — MEMANTINE HYDROCHLORIDE 10 MG/1
5 TABLET ORAL AT BEDTIME
Refills: 0 | Status: DISCONTINUED | OUTPATIENT
Start: 2023-01-01 | End: 2023-01-01

## 2023-01-01 RX ORDER — ACETAMINOPHEN 500 MG
650 TABLET ORAL ONCE
Refills: 0 | Status: COMPLETED | OUTPATIENT
Start: 2023-01-01 | End: 2023-01-01

## 2023-01-01 RX ORDER — CEFTRIAXONE 500 MG/1
1000 INJECTION, POWDER, FOR SOLUTION INTRAMUSCULAR; INTRAVENOUS EVERY 24 HOURS
Refills: 0 | Status: DISCONTINUED | OUTPATIENT
Start: 2023-01-01 | End: 2023-01-01

## 2023-01-01 RX ORDER — GABAPENTIN 400 MG/1
100 CAPSULE ORAL THREE TIMES A DAY
Refills: 0 | Status: DISCONTINUED | OUTPATIENT
Start: 2023-01-01 | End: 2023-01-01

## 2023-01-01 RX ORDER — SODIUM CHLORIDE 9 MG/ML
1000 INJECTION INTRAMUSCULAR; INTRAVENOUS; SUBCUTANEOUS ONCE
Refills: 0 | Status: COMPLETED | OUTPATIENT
Start: 2023-01-01 | End: 2023-01-01

## 2023-01-01 RX ORDER — ACETAMINOPHEN 500 MG
650 TABLET ORAL EVERY 6 HOURS
Refills: 0 | Status: DISCONTINUED | OUTPATIENT
Start: 2023-01-01 | End: 2023-01-01

## 2023-01-01 RX ORDER — CEFTRIAXONE 500 MG/1
1000 INJECTION, POWDER, FOR SOLUTION INTRAMUSCULAR; INTRAVENOUS ONCE
Refills: 0 | Status: COMPLETED | OUTPATIENT
Start: 2023-01-01 | End: 2023-01-01

## 2023-01-01 RX ORDER — CEFTRIAXONE 500 MG/1
INJECTION, POWDER, FOR SOLUTION INTRAMUSCULAR; INTRAVENOUS
Refills: 0 | Status: DISCONTINUED | OUTPATIENT
Start: 2023-01-01 | End: 2023-01-01

## 2023-01-01 RX ADMIN — GABAPENTIN 100 MILLIGRAM(S): 400 CAPSULE ORAL at 06:08

## 2023-01-01 RX ADMIN — Medication 81 MILLIGRAM(S): at 13:02

## 2023-01-01 RX ADMIN — Medication 81 MILLIGRAM(S): at 11:54

## 2023-01-01 RX ADMIN — HEPARIN SODIUM 5000 UNIT(S): 5000 INJECTION INTRAVENOUS; SUBCUTANEOUS at 14:17

## 2023-01-01 RX ADMIN — GABAPENTIN 100 MILLIGRAM(S): 400 CAPSULE ORAL at 14:17

## 2023-01-01 RX ADMIN — LEVETIRACETAM 500 MILLIGRAM(S): 250 TABLET, FILM COATED ORAL at 18:07

## 2023-01-01 RX ADMIN — LEVETIRACETAM 500 MILLIGRAM(S): 250 TABLET, FILM COATED ORAL at 05:13

## 2023-01-01 RX ADMIN — SODIUM CHLORIDE 1000 MILLILITER(S): 9 INJECTION INTRAMUSCULAR; INTRAVENOUS; SUBCUTANEOUS at 23:10

## 2023-01-01 RX ADMIN — Medication 200 MILLIGRAM(S): at 12:12

## 2023-01-01 RX ADMIN — MEMANTINE HYDROCHLORIDE 5 MILLIGRAM(S): 10 TABLET ORAL at 21:23

## 2023-01-01 RX ADMIN — ATORVASTATIN CALCIUM 20 MILLIGRAM(S): 80 TABLET, FILM COATED ORAL at 21:42

## 2023-01-01 RX ADMIN — GABAPENTIN 100 MILLIGRAM(S): 400 CAPSULE ORAL at 13:46

## 2023-01-01 RX ADMIN — LEVETIRACETAM 500 MILLIGRAM(S): 250 TABLET, FILM COATED ORAL at 06:25

## 2023-01-01 RX ADMIN — HEPARIN SODIUM 5000 UNIT(S): 5000 INJECTION INTRAVENOUS; SUBCUTANEOUS at 13:37

## 2023-01-01 RX ADMIN — CEFTRIAXONE 100 MILLIGRAM(S): 500 INJECTION, POWDER, FOR SOLUTION INTRAMUSCULAR; INTRAVENOUS at 14:43

## 2023-01-01 RX ADMIN — Medication 650 MILLIGRAM(S): at 05:13

## 2023-01-01 RX ADMIN — GABAPENTIN 100 MILLIGRAM(S): 400 CAPSULE ORAL at 21:22

## 2023-01-01 RX ADMIN — Medication 1 MILLIGRAM(S): at 11:54

## 2023-01-01 RX ADMIN — CEFEPIME 100 MILLIGRAM(S): 1 INJECTION, POWDER, FOR SOLUTION INTRAMUSCULAR; INTRAVENOUS at 23:10

## 2023-01-01 RX ADMIN — Medication 81 MILLIGRAM(S): at 11:03

## 2023-01-01 RX ADMIN — Medication 650 MILLIGRAM(S): at 17:12

## 2023-01-01 RX ADMIN — GABAPENTIN 100 MILLIGRAM(S): 400 CAPSULE ORAL at 14:42

## 2023-01-01 RX ADMIN — Medication 200 MILLIGRAM(S): at 11:03

## 2023-01-01 RX ADMIN — HEPARIN SODIUM 5000 UNIT(S): 5000 INJECTION INTRAVENOUS; SUBCUTANEOUS at 21:38

## 2023-01-01 RX ADMIN — HEPARIN SODIUM 5000 UNIT(S): 5000 INJECTION INTRAVENOUS; SUBCUTANEOUS at 13:02

## 2023-01-01 RX ADMIN — GABAPENTIN 100 MILLIGRAM(S): 400 CAPSULE ORAL at 05:12

## 2023-01-01 RX ADMIN — GABAPENTIN 100 MILLIGRAM(S): 400 CAPSULE ORAL at 06:25

## 2023-01-01 RX ADMIN — HEPARIN SODIUM 5000 UNIT(S): 5000 INJECTION INTRAVENOUS; SUBCUTANEOUS at 22:53

## 2023-01-01 RX ADMIN — HEPARIN SODIUM 5000 UNIT(S): 5000 INJECTION INTRAVENOUS; SUBCUTANEOUS at 06:09

## 2023-01-01 RX ADMIN — Medication 650 MILLIGRAM(S): at 18:07

## 2023-01-01 RX ADMIN — GABAPENTIN 100 MILLIGRAM(S): 400 CAPSULE ORAL at 21:25

## 2023-01-01 RX ADMIN — LEVETIRACETAM 500 MILLIGRAM(S): 250 TABLET, FILM COATED ORAL at 17:12

## 2023-01-01 RX ADMIN — ATORVASTATIN CALCIUM 20 MILLIGRAM(S): 80 TABLET, FILM COATED ORAL at 21:24

## 2023-01-01 RX ADMIN — Medication 200 MILLIGRAM(S): at 11:19

## 2023-01-01 RX ADMIN — Medication 81 MILLIGRAM(S): at 12:12

## 2023-01-01 RX ADMIN — Medication 200 MILLIGRAM(S): at 11:54

## 2023-01-01 RX ADMIN — GABAPENTIN 100 MILLIGRAM(S): 400 CAPSULE ORAL at 13:05

## 2023-01-01 RX ADMIN — HEPARIN SODIUM 5000 UNIT(S): 5000 INJECTION INTRAVENOUS; SUBCUTANEOUS at 05:28

## 2023-01-01 RX ADMIN — LEVETIRACETAM 500 MILLIGRAM(S): 250 TABLET, FILM COATED ORAL at 17:56

## 2023-01-01 RX ADMIN — LEVETIRACETAM 500 MILLIGRAM(S): 250 TABLET, FILM COATED ORAL at 05:28

## 2023-01-01 RX ADMIN — Medication 650 MILLIGRAM(S): at 05:28

## 2023-01-01 RX ADMIN — Medication 650 MILLIGRAM(S): at 23:10

## 2023-01-01 RX ADMIN — CEFTRIAXONE 100 MILLIGRAM(S): 500 INJECTION, POWDER, FOR SOLUTION INTRAMUSCULAR; INTRAVENOUS at 13:37

## 2023-01-01 RX ADMIN — Medication 1 MILLIGRAM(S): at 12:12

## 2023-01-01 RX ADMIN — HEPARIN SODIUM 5000 UNIT(S): 5000 INJECTION INTRAVENOUS; SUBCUTANEOUS at 21:24

## 2023-01-01 RX ADMIN — Medication 81 MILLIGRAM(S): at 11:19

## 2023-01-01 RX ADMIN — HEPARIN SODIUM 5000 UNIT(S): 5000 INJECTION INTRAVENOUS; SUBCUTANEOUS at 06:26

## 2023-01-01 RX ADMIN — Medication 1 MILLIGRAM(S): at 11:03

## 2023-01-01 RX ADMIN — HEPARIN SODIUM 5000 UNIT(S): 5000 INJECTION INTRAVENOUS; SUBCUTANEOUS at 05:12

## 2023-01-01 RX ADMIN — HEPARIN SODIUM 5000 UNIT(S): 5000 INJECTION INTRAVENOUS; SUBCUTANEOUS at 13:46

## 2023-01-01 RX ADMIN — CEFTRIAXONE 100 MILLIGRAM(S): 500 INJECTION, POWDER, FOR SOLUTION INTRAMUSCULAR; INTRAVENOUS at 06:44

## 2023-01-01 RX ADMIN — MEMANTINE HYDROCHLORIDE 5 MILLIGRAM(S): 10 TABLET ORAL at 21:25

## 2023-01-01 RX ADMIN — Medication 1 MILLIGRAM(S): at 11:19

## 2023-01-01 RX ADMIN — Medication 200 MILLIGRAM(S): at 13:02

## 2023-01-01 RX ADMIN — MEMANTINE HYDROCHLORIDE 5 MILLIGRAM(S): 10 TABLET ORAL at 22:53

## 2023-01-01 RX ADMIN — CEFTRIAXONE 100 MILLIGRAM(S): 500 INJECTION, POWDER, FOR SOLUTION INTRAMUSCULAR; INTRAVENOUS at 16:27

## 2023-01-01 RX ADMIN — HEPARIN SODIUM 5000 UNIT(S): 5000 INJECTION INTRAVENOUS; SUBCUTANEOUS at 14:42

## 2023-01-01 RX ADMIN — Medication 1 MILLIGRAM(S): at 13:02

## 2023-01-01 RX ADMIN — ATORVASTATIN CALCIUM 20 MILLIGRAM(S): 80 TABLET, FILM COATED ORAL at 22:53

## 2023-01-01 RX ADMIN — ATORVASTATIN CALCIUM 20 MILLIGRAM(S): 80 TABLET, FILM COATED ORAL at 21:22

## 2023-01-01 RX ADMIN — Medication 250 MILLIGRAM(S): at 23:40

## 2023-01-01 RX ADMIN — CEFTRIAXONE 100 MILLIGRAM(S): 500 INJECTION, POWDER, FOR SOLUTION INTRAMUSCULAR; INTRAVENOUS at 14:21

## 2023-01-01 RX ADMIN — GABAPENTIN 100 MILLIGRAM(S): 400 CAPSULE ORAL at 22:53

## 2023-01-01 RX ADMIN — LEVETIRACETAM 500 MILLIGRAM(S): 250 TABLET, FILM COATED ORAL at 06:09

## 2023-01-01 RX ADMIN — MEMANTINE HYDROCHLORIDE 5 MILLIGRAM(S): 10 TABLET ORAL at 21:40

## 2023-01-01 RX ADMIN — GABAPENTIN 100 MILLIGRAM(S): 400 CAPSULE ORAL at 21:38

## 2023-01-01 RX ADMIN — CEFTRIAXONE 100 MILLIGRAM(S): 500 INJECTION, POWDER, FOR SOLUTION INTRAMUSCULAR; INTRAVENOUS at 13:46

## 2023-01-01 RX ADMIN — Medication 650 MILLIGRAM(S): at 06:26

## 2023-01-01 RX ADMIN — HEPARIN SODIUM 5000 UNIT(S): 5000 INJECTION INTRAVENOUS; SUBCUTANEOUS at 06:39

## 2023-01-01 RX ADMIN — HEPARIN SODIUM 5000 UNIT(S): 5000 INJECTION INTRAVENOUS; SUBCUTANEOUS at 21:22

## 2023-01-01 RX ADMIN — GABAPENTIN 100 MILLIGRAM(S): 400 CAPSULE ORAL at 13:38

## 2023-01-01 RX ADMIN — GABAPENTIN 100 MILLIGRAM(S): 400 CAPSULE ORAL at 05:28

## 2023-01-10 PROBLEM — E78.5 HYPERLIPIDEMIA, UNSPECIFIED: Chronic | Status: ACTIVE | Noted: 2022-11-04

## 2023-01-10 PROBLEM — I63.9 CEREBRAL INFARCTION, UNSPECIFIED: Chronic | Status: ACTIVE | Noted: 2022-11-04

## 2023-01-12 ENCOUNTER — OUTPATIENT (OUTPATIENT)
Dept: OUTPATIENT SERVICES | Facility: HOSPITAL | Age: 83
LOS: 1 days | Discharge: HOME | End: 2023-01-12

## 2023-01-12 DIAGNOSIS — C90.00 MULTIPLE MYELOMA NOT HAVING ACHIEVED REMISSION: ICD-10-CM

## 2023-01-13 ENCOUNTER — APPOINTMENT (OUTPATIENT)
Dept: HEMATOLOGY ONCOLOGY | Facility: CLINIC | Age: 83
End: 2023-01-13
Payer: MEDICARE

## 2023-01-13 DIAGNOSIS — M54.9 DORSALGIA, UNSPECIFIED: ICD-10-CM

## 2023-01-13 DIAGNOSIS — R26.2 DIFFICULTY IN WALKING, NOT ELSEWHERE CLASSIFIED: ICD-10-CM

## 2023-01-13 PROCEDURE — 99214 OFFICE O/P EST MOD 30 MIN: CPT | Mod: 95

## 2023-01-13 RX ORDER — OXYCODONE 5 MG/1
5 TABLET ORAL
Qty: 30 | Refills: 0 | Status: ACTIVE | COMMUNITY
Start: 2022-05-24 | End: 1900-01-01

## 2023-01-13 NOTE — HISTORY OF PRESENT ILLNESS
[de-identified] : Multiple Myeloma s/p Thal/ Dex 2008 to 2010.....TIA in 2009.....Velcade 2012 to 2013...treatment held due to neuropathy..resumed b/c of POD...tolerating thus far [de-identified] : Patient presents for a follow-up visit.  She is ambulating with a cane. Her son is with her...She was hospitalized and recently discharged from rehab s/p infectious complications. New vertebral compression fx...still with painShe reports a healthy appetite. She notes varicose veins bilaterally in her LE and generalized arthritic pain which is stable. She notes some stiffness in hands bilaterally.  She states she followed up with neurology for issues with her discs. She states her hip pain is stable. She off  Velcade three weeks on and 1 week off with Decadron. Denies fever/chills, night sweats, mouth sores, eye dryness, blurred vision, nausea, vomiting, diarrhea. No CP, SOB or LE edema. \par \par On 8/19//21, patient presents for a follow up visit. Overall patient is tolerating treatment.. and offers no acute concerns aside from continues low back pain similar to when she was hospitalized... Has generalized arthritic pain which is stable. Denies fever, chills, nausea, vomiting, diarrhea, rash, mouth sores, dysuria or any signs of active bleeding. Denies SOB, chest pain or B/L LE edema. Daughter  on telephone during today's visit. \par \par 2/16/22 Here with aide.fer on phone...back pain on and off..difficulty raising head up fully...using tylenol and lidocaine patch..tolerating pom dex...improved spep...\par \par Admitted 4/13/22- 4/19/22. See discharge summary below:\par \par 82 F w / pmh x significant for HTN, multiple myeloma, RA on ASA81, remote h/o CVA no residual , p/w AMS to [facility] , found to have SAH t/f to [facility]. Also with t-spine fracture on MRI, pursuing conservative management with TLSO and plan to re-assess outpt. \par \par Fracture of thoracic spine; unstable 3 column horizontal T10 spinal fracture nsgy seen and followed  - plan for conservative orthotics management for now, potential surgery in future outpt follow up; multimodal pain control. \par \par SAH (subarachnoid hemorrhage); Repeat CTH stable, MR without additional concerns. EEG neg for sz activity. Neuro & NSGY followed; still concern SAH-related seizure, vs contribution of hyperglycemia to neuro symptoms, they are now resolving. \par - BP control; continue keppra, hold ASA and lovenox for now \par Seizure precautions - speech and swallow \par \par Altered mental state;  transient likely seizure in setting of SAH poss seizure, and hyperglycemia as above - now resolved. \par Uncontrolled type 2 diabetes mellitus with hyperglycemia; suspect at least in part secondary to Dex , A1c is >11 \par - s/p IV fluid bolus; basal/bolus insulin w coverage sliding scale \par Seen and followed by endo.; f/u mgmt given MM tx involving high dose steroid \par and plan for f/u with endocrine \par Rheumatoid arthritis; continue hydroxychloroquine \par KAJAL on CKD - sCr overall stable. \par Multiple myeloma; heme onc followed \par HTN (hypertension); continue amlodipine - continue statin; improved \par Pt. had last BM reported 4/17 and continue miralax & senna. Discharge planning; dispo to [facility] on 4/19 (d/w Dr. De La Vega) \par \par On 5/19/22, patient presents for a follow up visit. Patient is currently in rehab and is accompanied by health aide today. Daughter on speaker phone during today's visit. Angelika is in a wheelchair and has a back brace on. Recently admitted 4/13/22- 4/19/22. Overall well and offers no acute concerns. On pomalyst every other day. Denies fever, chills, nausea, vomiting, diarrhea, rash, mouth sores, dysuria or any signs of active bleeding. Denies SOB, chest pain or B/L LE Edema. \par \par 1/13/23: Patient presents with daughter Binta and home health aide at home for telehealth visit.Telehealth visit done with patient's  verbal consent.She is confused at times,has back pain and difficulty walking.She uses a walker to walk and at times she cannot lift herself up or walk.She has a pressure ulcer on sacrum which developed when she was in the rehab.Denies fever, chills, edema, SOB,chest pain,dysuria,nausea, vomiting, diarrhea or any signs of active bleeding.Pomalyst is on hold.

## 2023-01-13 NOTE — REASON FOR VISIT
[Follow-Up Visit] : a follow-up visit for [Family Member] : family member [FreeTextEntry2] : Multiple myeloma -Telehealth visit

## 2023-01-13 NOTE — ASSESSMENT
[Supportive] : Goals of care discussed with patient: Supportive [FreeTextEntry1] : Ms. James is an 82 year old female with history of MM, RA, HTN, TIA. s/p treatment as per HPI...was  on observation...IGG 3370 (8/15/17).  to  treated again with VD.\par \par Admitted 22- 22. Currently in rehab. \par \par Was hospitalized 22-22 for urinary retention/confusion and was d/marek to [facility];at home now\par  \par 1) MM\par Patient was on velcade 3 weeks on, 1 week off. Treatment was held and then discontinued. \par Discussed pomalyst 2 mg every other day on 21. \par Pomalyst application filled out and patient  started  pomalyst 2 mg every other day. Side effects explained to patient and family. IgG is down.Pomalyst on hold now\par Daughter  on telephone and agree with plan of care. \par Continue Zometa 2 mg every 3  months. Scheduled for 22.Zometa on hold secondary to elevated creatinine\par Decadron Discontinued\par \par 2) Heme\par Scheduled for home blood draw in 2 weeks\par Continue folic acid 1 mg daily\par \par 3) HTN\par Continue amlodipine 2.5 mg daily\par Continue atorvastatin 20 mg daily\par \par 4) Pain- \par MRI Spine on 4/15/22- Multiple thoracic/lumbar vertebral body fractures \par Continue to follow up with neurosurgeon \par Continue Gabapentin 200 mg BID\par Tylenol prn for pain\par Oxycodone 2.5 mg every 4 hours prn for pain\par Lidocaine 4% topical patch- apply 1 patch by topical route once daily to lower back for 12 hours\par Wear TLSO brace when OOB\par Continue  home physical therapy\par Arben lift ordered\par Questions and concerns addressed. Reassurance provided.\par \par 5) Uncontrolled type 2 diabetes mellitus with hyperglycemia; suspect at least in part secondary to Dex \par Insulin Glargine-yfgn- Inject 5 units SQ daily at bedtime\par Humalog kwikPen- Inject 3 units SQ daily before breakfast, inject 2 units SQ daily before lunch\par \par 6)GI\par Continue:\par Protonix 40 mg daily\par Polyethylene glycol 3350  17 gram/dose daily\par Senna 8.6 mg- take two tablets daily at bedtime\par \par 7) Other\par Rheumatoid arthritis; continue hydroxychloroquine daily\par Continue Keppra 500 mg tablet- take one tablet by mouth twice a day\par Continue melatonin 3 mg tablet- take one tablet once daily at bedtime\par Continue Memantine 5 mg daily\par Pressure ulcer dressing as needed\par Assist with mobility\par \par 8) Plan\par Continue to hold  Pomalyst 2 mg \par Patient advised to contact immediately with any concerns or symptoms\par Scheduled for home blood draw in 2 weeks\par Continue home PT\par Follow up with Dr Rosales in 6 weeks\par \par \par I confirmed patient's name and  at the  beginning of telehealth visit.Verbal consent given by Angelika James.Total visit time -30 minutes.\par I did telehealth under Dr. Rosales's supervision and Dr. Rosales agrees to plan of care as listed above\par

## 2023-01-13 NOTE — REVIEW OF SYSTEMS
[Joint Pain] : joint pain [Joint Stiffness] : joint stiffness [Difficulty Walking] : difficulty walking [Negative] : Allergic/Immunologic [Skin Wound] : skin wound [Fever] : no fever [Chills] : no chills [Night Sweats] : no night sweats [Fatigue] : no fatigue [Recent Change In Weight] : ~T no recent weight change [Cough] : no cough [Abdominal Pain] : no abdominal pain [Vomiting] : no vomiting [Skin Rash] : no skin rash [Dizziness] : no dizziness [Fainting] : no fainting [FreeTextEntry5] : bilateral LE varicose veins [FreeTextEntry9] : stable generalized arthritic. Pain of  lower back. Back brace on.  [de-identified] : Pressure ulcer on Sacrum [de-identified] : In a wheelchair today,Occasional confusion

## 2023-01-17 ENCOUNTER — NON-APPOINTMENT (OUTPATIENT)
Age: 83
End: 2023-01-17

## 2023-01-17 NOTE — ED PROVIDER NOTE - RESPIRATORY, MLM
Breath sounds clear and equal bilaterally. Elidel Counseling: Patient may experience a mild burning sensation during topical application. Elidel is not approved in children less than 2 years of age. There have been case reports of hematologic and skin malignancies in patients using topical calcineurin inhibitors although causality is questionable.

## 2023-01-18 ENCOUNTER — NON-APPOINTMENT (OUTPATIENT)
Age: 83
End: 2023-01-18

## 2023-01-20 ENCOUNTER — NON-APPOINTMENT (OUTPATIENT)
Age: 83
End: 2023-01-20

## 2023-01-30 ENCOUNTER — LABORATORY RESULT (OUTPATIENT)
Age: 83
End: 2023-01-30

## 2023-01-31 ENCOUNTER — LABORATORY RESULT (OUTPATIENT)
Age: 83
End: 2023-01-31

## 2023-02-10 ENCOUNTER — NON-APPOINTMENT (OUTPATIENT)
Age: 83
End: 2023-02-10

## 2023-02-16 ENCOUNTER — RESULT REVIEW (OUTPATIENT)
Age: 83
End: 2023-02-16

## 2023-02-16 ENCOUNTER — APPOINTMENT (OUTPATIENT)
Dept: HEMATOLOGY ONCOLOGY | Facility: CLINIC | Age: 83
End: 2023-02-16

## 2023-02-16 ENCOUNTER — APPOINTMENT (OUTPATIENT)
Dept: HEMATOLOGY ONCOLOGY | Facility: CLINIC | Age: 83
End: 2023-02-16
Payer: MEDICARE

## 2023-02-16 ENCOUNTER — OUTPATIENT (OUTPATIENT)
Dept: OUTPATIENT SERVICES | Facility: HOSPITAL | Age: 83
LOS: 1 days | End: 2023-02-16
Payer: MEDICARE

## 2023-02-16 VITALS
TEMPERATURE: 97 F | SYSTOLIC BLOOD PRESSURE: 128 MMHG | DIASTOLIC BLOOD PRESSURE: 73 MMHG | RESPIRATION RATE: 16 BRPM | HEART RATE: 75 BPM | OXYGEN SATURATION: 99 %

## 2023-02-16 DIAGNOSIS — C90.00 MULTIPLE MYELOMA NOT HAVING ACHIEVED REMISSION: ICD-10-CM

## 2023-02-16 DIAGNOSIS — Z86.73 PERSONAL HISTORY OF TRANSIENT ISCHEMIC ATTACK (TIA), AND CEREBRAL INFARCTION W/OUT RESIDUAL DEFICITS: ICD-10-CM

## 2023-02-16 DIAGNOSIS — S22.070S WEDGE COMPRESSION FRACTURE OF T9-T10 VERTEBRA, SEQUELA: ICD-10-CM

## 2023-02-16 LAB
ALBUMIN SERPL ELPH-MCNC: 3.3 G/DL
ALP BLD-CCNC: 74 U/L
ALT SERPL-CCNC: 15 U/L
ANION GAP SERPL CALC-SCNC: 12 MMOL/L
AST SERPL-CCNC: 14 U/L
B2 MICROGLOB SERPL-MCNC: 6.9 MG/L
BASOPHILS # BLD AUTO: 0.02 K/UL — SIGNIFICANT CHANGE UP (ref 0–0.2)
BASOPHILS NFR BLD AUTO: 0.5 % — SIGNIFICANT CHANGE UP (ref 0–2)
BILIRUB SERPL-MCNC: 0.5 MG/DL
BUN SERPL-MCNC: 40 MG/DL
CALCIUM SERPL-MCNC: 9.7 MG/DL
CHLORIDE SERPL-SCNC: 112 MMOL/L
CO2 SERPL-SCNC: 16 MMOL/L
CREAT SERPL-MCNC: 1.87 MG/DL
EGFR: 26 ML/MIN/1.73M2
EOSINOPHIL # BLD AUTO: 0.06 K/UL — SIGNIFICANT CHANGE UP (ref 0–0.5)
EOSINOPHIL NFR BLD AUTO: 1.4 % — SIGNIFICANT CHANGE UP (ref 0–6)
GLUCOSE SERPL-MCNC: 94 MG/DL
HCT VFR BLD CALC: 22.7 % — LOW (ref 34.5–45)
HGB BLD-MCNC: 7.3 G/DL — LOW (ref 11.5–15.5)
IMM GRANULOCYTES NFR BLD AUTO: 0.2 % — SIGNIFICANT CHANGE UP (ref 0–0.9)
LDH SERPL-CCNC: 120 U/L
LYMPHOCYTES # BLD AUTO: 1.57 K/UL — SIGNIFICANT CHANGE UP (ref 1–3.3)
LYMPHOCYTES # BLD AUTO: 35.4 % — SIGNIFICANT CHANGE UP (ref 13–44)
MAGNESIUM SERPL-MCNC: 2.4 MG/DL
MCHC RBC-ENTMCNC: 27.8 PG — SIGNIFICANT CHANGE UP (ref 27–34)
MCHC RBC-ENTMCNC: 32.2 G/DL — SIGNIFICANT CHANGE UP (ref 32–36)
MCV RBC AUTO: 86.3 FL — SIGNIFICANT CHANGE UP (ref 80–100)
MONOCYTES # BLD AUTO: 0.22 K/UL — SIGNIFICANT CHANGE UP (ref 0–0.9)
MONOCYTES NFR BLD AUTO: 5 % — SIGNIFICANT CHANGE UP (ref 2–14)
NEUTROPHILS # BLD AUTO: 2.55 K/UL — SIGNIFICANT CHANGE UP (ref 1.8–7.4)
NEUTROPHILS NFR BLD AUTO: 57.5 % — SIGNIFICANT CHANGE UP (ref 43–77)
NRBC # BLD: 0 /100 WBCS — SIGNIFICANT CHANGE UP (ref 0–0)
PLATELET # BLD AUTO: 180 K/UL — SIGNIFICANT CHANGE UP (ref 150–400)
POTASSIUM SERPL-SCNC: 4.6 MMOL/L
PROT SERPL-MCNC: 9.6 G/DL
RBC # BLD: 2.63 M/UL — LOW (ref 3.8–5.2)
RBC # FLD: 18.2 % — HIGH (ref 10.3–14.5)
SODIUM SERPL-SCNC: 139 MMOL/L
WBC # BLD: 4.43 K/UL — SIGNIFICANT CHANGE UP (ref 3.8–10.5)
WBC # FLD AUTO: 4.43 K/UL — SIGNIFICANT CHANGE UP (ref 3.8–10.5)

## 2023-02-16 PROCEDURE — 99215 OFFICE O/P EST HI 40 MIN: CPT

## 2023-02-16 NOTE — ASSESSMENT
[Supportive] : Goals of care discussed with patient: Supportive [FreeTextEntry1] : Ms. James is an 82 year old female with history of MM, RA, HTN, TIA. s/p treatment as per HPI...was  on observation...IGG 3370 (8/15/17). 2018 to 2020 treated again with VD.\par \par Admitted 4/13/22- 4/19/22.\par \par Was hospitalized 11/4/22-11/9/22 for urinary retention/confusion and was d/marek to [facility];at home now\par  \par 1) MM\par Patient was on velcade 3 weeks on, 1 week off. Treatment was held and then discontinued b/c of neuropathy. \par Back on pomalyst 2 mg every other day . IgG rising with drug holiday..\par Pomalyst application filled out and patient  started  pomalyst 2 mg every other day. Side effects explained to patient and family. Will increase to qd and add dex 8 mg weekly....must follow glucose\par Discussed darzalex sq if not responding....\par \par 2) Heme\par For rbc's 2/17...hgb 7.3\par Continue folic acid 1 mg daily\par \par 3) HTN\par Continue amlodipine 2.5 mg daily\par Continue atorvastatin 20 mg daily\par \par 4) Pain- \par MRI Spine on 4/15/22- Multiple thoracic/lumbar vertebral body fractures \par Continue to follow up with neurosurgeon \par Continue Gabapentin 200 mg BID\par Tylenol prn for pain\par Oxycodone 2.5 mg every 4 hours prn for pain\par Lidocaine 4% topical patch- apply 1 patch by topical route once daily to lower back for 12 hours\par Wear TLSO brace when OOB\par Continue  home physical therapy\par Arben lift ordered\par Questions and concerns addressed. Reassurance provided.\par \par 5) Uncontrolled type 2 diabetes mellitus with hyperglycemia; suspect at least in part secondary to Dex \par Insulin Glargine-yfgn- Inject 5 units SQ daily at bedtime\par Humalog kwikPen- Inject 3 units SQ daily before breakfast, inject 2 units SQ daily before lunch\par \par 6)GI\par Continue:\par Protonix 40 mg daily\par Polyethylene glycol 3350  17 gram/dose daily\par Senna 8.6 mg- take two tablets daily at bedtime\par \par 7) Other\par Rheumatoid arthritis; continue hydroxychloroquine daily\par Continue Keppra 500 mg tablet- take one tablet by mouth twice a day\par Continue melatonin 3 mg tablet- take one tablet once daily at bedtime\par Continue Memantine 5 mg daily\par Pressure ulcer dressing as needed\par Assist with mobility\par \par 8) \par Patient advised to contact immediately with any concerns or symptoms\par Continue home PT\par Follow up with NP in 3 weeks and  Dr Rosales in 6 weeks\par \par

## 2023-02-16 NOTE — REVIEW OF SYSTEMS
[Joint Pain] : joint pain [Joint Stiffness] : joint stiffness [Skin Wound] : skin wound [Difficulty Walking] : difficulty walking [Negative] : Allergic/Immunologic [Fever] : no fever [Chills] : no chills [Night Sweats] : no night sweats [Fatigue] : no fatigue [Recent Change In Weight] : ~T no recent weight change [Cough] : no cough [Abdominal Pain] : no abdominal pain [Vomiting] : no vomiting [Skin Rash] : no skin rash [Dizziness] : no dizziness [Fainting] : no fainting [FreeTextEntry5] : bilateral LE varicose veins [FreeTextEntry9] : stable generalized arthritic. Pain of  lower back. Back brace on.  [de-identified] : Pressure ulcer on Sacrum [de-identified] : In a wheelchair today,Occasional confusion

## 2023-02-16 NOTE — HISTORY OF PRESENT ILLNESS
[de-identified] : Multiple Myeloma s/p Thal/ Dex 2008 to 2010.....TIA in 2009.....Velcade 2012 to 2013...treatment held due to neuropathy..resumed b/c of POD...tolerating thus far [de-identified] : Patient presents for a follow-up visit.  She is ambulating with a cane. Her son is with her...She was hospitalized and recently discharged from rehab s/p infectious complications. New vertebral compression fx...still with painShe reports a healthy appetite. She notes varicose veins bilaterally in her LE and generalized arthritic pain which is stable. She notes some stiffness in hands bilaterally.  She states she followed up with neurology for issues with her discs. She states her hip pain is stable. She off  Velcade three weeks on and 1 week off with Decadron. Denies fever/chills, night sweats, mouth sores, eye dryness, blurred vision, nausea, vomiting, diarrhea. No CP, SOB or LE edema. \par \par On 8/19//21, patient presents for a follow up visit. Overall patient is tolerating treatment.. and offers no acute concerns aside from continues low back pain similar to when she was hospitalized... Has generalized arthritic pain which is stable. Denies fever, chills, nausea, vomiting, diarrhea, rash, mouth sores, dysuria or any signs of active bleeding. Denies SOB, chest pain or B/L LE edema. Daughter  on telephone during today's visit. \par \par 2/16/22 Here with aide.fer on phone...back pain on and off..difficulty raising head up fully...using tylenol and lidocaine patch..tolerating pom dex...improved spep...\par \par Admitted 4/13/22- 4/19/22. See discharge summary below:\par \par 82 F w / pmh x significant for HTN, multiple myeloma, RA on ASA81, remote h/o CVA no residual , p/w AMS to [facility] , found to have SAH t/f to [facility]. Also with t-spine fracture on MRI, pursuing conservative management with TLSO and plan to re-assess outpt. \par \par Fracture of thoracic spine; unstable 3 column horizontal T10 spinal fracture nsgy seen and followed  - plan for conservative orthotics management for now, potential surgery in future outpt follow up; multimodal pain control. \par \par SAH (subarachnoid hemorrhage); Repeat CTH stable, MR without additional concerns. EEG neg for sz activity. Neuro & NSGY followed; still concern SAH-related seizure, vs contribution of hyperglycemia to neuro symptoms, they are now resolving. \par - BP control; continue keppra, hold ASA and lovenox for now \par Seizure precautions - speech and swallow \par \par Altered mental state;  transient likely seizure in setting of SAH poss seizure, and hyperglycemia as above - now resolved. \par Uncontrolled type 2 diabetes mellitus with hyperglycemia; suspect at least in part secondary to Dex , A1c is >11 \par - s/p IV fluid bolus; basal/bolus insulin w coverage sliding scale \par Seen and followed by endo.; f/u mgmt given MM tx involving high dose steroid \par and plan for f/u with endocrine \par Rheumatoid arthritis; continue hydroxychloroquine \par KAJAL on CKD - sCr overall stable. \par Multiple myeloma; heme onc followed \par HTN (hypertension); continue amlodipine - continue statin; improved \par Pt. had last BM reported 4/17 and continue miralax & senna. Discharge planning; dispo to [facility] on 4/19 (d/w Dr. De La Vega) \par \par On 5/19/22, patient presents for a follow up visit. Patient is currently in rehab and is accompanied by health aide today. Daughter on speaker phone during today's visit. Angelika is in a wheelchair and has a back brace on. Recently admitted 4/13/22- 4/19/22. Overall well and offers no acute concerns. On pomalyst every other day. Denies fever, chills, nausea, vomiting, diarrhea, rash, mouth sores, dysuria or any signs of active bleeding. Denies SOB, chest pain or B/L LE Edema. \par \par 2/16/23: Patient presents with daughter Binta and grand daughter...She is in a wheelchair...She is confused at times,has back pain and difficulty walking.She uses a walker to walk and at times she cannot lift herself up or walk.She has a pressure ulcer on sacrum which developed when she was in the rehab.Denies fever, chills, edema, SOB,chest pain,dysuria,nausea, vomiting, diarrhea or any signs of active bleeding.Pomalyst qod

## 2023-02-17 ENCOUNTER — RESULT REVIEW (OUTPATIENT)
Age: 83
End: 2023-02-17

## 2023-02-17 ENCOUNTER — APPOINTMENT (OUTPATIENT)
Dept: INFUSION THERAPY | Facility: HOSPITAL | Age: 83
End: 2023-02-17

## 2023-02-17 DIAGNOSIS — R11.2 NAUSEA WITH VOMITING, UNSPECIFIED: ICD-10-CM

## 2023-02-17 LAB
BASOPHILS # BLD AUTO: 0.03 K/UL — SIGNIFICANT CHANGE UP (ref 0–0.2)
BASOPHILS NFR BLD AUTO: 0.6 % — SIGNIFICANT CHANGE UP (ref 0–2)
EOSINOPHIL # BLD AUTO: 0.07 K/UL — SIGNIFICANT CHANGE UP (ref 0–0.5)
EOSINOPHIL NFR BLD AUTO: 1.4 % — SIGNIFICANT CHANGE UP (ref 0–6)
HCT VFR BLD CALC: 22.5 % — LOW (ref 34.5–45)
HGB BLD-MCNC: 7.4 G/DL — LOW (ref 11.5–15.5)
IMM GRANULOCYTES NFR BLD AUTO: 1.2 % — HIGH (ref 0–0.9)
LYMPHOCYTES # BLD AUTO: 1.39 K/UL — SIGNIFICANT CHANGE UP (ref 1–3.3)
LYMPHOCYTES # BLD AUTO: 27.6 % — SIGNIFICANT CHANGE UP (ref 13–44)
MCHC RBC-ENTMCNC: 28 PG — SIGNIFICANT CHANGE UP (ref 27–34)
MCHC RBC-ENTMCNC: 32.9 G/DL — SIGNIFICANT CHANGE UP (ref 32–36)
MCV RBC AUTO: 85.2 FL — SIGNIFICANT CHANGE UP (ref 80–100)
MONOCYTES # BLD AUTO: 0.31 K/UL — SIGNIFICANT CHANGE UP (ref 0–0.9)
MONOCYTES NFR BLD AUTO: 6.2 % — SIGNIFICANT CHANGE UP (ref 2–14)
NEUTROPHILS # BLD AUTO: 3.17 K/UL — SIGNIFICANT CHANGE UP (ref 1.8–7.4)
NEUTROPHILS NFR BLD AUTO: 63 % — SIGNIFICANT CHANGE UP (ref 43–77)
NRBC # BLD: 0 /100 WBCS — SIGNIFICANT CHANGE UP (ref 0–0)
PLATELET # BLD AUTO: 177 K/UL — SIGNIFICANT CHANGE UP (ref 150–400)
RBC # BLD: 2.64 M/UL — LOW (ref 3.8–5.2)
RBC # FLD: 17.9 % — HIGH (ref 10.3–14.5)
WBC # BLD: 5.03 K/UL — SIGNIFICANT CHANGE UP (ref 3.8–10.5)
WBC # FLD AUTO: 5.03 K/UL — SIGNIFICANT CHANGE UP (ref 3.8–10.5)

## 2023-02-21 DIAGNOSIS — Z51.89 ENCOUNTER FOR OTHER SPECIFIED AFTERCARE: ICD-10-CM

## 2023-02-24 LAB
ALBUMIN MFR SERPL ELPH: 35 %
ALBUMIN SERPL-MCNC: 3.4 G/DL
ALBUMIN/GLOB SERPL: 0.5 RATIO
ALPHA1 GLOB MFR SERPL ELPH: 4 %
ALPHA1 GLOB SERPL ELPH-MCNC: 0.4 G/DL
ALPHA2 GLOB MFR SERPL ELPH: 7.5 %
ALPHA2 GLOB SERPL ELPH-MCNC: 0.7 G/DL
B-GLOBULIN MFR SERPL ELPH: 5.4 %
B-GLOBULIN SERPL ELPH-MCNC: 0.5 G/DL
DEPRECATED KAPPA LC FREE/LAMBDA SER: 168.34 RATIO
GAMMA GLOB FLD ELPH-MCNC: 4.6 G/DL
GAMMA GLOB MFR SERPL ELPH: 48.1 %
IGA SER QL IEP: 22 MG/DL
IGG SER QL IEP: 4471 MG/DL
IGM SER QL IEP: 24 MG/DL
INTERPRETATION SERPL IEP-IMP: NORMAL
KAPPA LC CSF-MCNC: 1.12 MG/DL
KAPPA LC SERPL-MCNC: 188.54 MG/DL
M PROTEIN MFR SERPL ELPH: 42.2 %
M PROTEIN SPEC IFE-MCNC: NORMAL
MONOCLON BAND OBS SERPL: 4.1 G/DL
PROT SERPL-MCNC: 9.6 G/DL
PROT SERPL-MCNC: 9.6 G/DL

## 2023-03-10 ENCOUNTER — LABORATORY RESULT (OUTPATIENT)
Age: 83
End: 2023-03-10

## 2023-03-14 ENCOUNTER — OUTPATIENT (OUTPATIENT)
Dept: OUTPATIENT SERVICES | Facility: HOSPITAL | Age: 83
LOS: 1 days | Discharge: HOME | End: 2023-03-14

## 2023-03-14 DIAGNOSIS — C90.00 MULTIPLE MYELOMA NOT HAVING ACHIEVED REMISSION: ICD-10-CM

## 2023-03-16 ENCOUNTER — RESULT REVIEW (OUTPATIENT)
Age: 83
End: 2023-03-16

## 2023-03-16 ENCOUNTER — APPOINTMENT (OUTPATIENT)
Dept: HEMATOLOGY ONCOLOGY | Facility: CLINIC | Age: 83
End: 2023-03-16
Payer: MEDICARE

## 2023-03-16 ENCOUNTER — APPOINTMENT (OUTPATIENT)
Dept: HEMATOLOGY ONCOLOGY | Facility: CLINIC | Age: 83
End: 2023-03-16

## 2023-03-16 VITALS
HEART RATE: 68 BPM | SYSTOLIC BLOOD PRESSURE: 93 MMHG | OXYGEN SATURATION: 99 % | TEMPERATURE: 96.6 F | RESPIRATION RATE: 16 BRPM | DIASTOLIC BLOOD PRESSURE: 59 MMHG

## 2023-03-16 LAB
ALBUMIN SERPL ELPH-MCNC: 3.3 G/DL
ALP BLD-CCNC: 68 U/L
ALT SERPL-CCNC: 15 U/L
ANION GAP SERPL CALC-SCNC: 11 MMOL/L
AST SERPL-CCNC: 7 U/L
BASOPHILS # BLD AUTO: 0.06 K/UL — SIGNIFICANT CHANGE UP (ref 0–0.2)
BASOPHILS NFR BLD AUTO: 2 % — SIGNIFICANT CHANGE UP (ref 0–2)
BILIRUB SERPL-MCNC: 0.2 MG/DL
BUN SERPL-MCNC: 53 MG/DL
BURR CELLS BLD QL SMEAR: PRESENT — SIGNIFICANT CHANGE UP
CALCIUM SERPL-MCNC: 8.4 MG/DL
CHLORIDE SERPL-SCNC: 113 MMOL/L
CO2 SERPL-SCNC: 12 MMOL/L
CREAT SERPL-MCNC: 2.46 MG/DL
EGFR: 19 ML/MIN/1.73M2
EOSINOPHIL # BLD AUTO: 0.21 K/UL — SIGNIFICANT CHANGE UP (ref 0–0.5)
EOSINOPHIL NFR BLD AUTO: 7 % — HIGH (ref 0–6)
GLUCOSE SERPL-MCNC: 120 MG/DL
HCT VFR BLD CALC: 27.8 % — LOW (ref 34.5–45)
HGB BLD-MCNC: 8.9 G/DL — LOW (ref 11.5–15.5)
LDH SERPL-CCNC: 105 U/L
LYMPHOCYTES # BLD AUTO: 1.22 K/UL — SIGNIFICANT CHANGE UP (ref 1–3.3)
LYMPHOCYTES # BLD AUTO: 41 % — SIGNIFICANT CHANGE UP (ref 13–44)
MAGNESIUM SERPL-MCNC: 2.4 MG/DL
MCHC RBC-ENTMCNC: 27.6 PG — SIGNIFICANT CHANGE UP (ref 27–34)
MCHC RBC-ENTMCNC: 32 G/DL — SIGNIFICANT CHANGE UP (ref 32–36)
MCV RBC AUTO: 86.3 FL — SIGNIFICANT CHANGE UP (ref 80–100)
MONOCYTES # BLD AUTO: 0.15 K/UL — SIGNIFICANT CHANGE UP (ref 0–0.9)
MONOCYTES NFR BLD AUTO: 5 % — SIGNIFICANT CHANGE UP (ref 2–14)
NEUTROPHILS # BLD AUTO: 1.34 K/UL — LOW (ref 1.8–7.4)
NEUTROPHILS NFR BLD AUTO: 45 % — SIGNIFICANT CHANGE UP (ref 43–77)
NRBC # BLD: 1 /100 — HIGH (ref 0–0)
NRBC # BLD: SIGNIFICANT CHANGE UP /100 WBCS (ref 0–0)
PLAT MORPH BLD: NORMAL — SIGNIFICANT CHANGE UP
PLATELET # BLD AUTO: 165 K/UL — SIGNIFICANT CHANGE UP (ref 150–400)
POIKILOCYTOSIS BLD QL AUTO: SLIGHT — SIGNIFICANT CHANGE UP
POTASSIUM SERPL-SCNC: 4.8 MMOL/L
PROT SERPL-MCNC: 7.7 G/DL
RBC # BLD: 3.22 M/UL — LOW (ref 3.8–5.2)
RBC # FLD: 17.9 % — HIGH (ref 10.3–14.5)
RBC BLD AUTO: ABNORMAL
SODIUM SERPL-SCNC: 136 MMOL/L
WBC # BLD: 2.98 K/UL — LOW (ref 3.8–10.5)
WBC # FLD AUTO: 2.98 K/UL — LOW (ref 3.8–10.5)

## 2023-03-16 PROCEDURE — 99213 OFFICE O/P EST LOW 20 MIN: CPT

## 2023-03-16 PROCEDURE — 86923 COMPATIBILITY TEST ELECTRIC: CPT

## 2023-03-16 PROCEDURE — 86905 BLOOD TYPING RBC ANTIGENS: CPT

## 2023-03-16 PROCEDURE — 86901 BLOOD TYPING SEROLOGIC RH(D): CPT

## 2023-03-16 PROCEDURE — 86850 RBC ANTIBODY SCREEN: CPT

## 2023-03-16 PROCEDURE — 86900 BLOOD TYPING SEROLOGIC ABO: CPT

## 2023-03-16 RX ORDER — INSULIN GLARGINE 100 [IU]/ML
100 INJECTION, SOLUTION SUBCUTANEOUS
Qty: 1 | Refills: 0 | Status: DISCONTINUED | COMMUNITY
Start: 2022-05-24 | End: 2023-03-16

## 2023-03-16 RX ORDER — INSULIN LISPRO 100 [IU]/ML
100 INJECTION, SOLUTION INTRAVENOUS; SUBCUTANEOUS
Qty: 1 | Refills: 0 | Status: DISCONTINUED | COMMUNITY
Start: 2022-05-24 | End: 2023-03-16

## 2023-03-16 RX ORDER — DEXAMETHASONE 4 MG/1
4 TABLET ORAL
Qty: 60 | Refills: 0 | Status: ACTIVE | COMMUNITY
Start: 2021-06-02

## 2023-03-16 NOTE — PHYSICAL EXAM
[Capable of only limited self care, confined to bed or chair more than 50% of waking hours] : Status 3- Capable of only limited self care, confined to bed or chair more than 50% of waking hours [Normal] : no peripheral adenopathy appreciated [de-identified] : Stage two pressure ulcer of sacrum

## 2023-03-16 NOTE — REVIEW OF SYSTEMS
[Joint Pain] : joint pain [Joint Stiffness] : joint stiffness [Skin Wound] : skin wound [Difficulty Walking] : difficulty walking [Negative] : Allergic/Immunologic [Fever] : no fever [Chills] : no chills [Night Sweats] : no night sweats [Fatigue] : no fatigue [Recent Change In Weight] : ~T no recent weight change [Cough] : no cough [Abdominal Pain] : no abdominal pain [Vomiting] : no vomiting [Skin Rash] : no skin rash [Dizziness] : no dizziness [Fainting] : no fainting [FreeTextEntry5] : bilateral LE varicose veins [FreeTextEntry9] : stable generalized arthritic. Pain of  lower back. Back brace on.  [de-identified] : Stage two pressure ulcer of sacrum [de-identified] : In a wheelchair today,Occasional confusion

## 2023-03-16 NOTE — ASSESSMENT
[Supportive] : Goals of care discussed with patient: Supportive [FreeTextEntry1] : Ms. James is an 82 year old female with history of MM, RA, HTN, TIA. s/p treatment as per HPI...was  on observation...IGG 3370 (8/15/17). 2018 to 2020 treated again with VD.\par \par Admitted 4/13/22- 4/19/22.\par \par Was hospitalized 11/4/22-11/9/22 for urinary retention/confusion and was d/marek to [facility];at home now\par  \par 1) MM\par Patient was on velcade 3 weeks on, 1 week off. Treatment was held and then discontinued b/c of neuropathy. \par Back on pomalyst 2 mg every other day . IgG rising with drug holiday..\par Pomalyst application filled out and patient  started  pomalyst 2 mg every other day. Side effects explained to patient and family. \par Currently on Pomalyst 2 mg daily and dexamethasone 4 mg twice a week. Must follow glucose\par \par 2) Heme\par Anemia.\par 3/16/23: WBC 2.98  H/H 8.9/27.8    ANC 1.34\par Received 1 PRBC on 2/17/23\par Continue folic acid 1 mg daily\par \par 3) HTN\par Continue amlodipine 2.5 mg daily\par Continue atorvastatin 20 mg daily\par \par 4) Pain- \par MRI Spine on 4/15/22- Multiple thoracic/lumbar vertebral body fractures \par Continue to follow up with neurosurgeon \par Continue Gabapentin 200 mg BID\par Tylenol prn for pain\par Oxycodone 2.5 mg every 4 hours prn for pain\par Lidocaine 4% topical patch- apply 1 patch by topical route once daily to lower back for 12 hours\par Wear TLSO brace when OOB\par Continue  home physical therapy\par Arben lift ordered\par Questions and concerns addressed. Reassurance provided.\par \par 5) Uncontrolled type 2 diabetes mellitus with hyperglycemia; suspect at least in part secondary to Dex \par Insulin Glargine-yfgn- Inject 5 units SQ daily at bedtime\par Humalog kwikPen- Inject 3 units SQ daily before breakfast, inject 2 units SQ daily before lunch\par No longer on insulin per daughter\par \par 6)GI\par Continue:\par Protonix 40 mg daily\par Senna 8.6 mg- take two tablets daily at bedtime prn \par \par 7) Other\par Rheumatoid arthritis; continue hydroxychloroquine daily\par Continue Keppra 500 mg tablet- take one tablet by mouth twice a day\par Continue melatonin 3 mg tablet- take one tablet once daily at bedtime\par Continue Memantine 5 mg daily\par Pressure ulcer- applying barrier cream. \par Assist with mobility\par \par 8) \par Patient advised to contact immediately with any concerns or symptoms\par Continue home PT\par Follow up with PCP on 3/17/23\par Follow up with  Dr Rosales in 3 weeks\par \par

## 2023-03-16 NOTE — HISTORY OF PRESENT ILLNESS
[de-identified] : Multiple Myeloma s/p Thal/ Dex 2008 to 2010.....TIA in 2009.....Velcade 2012 to 2013...treatment held due to neuropathy..resumed b/c of POD...tolerating thus far [de-identified] : Patient presents for a follow-up visit.  She is ambulating with a cane. Her son is with her...She was hospitalized and recently discharged from rehab s/p infectious complications. New vertebral compression fx...still with painShe reports a healthy appetite. She notes varicose veins bilaterally in her LE and generalized arthritic pain which is stable. She notes some stiffness in hands bilaterally.  She states she followed up with neurology for issues with her discs. She states her hip pain is stable. She off  Velcade three weeks on and 1 week off with Decadron. Denies fever/chills, night sweats, mouth sores, eye dryness, blurred vision, nausea, vomiting, diarrhea. No CP, SOB or LE edema. \par \par On 8/19//21, patient presents for a follow up visit. Overall patient is tolerating treatment.. and offers no acute concerns aside from continues low back pain similar to when she was hospitalized... Has generalized arthritic pain which is stable. Denies fever, chills, nausea, vomiting, diarrhea, rash, mouth sores, dysuria or any signs of active bleeding. Denies SOB, chest pain or B/L LE edema. Daughter  on telephone during today's visit. \par \par 2/16/22 Here with aide.fer on phone...back pain on and off..difficulty raising head up fully...using tylenol and lidocaine patch..tolerating pom dex...improved spep...\par \par Admitted 4/13/22- 4/19/22. See discharge summary below:\par \par 82 F w / pmh x significant for HTN, multiple myeloma, RA on ASA81, remote h/o CVA no residual , p/w AMS to [facility] , found to have SAH t/f to [facility]. Also with t-spine fracture on MRI, pursuing conservative management with TLSO and plan to re-assess outpt. \par \par Fracture of thoracic spine; unstable 3 column horizontal T10 spinal fracture nsgy seen and followed  - plan for conservative orthotics management for now, potential surgery in future outpt follow up; multimodal pain control. \par \par SAH (subarachnoid hemorrhage); Repeat CTH stable, MR without additional concerns. EEG neg for sz activity. Neuro & NSGY followed; still concern SAH-related seizure, vs contribution of hyperglycemia to neuro symptoms, they are now resolving. \par - BP control; continue keppra, hold ASA and lovenox for now \par Seizure precautions - speech and swallow \par \par Altered mental state;  transient likely seizure in setting of SAH poss seizure, and hyperglycemia as above - now resolved. \par Uncontrolled type 2 diabetes mellitus with hyperglycemia; suspect at least in part secondary to Dex , A1c is >11 \par - s/p IV fluid bolus; basal/bolus insulin w coverage sliding scale \par Seen and followed by endo.; f/u mgmt given MM tx involving high dose steroid \par and plan for f/u with endocrine \par Rheumatoid arthritis; continue hydroxychloroquine \par KAJAL on CKD - sCr overall stable. \par Multiple myeloma; heme onc followed \par HTN (hypertension); continue amlodipine - continue statin; improved \par Pt. had last BM reported 4/17 and continue miralax & senna. Discharge planning; dispo to [facility] on 4/19 (d/w Dr. De La Vega) \par \par On 5/19/22, patient presents for a follow up visit. Patient is currently in rehab and is accompanied by health aide today. Daughter on speaker phone during today's visit. Angelika is in a wheelchair and has a back brace on. Recently admitted 4/13/22- 4/19/22. Overall well and offers no acute concerns. On pomalyst every other day. Denies fever, chills, nausea, vomiting, diarrhea, rash, mouth sores, dysuria or any signs of active bleeding. Denies SOB, chest pain or B/L LE Edema. \par \par 2/16/23: Patient presents with daughter Binta and grand daughter...She is in a wheelchair...She is confused at times,has back pain and difficulty walking.She uses a walker to walk and at times she cannot lift herself up or walk.She has a pressure ulcer on sacrum which developed when she was in the rehab.Denies fever, chills, edema, SOB,chest pain,dysuria,nausea, vomiting, diarrhea or any signs of active bleeding.Pomalyst qod\par \par On 3/16/23, patient presents for a follow up visit. On dexamethasone 4 mg twice a week and pomalyst daily. In a wheelchair today and continues to have difficulty walking. Stage two pressure ulcer of sacrum. Denies fever, chills, nausea, vomiting, diarrhea, rash, mouth sores or any signs of active bleeding. Denies SOB, chest pain or B/L LE edema.

## 2023-03-17 LAB — B2 MICROGLOB SERPL-MCNC: 10.9 MG/L

## 2023-03-23 LAB
ALBUMIN MFR SERPL ELPH: 37.7 %
ALBUMIN SERPL-MCNC: 2.9 G/DL
ALBUMIN/GLOB SERPL: 0.6 RATIO
ALPHA1 GLOB MFR SERPL ELPH: 6.7 %
ALPHA1 GLOB SERPL ELPH-MCNC: 0.5 G/DL
ALPHA2 GLOB MFR SERPL ELPH: 12.8 %
ALPHA2 GLOB SERPL ELPH-MCNC: 1 G/DL
B-GLOBULIN MFR SERPL ELPH: 6.9 %
B-GLOBULIN SERPL ELPH-MCNC: 0.5 G/DL
DEPRECATED KAPPA LC FREE/LAMBDA SER: 34.72 RATIO
GAMMA GLOB FLD ELPH-MCNC: 2.8 G/DL
GAMMA GLOB MFR SERPL ELPH: 35.9 %
IGA SER QL IEP: 25 MG/DL
IGG SER QL IEP: 2826 MG/DL
IGM SER QL IEP: 54 MG/DL
INTERPRETATION SERPL IEP-IMP: NORMAL
KAPPA LC CSF-MCNC: 3.03 MG/DL
KAPPA LC SERPL-MCNC: 105.2 MG/DL
M PROTEIN MFR SERPL ELPH: 29.1 %
M PROTEIN SPEC IFE-MCNC: NORMAL
MONOCLON BAND OBS SERPL: 2.2 G/DL
PROT SERPL-MCNC: 7.7 G/DL
PROT SERPL-MCNC: 7.7 G/DL

## 2023-03-29 ENCOUNTER — APPOINTMENT (OUTPATIENT)
Dept: HEMATOLOGY ONCOLOGY | Facility: CLINIC | Age: 83
End: 2023-03-29
Payer: MEDICARE

## 2023-03-29 ENCOUNTER — RESULT REVIEW (OUTPATIENT)
Age: 83
End: 2023-03-29

## 2023-03-29 ENCOUNTER — NON-APPOINTMENT (OUTPATIENT)
Age: 83
End: 2023-03-29

## 2023-03-29 ENCOUNTER — INPATIENT (INPATIENT)
Facility: HOSPITAL | Age: 83
LOS: 5 days | Discharge: HOME | DRG: 683 | End: 2023-04-04
Attending: HOSPITALIST | Admitting: HOSPITALIST
Payer: MEDICARE

## 2023-03-29 ENCOUNTER — OUTPATIENT (OUTPATIENT)
Dept: OUTPATIENT SERVICES | Facility: HOSPITAL | Age: 83
LOS: 1 days | End: 2023-03-29
Payer: MEDICARE

## 2023-03-29 VITALS
RESPIRATION RATE: 16 BRPM | TEMPERATURE: 96.4 F | SYSTOLIC BLOOD PRESSURE: 98 MMHG | DIASTOLIC BLOOD PRESSURE: 68 MMHG | HEART RATE: 83 BPM | OXYGEN SATURATION: 99 %

## 2023-03-29 DIAGNOSIS — C90.00 MULTIPLE MYELOMA NOT HAVING ACHIEVED REMISSION: ICD-10-CM

## 2023-03-29 DIAGNOSIS — M19.90 UNSPECIFIED OSTEOARTHRITIS, UNSPECIFIED SITE: ICD-10-CM

## 2023-03-29 DIAGNOSIS — Z87.19 PERSONAL HISTORY OF OTHER DISEASES OF THE DIGESTIVE SYSTEM: ICD-10-CM

## 2023-03-29 DIAGNOSIS — Z86.73 PERSONAL HISTORY OF TRANSIENT ISCHEMIC ATTACK (TIA), AND CEREBRAL INFARCTION W/OUT RESIDUAL DEFICITS: ICD-10-CM

## 2023-03-29 LAB
BASOPHILS # BLD AUTO: 0 K/UL — SIGNIFICANT CHANGE UP (ref 0–0.2)
BASOPHILS NFR BLD AUTO: 0 % — SIGNIFICANT CHANGE UP (ref 0–2)
EOSINOPHIL # BLD AUTO: 0.09 K/UL — SIGNIFICANT CHANGE UP (ref 0–0.5)
EOSINOPHIL NFR BLD AUTO: 3 % — SIGNIFICANT CHANGE UP (ref 0–6)
HCT VFR BLD CALC: 24.5 % — LOW (ref 34.5–45)
HGB BLD-MCNC: 8 G/DL — LOW (ref 11.5–15.5)
LG PLATELETS BLD QL AUTO: SLIGHT — SIGNIFICANT CHANGE UP
LYMPHOCYTES # BLD AUTO: 1.43 K/UL — SIGNIFICANT CHANGE UP (ref 1–3.3)
LYMPHOCYTES # BLD AUTO: 46 % — HIGH (ref 13–44)
MCHC RBC-ENTMCNC: 28.3 PG — SIGNIFICANT CHANGE UP (ref 27–34)
MCHC RBC-ENTMCNC: 32.7 G/DL — SIGNIFICANT CHANGE UP (ref 32–36)
MCV RBC AUTO: 86.6 FL — SIGNIFICANT CHANGE UP (ref 80–100)
METAMYELOCYTES # FLD: 1 % — HIGH (ref 0–0)
MONOCYTES # BLD AUTO: 0.31 K/UL — SIGNIFICANT CHANGE UP (ref 0–0.9)
MONOCYTES NFR BLD AUTO: 10 % — SIGNIFICANT CHANGE UP (ref 2–14)
NEUTROPHILS # BLD AUTO: 1.24 K/UL — LOW (ref 1.8–7.4)
NEUTROPHILS NFR BLD AUTO: 40 % — LOW (ref 43–77)
NRBC # BLD: 0 /100 — SIGNIFICANT CHANGE UP (ref 0–0)
NRBC # BLD: SIGNIFICANT CHANGE UP /100 WBCS (ref 0–0)
PLAT MORPH BLD: ABNORMAL
PLATELET # BLD AUTO: 201 K/UL — SIGNIFICANT CHANGE UP (ref 150–400)
RBC # BLD: 2.83 M/UL — LOW (ref 3.8–5.2)
RBC # FLD: 18.4 % — HIGH (ref 10.3–14.5)
RBC BLD AUTO: ABNORMAL
ROULEAUX BLD QL SMEAR: PRESENT
WBC # BLD: 3.1 K/UL — LOW (ref 3.8–10.5)
WBC # FLD AUTO: 3.1 K/UL — LOW (ref 3.8–10.5)

## 2023-03-29 PROCEDURE — 86923 COMPATIBILITY TEST ELECTRIC: CPT

## 2023-03-29 PROCEDURE — 86900 BLOOD TYPING SEROLOGIC ABO: CPT

## 2023-03-29 PROCEDURE — 99215 OFFICE O/P EST HI 40 MIN: CPT

## 2023-03-29 PROCEDURE — 86850 RBC ANTIBODY SCREEN: CPT

## 2023-03-29 PROCEDURE — 99285 EMERGENCY DEPT VISIT HI MDM: CPT

## 2023-03-29 PROCEDURE — 86901 BLOOD TYPING SEROLOGIC RH(D): CPT

## 2023-03-29 NOTE — REVIEW OF SYSTEMS
[Joint Pain] : joint pain [Joint Stiffness] : joint stiffness [Skin Wound] : skin wound [Difficulty Walking] : difficulty walking [Negative] : Allergic/Immunologic [Fatigue] : fatigue [Recent Change In Weight] : ~T recent weight change [Fever] : no fever [Chills] : no chills [Night Sweats] : no night sweats [Cough] : no cough [Abdominal Pain] : no abdominal pain [Vomiting] : no vomiting [Skin Rash] : no skin rash [Dizziness] : no dizziness [Fainting] : no fainting [FreeTextEntry5] : bilateral LE varicose veins [FreeTextEntry9] : stable generalized arthritic. Pain of  lower back. Back brace on.  [de-identified] : Stage two pressure ulcer of sacrum [de-identified] : In a wheelchair today,Occasional confusion

## 2023-03-29 NOTE — ASSESSMENT
[Supportive] : Goals of care discussed with patient: Supportive [FreeTextEntry1] : Ms. James is an 83 year old female with history of MM, RA, HTN, TIA. s/p treatment as per HPI...was  on observation...IGG 3370 (8/15/17). 2018 to 2020 treated again with VD.\par \par Admitted 4/13/22- 4/19/22.\par \par Was hospitalized 11/4/22-11/9/22 for urinary retention/confusion and was d/marek to [facility];at home now\par  \par 1) MM\par Patient was on velcade 3 weeks on, 1 week off. Treatment was held and then discontinued b/c of neuropathy. \par Back on pomalyst 2 mg every other day . IgG rising with drug holiday..\par Pomalyst application filled out and patient  started  pomalyst 2 mg every other day. Side effects explained to patient and family. \par Currently on Pomalyst 2 mg daily and dexamethasone 4 mg twice a week. Must follow glucose..given anemia will change pomalyst to QOD...pt is responding to treatment with decrease in mspike\par \par 2) Heme\par Anemia.\par hgb 8..will arrange for 1 unit of RBC's with premeds\par Received 1 PRBC on 2/17/23\par Continue folic acid 1 mg daily\par \par 3) HTN\par Continue amlodipine 2.5 mg daily\par Continue atorvastatin 20 mg daily\par \par 4) Pain- \par MRI Spine on 4/15/22- Multiple thoracic/lumbar vertebral body fractures \par Continue to follow up with neurosurgeon \par Continue Gabapentin 200 mg BID\par Tylenol prn for pain\par Oxycodone 2.5 mg every 4 hours prn for pain\par Lidocaine 4% topical patch- apply 1 patch by topical route once daily to lower back for 12 hours\par Wear TLSO brace when OOB\par Continue  home physical therapy\par Arben lift ordered\par Questions and concerns addressed. Reassurance provided.\par \par 5) Uncontrolled type 2 diabetes mellitus with hyperglycemia; suspect at least in part secondary to Dex \par Insulin Glargine-yfgn- Inject 5 units SQ daily at bedtime\par Humalog kwikPen- Inject 3 units SQ daily before breakfast, inject 2 units SQ daily before lunch\par No longer on insulin per daughter\par \par 6)GI\par Continue:\par Protonix 40 mg daily\par Senna 8.6 mg- take two tablets daily at bedtime prn \par \par 7) Other\par Rheumatoid arthritis; continue hydroxychloroquine daily\par Continue Keppra 500 mg tablet- take one tablet by mouth twice a day\par Continue melatonin 3 mg tablet- take one tablet once daily at bedtime\par Continue Memantine 5 mg daily\par Pressure ulcer- applying barrier cream. \par Assist with mobility\par \par 8) \par Patient advised to contact immediately with any concerns or symptoms\par Continue home PT\par Follow up with PCP on 3/17/23\par Follow up  in 6 weeks\par Discussed gravity of situation and continued decline of pt's condition..MOLST form provided, advanced directives discussed\par

## 2023-03-29 NOTE — PHYSICAL EXAM
[Capable of only limited self care, confined to bed or chair more than 50% of waking hours] : Status 3- Capable of only limited self care, confined to bed or chair more than 50% of waking hours [Normal] : no peripheral adenopathy appreciated [de-identified] : Stage two pressure ulcer of sacrum

## 2023-03-29 NOTE — HISTORY OF PRESENT ILLNESS
[de-identified] : Multiple Myeloma s/p Thal/ Dex 2008 to 2010.....TIA in 2009.....Velcade 2012 to 2013...treatment held due to neuropathy..resumed b/c of POD...tolerating thus far [de-identified] : Patient presents for a follow-up visit.  She is ambulating with a cane. Her son is with her...She was hospitalized and recently discharged from rehab s/p infectious complications. New vertebral compression fx...still with painShe reports a healthy appetite. She notes varicose veins bilaterally in her LE and generalized arthritic pain which is stable. She notes some stiffness in hands bilaterally.  She states she followed up with neurology for issues with her discs. She states her hip pain is stable. She off  Velcade three weeks on and 1 week off with Decadron. Denies fever/chills, night sweats, mouth sores, eye dryness, blurred vision, nausea, vomiting, diarrhea. No CP, SOB or LE edema. \par \par On 8/19//21, patient presents for a follow up visit. Overall patient is tolerating treatment.. and offers no acute concerns aside from continues low back pain similar to when she was hospitalized... Has generalized arthritic pain which is stable. Denies fever, chills, nausea, vomiting, diarrhea, rash, mouth sores, dysuria or any signs of active bleeding. Denies SOB, chest pain or B/L LE edema. Daughter  on telephone during today's visit. \par \par 2/16/22 Here with aide.fer on phone...back pain on and off..difficulty raising head up fully...using tylenol and lidocaine patch..tolerating pom dex...improved spep...\par \par Admitted 4/13/22- 4/19/22. See discharge summary below:\par \par 82 F w / pmh x significant for HTN, multiple myeloma, RA on ASA81, remote h/o CVA no residual , p/w AMS to [facility] , found to have SAH t/f to [facility]. Also with t-spine fracture on MRI, pursuing conservative management with TLSO and plan to re-assess outpt. \par \par Fracture of thoracic spine; unstable 3 column horizontal T10 spinal fracture nsgy seen and followed  - plan for conservative orthotics management for now, potential surgery in future outpt follow up; multimodal pain control. \par \par SAH (subarachnoid hemorrhage); Repeat CTH stable, MR without additional concerns. EEG neg for sz activity. Neuro & NSGY followed; still concern SAH-related seizure, vs contribution of hyperglycemia to neuro symptoms, they are now resolving. \par - BP control; continue keppra, hold ASA and lovenox for now \par Seizure precautions - speech and swallow \par \par Altered mental state;  transient likely seizure in setting of SAH poss seizure, and hyperglycemia as above - now resolved. \par Uncontrolled type 2 diabetes mellitus with hyperglycemia; suspect at least in part secondary to Dex , A1c is >11 \par - s/p IV fluid bolus; basal/bolus insulin w coverage sliding scale \par Seen and followed by endo.; f/u mgmt given MM tx involving high dose steroid \par and plan for f/u with endocrine \par Rheumatoid arthritis; continue hydroxychloroquine \par KAJAL on CKD - sCr overall stable. \par Multiple myeloma; heme onc followed \par HTN (hypertension); continue amlodipine - continue statin; improved \par Pt. had last BM reported 4/17 and continue miralax & senna. Discharge planning; dispo to [facility] on 4/19 (d/w Dr. De La Vega) \par \par On 5/19/22, patient presents for a follow up visit. Patient is currently in rehab and is accompanied by health aide today. Daughter on speaker phone during today's visit. Angelika is in a wheelchair and has a back brace on. Recently admitted 4/13/22- 4/19/22. Overall well and offers no acute concerns. On pomalyst every other day. Denies fever, chills, nausea, vomiting, diarrhea, rash, mouth sores, dysuria or any signs of active bleeding. Denies SOB, chest pain or B/L LE Edema. \par \par 2/16/23: Patient presents with daughter Binta and grand daughter...She is in a wheelchair...She is confused at times,has back pain and difficulty walking.She uses a walker to walk and at times she cannot lift herself up or walk.She has a pressure ulcer on sacrum which developed when she was in the rehab.Denies fever, chills, edema, SOB,chest pain,dysuria,nausea, vomiting, diarrhea or any signs of active bleeding.Pomalyst qod\par \par On 3/29/23, patient presents for a follow up visit with granddaughter. On dexamethasone 4 mg twice a week and pomalyst daily. In a wheelchair today and continues to have difficulty walking. Stage two pressure ulcer of sacrum. Denies fever, chills, nausea, vomiting, diarrhea, rash, mouth sores or any signs of active bleeding. Denies SOB, chest pain or B/L LE edema. Unable to hold her head up....dental issues

## 2023-03-30 ENCOUNTER — APPOINTMENT (OUTPATIENT)
Dept: INFUSION THERAPY | Facility: HOSPITAL | Age: 83
End: 2023-03-30

## 2023-03-30 VITALS
SYSTOLIC BLOOD PRESSURE: 122 MMHG | HEIGHT: 63 IN | WEIGHT: 121.47 LBS | RESPIRATION RATE: 19 BRPM | HEART RATE: 70 BPM | OXYGEN SATURATION: 96 % | TEMPERATURE: 98 F | DIASTOLIC BLOOD PRESSURE: 93 MMHG

## 2023-03-30 DIAGNOSIS — I63.9 CEREBRAL INFARCTION, UNSPECIFIED: ICD-10-CM

## 2023-03-30 DIAGNOSIS — C90.00 MULTIPLE MYELOMA NOT HAVING ACHIEVED REMISSION: ICD-10-CM

## 2023-03-30 DIAGNOSIS — E87.5 HYPERKALEMIA: ICD-10-CM

## 2023-03-30 DIAGNOSIS — K59.9 FUNCTIONAL INTESTINAL DISORDER, UNSPECIFIED: ICD-10-CM

## 2023-03-30 DIAGNOSIS — Z29.9 ENCOUNTER FOR PROPHYLACTIC MEASURES, UNSPECIFIED: ICD-10-CM

## 2023-03-30 DIAGNOSIS — I10 ESSENTIAL (PRIMARY) HYPERTENSION: ICD-10-CM

## 2023-03-30 DIAGNOSIS — N17.9 ACUTE KIDNEY FAILURE, UNSPECIFIED: ICD-10-CM

## 2023-03-30 DIAGNOSIS — M06.9 RHEUMATOID ARTHRITIS, UNSPECIFIED: ICD-10-CM

## 2023-03-30 DIAGNOSIS — E78.5 HYPERLIPIDEMIA, UNSPECIFIED: ICD-10-CM

## 2023-03-30 DIAGNOSIS — F03.90 UNSPECIFIED DEMENTIA, UNSPECIFIED SEVERITY, WITHOUT BEHAVIORAL DISTURBANCE, PSYCHOTIC DISTURBANCE, MOOD DISTURBANCE, AND ANXIETY: ICD-10-CM

## 2023-03-30 LAB
ALBUMIN SERPL ELPH-MCNC: 2.1 G/DL — LOW (ref 3.5–5)
ALBUMIN SERPL ELPH-MCNC: 2.2 G/DL — LOW (ref 3.5–5)
ALBUMIN SERPL ELPH-MCNC: 3.1 G/DL
ALP BLD-CCNC: 64 U/L
ALP SERPL-CCNC: 56 U/L — SIGNIFICANT CHANGE UP (ref 40–120)
ALP SERPL-CCNC: 57 U/L — SIGNIFICANT CHANGE UP (ref 40–120)
ALT FLD-CCNC: 20 U/L DA — SIGNIFICANT CHANGE UP (ref 10–60)
ALT FLD-CCNC: 21 U/L DA — SIGNIFICANT CHANGE UP (ref 10–60)
ALT SERPL-CCNC: 16 U/L
ANION GAP SERPL CALC-SCNC: 13 MMOL/L
ANION GAP SERPL CALC-SCNC: 4 MMOL/L — LOW (ref 5–17)
ANION GAP SERPL CALC-SCNC: 4 MMOL/L — LOW (ref 5–17)
ANION GAP SERPL CALC-SCNC: 5 MMOL/L — SIGNIFICANT CHANGE UP (ref 5–17)
ANISOCYTOSIS BLD QL: SLIGHT — SIGNIFICANT CHANGE UP
APPEARANCE UR: ABNORMAL
AST SERPL-CCNC: 20 U/L — SIGNIFICANT CHANGE UP (ref 10–40)
AST SERPL-CCNC: 24 U/L — SIGNIFICANT CHANGE UP (ref 10–40)
AST SERPL-CCNC: 9 U/L
BACTERIA # UR AUTO: ABNORMAL /HPF
BASOPHILS # BLD AUTO: 0 K/UL — SIGNIFICANT CHANGE UP (ref 0–0.2)
BASOPHILS NFR BLD AUTO: 0 % — SIGNIFICANT CHANGE UP (ref 0–2)
BILIRUB SERPL-MCNC: 0.2 MG/DL — SIGNIFICANT CHANGE UP (ref 0.2–1.2)
BILIRUB SERPL-MCNC: 0.3 MG/DL
BILIRUB SERPL-MCNC: 0.3 MG/DL — SIGNIFICANT CHANGE UP (ref 0.2–1.2)
BILIRUB UR-MCNC: NEGATIVE — SIGNIFICANT CHANGE UP
BUN SERPL-MCNC: 68 MG/DL
BUN SERPL-MCNC: 72 MG/DL — HIGH (ref 7–18)
BUN SERPL-MCNC: 73 MG/DL — HIGH (ref 7–18)
BUN SERPL-MCNC: 75 MG/DL — HIGH (ref 7–18)
CALCIUM SERPL-MCNC: 9 MG/DL
CALCIUM SERPL-MCNC: 9.3 MG/DL — SIGNIFICANT CHANGE UP (ref 8.4–10.5)
CALCIUM SERPL-MCNC: 9.6 MG/DL — SIGNIFICANT CHANGE UP (ref 8.4–10.5)
CALCIUM SERPL-MCNC: 9.8 MG/DL — SIGNIFICANT CHANGE UP (ref 8.4–10.5)
CHLORIDE SERPL-SCNC: 110 MMOL/L
CHLORIDE SERPL-SCNC: 115 MMOL/L — HIGH (ref 96–108)
CHLORIDE SERPL-SCNC: 119 MMOL/L — HIGH (ref 96–108)
CHLORIDE SERPL-SCNC: 120 MMOL/L — HIGH (ref 96–108)
CO2 SERPL-SCNC: 14 MMOL/L
CO2 SERPL-SCNC: 14 MMOL/L — LOW (ref 22–31)
CO2 SERPL-SCNC: 16 MMOL/L — LOW (ref 22–31)
CO2 SERPL-SCNC: 17 MMOL/L — LOW (ref 22–31)
COLOR SPEC: YELLOW — SIGNIFICANT CHANGE UP
CREAT ?TM UR-MCNC: 35 MG/DL — SIGNIFICANT CHANGE UP
CREAT SERPL-MCNC: 3.25 MG/DL
CREAT SERPL-MCNC: 3.41 MG/DL — HIGH (ref 0.5–1.3)
CREAT SERPL-MCNC: 3.47 MG/DL — HIGH (ref 0.5–1.3)
CREAT SERPL-MCNC: 3.52 MG/DL — HIGH (ref 0.5–1.3)
DIFF PNL FLD: ABNORMAL
EGFR: 12 ML/MIN/1.73M2 — LOW
EGFR: 13 ML/MIN/1.73M2 — LOW
EGFR: 13 ML/MIN/1.73M2 — LOW
EGFR: 14 ML/MIN/1.73M2
EOSINOPHIL # BLD AUTO: 0.09 K/UL — SIGNIFICANT CHANGE UP (ref 0–0.5)
EOSINOPHIL NFR BLD AUTO: 3 % — SIGNIFICANT CHANGE UP (ref 0–6)
EPI CELLS # UR: ABNORMAL /HPF
GLUCOSE SERPL-MCNC: 152 MG/DL
GLUCOSE SERPL-MCNC: 175 MG/DL — HIGH (ref 70–99)
GLUCOSE SERPL-MCNC: 212 MG/DL — HIGH (ref 70–99)
GLUCOSE SERPL-MCNC: 73 MG/DL — SIGNIFICANT CHANGE UP (ref 70–99)
GLUCOSE UR QL: NEGATIVE — SIGNIFICANT CHANGE UP
HCT VFR BLD CALC: 25.3 % — LOW (ref 34.5–45)
HGB BLD-MCNC: 7.9 G/DL — LOW (ref 11.5–15.5)
KETONES UR-MCNC: NEGATIVE — SIGNIFICANT CHANGE UP
LACTATE SERPL-SCNC: 1.5 MMOL/L — SIGNIFICANT CHANGE UP (ref 0.7–2)
LDH SERPL-CCNC: 119 U/L
LEUKOCYTE ESTERASE UR-ACNC: ABNORMAL
LYMPHOCYTES # BLD AUTO: 0.98 K/UL — LOW (ref 1–3.3)
LYMPHOCYTES # BLD AUTO: 34 % — SIGNIFICANT CHANGE UP (ref 13–44)
MAGNESIUM SERPL-MCNC: 2.4 MG/DL
MAGNESIUM SERPL-MCNC: 2.5 MG/DL — SIGNIFICANT CHANGE UP (ref 1.6–2.6)
MANUAL SMEAR VERIFICATION: SIGNIFICANT CHANGE UP
MCHC RBC-ENTMCNC: 27.9 PG — SIGNIFICANT CHANGE UP (ref 27–34)
MCHC RBC-ENTMCNC: 31.2 GM/DL — LOW (ref 32–36)
MCV RBC AUTO: 89.4 FL — SIGNIFICANT CHANGE UP (ref 80–100)
MONOCYTES # BLD AUTO: 0.2 K/UL — SIGNIFICANT CHANGE UP (ref 0–0.9)
MONOCYTES NFR BLD AUTO: 7 % — SIGNIFICANT CHANGE UP (ref 2–14)
NEUTROPHILS # BLD AUTO: 1.18 K/UL — LOW (ref 1.8–7.4)
NEUTROPHILS NFR BLD AUTO: 41 % — LOW (ref 43–77)
NITRITE UR-MCNC: NEGATIVE — SIGNIFICANT CHANGE UP
NRBC # BLD: 0 /100 — SIGNIFICANT CHANGE UP (ref 0–0)
OSMOLALITY UR: 328 MOS/KG — SIGNIFICANT CHANGE UP (ref 50–1200)
OVALOCYTES BLD QL SMEAR: SLIGHT — SIGNIFICANT CHANGE UP
PH UR: 5 — SIGNIFICANT CHANGE UP (ref 5–8)
PHOSPHATE SERPL-MCNC: 4 MG/DL — SIGNIFICANT CHANGE UP (ref 2.5–4.5)
PLAT MORPH BLD: NORMAL — SIGNIFICANT CHANGE UP
PLATELET # BLD AUTO: 179 K/UL — SIGNIFICANT CHANGE UP (ref 150–400)
PLATELET COUNT - ESTIMATE: NORMAL — SIGNIFICANT CHANGE UP
POTASSIUM SERPL-MCNC: 5.6 MMOL/L — HIGH (ref 3.5–5.3)
POTASSIUM SERPL-MCNC: 5.9 MMOL/L — HIGH (ref 3.5–5.3)
POTASSIUM SERPL-MCNC: 6.1 MMOL/L — HIGH (ref 3.5–5.3)
POTASSIUM SERPL-SCNC: 5.6 MMOL/L — HIGH (ref 3.5–5.3)
POTASSIUM SERPL-SCNC: 5.9 MMOL/L — HIGH (ref 3.5–5.3)
POTASSIUM SERPL-SCNC: 6.1 MMOL/L — HIGH (ref 3.5–5.3)
POTASSIUM SERPL-SCNC: 6.3 MMOL/L
POTASSIUM UR-SCNC: 23 MMOL/L — SIGNIFICANT CHANGE UP
PROT ?TM UR-MCNC: 111 MG/DL — HIGH (ref 0–12)
PROT SERPL-MCNC: 6.9 G/DL
PROT SERPL-MCNC: 7.5 G/DL — SIGNIFICANT CHANGE UP (ref 6–8.3)
PROT SERPL-MCNC: 7.7 G/DL — SIGNIFICANT CHANGE UP (ref 6–8.3)
PROT UR-MCNC: 100 MG/DL
RAPID RVP RESULT: DETECTED
RBC # BLD: 2.83 M/UL — LOW (ref 3.8–5.2)
RBC # FLD: 18.4 % — HIGH (ref 10.3–14.5)
RBC BLD AUTO: ABNORMAL
RBC CASTS # UR COMP ASSIST: ABNORMAL /HPF (ref 0–2)
RV+EV RNA SPEC QL NAA+PROBE: DETECTED
SARS-COV-2 RNA SPEC QL NAA+PROBE: SIGNIFICANT CHANGE UP
SODIUM SERPL-SCNC: 136 MMOL/L
SODIUM SERPL-SCNC: 136 MMOL/L — SIGNIFICANT CHANGE UP (ref 135–145)
SODIUM SERPL-SCNC: 138 MMOL/L — SIGNIFICANT CHANGE UP (ref 135–145)
SODIUM SERPL-SCNC: 140 MMOL/L — SIGNIFICANT CHANGE UP (ref 135–145)
SODIUM UR-SCNC: 61 MMOL/L — SIGNIFICANT CHANGE UP
SP GR SPEC: 1.01 — SIGNIFICANT CHANGE UP (ref 1.01–1.02)
SPHEROCYTES BLD QL SMEAR: SLIGHT — SIGNIFICANT CHANGE UP
STOMATOCYTES BLD QL SMEAR: SLIGHT — SIGNIFICANT CHANGE UP
UROBILINOGEN FLD QL: NEGATIVE — SIGNIFICANT CHANGE UP
UUN UR-MCNC: 407 MG/DL — SIGNIFICANT CHANGE UP
VARIANT LYMPHS # BLD: 15 % — HIGH (ref 0–6)
WBC # BLD: 2.89 K/UL — LOW (ref 3.8–10.5)
WBC # FLD AUTO: 2.89 K/UL — LOW (ref 3.8–10.5)
WBC UR QL: ABNORMAL /HPF (ref 0–5)

## 2023-03-30 PROCEDURE — 99223 1ST HOSP IP/OBS HIGH 75: CPT | Mod: GC

## 2023-03-30 RX ORDER — SODIUM CHLORIDE 9 MG/ML
1000 INJECTION, SOLUTION INTRAVENOUS
Refills: 0 | Status: DISCONTINUED | OUTPATIENT
Start: 2023-03-30 | End: 2023-04-01

## 2023-03-30 RX ORDER — PANTOPRAZOLE SODIUM 20 MG/1
40 TABLET, DELAYED RELEASE ORAL
Refills: 0 | Status: DISCONTINUED | OUTPATIENT
Start: 2023-03-30 | End: 2023-04-04

## 2023-03-30 RX ORDER — CALCIUM GLUCONATE 100 MG/ML
2 VIAL (ML) INTRAVENOUS ONCE
Refills: 0 | Status: DISCONTINUED | OUTPATIENT
Start: 2023-03-30 | End: 2023-04-04

## 2023-03-30 RX ORDER — HEPARIN SODIUM 5000 [USP'U]/ML
5000 INJECTION INTRAVENOUS; SUBCUTANEOUS EVERY 8 HOURS
Refills: 0 | Status: DISCONTINUED | OUTPATIENT
Start: 2023-03-30 | End: 2023-04-04

## 2023-03-30 RX ORDER — MEMANTINE HYDROCHLORIDE 10 MG/1
5 TABLET ORAL AT BEDTIME
Refills: 0 | Status: DISCONTINUED | OUTPATIENT
Start: 2023-03-30 | End: 2023-04-04

## 2023-03-30 RX ORDER — ACETAMINOPHEN 500 MG
650 TABLET ORAL EVERY 6 HOURS
Refills: 0 | Status: DISCONTINUED | OUTPATIENT
Start: 2023-03-30 | End: 2023-04-04

## 2023-03-30 RX ORDER — SODIUM ZIRCONIUM CYCLOSILICATE 10 G/10G
5 POWDER, FOR SUSPENSION ORAL ONCE
Refills: 0 | Status: COMPLETED | OUTPATIENT
Start: 2023-03-30 | End: 2023-03-30

## 2023-03-30 RX ORDER — ATORVASTATIN CALCIUM 80 MG/1
80 TABLET, FILM COATED ORAL AT BEDTIME
Refills: 0 | Status: DISCONTINUED | OUTPATIENT
Start: 2023-03-30 | End: 2023-04-04

## 2023-03-30 RX ORDER — SODIUM POLYSTYRENE SULFONATE 4.1 MEQ/G
30 POWDER, FOR SUSPENSION ORAL ONCE
Refills: 0 | Status: DISCONTINUED | OUTPATIENT
Start: 2023-03-30 | End: 2023-03-30

## 2023-03-30 RX ORDER — ASPIRIN/CALCIUM CARB/MAGNESIUM 324 MG
81 TABLET ORAL DAILY
Refills: 0 | Status: DISCONTINUED | OUTPATIENT
Start: 2023-03-30 | End: 2023-04-04

## 2023-03-30 RX ORDER — INSULIN HUMAN 100 [IU]/ML
5 INJECTION, SOLUTION SUBCUTANEOUS ONCE
Refills: 0 | Status: DISCONTINUED | OUTPATIENT
Start: 2023-03-30 | End: 2023-03-30

## 2023-03-30 RX ORDER — SODIUM ZIRCONIUM CYCLOSILICATE 10 G/10G
10 POWDER, FOR SUSPENSION ORAL ONCE
Refills: 0 | Status: COMPLETED | OUTPATIENT
Start: 2023-03-30 | End: 2023-03-30

## 2023-03-30 RX ORDER — LEVETIRACETAM 250 MG/1
500 TABLET, FILM COATED ORAL
Refills: 0 | Status: DISCONTINUED | OUTPATIENT
Start: 2023-03-30 | End: 2023-04-04

## 2023-03-30 RX ORDER — DEXTROSE 50 % IN WATER 50 %
50 SYRINGE (ML) INTRAVENOUS ONCE
Refills: 0 | Status: DISCONTINUED | OUTPATIENT
Start: 2023-03-30 | End: 2023-03-30

## 2023-03-30 RX ORDER — AMLODIPINE BESYLATE 2.5 MG/1
2.5 TABLET ORAL DAILY
Refills: 0 | Status: DISCONTINUED | OUTPATIENT
Start: 2023-03-30 | End: 2023-04-04

## 2023-03-30 RX ORDER — FOLIC ACID 0.8 MG
1 TABLET ORAL DAILY
Refills: 0 | Status: DISCONTINUED | OUTPATIENT
Start: 2023-03-30 | End: 2023-04-04

## 2023-03-30 RX ADMIN — Medication 1 MILLIGRAM(S): at 11:28

## 2023-03-30 RX ADMIN — HEPARIN SODIUM 5000 UNIT(S): 5000 INJECTION INTRAVENOUS; SUBCUTANEOUS at 13:30

## 2023-03-30 RX ADMIN — SODIUM ZIRCONIUM CYCLOSILICATE 10 GRAM(S): 10 POWDER, FOR SUSPENSION ORAL at 14:08

## 2023-03-30 RX ADMIN — SODIUM ZIRCONIUM CYCLOSILICATE 5 GRAM(S): 10 POWDER, FOR SUSPENSION ORAL at 19:33

## 2023-03-30 RX ADMIN — SODIUM CHLORIDE 70 MILLILITER(S): 9 INJECTION, SOLUTION INTRAVENOUS at 19:32

## 2023-03-30 RX ADMIN — Medication 650 MILLIGRAM(S): at 18:40

## 2023-03-30 RX ADMIN — ATORVASTATIN CALCIUM 80 MILLIGRAM(S): 80 TABLET, FILM COATED ORAL at 21:36

## 2023-03-30 RX ADMIN — LEVETIRACETAM 500 MILLIGRAM(S): 250 TABLET, FILM COATED ORAL at 17:10

## 2023-03-30 RX ADMIN — HEPARIN SODIUM 5000 UNIT(S): 5000 INJECTION INTRAVENOUS; SUBCUTANEOUS at 21:36

## 2023-03-30 RX ADMIN — MEMANTINE HYDROCHLORIDE 5 MILLIGRAM(S): 10 TABLET ORAL at 21:37

## 2023-03-30 RX ADMIN — Medication 650 MILLIGRAM(S): at 19:32

## 2023-03-30 RX ADMIN — Medication 81 MILLIGRAM(S): at 11:28

## 2023-03-30 RX ADMIN — SODIUM CHLORIDE 70 MILLILITER(S): 9 INJECTION, SOLUTION INTRAVENOUS at 17:00

## 2023-03-30 NOTE — H&P ADULT - PROBLEM SELECTOR PLAN 1
Hx of CKD (baseline: 1.45)  p/w SCr 3.54  Hx of Multiple Myeloma, RA  Bladder Scan - 540 cc  Indwelling Zapata Catheter  f/u UA, ULytes  Avoid Nephrotoxic agents  monitor BMP  Nephro (Dr. Deutsch) consulted Hx of CKD (baseline: 1.45)  p/w SCr 3.54  Hx of Multiple Myeloma, RA  Bladder Scan - 540 cc  Indwelling Zapata Catheter  f/u UA, ULytes  Avoid Nephrotoxic agents  monitor BMP  f/u Renal US  Nephro (Dr. Deutsch) consulted

## 2023-03-30 NOTE — CONSULT NOTE ADULT - SUBJECTIVE AND OBJECTIVE BOX
MEDICATIONS  (STANDING):  amLODIPine   Tablet 2.5 milliGRAM(s) Oral daily  aspirin  chewable 81 milliGRAM(s) Oral daily  atorvastatin 80 milliGRAM(s) Oral at bedtime  calcium gluconate IVPB 2 Gram(s) IV Intermittent once  folic acid 1 milliGRAM(s) Oral daily  heparin   Injectable 5000 Unit(s) SubCutaneous every 8 hours  levETIRAcetam 500 milliGRAM(s) Oral two times a day  memantine 5 milliGRAM(s) Oral at bedtime  pantoprazole    Tablet 40 milliGRAM(s) Oral before breakfast  sodium chloride 0.45% 1000 milliLiter(s) (70 mL/Hr) IV Continuous <Continuous>    MEDICATIONS  (PRN):  acetaminophen     Tablet .. 650 milliGRAM(s) Oral every 6 hours PRN Temp greater or equal to 38C (100.4F), Mild Pain (1 - 3)    CAPILLARY BLOOD GLUCOSE        I&O's Summary    30 Mar 2023 07:01  -  30 Mar 2023 15:32  --------------------------------------------------------  IN: 118 mL / OUT: 850 mL / NET: -732 mL        PHYSICAL EXAM:  Vital Signs Last 24 Hrs  T(C): 36.7 (30 Mar 2023 09:56), Max: 36.7 (30 Mar 2023 09:56)  T(F): 98.1 (30 Mar 2023 09:56), Max: 98.1 (30 Mar 2023 09:56)  HR: 70 (30 Mar 2023 09:56) (70 - 70)  BP: 122/93 (30 Mar 2023 09:56) (122/93 - 122/93)  BP(mean): --  RR: 19 (30 Mar 2023 09:56) (19 - 19)  SpO2: 96% (30 Mar 2023 09:56) (96% - 96%)    Parameters below as of 30 Mar 2023 09:56  Patient On (Oxygen Delivery Method): room air        LABS:                        7.9    2.89  )-----------( 179      ( 30 Mar 2023 04:03 )             25.3     03-30    140  |  120<H>  |  72<H>  ----------------------------<  73  6.1<H>   |  16<L>  |  3.41<H>    Ca    9.8      30 Mar 2023 10:29  Phos  4.0       Mg     2.5         TPro  7.7  /  Alb  2.2<L>  /  TBili  0.3  /  DBili  x   /  AST  20  /  ALT  20  /  AlkPhos  56            Urinalysis Basic - ( 30 Mar 2023 10:30 )    Color: Yellow / Appearance: Slightly Turbid / S.010 / pH: x  Gluc: x / Ketone: Negative  / Bili: Negative / Urobili: Negative   Blood: x / Protein: 100 mg/dL / Nitrite: Negative   Leuk Esterase: Moderate / RBC: 5-10 /HPF / WBC 26-50 /HPF   Sq Epi: x / Non Sq Epi: Occasional /HPF / Bacteria: TNTC /HPF        SARS-CoV-2: NotDetec (30 Mar 2023 06:05)  COVID-19 PCR: NotDetec (2022 07:39)  COVID-19 PCR: NotDetec (2022 03:19)      RADIOLOGY & ADDITIONAL TESTS:       Reason for Admission: Acute Renal Failure on CKD; Hyperkalemia  History of Present Illness:   Patient is a 84 y/o F from home w/ PMHx of Multiple Myeloma, RA, dementia, HTN, HLD, CKD, CVA? (described as minor stroke). She was sent by her Oncologist (Dr. Bailey Rosales) due to worsening renal function and hyperkalemia. She was noted to have serum Potassium of 6.1. Her SCr last 2022 was 1.45. She denies any headache, dizziness, chest pain, shortness of breath. She does complain of right sided vague abdominal pain and tenderness. She denies any nausea/vomiting, diarrhea, dysuria. In the ED, VS were stable. Serum K was 6.1, Cr 3.54, Hb 8.2. EKG showed SR, 1st degree AVB, no T wave changes. She was given Hyperkalemia cocktail albuterol, Dextrose 50% and Regular insulin. Bladder scan showed urinary retention 540 cc. Indwelling Catheter was inserted. Of note, she sees Nephro (Dr. Deutsch) as outpatient.    GOC: FULL CODE       Review of Systems:  Unable to obtain due to: Dementia  Other Review of Systems: All other review of systems negative, except as noted in HPI    Goals of Care:    Conversation Discussion:  · Conversation Details	spoke to daughter, Ms. Binta James ([phone]) regarding GOC  wants her to be FULL CODE      Allergies and Intolerances:        Allergies:  	No Known Allergies:     Home Medications:   * Patient Currently Takes Medications as of 30-Mar-2023 01:15 documented in Structured Notes  · 	folic acid 1 mg oral tablet: Last Dose Taken:  , 1 tab(s) orally once a day  · 	amLODIPine 2.5 mg oral tablet: Last Dose Taken:  , 1 tab(s) orally once a day  · 	levETIRAcetam 500 mg oral tablet: Last Dose Taken:  , 1 tab(s) orally 2 times a day  · 	aspirin 81 mg oral tablet: Last Dose Taken:  , 1 tab(s) orally once a day  · 	memantine 5 mg oral tablet: Last Dose Taken:  , 1 tab(s) orally once a day (at bedtime)  · 	hydroxychloroquine 200 mg oral tablet: Last Dose Taken:  , 1 tab(s) orally once a day. DO NOT RESUME UNTIL OKAY WITH YOUR DOCTOR.   · 	Pomalyst 2 mg oral capsule: Last Dose Taken:  , 1 cap(s) orally every other day. DO NOT RESUME UNTIL AFTER REHAB AND OKAYED BY YOUR ONCOLOGIST  · 	rosuvastatin 20 mg oral capsule: Last Dose Taken:  , 1 cap(s) orally once a day (at bedtime)  · 	lisinopril 2.5 mg oral tablet: Last Dose Taken:  , 1 tab(s) orally once a day  · 	gabapentin 100 mg oral capsule: Last Dose Taken:  , 1 cap(s) orally 3 times a day  · 	ferrous sulfate 325 mg (65 mg elemental iron) oral delayed release tablet: Last Dose Taken:  , 1 tab(s) orally once a day    .    Patient History:    Past Medical, Past Surgical, and Family History:  PAST MEDICAL HISTORY:  Cerebrovascular accident (CVA) remote hx, no residual deficits    HLD (hyperlipidemia)     Hypertension     Multiple myeloma     Rheumatoid arthritis     Varicose veins of lower extremity.     PAST SURGICAL HISTORY:  No significant past surgical history.     FAMILY HISTORY:  Father  Still living? Unknown  FHx: stroke, Age at diagnosis: Age Unknown.     Social History:  · Substance use	Unable to obtain  · Unable to Obtain due to	Dementia      MEDICATIONS  (STANDING):  amLODIPine   Tablet 2.5 milliGRAM(s) Oral daily  aspirin  chewable 81 milliGRAM(s) Oral daily  atorvastatin 80 milliGRAM(s) Oral at bedtime  calcium gluconate IVPB 2 Gram(s) IV Intermittent once  folic acid 1 milliGRAM(s) Oral daily  heparin   Injectable 5000 Unit(s) SubCutaneous every 8 hours  levETIRAcetam 500 milliGRAM(s) Oral two times a day  memantine 5 milliGRAM(s) Oral at bedtime  pantoprazole    Tablet 40 milliGRAM(s) Oral before breakfast  sodium chloride 0.45% 1000 milliLiter(s) (70 mL/Hr) IV Continuous <Continuous>    MEDICATIONS  (PRN):  acetaminophen     Tablet .. 650 milliGRAM(s) Oral every 6 hours PRN Temp greater or equal to 38C (100.4F), Mild Pain (1 - 3)    CAPILLARY BLOOD GLUCOSE        I&O's Summary    30 Mar 2023 07:01  -  30 Mar 2023 15:32  --------------------------------------------------------  IN: 118 mL / OUT: 850 mL / NET: -732 mL        PHYSICAL EXAM:  Vital Signs Last 24 Hrs  T(C): 36.7 (30 Mar 2023 09:56), Max: 36.7 (30 Mar 2023 09:56)  T(F): 98.1 (30 Mar 2023 09:56), Max: 98.1 (30 Mar 2023 09:56)  HR: 70 (30 Mar 2023 09:56) (70 - 70)  BP: 122/93 (30 Mar 2023 09:56) (122/93 - 122/93)  BP(mean): --  RR: 19 (30 Mar 2023 09:56) (19 - 19)  SpO2: 96% (30 Mar 2023 09:56) (96% - 96%)    Parameters below as of 30 Mar 2023 09:56  Patient On (Oxygen Delivery Method): room air    GEN: NAD; pleasant, weak, cachectic  LUNGS: CTA B/L  HEART: S1 S2  ABDOMEN: soft, non-tender, non-distended, + BS  EXTREMITIES: no edema        LABS:                        7.9    2.89  )-----------( 179      ( 30 Mar 2023 04:03 )             25.3     03-30    140  |  120<H>  |  72<H>  ----------------------------<  73  6.1<H>   |  16<L>  |  3.41<H>    Ca    9.8      30 Mar 2023 10:29  Phos  4.0     03-30  Mg     2.5     03-30    TPro  7.7  /  Alb  2.2<L>  /  TBili  0.3  /  DBili  x   /  AST  20  /  ALT  20  /  AlkPhos  56  03-30          Urinalysis Basic - ( 30 Mar 2023 10:30 )    Color: Yellow / Appearance: Slightly Turbid / S.010 / pH: x  Gluc: x / Ketone: Negative  / Bili: Negative / Urobili: Negative   Blood: x / Protein: 100 mg/dL / Nitrite: Negative   Leuk Esterase: Moderate / RBC: 5-10 /HPF / WBC 26-50 /HPF   Sq Epi: x / Non Sq Epi: Occasional /HPF / Bacteria: TNTC /HPF        SARS-CoV-2: NotDetec (30 Mar 2023 06:05)  COVID-19 PCR: NotDetec (2022 07:39)  COVID-19 PCR: NotDetec (2022 03:19)

## 2023-03-30 NOTE — H&P ADULT - PROBLEM SELECTOR PLAN 2
K - 6.1  EKG - SR, no T wave changes  s/p hyperkalemia cocktail (albuterol, dextrose 50%, regular insulin)  likely due to KAJAL on CKD  monitor BMP  Nephro (Dr. Deutsch) consulted K - 6.1>5.9  EKG - SR, no T wave changes  s/p hyperkalemia cocktail (albuterol, dextrose 50%, regular insulin)  give another hyperkalemia cocktail  likely due to KAJAL on CKD  monitor BMP  Nephro (Dr. Deutsch) consulted

## 2023-03-30 NOTE — H&P ADULT - ATTENDING COMMENTS
Patient is a 84 y/o F from home w/ PMHx of Multiple Myeloma, RA, dementia, HTN, HLD, CKD, CVA. She was sent by her Oncologist (Dr. Bailey Rosales) due to worsening renal function and hyperkalemia of 6.4. She was noted to have serum Potassium of 6.1. Her SCr last 11/2022 was 1.45. In addition, she complains of right sided vague abdominal pain and tenderness. She denies any nausea/vomiting, diarrhea, or dysuria. She otherwise denies any headache, dizziness, chest pain, palpitation, shortness of breath.   In the ED, VS were stable. Serum K was 6.1, Cr 3.54, Hb 8.2. EKG showed SR, 1st degree AVB, no T wave changes. She was given Hyperkalemia cocktail albuterol, Dextrose 50% and Regular insulin. Bladder scan showed urinary retention 540 cc. Indwelling Catheter was inserted. Of note, she sees Nephro (Dr. Deutsch) as outpatient.    PE: as above    Plan:   #KAJAL on CKD  #Hyperkalemia  #Urinary Retention   #Multiple Myeloma  #RA  #HTN  #HLD  #Dementia  #hx of CVA    - Baseline CKD of 1.45, presenting with Cr of 3.54. Likely multifactorial: progression of MM, urinary retention, & drug use  - Patient's daughter states that oncologist recently switched patient's pomolyst to daily, was previously held due to worsening renal function. Will hold for now given worsening renal function.   - Bladder scan revealed 540cc retained urine, neves catheter inserted in ED for rentention   - F/u renal US  - K 6.1 on admission. s/p hyperkalemia cocktail, repeat 5.9. Will give additional cocktail. EKG NSR without T wave changes  - Consult Heme/onc (QMA)  - Consult Nephro  - Remainder of management as above

## 2023-03-30 NOTE — CONSULT NOTE ADULT - SUBJECTIVE AND OBJECTIVE BOX
[facility] : Progress Note :: [phone], (office [phone]),   Dr Triana / Dr Deutsch / Dr Manzo / Dr Perez / Dr Nichol GARZA / Dr Lantigua / Dr Joyce / Dr Alberto layne  _____________________________________________________________________________________________  Patient is a 83y Female with H/O Rheumatoid arthritis, HTN, HLD, dementia,CVA, multiple myeloma was on velcade previoulsly, unclear if still on it.. Has CKD presumed to be from myeloma kidney was last seen in the office in 2022.  Sent by oncologist with hyperkalemia and ARF.  Denies any nausea, vomitting or diarrhoea.  s/p medical management for hyperkalemia.    PAST MEDICAL & SURGICAL HISTORY:  Rheumatoid arthritis      Varicose veins of lower extremity      Hypertension      Multiple myeloma      HLD (hyperlipidemia)      Cerebrovascular accident (CVA)  remote hx, no residual deficits      No significant past surgical history        No Known Allergies    Home Medications Reviewed  Hospital Medications:   MEDICATIONS  (STANDING):  amLODIPine   Tablet 2.5 milliGRAM(s) Oral daily  aspirin  chewable 81 milliGRAM(s) Oral daily  atorvastatin 80 milliGRAM(s) Oral at bedtime  calcium gluconate IVPB 2 Gram(s) IV Intermittent once  folic acid 1 milliGRAM(s) Oral daily  heparin   Injectable 5000 Unit(s) SubCutaneous every 8 hours  levETIRAcetam 500 milliGRAM(s) Oral two times a day  memantine 5 milliGRAM(s) Oral at bedtime  pantoprazole    Tablet 40 milliGRAM(s) Oral before breakfast  sodium chloride 0.45% 1000 milliLiter(s) (70 mL/Hr) IV Continuous <Continuous>    SOCIAL HISTORY:  Denies ETOh,Smoking,   FAMILY HISTORY:  FHx: stroke (Father)          VITALS:  T(F): 99.3 (23 @ 15:34), Max: 99.3 (03-30-23 @ 15:34)  HR: 83 (23 @ 15:34)  BP: 134/51 (23 @ 15:34)  RR: 18 (23 @ 15:34)  SpO2: 100% (23 @ 15:34)  Wt(kg): --     @ 07:01  -   @ 15:46  --------------------------------------------------------  IN: 118 mL / OUT: 850 mL / NET: -732 mL      Height (cm): 160 ( @ 09:56)  Weight (kg): 55.1 ( @ 09:56)  BMI (kg/m2): 21.5 ( 09:56)  BSA (m2): 1.56 (:56)  PHYSICAL EXAM:  Constitutional: elderly female in no acute distress.  HEENT: anicteric sclera, oropharynx clear, MMM  Neck: No JVD  Respiratory: CTAB, no wheezes, rales or rhonchi  Cardiovascular: S1, S2, RRR  Gastrointestinal: BS+, soft, NT/ND  Extremities:  No peripheral edema  Neurological: A/O x 3, no focal deficits.  : No CVA tenderness. No neves.       LABS:      140  |  120<H>  |  72<H>  ----------------------------<  73  6.1<H>   |  16<L>  |  3.41<H>    Ca    9.8      30 Mar 2023 10:29  Phos  4.0       Mg     2.5         TPro  7.7  /  Alb  2.2<L>  /  TBili  0.3  /  DBili      /  AST  20  /  ALT  20  /  AlkPhos  56      Creatinine Trend: 3.41 <--, 3.47 <--, 3.54 <--                        7.9    2.89  )-----------( 179      ( 30 Mar 2023 04:03 )             25.3     Urine Studies:  Urinalysis Basic - ( 30 Mar 2023 10:30 )    Color: Yellow / Appearance: Slightly Turbid / S.010 / pH:   Gluc:  / Ketone: Negative  / Bili: Negative / Urobili: Negative   Blood:  / Protein: 100 mg/dL / Nitrite: Negative   Leuk Esterase: Moderate / RBC: 5-10 /HPF / WBC 26-50 /HPF   Sq Epi:  / Non Sq Epi: Occasional /HPF / Bacteria: TNTC /HPF      Sodium, Random Urine: 61 mmol/L ( @ 10:30)  Osmolality, Random Urine: 328 mos/kg ( @ 10:30)  Potassium, Random Urine: 23 mmol/L ( @ 10:30)  Creatinine, Random Urine: 35 mg/dL ( @ 10:30)    RADIOLOGY & ADDITIONAL STUDIES:

## 2023-03-30 NOTE — H&P ADULT - PROBLEM SELECTOR PLAN 4
Hx of RA  home med - hydroxychloroquine  hold home med for now Hx of RA  home med - hydroxychloroquine  hold home med for now due to KAJAL

## 2023-03-30 NOTE — CONSULT NOTE ADULT - ASSESSMENT
Patient is a 83y Female with H/O Rheumatoid arthritis, HTN, HLD, dementia,CVA, multiple myeloma was on velcade previoulsly, unclear if still on it.. Has CKD presumed to be from myeloma kidney was last seen in the office in June 2022.  Sent by oncologist with hyperkalemia and ARF.  Denies any nausea, vomitting or diarrhoea.  s/p medical management for hyperkalemia.    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  atielogy  of KAJAL ia a pre-renal state versus progression of myeloma kidney. Unclear if still getting chemo for MM  # hyperkalemia in light of KAJAL and being on ACE-I  # metabolic acidosis with low anion gap ( sec to MM)  # Leucouria/ microscopic hematuria   # HTN. BP controlled.    recs:  agree with stopping ACE-I  in light of presumed progression of myeloma kidney, will start 1/2NS WITH 75MEQ OF NAHCO3 as urine acidic PH 5.0- to help with tubulopathy associated with myeloma  consider antibiotics for presumed UTI pending urine cultures.  heme-onc consult  sp a dose of lokelma. F/U RPT BMP  daily BMP  avoid NSAID's IV contrast.  thanks for the consult

## 2023-03-30 NOTE — H&P ADULT - ASSESSMENT
Patient is a 84 y/o F from home w/ PMHx of Multiple Myeloma, RA, dementia, HTN, HLD, CKD, CVA? (described as minor stroke). Admitted for acute renal failure on CKD; Hyperkalemia.

## 2023-03-30 NOTE — PATIENT PROFILE ADULT - CAREGIVER ADDRESS
[address] [facility] [address] [facility], [facility]

## 2023-03-30 NOTE — CONSULT NOTE ADULT - ASSESSMENT
complete note to follow    #VTE Prophylaxis    84 y/o F from home w/ PMHx of Multiple Myeloma, RA, dementia, HTN, HLD, CKD, CVA? (described as minor stroke). She was sent by her Oncologist (Dr. Bailey Rosales) due to worsening renal function and hyperkalemia. She was noted to have serum Potassium of 6.1. Her SCr last 11/2022 was 1.45. She denies any headache, dizziness, chest pain, shortness of breath. She does complain of right sided vague abdominal pain and tenderness. She denies any nausea/vomiting, diarrhea, dysuria. In the ED, VS were stable. Serum K was 6.1, Cr 3.54, Hb 8.2. EKG showed SR, 1st degree AVB, no T wave changes. She was given Hyperkalemia cocktail albuterol, Dextrose 50% and Regular insulin. Bladder scan showed urinary retention 540 cc. Indwelling Catheter was inserted. Of note, she sees Nephro (Dr. Deutsch) as outpatient.      #Multiple Myeloma  pt follows with Dr. Rosales, currently on pomolyst qd and dex 4mg 2x/week  sent to ER for abnormal labs  now with KAJAL and Hyperkalemia  Hgb=7.9  Cr=3.4  K+=6.1  +rhinovirus  Rec's:  -Hold MM medication  (pomolyst and dex) during admission  -etiology for KAJAL poss d/t pomolyst   -Nephrology consult  -pt to f/u with Dr. Rosales upon d/c    #VTE Prophylaxis    Thank you for the referral. Will continue to monitor the patient.  Please call with any questions [phone]  Above reviewed with Attending Dr. Srinath ZAMAN/[facility]  [address], [address], [place], [place]  [phone]  *Note not finalized until signed by Attending Physician

## 2023-03-30 NOTE — H&P ADULT - HISTORY OF PRESENT ILLNESS
Patient is a 82 y/o F from home w/ PMHx of Multiple Myeloma, RA, dementia, HTN, HLD, CKD, CVA? (described as minor stroke). She was sent by her Oncologist (Dr. Bailey Rosales) due to worsening renal function and hyperkalemia. She was noted to have serum Potassium of 6.1. Her SCr last 11/2022 was 1.45. She denies any headache, dizziness, chest pain, shortness of breath. She does complain of right sided vague abdominal pain and tenderness. She denies any nausea/vomiting, diarrhea, dysuria. In the ED, VS were stable. Serum K was 6.1, Cr 3.54, Hb 8.2. EKG showed SR, 1st degree AVB, no T wave changes. She was given Hyperkalemia cocktail albuterol, Dextrose 50% and Regular insulin. Bladder scan showed urinary retention 540 cc. Indwelling Catheter was inserted. Of note, she sees Nephro (Dr. Deutsch) as outpatient.    GOC: FULL CODE

## 2023-03-30 NOTE — H&P ADULT - NSHPPHYSICALEXAM_GEN_ALL_CORE
PHYSICAL EXAM:  GENERAL: NAD, speaks in full sentences, no signs of respiratory distress  HEAD:  Atraumatic, Normocephalic  EYES: EOMI, PERRLA, conjunctiva and sclera clear  NECK: Supple, No JVD  CHEST/LUNG: Clear to auscultation bilaterally; No wheeze; No crackles; No accessory muscles used  HEART: Regular rate and rhythm; No murmurs;   ABDOMEN: Soft, (+) mild abd. tenderness, Nondistended; Bowel sounds present; No guarding  EXTREMITIES:  2+ Peripheral Pulses, No cyanosis or edema  PSYCH: AAOx2  NEUROLOGY: non-focal  SKIN: No rashes or lesions

## 2023-03-30 NOTE — H&P ADULT - PROBLEM SELECTOR PLAN 3
Hx of Multiple Myeloma  home med - Pomolyst  hold home med for now  Heme-Onc ([facility]) consulted Hx of Multiple Myeloma  home med - Pomolyst  hold home med for now due to KAJAL  Heme-Onc ([facility]) consulted

## 2023-03-30 NOTE — H&P ADULT - PROBLEM SELECTOR PLAN 8
Hx of minor strokes (daughter unsure if real stroke)  home med - keppra for seizure prophylaxis  also on ASA, atorvastatin  continue home meds

## 2023-03-30 NOTE — CONSULT NOTE ADULT - NS ATTEND AMEND GEN_ALL_CORE FT
pt seen and examined. chart reviewed and case d/w ACP. Agreed with Her A/p. 82 y/o female with h/o MM on Pomalyst treated by another oncologist admit for hyperkalemia and acute renal failure. ? etiology for her acute renal failure. renal team on case for further investigation and treatment s/p santos cath with release 450ml urine. hold MM treatment now and f/u creatinine. s/p treatment for hyperkalemia. will f/u

## 2023-03-31 DIAGNOSIS — Z02.9 ENCOUNTER FOR ADMINISTRATIVE EXAMINATIONS, UNSPECIFIED: ICD-10-CM

## 2023-03-31 DIAGNOSIS — N39.0 URINARY TRACT INFECTION, SITE NOT SPECIFIED: ICD-10-CM

## 2023-03-31 LAB
ANION GAP SERPL CALC-SCNC: 5 MMOL/L — SIGNIFICANT CHANGE UP (ref 5–17)
APPEARANCE UR: ABNORMAL
BACTERIA # UR AUTO: ABNORMAL /HPF
BILIRUB UR-MCNC: NEGATIVE — SIGNIFICANT CHANGE UP
BUN SERPL-MCNC: 64 MG/DL — HIGH (ref 7–18)
CALCIUM SERPL-MCNC: 9.2 MG/DL — SIGNIFICANT CHANGE UP (ref 8.4–10.5)
CHLORIDE SERPL-SCNC: 114 MMOL/L — HIGH (ref 96–108)
CO2 SERPL-SCNC: 20 MMOL/L — LOW (ref 22–31)
COLOR SPEC: YELLOW — SIGNIFICANT CHANGE UP
CREAT SERPL-MCNC: 3.11 MG/DL — HIGH (ref 0.5–1.3)
DIFF PNL FLD: ABNORMAL
EGFR: 14 ML/MIN/1.73M2 — LOW
EPI CELLS # UR: ABNORMAL /HPF
GLUCOSE SERPL-MCNC: 116 MG/DL — HIGH (ref 70–99)
GLUCOSE UR QL: NEGATIVE — SIGNIFICANT CHANGE UP
HCT VFR BLD CALC: 25.9 % — LOW (ref 34.5–45)
HGB BLD-MCNC: 8.1 G/DL — LOW (ref 11.5–15.5)
KETONES UR-MCNC: NEGATIVE — SIGNIFICANT CHANGE UP
LEUKOCYTE ESTERASE UR-ACNC: ABNORMAL
MAGNESIUM SERPL-MCNC: 2.4 MG/DL — SIGNIFICANT CHANGE UP (ref 1.6–2.6)
MCHC RBC-ENTMCNC: 27.3 PG — SIGNIFICANT CHANGE UP (ref 27–34)
MCHC RBC-ENTMCNC: 31.3 GM/DL — LOW (ref 32–36)
MCV RBC AUTO: 87.2 FL — SIGNIFICANT CHANGE UP (ref 80–100)
NITRITE UR-MCNC: NEGATIVE — SIGNIFICANT CHANGE UP
NRBC # BLD: 0 /100 WBCS — SIGNIFICANT CHANGE UP (ref 0–0)
PH UR: 6 — SIGNIFICANT CHANGE UP (ref 5–8)
PHOSPHATE SERPL-MCNC: 4.2 MG/DL — SIGNIFICANT CHANGE UP (ref 2.5–4.5)
PLATELET # BLD AUTO: 192 K/UL — SIGNIFICANT CHANGE UP (ref 150–400)
POTASSIUM SERPL-MCNC: 5.2 MMOL/L — SIGNIFICANT CHANGE UP (ref 3.5–5.3)
POTASSIUM SERPL-SCNC: 5.2 MMOL/L — SIGNIFICANT CHANGE UP (ref 3.5–5.3)
PROT UR-MCNC: 100 MG/DL
RBC # BLD: 2.97 M/UL — LOW (ref 3.8–5.2)
RBC # FLD: 18.4 % — HIGH (ref 10.3–14.5)
RBC CASTS # UR COMP ASSIST: SIGNIFICANT CHANGE UP /HPF (ref 0–2)
SODIUM SERPL-SCNC: 139 MMOL/L — SIGNIFICANT CHANGE UP (ref 135–145)
SP GR SPEC: 1.01 — SIGNIFICANT CHANGE UP (ref 1.01–1.02)
UROBILINOGEN FLD QL: NEGATIVE — SIGNIFICANT CHANGE UP
WBC # BLD: 2.73 K/UL — LOW (ref 3.8–10.5)
WBC # FLD AUTO: 2.73 K/UL — LOW (ref 3.8–10.5)
WBC UR QL: >50 /HPF (ref 0–5)

## 2023-03-31 PROCEDURE — 99233 SBSQ HOSP IP/OBS HIGH 50: CPT | Mod: FS

## 2023-03-31 RX ORDER — CEFTRIAXONE 500 MG/1
1000 INJECTION, POWDER, FOR SOLUTION INTRAMUSCULAR; INTRAVENOUS EVERY 24 HOURS
Refills: 0 | Status: COMPLETED | OUTPATIENT
Start: 2023-03-31 | End: 2023-04-02

## 2023-03-31 RX ADMIN — HEPARIN SODIUM 5000 UNIT(S): 5000 INJECTION INTRAVENOUS; SUBCUTANEOUS at 05:42

## 2023-03-31 RX ADMIN — MEMANTINE HYDROCHLORIDE 5 MILLIGRAM(S): 10 TABLET ORAL at 22:55

## 2023-03-31 RX ADMIN — HEPARIN SODIUM 5000 UNIT(S): 5000 INJECTION INTRAVENOUS; SUBCUTANEOUS at 22:55

## 2023-03-31 RX ADMIN — CEFTRIAXONE 100 MILLIGRAM(S): 500 INJECTION, POWDER, FOR SOLUTION INTRAMUSCULAR; INTRAVENOUS at 09:44

## 2023-03-31 RX ADMIN — HEPARIN SODIUM 5000 UNIT(S): 5000 INJECTION INTRAVENOUS; SUBCUTANEOUS at 13:38

## 2023-03-31 RX ADMIN — Medication 1 MILLIGRAM(S): at 11:57

## 2023-03-31 RX ADMIN — LEVETIRACETAM 500 MILLIGRAM(S): 250 TABLET, FILM COATED ORAL at 05:42

## 2023-03-31 RX ADMIN — ATORVASTATIN CALCIUM 80 MILLIGRAM(S): 80 TABLET, FILM COATED ORAL at 22:55

## 2023-03-31 RX ADMIN — LEVETIRACETAM 500 MILLIGRAM(S): 250 TABLET, FILM COATED ORAL at 17:29

## 2023-03-31 RX ADMIN — AMLODIPINE BESYLATE 2.5 MILLIGRAM(S): 2.5 TABLET ORAL at 05:42

## 2023-03-31 RX ADMIN — PANTOPRAZOLE SODIUM 40 MILLIGRAM(S): 20 TABLET, DELAYED RELEASE ORAL at 05:42

## 2023-03-31 RX ADMIN — Medication 81 MILLIGRAM(S): at 11:57

## 2023-03-31 NOTE — DIETITIAN INITIAL EVALUATION ADULT - DIET TYPE
Nepro 1can daily as medically feasible ( 425 kcal, 19 g protein)/DASH/TLC (sodium and cholesterol restricted diet)/no concentrated potassium/no concentrated phosphorus/easy to chew

## 2023-03-31 NOTE — DIETITIAN INITIAL EVALUATION ADULT - NSFNSPHYEXAMSKINFT_GEN_A_CORE
Pressure Injury 1: Right:,coccyx, Stage II  Pressure Injury 2: Left:,coccyx, Stage II  Pressure Injury 3: Left:,heel, Suspected deep tissue injury  Pressure Injury 4: none, none  Pressure Injury 5: Right:,heel,(x2), Suspected deep tissue injury  Pressure Injury 6: none, none  Pressure Injury 7: none, none  Pressure Injury 8: none, none  Pressure Injury 9: none, none  Pressure Injury 10: none, none  Pressure Injury 11: none, none, Pressure Injury 1: Right:,buttocks, Stage II  Pressure Injury 2: Left:,buttocks, Stage II  Pressure Injury 3: Left:,heel, Suspected deep tissue injury  Pressure Injury 4: none, none  Pressure Injury 5: heel,Right:, Stage I  Pressure Injury 6: none, none  Pressure Injury 7: none, none  Pressure Injury 8: none, none  Pressure Injury 9: none, none  Pressure Injury 10: none, none  Pressure Injury 11: none, none Pressure Injury 1: Right:,coccyx, Stage II  Pressure Injury 2: Left:,coccyx, Stage II  Pressure Injury 3: Left:,heel, Suspected deep tissue injury  Pressure Injury 5: Right:,heel,(x2), Suspected deep tissue injury

## 2023-03-31 NOTE — DIETITIAN INITIAL EVALUATION ADULT - FEEDING ASSISTANCE
Provide food choices within diet Rx as available/updated; Nursing to continue feeding assistance and encouragement

## 2023-03-31 NOTE — DIETITIAN INITIAL EVALUATION ADULT - PROBLEM SELECTOR PLAN 3
Hx of Multiple Myeloma  home med - Pomolyst  hold home med for now due to KAJAL  Heme-Onc ([facility]) consulted

## 2023-03-31 NOTE — PROGRESS NOTE ADULT - NS PANP COMMENT GEN_ALL_CORE FT
82 y/o F from home w/ PMHx of Multiple Myeloma, RA, dementia, HTN, HLD, CKD, CVA. She was sent by her Oncologist (Dr. Bailey Rosales) due to worsening renal function and hyperkalemia of 6.4. She was noted to have serum Potassium of 6.1. Her SCr last 11/2022 was 1.45. In addition, she complains of right sided vague abdominal pain and tenderness. She denies any nausea/vomiting, diarrhea, or dysuria. She otherwise denies any headache, dizziness, chest pain, palpitation, shortness of breath.   In the ED, VS were stable. Serum K was 6.1, Cr 3.54, Hb 8.2. EKG showed SR, 1st degree AVB, no T wave changes. She was given Hyperkalemia cocktail albuterol, Dextrose 50% and Regular insulin. Bladder scan showed urinary retention 540 cc. Indwelling Catheter was inserted. Of note, she sees Nephro (Dr. Deutsch) as outpatient.    #KAJAL on CKD  #Hyperkalemia  #Urinary Retention   #UTI  #Multiple Myeloma  #RA  #HTN  #HLD  #Dementia  #hx of CVA  Baseline CKD of 1.45, presenting with Cr of 3.54. Likely multifactorial: progression of MM, urinary retention, & drug use. Patient's daughter states that oncologist recently switched patient's pomolyst to daily, was previously held due to worsening renal function. Will hold for now given worsening renal function. Bladder scan revealed 540cc retained urine, neves catheter inserted in ED for rentention   - F/u renal US  - Monitor Cr with IVF  - UA concerning for UTI started on ceftriaxone and Ucx drawn  - Heme/onc ([facility]) and Nephro consulted  - Wound care for sacral ulcer  - DVT ppx   -PT

## 2023-03-31 NOTE — DIETITIAN INITIAL EVALUATION ADULT - PROBLEM SELECTOR PLAN 1
Hx of CKD (baseline: 1.45)  p/w SCr 3.54  Hx of Multiple Myeloma, RA  Bladder Scan - 540 cc  Indwelling Zapata Catheter  f/u UA, ULytes  Avoid Nephrotoxic agents  monitor BMP  f/u Renal US  Nephro (Dr. Deutsch) consulted

## 2023-03-31 NOTE — DIETITIAN INITIAL EVALUATION ADULT - FACTORS AFF FOOD INTAKE
change in mental status/Sabianism/ethnic/cultural/personal food preferences acute on chronic comorbidities including acute on CKD, multiple myeloma,/change in mental status/Taoist/ethnic/cultural/personal food preferences

## 2023-03-31 NOTE — DIETITIAN INITIAL EVALUATION ADULT - PROBLEM SELECTOR PLAN 2
K - 6.1>5.9  EKG - SR, no T wave changes  s/p hyperkalemia cocktail (albuterol, dextrose 50%, regular insulin)  give another hyperkalemia cocktail  likely due to KAJAL on CKD  monitor BMP  Nephro (Dr. Deutsch) consulted

## 2023-03-31 NOTE — DIETITIAN INITIAL EVALUATION ADULT - PERTINENT MEDS FT
MEDICATIONS  (STANDING):  amLODIPine   Tablet 2.5 milliGRAM(s) Oral daily  aspirin  chewable 81 milliGRAM(s) Oral daily  atorvastatin 80 milliGRAM(s) Oral at bedtime  calcium gluconate IVPB 2 Gram(s) IV Intermittent once  cefTRIAXone   IVPB 1000 milliGRAM(s) IV Intermittent every 24 hours  folic acid 1 milliGRAM(s) Oral daily  heparin   Injectable 5000 Unit(s) SubCutaneous every 8 hours  levETIRAcetam 500 milliGRAM(s) Oral two times a day  memantine 5 milliGRAM(s) Oral at bedtime  pantoprazole    Tablet 40 milliGRAM(s) Oral before breakfast  sodium chloride 0.45% 1000 milliLiter(s) (70 mL/Hr) IV Continuous <Continuous>    MEDICATIONS  (PRN):  acetaminophen     Tablet .. 650 milliGRAM(s) Oral every 6 hours PRN Temp greater or equal to 38C (100.4F), Mild Pain (1 - 3)

## 2023-03-31 NOTE — ADVANCED PRACTICE NURSE CONSULT - RECOMMEDATIONS
-Clean all wounds with normal saline and apply skin prep to the surrounding skin  -Apply Calcium Alginate (Aquacel) to the Coccyx wounds and cover with a Foam dressing Q 72hrs PRN  -Leave the Bilateral Heel wound open to air  -Elevate/float the patients heels using heel protectors and reposition the patient Q 2hrs using wedges or pillows

## 2023-03-31 NOTE — DOWNTIME INTERRUPTION NOTE - WHICH MANUAL FORMS INITIATED?
Chuck Freitas, PGY-3, MD: I was not involved in the care of this patient and am only entering information to back log for downtime. Regarding Emergency Department care during this visit/admission, please see paper chart for isolation and medical management details, as the mandatory fields in the disposition section may not be accurate.

## 2023-03-31 NOTE — ADVANCED PRACTICE NURSE CONSULT - ASSESSMENT
This is a 83yr old female patient admitted for Functional Disorder of the Intestine, presenting with the following:  -There is an intact DTI to the R. Heel (x2) (0.3cm x 0.3cm) and L. Heel (x1) (0.4cm x 0.4cm) without drainage  -There is a Stage 2 Pressure Injury to the R. (1.5cm x 2cm x 0.2cm) and L. (2cm x 2cm x 0.2cm) Coccyx  with red tissue, drainage, and periwound maceration

## 2023-03-31 NOTE — PROGRESS NOTE ADULT - PROBLEM SELECTOR PLAN 6
- Hx of HTN  - home med - amlodipine, lisinopril  - hold lisinopril for KAJAL  - continue amlodipine  - BP monitoring  - DASH diet

## 2023-03-31 NOTE — DIETITIAN INITIAL EVALUATION ADULT - PROBLEM SELECTOR PLAN 5
Hx of HTN  home med - amlodipine, lisinopril  hold lisinopril for KAJAL  continue amlodipine  BP monitoring

## 2023-03-31 NOTE — PROGRESS NOTE ADULT - PROBLEM SELECTOR PLAN 9
- Hx of minor strokes (daughter unsure if real stroke)  - home med - keppra for seizure prophylaxis  - Also on ASA, atorvastatin  - continue home meds

## 2023-03-31 NOTE — DIETITIAN INITIAL EVALUATION ADULT - OTHER INFO
Pt lives home PTA, alert, verbally responsive, confused, Limited intake/wt change history data available at present, intake 25% observed today, 51-75% intake at time per flowsheet; K=6.1-->5.2, eGFR=14, PO4=4.8-->4.2 noted; Unknown food allergies per Chart  Pt lives home PTA, alert, verbally responsive, confused, Limited intake/wt change history data available at present, intake 25% observed today, 51-75% intake at time per flowsheet; K=6.1-->5.2, eGFR=14, PO4=4.8-->4.2 noted; no h/o DM, glucose=Unknown food allergies per Chart  Pt lives home PTA, alert, verbally responsive, confused, Limited intake/wt change history data available at present, intake 25% observed today, 51-75% intake at time per flowsheet; K=6.1-->5.2, eGFR=14, PO4=4.8-->4.2 noted; no h/o DM, glucose=73 to 212, SeqB9G=25.4 4/14/22-->5.8 11/5/22 noted; Unknown food allergies per Chart

## 2023-03-31 NOTE — DIETITIAN INITIAL EVALUATION ADULT - PERTINENT LABORATORY DATA
03-31    139  |  114<H>  |  64<H>  ----------------------------<  116<H>  5.2   |  20<L>  |  3.11<H>    Ca    9.2      31 Mar 2023 05:30  Phos  4.2     03-31  Mg     2.4     03-31    TPro  7.7  /  Alb  2.2<L>  /  TBili  0.3  /  DBili  x   /  AST  20  /  ALT  20  /  AlkPhos  56  03-30  A1C with Estimated Average Glucose Result: 5.8 % (11-05-22 @ 05:25)  A1C with Estimated Average Glucose Result: 11.4 % (04-14-22 @ 09:21)  A1C with Estimated Average Glucose Result: 11.5 % (04-13-22 @ 12:19)

## 2023-04-01 LAB
ANION GAP SERPL CALC-SCNC: 7 MMOL/L — SIGNIFICANT CHANGE UP (ref 5–17)
BUN SERPL-MCNC: 49 MG/DL — HIGH (ref 7–18)
CALCIUM SERPL-MCNC: 9 MG/DL — SIGNIFICANT CHANGE UP (ref 8.4–10.5)
CHLORIDE SERPL-SCNC: 116 MMOL/L — HIGH (ref 96–108)
CO2 SERPL-SCNC: 19 MMOL/L — LOW (ref 22–31)
CREAT SERPL-MCNC: 2.8 MG/DL — HIGH (ref 0.5–1.3)
EGFR: 16 ML/MIN/1.73M2 — LOW
GLUCOSE SERPL-MCNC: 98 MG/DL — SIGNIFICANT CHANGE UP (ref 70–99)
HCT VFR BLD CALC: 26 % — LOW (ref 34.5–45)
HGB BLD-MCNC: 8.2 G/DL — LOW (ref 11.5–15.5)
MCHC RBC-ENTMCNC: 28.2 PG — SIGNIFICANT CHANGE UP (ref 27–34)
MCHC RBC-ENTMCNC: 31.5 GM/DL — LOW (ref 32–36)
MCV RBC AUTO: 89.3 FL — SIGNIFICANT CHANGE UP (ref 80–100)
NRBC # BLD: 0 /100 WBCS — SIGNIFICANT CHANGE UP (ref 0–0)
PLATELET # BLD AUTO: 199 K/UL — SIGNIFICANT CHANGE UP (ref 150–400)
POTASSIUM SERPL-MCNC: 4.6 MMOL/L — SIGNIFICANT CHANGE UP (ref 3.5–5.3)
POTASSIUM SERPL-SCNC: 4.6 MMOL/L — SIGNIFICANT CHANGE UP (ref 3.5–5.3)
RBC # BLD: 2.91 M/UL — LOW (ref 3.8–5.2)
RBC # FLD: 18.4 % — HIGH (ref 10.3–14.5)
SODIUM SERPL-SCNC: 142 MMOL/L — SIGNIFICANT CHANGE UP (ref 135–145)
WBC # BLD: 3.46 K/UL — LOW (ref 3.8–10.5)
WBC # FLD AUTO: 3.46 K/UL — LOW (ref 3.8–10.5)

## 2023-04-01 PROCEDURE — 99232 SBSQ HOSP IP/OBS MODERATE 35: CPT

## 2023-04-01 RX ORDER — SODIUM CHLORIDE 9 MG/ML
1000 INJECTION, SOLUTION INTRAVENOUS
Refills: 0 | Status: DISCONTINUED | OUTPATIENT
Start: 2023-04-01 | End: 2023-04-04

## 2023-04-01 RX ADMIN — HEPARIN SODIUM 5000 UNIT(S): 5000 INJECTION INTRAVENOUS; SUBCUTANEOUS at 05:45

## 2023-04-01 RX ADMIN — HEPARIN SODIUM 5000 UNIT(S): 5000 INJECTION INTRAVENOUS; SUBCUTANEOUS at 22:46

## 2023-04-01 RX ADMIN — Medication 650 MILLIGRAM(S): at 00:12

## 2023-04-01 RX ADMIN — PANTOPRAZOLE SODIUM 40 MILLIGRAM(S): 20 TABLET, DELAYED RELEASE ORAL at 06:12

## 2023-04-01 RX ADMIN — SODIUM CHLORIDE 70 MILLILITER(S): 9 INJECTION, SOLUTION INTRAVENOUS at 16:51

## 2023-04-01 RX ADMIN — LEVETIRACETAM 500 MILLIGRAM(S): 250 TABLET, FILM COATED ORAL at 05:45

## 2023-04-01 RX ADMIN — ATORVASTATIN CALCIUM 80 MILLIGRAM(S): 80 TABLET, FILM COATED ORAL at 22:46

## 2023-04-01 RX ADMIN — CEFTRIAXONE 100 MILLIGRAM(S): 500 INJECTION, POWDER, FOR SOLUTION INTRAMUSCULAR; INTRAVENOUS at 09:04

## 2023-04-01 RX ADMIN — LEVETIRACETAM 500 MILLIGRAM(S): 250 TABLET, FILM COATED ORAL at 17:31

## 2023-04-01 RX ADMIN — Medication 650 MILLIGRAM(S): at 00:54

## 2023-04-01 RX ADMIN — Medication 1 MILLIGRAM(S): at 12:06

## 2023-04-01 RX ADMIN — Medication 5 MILLIGRAM(S): at 22:46

## 2023-04-01 RX ADMIN — AMLODIPINE BESYLATE 2.5 MILLIGRAM(S): 2.5 TABLET ORAL at 05:45

## 2023-04-01 RX ADMIN — MEMANTINE HYDROCHLORIDE 5 MILLIGRAM(S): 10 TABLET ORAL at 22:46

## 2023-04-01 RX ADMIN — Medication 81 MILLIGRAM(S): at 12:06

## 2023-04-01 RX ADMIN — HEPARIN SODIUM 5000 UNIT(S): 5000 INJECTION INTRAVENOUS; SUBCUTANEOUS at 13:17

## 2023-04-02 LAB
ANION GAP SERPL CALC-SCNC: 2 MMOL/L — LOW (ref 5–17)
APTT BLD: 37.4 SEC — HIGH (ref 27.5–35.5)
BLD GP AB SCN SERPL QL: SIGNIFICANT CHANGE UP
BUN SERPL-MCNC: 43 MG/DL — HIGH (ref 7–18)
CALCIUM SERPL-MCNC: 8 MG/DL — LOW (ref 8.4–10.5)
CHLORIDE SERPL-SCNC: 113 MMOL/L — HIGH (ref 96–108)
CO2 SERPL-SCNC: 22 MMOL/L — SIGNIFICANT CHANGE UP (ref 22–31)
CREAT SERPL-MCNC: 2.31 MG/DL — HIGH (ref 0.5–1.3)
EGFR: 20 ML/MIN/1.73M2 — LOW
GLUCOSE SERPL-MCNC: 113 MG/DL — HIGH (ref 70–99)
HCT VFR BLD CALC: 20.8 % — CRITICAL LOW (ref 34.5–45)
HCT VFR BLD CALC: 24.7 % — LOW (ref 34.5–45)
HGB BLD-MCNC: 6.7 G/DL — CRITICAL LOW (ref 11.5–15.5)
HGB BLD-MCNC: 8.1 G/DL — LOW (ref 11.5–15.5)
INR BLD: 1.12 RATIO — SIGNIFICANT CHANGE UP (ref 0.88–1.16)
MCHC RBC-ENTMCNC: 28 PG — SIGNIFICANT CHANGE UP (ref 27–34)
MCHC RBC-ENTMCNC: 28.7 PG — SIGNIFICANT CHANGE UP (ref 27–34)
MCHC RBC-ENTMCNC: 32.2 GM/DL — SIGNIFICANT CHANGE UP (ref 32–36)
MCHC RBC-ENTMCNC: 32.8 GM/DL — SIGNIFICANT CHANGE UP (ref 32–36)
MCV RBC AUTO: 87 FL — SIGNIFICANT CHANGE UP (ref 80–100)
MCV RBC AUTO: 87.6 FL — SIGNIFICANT CHANGE UP (ref 80–100)
NRBC # BLD: 0 /100 WBCS — SIGNIFICANT CHANGE UP (ref 0–0)
NRBC # BLD: 0 /100 WBCS — SIGNIFICANT CHANGE UP (ref 0–0)
PLATELET # BLD AUTO: 168 K/UL — SIGNIFICANT CHANGE UP (ref 150–400)
PLATELET # BLD AUTO: 197 K/UL — SIGNIFICANT CHANGE UP (ref 150–400)
POTASSIUM SERPL-MCNC: 3.8 MMOL/L — SIGNIFICANT CHANGE UP (ref 3.5–5.3)
POTASSIUM SERPL-SCNC: 3.8 MMOL/L — SIGNIFICANT CHANGE UP (ref 3.5–5.3)
PROTHROM AB SERPL-ACNC: 13.4 SEC — SIGNIFICANT CHANGE UP (ref 10.5–13.4)
RBC # BLD: 2.39 M/UL — LOW (ref 3.8–5.2)
RBC # BLD: 2.82 M/UL — LOW (ref 3.8–5.2)
RBC # FLD: 17.8 % — HIGH (ref 10.3–14.5)
RBC # FLD: 18 % — HIGH (ref 10.3–14.5)
SODIUM SERPL-SCNC: 137 MMOL/L — SIGNIFICANT CHANGE UP (ref 135–145)
WBC # BLD: 2.94 K/UL — LOW (ref 3.8–10.5)
WBC # BLD: 4.1 K/UL — SIGNIFICANT CHANGE UP (ref 3.8–10.5)
WBC # FLD AUTO: 2.94 K/UL — LOW (ref 3.8–10.5)
WBC # FLD AUTO: 4.1 K/UL — SIGNIFICANT CHANGE UP (ref 3.8–10.5)

## 2023-04-02 PROCEDURE — 99232 SBSQ HOSP IP/OBS MODERATE 35: CPT

## 2023-04-02 RX ADMIN — PANTOPRAZOLE SODIUM 40 MILLIGRAM(S): 20 TABLET, DELAYED RELEASE ORAL at 05:37

## 2023-04-02 RX ADMIN — HEPARIN SODIUM 5000 UNIT(S): 5000 INJECTION INTRAVENOUS; SUBCUTANEOUS at 05:37

## 2023-04-02 RX ADMIN — LEVETIRACETAM 500 MILLIGRAM(S): 250 TABLET, FILM COATED ORAL at 17:17

## 2023-04-02 RX ADMIN — AMLODIPINE BESYLATE 2.5 MILLIGRAM(S): 2.5 TABLET ORAL at 05:36

## 2023-04-02 RX ADMIN — SODIUM CHLORIDE 70 MILLILITER(S): 9 INJECTION, SOLUTION INTRAVENOUS at 08:21

## 2023-04-02 RX ADMIN — Medication 81 MILLIGRAM(S): at 13:05

## 2023-04-02 RX ADMIN — Medication 5 MILLIGRAM(S): at 21:24

## 2023-04-02 RX ADMIN — HEPARIN SODIUM 5000 UNIT(S): 5000 INJECTION INTRAVENOUS; SUBCUTANEOUS at 13:05

## 2023-04-02 RX ADMIN — MEMANTINE HYDROCHLORIDE 5 MILLIGRAM(S): 10 TABLET ORAL at 21:24

## 2023-04-02 RX ADMIN — CEFTRIAXONE 100 MILLIGRAM(S): 500 INJECTION, POWDER, FOR SOLUTION INTRAMUSCULAR; INTRAVENOUS at 08:44

## 2023-04-02 RX ADMIN — LEVETIRACETAM 500 MILLIGRAM(S): 250 TABLET, FILM COATED ORAL at 05:36

## 2023-04-02 RX ADMIN — ATORVASTATIN CALCIUM 80 MILLIGRAM(S): 80 TABLET, FILM COATED ORAL at 21:24

## 2023-04-02 RX ADMIN — HEPARIN SODIUM 5000 UNIT(S): 5000 INJECTION INTRAVENOUS; SUBCUTANEOUS at 21:24

## 2023-04-02 RX ADMIN — Medication 1 MILLIGRAM(S): at 13:05

## 2023-04-03 ENCOUNTER — TRANSCRIPTION ENCOUNTER (OUTPATIENT)
Age: 83
End: 2023-04-03

## 2023-04-03 DIAGNOSIS — E46 UNSPECIFIED PROTEIN-CALORIE MALNUTRITION: ICD-10-CM

## 2023-04-03 DIAGNOSIS — D64.9 ANEMIA, UNSPECIFIED: ICD-10-CM

## 2023-04-03 LAB
-  AMIKACIN: SIGNIFICANT CHANGE UP
-  AMOXICILLIN/CLAVULANIC ACID: SIGNIFICANT CHANGE UP
-  AMPICILLIN/SULBACTAM: SIGNIFICANT CHANGE UP
-  AMPICILLIN: SIGNIFICANT CHANGE UP
-  AZTREONAM: SIGNIFICANT CHANGE UP
-  CEFAZOLIN: SIGNIFICANT CHANGE UP
-  CEFEPIME: SIGNIFICANT CHANGE UP
-  CEFOXITIN: SIGNIFICANT CHANGE UP
-  CEFTRIAXONE: SIGNIFICANT CHANGE UP
-  CEFUROXIME: SIGNIFICANT CHANGE UP
-  CIPROFLOXACIN: SIGNIFICANT CHANGE UP
-  ERTAPENEM: SIGNIFICANT CHANGE UP
-  GENTAMICIN: SIGNIFICANT CHANGE UP
-  IMIPENEM: SIGNIFICANT CHANGE UP
-  LEVOFLOXACIN: SIGNIFICANT CHANGE UP
-  MEROPENEM: SIGNIFICANT CHANGE UP
-  NITROFURANTOIN: SIGNIFICANT CHANGE UP
-  PIPERACILLIN/TAZOBACTAM: SIGNIFICANT CHANGE UP
-  TOBRAMYCIN: SIGNIFICANT CHANGE UP
-  TRIMETHOPRIM/SULFAMETHOXAZOLE: SIGNIFICANT CHANGE UP
ANION GAP SERPL CALC-SCNC: 4 MMOL/L — LOW (ref 5–17)
BUN SERPL-MCNC: 35 MG/DL — HIGH (ref 7–18)
CALCIUM SERPL-MCNC: 8.7 MG/DL — SIGNIFICANT CHANGE UP (ref 8.4–10.5)
CHLORIDE SERPL-SCNC: 114 MMOL/L — HIGH (ref 96–108)
CO2 SERPL-SCNC: 22 MMOL/L — SIGNIFICANT CHANGE UP (ref 22–31)
CREAT SERPL-MCNC: 2.21 MG/DL — HIGH (ref 0.5–1.3)
CULTURE RESULTS: SIGNIFICANT CHANGE UP
EGFR: 22 ML/MIN/1.73M2 — LOW
GLUCOSE SERPL-MCNC: 106 MG/DL — HIGH (ref 70–99)
HCT VFR BLD CALC: 23.3 % — LOW (ref 34.5–45)
HGB BLD-MCNC: 7.5 G/DL — LOW (ref 11.5–15.5)
MCHC RBC-ENTMCNC: 28.3 PG — SIGNIFICANT CHANGE UP (ref 27–34)
MCHC RBC-ENTMCNC: 32.2 GM/DL — SIGNIFICANT CHANGE UP (ref 32–36)
MCV RBC AUTO: 87.9 FL — SIGNIFICANT CHANGE UP (ref 80–100)
METHOD TYPE: SIGNIFICANT CHANGE UP
NRBC # BLD: 0 /100 WBCS — SIGNIFICANT CHANGE UP (ref 0–0)
ORGANISM # SPEC MICROSCOPIC CNT: SIGNIFICANT CHANGE UP
ORGANISM # SPEC MICROSCOPIC CNT: SIGNIFICANT CHANGE UP
PLATELET # BLD AUTO: 209 K/UL — SIGNIFICANT CHANGE UP (ref 150–400)
POTASSIUM SERPL-MCNC: 3.8 MMOL/L — SIGNIFICANT CHANGE UP (ref 3.5–5.3)
POTASSIUM SERPL-SCNC: 3.8 MMOL/L — SIGNIFICANT CHANGE UP (ref 3.5–5.3)
RBC # BLD: 2.65 M/UL — LOW (ref 3.8–5.2)
RBC # FLD: 18.1 % — HIGH (ref 10.3–14.5)
SODIUM SERPL-SCNC: 140 MMOL/L — SIGNIFICANT CHANGE UP (ref 135–145)
SPECIMEN SOURCE: SIGNIFICANT CHANGE UP
WBC # BLD: 3.38 K/UL — LOW (ref 3.8–10.5)
WBC # FLD AUTO: 3.38 K/UL — LOW (ref 3.8–10.5)

## 2023-04-03 PROCEDURE — 99232 SBSQ HOSP IP/OBS MODERATE 35: CPT

## 2023-04-03 RX ORDER — LISINOPRIL 2.5 MG/1
1 TABLET ORAL
Refills: 0 | DISCHARGE

## 2023-04-03 RX ADMIN — LEVETIRACETAM 500 MILLIGRAM(S): 250 TABLET, FILM COATED ORAL at 05:59

## 2023-04-03 RX ADMIN — HEPARIN SODIUM 5000 UNIT(S): 5000 INJECTION INTRAVENOUS; SUBCUTANEOUS at 13:54

## 2023-04-03 RX ADMIN — PANTOPRAZOLE SODIUM 40 MILLIGRAM(S): 20 TABLET, DELAYED RELEASE ORAL at 07:09

## 2023-04-03 RX ADMIN — HEPARIN SODIUM 5000 UNIT(S): 5000 INJECTION INTRAVENOUS; SUBCUTANEOUS at 22:30

## 2023-04-03 RX ADMIN — AMLODIPINE BESYLATE 2.5 MILLIGRAM(S): 2.5 TABLET ORAL at 05:59

## 2023-04-03 RX ADMIN — LEVETIRACETAM 500 MILLIGRAM(S): 250 TABLET, FILM COATED ORAL at 17:17

## 2023-04-03 RX ADMIN — Medication 81 MILLIGRAM(S): at 11:08

## 2023-04-03 RX ADMIN — HEPARIN SODIUM 5000 UNIT(S): 5000 INJECTION INTRAVENOUS; SUBCUTANEOUS at 05:59

## 2023-04-03 RX ADMIN — Medication 1 MILLIGRAM(S): at 11:08

## 2023-04-03 RX ADMIN — ATORVASTATIN CALCIUM 80 MILLIGRAM(S): 80 TABLET, FILM COATED ORAL at 22:31

## 2023-04-03 RX ADMIN — MEMANTINE HYDROCHLORIDE 5 MILLIGRAM(S): 10 TABLET ORAL at 22:31

## 2023-04-03 NOTE — DISCHARGE NOTE PROVIDER - HOSPITAL COURSE
Patient is a 82 y/o F from home w/ PMHx of Multiple Myeloma, RA, dementia, HTN, HLD, CKD, CVA? Admitted for acute renal failure on CKD with Hyperkalemia. Nephro Dr Triana consulted. Pt was admitted for Acute kidney injury superimposed on CKD, neves was inserted and Nephro Dr. Deutsch followed.   Pt was also found to have Klebsiella UTI and treated with abx   PT recommended DEV, but family want to take her home with PT   Clinically improved, optimized for discharge with outpt follow up   Please note that this a brief summary of hospital course please refer to daily progress notes and consult notes for full course and events

## 2023-04-03 NOTE — DISCHARGE NOTE PROVIDER - CARE PROVIDER_API CALL
Fatuma Cruz (DO)  Family Medicine  [address] [place] [address]  Phone: [phone]  Fax: [phone]  Follow Up Time: 1 week   Fatuma Cruz (DO)  Family Medicine  [address] [place] [address]  Phone: [phone]  Fax: [phone]  Follow Up Time: 1 week    Bailey Rosales)  Medical Oncology  [address] [place] [address]  Phone: [phone]  Fax: [phone]  Follow Up Time: 1 week

## 2023-04-03 NOTE — PROGRESS NOTE ADULT - PROBLEM SELECTOR PLAN 12
-pt from home, PT recommended DEV, but family want to take her home with PT, daughter requesting pt to be discharged tomorrow 4/4  -CM consult to reinstate HHA

## 2023-04-03 NOTE — PROGRESS NOTE ADULT - PROBLEM SELECTOR PLAN 3
-hx of Multiple Myeloma  -hold home dose of  Pomolyst while inpt   -Heme/Onc [facility] following
- K - 6.1>5.9  - likely due to KAJAL on CKD  - EKG - SR, no T wave changes  - s/p hyperkalemia cocktail (albuterol, dextrose 50%, regular insulin)  - monitor BMP  - Nephro (Dr. Deutsch) consulted

## 2023-04-03 NOTE — DISCHARGE NOTE PROVIDER - NSDCCPCAREPLAN_GEN_ALL_CORE_FT
ANTICOAGULATION FOLLOW-UP CLINIC VISIT    Patient Name:  Richard Ball  Date:  8/11/2021  Contact Type:  Telephone    SUBJECTIVE:  Patient Findings     Positives:  Change in diet/appetite        Clinical Outcomes     Negatives:  Major bleeding event, Thromboembolic event, Anticoagulation-related hospital admission, Anticoagulation-related ED visit, Anticoagulation-related fatality           OBJECTIVE    Recent labs: (last 7 days)     08/11/21  1413   INR 3.0*       ASSESSMENT / PLAN  INR assessment THER    Recheck INR In: 3 WEEKS    INR Location Outside lab      Anticoagulation Summary  As of 8/11/2021    INR goal:  2.0-3.0   TTR:  67.4 % (1 y)   INR used for dosing:  3.0 (8/11/2021)   Warfarin maintenance plan:  5 mg (5 mg x 1) every Mon, Wed, Fri; 2.5 mg (5 mg x 0.5) all other days   Full warfarin instructions:  5 mg every Mon, Wed, Fri; 2.5 mg all other days   Weekly warfarin total:  25 mg   No change documented:  Amita Obando RN   Plan last modified:  Teresa Espino RN (10/15/2019)   Next INR check:  9/1/2021   Target end date:  Indefinite    Indications    Heart valve replaced [Z95.2] [Z95.2]  Long term current use of anticoagulants with INR goal of 2.0-3.0 [Z79.01]  S/P aortic valve replacement [Z95.2]             Anticoagulation Episode Summary     INR check location:      Preferred lab:      Send INR reminders to:  [facility] HEART INR NURSE    Comments:  AVR in 2001, Lovenox needed per Dr. Garcia due to possible TIA in 05/18      Anticoagulation Care Providers     Provider Role Specialty Phone number    March, Sundar Cantu MD Referring Interventional Cardiology [phone]            See the Encounter Report to view Anticoagulation Flowsheet and Dosing Calendar (Go to Encounters tab in chart review, and find the Anticoagulation Therapy Visit)    INR 3.0 today at outside clinic. Spoke to patient, no abnormal bleeding/bruising. No med changes, he is maintaining his green intake, salad/broccoli a  few times a week. Given that INR is still at the top of range after partial hold last check, will have patient increase green intake slightly and continue on current warfarin maintenance dosing of 5mg WMF and 2.5mg all other days. Recheck in 3wks.     Amita Obando RN                  PRINCIPAL DISCHARGE DIAGNOSIS  Diagnosis: Acute kidney injury superimposed on CKD  Assessment and Plan of Treatment: your kidney function was elevated more than your baseline likley due to UTI, you were given IV fluids. You were evaluated by Nephrologist your kidney function improved and remained stable at your baseline  while your kidneys are healing you should avoid agents that can cause kidney injury.  Some of these agents include NSAIDs, Gadolinium contrast, and Phosphate containing enemas.  [facility]      SECONDARY DISCHARGE DIAGNOSES  Diagnosis: Acute UTI  Assessment and Plan of Treatment: you were found to have UTI and were treated with Intavenous antibiotics   You completed the antibiotic course in the hospital   Drink enough water and fluids to keep your urine clear or pale yellow.  SEEK IMMEDIATE MEDICAL CARE IF:  You have severe back pain or lower abdominal pain.  You develop chills.  You have nausea or vomiting.  You have continued burning or discomfort with urination.      Diagnosis: Rheumatoid arthritis  Assessment and Plan of Treatment: Continue medications as prescribed    Diagnosis: Multiple myeloma  Assessment and Plan of Treatment: you have history of Multiple Myeloma, follow up with Hematology/Oncology outpatient     PRINCIPAL DISCHARGE DIAGNOSIS  Diagnosis: Acute kidney injury superimposed on CKD  Assessment and Plan of Treatment: You have chronic kidney diseae which around stage 4  your kidney function was elevated more than your baseline likley due to UTI, you were given IV fluids. You were evaluated by Nephrologist your kidney function improved and remained stable at your baseline  while your kidneys are healing you should avoid agents that can cause kidney injury.  Some of these agents include NSAIDs, Gadolinium contrast, and Phosphate containing enemas.  Follow up with PCP and Nephrologist outpateint for repeat blood work      SECONDARY DISCHARGE DIAGNOSES  Diagnosis: Multiple myeloma  Assessment and Plan of Treatment: you have history of Multiple Myeloma, follow up with Hematology/Oncology outpatient    Diagnosis: Rheumatoid arthritis  Assessment and Plan of Treatment: Continue medications as prescribed    Diagnosis: Acute UTI  Assessment and Plan of Treatment: you were found to have UTI and were treated with Intavenous antibiotics   You completed the antibiotic course in the hospital   Drink enough water and fluids to keep your urine clear or pale yellow.  SEEK IMMEDIATE MEDICAL CARE IF:  You have severe back pain or lower abdominal pain.  You develop chills.  You have nausea or vomiting.  You have continued burning or discomfort with urination.      Diagnosis: Protein calorie malnutrition  Assessment and Plan of Treatment: Continue small frequesnt meals with supplements as tolearted    Diagnosis: Hyperkalemia  Assessment and Plan of Treatment: Your potassium level was high on admission which was likely due to chronic kidney disease. You were started on medications to lower your potassium level. Your blood work was monitored and your potassium level improved and remained stable

## 2023-04-03 NOTE — PROGRESS NOTE ADULT - SUBJECTIVE AND OBJECTIVE BOX
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 milliGRAM(s) Oral two times a day  memantine 5 milliGRAM(s) Oral at bedtime  pantoprazole    Tablet 40 milliGRAM(s) Oral before breakfast  sodium chloride 0.45% 1000 milliLiter(s) (70 mL/Hr) IV Continuous <Continuous>    MEDICATIONS  (PRN):  acetaminophen     Tablet .. 650 milliGRAM(s) Oral every 6 hours PRN Temp greater or equal to 38C (100.4F), Mild Pain (1 - 3)      PHYSICAL EXAM:  GENERAL: NAD  EYES: clear conjunctiva  ENMT: Moist mucous membranes  NECK: Supple, No JVD  CHEST/LUNG: Clear to auscultation bilaterally; No rales, rhonchi, wheezing, or rubs  HEART: S1, S2, Regular rate and rhythm  ABDOMEN: Soft, Nontender, Nondistended; Bowel sounds present  NEURO: Alert & Oriented to person and place, confused to time and situation   EXTREMITIES: No LE edema, no calf tenderness      Consultant(s) Notes Reviewed:  [x ] YES  [ ] NO  Care Discussed with Consultants/Other Providers [ x] YES  [ ] NO    LABS:                        7.5    3.38  )-----------( 209      ( 03 Apr 2023 06:45 )             23.3     04-03    140  |  114<H>  |  35<H>  ----------------------------<  106<H>  3.8   |  22  |  2.21<H>    Ca    8.7      03 Apr 2023 06:45      PT/INR - ( 02 Apr 2023 09:24 )   PT: 13.4 sec;   INR: 1.12 ratio      PTT - ( 02 Apr 2023 09:24 )  PTT:37.4 sec  CAPILLARY BLOOD GLUCOSE  RADIOLOGY & ADDITIONAL TESTS:

## 2023-04-03 NOTE — PROGRESS NOTE ADULT - REASON FOR ADMISSION
Acute Renal Failure on CKD; Hyperkalemia

## 2023-04-03 NOTE — PHYSICAL THERAPY INITIAL EVALUATION ADULT - ADDITIONAL COMMENTS
Spoke with pt's dtr over the phone to obtain information as patient presented with confusion at times(h/o dementia). Patient lives alone and ambulates with Rollator (HHA few hours during the day)

## 2023-04-03 NOTE — DISCHARGE NOTE PROVIDER - NSDCMRMEDTOKEN_GEN_ALL_CORE_FT
amLODIPine 2.5 mg oral tablet: 1 tab(s) orally once a day  aspirin 81 mg oral tablet: 1 tab(s) orally once a day  ferrous sulfate 325 mg (65 mg elemental iron) oral delayed release tablet: 1 tab(s) orally once a day  folic acid 1 mg oral tablet: 1 tab(s) orally once a day  gabapentin 100 mg oral capsule: 1 cap(s) orally 3 times a day  hydroxychloroquine 200 mg oral tablet: 1 tab(s) orally once a day. DO NOT RESUME UNTIL OKAY WITH YOUR DOCTOR.   levETIRAcetam 500 mg oral tablet: 1 tab(s) orally 2 times a day  memantine 5 mg oral tablet: 1 tab(s) orally once a day (at bedtime)  Pomalyst 2 mg oral capsule: 1 cap(s) orally every other day. DO NOT RESUME UNTIL AFTER REHAB AND OKAYED BY YOUR ONCOLOGIST  rosuvastatin 20 mg oral capsule: 1 cap(s) orally once a day (at bedtime)

## 2023-04-03 NOTE — PROGRESS NOTE ADULT - PROBLEM SELECTOR PLAN 11
-pt from home, PT recommended DEV, but family want to take her home with PT, daughter requesting pt to be discharged tomorrow 4/4  -CM consult to reinstate HHA -dvt ppx: Heparin   -gi ppx: PPI

## 2023-04-03 NOTE — PROGRESS NOTE ADULT - PROBLEM SELECTOR PLAN 2
- Ua +  - Started on Rocephin  - F/U Ucx
-pansensitive Klebsiella pneumoniae UTI  -completed Ceftriaxone x3 days

## 2023-04-03 NOTE — PROGRESS NOTE ADULT - PROBLEM SELECTOR PLAN 7
-likely in setting of dementia   -supportive measures -mental status at baseline, cont home dose Memantine

## 2023-04-03 NOTE — DISCHARGE NOTE PROVIDER - CARE PROVIDERS DIRECT ADDRESSES
,DirectAddress_Unknown ,DirectAddress_Unknown,[email or web address]

## 2023-04-03 NOTE — PROGRESS NOTE ADULT - PROVIDER SPECIALTY LIST ADULT
Nephrology
Nephrology
Heme/Onc
Internal Medicine
Internal Medicine
Nephrology
Nephrology
Heme/Onc
Internal Medicine
Internal Medicine

## 2023-04-03 NOTE — DISCHARGE NOTE PROVIDER - NSDCADMDATE_GEN_ALL_CORE_FT
30-Mar-2023 01:16 Chief Complaint   Patient presents with    Follow-up    Cholesterol Problem    Knee Injury     Right knee Contusion    Labs     completed 9/11/18     HISTORY OF PRESENT ILLNESS  Josue Metcalf is a 80 y.o. female. HPI  Patient is here for a 3 mo follow up of lipids. Patient had labs on 9/11/18. Labs reviewed in detail with patient     Patient does need medication refills today. New concerns today: pt fell at home recently. She thinks she tripped on a rug at home. She landed on the side of her knee. She had to call 911 for help to get up. She did not go to the ER. She was seen at [facility] the following day when a relative insisted that she go. She was dx with a contusion. It has improved since then. ROS  Review of Systems   Constitutional: Negative. HENT: Negative. Respiratory: Negative. Cardiovascular: Negative. All other systems reviewed and are negative. Physical Exam  Physical Exam   Nursing note and vitals reviewed. Constitutional: She is oriented to person, place, and time. She appears well-developed and well-nourished. HENT:   Head: Normocephalic and atraumatic. Right Ear: External ear normal.   Left Ear: External ear normal.   Nose: Nose normal.   Eyes: Conjunctivae and EOM are normal.   Neck: Normal range of motion. Neck supple. No JVD present. Carotid bruit is not present. No thyromegaly present. Cardiovascular: Normal rate, regular rhythm, normal heart sounds and intact distal pulses. Exam reveals no gallop and no friction rub. No murmur heard. Pulmonary/Chest: Effort normal and breath sounds normal. She has no wheezes. She has no rhonchi. She has no rales. Abdominal: Soft. Bowel sounds are normal.   Musculoskeletal: Normal range of motion. Neurological: She is alert and oriented to person, place, and time. Coordination normal.   Skin: Skin is warm and dry. Psychiatric: She has a normal mood and affect.  Her behavior is normal. Judgment and thought content normal.     ASSESSMENT and PLAN  Diagnoses and all orders for this visit:    1. Hypercholesterolemia  -     METABOLIC PANEL, COMPREHENSIVE; Future  -     LIPID PANEL; Future  Discussed diet. Pt will work on making changes. 2. Primary osteoarthritis involving multiple joints  -     celecoxib (CELEBREX) 200 mg capsule; Take 1 Cap by mouth two (2) times daily as needed for Pain. 3. Primary osteoarthritis of right shoulder  -     celecoxib (CELEBREX) 200 mg capsule; Take 1 Cap by mouth two (2) times daily as needed for Pain. 4. Fall at home, initial encounter  Pt counseled to get rid of throw rugs as these can present a trip hazard. 5. Acute pain of right knee  Resolved.      Follow-up Disposition:  3 months; sooner prn

## 2023-04-03 NOTE — PROGRESS NOTE ADULT - NS ATTEND AMEND GEN_ALL_CORE FT
84 y/o F from home w/ PMHx of Multiple Myeloma, RA, dementia, HTN, HLD, CKD, CVA. She was sent by her Oncologist (Dr. Bailey Rosales) due to worsening renal function and hyperkalemia of 6.4. She was noted to have serum Potassium of 6.1. Her SCr last 11/2022 was 1.45. In addition, she complains of right sided vague abdominal pain and tenderness. She denies any nausea/vomiting, diarrhea, or dysuria. She otherwise denies any headache, dizziness, chest pain, palpitation, shortness of breath.   In the ED, VS were stable. Serum K was 6.1, Cr 3.54, Hb 8.2. EKG showed SR, 1st degree AVB, no T wave changes. She was given Hyperkalemia cocktail albuterol, Dextrose 50% and Regular insulin. Bladder scan showed urinary retention 540 cc. Indwelling Catheter was inserted. Of note, she sees Nephro (Dr. Deutsch) as outpatient.    #KAJAL on CKD  #UTI - klebsiella  #Hyperkalemia  #Urinary Retention   #Enterovirus  #Multiple Myeloma  #RA  #HTN  #HLD  #Dementia  #hx of CVA  Baseline CKD of 1.45, presenting with Cr of 3.54. Likely multifactorial: progression of MM, urinary retention, & drug use. Patient's daughter states that oncologist recently switched patient's pomolyst to daily, was previously held due to worsening renal function. Will hold for now given worsening renal function. Bladder scan revealed 540cc retained urine, neves catheter inserted in ED for rentention. Neves removed.  - Cr improving closer to baseline Cr.  - Completed 3 days of ceftriaxone  - Passed TOV  - Heme/onc (QMA) and Nephro consulted  - Wound care for sacral ulcer  - DVT ppx   -PT - DEV but daughter will like to take patient home with reinstatment of home care service tomorrow

## 2023-04-03 NOTE — PHYSICAL THERAPY INITIAL EVALUATION ADULT - DIAGNOSIS, PT EVAL
Patient presented with weakness on B LE'S, impaired balance and was very unsteady with transfers and ambulation

## 2023-04-03 NOTE — PROGRESS NOTE ADULT - PROBLEM SELECTOR PLAN 4
-hold home dose of Hydroxychloroquine for now
- Hx of Multiple Myeloma  - home med - Pomolyst  - hold home med for now due to KAJAL  - Heme-Onc (QMA) consulted

## 2023-04-03 NOTE — PROGRESS NOTE ADULT - PROBLEM SELECTOR PLAN 9
-cont Statin -controlled   -hold home dose of Lisinopril dur to KAJAL   -cont home dose Amlodipine   -mon BP

## 2023-04-03 NOTE — DISCHARGE NOTE PROVIDER - NSDCFUSCHEDAPPT_GEN_ALL_CORE_FT
Bailey Rosales  [facility]  [facility]  Scheduled Appointment: 05/11/2023    [facility]  [facility]  Scheduled Appointment: 05/11/2023    [facility]  [facility]  Scheduled Appointment: 06/21/2023    [facility]  [facility]  Scheduled Appointment: 06/21/2023

## 2023-04-03 NOTE — PROGRESS NOTE ADULT - PROBLEM SELECTOR PLAN 5
-hx of CVAs   -cont home dose Keppra for seizure prophylaxis   -cont ASA and Statin
- Hx of RA  - home med - hydroxychloroquine  - hold home med for now due to KAJAL

## 2023-04-03 NOTE — PROGRESS NOTE ADULT - PROBLEM SELECTOR PLAN 6
-mental status at baseline, cont home dose Memantine -multifactorial in setting of multiple myeloma, immunotherapy use and worsening CKD   -baseline hg around 8, no acute sign or symptoms of bleeding   -mon CBC

## 2023-04-03 NOTE — PROGRESS NOTE ADULT - ASSESSMENT
82 y/o F from home w/ PMHx of Multiple Myeloma, RA, dementia, HTN, HLD, CKD, CVA? (described as minor stroke). She was sent by her Oncologist (Dr. Bailey Rosalse) due to worsening renal function and hyperkalemia. She was noted to have serum Potassium of 6.1. Her SCr last 11/2022 was 1.45. She denies any headache, dizziness, chest pain, shortness of breath. She does complain of right sided vague abdominal pain and tenderness. She denies any nausea/vomiting, diarrhea, dysuria. In the ED, VS were stable. Serum K was 6.1, Cr 3.54, Hb 8.2. EKG showed SR, 1st degree AVB, no T wave changes. She was given Hyperkalemia cocktail albuterol, Dextrose 50% and Regular insulin. Bladder scan showed urinary retention 540 cc. Indwelling Catheter was inserted. Of note, she sees Nephro (Dr. Deutsch) as outpatient.      #Multiple Myeloma with anemia and worsening renal function   pt follows with Dr. Rosales, currently on pomolyst qd and dex 4mg 2x/week  sent to ER for abnormal labs   with KAJAL and Hyperkalemia  clinically improved   Hgb=7.9>8  Cr=3.4>3.1  K+=6.1>5.2  +rhinovirus  Rec's:  -Hold MM medication  (pomolyst and dex) during admission  -etiology for KAJAL poss d/t pomolyst   -Nephrology consult appreciated   -pt to f/u with Dr. Rosales upon d/c  continue other care as per primary team     #VTE Prophylaxis    Thank you for the referral. Will continue to monitor the patient.  Please call with any questions [phone]  
84 y/o F from home w/ PMHx of Multiple Myeloma, RA, dementia, HTN, HLD, CKD, CVA. She was sent by her Oncologist (Dr. Bailey Rosales) due to worsening renal function and hyperkalemia of 6.4. She was noted to have serum Potassium of 6.1. Her SCr last 11/2022 was 1.45. In addition, she complains of right sided vague abdominal pain and tenderness. She denies any nausea/vomiting, diarrhea, or dysuria. She otherwise denies any headache, dizziness, chest pain, palpitation, shortness of breath.   In the ED, VS were stable. Serum K was 6.1, Cr 3.54, Hb 8.2. EKG showed SR, 1st degree AVB, no T wave changes. She was given Hyperkalemia cocktail albuterol, Dextrose 50% and Regular insulin. Bladder scan showed urinary retention 540 cc. Indwelling Catheter was inserted. Of note, she sees Nephro (Dr. Deutsch) as outpatient.    #KAJAL on CKD  #UTI - klebsiella  #Hyperkalemia  #Urinary Retention   #Enterovirus  #Multiple Myeloma  #RA  #HTN  #HLD  #Dementia  #hx of CVA  Baseline CKD of 1.45, presenting with Cr of 3.54. Likely multifactorial: progression of MM, urinary retention, & drug use. Patient's daughter states that oncologist recently switched patient's pomolyst to daily, was previously held due to worsening renal function. Will hold for now given worsening renal function. Bladder scan revealed 540cc retained urine, neves catheter inserted in ED for rentention   - Monitor Cr with IVF - Improving and approaching baseine  - UA concerning for UTI started on ceftriaxone and Ucx drawn  - TOV today  - Heme/onc (QMA) and Nephro consulted  - Wound care for sacral ulcer  - DVT ppx   -PT
Patient is a 83y Female with H/O Rheumatoid arthritis, HTN, HLD, dementia,CVA, multiple myeloma on pomalidomide.   Has CKD presumed to be from myeloma kidney was last seen in the office in June 2022.  Sent by oncologist with hyperkalemia and ARF.  Denies any nausea, vomitting or diarrhoea.  s/p medical management for hyperkalemia.    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  atielogy  of KAJAL ia a pre-renal state versus progression of myeloma kidney versus pomalidomide nephrotoxicity.   renal function better after isotonic fluids and alkalinazation of urine.  off ACEI  # metabolic acidosis with low anion gap ( sec to MM)  # UTI  # HTN. BP controlled.  avoid NSAID's and iv contrast agents  daily BMP  avoid NSAID's IV contrast.  D/C Planning 
Patient is a 83y Female with H/O Rheumatoid arthritis, HTN, HLD, dementia,CVA, multiple myeloma on pomalidomide.   Has CKD presumed to be from myeloma kidney was last seen in the office in June 2022.  Sent by oncologist with hyperkalemia and ARF.  Denies any nausea, vomitting or diarrhoea.  s/p medical management for hyperkalemia.    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  atielogy  of KAJAL ia a pre-renal state versus progression of myeloma kidney versus pomalidomide nephrotoxicity.   renal function better with IVF  # hyperkalemia in light of KAJAL and being on ACE-I- improved.  # metabolic acidosis with low anion gap ( sec to MM)  # UTI  # HTN. BP controlled.    recs:  agree with stopping ACE-I  in light of presumed progression of myeloma kidney, Continue  1/2NS WITH 75MEQ OF NAHCO3 as urine acidic PH 5.0- to help with tubulopathy associated with myeloma  on rocephine for UTI  avoid NSAID's and iv contrast agents  daily BMP  avoid NSAID's IV contrast.  thanks for the consult 
82 y/o F from home w/ PMHx of Multiple Myeloma, RA, dementia, HTN, HLD, CKD, CVA. She was sent by her Oncologist (Dr. Bailey Rosales) due to worsening renal function and hyperkalemia of 6.4. She was noted to have serum Potassium of 6.1. Her SCr last 11/2022 was 1.45. In addition, she complains of right sided vague abdominal pain and tenderness. She denies any nausea/vomiting, diarrhea, or dysuria. She otherwise denies any headache, dizziness, chest pain, palpitation, shortness of breath.   In the ED, VS were stable. Serum K was 6.1, Cr 3.54, Hb 8.2. EKG showed SR, 1st degree AVB, no T wave changes. She was given Hyperkalemia cocktail albuterol, Dextrose 50% and Regular insulin. Bladder scan showed urinary retention 540 cc. Indwelling Catheter was inserted. Of note, she sees Nephro (Dr. Deutsch) as outpatient.    #KAJAL on CKD  #Hyperkalemia  #Urinary Retention   #UTI  #Multiple Myeloma  #RA  #HTN  #HLD  #Dementia  #hx of CVA  Baseline CKD of 1.45, presenting with Cr of 3.54. Likely multifactorial: progression of MM, urinary retention, & drug use. Patient's daughter states that oncologist recently switched patient's pomolyst to daily, was previously held due to worsening renal function. Will hold for now given worsening renal function. Bladder scan revealed 540cc retained urine, neves catheter inserted in ED for rentention   - Monitor Cr with IVF - Improving and approaching baseine  - UA concerning for UTI started on ceftriaxone and Ucx drawn  - Heme/onc (QMA) and Nephro consulted  - Wound care for sacral ulcer  - DVT ppx   -PT.
82 y/o F from home w/ PMHx of Multiple Myeloma, RA, dementia, HTN, HLD, CKD, CVA? (described as minor stroke). She was sent by her Oncologist (Dr. Bailey Rosales) due to worsening renal function and hyperkalemia. She was noted to have serum Potassium of 6.1. Her SCr last 11/2022 was 1.45. She denies any headache, dizziness, chest pain, shortness of breath. She does complain of right sided vague abdominal pain and tenderness. She denies any nausea/vomiting, diarrhea, dysuria. In the ED, VS were stable. Serum K was 6.1, Cr 3.54, Hb 8.2. EKG showed SR, 1st degree AVB, no T wave changes. She was given Hyperkalemia cocktail albuterol, Dextrose 50% and Regular insulin. Bladder scan showed urinary retention 540 cc. Indwelling Catheter was inserted. Of note, she sees Nephro (Dr. Deutsch) as outpatient.      #Multiple Myeloma with anemia and worsening renal function   pt follows with Dr. Rosales, currently on pomolyst qd and dex 4mg 2x/week  sent to ER for abnormal labs   with KAJAL and Hyperkalemia  clinically improved   Hgb=7.9>8.1  Cr=3.4>2.1  K+=6.1>5.2>3.8  +rhinovirus  Rec's:  -Hold MM medication  (pomolyst and dex) during admission  -etiology for KAJAL poss d/t pomolyst vs other   -Nephrology consult appreciated   renal function to baseline   -pt to f/u with Dr. Rosales upon d/c  continue other care as per primary team     #VTE Prophylaxis    Thank you for the referral. Will continue to monitor the patient.  Please call with any questions [phone]  
A/P    PT  WITH  H/O   CKD  3B-4   CAME IN  WITH KAJAL  AND  HIGH  K    H/O  M  MYELOMA,  FOLLOWS  WITH HER  ONCOLOGIST  AT  [facility]    GETS  CHEMO WITH HER    NOT  SURE  WHEN WAS  HER  LAST  CHEMO    HER  CR  IS   APPROACHING  HER  BASELINE  WITH  IV  FLUIDS     WILL  CONT FOR  NOW      WILL  CONT  WITH   BICARB IN  IV  FLUID  AS  HER  BICARB    IS  19     BEING  TREATED  FOR UTI      IS  OFF ACEI/ARB-  K  IS NORMAL    WILL  FOLLOW   
A/P    PT  WITH KAJAL    HAS  CKD  3B-4    H/O  M MYELOMA  ON  CHEMO   AS PER  HER  ONCOLOGIST  AT [facility]      CR IMPROVING  WITH  IV  FLUIDS  AND    BICARB ALSO BETTER     WILL  CONT  WITH  IV  FLUIDS  WITH  BICARB  FOR ANOTHER  24  HRS  AND   THEN  D/C       BEING  TREATED  FOR UTI   ON  ABX  AS PER  ID     WILL  D/C  FOLEYS CATHETER, TRIAL  OF  VOIDING    WILL  FOLLOW
Patient is a 82 y/o F from home w/ PMHx of Multiple Myeloma, RA, dementia, HTN, HLD, CKD, CVA? Admitted for acute renal failure on CKD with Hyperkalemia. Nephro Dr Triana consulted.
82 y/o F from home w/ PMHx of Multiple Myeloma, RA, dementia, HTN, HLD, CKD, CVA? Admitted for acute renal failure on CKD with Hyperkalemia. Nephro Dr Triana consulted. Pt was admitted for Acute kidney injury superimposed on CKD, neves was inserted and Nephro Dr. Deutsch followed. Pt was also found to have Klebsiella UTI and treated with abx.   Neves removed and pt passed TOV.   PT recommended DEV, but family want to take her home with PT, daughter requesting pt to be discharged tomorrow 4/4

## 2023-04-03 NOTE — DISCHARGE NOTE PROVIDER - PROVIDER TOKENS
PROVIDER:[TOKEN:[[id number]],FOLLOWUP:[1 week]] PROVIDER:[TOKEN:[[id number]],FOLLOWUP:[1 week]],PROVIDER:[TOKEN:[[id number]],FOLLOWUP:[1 week]]

## 2023-04-03 NOTE — PROGRESS NOTE ADULT - PROBLEM SELECTOR PLAN 8
-controlled   -hold home dose of Lisinopril dur to KAJAL   -cont home dose Amlodipine   -mon BP -likely in setting of dementia   -supportive measures

## 2023-04-03 NOTE — DISCHARGE NOTE PROVIDER - NSDCFUADDAPPT_GEN_ALL_CORE_FT
Wound care instruction   -There is an intact DTI to the R. Heel (x2) (0.3cm x 0.3cm) and L. Heel (x1) (0.4cm x 0.4cm) without drainage  -There is a Stage 2 Pressure Injury to the R. (1.5cm x 2cm x 0.2cm) and L. (2cm x 2cm x 0.2cm) Coccyx  with red tissue, drainage, and periwound maceration         Recommendation:  · Recommendations	  -Clean all wounds with normal saline and apply skin prep to the surrounding skin  -Apply Calcium Alginate (Aquacel) to the Coccyx wounds and cover with a Foam dressing Q 72hrs PRN  -Leave the Bilateral Heel wound open to air  -Elevate/float the patients heels using heel protectors and reposition the patient Q 2hrs using wedges or pillows

## 2023-04-03 NOTE — PROGRESS NOTE ADULT - PROBLEM SELECTOR PLAN 1
- Hx of CKD (baseline: 1.45)  - p/w SCr 3.54  - Hx of Multiple Myeloma, RA  - Bladder Scan - 540 cc  - Indwelling [place] Catheter  - Avoid Nephrotoxic agents  - monitor BMP  - f/u Renal US  - Nephro (Dr. Deutsch) consulted
-KAJAL on baseline stage 3CKD likely pre renal   -s/p neves removal- passed TOV   -renal function slowly improving   -avoid Nephrotoxic agents  -Nephro Dr. Deutsch

## 2023-04-04 ENCOUNTER — TRANSCRIPTION ENCOUNTER (OUTPATIENT)
Age: 83
End: 2023-04-04

## 2023-04-04 VITALS
HEART RATE: 64 BPM | OXYGEN SATURATION: 100 % | DIASTOLIC BLOOD PRESSURE: 75 MMHG | RESPIRATION RATE: 18 BRPM | SYSTOLIC BLOOD PRESSURE: 128 MMHG | TEMPERATURE: 98 F

## 2023-04-04 PROCEDURE — 99239 HOSP IP/OBS DSCHRG MGMT >30: CPT

## 2023-04-04 RX ADMIN — AMLODIPINE BESYLATE 2.5 MILLIGRAM(S): 2.5 TABLET ORAL at 06:22

## 2023-04-04 RX ADMIN — LEVETIRACETAM 500 MILLIGRAM(S): 250 TABLET, FILM COATED ORAL at 06:21

## 2023-04-04 RX ADMIN — Medication 1 MILLIGRAM(S): at 12:14

## 2023-04-04 RX ADMIN — HEPARIN SODIUM 5000 UNIT(S): 5000 INJECTION INTRAVENOUS; SUBCUTANEOUS at 06:21

## 2023-04-04 RX ADMIN — PANTOPRAZOLE SODIUM 40 MILLIGRAM(S): 20 TABLET, DELAYED RELEASE ORAL at 06:21

## 2023-04-04 RX ADMIN — Medication 81 MILLIGRAM(S): at 12:14

## 2023-04-04 NOTE — DISCHARGE NOTE NURSING/CASE MANAGEMENT/SOCIAL WORK - NSDCVIVACCINE_GEN_ALL_CORE_FT
COVID-19 vaccine, vector-nr, rS-Ad26, PF, 0.5 mL ([employer]Medicare); 29-Mar-2021 13:11; Maria D Hightower (Student, Nursing); [employer]are; [id number] (Exp. Date: 29-Mar-2021); IntraMuscular; Deltoid Right.; 0.5 milliLiter(s);

## 2023-04-04 NOTE — DISCHARGE NOTE NURSING/CASE MANAGEMENT/SOCIAL WORK - PATIENT PORTAL LINK FT
You can access the FollowMyHealth Patient Portal offered by [facility] by registering at the following website: [email or web address]. By joining [facility]’s FollowMyHealth portal, you will also be able to view your health information using other applications (apps) compatible with our system.

## 2023-04-04 NOTE — DISCHARGE NOTE NURSING/CASE MANAGEMENT/SOCIAL WORK - NSDCPEFALRISK_GEN_ALL_CORE
For information on Fall & Injury Prevention, visit: [email or web address] OR  [email or web address] OR  https://www.cdc.gov/steadi/patient.html

## 2023-04-04 NOTE — CHART NOTE - NSCHARTNOTEFT_GEN_A_CORE
Pt for discharge back home, spoke to pt's daughter Binta James, [phone], discharge plans, medication changes and outpt follow up reviewed at length, all questions answered     D/C plan discussed with Dr. Puentes

## 2023-04-07 LAB
CULTURE RESULTS: SIGNIFICANT CHANGE UP
CULTURE RESULTS: SIGNIFICANT CHANGE UP
SPECIMEN SOURCE: SIGNIFICANT CHANGE UP
SPECIMEN SOURCE: SIGNIFICANT CHANGE UP

## 2023-04-11 ENCOUNTER — LABORATORY RESULT (OUTPATIENT)
Age: 83
End: 2023-04-11

## 2023-04-12 ENCOUNTER — LABORATORY RESULT (OUTPATIENT)
Age: 83
End: 2023-04-12

## 2023-04-19 ENCOUNTER — LABORATORY RESULT (OUTPATIENT)
Age: 83
End: 2023-04-19

## 2023-04-20 PROCEDURE — 84133 ASSAY OF URINE POTASSIUM: CPT

## 2023-04-20 PROCEDURE — 87040 BLOOD CULTURE FOR BACTERIA: CPT

## 2023-04-20 PROCEDURE — 83605 ASSAY OF LACTIC ACID: CPT

## 2023-04-20 PROCEDURE — 84300 ASSAY OF URINE SODIUM: CPT

## 2023-04-20 PROCEDURE — 85027 COMPLETE CBC AUTOMATED: CPT

## 2023-04-20 PROCEDURE — 87086 URINE CULTURE/COLONY COUNT: CPT

## 2023-04-20 PROCEDURE — 84100 ASSAY OF PHOSPHORUS: CPT

## 2023-04-20 PROCEDURE — 93005 ELECTROCARDIOGRAM TRACING: CPT

## 2023-04-20 PROCEDURE — 87186 SC STD MICRODIL/AGAR DIL: CPT

## 2023-04-20 PROCEDURE — 85025 COMPLETE CBC W/AUTO DIFF WBC: CPT

## 2023-04-20 PROCEDURE — 80053 COMPREHEN METABOLIC PANEL: CPT

## 2023-04-20 PROCEDURE — 80048 BASIC METABOLIC PNL TOTAL CA: CPT

## 2023-04-20 PROCEDURE — 86901 BLOOD TYPING SEROLOGIC RH(D): CPT

## 2023-04-20 PROCEDURE — 86900 BLOOD TYPING SEROLOGIC ABO: CPT

## 2023-04-20 PROCEDURE — 36415 COLL VENOUS BLD VENIPUNCTURE: CPT

## 2023-04-20 PROCEDURE — 83735 ASSAY OF MAGNESIUM: CPT

## 2023-04-20 PROCEDURE — 82570 ASSAY OF URINE CREATININE: CPT

## 2023-04-20 PROCEDURE — 85610 PROTHROMBIN TIME: CPT

## 2023-04-20 PROCEDURE — 84156 ASSAY OF PROTEIN URINE: CPT

## 2023-04-20 PROCEDURE — 99285 EMERGENCY DEPT VISIT HI MDM: CPT

## 2023-04-20 PROCEDURE — 85730 THROMBOPLASTIN TIME PARTIAL: CPT

## 2023-04-20 PROCEDURE — 0225U NFCT DS DNA&RNA 21 SARSCOV2: CPT

## 2023-04-20 PROCEDURE — 87077 CULTURE AEROBIC IDENTIFY: CPT

## 2023-04-20 PROCEDURE — 83935 ASSAY OF URINE OSMOLALITY: CPT

## 2023-04-20 PROCEDURE — 84540 ASSAY OF URINE/UREA-N: CPT

## 2023-04-20 PROCEDURE — 86850 RBC ANTIBODY SCREEN: CPT

## 2023-04-20 PROCEDURE — 81001 URINALYSIS AUTO W/SCOPE: CPT

## 2023-05-12 NOTE — H&P ADULT - HISTORY OF PRESENT ILLNESS
83 F w/pmh  significant for HTN, multiple myeloma, RA on ASA81, remote h/o CVA no residual , SAH, compression fractures, bladder incontinence, recent admission in last month for KAJAL and Klbesiella UTI who comes in due to fatigued and weakness , associated with hypotension to the eighties to 90s at home, and in ability to walk with the walker as is her baseline.    The patient initially thought it was attributed to anemia due to missed blood transfusion, and decided to come to the hospital. Pt herself denies any new symptoms, Hx obtained from the daughter at bedside, Also states that she has had some yellow discharge. Denies N/v, fever, chills, constipation or diarrhea, chest pain or shortness of breath      In the ED  febrile with T 102 /53   Exam: dementia,  no distress  Labs: Leukopenia 1.7, hemoglobin stable, 7.6 normocytic           hyperchloremic metabolic acidosis             BUN/creatinine 47/2.5, lactate 3.6, UA +    CT had no acute changes compared to prior

## 2023-05-12 NOTE — ED PROVIDER NOTE - IV ALTEPLASE DOOR HIDDEN
Virtual/Telephone Check-In    Doug Taylor verbally consents to a Virtual/Telephone Check-In service on 04/06/20. I contacted this patient via telephone for follow-up regarding laparoscopic cholecystectomy performed by Dr. Laney Kidd on March 22, 2020.
show

## 2023-05-12 NOTE — H&P ADULT - PROBLEM SELECTOR PLAN 5
leukopenia  hgb stable  CKD with mild KAJAL but stable  no concerns of coagulopathy   stable  c/w home meds

## 2023-05-12 NOTE — H&P ADULT - NSHPPHYSICALEXAM_GEN_ALL_CORE
T(C): 37.1 (05-12-23 @ 23:15), Max: 39.2 (05-12-23 @ 22:15)  T(F): 98.8 (05-12-23 @ 23:15), Max: 102.5 (05-12-23 @ 22:15)  HR: 90 (05-12-23 @ 23:15) (90 - 102)  BP: 113/67 (05-12-23 @ 23:15) (103/53 - 113/67)  RR: 18 (05-12-23 @ 23:15) (18 - 18)  SpO2: 97% (05-12-23 @ 23:15) (97% - 98%)    GENERAL: NAD, lying in bed comfortably, generalized weakness   HEAD:  Atraumatic, Normocephalic,   EYES: EOMI, PERRLA, conjunctiva and sclera clear, eyes mostly closed but can open on command  ENT: Moist mucous membranes  NECK: Supple, No JVD  CHEST/LUNG: Clear to auscultation bilaterally; No rales, rhonchi, wheezing, or rubs. Unlabored respirations  HEART: Regular rate and rhythm; No murmurs, rubs, or gallops  ABDOMEN: Bowel sounds present; Soft, Nontender, Nondistended. No hepatomegally  EXTREMITIES:  2+ Peripheral Pulses, brisk capillary refill. No clubbing, cyanosis, or edema  NERVOUS SYSTEM:  Alert & Oriented X1, speech clear. No deficits   MSK: FROM all 4 extremities, full and equal strength  SKIN: No rashes or lesions

## 2023-05-12 NOTE — H&P ADULT - NSHPREVIEWOFSYSTEMS_GEN_ALL_CORE
REVIEW OF SYSTEMS:    CONSTITUTIONAL: + weakness, fevers or chills  EYES/ENT: No visual changes;  No vertigo or throat pain   NECK: No pain or stiffness  RESPIRATORY: No cough, wheezing, hemoptysis; No shortness of breath  CARDIOVASCULAR: No chest pain or palpitations  GASTROINTESTINAL: No abdominal or epigastric pain. No nausea, vomiting, or hematemesis; No diarrhea or constipation. No melena or hematochezia.  GENITOURINARY+ yellow discharge like foul smelling urine  NEUROLOGICAL: No numbness or weakness  SKIN: No itching, rashes

## 2023-05-12 NOTE — ED PROVIDER NOTE - CLINICAL SUMMARY MEDICAL DECISION MAKING FREE TEXT BOX
33-year-old female presents with family with weakness and possible hypotension.  Blood pressure 103/58 here in ED.  Patient with history of multiple myeloma who is required blood transfusions in the past.  Concern for symptomatic anemia versus less likely acute CVA.  Will check labs CAT scan urinalysis reassess.  Patient consented for blood if  blood transfusion needed. 83-year-old female presents with family with weakness and possible hypotension.  Blood pressure 103/58 here in ED.  Patient with history of multiple myeloma who is required blood transfusions in the past.  Concern for symptomatic anemia versus less likely acute CVA.  Will check labs CAT scan urinalysis reassess.  Patient consented for blood if  blood transfusion needed.

## 2023-05-12 NOTE — ED PROVIDER NOTE - OBJECTIVE STATEMENT
83-year-old female history of dementia, hypertension, hyperlipidemia, CVA, CKD, rheumatoid arthritis, multiple myeloma presents with weakness and hypotension.  As per daughter blood pressure was 80s to 90s systolic at home.  Today patient was noted to be weak.  Normally patient is able to ambulate with a walker however today patient had no energy and was slumped over.  Patient was unable to even stand up.  This is a change from the patient's baseline.  Patient denies any focal deficits.  Just saying feeling weak.  No nausea no vomiting no fever no diarrhea no urinary complaints.  As per daughter mother was scheduled for blood transfusion yesterday.  Patient was not able to get transfusion because she had a doctor's appointment.

## 2023-05-12 NOTE — H&P ADULT - NSHPLABSRESULTS_GEN_ALL_CORE
7.6    1.76  )-----------( 163      ( 12 May 2023 20:38 )             23.7         139  |  113<H>  |  47<H>  ----------------------------<  208<H>  4.8   |  16<L>  |  2.54<H>    Ca    8.2<L>      12 May 2023 20:38    TPro  7.4  /  Alb  2.3<L>  /  TBili  0.2  /  DBili  x   /  AST  68<H>  /  ALT  18  /  AlkPhos  82  05        LIVER FUNCTIONS - ( 12 May 2023 20:38 )  Alb: 2.3 g/dL / Pro: 7.4 g/dL / ALK PHOS: 82 U/L / ALT: 18 U/L DA / AST: 68 U/L / GGT: x           PT/INR - ( 12 May 2023 20:38 )   PT: 15.3 sec;   INR: 1.28 ratio         PTT - ( 12 May 2023 20:38 )  PTT:21.1 sec  Urinalysis Basic - ( 12 May 2023 22:54 )    Color: Yellow / Appearance: very cloudy / S.010 / pH: x  Gluc: x / Ketone: Negative  / Bili: Negative / Urobili: Negative   Blood: x / Protein: 30 mg/dL / Nitrite: Negative   Leuk Esterase: Moderate / RBC: 10-25 /HPF / WBC >50 /HPF   Sq Epi: x / Non Sq Epi: x / Bacteria: Many /HPF        Lactate, Blood: 3.6 mmol/L ( @ 20:33)    Blood, Urine: Large ( @ 22:54)                  RADIOLOGY STUDIES: Chest X ray pending read                                                   CT H non con 2023                                                    -ve for new changes

## 2023-05-12 NOTE — H&P ADULT - PROBLEM SELECTOR PLAN 1
Sepsis 2/2 UTI   Generalized weakness   Associated with hypotension, tachycardia, leukopenia, and fever  Elevated lactate  Will continue services work up, UA +ve   Status post Vanc and  cefepime in the ED  Status post 1 L NS well as   Trend lactate  F/u urine, and blood cultures  F/u x-ray official read  Maintain hydration

## 2023-05-12 NOTE — H&P ADULT - PROBLEM SELECTOR PLAN 4
Known to have stage 3CKD secondary to multiple myeloma  Now with sepsis, likely has KAJAL  Prerenal vs ATN  F/u urine studies  Continue gentle hydration  Avoid nephrotoxins  Dose medication per GFR

## 2023-05-12 NOTE — ED PROVIDER NOTE - PROGRESS NOTE DETAILS
patient signed out to me pending lab for anemia. subsequently found to be febrile. ct unchanged, per radiology report, hyperattenuation noted in prior ct. patient has no trauma or focal deficit. admitted for abx, fluid, sepsis work up

## 2023-05-12 NOTE — H&P ADULT - PROBLEM SELECTOR PLAN 3
Generalized weakness  In a patient with dementia and multiple myeloma  Likely due to sepsis  Continue antibiotics and fluids  PT consult  Patient was admitted last month, family declined DEV

## 2023-05-12 NOTE — H&P ADULT - ASSESSMENT
83 F w/pmh  significant for HTN, multiple myeloma, RA on ASA81, remote h/o CVA no residual , SAH, compression fractures, bladder incontinence, recent admission in last month for KAJAL and Klbesiella UTI who comes in due to fatigued and weakness, admitted for sepsis

## 2023-05-12 NOTE — H&P ADULT - ATTENDING COMMENTS
82 y/o female with PMHx of HTN and multiple myeloma presents with weakness and fatigue. UA positive for UTI. Will admit ot medicine for sepsis 2/2 to UTI.    Vital Signs Last 24 Hrs  T(C): 36.8 (13 May 2023 04:15), Max: 39.2 (12 May 2023 22:15)  T(F): 98.2 (13 May 2023 04:15), Max: 102.5 (12 May 2023 22:15)  HR: 75 (13 May 2023 04:15) (75 - 102)  BP: 119/71 (13 May 2023 04:15) (103/53 - 119/71)  BP(mean): --  RR: 18 (13 May 2023 04:15) (18 - 18)  SpO2: 100% (13 May 2023 04:15) (97% - 100%)    Parameters below as of 13 May 2023 04:15  Patient On (Oxygen Delivery Method): room air    PEx  as above    A/P:  #Sepsis 2/2 to UTI  #Multiple myeloma  #RA  #HTN  #KAJAL  #Prophylactic measure    - gentle IV hydration  - IV abx  - f/u blood and urine cx  - dvt ppx

## 2023-05-13 NOTE — ED ADULT NURSE NOTE - NSFALLHARMRISKINTERV_ED_ALL_ED

## 2023-05-13 NOTE — PROGRESS NOTE ADULT - SUBJECTIVE AND OBJECTIVE BOX
MEDICAL ATTENDING NOTE  Patient is a 83y old  Female who presents with a chief complaint of Generalized weakness (12 May 2023 23:53)      HPI:  83 F w/pmh  significant for HTN, multiple myeloma, RA on ASA81, remote h/o CVA no residual , SAH, compression fractures, bladder incontinence, recent admission in last month for KAJAL and Klbesiella UTI who comes in due to fatigued and weakness , associated with hypotension to the eighties to 90s at home, and in ability to walk with the walker as is her baseline.    The patient initially thought it was attributed to anemia due to missed blood transfusion, and decided to come to the hospital. Pt herself denies any new symptoms, Hx obtained from the daughter at bedside, Also states that she has had some yellow discharge. Denies N/v, fever, chills, constipation or diarrhea, chest pain or shortness of breath      In the ED  febrile with T 102 /53   Exam: dementia,  no distress  Labs: Leukopenia 1.7, hemoglobin stable, 7.6 normocytic           hyperchloremic metabolic acidosis             BUN/creatinine 47/2.5, lactate 3.6, UA +    CT had no acute changes compared to prior (12 May 2023 23:53)      INTERVAL HPI/OVERNIGHT EVENTS: feeling better    MEDICATIONS  (STANDING):  aspirin  chewable 81 milliGRAM(s) Oral daily  atorvastatin 20 milliGRAM(s) Oral at bedtime  cefTRIAXone   IVPB      folic acid 1 milliGRAM(s) Oral daily  gabapentin 100 milliGRAM(s) Oral three times a day  heparin   Injectable 5000 Unit(s) SubCutaneous every 8 hours  hydroxychloroquine 200 milliGRAM(s) Oral daily  levETIRAcetam 500 milliGRAM(s) Oral two times a day  memantine 5 milliGRAM(s) Oral at bedtime    MEDICATIONS  (PRN):  acetaminophen     Tablet .. 650 milliGRAM(s) Oral every 6 hours PRN Temp greater or equal to 38C (100.4F), Mild Pain (1 - 3)  aluminum hydroxide/magnesium hydroxide/simethicone Suspension 30 milliLiter(s) Oral every 4 hours PRN Dyspepsia  melatonin 3 milliGRAM(s) Oral at bedtime PRN Insomnia  ondansetron Injectable 4 milliGRAM(s) IV Push every 8 hours PRN Nausea and/or Vomiting      __________________________________________________  REVIEW OF SYSTEMS:    CONSTITUTIONAL: No fever,   EYES: no acute visual disturbances  NECK: No pain or stiffness  RESPIRATORY: No cough; No shortness of breath  CARDIOVASCULAR: No chest pain, no palpitations  GASTROINTESTINAL: No pain. No nausea or vomiting; No diarrhea   NEUROLOGICAL: No headache or numbness, no tremors  MUSCULOSKELETAL: No joint pain, no muscle pain  GENITOURINARY: no dysuria, no frequency, no hesitancy  PSYCHIATRY: no depression , no anxiety  ALL OTHER  ROS negative        Vital Signs Last 24 Hrs  T(C): 36.6 (13 May 2023 17:00), Max: 39.2 (12 May 2023 22:15)  T(F): 97.8 (13 May 2023 17:00), Max: 102.5 (12 May 2023 22:15)  HR: 71 (13 May 2023 17:00) (71 - 90)  BP: 138/47 (13 May 2023 17:00) (113/67 - 138/47)  BP(mean): 68 (13 May 2023 17:00) (68 - 96)  RR: 18 (13 May 2023 17:00) (18 - 20)  SpO2: 100% (13 May 2023 17:00) (97% - 100%)    Parameters below as of 13 May 2023 17:00  Patient On (Oxygen Delivery Method): room air        ________________________________________________  PHYSICAL EXAM:  GENERAL: Alert, chronically-ill woman in NAD  HEENT: Normocephalic;  conjunctivae and sclerae clear; moist mucous membranes; decreased vision  NECK : supple  CHEST/LUNG: Clear to auscultation bilaterally with good air entry   HEART: S1 S2  regular; no murmurs, gallops or rubs  ABDOMEN: Soft, Nontender, Nondistended; Bowel sounds present  EXTREMITIES: no cyanosis; no edema; no calf tenderness  SKIN: warm and dry; no rash  NERVOUS SYSTEM:  Awake and alert; Oriented  to place, person and time ; no new deficits    _________________________________________________  LABS:                        7.1    2.29  )-----------( 144      ( 13 May 2023 05:01 )             22.0     05    141  |  121<H>  |  48<H>  ----------------------------<  116<H>  4.7   |  15<L>  |  2.58<H>    Ca    7.6<L>      13 May 2023 05:01    TPro  6.5  /  Alb  1.9<L>  /  TBili  0.3  /  DBili  x   /  AST  110<H>  /  ALT  23  /  AlkPhos  57  05-13    PT/INR - ( 12 May 2023 20:38 )   PT: 15.3 sec;   INR: 1.28 ratio         PTT - ( 12 May 2023 20:38 )  PTT:21.1 sec  Urinalysis Basic - ( 12 May 2023 22:54 )    Color: Yellow / Appearance: very cloudy / S.010 / pH: x  Gluc: x / Ketone: Negative  / Bili: Negative / Urobili: Negative   Blood: x / Protein: 30 mg/dL / Nitrite: Negative   Leuk Esterase: Moderate / RBC: 10-25 /HPF / WBC >50 /HPF   Sq Epi: x / Non Sq Epi: x / Bacteria: Many /HPF      CAPILLARY BLOOD GLUCOSE

## 2023-05-13 NOTE — PATIENT PROFILE ADULT - FALL HARM RISK - HARM RISK INTERVENTIONS
Assistance with ambulation/Assistance OOB with selected safe patient handling equipment/Communicate Risk of Fall with Harm to all staff/Monitor for mental status changes/Monitor gait and stability/Provide patient with walking aids - walker, cane, crutches/Reinforce activity limits and safety measures with patient and family/Reorient to person, place and time as needed/Review medications for side effects contributing to fall risk/Sit up slowly, dangle for a short time, stand at bedside before walking/Tailored Fall Risk Interventions/Use of alarms - bed, chair and/or voice tab/Visual Cue: Yellow wristband and red socks/Bed in lowest position, wheels locked, appropriate side rails in place/Call bell, personal items and telephone in reach/Instruct patient to call for assistance before getting out of bed or chair/Non-slip footwear when patient is out of bed/[place] to call system/Physically safe environment - no spills, clutter or unnecessary equipment/Purposeful Proactive Rounding/Room/bathroom lighting operational, light cord in reach

## 2023-05-13 NOTE — PATIENT PROFILE ADULT - FUNCTIONAL ASSESSMENT - BASIC MOBILITY 6.
2-calculated by average/Not able to assess (calculate score using [facility] averaging method)

## 2023-05-14 NOTE — PROGRESS NOTE ADULT - SUBJECTIVE AND OBJECTIVE BOX
MEDICAL ATTENDING NOTE  Patient is a 83y old  Female who presents with a chief complaint of Generalized weakness (14 May 2023 16:39)      HPI:  83 F w/pmh  significant for HTN, multiple myeloma, RA on ASA81, remote h/o CVA no residual , SAH, compression fractures, bladder incontinence, recent admission in last month for KAJAL and Klbesiella UTI who comes in due to fatigued and weakness , associated with hypotension to the eighties to 90s at home, and in ability to walk with the walker as is her baseline.      In the ED  febrile with T 102 /53   Exam: dementia,  no distress  Labs: Leukopenia 1.7, hemoglobin stable, 7.6 normocytic           hyperchloremic metabolic acidosis             BUN/creatinine 47/2.5, lactate 3.6, UA +    CT had no acute changes compared to prior (12 May 2023 23:53)      INTERVAL HPI/OVERNIGHT EVENTS: Patient asks for primafit because she is unable to control her bladder function    MEDICATIONS  (STANDING):  aspirin  chewable 81 milliGRAM(s) Oral daily  atorvastatin 20 milliGRAM(s) Oral at bedtime  cefTRIAXone   IVPB 2000 milliGRAM(s) IV Intermittent every 24 hours  cefTRIAXone   IVPB      folic acid 1 milliGRAM(s) Oral daily  gabapentin 100 milliGRAM(s) Oral three times a day  heparin   Injectable 5000 Unit(s) SubCutaneous every 8 hours  hydroxychloroquine 200 milliGRAM(s) Oral daily  levETIRAcetam 500 milliGRAM(s) Oral two times a day  memantine 5 milliGRAM(s) Oral at bedtime  sodium bicarbonate 650 milliGRAM(s) Oral two times a day    MEDICATIONS  (PRN):  acetaminophen     Tablet .. 650 milliGRAM(s) Oral every 6 hours PRN Temp greater or equal to 38C (100.4F), Mild Pain (1 - 3)  aluminum hydroxide/magnesium hydroxide/simethicone Suspension 30 milliLiter(s) Oral every 4 hours PRN Dyspepsia  melatonin 3 milliGRAM(s) Oral at bedtime PRN Insomnia  ondansetron Injectable 4 milliGRAM(s) IV Push every 8 hours PRN Nausea and/or Vomiting      __________________________________________________  REVIEW OF SYSTEMS:    CONSTITUTIONAL: No fever,   EYES: no acute visual disturbances  NECK: No pain or stiffness  RESPIRATORY: No cough; No shortness of breath  CARDIOVASCULAR: No chest pain, no palpitations  GASTROINTESTINAL: No pain. No nausea or vomiting; No diarrhea   NEUROLOGICAL: No headache or numbness, no tremors  MUSCULOSKELETAL: No joint pain, no muscle pain  GENITOURINARY: no dysuria, no frequency, no hesitancy  PSYCHIATRY: no depression , no anxiety  ALL OTHER  ROS negative        Vital Signs Last 24 Hrs  T(C): 36.6 (14 May 2023 14:16), Max: 36.7 (13 May 2023 20:56)  T(F): 97.8 (14 May 2023 14:16), Max: 98.1 (13 May 2023 20:56)  HR: 95 (14 May 2023 14:16) (68 - 95)  BP: 149/76 (14 May 2023 14:16) (121/59 - 150/92)  BP(mean): 72 (13 May 2023 20:56) (72 - 72)  RR: 17 (14 May 2023 14:16) (17 - 18)  SpO2: 98% (14 May 2023 14:16) (98% - 100%)    Parameters below as of 14 May 2023 14:16  Patient On (Oxygen Delivery Method): room air    ________________________________________________  PHYSICAL EXAM:  GENERAL: Alert, cooperative woman, supine in bed  HEENT: Normocephalic;  conjunctivae and sclerae clear; moist mucous membranes;   NECK : supple  CHEST/LUNG: Clear to auscultation bilaterally with good air entry   HEART: S1 S2  regular; no murmurs, gallops or rubs  ABDOMEN: Soft, Nontender, Nondistended; Bowel sounds present; bladder dullness is palpable  EXTREMITIES: no cyanosis; no edema; no calf tenderness  SKIN: warm and dry; no rash  NERVOUS SYSTEM:  Awake and alert; Oriented  to place, person and time ; no new deficits    _________________________________________________  LABS:                        7.1    2.29  )-----------( 144      ( 13 May 2023 05:01 )             22.0         141  |  121<H>  |  48<H>  ----------------------------<  116<H>  4.7   |  15<L>  |  2.58<H>    Ca    7.6<L>      13 May 2023 05:01    TPro  6.5  /  Alb  1.9<L>  /  TBili  0.3  /  DBili  x   /  AST  110<H>  /  ALT  23  /  AlkPhos  57      PT/INR - ( 12 May 2023 20:38 )   PT: 15.3 sec;   INR: 1.28 ratio         PTT - ( 12 May 2023 20:38 )  PTT:21.1 sec  Urinalysis Basic - ( 12 May 2023 22:54 )    Color: Yellow / Appearance: very cloudy / S.010 / pH: x  Gluc: x / Ketone: Negative  / Bili: Negative / Urobili: Negative   Blood: x / Protein: 30 mg/dL / Nitrite: Negative   Leuk Esterase: Moderate / RBC: 10-25 /HPF / WBC >50 /HPF   - K. pneumoniae group: Detec (K. pneumoniae, K. quasipneumoniae, K. variicola) (23 @ 23:00) - Cefazolin: S <=2 Sq Epi: x / Non Sq Epi: x / Bacteria: Many /HPF

## 2023-05-14 NOTE — DISCHARGE NOTE PROVIDER - NSDCCPCAREPLAN_GEN_ALL_CORE_FT
PRINCIPAL DISCHARGE DIAGNOSIS  Diagnosis: Bacteremia due to Klebsiella pneumoniae  Assessment and Plan of Treatment: You presented to ED with generalized weakness associated with low blood pressure.  In ED you were noted febrile with elevated heart rate, noted with positive urinary tract infection.  You were admitted to medicine and found to have positive blood cultures with Klebsiella pneumoniae, source likely urinary.  You were treated with intravenous antibiotics and repeat blood cultures from 5/14 showed........      SECONDARY DISCHARGE DIAGNOSES  Diagnosis: Sepsis  Assessment and Plan of Treatment: due to bacteremia due to UTI.  You met sepsis criteria on admission.  You were treated with antibiotics with resolution of sepsis.    Diagnosis: Generalized weakness  Assessment and Plan of Treatment: likely due to sepsis.     PRINCIPAL DISCHARGE DIAGNOSIS  Diagnosis: Bacteremia due to Klebsiella pneumoniae  Assessment and Plan of Treatment: You presented to ED with generalized weakness associated with low blood pressure.  In ED you were noted febrile with elevated heart rate, noted with positive urinary tract infection.  You were admitted to medicine and found to have positive blood cultures with Klebsiella pneumoniae, source likely urinary.  You were treated with intravenous antibiotics and repeat blood cultures from 5/14 noted negative.  -Please complete antibiotics as prescribed  -Please follow up with your primary doctor within one week      SECONDARY DISCHARGE DIAGNOSES  Diagnosis: Sepsis  Assessment and Plan of Treatment: due to bacteremia due to UTI.  You met sepsis criteria on admission.  You were treated with antibiotics with resolution of sepsis.    Diagnosis: Generalized weakness  Assessment and Plan of Treatment: likely due to sepsis.     PRINCIPAL DISCHARGE DIAGNOSIS  Diagnosis: Bacteremia due to Klebsiella pneumoniae  Assessment and Plan of Treatment: You presented to ED with generalized weakness associated with low blood pressure.  In ED you were noted febrile with elevated heart rate, noted with positive urinary tract infection.  You were admitted to medicine and found to have positive blood cultures with Klebsiella pneumoniae, source likely urinary.  You were treated with intravenous antibiotics and repeat blood cultures from 5/14 noted negative.  -Please complete antibiotics as prescribed  -Please follow up with your primary doctor within one week      SECONDARY DISCHARGE DIAGNOSES  Diagnosis: Acute kidney injury superimposed on CKD  Assessment and Plan of Treatment: You are known to have kidney disease and noted with worsening kidney function on admission likely due to infection.  Your kidney function is now improving and back to base line.  -Follow up with your primary doctor within one week  -Maintain adequate hydration at all times    Diagnosis: Urinary retention with incomplete bladder emptying  Assessment and Plan of Treatment: You reported "leaky bladder" and chronic frequent urination.  You were noted with episodes of urinary retention requiring placement of a neves catheter.  Your neves was removed and you were noted with acceptable urinary residual.    Diagnosis: Anemia of chronic disease  Assessment and Plan of Treatment: You required transfusion of one unit blood for hemoglobin 6.3.  You responded well to blood transfusion and your hemoglobin today is 9.1.  -Follow up with your outpatient provider for continued blood counts monitoring.    Diagnosis: Multiple myeloma  Assessment and Plan of Treatment: Follow up with outpatient Dr. Rosales and oncology team caring for you in regard to your myltiple myeloma.      Diagnosis: Sepsis  Assessment and Plan of Treatment: due to bacteremia due to UTI.  You met sepsis criteria on admission.  You were treated with antibiotics with resolution of sepsis.    Diagnosis: Generalized weakness  Assessment and Plan of Treatment: likely due to sepsis superimposed on chronic multiple myeloma.  -Activity as tolerated      Diagnosis: Pressure injury of skin, stage 2  Assessment and Plan of Treatment: You are noted with stage 2 pressure ulcer to bilateral gluteus with pink drainage and periwound maceration.  -Clean bilateral gluteal wounds with normal saline  -Apply skin prep to surrounding skin  -Apply Calcium Alginate (aquacel) to the wound bed  -Cover with foam dressing q 72hrs and prn  -Elevate/float pt.'s heels using heel protectors and reposition pt. q 2hrs using wedges and pillows

## 2023-05-14 NOTE — DISCHARGE NOTE PROVIDER - ATTENDING DISCHARGE PHYSICAL EXAMINATION:
Constitutional/General: Well developed, vitals reviewed  EYE: Symmetrical pupils, conjunctiva clear   ENT: Good dentition, oropharynx clear  NECK: No visual masses, no JVD  CHEST: No respiratory distress, bilateral symmetrical chest rise  ABDOMEN: Nondistended, no visual masses  SKIN: No rash, warm, dry  NEURO: Alert, Oriented, Cranial nerves grossly intact, moves all extremities, follows commands  PSYCH: Normal mood, normal affect

## 2023-05-14 NOTE — DISCHARGE NOTE PROVIDER - DETAILS OF MALNUTRITION DIAGNOSIS/DIAGNOSES
This patient has been assessed with a concern for Malnutrition and was treated during this hospitalization for the following Nutrition diagnosis/diagnoses:     -  05/15/2023: Moderate protein-calorie malnutrition

## 2023-05-14 NOTE — DISCHARGE NOTE PROVIDER - CARE PROVIDER_API CALL
Fatuma Cruz (DO)  Family Medicine  [phone] [place] [address]  Phone: [phone]  Fax: [phone]  Follow Up Time: 1 week

## 2023-05-14 NOTE — CONSULT NOTE ADULT - ASSESSMENT
A/P    PT  WITH  CKD  3B-4,      WAS  ADMITTED  RECENTLY  WITH UTI AND  CR  HAD  GONE  UPTO 3.5     WAS  2.2  ON  DISCHARGE    NOW  WITH  CR OF 2.5 ,  MOST  LIKELY  SEC  T O  HER  UTI    K OK   LOW  BICARB,  WILL  START  SODIUM  BICARB  TABS  1  BID    HAS NO  VOL  ISSUES    ENCOURAGED TO  INCREASE  PO  FLUID INTAKE    BEING  TREATED  FOR  UTI    BP  HAS BEEN  OK     WILL FOLLOW

## 2023-05-14 NOTE — DISCHARGE NOTE PROVIDER - HOSPITAL COURSE
RN rounded on pt. Pt medicated for pain and family updated on POC. Pt has no further needs at this time. Pt resting in bed, call light within reach. Pt denies any questions. Will cont to monitor.       Kaur Cullen RN  11/13/18 [id number] F w/pmh  significant for HTN, multiple myeloma, RA on ASA 81, remote h/o CVA no residual , SAH, compression fractures, bladder incontinence, recent admission in last month for KAJAL and Klbesiella UTI who comes in due to fatigued and weakness , associated with hypotension to the 80's to 90's at home, and in ability to walk with the walker as is her baseline.  In the ED  febrile with T 102 /53   Labs: Leukopenia 1.7, hemoglobin stable, 7.6 normocytic           hyperchloremic metabolic acidosis             BUN/creatinine 47/2.5, lactate 3.6, UA +  CT had no acute changes compared to prior  Admitted for sepsis due to UTI, treated with IV Ceftriaxone.  Pt. found with bacteremia secondary to K pneumoniae with a urinary source, sensitivity pending........  Course of hospitalization complicated by KAJAL on CKD with SCr 3.5 compared to base line Scr of 2.2,  followed by nephro, suspect infection is etiology of KAJAL, gradually improving.    INCOMPLETE            83 F w/pmh  significant for HTN, multiple myeloma, RA on ASA 81, remote h/o CVA no residual , SAH, compression fractures, bladder incontinence, recent admission in last month for KAJAL and Klbesiella UTI who comes in due to fatigued and weakness , associated with hypotension to the 80's to 90's at home, and in ability to walk with the walker as is her baseline.  In the ED  febrile with T 102 /53   Labs: Leukopenia 1.7, hemoglobin stable, 7.6 normocytic           hyperchloremic metabolic acidosis             BUN/creatinine 47/2.5, lactate 3.6, UA +  CT had no acute changes compared to prior  Admitted for sepsis due to UTI, treated with IV Ceftriaxone.  Pt. found with bacteremia secondary to K pneumoniae with a urinary source, sensitive to Ceftriaxone.  Course of hospitalization complicated by KAJAL on CKD with SCr 3.5 compared to base line Scr of 2.2,  followed by nephro, suspect etiology infection vs. urinary retention as she was noted with bladder scan>400 ml requiring neves placement.  Pt. noted with improved SCr post neves placement on 5/15.  TOV attempted 5/16 in preparation for discharge, noted again with bladder scan>400ml and SCR......    INCOMPLETE            83 F w/pmh  significant for HTN, multiple myeloma, RA on ASA 81, remote h/o CVA no residual , SAH, compression fractures, bladder incontinence, recent admission in last month for KAJAL and Klbesiella UTI who comes in due to fatigued and weakness , associated with hypotension to the 80's to 90's at home, and in ability to walk with the walker as is her baseline.  In the ED  febrile with T 102 /53   Labs: Leukopenia 1.7, hemoglobin stable, 7.6 normocytic           hyperchloremic metabolic acidosis             BUN/creatinine 47/2.5, lactate 3.6, UA +  CT had no acute changes compared to prior  Admitted for sepsis due to UTI, treated with IV Ceftriaxone.  Pt. found with bacteremia secondary to K pneumoniae with a urinary source, sensitive to Ceftriaxone.  Course of hospitalization complicated by KAJAL on CKD with SCr 3.5 compared to base line Scr of 2.2,  followed by nephro, suspect etiology infection vs. urinary retention as she was noted with bladder scan>400 ml requiring neves placement.  Pt. noted with improved SCr post neves placement on 5/15.  TOV attempted 5/16 in preparation for discharge, passed trial, voiding freely, SCr continues to improve.  Pt. is medically optimized for discharge to home with HHA.

## 2023-05-14 NOTE — DISCHARGE NOTE PROVIDER - NSDCMRMEDTOKEN_GEN_ALL_CORE_FT
amLODIPine 2.5 mg oral tablet: 1 tab(s) orally once a day  aspirin 81 mg oral tablet: 1 tab(s) orally once a day  ferrous sulfate 325 mg (65 mg elemental iron) oral delayed release tablet: 1 tab(s) orally once a day  folic acid 1 mg oral tablet: 1 tab(s) orally once a day  gabapentin 100 mg oral capsule: 1 cap(s) orally 3 times a day  hydroxychloroquine 200 mg oral tablet: 1 tab(s) orally once a day. DO NOT RESUME UNTIL OKAY WITH YOUR DOCTOR.   levETIRAcetam 500 mg oral tablet: 1 tab(s) orally 2 times a day  memantine 5 mg oral tablet: 1 tab(s) orally once a day (at bedtime)  Pomalyst 2 mg oral capsule: 1 cap(s) orally every other day. DO NOT RESUME UNTIL AFTER REHAB AND OKAYED BY YOUR ONCOLOGIST  rosuvastatin 20 mg oral capsule: 1 cap(s) orally once a day (at bedtime)   aquacel dressing: apply every 72 hrs and as neesded  aspirin 81 mg oral tablet: 1 tab(s) orally once a day  cefuroxime 500 mg oral tablet: 1 tab(s) orally 2 times a day  ferrous sulfate 325 mg (65 mg elemental iron) oral delayed release tablet: 1 tab(s) orally once a day  folic acid 1 mg oral tablet: 1 tab(s) orally once a day  gabapentin 100 mg oral capsule: 1 cap(s) orally 3 times a day  hydroxychloroquine 200 mg oral tablet: 1 tab(s) orally once a day. DO NOT RESUME UNTIL OKAY WITH YOUR DOCTOR.   levETIRAcetam 500 mg oral tablet: 1 tab(s) orally 2 times a day  memantine 5 mg oral tablet: 1 tab(s) orally once a day (at bedtime)  Pomalyst 2 mg oral capsule: 1 cap(s) orally every other day. DO NOT RESUME UNTIL AFTER REHAB AND OKAYED BY YOUR ONCOLOGIST  rosuvastatin 20 mg oral capsule: 1 cap(s) orally once a day (at bedtime)

## 2023-05-14 NOTE — DISCHARGE NOTE PROVIDER - NSDCFUSCHEDAPPT_GEN_ALL_CORE_FT
Bailey Rosales Physician [facility]  [facility]  Scheduled Appointment: 06/21/2023    Bailey Rosales Physician [facility]  Scheduled Appointment: 06/21/2023

## 2023-05-15 NOTE — PHYSICAL THERAPY INITIAL EVALUATION ADULT - PERTINENT HX OF CURRENT PROBLEM, REHAB EVAL
admitted due to generalized weakness and fatigue; diagnosed with sepsis due to UTI on admission  h/o HTN, multiple myeloma, RA on ASA 81; remote h/o CVA with no residual deficits;   SAH, compression fracture, bladder incontinence  now with low Hgb/Hct (6.8/22.1); currently on blood transfusion (>15 minutes)

## 2023-05-15 NOTE — PROGRESS NOTE ADULT - ASSESSMENT
# KAJAL in a patient with underlying CKD.  KAJAL sec to a pre-renal state from sepsis with klebsiella bacteremia  s/p PRBC transfusion as with severe anemia .  H/O multiple myeloma.  SCR improving   on NAHC03 tabs. serum bicarb 19.  daily BMP

## 2023-05-15 NOTE — DIETITIAN INITIAL EVALUATION ADULT - PERTINENT LABORATORY DATA
05-15    142  |  119<H>  |  42<H>  ----------------------------<  104<H>  4.9   |  19<L>  |  2.52<H>    Ca    9.1      15 May 2023 07:27    A1C with Estimated Average Glucose Result: 5.8 % (11-05-22 @ 05:25)

## 2023-05-15 NOTE — CONSULT NOTE ADULT - NS ATTEND AMEND GEN_ALL_CORE FT
pt seen and examined. Chart reviewed and case d/w ACP. 83y female with prior Dx MM and on treatment with another oncologist and significant anemia requiring RBC admit for urosepsis  with hypotension and weakness. She was admit for similar reason 6wks ago. Her Hb 6.0. Unable to tell the followup with her oncologist after last admission. Need more information. Her blood grows gram negative gonzalo.  pt is on iv antibiotics need ID consult for duration of antibiotics. She also has chronic renal failure with stable creatinine level. Her anemia is likely due to multifactorial. She is a candidate for procrit if iron saturation > 20%. RBC supportive transfusion for Hb <7  as needed.

## 2023-05-15 NOTE — DIETITIAN INITIAL EVALUATION ADULT - MALNUTRITION
PCM (moderate) related to acute on chronic illness as evidenced by recent hospitalization indicating few # wt loss from then, decreased PO (per NP)

## 2023-05-15 NOTE — DIETITIAN INITIAL EVALUATION ADULT - PERTINENT MEDS FT
MEDICATIONS  (STANDING):  aspirin  chewable 81 milliGRAM(s) Oral daily  atorvastatin 20 milliGRAM(s) Oral at bedtime  cefTRIAXone   IVPB 2000 milliGRAM(s) IV Intermittent every 24 hours  cefTRIAXone   IVPB      folic acid 1 milliGRAM(s) Oral daily  gabapentin 100 milliGRAM(s) Oral three times a day  heparin   Injectable 5000 Unit(s) SubCutaneous every 8 hours  hydroxychloroquine 200 milliGRAM(s) Oral daily  levETIRAcetam 500 milliGRAM(s) Oral two times a day  memantine 5 milliGRAM(s) Oral at bedtime  sodium bicarbonate 650 milliGRAM(s) Oral two times a day    MEDICATIONS  (PRN):  acetaminophen     Tablet .. 650 milliGRAM(s) Oral every 6 hours PRN Temp greater or equal to 38C (100.4F), Mild Pain (1 - 3)  aluminum hydroxide/magnesium hydroxide/simethicone Suspension 30 milliLiter(s) Oral every 4 hours PRN Dyspepsia  melatonin 3 milliGRAM(s) Oral at bedtime PRN Insomnia  ondansetron Injectable 4 milliGRAM(s) IV Push every 8 hours PRN Nausea and/or Vomiting

## 2023-05-15 NOTE — PHYSICAL THERAPY INITIAL EVALUATION ADULT - LIVES WITH, PROFILE
daughter in a private home; stays on the first floor; no stairs to negotiate; has HHA services 8am-4pm Sunday to Saturday

## 2023-05-15 NOTE — PROGRESS NOTE ADULT - PROBLEM SELECTOR PLAN 4
Generalized weakness  In a patient with dementia and multiple myeloma  Likely due to sepsis  Continue antibiotics and fluids  PT consult  Patient was admitted last month, family declined DEV voids frequently but with distended bladder  -26hrs bladder scans revealed -400ml  -Zapata placed per attending

## 2023-05-15 NOTE — DIETITIAN INITIAL EVALUATION ADULT - OTHER INFO
Pt seen, asleep. NP reports pt w/ decreased PO, Glucerna shakes BID added.  Weight from previous admission, seems down a few #s. Pressure injury stage 2 (B/L gluteus noted)

## 2023-05-15 NOTE — PROGRESS NOTE ADULT - ATTENDING COMMENTS
84 y/o female with PMHx of HTN and multiple myeloma presents with weakness and fatigue. UA positive for UTI. Will admit ot medicine for sepsis 2/2 to UTI.  Patient c/o inability to control urinary flow and asks for primafit.   Vital Signs Last 24 Hrs  T(C): 36.6 (14 May 2023 14:16), Max: 36.7 (13 May 2023 20:56)  T(F): 97.8 (14 May 2023 14:16), Max: 98.1 (13 May 2023 20:56)  HR: 95 (14 May 2023 14:16) (68 - 95)  BP: 149/76 (14 May 2023 14:16) (121/59 - 150/92)  BP(mean): 72 (13 May 2023 20:56) (72 - 72)  RR: 17 (14 May 2023 14:16) (17 - 18)  SpO2: 98% (14 May 2023 14:16) (98% - 100%)    Parameters below as of 14 May 2023 14:16  Patient On (Oxygen Delivery Method): room air    Bladder dullness    Bladder scan shows 348 residual.  Repeat after urination, 220 ml                        7.1    2.29  )-----------( 144      ( 13 May 2023 05:01 )             22.0   05-13    141  |  121<H>  |  48<H>  ----------------------------<  116<H>  4.7   |  15<L>  |  2.58<H>    Ca    7.6<L>      13 May 2023 05:01    TPro  6.5  /  Alb  1.9<L>  /  TBili  0.3  /  DBili  x   /  AST  110<H>  /  ALT  23  /  AlkPhos  57  05-13      - K. pneumoniae group: Detec (K. pneumoniae, K. quasipneumoniae, K. variicola) (05.12.23 @ 23:00)         #Sepsis 2/2 to UTI  #bacteremia secondary to K pneumoniae with a urinary source  #urinary retention is contributing to complicated UTI  #Multiple myeloma  #anemia  #RA  #HTN  #KAJAL  #Prophylactic measure    Plan:    - repeat bladder scan; may need to place Zapata to drain bladder.   - repeat blood cultures  - Hematology consultation in AM.  Will consider Tx.  - type and screen  - gentle IV hydration  - increase ceftriaxone to 2 grams daily  - Nephrology consultation, Dr. Armas, appreciated
82 y/o female with PMHx of HTN and multiple myeloma presents with weakness and fatigue. UA positive for UTI. Will admit ot medicine for sepsis 2/2 to UTI.  Vital Signs Last 24 Hrs  T(C): 36.6 (13 May 2023 17:00), Max: 39.2 (12 May 2023 22:15)  T(F): 97.8 (13 May 2023 17:00), Max: 102.5 (12 May 2023 22:15)  HR: 71 (13 May 2023 17:00) (71 - 90)  BP: 138/47 (13 May 2023 17:00) (113/67 - 138/47)  BP(mean): 68 (13 May 2023 17:00) (68 - 96)  RR: 18 (13 May 2023 17:00) (18 - 20)  SpO2: 100% (13 May 2023 17:00) (97% - 100%)    Parameters below as of 13 May 2023 17:00  Patient On (Oxygen Delivery Method): room air    - K. pneumoniae group: Detec (K. pneumoniae, K. quasipneumoniae, K. variicola) (05.12.23 @ 23:00)       #Sepsis 2/2 to UTI  #bacteremia secondary to K pneumoniae with a urinary source  #Multiple myeloma  #anemia  #RA  #HTN  #KAJAL  #Prophylactic measure    - gentle IV hydration  - increase ceftriaxone to 2 grams daily  - Nephrology consultation, Dr. Armas  - f/u H/H.  Will consider Tx, if symptomatic
84 y/o female with PMHx of HTN and multiple myeloma presents with weakness and fatigue.   Patient is feeling better after fluids and antibiotics.   Patient c/o inability to control urinary flow and asks for primafit.   Vital Signs Last 24 Hrs  T(C): 37.3 (15 May 2023 20:15), Max: 37.3 (15 May 2023 20:15)  T(F): 99.1 (15 May 2023 20:15), Max: 99.1 (15 May 2023 20:15)  HR: 69 (15 May 2023 20:15) (63 - 69)  BP: 133/58 (15 May 2023 20:15) (133/58 - 174/77)  BP(mean): 76 (15 May 2023 20:15) (76 - 109)  RR: 17 (15 May 2023 20:15) (17 - 18)  SpO2: 100% (15 May 2023 20:15) (99% - 100%)    Parameters below as of 15 May 2023 20:15  Patient On (Oxygen Delivery Method): room air    Bladder dullness    Bladder scan shows 348 residual.  Repeat after urination, 220 ml  Repeat residual urine volume is 400 ml.                          6.8    2.47  )-----------( 155      ( 15 May 2023 07:27 )             21.1             05-15    142  |  119<H>  |  42<H>  ----------------------------<  104<H>  4.9   |  19<L>  |  2.52<H>    Ca    9.1      15 May 2023 07:27      - K. pneumoniae group: Detec (K. pneumoniae, K. quasipneumoniae, K. variicola) (05.12.23 @ 23:00)   - Cefazolin: S <=2 Enterobacter, Klebsiella aerogenes, Citrobacter, and Serratia may develop resistance during prolonged therapy (3-4 days) (05.12.23 @ 23:00)     IMP:   #Sepsis 2/2 to UTI  #bacteremia secondary to K pneumoniae with a urinary source  #urinary retention is contributing to complicated UTI  #Multiple myeloma  #anemia  #RA  #HTN  #KAJAL  #Prophylactic measure    Plan:    - Zapata catheter to drain bladder.   - Urology consultation  - repeat blood cultures were sent this AM  -  Patient received one unit PRBC this AM.   - continue ceftriaxone to 2 grams daily  - Nephrology f/u, Dr. Triana, appreciated  -- Hematology consultation, appreciated:  candidate for procrit if iron saturation > 20%.  Will send iron profile.

## 2023-05-15 NOTE — PHYSICAL THERAPY INITIAL EVALUATION ADULT - DIAGNOSIS, PT EVAL
sepsis due to UTI and anemia; generalized weakness; difficulty performing bed mobility, transfers, and ambulation

## 2023-05-15 NOTE — PROGRESS NOTE ADULT - SUBJECTIVE AND OBJECTIVE BOX
NP Note discussed with  Primary Attending    Patient is a 83y old  Female who presents with a chief complaint of Generalized weakness (15 May 2023 11:39)      INTERVAL HPI/OVERNIGHT EVENTS: no new complaints    MEDICATIONS  (STANDING):  aspirin  chewable 81 milliGRAM(s) Oral daily  atorvastatin 20 milliGRAM(s) Oral at bedtime  cefTRIAXone   IVPB      cefTRIAXone   IVPB 2000 milliGRAM(s) IV Intermittent every 24 hours  folic acid 1 milliGRAM(s) Oral daily  gabapentin 100 milliGRAM(s) Oral three times a day  heparin   Injectable 5000 Unit(s) SubCutaneous every 8 hours  hydroxychloroquine 200 milliGRAM(s) Oral daily  levETIRAcetam 500 milliGRAM(s) Oral two times a day  memantine 5 milliGRAM(s) Oral at bedtime  sodium bicarbonate 650 milliGRAM(s) Oral two times a day    MEDICATIONS  (PRN):  acetaminophen     Tablet .. 650 milliGRAM(s) Oral every 6 hours PRN Temp greater or equal to 38C (100.4F), Mild Pain (1 - 3)  aluminum hydroxide/magnesium hydroxide/simethicone Suspension 30 milliLiter(s) Oral every 4 hours PRN Dyspepsia  melatonin 3 milliGRAM(s) Oral at bedtime PRN Insomnia  ondansetron Injectable 4 milliGRAM(s) IV Push every 8 hours PRN Nausea and/or Vomiting      __________________________________________________  REVIEW OF SYSTEMS:    CONSTITUTIONAL: No fever,   EYES: no acute visual disturbances  NECK: No pain or stiffness  RESPIRATORY: No cough; No shortness of breath  CARDIOVASCULAR: No chest pain, no palpitations  GASTROINTESTINAL: No pain. No nausea or vomiting; No diarrhea   NEUROLOGICAL: No headache or numbness, no tremors  MUSCULOSKELETAL: No joint pain, no muscle pain  GENITOURINARY: no dysuria, no frequency, no hesitancy  PSYCHIATRY: no depression , no anxiety  ALL OTHER  ROS negative        Vital Signs Last 24 Hrs  T(C): 36.6 (15 May 2023 05:10), Max: 36.7 (14 May 2023 20:20)  T(F): 97.8 (15 May 2023 05:10), Max: 98 (14 May 2023 20:20)  HR: 67 (15 May 2023 05:10) (67 - 95)  BP: 154/62 (15 May 2023 05:10) (141/57 - 154/62)  BP(mean): 79 (14 May 2023 20:20) (79 - 79)  RR: 17 (15 May 2023 05:10) (16 - 17)  SpO2: 100% (15 May 2023 05:10) (98% - 100%)    Parameters below as of 15 May 2023 05:10  Patient On (Oxygen Delivery Method): room air        ________________________________________________  PHYSICAL EXAM:  well developed, comfortable  GENERAL: NAD  HEENT: Normocephalic;  conjunctivae and sclerae clear; moist mucous membranes;   NECK : supple  CHEST/LUNG: Clear to auscultation bilaterally with good air entry   HEART: S1 S2  regular; no murmurs, gallops or rubs  ABDOMEN: Soft, Nontender, Nondistended; Bowel sounds present  EXTREMITIES: no cyanosis; no edema; no calf tenderness  SKIN: warm and dry; no rash  NERVOUS SYSTEM:  Awake and alert; Oriented  to place, person and time ; no new deficits    _________________________________________________  LABS:                        6.8    2.47  )-----------( 155      ( 15 May 2023 07:27 )             21.1     05-15    142  |  119<H>  |  42<H>  ----------------------------<  104<H>  4.9   |  19<L>  |  2.52<H>    Ca    9.1      15 May 2023 07:27      CAPILLARY BLOOD GLUCOSE    RADIOLOGY & ADDITIONAL TESTS:    < from: Xray Chest 1 View- PORTABLE-Urgent (Xray Chest 1 View- PORTABLE-Urgent .) (05.12.23 @ 22:52) >    ACC: [id number] EXAM:  XR CHEST PORTABLE URGENT 1V   ORDERED BY: ASHLEIGH GREEN     PROCEDURE DATE:  05/12/2023          INTERPRETATION:  TECHNIQUE: A single AP view of the chest was obtained.   Ordered time:   5/12/2023 10:52 PM    COMPARISON: 11//2022    CLINICAL INFORMATION: Fever    FINDINGS:    The heart is not well assessed on an AP film.  Multiple calcified granulomas again seen scattered throughout both lungs,   right greater than left. No focal consolidation.  There are no pleural effusions.  There is no pneumothorax.    IMPRESSION:    No acute pulmonary disease    < end of copied text >      < from: CT Head No Cont (05.12.23 @ 21:37) >    ACC: [id number] EXAM:  CT BRAIN   ORDERED BY: NACHO CRAIN     PROCEDURE DATE:  05/12/2023          INTERPRETATION:  Brain CT    Indication: Weakness    Technique: Axial images were obtained, along with reformatted coronal and   sagittal images.    Comparison:  CT of November 4, 2022    FINDINGS:    As seen on prior studies of 4/12/22, there is hyperattenuation in the   left posterior occipital sulci.  There is no intracranial mass effect,   midline shift or large acute cortical infarct.    Gray-white differentiation is maintained.  There are age appropriate   involutional changes with commensurate dilatation of the CSF spaces.    There are subcortical and periventricular white matter lucencies likely   representing microvascular ischemicdisease.    The mastoid air cells and visualized paranasal sinuses are well aerated.    IMPRESSION:  No significant interval change compared to the prior CT of 4/12/22.  Redemonstration of left posterior occipital sulcal hyperattenuation,   which mayrepresent dystrophic calcification.  If clinically indicated,   further evaluation with follow up CT or MRI may be obtained for further   evaluation.  No mass effect or large acute cortical infarct.    Findings discussed with Dr. YNES Green at 10:12 pm on 5/12/23 with RBV.    --- End of Report ---    < end of copied text >      Imaging Personally Reviewed:  YES/NO    Consultant(s) Notes Reviewed:   YES/ No    Care Discussed with Consultants :     Plan of care was discussed with patient and /or primary care giver; all questions and concerns were addressed and care was aligned with patient's wishes.

## 2023-05-15 NOTE — DIETITIAN INITIAL EVALUATION ADULT - ADD RECOMMEND
PCM (moderate). Glucerna shakes BID added. If electrolytes become elevated may need to change diet and/ or supplement. If stable may consider liberalize diet. MD to monitor. RD available.

## 2023-05-15 NOTE — PROGRESS NOTE ADULT - PROBLEM SELECTOR PLAN 2
on 5/10 B/C, source likely urine  -Cont Ceftriaxone (sensitive)  -f/u repeat blood cultures (testing)

## 2023-05-15 NOTE — PROGRESS NOTE ADULT - PROBLEM SELECTOR PLAN 10
Pt. is from home, lives with daughter and HHA  Disposition pending PT eval and pt./family preference  f/u PT reccs  Declined DEV on previous admission  CM following

## 2023-05-15 NOTE — DIETITIAN INITIAL EVALUATION ADULT - NSFNSPHYEXAMSKINFT_GEN_A_CORE
Pressure Injury 1: Bilateral:,buttocks, Stage II  Pressure Injury 2: none, none  Pressure Injury 3: none, none  Pressure Injury 4: none, none  Pressure Injury 5: none, none  Pressure Injury 6: none, none  Pressure Injury 7: none, none  Pressure Injury 8: none, none  Pressure Injury 9: none, none  Pressure Injury 10: none, none  Pressure Injury 11: none, none, Pressure Injury 1: Bilateral:,buttocks, Stage II  Pressure Injury 2: none, none  Pressure Injury 3: none, none  Pressure Injury 4: none, none  Pressure Injury 5: none, none  Pressure Injury 6: none, none  Pressure Injury 7: none, none  Pressure Injury 8: none, none  Pressure Injury 9: none, none  Pressure Injury 10: none, none  Pressure Injury 11: none, none, Pressure Injury 1: sacrum, Stage II  Pressure Injury 2: none, none  Pressure Injury 3: none, none  Pressure Injury 4: none, none  Pressure Injury 5: none, none  Pressure Injury 6: none, none  Pressure Injury 7: none, none  Pressure Injury 8: none, none  Pressure Injury 9: none, none  Pressure Injury 10: none, none  Pressure Injury 11: none, none

## 2023-05-15 NOTE — PROGRESS NOTE ADULT - PROBLEM SELECTOR PLAN 3
Hx MM, f/u with OP Dr. Bailey Rosales at [facility]  -Last blood transfusion 2 mons ago  -Hgn today (5/15) 6.3-->transfused 1 unit PRBCs Hx MM, f/u with OP Dr. Bailey Rosales at [facility]  -Last blood transfusion 2 mons ago  -Hgn today (5/15) 6.3-->transfused 1 unit PRBCs  -f/u post transfusion H/H  -Pt. with no s&s of bleeding  -Hem/Onc QMA following

## 2023-05-15 NOTE — DIETITIAN INITIAL EVALUATION ADULT - NS FNS DIET ORDER
Diet, Regular:   Consistent Carbohydrate {Evening Snacks}  DASH/TLC {Sodium & Cholesterol Restricted}  Supplement Feeding Modality:  Oral  Glucerna Shake Cans or Servings Per Day:  1       Frequency:  Two Times a day (05-15-23 @ 15:45)

## 2023-05-15 NOTE — ADVANCED PRACTICE NURSE CONSULT - ASSESSMENT
This is a 83yr old female patient admitted for Fever, presenting with a Stage 2 Pressure injury to the Bilateral Gluteus with pink tissue, drainage, and periwound maceration

## 2023-05-15 NOTE — PROGRESS NOTE ADULT - PROBLEM SELECTOR PLAN 5
Known to have stage 3CKD secondary to multiple myeloma  Now with sepsis, likely has KAJAL  Prerenal vs ATN  F/u urine studies  Continue gentle hydration  Avoid nephrotoxins  Dose medication per GFR in setting of bacteremia  -Known to have CKD3  -p/w SCr 3.54  -Improving with hydration and bacteremia mgnt, now Scr 2.52, likely base line  -Nephro Dr. Deutsch following  -Avoid nephrotoxins

## 2023-05-15 NOTE — PHYSICAL THERAPY INITIAL EVALUATION ADULT - ADDITIONAL COMMENTS
occasionally needs set-up but can be mostly independent in household ambulation using a rolling walker; has wheelchair for long distance and community mobility

## 2023-05-15 NOTE — PROGRESS NOTE ADULT - PROBLEM SELECTOR PLAN 6
leukopenia  hgb stable  CKD with mild KAJAL but stable  no concerns of coagulopathy   stable  c/w home meds [facility]  -Cont home meds

## 2023-05-15 NOTE — CONSULT NOTE ADULT - ASSESSMENT
complete note to follow    #VTE Prophylaxis   83 F w/pmh  significant for HTN, multiple myeloma, RA on ASA81, remote h/o CVA no residual , SAH, compression fractures, bladder incontinence, recent admission in last month for KAJAL and Klbesiella UTI who comes in due to fatigued and weakness , associated with hypotension to the eighties to 90s at home, and in ability to walk with the walker as is her baseline.    The patient initially thought it was attributed to anemia due to missed blood transfusion, and decided to come to the hospital. Pt herself denies any new symptoms, Hx obtained from the daughter at bedside, Also states that she has had some yellow discharge. Denies N/v, fever, chills, constipation or diarrhea, chest pain or shortness of breath    #Multiple Myeloma   #Normocytic Anemia  pt follows with Dr. Rosales, currently on pomolyst qod and dex 4mg 2x/week  pt poor historian, unclear if she f/u and is taking pomalyst  p/w fatigue and weakness and anorexia  with KAJAL and UTI  Hgb=7.5-->6.8  Cr=2.5  BCx: GNR (+) Kleb p.  pt had recent admission 6 weeks ago with same presentation  Rec's:  -Hold MM medication (pomolyst and dex) during admission  -etiology for KAJAL d/t UTI and poss d/t pomolyst  -PRBC transfusion if Hgb <7.0 or symptomatic  -Daily CBC  -Nephrology consult   -will reach out to pt's primary Oncologist  -pt to f/u with Dr. Rosales upon d/c    Thank you for the referral. Will continue to monitor the patient.  Please call with any questions [phone]  Above reviewed with Attending Dr. Srinath ZAMAN/[facility]  [address] [facility], [address], [place], [place]  [phone]  *Note not finalized until signed by Attending Physician       83 F w/pmh  significant for HTN, multiple myeloma, RA on ASA81, remote h/o CVA no residual , SAH, compression fractures, bladder incontinence, recent admission in last month for KAJAL and Klbesiella UTI who comes in due to fatigued and weakness , associated with hypotension to the eighties to 90s at home, and in ability to walk with the walker as is her baseline.    The patient initially thought it was attributed to anemia due to missed blood transfusion, and decided to come to the hospital. Pt herself denies any new symptoms, Hx obtained from the daughter at bedside, Also states that she has had some yellow discharge. Denies N/v, fever, chills, constipation or diarrhea, chest pain or shortness of breath    #Multiple Myeloma   #Normocytic Anemia  pt follows with Dr. Rosales, currently on pomolyst qod and dex 4mg 2x/week  pt poor historian, unclear if she f/u and is taking pomalyst  p/w fatigue and weakness and anorexia  with KAJAL and UTI  Hgb=7.5-->6.8  Cr=2.5  BCx: GNR (+) Kleb p.  pt had recent admission 6 weeks ago with same presentation  Rec's:  -Hold MM medication (pomolyst and dex) during admission  -etiology for KAJAL d/t UTI and poss d/t pomolyst  -PRBC transfusion if Hgb <7.0 or symptomatic  -Anemia panel  -Daily CBC  -Nephrology consult   -will reach out to pt's primary Oncologist  -pt to f/u with Dr. Rosales upon d/c    Thank you for the referral. Will continue to monitor the patient.  Please call with any questions [phone]  Above reviewed with Attending Dr. Srinath ZAMAN/[facility]  [address] [facility], [address], [place], [place]  [phone]  *Note not finalized until signed by Attending Physician       83 F w/pmh  significant for HTN, multiple myeloma, RA on ASA81, remote h/o CVA no residual , SAH, compression fractures, bladder incontinence, recent admission in last month for KAJAL and Klbesiella UTI who comes in due to fatigued and weakness , associated with hypotension to the eighties to 90s at home, and in ability to walk with the walker as is her baseline.    The patient initially thought it was attributed to anemia due to missed blood transfusion, and decided to come to the hospital. Pt herself denies any new symptoms, Hx obtained from the daughter at bedside, Also states that she has had some yellow discharge. Denies N/v, fever, chills, constipation or diarrhea, chest pain or shortness of breath    #Multiple Myeloma   #Normocytic Anemia  pt follows with Dr. Rosales, currently on pomolyst qod and dex 4mg 2x/week  pt poor historian, unclear if she f/u and is taking pomalyst  p/w fatigue and weakness and anorexia  with KAJAL and UTI  Hgb=7.5-->6.8  Cr=2.5  BCx: GNR (+) Kleb p.  pt had recent admission 6 weeks ago with same presentation  Rec's:  -Hold MM medication (pomolyst and dex) during admission  -etiology for KAJAL d/t UTI and poss d/t pomolyst  -PRBC transfusion if Hgb <7.0 or symptomatic  -Anemia panel  -Daily CBC  -Nephrology consult   -Called pt's primary Oncologist and LM to call me back  -pt to f/u with Dr. Rosales upon d/c    Thank you for the referral. Will continue to monitor the patient.  Please call with any questions [phone]  Above reviewed with Attending Dr. Srinath ZAMAN/[facility]  [address] [facility], [address], [place], [place]  [phone]  *Note not finalized until signed by Attending Physician

## 2023-05-15 NOTE — ADVANCED PRACTICE NURSE CONSULT - RECOMMEDATIONS
-Clean the Bilateral Gluteal wounds with normal saline and apply skin prep to the surrounding skin  -Apply Calcium Alginate (Aquacel) to the wound bed and cover with a Foam dressing Q 72hrs PRN  -Frequent toileting  -Elevate/float the patients heels using heel protectors and reposition the patient Q 2hrs using wedges or pillows

## 2023-05-15 NOTE — PROGRESS NOTE ADULT - SUBJECTIVE AND OBJECTIVE BOX
[facility] : Progress Note :: [phone], (office [phone]),   Dr Triana / Dr Deutsch / Dr Manzo / Dr Perez / Dr Nichol GARZA / Dr Lantigua / Dr Joyce / Dr Alberto layne  _____________________________________________________________________________________________  Patient is a 83y Female with    No Known Allergies    Hospital Medications:   MEDICATIONS  (STANDING):  aspirin  chewable 81 milliGRAM(s) Oral daily  atorvastatin 20 milliGRAM(s) Oral at bedtime  cefTRIAXone   IVPB 2000 milliGRAM(s) IV Intermittent every 24 hours  cefTRIAXone   IVPB      folic acid 1 milliGRAM(s) Oral daily  gabapentin 100 milliGRAM(s) Oral three times a day  heparin   Injectable 5000 Unit(s) SubCutaneous every 8 hours  hydroxychloroquine 200 milliGRAM(s) Oral daily  levETIRAcetam 500 milliGRAM(s) Oral two times a day  memantine 5 milliGRAM(s) Oral at bedtime  sodium bicarbonate 650 milliGRAM(s) Oral two times a day        VITALS:  T(F): 97.8 (05-15-23 @ 13:42), Max: 98 (23 @ 20:20)  HR: 64 (05-15-23 @ 13:42)  BP: 157/73 (05-15-23 @ 13:42)  RR: 18 (05-15-23 @ 13:42)  SpO2: 100% (05-15-23 @ 13:42)  Wt(kg): --     @ 07:01  -  05-15 @ 07:00  --------------------------------------------------------  IN: 0 mL / OUT: 2600 mL / NET: -2600 mL    05-15 @ 07:01  -  05-15 @ 18:30  --------------------------------------------------------  IN: 0 mL / OUT: 550 mL / NET: -550 mL        PHYSICAL EXAM:  Constitutional: NAD  HEENT: anicteric sclera, oropharynx clear.  Neck: No JVD  Respiratory: CTAB, no wheezes, rales or rhonchi  Cardiovascular: S1, S2, RRR  Gastrointestinal: BS+, soft, NT/ND  Extremities:  No peripheral edema  Neurological: A/O x 3,       LABS:  05-15    142  |  119<H>  |  42<H>  ----------------------------<  104<H>  4.9   |  19<L>  |  2.52<H>    Ca    9.1      15 May 2023 07:27      Creatinine Trend: 2.52 <--, 2.58 <--, 2.54 <--                        6.8    2.47  )-----------( 155      ( 15 May 2023 07:27 )             21.1     Urine Studies:  Urinalysis Basic - ( 12 May 2023 22:54 )    Color: Yellow / Appearance: very cloudy / S.010 / pH:   Gluc:  / Ketone: Negative  / Bili: Negative / Urobili: Negative   Blood:  / Protein: 30 mg/dL / Nitrite: Negative   Leuk Esterase: Moderate / RBC: 10-25 /HPF / WBC >50 /HPF   Sq Epi:  / Non Sq Epi:  / Bacteria: Many /HPF        RADIOLOGY & ADDITIONAL STUDIES:

## 2023-05-15 NOTE — CONSULT NOTE ADULT - SUBJECTIVE AND OBJECTIVE BOX
MEDICATIONS  (STANDING):  aspirin  chewable 81 milliGRAM(s) Oral daily  atorvastatin 20 milliGRAM(s) Oral at bedtime  cefTRIAXone   IVPB 2000 milliGRAM(s) IV Intermittent every 24 hours  cefTRIAXone   IVPB      folic acid 1 milliGRAM(s) Oral daily  gabapentin 100 milliGRAM(s) Oral three times a day  heparin   Injectable 5000 Unit(s) SubCutaneous every 8 hours  hydroxychloroquine 200 milliGRAM(s) Oral daily  levETIRAcetam 500 milliGRAM(s) Oral two times a day  memantine 5 milliGRAM(s) Oral at bedtime  sodium bicarbonate 650 milliGRAM(s) Oral two times a day    MEDICATIONS  (PRN):  acetaminophen     Tablet .. 650 milliGRAM(s) Oral every 6 hours PRN Temp greater or equal to 38C (100.4F), Mild Pain (1 - 3)  aluminum hydroxide/magnesium hydroxide/simethicone Suspension 30 milliLiter(s) Oral every 4 hours PRN Dyspepsia  melatonin 3 milliGRAM(s) Oral at bedtime PRN Insomnia  ondansetron Injectable 4 milliGRAM(s) IV Push every 8 hours PRN Nausea and/or Vomiting    CAPILLARY BLOOD GLUCOSE        I&O's Summary    14 May 2023 07:01  -  15 May 2023 07:00  --------------------------------------------------------  IN: 0 mL / OUT: 2600 mL / NET: -2600 mL        PHYSICAL EXAM:  Vital Signs Last 24 Hrs  T(C): 36.6 (15 May 2023 05:10), Max: 36.7 (14 May 2023 20:20)  T(F): 97.8 (15 May 2023 05:10), Max: 98 (14 May 2023 20:20)  HR: 67 (15 May 2023 05:10) (67 - 95)  BP: 154/62 (15 May 2023 05:10) (141/57 - 154/62)  BP(mean): 79 (14 May 2023 20:20) (79 - 79)  RR: 17 (15 May 2023 05:10) (16 - 17)  SpO2: 100% (15 May 2023 05:10) (98% - 100%)    Parameters below as of 15 May 2023 05:10  Patient On (Oxygen Delivery Method): room air          LABS:                        6.8    2.47  )-----------( 155      ( 15 May 2023 07:27 )             21.1     05-15    142  |  119<H>  |  42<H>  ----------------------------<  104<H>  4.9   |  19<L>  |  2.52<H>    Ca    9.1      15 May 2023 07:27                Culture - Blood (collected 12 May 2023 23:00)  Source: .Blood Blood-Peripheral  Gram Stain (13 May 2023 16:45):    Growth in anaerobic bottle: Gram Negative Rods    Growth in aerobic bottle: Gram Negative Rods  Final Report (15 May 2023 08:53):    Growth in aerobic and anaerobic bottles: Klebsiella pneumoniae    ***Blood Panel PCR results on this specimen are available    approximately 3 hours after the Gram stain result.***    Gram stain, PCR, and/or culture results may not always    correspond dueto difference in methodologies.    ************************************************************    This PCR assay was performed by multiplex PCR. This    Assay tests for 66 bacterial and resistance gene targets.    Please refer to the [facility] test directory    at [email or web address] for details.  Organism: Blood Culture PCR  Klebsiella pneumoniae (15 May 2023 08:53)  Organism: Klebsiella pneumoniae (15 May 2023 08:53)  Organism: Blood Culture PCR (15 May 2023 08:53)    Culture - Blood (collected 12 May 2023 22:40)  Source: .Blood Blood-Peripheral  Gram Stain (13 May 2023 13:08):    Growth in anaerobic bottle: Gram Negative Rods  Preliminary Report (14 May 2023 11:31):    Growth in anaerobic bottle: Klebsiella pneumoniae    See previous culture [id number]      SARS-CoV-2: NotDetec (12 May 2023 23:05)  SARS-CoV-2: NotDete (30 Mar 2023 06:05)      RADIOLOGY & ADDITIONAL TESTS:  < from: CT Head No Cont (05.12.23 @ 21:37) >    ACC: [id number] EXAM:  CT BRAIN   ORDERED BY: NACHO RUDD DATE:  05/12/2023          INTERPRETATION:  Brain CT    Indication: Weakness    Technique: Axial images were obtained, along with reformatted coronal and   sagittal images.    Comparison:  CT of November 4, 2022    FINDINGS:    As seen on prior studies of 4/12/22, there is hyperattenuation in the   left posterior occipital sulci.  There is no intracranial mass effect,   midline shift or large acute cortical infarct.    Gray-white differentiation is maintained.  There are age appropriate   involutional changes with commensurate dilatation of the CSF spaces.    There are subcortical and periventricular white matter lucencies likely   representing microvascular ischemicdisease.    The mastoid air cells and visualized paranasal sinuses are well aerated.    IMPRESSION:  No significant interval change compared to the prior CT of 4/12/22.  Redemonstration of left posterior occipital sulcal hyperattenuation,   which mayrepresent dystrophic calcification.  If clinically indicated,   further evaluation with follow up CT or MRI may be obtained for further   evaluation.  No mass effect or large acute cortical infarct.    Findings discussed with Dr. YNES Green at 10:12 pm on 5/12/23 with RBV.    < end of copied text >  < from: Xray Chest 1 View- PORTABLE-Urgent (Xray Chest 1 View- PORTABLE-Urgent .) (05.12.23 @ 22:52) >  IMPRESSION:    No acute pulmonary disease    < end of copied text >       Reason for Admission: Generalized weakness  History of Present Illness:   83 F w/pmh  significant for HTN, multiple myeloma, RA on ASA81, remote h/o CVA no residual , SAH, compression fractures, bladder incontinence, recent admission in last month for KAJAL and Klbesiella UTI who comes in due to fatigued and weakness , associated with hypotension to the eighties to 90s at home, and in ability to walk with the walker as is her baseline.    The patient initially thought it was attributed to anemia due to missed blood transfusion, and decided to come to the hospital. Pt herself denies any new symptoms, Hx obtained from the daughter at bedside, Also states that she has had some yellow discharge. Denies N/v, fever, chills, constipation or diarrhea, chest pain or shortness of breath      In the ED  febrile with T 102 /53   Exam: dementia,  no distress  Labs: Leukopenia 1.7, hemoglobin stable, 7.6 normocytic           hyperchloremic metabolic acidosis             BUN/creatinine 47/2.5, lactate 3.6, UA +    CT had no acute changes compared to prior      REVIEW OF SYSTEMS:    CONSTITUTIONAL: + weakness, fevers or chills  EYES/ENT: No visual changes;  No vertigo or throat pain   NECK: No pain or stiffness  RESPIRATORY: No cough, wheezing, hemoptysis; No shortness of breath  CARDIOVASCULAR: No chest pain or palpitations  GASTROINTESTINAL: No abdominal or epigastric pain. No nausea, vomiting, or hematemesis; No diarrhea or constipation. No melena or hematochezia.  GENITOURINARY+ yellow discharge like foul smelling urine  NEUROLOGICAL: No numbness or weakness  SKIN: No itching, rashes      Allergies and Intolerances:        Allergies:  	No Known Allergies:     Home Medications:   * Patient Currently Takes Medications as of 03-Apr-2023 17:51 documented in Structured Notes  · 	folic acid 1 mg oral tablet: 1 tab(s) orally once a day  · 	amLODIPine 2.5 mg oral tablet: 1 tab(s) orally once a day  · 	levETIRAcetam 500 mg oral tablet: 1 tab(s) orally 2 times a day  · 	memantine 5 mg oral tablet: 1 tab(s) orally once a day (at bedtime)  · 	rosuvastatin 20 mg oral capsule: 1 cap(s) orally once a day (at bedtime)  · 	ferrous sulfate 325 mg (65 mg elemental iron) oral delayed release tablet: 1 tab(s) orally once a day  · 	hydroxychloroquine 200 mg oral tablet: 1 tab(s) orally once a day. DO NOT RESUME UNTIL OKAY WITH YOUR DOCTOR.   · 	aspirin 81 mg oral tablet: 1 tab(s) orally once a day  · 	gabapentin 100 mg oral capsule: 1 cap(s) orally 3 times a day  · 	Pomalyst 2 mg oral capsule: 1 cap(s) orally every other day. DO NOT RESUME UNTIL AFTER REHAB AND OKAYED BY YOUR ONCOLOGIST    Patient History:    Past Medical, Past Surgical, and Family History:  PAST MEDICAL HISTORY:  Cerebrovascular accident (CVA) remote hx, no residual deficits    HLD (hyperlipidemia)     Hypertension     Multiple myeloma     Rheumatoid arthritis     Varicose veins of lower extremity.     PAST SURGICAL HISTORY:  No significant past surgical history.     FAMILY HISTORY:  Father  Still living? Unknown  FHx: stroke, Age at diagnosis: Age Unknown.     Social History:  · Substance use	No  · Social History (marital status, living situation, occupation, and sexual history)	does not smoke drink alcohol or use drugs      MEDICATIONS  (STANDING):  aspirin  chewable 81 milliGRAM(s) Oral daily  atorvastatin 20 milliGRAM(s) Oral at bedtime  cefTRIAXone   IVPB 2000 milliGRAM(s) IV Intermittent every 24 hours  cefTRIAXone   IVPB      folic acid 1 milliGRAM(s) Oral daily  gabapentin 100 milliGRAM(s) Oral three times a day  heparin   Injectable 5000 Unit(s) SubCutaneous every 8 hours  hydroxychloroquine 200 milliGRAM(s) Oral daily  levETIRAcetam 500 milliGRAM(s) Oral two times a day  memantine 5 milliGRAM(s) Oral at bedtime  sodium bicarbonate 650 milliGRAM(s) Oral two times a day    MEDICATIONS  (PRN):  acetaminophen     Tablet .. 650 milliGRAM(s) Oral every 6 hours PRN Temp greater or equal to 38C (100.4F), Mild Pain (1 - 3)  aluminum hydroxide/magnesium hydroxide/simethicone Suspension 30 milliLiter(s) Oral every 4 hours PRN Dyspepsia  melatonin 3 milliGRAM(s) Oral at bedtime PRN Insomnia  ondansetron Injectable 4 milliGRAM(s) IV Push every 8 hours PRN Nausea and/or Vomiting    CAPILLARY BLOOD GLUCOSE        I&O's Summary    14 May 2023 07:01  -  15 May 2023 07:00  --------------------------------------------------------  IN: 0 mL / OUT: 2600 mL / NET: -2600 mL        PHYSICAL EXAM:  Vital Signs Last 24 Hrs  T(C): 36.6 (15 May 2023 05:10), Max: 36.7 (14 May 2023 20:20)  T(F): 97.8 (15 May 2023 05:10), Max: 98 (14 May 2023 20:20)  HR: 67 (15 May 2023 05:10) (67 - 95)  BP: 154/62 (15 May 2023 05:10) (141/57 - 154/62)  BP(mean): 79 (14 May 2023 20:20) (79 - 79)  RR: 17 (15 May 2023 05:10) (16 - 17)  SpO2: 100% (15 May 2023 05:10) (98% - 100%)    Parameters below as of 15 May 2023 05:10  Patient On (Oxygen Delivery Method): room air      GEN: NAD; A and O x 3, weak  LUNGS: CTA B/L  HEART: S1 S2  ABDOMEN: soft, non-tender, non-distended, + BS  EXTREMITIES: no edema        LABS:                        6.8    2.47  )-----------( 155      ( 15 May 2023 07:27 )             21.1     05-15    142  |  119<H>  |  42<H>  ----------------------------<  104<H>  4.9   |  19<L>  |  2.52<H>    Ca    9.1      15 May 2023 07:27                Culture - Blood (collected 12 May 2023 23:00)  Source: .Blood Blood-Peripheral  Gram Stain (13 May 2023 16:45):    Growth in anaerobic bottle: Gram Negative Rods    Growth in aerobic bottle: Gram Negative Rods  Final Report (15 May 2023 08:53):    Growth in aerobic and anaerobic bottles: Klebsiella pneumoniae    ***Blood Panel PCR results on this specimen are available    approximately 3 hours after the Gram stain result.***    Gram stain, PCR, and/or culture results may not always    correspond dueto difference in methodologies.    ************************************************************    This PCR assay was performed by multiplex PCR. This    Assay tests for 66 bacterial and resistance gene targets.    Please refer to the [facility] test directory    at [email or web address] for details.  Organism: Blood Culture PCR  Klebsiella pneumoniae (15 May 2023 08:53)  Organism: Klebsiella pneumoniae (15 May 2023 08:53)  Organism: Blood Culture PCR (15 May 2023 08:53)    Culture - Blood (collected 12 May 2023 22:40)  Source: .Blood Blood-Peripheral  Gram Stain (13 May 2023 13:08):    Growth in anaerobic bottle: Gram Negative Rods  Preliminary Report (14 May 2023 11:31):    Growth in anaerobic bottle: Klebsiella pneumoniae    See previous culture [id number]      SARS-CoV-2: NotDetec (12 May 2023 23:05)  SARS-CoV-2: NotDetec (30 Mar 2023 06:05)      RADIOLOGY & ADDITIONAL TESTS:  < from: CT Head No Cont (05.12.23 @ 21:37) >    ACC: [id number] EXAM:  CT BRAIN   ORDERED BY: NACHO CRAIN     PROCEDURE DATE:  05/12/2023          INTERPRETATION:  Brain CT    Indication: Weakness    Technique: Axial images were obtained, along with reformatted coronal and   sagittal images.    Comparison:  CT of November 4, 2022    FINDINGS:    As seen on prior studies of 4/12/22, there is hyperattenuation in the   left posterior occipital sulci.  There is no intracranial mass effect,   midline shift or large acute cortical infarct.    Gray-white differentiation is maintained.  There are age appropriate   involutional changes with commensurate dilatation of the CSF spaces.    There are subcortical and periventricular white matter lucencies likely   representing microvascular ischemicdisease.    The mastoid air cells and visualized paranasal sinuses are well aerated.    IMPRESSION:  No significant interval change compared to the prior CT of 4/12/22.  Redemonstration of left posterior occipital sulcal hyperattenuation,   which mayrepresent dystrophic calcification.  If clinically indicated,   further evaluation with follow up CT or MRI may be obtained for further   evaluation.  No mass effect or large acute cortical infarct.    Findings discussed with Dr. YNES Green at 10:12 pm on 5/12/23 with RBV.    < end of copied text >  < from: Xray Chest 1 View- PORTABLE-Urgent (Xray Chest 1 View- PORTABLE-Urgent .) (05.12.23 @ 22:52) >  IMPRESSION:    No acute pulmonary disease    < end of copied text >       Reason for Admission: Generalized weakness  History of Present Illness:   83 F w/pmh  significant for HTN, multiple myeloma, RA on ASA 81, remote h/o CVA no residual , SAH, compression fractures, bladder incontinence, recent admission in last month for KAJAL and Klbesiella UTI who comes in due to fatigued and weakness , associated with hypotension to the eighties to 90s at home, and in ability to walk with the walker as is her baseline.    The patient initially thought it was attributed to anemia due to missed blood transfusion, and decided to come to the hospital. Pt herself denies any new symptoms, Hx obtained from the daughter at bedside, Also states that she has had some yellow discharge. Denies N/v, fever, chills, constipation or diarrhea, chest pain or shortness of breath      In the ED  febrile with T 102 /53   Exam: dementia,  no distress  Labs: Leukopenia 1.7, hemoglobin stable, 7.6 normocytic           hyperchloremic metabolic acidosis             BUN/creatinine 47/2.5, lactate 3.6, UA +    CT had no acute changes compared to prior      REVIEW OF SYSTEMS:    CONSTITUTIONAL: + weakness, fevers or chills  EYES/ENT: No visual changes;  No vertigo or throat pain   NECK: No pain or stiffness  RESPIRATORY: No cough, wheezing, hemoptysis; No shortness of breath  CARDIOVASCULAR: No chest pain or palpitations  GASTROINTESTINAL: No abdominal or epigastric pain. No nausea, vomiting, or hematemesis; No diarrhea or constipation. No melena or hematochezia.  GENITOURINARY+ yellow discharge like foul smelling urine  NEUROLOGICAL: No numbness or weakness  SKIN: No itching, rashes      Allergies and Intolerances:        Allergies:  	No Known Allergies:     Home Medications:   * Patient Currently Takes Medications as of 03-Apr-2023 17:51 documented in Structured Notes  · 	folic acid 1 mg oral tablet: 1 tab(s) orally once a day  · 	amLODIPine 2.5 mg oral tablet: 1 tab(s) orally once a day  · 	levETIRAcetam 500 mg oral tablet: 1 tab(s) orally 2 times a day  · 	memantine 5 mg oral tablet: 1 tab(s) orally once a day (at bedtime)  · 	rosuvastatin 20 mg oral capsule: 1 cap(s) orally once a day (at bedtime)  · 	ferrous sulfate 325 mg (65 mg elemental iron) oral delayed release tablet: 1 tab(s) orally once a day  · 	hydroxychloroquine 200 mg oral tablet: 1 tab(s) orally once a day. DO NOT RESUME UNTIL OKAY WITH YOUR DOCTOR.   · 	aspirin 81 mg oral tablet: 1 tab(s) orally once a day  · 	gabapentin 100 mg oral capsule: 1 cap(s) orally 3 times a day  · 	Pomalyst 2 mg oral capsule: 1 cap(s) orally every other day. DO NOT RESUME UNTIL AFTER REHAB AND OKAYED BY YOUR ONCOLOGIST    Patient History:    Past Medical, Past Surgical, and Family History:  PAST MEDICAL HISTORY:  Cerebrovascular accident (CVA) remote hx, no residual deficits    HLD (hyperlipidemia)     Hypertension     Multiple myeloma     Rheumatoid arthritis     Varicose veins of lower extremity.     PAST SURGICAL HISTORY:  No significant past surgical history.     FAMILY HISTORY:  Father  Still living? Unknown  FHx: stroke, Age at diagnosis: Age Unknown.     Social History:  · Substance use	No  · Social History (marital status, living situation, occupation, and sexual history)	does not smoke drink alcohol or use drugs      MEDICATIONS  (STANDING):  aspirin  chewable 81 milliGRAM(s) Oral daily  atorvastatin 20 milliGRAM(s) Oral at bedtime  cefTRIAXone   IVPB 2000 milliGRAM(s) IV Intermittent every 24 hours  cefTRIAXone   IVPB      folic acid 1 milliGRAM(s) Oral daily  gabapentin 100 milliGRAM(s) Oral three times a day  heparin   Injectable 5000 Unit(s) SubCutaneous every 8 hours  hydroxychloroquine 200 milliGRAM(s) Oral daily  levETIRAcetam 500 milliGRAM(s) Oral two times a day  memantine 5 milliGRAM(s) Oral at bedtime  sodium bicarbonate 650 milliGRAM(s) Oral two times a day    MEDICATIONS  (PRN):  acetaminophen     Tablet .. 650 milliGRAM(s) Oral every 6 hours PRN Temp greater or equal to 38C (100.4F), Mild Pain (1 - 3)  aluminum hydroxide/magnesium hydroxide/simethicone Suspension 30 milliLiter(s) Oral every 4 hours PRN Dyspepsia  melatonin 3 milliGRAM(s) Oral at bedtime PRN Insomnia  ondansetron Injectable 4 milliGRAM(s) IV Push every 8 hours PRN Nausea and/or Vomiting    CAPILLARY BLOOD GLUCOSE        I&O's Summary    14 May 2023 07:01  -  15 May 2023 07:00  --------------------------------------------------------  IN: 0 mL / OUT: 2600 mL / NET: -2600 mL        PHYSICAL EXAM:  Vital Signs Last 24 Hrs  T(C): 36.6 (15 May 2023 05:10), Max: 36.7 (14 May 2023 20:20)  T(F): 97.8 (15 May 2023 05:10), Max: 98 (14 May 2023 20:20)  HR: 67 (15 May 2023 05:10) (67 - 95)  BP: 154/62 (15 May 2023 05:10) (141/57 - 154/62)  BP(mean): 79 (14 May 2023 20:20) (79 - 79)  RR: 17 (15 May 2023 05:10) (16 - 17)  SpO2: 100% (15 May 2023 05:10) (98% - 100%)    Parameters below as of 15 May 2023 05:10  Patient On (Oxygen Delivery Method): room air      GEN: NAD; A and O x 3, weak  LUNGS: CTA B/L  HEART: S1 S2  ABDOMEN: soft, non-tender, non-distended, + BS  EXTREMITIES: no edema        LABS:                        6.8    2.47  )-----------( 155      ( 15 May 2023 07:27 )             21.1     05-15    142  |  119<H>  |  42<H>  ----------------------------<  104<H>  4.9   |  19<L>  |  2.52<H>    Ca    9.1      15 May 2023 07:27                Culture - Blood (collected 12 May 2023 23:00)  Source: .Blood Blood-Peripheral  Gram Stain (13 May 2023 16:45):    Growth in anaerobic bottle: Gram Negative Rods    Growth in aerobic bottle: Gram Negative Rods  Final Report (15 May 2023 08:53):    Growth in aerobic and anaerobic bottles: Klebsiella pneumoniae    ***Blood Panel PCR results on this specimen are available    approximately 3 hours after the Gram stain result.***    Gram stain, PCR, and/or culture results may not always    correspond dueto difference in methodologies.    ************************************************************    This PCR assay was performed by multiplex PCR. This    Assay tests for 66 bacterial and resistance gene targets.    Please refer to the [facility] test directory    at [email or web address] for details.  Organism: Blood Culture PCR  Klebsiella pneumoniae (15 May 2023 08:53)  Organism: Klebsiella pneumoniae (15 May 2023 08:53)  Organism: Blood Culture PCR (15 May 2023 08:53)    Culture - Blood (collected 12 May 2023 22:40)  Source: .Blood Blood-Peripheral  Gram Stain (13 May 2023 13:08):    Growth in anaerobic bottle: Gram Negative Rods  Preliminary Report (14 May 2023 11:31):    Growth in anaerobic bottle: Klebsiella pneumoniae    See previous culture [id number]      SARS-CoV-2: NotDetec (12 May 2023 23:05)  SARS-CoV-2: NotDetec (30 Mar 2023 06:05)      RADIOLOGY & ADDITIONAL TESTS:  < from: CT Head No Cont (05.12.23 @ 21:37) >    ACC: [id number] EXAM:  CT BRAIN   ORDERED BY: NACHO CRAIN     PROCEDURE DATE:  05/12/2023          INTERPRETATION:  Brain CT    Indication: Weakness    Technique: Axial images were obtained, along with reformatted coronal and   sagittal images.    Comparison:  CT of November 4, 2022    FINDINGS:    As seen on prior studies of 4/12/22, there is hyperattenuation in the   left posterior occipital sulci.  There is no intracranial mass effect,   midline shift or large acute cortical infarct.    Gray-white differentiation is maintained.  There are age appropriate   involutional changes with commensurate dilatation of the CSF spaces.    There are subcortical and periventricular white matter lucencies likely   representing microvascular ischemicdisease.    The mastoid air cells and visualized paranasal sinuses are well aerated.    IMPRESSION:  No significant interval change compared to the prior CT of 4/12/22.  Redemonstration of left posterior occipital sulcal hyperattenuation,   which mayrepresent dystrophic calcification.  If clinically indicated,   further evaluation with follow up CT or MRI may be obtained for further   evaluation.  No mass effect or large acute cortical infarct.    Findings discussed with Dr. YNES Green at 10:12 pm on 5/12/23 with RBV.    < end of copied text >  < from: Xray Chest 1 View- PORTABLE-Urgent (Xray Chest 1 View- PORTABLE-Urgent .) (05.12.23 @ 22:52) >  IMPRESSION:    No acute pulmonary disease    < end of copied text >

## 2023-05-15 NOTE — PROGRESS NOTE ADULT - ASSESSMENT
83 F w/pmh  significant for HTN, multiple myeloma, RA on ASA81, remote h/o CVA no residual , SAH, compression fractures, bladder incontinence, recent admission in last month for KAJAL and Klbesiella UTI who comes in due to fatigued and weakness, admitted for sepsis, found to have Klebsiella pneumoniae bacteremia on 5/12 blood culture, treated appropriately with Ceftriaxone 2gm q24 hrs, repeat blood cultures testing.  Pt. is currently afebrile, HD stable with no leukocytosis.  Course of hospitalization complicated by trending down H/H likely in setting of MM, required transfusion 1 unit PRBCs for Hgn 6.3 on 5/15.  Course further complicated by urinary retention requiring neves placement.  Pt. is from home, lives with daughter and HHA.  Discharge disposition pending PT eval and pt./family choice.

## 2023-05-16 NOTE — CHART NOTE - NSCHARTNOTEFT_GEN_A_CORE
Neves was discontinued earlier today. Bladder scan was performed which revealed about 428 ml of urine in bladder. Straight cath x 1. Repeat bladder 6 hours from now to determine need for possible neves on discharge. Neves was discontinued earlier today. Bladder scan was performed which revealed about 428 ml of urine in bladder. Straight cath x 1. Repeat bladder scan 6 hours from now to determine need for possible neves on discharge.

## 2023-05-16 NOTE — PROGRESS NOTE ADULT - PROBLEM SELECTOR PLAN 3
Hx MM, f/u with OP Dr. Bailey Rosales at [facility]  -Last blood transfusion 2 mons ago  -Hgn today (5/15) 6.3-->transfused 1 unit PRBCs  -f/u post transfusion H/H  -Pt. with no s&s of bleeding  -Hem/Onc QMA following Hx MM, f/u with OP Dr. Bailey Rosales at [facility]  -Last blood transfusion 2 mons ago  -Hgn today (5/15) 6.3-->transfused 1 unit PRBCs  -Post transfusion H/H 9.1/27.5  -Pt. with no s&s of bleeding  -Hem/Onc QMA following

## 2023-05-16 NOTE — PROGRESS NOTE ADULT - PROBLEM SELECTOR PLAN 2
on 5/10 B/C, source likely urine  -Cont Ceftriaxone (sensitive)  -f/u repeat blood cultures (testing) on 5/10 B/C, source likely urine  -Cont Ceftriaxone (sensitive)  -Repeat blood culture 5/14 NTD

## 2023-05-16 NOTE — PROGRESS NOTE ADULT - PROBLEM SELECTOR PLAN 8
heparin
Pt has been hypotensive due to infection   hold home BP meds
heparin
Pt has been hypotensive due to infection   hold home BP meds

## 2023-05-16 NOTE — PROGRESS NOTE ADULT - SUBJECTIVE AND OBJECTIVE BOX
[facility] : Progress Note :: [phone], (office [phone]),   Dr Triana / Dr Deutsch / Dr Manzo / Dr Perez / Dr Nichol GARZA / Dr Lantigua / Dr Joyce / Dr Alberto layne  _____________________________________________________________________________________________    HGB better.  improved renal function    No Known Allergies    Hospital Medications:   MEDICATIONS  (STANDING):  aspirin  chewable 81 milliGRAM(s) Oral daily  atorvastatin 20 milliGRAM(s) Oral at bedtime  cefTRIAXone   IVPB      cefTRIAXone   IVPB 2000 milliGRAM(s) IV Intermittent every 24 hours  folic acid 1 milliGRAM(s) Oral daily  gabapentin 100 milliGRAM(s) Oral three times a day  heparin   Injectable 5000 Unit(s) SubCutaneous every 8 hours  hydroxychloroquine 200 milliGRAM(s) Oral daily  levETIRAcetam 500 milliGRAM(s) Oral two times a day  memantine 5 milliGRAM(s) Oral at bedtime  sodium bicarbonate 650 milliGRAM(s) Oral two times a day        VITALS:  T(F): 98.7 (23 @ 14:12), Max: 99.1 (05-15-23 @ 20:15)  HR: 63 (23 @ 14:12)  BP: 169/65 (23 @ 14:12)  RR: 18 (23 @ 14:12)  SpO2: 100% (23 @ 14:12)  Wt(kg): --    05-15 @ 07:01  -   @ 07:00  --------------------------------------------------------  IN: 0 mL / OUT: 2350 mL / NET: -2350 mL     @ 07:01  -   @ 18:29  --------------------------------------------------------  IN: 0 mL / OUT: 370 mL / NET: -370 mL        PHYSICAL EXAM:  Constitutional: NAD  HEENT: anicteric sclera, oropharynx clear,  Neck: No JVD  Respiratory: CTAB, no wheezes, rales or rhonchi  Cardiovascular: S1, S2, RRR  Gastrointestinal: BS+, soft, NT/ND  Extremities:  No peripheral edema  Neurological: A/O x 3, no focal deficits  : No CVA tenderness. No neves.       LABS:      142  |  118<H>  |  31<H>  ----------------------------<  93  4.6   |  20<L>  |  2.03<H>    Ca    9.1      16 May 2023 06:55  Phos  3.8       Mg     2.0           Creatinine Trend: 2.03 <--, 2.52 <--, 2.58 <--, 2.54 <--                        9.1    2.93  )-----------( 147      ( 16 May 2023 06:55 )             27.5     Urine Studies:  Urinalysis Basic - ( 12 May 2023 22:54 )    Color: Yellow / Appearance: very cloudy / S.010 / pH:   Gluc:  / Ketone: Negative  / Bili: Negative / Urobili: Negative   Blood:  / Protein: 30 mg/dL / Nitrite: Negative   Leuk Esterase: Moderate / RBC: 10-25 /HPF / WBC >50 /HPF   Sq Epi:  / Non Sq Epi:  / Bacteria: Many /HPF        RADIOLOGY & ADDITIONAL STUDIES:

## 2023-05-16 NOTE — PROGRESS NOTE ADULT - SUBJECTIVE AND OBJECTIVE BOX
Patient is a 83y old  Female who presents with a chief complaint of Generalized weakness     SUBJECTIVE / OVERNIGHT EVENTS:        MEDICATIONS  (STANDING):  aspirin  chewable 81 milliGRAM(s) Oral daily  atorvastatin 20 milliGRAM(s) Oral at bedtime  cefTRIAXone   IVPB      cefTRIAXone   IVPB 2000 milliGRAM(s) IV Intermittent every 24 hours  folic acid 1 milliGRAM(s) Oral daily  gabapentin 100 milliGRAM(s) Oral three times a day  heparin   Injectable 5000 Unit(s) SubCutaneous every 8 hours  hydroxychloroquine 200 milliGRAM(s) Oral daily  levETIRAcetam 500 milliGRAM(s) Oral two times a day  memantine 5 milliGRAM(s) Oral at bedtime  sodium bicarbonate 650 milliGRAM(s) Oral two times a day    MEDICATIONS  (PRN):  acetaminophen     Tablet .. 650 milliGRAM(s) Oral every 6 hours PRN Temp greater or equal to 38C (100.4F), Mild Pain (1 - 3)  aluminum hydroxide/magnesium hydroxide/simethicone Suspension 30 milliLiter(s) Oral every 4 hours PRN Dyspepsia  melatonin 3 milliGRAM(s) Oral at bedtime PRN Insomnia  ondansetron Injectable 4 milliGRAM(s) IV Push every 8 hours PRN Nausea and/or Vomiting    CAPILLARY BLOOD GLUCOSE        I&O's Summary    15 May 2023 07:01  -  16 May 2023 07:00  --------------------------------------------------------  IN: 0 mL / OUT: 2350 mL / NET: -2350 mL        PHYSICAL EXAM:  Vital Signs Last 24 Hrs  T(C): 36.7 (16 May 2023 05:15), Max: 37.3 (15 May 2023 20:15)  T(F): 98.1 (16 May 2023 05:15), Max: 99.1 (15 May 2023 20:15)  HR: 52 (16 May 2023 05:15) (52 - 69)  BP: 147/53 (16 May 2023 05:15) (133/58 - 174/77)  BP(mean): 77 (16 May 2023 05:15) (76 - 109)  RR: 17 (16 May 2023 05:15) (17 - 18)  SpO2: 99% (16 May 2023 05:15) (99% - 100%)    Parameters below as of 16 May 2023 05:15  Patient On (Oxygen Delivery Method): room air          LABS:                        9.1    2.93  )-----------( 147      ( 16 May 2023 06:55 )             27.5     05-16    142  |  118<H>  |  31<H>  ----------------------------<  93  4.6   |  20<L>  |  2.03<H>    Ca    9.1      16 May 2023 06:55  Phos  3.8     05-16  Mg     2.0     05-16                Culture - Blood (collected 14 May 2023 12:40)  Source: .Blood Blood  Preliminary Report (15 May 2023 19:02):    No growth to date.      SARS-CoV-2: NotDetec (12 May 2023 23:05)  SARS-CoV-2: NotDetec (30 Mar 2023 06:05)           Patient is a 83y old  Female who presents with a chief complaint of Generalized weakness     SUBJECTIVE / OVERNIGHT EVENTS: events noted. No new complaints        MEDICATIONS  (STANDING):  aspirin  chewable 81 milliGRAM(s) Oral daily  atorvastatin 20 milliGRAM(s) Oral at bedtime  cefTRIAXone   IVPB      cefTRIAXone   IVPB 2000 milliGRAM(s) IV Intermittent every 24 hours  folic acid 1 milliGRAM(s) Oral daily  gabapentin 100 milliGRAM(s) Oral three times a day  heparin   Injectable 5000 Unit(s) SubCutaneous every 8 hours  hydroxychloroquine 200 milliGRAM(s) Oral daily  levETIRAcetam 500 milliGRAM(s) Oral two times a day  memantine 5 milliGRAM(s) Oral at bedtime  sodium bicarbonate 650 milliGRAM(s) Oral two times a day    MEDICATIONS  (PRN):  acetaminophen     Tablet .. 650 milliGRAM(s) Oral every 6 hours PRN Temp greater or equal to 38C (100.4F), Mild Pain (1 - 3)  aluminum hydroxide/magnesium hydroxide/simethicone Suspension 30 milliLiter(s) Oral every 4 hours PRN Dyspepsia  melatonin 3 milliGRAM(s) Oral at bedtime PRN Insomnia  ondansetron Injectable 4 milliGRAM(s) IV Push every 8 hours PRN Nausea and/or Vomiting    CAPILLARY BLOOD GLUCOSE        I&O's Summary    15 May 2023 07:01  -  16 May 2023 07:00  --------------------------------------------------------  IN: 0 mL / OUT: 2350 mL / NET: -2350 mL        PHYSICAL EXAM:  Vital Signs Last 24 Hrs  T(C): 36.7 (16 May 2023 05:15), Max: 37.3 (15 May 2023 20:15)  T(F): 98.1 (16 May 2023 05:15), Max: 99.1 (15 May 2023 20:15)  HR: 52 (16 May 2023 05:15) (52 - 69)  BP: 147/53 (16 May 2023 05:15) (133/58 - 174/77)  BP(mean): 77 (16 May 2023 05:15) (76 - 109)  RR: 17 (16 May 2023 05:15) (17 - 18)  SpO2: 99% (16 May 2023 05:15) (99% - 100%)    Parameters below as of 16 May 2023 05:15  Patient On (Oxygen Delivery Method): room air        GEN: NAD; A and O x 3  LUNGS: CTA B/L  HEART: S1 S2  ABDOMEN: soft, non-tender, non-distended, + BS  : neves in place  EXTREMITIES: no edema      LABS:                        9.1    2.93  )-----------( 147      ( 16 May 2023 06:55 )             27.5     05-16    142  |  118<H>  |  31<H>  ----------------------------<  93  4.6   |  20<L>  |  2.03<H>    Ca    9.1      16 May 2023 06:55  Phos  3.8     05-16  Mg     2.0     05-16                Culture - Blood (collected 14 May 2023 12:40)  Source: .Blood Blood  Preliminary Report (15 May 2023 19:02):    No growth to date.      SARS-CoV-2: NotDetec (12 May 2023 23:05)  SARS-CoV-2: NotDetec (30 Mar 2023 06:05)

## 2023-05-16 NOTE — PROGRESS NOTE ADULT - ASSESSMENT
complete note to follow         Assessment and Recommendation:   · Assessment	  83 F w/pmh  significant for HTN, multiple myeloma, RA on ASA81, remote h/o CVA no residual , SAH, compression fractures, bladder incontinence, recent admission in last month for KAJAL and Klbesiella UTI who comes in due to fatigued and weakness , associated with hypotension to the eighties to 90s at home, and in ability to walk with the walker as is her baseline.    The patient initially thought it was attributed to anemia due to missed blood transfusion, and decided to come to the hospital. Pt herself denies any new symptoms, Hx obtained from the daughter at bedside, Also states that she has had some yellow discharge. Denies N/v, fever, chills, constipation or diarrhea, chest pain or shortness of breath    #Multiple Myeloma   #Normocytic Anemia  pt follows with Dr. Rosales, currently on pomolyst qod and dex 4mg 2x/week  pt poor historian, unclear if she f/u and is taking pomalyst  p/w fatigue and weakness and anorexia  with KAJAL and UTI  Hgb=7.5-->6.8->9.1, 2/p 1 unit PRBC  Cr=2.5  BCx: GNR (+) Kleb p.  pt had recent admission 6 weeks ago with same presentation  Rec's:  -Hold MM medication (pomolyst and dex) during admission  -etiology for KAJAL d/t UTI and poss d/t pomolyst  -PRBC transfusion if Hgb <7.0 or symptomatic  -Hgb improved to 9.1  -Anemia panel, Iron studies c/w ACD, TSH nl, others pending  -Daily CBC  -Nephrology consult   -Called pt's primary Oncologist and spoke with PA, pt was restarted on pomalyst every other day and was due for f/u last week, but pt canceled d/t dental issue. Dr. Rosales/PA agreed with holding Pomalyst during admission  -Consider Pall Care consult as this is a repeat admission, if pt clinical status does not improve she may not be a candidate for continue MM tx as per her primary Oncologist  -D/C pending neves, urinary retention  -pt to f/u with Dr. Rosales upon d/c     Thank you for the referral. Will continue to monitor the patient.  Please call with any questions [phone]  Above reviewed with Attending Dr. Srinath ZAMAN/[facility]  [address] [facility], [address], [place], [place]  [phone]  *Note not finalized until signed by Attending Physician         Assessment and Recommendation:   · Assessment	  83 F w/pmh  significant for HTN, multiple myeloma, RA on ASA81, remote h/o CVA no residual , SAH, compression fractures, bladder incontinence, recent admission in last month for KAJAL and Klbesiella UTI who comes in due to fatigued and weakness , associated with hypotension to the eighties to 90s at home, and in ability to walk with the walker as is her baseline.    The patient initially thought it was attributed to anemia due to missed blood transfusion, and decided to come to the hospital. Pt herself denies any new symptoms, Hx obtained from the daughter at bedside, Also states that she has had some yellow discharge. Denies N/v, fever, chills, constipation or diarrhea, chest pain or shortness of breath    #Multiple Myeloma   #Normocytic Anemia  pt follows with Dr. Rosales, currently on pomolyst qod and dex 4mg 2x/week  pt poor historian, unclear if she f/u and is taking pomalyst  p/w fatigue and weakness and anorexia  with KAJAL and UTI, bacteremia  Hgb=7.5-->6.8->9.1, 2/p 1 unit PRBC  Cr=2.5  BCx: GNR (+) Kleb p.  pt had recent admission 6 weeks ago with same presentation  Rec's:  etiology Multifactorial: MM/Bacteremia/UTI/KAJAL  -Hold MM medication (pomolyst and dex) during admission  -etiology for KAJAL d/t UTI and poss d/t pomolyst  -PRBC transfusion if Hgb <7.0 or symptomatic  -Hgb improved to 9.1  -Anemia panel, Iron studies c/w ACD, TSH nl, others pending  -Daily CBC  -Nephrology consult   -Called pt's primary Oncologist and spoke with PA, pt was restarted on pomalyst every other day and was due for f/u last week, but pt canceled d/t dental issue. Dr. Rosales/PA agreed with holding Pomalyst during admission  -Consider Pall Care consult as this is a repeat admission, if pt clinical status does not improve she may not be a candidate for continue MM tx as per her primary Oncologist  -D/C pending neves, urinary retention  -pt to f/u with Dr. Rosales upon d/c     Thank you for the referral. Will continue to monitor the patient.  Please call with any questions [phone]  Above reviewed with Attending Dr. Srinath ZAMAN/[facility]  [address] [facility], [address], [place], [place]  [phone]  *Note not finalized until signed by Attending Physician         Assessment and Recommendation:   · Assessment	  83 F w/pmh  significant for HTN, multiple myeloma, RA on ASA81, remote h/o CVA no residual , SAH, compression fractures, bladder incontinence, recent admission in last month for KAJAL and Klbesiella UTI who comes in due to fatigued and weakness , associated with hypotension to the eighties to 90s at home, and in ability to walk with the walker as is her baseline.    The patient initially thought it was attributed to anemia due to missed blood transfusion, and decided to come to the hospital. Pt herself denies any new symptoms, Hx obtained from the daughter at bedside, Also states that she has had some yellow discharge. Denies N/v, fever, chills, constipation or diarrhea, chest pain or shortness of breath    #Multiple Myeloma   #Normocytic Anemia  pt follows with Dr. Rosales, currently on pomalyst qod and dex 4mg 2x/week  pt poor historian, unclear if she f/u and is taking pomalyst  p/w fatigue and weakness and anorexia  with KAJAL and UTI, bacteremia  Hgb=7.5-->6.8->9.1, 2/p 1 unit PRBC  Cr=2.5  BCx: GNR (+) Kleb p.  pt had recent admission 6 weeks ago with same presentation  Rec's:  etiology Multifactorial: MM/Med SE/Bacteremia/UTI/KAJAL  -Hold MM medication (pomolyst and dex) during admission  -etiology for KAJAL d/t UTI and poss d/t pomolyst  -PRBC transfusion if Hgb <7.0 or symptomatic  -Hgb improved to 9.1  -Anemia panel, Iron studies c/w ACD, TSH nl, others pending  -Daily CBC  -Nephrology consult   -Called pt's primary Oncologist and spoke with PA, pt was restarted on pomalyst every other day and was due for f/u last week, but pt canceled d/t dental issue. Dr. Rosales/PA agreed with holding Pomalyst during admission  -Consider Pall Care consult as this is a repeat admission, if pt clinical status does not improve she may not be a candidate for continue MM tx as per her primary Oncologist  -D/C pending neves, urinary retention  -pt to f/u with Dr. Rosales upon d/c     Thank you for the referral. Will continue to monitor the patient.  Please call with any questions [phone]  Above reviewed with Attending Dr. Srinath ZAMAN/[facility]  [address] [facility], [address], [place], [place]  [phone]  *Note not finalized until signed by Attending Physician

## 2023-05-16 NOTE — PROGRESS NOTE ADULT - ASSESSMENT
# KAJAL in a patient with underlying CKD.  KAJAL sec to a pre-renal state from sepsis with klebsiella bacteremia  s/p PRBC transfusion as with severe anemia .  improved renal function   H/O multiple myeloma.  on NAHC03 tabs. serum bicarb 20  daily BMP

## 2023-05-16 NOTE — PROGRESS NOTE ADULT - PROBLEM SELECTOR PLAN 4
voids frequently but with distended bladder  -26hrs bladder scans revealed -400ml  -Zapata placed per attending voids frequently but with distended bladder  -q6hrs bladder scans revealed -400ml  -Zapata placed per attending on 5/15  -Zapata removed in preparation for discharge  -f/u PVR

## 2023-05-16 NOTE — PROGRESS NOTE ADULT - PROBLEM SELECTOR PROBLEM 2
Patient called stating that she was told by the pharmacy that her fax has been sent to out [place] location. Patient would like a call to confirm our office has received it.       Please advise
Sepsis
Bacteremia due to Klebsiella pneumoniae
Sepsis
Bacteremia due to Klebsiella pneumoniae

## 2023-05-16 NOTE — PROGRESS NOTE ADULT - NUTRITIONAL ASSESSMENT
This patient has been assessed with a concern for Malnutrition and has been determined to have a diagnosis/diagnoses of Moderate protein-calorie malnutrition.    This patient is being managed with:   Diet Regular-  Consistent Carbohydrate {Evening Snacks}  DASH/TLC {Sodium & Cholesterol Restricted}  Supplement Feeding Modality:  Oral  Glucerna Shake Cans or Servings Per Day:  1       Frequency:  Two Times a day  Entered: May 15 2023  3:44PM

## 2023-05-16 NOTE — PROGRESS NOTE ADULT - PROBLEM SELECTOR PLAN 10
Pt. is from home, lives with daughter and HHA  Disposition pending PT eval and pt./family preference  f/u PT reccs  Declined DEV on previous admission  CM following Pt. is from home, lives with daughter and HHA  PT recc home with PT  Declined DEV on previous admission  CM following  May require replacement of neves and discharge with neves.

## 2023-05-16 NOTE — PROGRESS NOTE ADULT - SUBJECTIVE AND OBJECTIVE BOX
NP Note discussed with  Primary Attending    Patient is a 83y old  Female who presents with a chief complaint of Generalized weakness (16 May 2023 11:41)      INTERVAL HPI/OVERNIGHT EVENTS: no new complaints    MEDICATIONS  (STANDING):  aspirin  chewable 81 milliGRAM(s) Oral daily  atorvastatin 20 milliGRAM(s) Oral at bedtime  cefTRIAXone   IVPB 2000 milliGRAM(s) IV Intermittent every 24 hours  cefTRIAXone   IVPB      folic acid 1 milliGRAM(s) Oral daily  gabapentin 100 milliGRAM(s) Oral three times a day  heparin   Injectable 5000 Unit(s) SubCutaneous every 8 hours  hydroxychloroquine 200 milliGRAM(s) Oral daily  levETIRAcetam 500 milliGRAM(s) Oral two times a day  memantine 5 milliGRAM(s) Oral at bedtime  sodium bicarbonate 650 milliGRAM(s) Oral two times a day    MEDICATIONS  (PRN):  acetaminophen     Tablet .. 650 milliGRAM(s) Oral every 6 hours PRN Temp greater or equal to 38C (100.4F), Mild Pain (1 - 3)  aluminum hydroxide/magnesium hydroxide/simethicone Suspension 30 milliLiter(s) Oral every 4 hours PRN Dyspepsia  melatonin 3 milliGRAM(s) Oral at bedtime PRN Insomnia  ondansetron Injectable 4 milliGRAM(s) IV Push every 8 hours PRN Nausea and/or Vomiting      __________________________________________________  REVIEW OF SYSTEMS:    CONSTITUTIONAL: No fever,   EYES: no acute visual disturbances  NECK: No pain or stiffness  RESPIRATORY: No cough; No shortness of breath  CARDIOVASCULAR: No chest pain, no palpitations  GASTROINTESTINAL: No pain. No nausea or vomiting; No diarrhea   NEUROLOGICAL: No headache or numbness, no tremors  MUSCULOSKELETAL: No joint pain, no muscle pain  GENITOURINARY: no dysuria, no frequency, no hesitancy  PSYCHIATRY: no depression , no anxiety  ALL OTHER  ROS negative        Vital Signs Last 24 Hrs  T(C): 37.1 (16 May 2023 14:12), Max: 37.3 (15 May 2023 20:15)  T(F): 98.7 (16 May 2023 14:12), Max: 99.1 (15 May 2023 20:15)  HR: 63 (16 May 2023 14:12) (52 - 69)  BP: 169/65 (16 May 2023 14:12) (133/58 - 169/65)  BP(mean): 77 (16 May 2023 05:15) (76 - 77)  RR: 18 (16 May 2023 14:12) (17 - 18)  SpO2: 100% (16 May 2023 14:12) (99% - 100%)    Parameters below as of 16 May 2023 14:12  Patient On (Oxygen Delivery Method): room air        ________________________________________________  PHYSICAL EXAM:  well developed  GENERAL: NAD  HEENT: Normocephalic;  conjunctivae and sclerae clear; moist mucous membranes;   NECK : supple  CHEST/LUNG: Clear to auscultation bilaterally with good air entry   HEART: S1 S2  regular; no murmurs, gallops or rubs  ABDOMEN: Soft, Nontender, Nondistended; Bowel sounds present  EXTREMITIES: no cyanosis; no edema; no calf tenderness  SKIN: warm and dry; no rash  NERVOUS SYSTEM:  Awake and alert; Oriented  to place, person and time ; no new deficits    _________________________________________________  LABS:                        9.1    2.93  )-----------( 147      ( 16 May 2023 06:55 )             27.5     05-16    142  |  118<H>  |  31<H>  ----------------------------<  93  4.6   |  20<L>  |  2.03<H>    Ca    9.1      16 May 2023 06:55  Phos  3.8     05-16  Mg     2.0     05-16      CAPILLARY BLOOD GLUCOSE    RADIOLOGY & ADDITIONAL TESTS:    < from: Xray Chest 1 View- PORTABLE-Urgent (Xray Chest 1 View- PORTABLE-Urgent .) (05.12.23 @ 22:52) >    ACC: [id number] EXAM:  XR CHEST PORTABLE URGENT 1V   ORDERED BY: ASHLEIGH GREEN     PROCEDURE DATE:  05/12/2023          INTERPRETATION:  TECHNIQUE: A single AP view of the chest was obtained.   Ordered time:   5/12/2023 10:52 PM    COMPARISON: 11//2022    CLINICAL INFORMATION: Fever    FINDINGS:    The heart is not well assessed on an AP film.  Multiple calcified granulomas again seen scattered throughout both lungs,   right greater than left. No focal consolidation.  There are no pleural effusions.  There is no pneumothorax.    IMPRESSION:    No acute pulmonary disease    < end of copied text >      < from: CT Head No Cont (05.12.23 @ 21:37) >    ACC: [id number] EXAM:  CT BRAIN   ORDERED BY: NACHO CRAIN     PROCEDURE DATE:  05/12/2023          INTERPRETATION:  Brain CT    Indication: Weakness    Technique: Axial images were obtained, along with reformatted coronal and   sagittal images.    Comparison:  CT of November 4, 2022    FINDINGS:    As seen on prior studies of 4/12/22, there is hyperattenuation in the   left posterior occipital sulci.  There is no intracranial mass effect,   midline shift or large acute cortical infarct.    Gray-white differentiation is maintained.  There are age appropriate   involutional changes with commensurate dilatation of the CSF spaces.    There are subcortical and periventricular white matter lucencies likely   representing microvascular ischemicdisease.    The mastoid air cells and visualized paranasal sinuses are well aerated.    IMPRESSION:  No significant interval change compared to the prior CT of 4/12/22.  Redemonstration of left posterior occipital sulcal hyperattenuation,   which mayrepresent dystrophic calcification.  If clinically indicated,   further evaluation with follow up CT or MRI may be obtained for further   evaluation.  No mass effect or large acute cortical infarct.    Findings discussed with Dr. YNES Green at 10:12 pm on 5/12/23 with RBV.    --- End of Report ---    < end of copied text >      Imaging Personally Reviewed:  YES/NO    Consultant(s) Notes Reviewed:   YES/ No    Care Discussed with Consultants :     Plan of care was discussed with patient and /or primary care giver; all questions and concerns were addressed and care was aligned with patient's wishes.

## 2023-05-16 NOTE — PROGRESS NOTE ADULT - PROBLEM SELECTOR PLAN 1
Sepsis 2/2 UTI   Generalized weakness   Associated with hypotension, tachycardia, leukopenia, and fever  Elevated lactate  Will continue services work up, UA +ve   Status post Vanc and  cefepime in the ED  Status post 1 L NS well as   Trend lactate  F/u urine, and blood cultures  F/u x-ray official read  Maintain hydration
p/w generalized weakness associated with hypotension, tachycardia, leukopenia,  fever and elevated lactate meeting sepsis criteria  -UA+, urine culture grew Klebsiella pneumoniae  -Cont Ceftriaxone 2gm q 24hrs  -Maintain hydration
Sepsis 2/2 UTI   Generalized weakness   Associated with hypotension, tachycardia, leukopenia, and fever  Elevated lactate  Will continue services work up, UA +ve   Status post Vanc and  cefepime in the ED  Status post 1 L NS well as   Trend lactate  F/u urine, and blood cultures  F/u x-ray official read  Maintain hydration
p/w generalized weakness associated with hypotension, tachycardia, leukopenia,  fever and elevated lactate meeting sepsis criteria  -UA+, urine culture grew Klebsiella pneumoniae  -Cont Ceftriaxone 2gm q 24hrs  -Maintain hydration

## 2023-05-16 NOTE — PROGRESS NOTE ADULT - PROBLEM SELECTOR PLAN 5
in setting of bacteremia  -Known to have CKD3  -p/w SCr 3.54  -Improving with hydration and bacteremia mgnt, now Scr 2.52, likely base line  -Nephro Dr. Deutsch following  -Avoid nephrotoxins in setting of bacteremia  -Known to have CKD3  -p/w SCr 3.54  -Improving with hydration and bacteremia mgnt, now Scr 2.03, likely base line  -Nephro Dr. Deutsch following  -Avoid nephrotoxins

## 2023-05-16 NOTE — PROGRESS NOTE ADULT - PROBLEM SELECTOR PLAN 6
[facility]  -Cont home meds

## 2023-05-17 NOTE — PROGRESS NOTE ADULT - SUBJECTIVE AND OBJECTIVE BOX
Patient is a 83y old  Female who presents with a chief complaint of Generalized weakness     SUBJECTIVE / OVERNIGHT EVENTS:        MEDICATIONS  (STANDING):  aspirin  chewable 81 milliGRAM(s) Oral daily  atorvastatin 20 milliGRAM(s) Oral at bedtime  cefTRIAXone   IVPB      cefTRIAXone   IVPB 2000 milliGRAM(s) IV Intermittent every 24 hours  folic acid 1 milliGRAM(s) Oral daily  gabapentin 100 milliGRAM(s) Oral three times a day  heparin   Injectable 5000 Unit(s) SubCutaneous every 8 hours  hydroxychloroquine 200 milliGRAM(s) Oral daily  levETIRAcetam 500 milliGRAM(s) Oral two times a day  memantine 5 milliGRAM(s) Oral at bedtime  sodium bicarbonate 650 milliGRAM(s) Oral two times a day    MEDICATIONS  (PRN):  acetaminophen     Tablet .. 650 milliGRAM(s) Oral every 6 hours PRN Temp greater or equal to 38C (100.4F), Mild Pain (1 - 3)  aluminum hydroxide/magnesium hydroxide/simethicone Suspension 30 milliLiter(s) Oral every 4 hours PRN Dyspepsia  melatonin 3 milliGRAM(s) Oral at bedtime PRN Insomnia  ondansetron Injectable 4 milliGRAM(s) IV Push every 8 hours PRN Nausea and/or Vomiting    CAPILLARY BLOOD GLUCOSE        I&O's Summary    16 May 2023 07:01  -  17 May 2023 07:00  --------------------------------------------------------  IN: 0 mL / OUT: 1170 mL / NET: -1170 mL        PHYSICAL EXAM:  Vital Signs Last 24 Hrs  T(C): 36.6 (17 May 2023 05:10), Max: 37.2 (16 May 2023 20:15)  T(F): 97.8 (17 May 2023 05:10), Max: 99 (16 May 2023 20:15)  HR: 63 (17 May 2023 05:10) (63 - 67)  BP: 127/62 (17 May 2023 05:10) (127/62 - 169/65)  BP(mean): 78 (17 May 2023 05:10) (78 - 80)  RR: 18 (17 May 2023 05:10) (18 - 18)  SpO2: 100% (17 May 2023 05:10) (99% - 100%)    Parameters below as of 17 May 2023 05:10  Patient On (Oxygen Delivery Method): room air        LABS:                        10.0   2.94  )-----------( 150      ( 17 May 2023 07:05 )             29.5     05-17    140  |  116<H>  |  32<H>  ----------------------------<  95  4.4   |  20<L>  |  1.95<H>    Ca    9.3      17 May 2023 07:05  Phos  3.8     05-16  Mg     2.0     05-16                Culture - Blood (collected 15 May 2023 07:11)  Source: .Blood Blood  Preliminary Report (16 May 2023 19:01):    No growth to date.    Culture - Blood (collected 14 May 2023 12:40)  Source: .Blood Blood  Preliminary Report (15 May 2023 19:02):    No growth to date.      SARS-CoV-2: NotDetec (12 May 2023 23:05)  SARS-CoV-2: NotDetec (30 Mar 2023 06:05)           Patient is a 83y old  Female who presents with a chief complaint of Generalized weakness     SUBJECTIVE / OVERNIGHT EVENTS: events noted. No new complaints. Discussed anemia and EPO level and for her to d/w Dr. Rosales for poss tx with procrit        MEDICATIONS  (STANDING):  aspirin  chewable 81 milliGRAM(s) Oral daily  atorvastatin 20 milliGRAM(s) Oral at bedtime  cefTRIAXone   IVPB      cefTRIAXone   IVPB 2000 milliGRAM(s) IV Intermittent every 24 hours  folic acid 1 milliGRAM(s) Oral daily  gabapentin 100 milliGRAM(s) Oral three times a day  heparin   Injectable 5000 Unit(s) SubCutaneous every 8 hours  hydroxychloroquine 200 milliGRAM(s) Oral daily  levETIRAcetam 500 milliGRAM(s) Oral two times a day  memantine 5 milliGRAM(s) Oral at bedtime  sodium bicarbonate 650 milliGRAM(s) Oral two times a day    MEDICATIONS  (PRN):  acetaminophen     Tablet .. 650 milliGRAM(s) Oral every 6 hours PRN Temp greater or equal to 38C (100.4F), Mild Pain (1 - 3)  aluminum hydroxide/magnesium hydroxide/simethicone Suspension 30 milliLiter(s) Oral every 4 hours PRN Dyspepsia  melatonin 3 milliGRAM(s) Oral at bedtime PRN Insomnia  ondansetron Injectable 4 milliGRAM(s) IV Push every 8 hours PRN Nausea and/or Vomiting    CAPILLARY BLOOD GLUCOSE        I&O's Summary    16 May 2023 07:01  -  17 May 2023 07:00  --------------------------------------------------------  IN: 0 mL / OUT: 1170 mL / NET: -1170 mL        PHYSICAL EXAM:  Vital Signs Last 24 Hrs  T(C): 36.6 (17 May 2023 05:10), Max: 37.2 (16 May 2023 20:15)  T(F): 97.8 (17 May 2023 05:10), Max: 99 (16 May 2023 20:15)  HR: 63 (17 May 2023 05:10) (63 - 67)  BP: 127/62 (17 May 2023 05:10) (127/62 - 169/65)  BP(mean): 78 (17 May 2023 05:10) (78 - 80)  RR: 18 (17 May 2023 05:10) (18 - 18)  SpO2: 100% (17 May 2023 05:10) (99% - 100%)    Parameters below as of 17 May 2023 05:10  Patient On (Oxygen Delivery Method): room air      GEN: NAD; A and O x 3  LUNGS: CTA B/L  HEART: S1 S2  ABDOMEN: soft, non-tender, non-distended, + BS  : neves in place  EXTREMITIES: no edema      LABS:                        10.0   2.94  )-----------( 150      ( 17 May 2023 07:05 )             29.5     05-17    140  |  116<H>  |  32<H>  ----------------------------<  95  4.4   |  20<L>  |  1.95<H>    Ca    9.3      17 May 2023 07:05  Phos  3.8     05-16  Mg     2.0     05-16                Culture - Blood (collected 15 May 2023 07:11)  Source: .Blood Blood  Preliminary Report (16 May 2023 19:01):    No growth to date.    Culture - Blood (collected 14 May 2023 12:40)  Source: .Blood Blood  Preliminary Report (15 May 2023 19:02):    No growth to date.      SARS-CoV-2: NotDetec (12 May 2023 23:05)  SARS-CoV-2: NotDetec (30 Mar 2023 06:05)

## 2023-05-17 NOTE — PROGRESS NOTE ADULT - ASSESSMENT
complete note to follow    #VTE Prophylaxis   Assessment and Plan:   · Assessment	    83 F w/pmh  significant for HTN, multiple myeloma, RA on ASA81, remote h/o CVA no residual , SAH, compression fractures, bladder incontinence, recent admission in last month for KAJAL and Klbesiella UTI who comes in due to fatigued and weakness , associated with hypotension to the eighties to 90s at home, and in ability to walk with the walker as is her baseline.    The patient initially thought it was attributed to anemia due to missed blood transfusion, and decided to come to the hospital. Pt herself denies any new symptoms, Hx obtained from the daughter at bedside, Also states that she has had some yellow discharge. Denies N/v, fever, chills, constipation or diarrhea, chest pain or shortness of breath    #Multiple Myeloma   #Normocytic Anemia  pt follows with Dr. Rosales, currently on pomalyst qod and dex 4mg 2x/week  pt poor historian, unclear if she f/u and is taking pomalyst  p/w fatigue and weakness and anorexia  with KAJAL and UTI, bacteremia  s/p 1 unit PRBC  Cr improving  BCx: GNR (+) Kleb p.  pt had recent admission 6 weeks ago with same presentation  Rec's:  etiology Multifactorial: MM/Med SE/Bacteremia/UTI/KAJAL/RA  -Hold MM medication (pomolyst and dex) during admission  -etiology for KAJAL d/t UTI and poss d/t pomolyst  -PRBC transfusion if Hgb <7.0 or symptomatic  -Hgb improved to 10  -Anemia panel, Iron studies c/w ACD, B12/folate/TSH nl, EPO low at 22  -pt may be a candidate for procrit, rec pt to d/w Dr. Rosales  -Daily CBC  -Nephrology following  -spoke with pt's primary Oncologist and spoke with PA, pt was restarted on pomalyst every other day and was due for f/u last week, but pt canceled d/t dental issue. Dr. Rosales/PA agreed with holding Pomalyst during admission  -Consider Pall Care consult as this is a repeat admission, if pt clinical status does not improve she may not be a candidate for continue MM tx as per her primary Oncologist  -D/C planned for today  -advised pt to f/u with Dr. Rosales within a week of d/c    Thank you for the referral. Will continue to monitor the patient.  Please call with any questions [phone]  Above reviewed with Attending Dr. Keith ZAMAN/[facility]  [address] [facility], [address], [place], [place]  [phone]  *Note not finalized until signed by Attending Physician

## 2023-05-17 NOTE — PROGRESS NOTE ADULT - PROVIDER SPECIALTY LIST ADULT
Heme/Onc
Heme/Onc
Nephrology
Nephrology
Internal Medicine

## 2023-05-17 NOTE — PROGRESS NOTE ADULT - NS ATTEND AMEND GEN_ALL_CORE FT
CC: MM  HPI: 83 F w/ PMH HTN, multiple myeloma, RA, CVA no residual , SAH, compression fractures, bladder incontinence p/w sepsis.  INTERVAL HPI: Patient seen and examined at bedside; Events noted; Patient feels improved      Vital Signs Last 24 Hrs  T(C): 36.9 (17 May 2023 13:20), Max: 37.2 (16 May 2023 20:15)  T(F): 98.5 (17 May 2023 13:20), Max: 99 (16 May 2023 20:15)  HR: 76 (17 May 2023 13:20) (63 - 76)  BP: 112/69 (17 May 2023 13:20) (112/69 - 150/60)  BP(mean): 78 (17 May 2023 05:10) (78 - 80)  RR: 18 (17 May 2023 13:20) (18 - 18)  SpO2: 100% (17 May 2023 13:20) (99% - 100%)    Parameters below as of 17 May 2023 13:20  Patient On (Oxygen Delivery Method): room air      _________________  PHYSICAL EXAM:  ---------------------------  GEN: NAD; NC/AT; A and O x 3  HEENT: PERRLA; EOMI  LUNGS: no wheezing; decreased bilateral air entry; no use of accessory muscles for breathing  HEART: Nl S1 S2; no M   ABDOMEN: Soft, Nontender, non distended  EXTREMITIES: no cyanosis; no edema; warm and dry  NERVOUS SYSTEM:  Awake and alert; no focal neuro  deficits    _________________________________________________  LABS:                        10.0   2.94  )-----------( 150      ( 17 May 2023 07:05 )             29.5     05-17    140  |  116<H>  |  32<H>  ----------------------------<  95  4.4   |  20<L>  |  1.95<H>    Ca    9.3      17 May 2023 07:05  Phos  3.8     05-16  Mg     2.0     05-16    A/P    Problem #1 MM - pt under care of Dr. Rosales; plan to resume care as outpt    Problem #2 ID - improved w/ abx; stable for DC    I have examined the patient at bedside and reviewed patient's data and participated in the management of the patient along with Bella Flores well as hemotology/med oncology faculty consisting of Dr. CHARLEY Guzmán, Dr. Gavin Yo, Dr. Suma Cassidy, Dr. Cole Marin as well as myself during the daily heme/onc case review. I reviewed pertinent clinical information, PE,  labs as well as A/P as outline above.     Call with questions [phone]    Hitesh Parker MD
pt seen and examined by ACP. Chart reviewed and case d/w ACP. 83y female with prior Dx MM and on treatment with another oncologist and significant anemia requiring RBC admit for urosepsis  with hypotension and weakness. She was admit for similar reason 6wks ago. Her Hb 6.0>9.0 after 2 units of RBC. Unable to tell the followup with her oncologist after last admission. Need more information. Her blood grows gram negative gonzalo.  pt is on iv antibiotics need ID consult for duration of antibiotics. She also has chronic renal failure with stable creatinine level. Her anemia is likely due to multifactorial. She is a candidate for procrit if iron saturation > 20%. RBC supportive transfusion for Hb <7  as needed. I will be off service and Dr. Parker will take over her care.
#Acute UTI   #Urinary retention   #Bladder incontinence  #Multiple myeloma on treatment  #Normocytic anemia   #CKD - III  #RA   #Hx CVA  #HTN    83yF with PMH of multiple myeloma, RA, Hx of CVA, compression fracture, bladder incontinence, recently admitted from 03/30-04/04 for KAJAL on CKD and Klebsiella UTI who presented with fatigue and weakness, admitted for Klebsiella bacteremia.     -Ceftriaxone 2g IV - day   -TOV today  -  -PT rec: home therapy; on previous admission, was recommended for DEV, however patient preferred to return home  -Heme/onc following; MM treatment will be held during admission  -SW consulted, patient with HHA   -F/U UCx, BCx   -DVT ppx: Heparin

## 2023-05-17 NOTE — DISCHARGE NOTE NURSING/CASE MANAGEMENT/SOCIAL WORK - PATIENT PORTAL LINK FT
You can access the FollowMyHealth Patient Portal offered by [facility] by registering at the following website: [email or web address]. By joining [facility]’s FollowMyHealth portal, you will also be able to view your health information using other applications (apps) compatible with our system.

## 2023-05-17 NOTE — DISCHARGE NOTE NURSING/CASE MANAGEMENT/SOCIAL WORK - NSDCVIVACCINE_GEN_ALL_CORE_FT
COVID-19 vaccine, vector-nr, [id number], PF, 0.5 mL ([employer]); 29-Mar-2021 13:11; Maria D Hightower (Student, Nursing); [employer]; [id number] (Exp. Date: 29-Mar-2021); IntraMuscular; Deltoid Right.; 0.5 milliLiter(s);

## 2023-06-28 NOTE — REVIEW OF SYSTEMS
[Fatigue] : fatigue [Joint Pain] : joint pain [Joint Stiffness] : joint stiffness [Skin Wound] : skin wound [Difficulty Walking] : difficulty walking [Negative] : Allergic/Immunologic [Fever] : no fever [Chills] : no chills [Night Sweats] : no night sweats [Recent Change In Weight] : ~T no recent weight change [Eye Pain] : no eye pain [Red Eyes] : eyes not red [Dry Eyes] : no dryness of the eyes [Vision Problems] : vision problems [Dysphagia] : no dysphagia [Loss of Hearing] : no loss of hearing [Nosebleeds] : no nosebleeds [Hoarseness] : no hoarseness [Odynophagia] : no odynophagia [Mucosal Pain] : no mucosal pain [Chest Pain] : no chest pain [Palpitations] : no palpitations [Leg Claudication] : no intermittent leg claudication [Lower Ext Edema] : no lower extremity edema [Cough] : no cough [Abdominal Pain] : no abdominal pain [Vomiting] : no vomiting [Skin Rash] : no skin rash [Dizziness] : no dizziness [Fainting] : no fainting [FreeTextEntry3] : Left cataract  [FreeTextEntry4] : Loose teeth [FreeTextEntry5] : bilateral LE varicose veins [FreeTextEntry9] : stable generalized arthritic. Pain of  lower back. Back brace on.  [de-identified] : Stage two pressure ulcer of sacrum [de-identified] : In a wheelchair today,Occasional confusion

## 2023-06-28 NOTE — ASSESSMENT
[Supportive] : Goals of care discussed with patient: Supportive [FreeTextEntry1] : Ms. James is an 83 year old female with history of MM, RA, HTN, TIA. s/p treatment as per HPI...was  on observation...IGG 3370 (8/15/17). 2018 to 2020 treated again with VD.\par \par Admitted 4/13/22- 4/19/22.\par \par Was hospitalized 11/4/22-11/9/22 for urinary retention/confusion and was d/[facility] to [facility];at home now\par  \par 1) MM\par Patient was on velcade 3 weeks on, 1 week off. Treatment was held and then discontinued b/c of neuropathy. \par Back on pomalyst 2 mg every other day . IgG rising with drug holiday..\par Pomalyst application filled out and patient  started  pomalyst 2 mg every other day. Side effects explained to patient and family. \par Currently on Pomalyst 2 mg daily and dexamethasone 4 mg twice a week. Must follow glucose..given anemia will change pomalyst to QOD...pt is responding to treatment with decrease in mspike\par \par - Currently on pomalyst  2 mg every other day. Continue aspirin 81 mg daily. Continue dexamethasone twice  a week. \par \par 2) Heme\par Counts stable. No indication for transfusion today.\par 6/26/23: WBC 3.98  H/H 9.5/29.2    ANC 1.63\par Continue folic acid 1 mg daily\par \par 3) HTN\par Continue amlodipine 2.5 mg daily\par Continue atorvastatin 20 mg daily\par \par 4) Pain- \par MRI Spine on 4/15/22- Multiple thoracic/lumbar vertebral body fractures \par Continue to follow up with neurosurgeon \par Continue Gabapentin 200 mg BID\par Tylenol prn for pain\par Oxycodone 2.5 mg every 4 hours prn for pain\par Lidocaine 4% topical patch- apply 1 patch by topical route once daily to lower back for 12 hours\par Wear TLSO brace when OOB\par Continue  home physical therapy\par Arben lift ordered in the past \par Questions and concerns addressed. Reassurance provided.\par \par 5) Uncontrolled type 2 diabetes mellitus with hyperglycemia; suspect at least in part secondary to Dex \par Insulin Glargine-yfgn- Inject 5 units SQ daily at bedtime\par Humalog kwikPen- Inject 3 units SQ daily before breakfast, inject 2 units SQ daily before lunch\par No longer on insulin per daughter\par \par 6)GI\par Continue:\par Protonix 40 mg daily\par Senna 8.6 mg- take two tablets daily at bedtime prn \par \par 7) Other\par Rheumatoid arthritis; continue hydroxychloroquine daily\par Continue Keppra 500 mg tablet- take one tablet by mouth twice a day\par Continue melatonin 3 mg tablet- take one tablet once daily at bedtime\par Continue Memantine 5 mg daily\par Pressure ulcer- applying barrier cream. \par Assist with mobility\par \par 8) \par Patient advised to contact immediately with any concerns or symptoms\par Continue home PT\par Follow up with PCP on 3/17/23\par Follow up  in 6 weeks with Dr Rosales \par Discussed gravity of situation and continued decline of pt's condition..MOLST form provided, advanced directives discussed\par

## 2023-06-28 NOTE — PHYSICAL EXAM
[Capable of only limited self care, confined to bed or chair more than 50% of waking hours] : Status 3- Capable of only limited self care, confined to bed or chair more than 50% of waking hours [Normal] : no peripheral adenopathy appreciated [de-identified] : Stage two pressure ulcer of sacrum

## 2023-06-28 NOTE — HISTORY OF PRESENT ILLNESS
[de-identified] : Multiple Myeloma s/p Thal/ Dex 2008 to 2010.....TIA in 2009.....Velcade 2012 to 2013...treatment held due to neuropathy..resumed b/c of POD...tolerating thus far [de-identified] : Patient presents for a follow-up visit.  She is ambulating with a cane. Her son is with her...She was hospitalized and recently discharged from rehab s/p infectious complications. New vertebral compression fx...still with painShe reports a healthy appetite. She notes varicose veins bilaterally in her LE and generalized arthritic pain which is stable. She notes some stiffness in hands bilaterally.  She states she followed up with neurology for issues with her discs. She states her hip pain is stable. She off  Velcade three weeks on and 1 week off with Decadron. Denies fever/chills, night sweats, mouth sores, eye dryness, blurred vision, nausea, vomiting, diarrhea. No CP, SOB or LE edema. \par \par On 8/19//21, patient presents for a follow up visit. Overall patient is tolerating treatment.. and offers no acute concerns aside from continues low back pain similar to when she was hospitalized... Has generalized arthritic pain which is stable. Denies fever, chills, nausea, vomiting, diarrhea, rash, mouth sores, dysuria or any signs of active bleeding. Denies SOB, chest pain or B/L LE edema. Daughter  on telephone during today's visit. \par \par 2/16/22 Here with aide.fer on phone...back pain on and off..difficulty raising head up fully...using tylenol and lidocaine patch..tolerating pom dex...improved spep...\par \par Admitted 4/13/22- 4/19/22. See discharge summary below:\par \par 82 F w / pmh x significant for HTN, multiple myeloma, RA on ASA81, remote h/o CVA no residual , p/w AMS to [facility] , found to have SAH t/f to [facility]. Also with t-spine fracture on MRI, pursuing conservative management with TLSO and plan to re-assess outpt. \par \par Fracture of thoracic spine; unstable 3 column horizontal T10 spinal fracture nsgy seen and followed  - plan for conservative orthotics management for now, potential surgery in future outpt follow up; multimodal pain control. \par \par SAH (subarachnoid hemorrhage); Repeat CTH stable, MR without additional concerns. EEG neg for sz activity. Neuro & NSGY followed; still concern SAH-related seizure, vs contribution of hyperglycemia to neuro symptoms, they are now resolving. \par - BP control; continue keppra, hold ASA and lovenox for now \par Seizure precautions - speech and swallow \par \par Altered mental state;  transient likely seizure in setting of SAH poss seizure, and hyperglycemia as above - now resolved. \par Uncontrolled type 2 diabetes mellitus with hyperglycemia; suspect at least in part secondary to Dex , A1c is >11 \par - s/p IV fluid bolus; basal/bolus insulin w coverage sliding scale \par Seen and followed by endo.; f/u mgmt given MM tx involving high dose steroid \par and plan for f/u with endocrine \par Rheumatoid arthritis; continue hydroxychloroquine \par KAJAL on CKD - sCr overall stable. \par Multiple myeloma; heme onc followed \par HTN (hypertension); continue amlodipine - continue statin; improved \par Pt. had last BM reported 4/17 and continue miralax & senna. Discharge planning; dispo to [facility] on 4/19 (d/w Dr. De La Vega) \par \par On 5/19/22, patient presents for a follow up visit. Patient is currently in rehab and is accompanied by health aide today. Daughter on speaker phone during today's visit. Angelika is in a wheelchair and has a back brace on. Recently admitted 4/13/22- 4/19/22. Overall well and offers no acute concerns. On pomalyst every other day. Denies fever, chills, nausea, vomiting, diarrhea, rash, mouth sores, dysuria or any signs of active bleeding. Denies SOB, chest pain or B/L LE Edema. \par \par 2/16/23: Patient presents with daughter Binta and grand daughter...She is in a wheelchair...She is confused at times,has back pain and difficulty walking.She uses a walker to walk and at times she cannot lift herself up or walk.She has a pressure ulcer on sacrum which developed when she was in the rehab.Denies fever, chills, edema, SOB,chest pain,dysuria,nausea, vomiting, diarrhea or any signs of active bleeding.Pomalyst qod\par \par On 3/29/23, patient presents for a follow up visit with granddaughter. On dexamethasone 4 mg twice a week and pomalyst daily. In a wheelchair today and continues to have difficulty walking. Stage two pressure ulcer of sacrum. Denies fever, chills, nausea, vomiting, diarrhea, rash, mouth sores or any signs of active bleeding. Denies SOB, chest pain or B/L LE edema. Unable to hold her head up....dental issues\par \par On 6/26/23, patient presents for a follow up visit with home health aide. Daughter on telephone today during visit. Overall Angelika has loose teeth and would like to see a recommended dentist. Has left cataract and will need cataract surgery. Denies fever, chills, nausea, vomiting, diarrhea, rash, mouth sores or any signs of active bleeding. Denies SOB, chest pain or B/L LE edema.

## 2023-07-17 NOTE — SWALLOW BEDSIDE ASSESSMENT ADULT - SWALLOW EVAL: FUNCTIONAL LEVEL AT TIME OF EVAL
Dependent I personally performed the service described in the documentation recorded by the scribe in my presence, and it accurately and completely records my words and actions.

## 2023-08-31 NOTE — ASSESSMENT
[Supportive] : Goals of care discussed with patient: Supportive [FreeTextEntry1] : Ms. James is an 83 year old female with history of MM, RA, HTN, TIA. s/p treatment as per HPI...was  on observation...IGG 3370 (8/15/17). 2018 to 2020 treated again with VD.  Admitted 4/13/22- 4/19/22.  Was hospitalized 11/4/22-11/9/22 for urinary retention/confusion and was d/marek to [facility];at home now   1) MM Patient was on velcade 3 weeks on, 1 week off. Treatment was held and then discontinued b/c of neuropathy.  Back on pomalyst 2 mg every other day . IgG rising with drug holiday.. Pomalyst application filled out and patient  started  pomalyst 2 mg every other day. Side effects explained to patient and family.  Currently on Pomalyst 2 mg daily and dexamethasone 4 mg twice a week. Must follow glucose..given anemia will change pomalyst to QOD...pt is responding to treatment with decrease in mspike  - Currently on pomalyst  2 mg every other day. Continue aspirin 81 mg daily. Continue dexamethasone twice  a week.   2) Heme Counts stable. No indication for transfusion today. 8/31/23: WBC 2.59  H/H 11.2/34.9     Continue folic acid 1 mg daily  3) HTN Continue amlodipine 2.5 mg daily Continue atorvastatin 20 mg daily  4) Pain-  MRI Spine on 4/15/22- Multiple thoracic/lumbar vertebral body fractures  Continue to follow up with neurosurgeon  Continue Gabapentin 200 mg BID Tylenol prn for pain Oxycodone 2.5 mg every 4 hours prn for pain Lidocaine 4% topical patch- apply 1 patch by topical route once daily to lower back for 12 hours Wear TLSO brace when OOB Continue  home physical therapy Arben lift ordered in the past  Questions and concerns addressed. Reassurance provided.  5) Uncontrolled type 2 diabetes mellitus with hyperglycemia; suspect at least in part secondary to Dex  Insulin Glargine-yfgn- Inject 5 units SQ daily at bedtime Humalog kwikPen- Inject 3 units SQ daily before breakfast, inject 2 units SQ daily before lunch No longer on insulin per daughter  6)GI Continue: Protonix 40 mg daily Senna 8.6 mg- take two tablets daily at bedtime prn   7) Other Rheumatoid arthritis; continue hydroxychloroquine daily Continue melatonin 3 mg tablet- take one tablet once daily at bedtime Assist with mobility  8)  Patient advised to contact immediately with any concerns or symptoms Continue home PT Follow up  in 6 weeks with Dr Rosales  Discussed gravity of situation and continued decline of pt's condition..MOLST form provided, advanced directives discussed

## 2023-08-31 NOTE — HISTORY OF PRESENT ILLNESS
[de-identified] : Multiple Myeloma s/p Thal/ Dex 2008 to 2010.....TIA in 2009.....Velcade 2012 to 2013...treatment held due to neuropathy..resumed b/c of POD...tolerating thus far [de-identified] : Patient presents for a follow-up visit.  She is ambulating with a cane. Her son is with her...She was hospitalized and recently discharged from rehab s/p infectious complications. New vertebral compression fx...still with painShe reports a healthy appetite. She notes varicose veins bilaterally in her LE and generalized arthritic pain which is stable. She notes some stiffness in hands bilaterally.  She states she followed up with neurology for issues with her discs. She states her hip pain is stable. She off  Velcade three weeks on and 1 week off with Decadron. Denies fever/chills, night sweats, mouth sores, eye dryness, blurred vision, nausea, vomiting, diarrhea. No CP, SOB or LE edema.   On 8/19//21, patient presents for a follow up visit. Overall patient is tolerating treatment.. and offers no acute concerns aside from continues low back pain similar to when she was hospitalized... Has generalized arthritic pain which is stable. Denies fever, chills, nausea, vomiting, diarrhea, rash, mouth sores, dysuria or any signs of active bleeding. Denies SOB, chest pain or B/L LE edema. Daughter  on telephone during today's visit.   2/16/22 Here with aide.fer on phone...back pain on and off..difficulty raising head up fully...using tylenol and lidocaine patch..tolerating pom dex...improved spep...  Admitted 4/13/22- 4/19/22. 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 Also with t-spine fracture on MRI, pursuing conservative management with TLSO and plan to re-assess outpt.   Fracture of thoracic spine; unstable 3 column horizontal T10 spinal fracture nsgy seen and followed  - plan for conservative orthotics management for now, potential surgery in future outpt follow up; multimodal pain control.   SAH (subarachnoid hemorrhage); Repeat CTH stable, MR without additional concerns. EEG neg for sz activity. Neuro & NSGY followed; still concern SAH-related seizure, vs contribution of hyperglycemia to neuro symptoms, they are now resolving.  - BP control; continue keppra, hold ASA and lovenox for now  Seizure precautions - speech and swallow   Altered mental state;  transient likely seizure in setting of SAH poss seizure, and hyperglycemia as above - now resolved.  Uncontrolled type 2 diabetes mellitus with hyperglycemia; suspect at least in part secondary to Dex , A1c is >11  - s/p IV fluid bolus; basal/bolus insulin w coverage sliding scale  Seen and followed by endo.; f/u mgmt given MM tx involving high dose steroid  and plan for f/u with endocrine  Rheumatoid arthritis; continue hydroxychloroquine  KAJAL on CKD - sCr overall stable.  Multiple myeloma; heme onc followed  HTN (hypertension); continue amlodipine - continue statin; improved  Pt. had last BM reported 4/17 and continue miralax & senna. Discharge planning; dispo to [facility] on 4/19 (d/w Dr. De La Vega)   On 5/19/22, patient presents for a follow up visit. Patient is currently in rehab and is accompanied by health aide today. Daughter on speaker phone during today's visit. Angelika is in a wheelchair and has a back brace on. Recently admitted 4/13/22- 4/19/22. Overall well and offers no acute concerns. On pomalyst every other day. Denies fever, chills, nausea, vomiting, diarrhea, rash, mouth sores, dysuria or any signs of active bleeding. Denies SOB, chest pain or B/L LE Edema.   2/16/23: Patient presents with daughter Binta and grand daughter...She is in a wheelchair...She is confused at times,has back pain and difficulty walking.She uses a walker to walk and at times she cannot lift herself up or walk.She has a pressure ulcer on sacrum which developed when she was in the rehab.Denies fever, chills, edema, SOB,chest pain,dysuria,nausea, vomiting, diarrhea or any signs of active bleeding.Pomalyst qod  On 3/29/23, patient presents for a follow up visit with granddaughter. On dexamethasone 4 mg twice a week and pomalyst daily. In a wheelchair today and continues to have difficulty walking. Stage two pressure ulcer of sacrum. Denies fever, chills, nausea, vomiting, diarrhea, rash, mouth sores or any signs of active bleeding. Denies SOB, chest pain or B/L LE edema. Unable to hold her head up....dental issues  On 6/26/23, patient presents for a follow up visit with home health aide. Daughter on telephone today during visit. Overall Angelika has loose teeth and would like to see a recommended dentist. Has left cataract and will need cataract surgery. Denies fever, chills, nausea, vomiting, diarrhea, rash, mouth sores or any signs of active bleeding. Denies SOB, chest pain or B/L LE edema.   On 8/31/23, patient presents for a follow up visit. Overall, well and offers no acute concerns. Patient has a cataract of left eye and is unable to move forward with cataract surgery as she can't bend her neck. On pomalyst every other day and is on Dexamethasone twice a week. Denies fever, chills, nausea, vomiting, diarrhea, rash, mouth sores or any signs of active bleeding. Denies SOB, chest pain or B/L LE edema.

## 2023-08-31 NOTE — REVIEW OF SYSTEMS
[Fatigue] : fatigue [Vision Problems] : vision problems [Joint Pain] : joint pain [Joint Stiffness] : joint stiffness [Difficulty Walking] : difficulty walking [Negative] : Allergic/Immunologic [Fever] : no fever [Chills] : no chills [Night Sweats] : no night sweats [Recent Change In Weight] : ~T no recent weight change [Eye Pain] : no eye pain [Red Eyes] : eyes not red [Dry Eyes] : no dryness of the eyes [Dysphagia] : no dysphagia [Loss of Hearing] : no loss of hearing [Nosebleeds] : no nosebleeds [Hoarseness] : no hoarseness [Odynophagia] : no odynophagia [Mucosal Pain] : no mucosal pain [Chest Pain] : no chest pain [Palpitations] : no palpitations [Leg Claudication] : no intermittent leg claudication [Lower Ext Edema] : no lower extremity edema [Cough] : no cough [Abdominal Pain] : no abdominal pain [Vomiting] : no vomiting [Skin Rash] : no skin rash [Skin Wound] : no skin wound [Dizziness] : no dizziness [Fainting] : no fainting [FreeTextEntry3] : Left cataract  [FreeTextEntry4] : Loose teeth [FreeTextEntry5] : bilateral LE varicose veins [FreeTextEntry9] : stable generalized arthritic. Pain of  lower back. Back brace on.  [de-identified] : In a wheelchair today,Occasional confusion

## 2023-10-30 NOTE — PHYSICAL THERAPY INITIAL EVALUATION ADULT - PASSIVE RANGE OF MOTION EXAMINATION, REHAB EVAL
Neurosurgery Post op Progress Note      SUBJECTIVE: Post right L4-L5 hemilaminectomy, medial facetectomy, L4 foraminotomy. Patient seen while in recovery. Patient is awake and alert. She is having moderate complaints of lower back pain overlying the surgical site. She states that her symptoms prior to surgery are significantly better after surgery. Denies any headache, nausea or emesis. She moves all 4 extremities upon command. OBJECTIVE      Physical exam   VITALS:    Vitals:    10/30/23 1530   BP: 136/87   Pulse: 96   Resp: 14   Temp:    SpO2: 99%     INTAKE:    Intake/Output Summary (Last 24 hours) at 10/30/2023 1602  Last data filed at 10/30/2023 1523  Gross per 24 hour   Intake 900 ml   Output 50 ml   Net 850 ml       URINARY CATHETER OUTPUT (Harris): No Harris catheter. DRAIN/TUBE OUTPUT: No drains to the surgical site. No evidence of DVT seen on physical exam.  Negative Valentin's sign. No cords or calf tenderness. No significant calf/ankle edema. Neurological exam reveals Awake and alert, Responds to voice, and Responds to tactile stimuli  alert, oriented x3, affect appropriate, moves all extremities well, no involuntary movements, and reflexes at knee and ankle intact  alert, oriented, normal speech, no focal findings or movement disorder noted, screening mental status exam normal, cranial nerves II through XII intact, motor and sensory grossly normal bilaterally. the upper and lower extremities   normal light touch sensation and normal position sensation      Wound   Post op wound:  posterior lumbar spine   well approximated incision clean, dry, and no drainage Closed w/ Dermabond. Silver dressing overlying incision site.       Data      LABS:   Lab Results   Component Value Date    WBC 13.3 (H) 10/16/2023    HGB 15.5 (H) 10/16/2023    HCT 48.6 (H) 10/16/2023    MCV 90.5 10/16/2023     10/16/2023      Lab Results   Component Value Date     10/16/2023    K 4.4 10/16/2023 no Passive ROM deficits were identified

## 2024-01-01 ENCOUNTER — TRANSCRIPTION ENCOUNTER (OUTPATIENT)
Age: 84
End: 2024-01-01

## 2024-01-01 ENCOUNTER — OUTPATIENT (OUTPATIENT)
Dept: OUTPATIENT SERVICES | Facility: HOSPITAL | Age: 84
LOS: 1 days | Discharge: HOME | End: 2024-01-01

## 2024-01-01 ENCOUNTER — APPOINTMENT (OUTPATIENT)
Dept: HEMATOLOGY ONCOLOGY | Facility: CLINIC | Age: 84
End: 2024-01-01

## 2024-01-01 ENCOUNTER — APPOINTMENT (OUTPATIENT)
Dept: HEMATOLOGY ONCOLOGY | Facility: CLINIC | Age: 84
End: 2024-01-01
Payer: MEDICARE

## 2024-01-01 ENCOUNTER — INPATIENT (INPATIENT)
Facility: HOSPITAL | Age: 84
LOS: 4 days | Discharge: HOME | DRG: 594 | End: 2024-04-26
Attending: STUDENT IN AN ORGANIZED HEALTH CARE EDUCATION/TRAINING PROGRAM | Admitting: STUDENT IN AN ORGANIZED HEALTH CARE EDUCATION/TRAINING PROGRAM
Payer: MEDICARE

## 2024-01-01 ENCOUNTER — RX RENEWAL (OUTPATIENT)
Age: 84
End: 2024-01-01

## 2024-01-01 ENCOUNTER — INPATIENT (INPATIENT)
Facility: HOSPITAL | Age: 84
LOS: 5 days | Discharge: HOME | DRG: 758 | End: 2024-04-01
Attending: STUDENT IN AN ORGANIZED HEALTH CARE EDUCATION/TRAINING PROGRAM | Admitting: STUDENT IN AN ORGANIZED HEALTH CARE EDUCATION/TRAINING PROGRAM
Payer: MEDICARE

## 2024-01-01 ENCOUNTER — RESULT REVIEW (OUTPATIENT)
Age: 84
End: 2024-01-01

## 2024-01-01 VITALS
TEMPERATURE: 98 F | RESPIRATION RATE: 16 BRPM | DIASTOLIC BLOOD PRESSURE: 68 MMHG | HEART RATE: 70 BPM | SYSTOLIC BLOOD PRESSURE: 114 MMHG | OXYGEN SATURATION: 98 %

## 2024-01-01 VITALS
TEMPERATURE: 98 F | SYSTOLIC BLOOD PRESSURE: 170 MMHG | RESPIRATION RATE: 17 BRPM | OXYGEN SATURATION: 96 % | DIASTOLIC BLOOD PRESSURE: 70 MMHG | HEIGHT: 65 IN | HEART RATE: 74 BPM | WEIGHT: 132.06 LBS

## 2024-01-01 VITALS
SYSTOLIC BLOOD PRESSURE: 167 MMHG | HEART RATE: 76 BPM | DIASTOLIC BLOOD PRESSURE: 91 MMHG | WEIGHT: 145.95 LBS | TEMPERATURE: 98 F | OXYGEN SATURATION: 99 % | HEIGHT: 65 IN | RESPIRATION RATE: 17 BRPM

## 2024-01-01 VITALS
DIASTOLIC BLOOD PRESSURE: 76 MMHG | RESPIRATION RATE: 18 BRPM | OXYGEN SATURATION: 99 % | SYSTOLIC BLOOD PRESSURE: 156 MMHG | HEART RATE: 81 BPM | TEMPERATURE: 99 F

## 2024-01-01 VITALS
HEART RATE: 68 BPM | BODY MASS INDEX: 26.7 KG/M2 | SYSTOLIC BLOOD PRESSURE: 183 MMHG | DIASTOLIC BLOOD PRESSURE: 82 MMHG | RESPIRATION RATE: 16 BRPM | OXYGEN SATURATION: 95 % | WEIGHT: 136 LBS | TEMPERATURE: 97.9 F

## 2024-01-01 DIAGNOSIS — I10 ESSENTIAL (PRIMARY) HYPERTENSION: ICD-10-CM

## 2024-01-01 DIAGNOSIS — E78.5 HYPERLIPIDEMIA, UNSPECIFIED: ICD-10-CM

## 2024-01-01 DIAGNOSIS — N17.9 ACUTE KIDNEY FAILURE, UNSPECIFIED: ICD-10-CM

## 2024-01-01 DIAGNOSIS — M06.9 RHEUMATOID ARTHRITIS, UNSPECIFIED: ICD-10-CM

## 2024-01-01 DIAGNOSIS — N39.0 URINARY TRACT INFECTION, SITE NOT SPECIFIED: ICD-10-CM

## 2024-01-01 DIAGNOSIS — G93.41 METABOLIC ENCEPHALOPATHY: ICD-10-CM

## 2024-01-01 DIAGNOSIS — M79.672 PAIN IN LEFT FOOT: ICD-10-CM

## 2024-01-01 DIAGNOSIS — C90.00 MULTIPLE MYELOMA NOT HAVING ACHIEVED REMISSION: ICD-10-CM

## 2024-01-01 DIAGNOSIS — E11.621 TYPE 2 DIABETES MELLITUS WITH FOOT ULCER: ICD-10-CM

## 2024-01-01 DIAGNOSIS — Z29.9 ENCOUNTER FOR PROPHYLACTIC MEASURES, UNSPECIFIED: ICD-10-CM

## 2024-01-01 DIAGNOSIS — M54.50 LOW BACK PAIN, UNSPECIFIED: ICD-10-CM

## 2024-01-01 DIAGNOSIS — N18.9 CHRONIC KIDNEY DISEASE, UNSPECIFIED: ICD-10-CM

## 2024-01-01 DIAGNOSIS — L89.629 PRESSURE ULCER OF LEFT HEEL, UNSPECIFIED STAGE: ICD-10-CM

## 2024-01-01 DIAGNOSIS — G62.9 POLYNEUROPATHY, UNSPECIFIED: ICD-10-CM

## 2024-01-01 DIAGNOSIS — Z75.8 OTHER PROBLEMS RELATED TO MEDICAL FACILITIES AND OTHER HEALTH CARE: ICD-10-CM

## 2024-01-01 DIAGNOSIS — L89.609 PRESSURE ULCER OF UNSPECIFIED HEEL, UNSPECIFIED STAGE: ICD-10-CM

## 2024-01-01 LAB
% ALBUMIN: 46 % — SIGNIFICANT CHANGE UP
% ALPHA 1: 6.9 % — SIGNIFICANT CHANGE UP
% ALPHA 2: 14.3 % — SIGNIFICANT CHANGE UP
% BETA: 11.4 % — SIGNIFICANT CHANGE UP
% GAMMA: 21.4 % — SIGNIFICANT CHANGE UP
% M SPIKE: 15.3 % — SIGNIFICANT CHANGE UP
-  CLINDAMYCIN: SIGNIFICANT CHANGE UP
-  ERYTHROMYCIN: SIGNIFICANT CHANGE UP
-  GENTAMICIN: SIGNIFICANT CHANGE UP
-  OXACILLIN: SIGNIFICANT CHANGE UP
-  PENICILLIN: SIGNIFICANT CHANGE UP
-  RIFAMPIN: SIGNIFICANT CHANGE UP
-  TETRACYCLINE: SIGNIFICANT CHANGE UP
-  TRIMETHOPRIM/SULFAMETHOXAZOLE: SIGNIFICANT CHANGE UP
-  VANCOMYCIN: SIGNIFICANT CHANGE UP
A1C WITH ESTIMATED AVERAGE GLUCOSE RESULT: 7.5 % — HIGH (ref 4–5.6)
A1C WITH ESTIMATED AVERAGE GLUCOSE RESULT: 8.2 % — HIGH (ref 4–5.6)
ALBUMIN MFR SERPL ELPH: 49.8 %
ALBUMIN SERPL ELPH-MCNC: 2.4 G/DL — LOW (ref 3.5–5)
ALBUMIN SERPL ELPH-MCNC: 2.5 G/DL — LOW (ref 3.5–5)
ALBUMIN SERPL ELPH-MCNC: 2.7 G/DL — LOW (ref 3.6–5.5)
ALBUMIN SERPL ELPH-MCNC: 3.6 G/DL
ALBUMIN SERPL-MCNC: 3.2 G/DL
ALBUMIN/GLOB SERPL ELPH: 0.8 RATIO — SIGNIFICANT CHANGE UP
ALBUMIN/GLOB SERPL: 1 RATIO
ALP BLD-CCNC: 93 U/L
ALP SERPL-CCNC: 111 U/L — SIGNIFICANT CHANGE UP (ref 40–120)
ALP SERPL-CCNC: 76 U/L — SIGNIFICANT CHANGE UP (ref 40–120)
ALPHA1 GLOB MFR SERPL ELPH: 6.7 %
ALPHA1 GLOB SERPL ELPH-MCNC: 0.4 G/DL
ALPHA1 GLOB SERPL ELPH-MCNC: 0.4 G/DL — SIGNIFICANT CHANGE UP (ref 0.1–0.4)
ALPHA2 GLOB MFR SERPL ELPH: 13.4 %
ALPHA2 GLOB SERPL ELPH-MCNC: 0.8 G/DL — SIGNIFICANT CHANGE UP (ref 0.5–1)
ALPHA2 GLOB SERPL ELPH-MCNC: 0.9 G/DL
ALT FLD-CCNC: 19 U/L DA — SIGNIFICANT CHANGE UP (ref 10–60)
ALT FLD-CCNC: 22 U/L DA — SIGNIFICANT CHANGE UP (ref 10–60)
ALT SERPL-CCNC: 12 U/L
ANION GAP SERPL CALC-SCNC: 12 MMOL/L
ANION GAP SERPL CALC-SCNC: 3 MMOL/L — LOW (ref 5–17)
ANION GAP SERPL CALC-SCNC: 3 MMOL/L — LOW (ref 5–17)
ANION GAP SERPL CALC-SCNC: 5 MMOL/L — SIGNIFICANT CHANGE UP (ref 5–17)
ANION GAP SERPL CALC-SCNC: 6 MMOL/L — SIGNIFICANT CHANGE UP (ref 5–17)
ANION GAP SERPL CALC-SCNC: 8 MMOL/L — SIGNIFICANT CHANGE UP (ref 5–17)
ANION GAP SERPL CALC-SCNC: 8 MMOL/L — SIGNIFICANT CHANGE UP (ref 5–17)
APPEARANCE UR: ABNORMAL
APTT BLD: 22.1 SEC — LOW (ref 24.5–35.6)
AST SERPL-CCNC: 13 U/L
AST SERPL-CCNC: 18 U/L — SIGNIFICANT CHANGE UP (ref 10–40)
AST SERPL-CCNC: 8 U/L — LOW (ref 10–40)
B-GLOBULIN MFR SERPL ELPH: 12.3 %
B-GLOBULIN SERPL ELPH-MCNC: 0.7 G/DL — SIGNIFICANT CHANGE UP (ref 0.5–1)
B-GLOBULIN SERPL ELPH-MCNC: 0.8 G/DL
B2 MICROGLOB SERPL-MCNC: 5.1 MG/L
BACTERIA # UR AUTO: ABNORMAL /HPF
BASOPHILS # BLD AUTO: 0 K/UL — SIGNIFICANT CHANGE UP (ref 0–0.2)
BASOPHILS # BLD AUTO: 0.02 K/UL — SIGNIFICANT CHANGE UP (ref 0–0.2)
BASOPHILS # BLD AUTO: 0.05 K/UL — SIGNIFICANT CHANGE UP (ref 0–0.2)
BASOPHILS NFR BLD AUTO: 0 % — SIGNIFICANT CHANGE UP (ref 0–2)
BASOPHILS NFR BLD AUTO: 0.4 % — SIGNIFICANT CHANGE UP (ref 0–2)
BASOPHILS NFR BLD AUTO: 1.1 % — SIGNIFICANT CHANGE UP (ref 0–2)
BILIRUB SERPL-MCNC: 0.2 MG/DL
BILIRUB SERPL-MCNC: 0.3 MG/DL — SIGNIFICANT CHANGE UP (ref 0.2–1.2)
BILIRUB SERPL-MCNC: 0.4 MG/DL — SIGNIFICANT CHANGE UP (ref 0.2–1.2)
BILIRUB UR-MCNC: NEGATIVE — SIGNIFICANT CHANGE UP
BUN SERPL-MCNC: 25 MG/DL — HIGH (ref 7–18)
BUN SERPL-MCNC: 25 MG/DL — HIGH (ref 7–18)
BUN SERPL-MCNC: 30 MG/DL — HIGH (ref 7–18)
BUN SERPL-MCNC: 30 MG/DL — HIGH (ref 7–18)
BUN SERPL-MCNC: 31 MG/DL — HIGH (ref 7–18)
BUN SERPL-MCNC: 32 MG/DL
BUN SERPL-MCNC: 32 MG/DL — HIGH (ref 7–18)
BUN SERPL-MCNC: 33 MG/DL — HIGH (ref 7–18)
BUN SERPL-MCNC: 33 MG/DL — HIGH (ref 7–18)
BUN SERPL-MCNC: 35 MG/DL — HIGH (ref 7–18)
BUN SERPL-MCNC: 39 MG/DL — HIGH (ref 7–18)
BUN SERPL-MCNC: 47 MG/DL — HIGH (ref 7–18)
BUN SERPL-MCNC: 55 MG/DL — HIGH (ref 7–18)
CALCIUM SERPL-MCNC: 8.4 MG/DL
CALCIUM SERPL-MCNC: 8.4 MG/DL — SIGNIFICANT CHANGE UP (ref 8.4–10.5)
CALCIUM SERPL-MCNC: 8.5 MG/DL — SIGNIFICANT CHANGE UP (ref 8.4–10.5)
CALCIUM SERPL-MCNC: 8.5 MG/DL — SIGNIFICANT CHANGE UP (ref 8.4–10.5)
CALCIUM SERPL-MCNC: 8.6 MG/DL — SIGNIFICANT CHANGE UP (ref 8.4–10.5)
CALCIUM SERPL-MCNC: 8.7 MG/DL — SIGNIFICANT CHANGE UP (ref 8.4–10.5)
CALCIUM SERPL-MCNC: 8.9 MG/DL — SIGNIFICANT CHANGE UP (ref 8.4–10.5)
CALCIUM SERPL-MCNC: 8.9 MG/DL — SIGNIFICANT CHANGE UP (ref 8.4–10.5)
CALCIUM SERPL-MCNC: 9.1 MG/DL — SIGNIFICANT CHANGE UP (ref 8.4–10.5)
CALCIUM SERPL-MCNC: 9.3 MG/DL — SIGNIFICANT CHANGE UP (ref 8.4–10.5)
CALCIUM SERPL-MCNC: 9.4 MG/DL — SIGNIFICANT CHANGE UP (ref 8.4–10.5)
CALCIUM SERPL-MCNC: 9.4 MG/DL — SIGNIFICANT CHANGE UP (ref 8.4–10.5)
CALCIUM SERPL-MCNC: 9.6 MG/DL — SIGNIFICANT CHANGE UP (ref 8.4–10.5)
CHLORIDE SERPL-SCNC: 110 MMOL/L
CHLORIDE SERPL-SCNC: 111 MMOL/L — HIGH (ref 96–108)
CHLORIDE SERPL-SCNC: 111 MMOL/L — HIGH (ref 96–108)
CHLORIDE SERPL-SCNC: 112 MMOL/L — HIGH (ref 96–108)
CHLORIDE SERPL-SCNC: 113 MMOL/L — HIGH (ref 96–108)
CHLORIDE SERPL-SCNC: 114 MMOL/L — HIGH (ref 96–108)
CHLORIDE SERPL-SCNC: 115 MMOL/L — HIGH (ref 96–108)
CHLORIDE SERPL-SCNC: 115 MMOL/L — HIGH (ref 96–108)
CHLORIDE SERPL-SCNC: 116 MMOL/L — HIGH (ref 96–108)
CHLORIDE SERPL-SCNC: 116 MMOL/L — HIGH (ref 96–108)
CHLORIDE SERPL-SCNC: 117 MMOL/L — HIGH (ref 96–108)
CO2 SERPL-SCNC: 15 MMOL/L — LOW (ref 22–31)
CO2 SERPL-SCNC: 17 MMOL/L — LOW (ref 22–31)
CO2 SERPL-SCNC: 18 MMOL/L — LOW (ref 22–31)
CO2 SERPL-SCNC: 19 MMOL/L
CO2 SERPL-SCNC: 21 MMOL/L — LOW (ref 22–31)
CO2 SERPL-SCNC: 21 MMOL/L — LOW (ref 22–31)
CO2 SERPL-SCNC: 22 MMOL/L — SIGNIFICANT CHANGE UP (ref 22–31)
CO2 SERPL-SCNC: 22 MMOL/L — SIGNIFICANT CHANGE UP (ref 22–31)
CO2 SERPL-SCNC: 23 MMOL/L — SIGNIFICANT CHANGE UP (ref 22–31)
CO2 SERPL-SCNC: 23 MMOL/L — SIGNIFICANT CHANGE UP (ref 22–31)
CO2 SERPL-SCNC: 24 MMOL/L — SIGNIFICANT CHANGE UP (ref 22–31)
COLOR SPEC: ABNORMAL
COLOR SPEC: ABNORMAL
COLOR SPEC: YELLOW — SIGNIFICANT CHANGE UP
COMMENT - URINE: SIGNIFICANT CHANGE UP
CREAT ?TM UR-MCNC: 58 MG/DL — SIGNIFICANT CHANGE UP
CREAT SERPL-MCNC: 1.78 MG/DL — HIGH (ref 0.5–1.3)
CREAT SERPL-MCNC: 1.9 MG/DL — HIGH (ref 0.5–1.3)
CREAT SERPL-MCNC: 1.95 MG/DL
CREAT SERPL-MCNC: 1.96 MG/DL — HIGH (ref 0.5–1.3)
CREAT SERPL-MCNC: 1.96 MG/DL — HIGH (ref 0.5–1.3)
CREAT SERPL-MCNC: 2.04 MG/DL — HIGH (ref 0.5–1.3)
CREAT SERPL-MCNC: 2.2 MG/DL — HIGH (ref 0.5–1.3)
CREAT SERPL-MCNC: 2.31 MG/DL — HIGH (ref 0.5–1.3)
CREAT SERPL-MCNC: 2.32 MG/DL — HIGH (ref 0.5–1.3)
CREAT SERPL-MCNC: 2.35 MG/DL — HIGH (ref 0.5–1.3)
CREAT SERPL-MCNC: 2.35 MG/DL — HIGH (ref 0.5–1.3)
CREAT SERPL-MCNC: 2.41 MG/DL — HIGH (ref 0.5–1.3)
CREAT SERPL-MCNC: 2.87 MG/DL — HIGH (ref 0.5–1.3)
CRP SERPL-MCNC: 28 MG/L — HIGH
CULTURE RESULTS: ABNORMAL
CULTURE RESULTS: ABNORMAL
CULTURE RESULTS: SIGNIFICANT CHANGE UP
DEPRECATED KAPPA LC FREE/LAMBDA SER: 32.39 RATIO
DIFF PNL FLD: ABNORMAL
EGFR: 16 ML/MIN/1.73M2 — LOW
EGFR: 19 ML/MIN/1.73M2 — LOW
EGFR: 20 ML/MIN/1.73M2 — LOW
EGFR: 22 ML/MIN/1.73M2 — LOW
EGFR: 24 ML/MIN/1.73M2 — LOW
EGFR: 25 ML/MIN/1.73M2
EGFR: 25 ML/MIN/1.73M2 — LOW
EGFR: 25 ML/MIN/1.73M2 — LOW
EGFR: 26 ML/MIN/1.73M2 — LOW
EGFR: 28 ML/MIN/1.73M2 — LOW
EOSINOPHIL # BLD AUTO: 0 K/UL — SIGNIFICANT CHANGE UP (ref 0–0.5)
EOSINOPHIL # BLD AUTO: 0.14 K/UL — SIGNIFICANT CHANGE UP (ref 0–0.5)
EOSINOPHIL # BLD AUTO: 0.24 K/UL — SIGNIFICANT CHANGE UP (ref 0–0.5)
EOSINOPHIL NFR BLD AUTO: 0 % — SIGNIFICANT CHANGE UP (ref 0–6)
EOSINOPHIL NFR BLD AUTO: 3.1 % — SIGNIFICANT CHANGE UP (ref 0–6)
EOSINOPHIL NFR BLD AUTO: 5.4 % — SIGNIFICANT CHANGE UP (ref 0–6)
EPI CELLS # UR: PRESENT
ESTIMATED AVERAGE GLUCOSE: 169 MG/DL — HIGH (ref 68–114)
ESTIMATED AVERAGE GLUCOSE: 189 MG/DL — HIGH (ref 68–114)
FERRITIN SERPL-MCNC: 259 NG/ML — SIGNIFICANT CHANGE UP (ref 13–330)
GAMMA GLOB FLD ELPH-MCNC: 1.1 G/DL
GAMMA GLOB MFR SERPL ELPH: 17.8 %
GAMMA GLOBULIN: 1.3 G/DL — SIGNIFICANT CHANGE UP (ref 0.6–1.6)
GLUCOSE BLDC GLUCOMTR-MCNC: 106 MG/DL — HIGH (ref 70–99)
GLUCOSE BLDC GLUCOMTR-MCNC: 107 MG/DL — HIGH (ref 70–99)
GLUCOSE BLDC GLUCOMTR-MCNC: 108 MG/DL — HIGH (ref 70–99)
GLUCOSE BLDC GLUCOMTR-MCNC: 114 MG/DL — HIGH (ref 70–99)
GLUCOSE BLDC GLUCOMTR-MCNC: 116 MG/DL — HIGH (ref 70–99)
GLUCOSE BLDC GLUCOMTR-MCNC: 123 MG/DL — HIGH (ref 70–99)
GLUCOSE BLDC GLUCOMTR-MCNC: 133 MG/DL — HIGH (ref 70–99)
GLUCOSE BLDC GLUCOMTR-MCNC: 154 MG/DL — HIGH (ref 70–99)
GLUCOSE BLDC GLUCOMTR-MCNC: 154 MG/DL — HIGH (ref 70–99)
GLUCOSE BLDC GLUCOMTR-MCNC: 163 MG/DL — HIGH (ref 70–99)
GLUCOSE BLDC GLUCOMTR-MCNC: 165 MG/DL — HIGH (ref 70–99)
GLUCOSE BLDC GLUCOMTR-MCNC: 176 MG/DL — HIGH (ref 70–99)
GLUCOSE BLDC GLUCOMTR-MCNC: 184 MG/DL — HIGH (ref 70–99)
GLUCOSE BLDC GLUCOMTR-MCNC: 190 MG/DL — HIGH (ref 70–99)
GLUCOSE BLDC GLUCOMTR-MCNC: 191 MG/DL — HIGH (ref 70–99)
GLUCOSE BLDC GLUCOMTR-MCNC: 195 MG/DL — HIGH (ref 70–99)
GLUCOSE BLDC GLUCOMTR-MCNC: 199 MG/DL — HIGH (ref 70–99)
GLUCOSE BLDC GLUCOMTR-MCNC: 201 MG/DL — HIGH (ref 70–99)
GLUCOSE BLDC GLUCOMTR-MCNC: 214 MG/DL — HIGH (ref 70–99)
GLUCOSE BLDC GLUCOMTR-MCNC: 222 MG/DL — HIGH (ref 70–99)
GLUCOSE BLDC GLUCOMTR-MCNC: 229 MG/DL — HIGH (ref 70–99)
GLUCOSE BLDC GLUCOMTR-MCNC: 272 MG/DL — HIGH (ref 70–99)
GLUCOSE BLDC GLUCOMTR-MCNC: 291 MG/DL — HIGH (ref 70–99)
GLUCOSE BLDC GLUCOMTR-MCNC: 62 MG/DL — LOW (ref 70–99)
GLUCOSE BLDC GLUCOMTR-MCNC: 63 MG/DL — LOW (ref 70–99)
GLUCOSE BLDC GLUCOMTR-MCNC: 66 MG/DL — LOW (ref 70–99)
GLUCOSE BLDC GLUCOMTR-MCNC: 69 MG/DL — LOW (ref 70–99)
GLUCOSE BLDC GLUCOMTR-MCNC: 76 MG/DL — SIGNIFICANT CHANGE UP (ref 70–99)
GLUCOSE BLDC GLUCOMTR-MCNC: 81 MG/DL — SIGNIFICANT CHANGE UP (ref 70–99)
GLUCOSE BLDC GLUCOMTR-MCNC: 82 MG/DL — SIGNIFICANT CHANGE UP (ref 70–99)
GLUCOSE BLDC GLUCOMTR-MCNC: 83 MG/DL — SIGNIFICANT CHANGE UP (ref 70–99)
GLUCOSE BLDC GLUCOMTR-MCNC: 83 MG/DL — SIGNIFICANT CHANGE UP (ref 70–99)
GLUCOSE BLDC GLUCOMTR-MCNC: 84 MG/DL — SIGNIFICANT CHANGE UP (ref 70–99)
GLUCOSE BLDC GLUCOMTR-MCNC: 85 MG/DL — SIGNIFICANT CHANGE UP (ref 70–99)
GLUCOSE BLDC GLUCOMTR-MCNC: 89 MG/DL — SIGNIFICANT CHANGE UP (ref 70–99)
GLUCOSE BLDC GLUCOMTR-MCNC: 89 MG/DL — SIGNIFICANT CHANGE UP (ref 70–99)
GLUCOSE BLDC GLUCOMTR-MCNC: 90 MG/DL — SIGNIFICANT CHANGE UP (ref 70–99)
GLUCOSE BLDC GLUCOMTR-MCNC: 91 MG/DL — SIGNIFICANT CHANGE UP (ref 70–99)
GLUCOSE BLDC GLUCOMTR-MCNC: 93 MG/DL — SIGNIFICANT CHANGE UP (ref 70–99)
GLUCOSE BLDC GLUCOMTR-MCNC: 96 MG/DL — SIGNIFICANT CHANGE UP (ref 70–99)
GLUCOSE BLDC GLUCOMTR-MCNC: 99 MG/DL — SIGNIFICANT CHANGE UP (ref 70–99)
GLUCOSE SERPL-MCNC: 108 MG/DL — HIGH (ref 70–99)
GLUCOSE SERPL-MCNC: 113 MG/DL — HIGH (ref 70–99)
GLUCOSE SERPL-MCNC: 125 MG/DL — HIGH (ref 70–99)
GLUCOSE SERPL-MCNC: 126 MG/DL
GLUCOSE SERPL-MCNC: 162 MG/DL — HIGH (ref 70–99)
GLUCOSE SERPL-MCNC: 183 MG/DL — HIGH (ref 70–99)
GLUCOSE SERPL-MCNC: 217 MG/DL — HIGH (ref 70–99)
GLUCOSE SERPL-MCNC: 307 MG/DL — HIGH (ref 70–99)
GLUCOSE SERPL-MCNC: 471 MG/DL — CRITICAL HIGH (ref 70–99)
GLUCOSE SERPL-MCNC: 81 MG/DL — SIGNIFICANT CHANGE UP (ref 70–99)
GLUCOSE SERPL-MCNC: 82 MG/DL — SIGNIFICANT CHANGE UP (ref 70–99)
GLUCOSE SERPL-MCNC: 85 MG/DL — SIGNIFICANT CHANGE UP (ref 70–99)
GLUCOSE SERPL-MCNC: 85 MG/DL — SIGNIFICANT CHANGE UP (ref 70–99)
GLUCOSE UR QL: 100 MG/DL
GLUCOSE UR QL: 500 MG/DL
GLUCOSE UR QL: NEGATIVE MG/DL — SIGNIFICANT CHANGE UP
HCT VFR BLD CALC: 23.3 % — LOW (ref 34.5–45)
HCT VFR BLD CALC: 24.9 % — LOW (ref 34.5–45)
HCT VFR BLD CALC: 25.2 % — LOW (ref 34.5–45)
HCT VFR BLD CALC: 25.4 % — LOW (ref 34.5–45)
HCT VFR BLD CALC: 25.8 % — LOW (ref 34.5–45)
HCT VFR BLD CALC: 26.4 % — LOW (ref 34.5–45)
HCT VFR BLD CALC: 27.9 % — LOW (ref 34.5–45)
HCT VFR BLD CALC: 28.4 % — LOW (ref 34.5–45)
HCT VFR BLD CALC: 28.7 % — LOW (ref 34.5–45)
HCT VFR BLD CALC: 30.1 % — LOW (ref 34.5–45)
HCT VFR BLD CALC: 30.6 % — LOW (ref 34.5–45)
HCT VFR BLD CALC: 32.1 % — LOW (ref 34.5–45)
HGB BLD-MCNC: 10.2 G/DL — LOW (ref 11.5–15.5)
HGB BLD-MCNC: 7.8 G/DL — LOW (ref 11.5–15.5)
HGB BLD-MCNC: 7.9 G/DL — LOW (ref 11.5–15.5)
HGB BLD-MCNC: 7.9 G/DL — LOW (ref 11.5–15.5)
HGB BLD-MCNC: 8.3 G/DL — LOW (ref 11.5–15.5)
HGB BLD-MCNC: 8.4 G/DL — LOW (ref 11.5–15.5)
HGB BLD-MCNC: 8.5 G/DL — LOW (ref 11.5–15.5)
HGB BLD-MCNC: 8.8 G/DL — LOW (ref 11.5–15.5)
HGB BLD-MCNC: 9 G/DL — LOW (ref 11.5–15.5)
HGB BLD-MCNC: 9.3 G/DL — LOW (ref 11.5–15.5)
HGB BLD-MCNC: 9.6 G/DL — LOW (ref 11.5–15.5)
HGB BLD-MCNC: 9.9 G/DL — LOW (ref 11.5–15.5)
HYALINE CASTS # UR AUTO: PRESENT
IGA FLD-MCNC: 48 MG/DL — LOW (ref 84–499)
IGA SER QL IEP: 38 MG/DL
IGG FLD-MCNC: 1335 MG/DL — SIGNIFICANT CHANGE UP (ref 610–1660)
IGG SER QL IEP: 1235 MG/DL
IGM SER QL IEP: 36 MG/DL
IGM SERPL-MCNC: 41 MG/DL — SIGNIFICANT CHANGE UP (ref 35–242)
IMM GRANULOCYTES NFR BLD AUTO: 0.4 % — SIGNIFICANT CHANGE UP (ref 0–0.9)
IMM GRANULOCYTES NFR BLD AUTO: 0.7 % — SIGNIFICANT CHANGE UP (ref 0–0.9)
INR BLD: 1.22 RATIO — HIGH (ref 0.85–1.18)
INTERPRETATION SERPL IEP-IMP: NORMAL
INTERPRETATION SERPL IFE-IMP: SIGNIFICANT CHANGE UP
IRON SATN MFR SERPL: 18 % — SIGNIFICANT CHANGE UP (ref 15–50)
IRON SATN MFR SERPL: 52 UG/DL — SIGNIFICANT CHANGE UP (ref 40–150)
KAPPA LC CSF-MCNC: 2.44 MG/DL
KAPPA LC SER QL IFE: 104.43 MG/DL — HIGH (ref 0.33–1.94)
KAPPA LC SERPL-MCNC: 79.04 MG/DL
KAPPA/LAMBDA FREE LIGHT CHAIN RATIO, SERUM: 35.52 RATIO — HIGH (ref 0.26–1.65)
KETONES UR-MCNC: NEGATIVE MG/DL — SIGNIFICANT CHANGE UP
LACTATE SERPL-SCNC: 1.3 MMOL/L — SIGNIFICANT CHANGE UP (ref 0.7–2)
LAMBDA LC SER QL IFE: 2.94 MG/DL — HIGH (ref 0.57–2.63)
LDH SERPL-CCNC: 194 U/L
LEUKOCYTE ESTERASE UR-ACNC: ABNORMAL
LYMPHOCYTES # BLD AUTO: 0.68 K/UL — LOW (ref 1–3.3)
LYMPHOCYTES # BLD AUTO: 1.01 K/UL — SIGNIFICANT CHANGE UP (ref 1–3.3)
LYMPHOCYTES # BLD AUTO: 1.41 K/UL — SIGNIFICANT CHANGE UP (ref 1–3.3)
LYMPHOCYTES # BLD AUTO: 19 % — SIGNIFICANT CHANGE UP (ref 13–44)
LYMPHOCYTES # BLD AUTO: 22.5 % — SIGNIFICANT CHANGE UP (ref 13–44)
LYMPHOCYTES # BLD AUTO: 31.5 % — SIGNIFICANT CHANGE UP (ref 13–44)
M PROTEIN MFR SERPL ELPH: 11.2 %
M PROTEIN SPEC IFE-MCNC: NORMAL
M-SPIKE: 0.9 G/DL — HIGH (ref 0–0)
MAGNESIUM SERPL-MCNC: 1.8 MG/DL — SIGNIFICANT CHANGE UP (ref 1.6–2.6)
MAGNESIUM SERPL-MCNC: 2 MG/DL — SIGNIFICANT CHANGE UP (ref 1.6–2.6)
MAGNESIUM SERPL-MCNC: 2 MG/DL — SIGNIFICANT CHANGE UP (ref 1.6–2.6)
MAGNESIUM SERPL-MCNC: 2.2 MG/DL
MAGNESIUM SERPL-MCNC: 2.2 MG/DL — SIGNIFICANT CHANGE UP (ref 1.6–2.6)
MAGNESIUM SERPL-MCNC: 2.3 MG/DL — SIGNIFICANT CHANGE UP (ref 1.6–2.6)
MAGNESIUM SERPL-MCNC: 2.3 MG/DL — SIGNIFICANT CHANGE UP (ref 1.6–2.6)
MCHC RBC-ENTMCNC: 27.9 PG — SIGNIFICANT CHANGE UP (ref 27–34)
MCHC RBC-ENTMCNC: 28.2 PG — SIGNIFICANT CHANGE UP (ref 27–34)
MCHC RBC-ENTMCNC: 28.3 PG — SIGNIFICANT CHANGE UP (ref 27–34)
MCHC RBC-ENTMCNC: 28.3 PG — SIGNIFICANT CHANGE UP (ref 27–34)
MCHC RBC-ENTMCNC: 28.5 PG — SIGNIFICANT CHANGE UP (ref 27–34)
MCHC RBC-ENTMCNC: 28.7 PG — SIGNIFICANT CHANGE UP (ref 27–34)
MCHC RBC-ENTMCNC: 28.8 PG — SIGNIFICANT CHANGE UP (ref 27–34)
MCHC RBC-ENTMCNC: 28.8 PG — SIGNIFICANT CHANGE UP (ref 27–34)
MCHC RBC-ENTMCNC: 29 PG — SIGNIFICANT CHANGE UP (ref 27–34)
MCHC RBC-ENTMCNC: 29.1 PG — SIGNIFICANT CHANGE UP (ref 27–34)
MCHC RBC-ENTMCNC: 29.3 PG — SIGNIFICANT CHANGE UP (ref 27–34)
MCHC RBC-ENTMCNC: 30.3 PG — SIGNIFICANT CHANGE UP (ref 27–34)
MCHC RBC-ENTMCNC: 31.1 GM/DL — LOW (ref 32–36)
MCHC RBC-ENTMCNC: 31.3 GM/DL — LOW (ref 32–36)
MCHC RBC-ENTMCNC: 31.4 GM/DL — LOW (ref 32–36)
MCHC RBC-ENTMCNC: 31.5 GM/DL — LOW (ref 32–36)
MCHC RBC-ENTMCNC: 31.8 G/DL — LOW (ref 32–36)
MCHC RBC-ENTMCNC: 31.8 GM/DL — LOW (ref 32–36)
MCHC RBC-ENTMCNC: 31.9 GM/DL — LOW (ref 32–36)
MCHC RBC-ENTMCNC: 32.4 GM/DL — SIGNIFICANT CHANGE UP (ref 32–36)
MCHC RBC-ENTMCNC: 32.7 GM/DL — SIGNIFICANT CHANGE UP (ref 32–36)
MCHC RBC-ENTMCNC: 32.9 GM/DL — SIGNIFICANT CHANGE UP (ref 32–36)
MCHC RBC-ENTMCNC: 32.9 GM/DL — SIGNIFICANT CHANGE UP (ref 32–36)
MCHC RBC-ENTMCNC: 33.9 GM/DL — SIGNIFICANT CHANGE UP (ref 32–36)
MCV RBC AUTO: 84.6 FL — SIGNIFICANT CHANGE UP (ref 80–100)
MCV RBC AUTO: 86.3 FL — SIGNIFICANT CHANGE UP (ref 80–100)
MCV RBC AUTO: 86.6 FL — SIGNIFICANT CHANGE UP (ref 80–100)
MCV RBC AUTO: 87.4 FL — SIGNIFICANT CHANGE UP (ref 80–100)
MCV RBC AUTO: 88.5 FL — SIGNIFICANT CHANGE UP (ref 80–100)
MCV RBC AUTO: 88.8 FL — SIGNIFICANT CHANGE UP (ref 80–100)
MCV RBC AUTO: 89.7 FL — SIGNIFICANT CHANGE UP (ref 80–100)
MCV RBC AUTO: 91 FL — SIGNIFICANT CHANGE UP (ref 80–100)
MCV RBC AUTO: 91.4 FL — SIGNIFICANT CHANGE UP (ref 80–100)
MCV RBC AUTO: 91.9 FL — SIGNIFICANT CHANGE UP (ref 80–100)
MCV RBC AUTO: 92.7 FL — SIGNIFICANT CHANGE UP (ref 80–100)
MCV RBC AUTO: 95.3 FL — SIGNIFICANT CHANGE UP (ref 80–100)
METHOD TYPE: SIGNIFICANT CHANGE UP
MONOCLON BAND OBS SERPL: 0.7 G/DL
MONOCYTES # BLD AUTO: 0.07 K/UL — SIGNIFICANT CHANGE UP (ref 0–0.9)
MONOCYTES # BLD AUTO: 0.32 K/UL — SIGNIFICANT CHANGE UP (ref 0–0.9)
MONOCYTES # BLD AUTO: 0.39 K/UL — SIGNIFICANT CHANGE UP (ref 0–0.9)
MONOCYTES NFR BLD AUTO: 2 % — SIGNIFICANT CHANGE UP (ref 2–14)
MONOCYTES NFR BLD AUTO: 7.1 % — SIGNIFICANT CHANGE UP (ref 2–14)
MONOCYTES NFR BLD AUTO: 8.7 % — SIGNIFICANT CHANGE UP (ref 2–14)
NEUTROPHILS # BLD AUTO: 2.4 K/UL — SIGNIFICANT CHANGE UP (ref 1.8–7.4)
NEUTROPHILS # BLD AUTO: 2.7 K/UL — SIGNIFICANT CHANGE UP (ref 1.8–7.4)
NEUTROPHILS # BLD AUTO: 2.93 K/UL — SIGNIFICANT CHANGE UP (ref 1.8–7.4)
NEUTROPHILS NFR BLD AUTO: 53.6 % — SIGNIFICANT CHANGE UP (ref 43–77)
NEUTROPHILS NFR BLD AUTO: 65.5 % — SIGNIFICANT CHANGE UP (ref 43–77)
NEUTROPHILS NFR BLD AUTO: 75 % — SIGNIFICANT CHANGE UP (ref 43–77)
NITRITE UR-MCNC: NEGATIVE — SIGNIFICANT CHANGE UP
NITRITE UR-MCNC: NEGATIVE — SIGNIFICANT CHANGE UP
NITRITE UR-MCNC: POSITIVE
NRBC # BLD: 0 /100 WBCS — SIGNIFICANT CHANGE UP (ref 0–0)
ORGANISM # SPEC MICROSCOPIC CNT: ABNORMAL
ORGANISM # SPEC MICROSCOPIC CNT: ABNORMAL
OSMOLALITY UR: 410 MOS/KG — SIGNIFICANT CHANGE UP (ref 50–1200)
PH UR: 5 — SIGNIFICANT CHANGE UP (ref 5–8)
PH UR: 5 — SIGNIFICANT CHANGE UP (ref 5–8)
PH UR: 7 — SIGNIFICANT CHANGE UP (ref 5–8)
PHOSPHATE SERPL-MCNC: 2.9 MG/DL — SIGNIFICANT CHANGE UP (ref 2.5–4.5)
PHOSPHATE SERPL-MCNC: 3.8 MG/DL — SIGNIFICANT CHANGE UP (ref 2.5–4.5)
PHOSPHATE SERPL-MCNC: 4 MG/DL — SIGNIFICANT CHANGE UP (ref 2.5–4.5)
PHOSPHATE SERPL-MCNC: 4.4 MG/DL — SIGNIFICANT CHANGE UP (ref 2.5–4.5)
PHOSPHATE SERPL-MCNC: 4.6 MG/DL — HIGH (ref 2.5–4.5)
PLATELET # BLD AUTO: 178 K/UL — SIGNIFICANT CHANGE UP (ref 150–400)
PLATELET # BLD AUTO: 183 K/UL — SIGNIFICANT CHANGE UP (ref 150–400)
PLATELET # BLD AUTO: 183 K/UL — SIGNIFICANT CHANGE UP (ref 150–400)
PLATELET # BLD AUTO: 184 K/UL — SIGNIFICANT CHANGE UP (ref 150–400)
PLATELET # BLD AUTO: 194 K/UL — SIGNIFICANT CHANGE UP (ref 150–400)
PLATELET # BLD AUTO: 195 K/UL — SIGNIFICANT CHANGE UP (ref 150–400)
PLATELET # BLD AUTO: 202 K/UL — SIGNIFICANT CHANGE UP (ref 150–400)
PLATELET # BLD AUTO: 204 K/UL — SIGNIFICANT CHANGE UP (ref 150–400)
PLATELET # BLD AUTO: 206 K/UL — SIGNIFICANT CHANGE UP (ref 150–400)
PLATELET # BLD AUTO: 217 K/UL — SIGNIFICANT CHANGE UP (ref 150–400)
PLATELET # BLD AUTO: 226 K/UL — SIGNIFICANT CHANGE UP (ref 150–400)
PLATELET # BLD AUTO: 238 K/UL — SIGNIFICANT CHANGE UP (ref 150–400)
POTASSIUM SERPL-MCNC: 4.3 MMOL/L — SIGNIFICANT CHANGE UP (ref 3.5–5.3)
POTASSIUM SERPL-MCNC: 4.4 MMOL/L — SIGNIFICANT CHANGE UP (ref 3.5–5.3)
POTASSIUM SERPL-MCNC: 4.4 MMOL/L — SIGNIFICANT CHANGE UP (ref 3.5–5.3)
POTASSIUM SERPL-MCNC: 4.6 MMOL/L — SIGNIFICANT CHANGE UP (ref 3.5–5.3)
POTASSIUM SERPL-MCNC: 4.8 MMOL/L — SIGNIFICANT CHANGE UP (ref 3.5–5.3)
POTASSIUM SERPL-MCNC: 4.8 MMOL/L — SIGNIFICANT CHANGE UP (ref 3.5–5.3)
POTASSIUM SERPL-MCNC: 5.3 MMOL/L — SIGNIFICANT CHANGE UP (ref 3.5–5.3)
POTASSIUM SERPL-MCNC: 5.6 MMOL/L — HIGH (ref 3.5–5.3)
POTASSIUM SERPL-SCNC: 4.3 MMOL/L — SIGNIFICANT CHANGE UP (ref 3.5–5.3)
POTASSIUM SERPL-SCNC: 4.4 MMOL/L — SIGNIFICANT CHANGE UP (ref 3.5–5.3)
POTASSIUM SERPL-SCNC: 4.4 MMOL/L — SIGNIFICANT CHANGE UP (ref 3.5–5.3)
POTASSIUM SERPL-SCNC: 4.6 MMOL/L — SIGNIFICANT CHANGE UP (ref 3.5–5.3)
POTASSIUM SERPL-SCNC: 4.8 MMOL/L — SIGNIFICANT CHANGE UP (ref 3.5–5.3)
POTASSIUM SERPL-SCNC: 4.8 MMOL/L — SIGNIFICANT CHANGE UP (ref 3.5–5.3)
POTASSIUM SERPL-SCNC: 5.3 MMOL/L — SIGNIFICANT CHANGE UP (ref 3.5–5.3)
POTASSIUM SERPL-SCNC: 5.5 MMOL/L
POTASSIUM SERPL-SCNC: 5.6 MMOL/L — HIGH (ref 3.5–5.3)
PROT PATTERN SERPL ELPH-IMP: SIGNIFICANT CHANGE UP
PROT SERPL-MCNC: 5.9 G/DL — LOW (ref 6–8.3)
PROT SERPL-MCNC: 5.9 G/DL — LOW (ref 6–8.3)
PROT SERPL-MCNC: 6.4 G/DL
PROT SERPL-MCNC: 7 G/DL — SIGNIFICANT CHANGE UP (ref 6–8.3)
PROT SERPL-MCNC: 7 G/DL — SIGNIFICANT CHANGE UP (ref 6–8.3)
PROT UR-MCNC: 100 MG/DL
PROT UR-MCNC: 100 MG/DL
PROT UR-MCNC: ABNORMAL MG/DL
PROTHROM AB SERPL-ACNC: 13.8 SEC — HIGH (ref 9.5–13)
RBC # BLD: 2.7 M/UL — LOW (ref 3.8–5.2)
RBC # BLD: 2.71 M/UL — LOW (ref 3.8–5.2)
RBC # BLD: 2.74 M/UL — LOW (ref 3.8–5.2)
RBC # BLD: 2.9 M/UL — LOW (ref 3.8–5.2)
RBC # BLD: 2.91 M/UL — LOW (ref 3.8–5.2)
RBC # BLD: 3.05 M/UL — LOW (ref 3.8–5.2)
RBC # BLD: 3.11 M/UL — LOW (ref 3.8–5.2)
RBC # BLD: 3.14 M/UL — LOW (ref 3.8–5.2)
RBC # BLD: 3.2 M/UL — LOW (ref 3.8–5.2)
RBC # BLD: 3.37 M/UL — LOW (ref 3.8–5.2)
RBC # BLD: 3.4 M/UL — LOW (ref 3.8–5.2)
RBC # BLD: 3.5 M/UL — LOW (ref 3.8–5.2)
RBC # FLD: 15.1 % — HIGH (ref 10.3–14.5)
RBC # FLD: 15.2 % — HIGH (ref 10.3–14.5)
RBC # FLD: 15.3 % — HIGH (ref 10.3–14.5)
RBC # FLD: 15.3 % — HIGH (ref 10.3–14.5)
RBC # FLD: 15.5 % — HIGH (ref 10.3–14.5)
RBC # FLD: 15.7 % — HIGH (ref 10.3–14.5)
RBC # FLD: 15.9 % — HIGH (ref 10.3–14.5)
RBC # FLD: 16.1 % — HIGH (ref 10.3–14.5)
RBC # FLD: 16.2 % — HIGH (ref 10.3–14.5)
RBC # FLD: 16.5 % — HIGH (ref 10.3–14.5)
RBC CASTS # UR COMP ASSIST: 20 /HPF — HIGH (ref 0–4)
SODIUM SERPL-SCNC: 135 MMOL/L — SIGNIFICANT CHANGE UP (ref 135–145)
SODIUM SERPL-SCNC: 135 MMOL/L — SIGNIFICANT CHANGE UP (ref 135–145)
SODIUM SERPL-SCNC: 139 MMOL/L — SIGNIFICANT CHANGE UP (ref 135–145)
SODIUM SERPL-SCNC: 139 MMOL/L — SIGNIFICANT CHANGE UP (ref 135–145)
SODIUM SERPL-SCNC: 140 MMOL/L
SODIUM SERPL-SCNC: 140 MMOL/L — SIGNIFICANT CHANGE UP (ref 135–145)
SODIUM SERPL-SCNC: 142 MMOL/L — SIGNIFICANT CHANGE UP (ref 135–145)
SODIUM SERPL-SCNC: 143 MMOL/L — SIGNIFICANT CHANGE UP (ref 135–145)
SODIUM SERPL-SCNC: 143 MMOL/L — SIGNIFICANT CHANGE UP (ref 135–145)
SODIUM SERPL-SCNC: 144 MMOL/L — SIGNIFICANT CHANGE UP (ref 135–145)
SODIUM SERPL-SCNC: 145 MMOL/L — SIGNIFICANT CHANGE UP (ref 135–145)
SODIUM UR-SCNC: 92 MMOL/L — SIGNIFICANT CHANGE UP
SP GR SPEC: 1.01 — SIGNIFICANT CHANGE UP (ref 1–1.03)
SPECIMEN SOURCE: SIGNIFICANT CHANGE UP
TIBC SERPL-MCNC: 281 UG/DL — SIGNIFICANT CHANGE UP (ref 250–450)
TROPONIN I, HIGH SENSITIVITY RESULT: 42.6 NG/L — SIGNIFICANT CHANGE UP
TROPONIN I, HIGH SENSITIVITY RESULT: 55.7 NG/L — HIGH
TROPONIN I, HIGH SENSITIVITY RESULT: 56.3 NG/L — HIGH
UIBC SERPL-MCNC: 229 UG/DL — SIGNIFICANT CHANGE UP (ref 110–370)
UROBILINOGEN FLD QL: 0.2 MG/DL — SIGNIFICANT CHANGE UP (ref 0.2–1)
WBC # BLD: 3.42 K/UL — LOW (ref 3.8–10.5)
WBC # BLD: 3.6 K/UL — LOW (ref 3.8–10.5)
WBC # BLD: 3.61 K/UL — LOW (ref 3.8–10.5)
WBC # BLD: 3.76 K/UL — LOW (ref 3.8–10.5)
WBC # BLD: 4.08 K/UL — SIGNIFICANT CHANGE UP (ref 3.8–10.5)
WBC # BLD: 4.13 K/UL — SIGNIFICANT CHANGE UP (ref 3.8–10.5)
WBC # BLD: 4.24 K/UL — SIGNIFICANT CHANGE UP (ref 3.8–10.5)
WBC # BLD: 4.48 K/UL — SIGNIFICANT CHANGE UP (ref 3.8–10.5)
WBC # BLD: 4.48 K/UL — SIGNIFICANT CHANGE UP (ref 3.8–10.5)
WBC # BLD: 4.58 K/UL — SIGNIFICANT CHANGE UP (ref 3.8–10.5)
WBC # BLD: 4.68 K/UL — SIGNIFICANT CHANGE UP (ref 3.8–10.5)
WBC # BLD: 4.7 K/UL — SIGNIFICANT CHANGE UP (ref 3.8–10.5)
WBC # FLD AUTO: 3.42 K/UL — LOW (ref 3.8–10.5)
WBC # FLD AUTO: 3.6 K/UL — LOW (ref 3.8–10.5)
WBC # FLD AUTO: 3.61 K/UL — LOW (ref 3.8–10.5)
WBC # FLD AUTO: 3.76 K/UL — LOW (ref 3.8–10.5)
WBC # FLD AUTO: 4.08 K/UL — SIGNIFICANT CHANGE UP (ref 3.8–10.5)
WBC # FLD AUTO: 4.13 K/UL — SIGNIFICANT CHANGE UP (ref 3.8–10.5)
WBC # FLD AUTO: 4.24 K/UL — SIGNIFICANT CHANGE UP (ref 3.8–10.5)
WBC # FLD AUTO: 4.48 K/UL — SIGNIFICANT CHANGE UP (ref 3.8–10.5)
WBC # FLD AUTO: 4.48 K/UL — SIGNIFICANT CHANGE UP (ref 3.8–10.5)
WBC # FLD AUTO: 4.58 K/UL — SIGNIFICANT CHANGE UP (ref 3.8–10.5)
WBC # FLD AUTO: 4.68 K/UL — SIGNIFICANT CHANGE UP (ref 3.8–10.5)
WBC # FLD AUTO: 4.7 K/UL — SIGNIFICANT CHANGE UP (ref 3.8–10.5)
WBC UR QL: 25 /HPF — HIGH (ref 0–5)

## 2024-01-01 PROCEDURE — 83735 ASSAY OF MAGNESIUM: CPT

## 2024-01-01 PROCEDURE — 99232 SBSQ HOSP IP/OBS MODERATE 35: CPT | Mod: FS

## 2024-01-01 PROCEDURE — 81001 URINALYSIS AUTO W/SCOPE: CPT

## 2024-01-01 PROCEDURE — 93005 ELECTROCARDIOGRAM TRACING: CPT

## 2024-01-01 PROCEDURE — 83550 IRON BINDING TEST: CPT

## 2024-01-01 PROCEDURE — 99213 OFFICE O/P EST LOW 20 MIN: CPT

## 2024-01-01 PROCEDURE — 83605 ASSAY OF LACTIC ACID: CPT

## 2024-01-01 PROCEDURE — 73630 X-RAY EXAM OF FOOT: CPT

## 2024-01-01 PROCEDURE — 83036 HEMOGLOBIN GLYCOSYLATED A1C: CPT

## 2024-01-01 PROCEDURE — 87077 CULTURE AEROBIC IDENTIFY: CPT

## 2024-01-01 PROCEDURE — 82728 ASSAY OF FERRITIN: CPT

## 2024-01-01 PROCEDURE — 99233 SBSQ HOSP IP/OBS HIGH 50: CPT | Mod: FS

## 2024-01-01 PROCEDURE — 84484 ASSAY OF TROPONIN QUANT: CPT

## 2024-01-01 PROCEDURE — 87040 BLOOD CULTURE FOR BACTERIA: CPT

## 2024-01-01 PROCEDURE — 80048 BASIC METABOLIC PNL TOTAL CA: CPT

## 2024-01-01 PROCEDURE — 99285 EMERGENCY DEPT VISIT HI MDM: CPT | Mod: 25

## 2024-01-01 PROCEDURE — 87070 CULTURE OTHR SPECIMN AEROBIC: CPT

## 2024-01-01 PROCEDURE — 99223 1ST HOSP IP/OBS HIGH 75: CPT

## 2024-01-01 PROCEDURE — 82962 GLUCOSE BLOOD TEST: CPT

## 2024-01-01 PROCEDURE — 80053 COMPREHEN METABOLIC PANEL: CPT

## 2024-01-01 PROCEDURE — 84300 ASSAY OF URINE SODIUM: CPT

## 2024-01-01 PROCEDURE — 85730 THROMBOPLASTIN TIME PARTIAL: CPT

## 2024-01-01 PROCEDURE — 85652 RBC SED RATE AUTOMATED: CPT

## 2024-01-01 PROCEDURE — 85027 COMPLETE CBC AUTOMATED: CPT

## 2024-01-01 PROCEDURE — 36415 COLL VENOUS BLD VENIPUNCTURE: CPT

## 2024-01-01 PROCEDURE — 70450 CT HEAD/BRAIN W/O DYE: CPT | Mod: MC

## 2024-01-01 PROCEDURE — 70450 CT HEAD/BRAIN W/O DYE: CPT | Mod: 26

## 2024-01-01 PROCEDURE — 85025 COMPLETE CBC W/AUTO DIFF WBC: CPT

## 2024-01-01 PROCEDURE — 84100 ASSAY OF PHOSPHORUS: CPT

## 2024-01-01 PROCEDURE — 99223 1ST HOSP IP/OBS HIGH 75: CPT | Mod: GC

## 2024-01-01 PROCEDURE — 99285 EMERGENCY DEPT VISIT HI MDM: CPT

## 2024-01-01 PROCEDURE — 87186 SC STD MICRODIL/AGAR DIL: CPT

## 2024-01-01 PROCEDURE — 83935 ASSAY OF URINE OSMOLALITY: CPT

## 2024-01-01 PROCEDURE — 82570 ASSAY OF URINE CREATININE: CPT

## 2024-01-01 PROCEDURE — 84165 PROTEIN E-PHORESIS SERUM: CPT

## 2024-01-01 PROCEDURE — 82784 ASSAY IGA/IGD/IGG/IGM EACH: CPT

## 2024-01-01 PROCEDURE — 99222 1ST HOSP IP/OBS MODERATE 55: CPT | Mod: FS

## 2024-01-01 PROCEDURE — 87086 URINE CULTURE/COLONY COUNT: CPT

## 2024-01-01 PROCEDURE — 73630 X-RAY EXAM OF FOOT: CPT | Mod: 26,LT

## 2024-01-01 PROCEDURE — 97163 PT EVAL HIGH COMPLEX 45 MIN: CPT

## 2024-01-01 PROCEDURE — 86140 C-REACTIVE PROTEIN: CPT

## 2024-01-01 PROCEDURE — 84155 ASSAY OF PROTEIN SERUM: CPT

## 2024-01-01 PROCEDURE — 83540 ASSAY OF IRON: CPT

## 2024-01-01 PROCEDURE — 99239 HOSP IP/OBS DSCHRG MGMT >30: CPT

## 2024-01-01 PROCEDURE — 93926 LOWER EXTREMITY STUDY: CPT | Mod: 26,LT

## 2024-01-01 PROCEDURE — 99232 SBSQ HOSP IP/OBS MODERATE 35: CPT

## 2024-01-01 PROCEDURE — 93926 LOWER EXTREMITY STUDY: CPT

## 2024-01-01 PROCEDURE — 85610 PROTHROMBIN TIME: CPT

## 2024-01-01 PROCEDURE — 97161 PT EVAL LOW COMPLEX 20 MIN: CPT

## 2024-01-01 PROCEDURE — 86334 IMMUNOFIX E-PHORESIS SERUM: CPT

## 2024-01-01 RX ORDER — INSULIN LISPRO 100/ML
VIAL (ML) SUBCUTANEOUS AT BEDTIME
Refills: 0 | Status: DISCONTINUED | OUTPATIENT
Start: 2024-01-01 | End: 2024-01-01

## 2024-01-01 RX ORDER — ACETAMINOPHEN 500 MG
650 TABLET ORAL EVERY 6 HOURS
Refills: 0 | Status: DISCONTINUED | OUTPATIENT
Start: 2024-01-01 | End: 2024-01-01

## 2024-01-01 RX ORDER — ATORVASTATIN CALCIUM 80 MG/1
80 TABLET, FILM COATED ORAL AT BEDTIME
Refills: 0 | Status: DISCONTINUED | OUTPATIENT
Start: 2024-01-01 | End: 2024-01-01

## 2024-01-01 RX ORDER — HYDROXYCHLOROQUINE SULFATE 200 MG
200 TABLET ORAL DAILY
Refills: 0 | Status: DISCONTINUED | OUTPATIENT
Start: 2024-01-01 | End: 2024-01-01

## 2024-01-01 RX ORDER — POLYETHYLENE GLYCOL 3350 17 G/17G
17 POWDER, FOR SOLUTION ORAL EVERY 24 HOURS
Refills: 0 | Status: DISCONTINUED | OUTPATIENT
Start: 2024-01-01 | End: 2024-01-01

## 2024-01-01 RX ORDER — ATORVASTATIN CALCIUM 80 MG/1
1 TABLET, FILM COATED ORAL
Qty: 30 | Refills: 0
Start: 2024-01-01 | End: 2024-05-24

## 2024-01-01 RX ORDER — GABAPENTIN 100 MG/1
100 CAPSULE ORAL
Qty: 120 | Refills: 1 | Status: ACTIVE | COMMUNITY
Start: 2021-06-24 | End: 1900-01-01

## 2024-01-01 RX ORDER — SODIUM BICARBONATE 1 MEQ/ML
650 SYRINGE (ML) INTRAVENOUS
Refills: 0 | Status: DISCONTINUED | OUTPATIENT
Start: 2024-01-01 | End: 2024-01-01

## 2024-01-01 RX ORDER — DEXTROSE 50 % IN WATER 50 %
25 SYRINGE (ML) INTRAVENOUS ONCE
Refills: 0 | Status: DISCONTINUED | OUTPATIENT
Start: 2024-01-01 | End: 2024-01-01

## 2024-01-01 RX ORDER — SODIUM CHLORIDE 9 MG/ML
1000 INJECTION, SOLUTION INTRAVENOUS
Refills: 0 | Status: DISCONTINUED | OUTPATIENT
Start: 2024-01-01 | End: 2024-01-01

## 2024-01-01 RX ORDER — CEFTRIAXONE 500 MG/1
1000 INJECTION, POWDER, FOR SOLUTION INTRAMUSCULAR; INTRAVENOUS ONCE
Refills: 0 | Status: COMPLETED | OUTPATIENT
Start: 2024-01-01 | End: 2024-01-01

## 2024-01-01 RX ORDER — ASPIRIN/CALCIUM CARB/MAGNESIUM 324 MG
81 TABLET ORAL DAILY
Refills: 0 | Status: DISCONTINUED | OUTPATIENT
Start: 2024-01-01 | End: 2024-01-01

## 2024-01-01 RX ORDER — SODIUM CHLORIDE 9 MG/ML
1000 INJECTION INTRAMUSCULAR; INTRAVENOUS; SUBCUTANEOUS ONCE
Refills: 0 | Status: COMPLETED | OUTPATIENT
Start: 2024-01-01 | End: 2024-01-01

## 2024-01-01 RX ORDER — FOLIC ACID 0.8 MG
1 TABLET ORAL DAILY
Refills: 0 | Status: DISCONTINUED | OUTPATIENT
Start: 2024-01-01 | End: 2024-01-01

## 2024-01-01 RX ORDER — CEFTRIAXONE 500 MG/1
INJECTION, POWDER, FOR SOLUTION INTRAMUSCULAR; INTRAVENOUS
Refills: 0 | Status: COMPLETED | OUTPATIENT
Start: 2024-01-01 | End: 2024-01-01

## 2024-01-01 RX ORDER — SODIUM CHLORIDE 9 MG/ML
1000 INJECTION INTRAMUSCULAR; INTRAVENOUS; SUBCUTANEOUS ONCE
Refills: 0 | Status: DISCONTINUED | OUTPATIENT
Start: 2024-01-01 | End: 2024-01-01

## 2024-01-01 RX ORDER — DEXAMETHASONE 0.5 MG/5ML
1 ELIXIR ORAL
Refills: 0 | DISCHARGE

## 2024-01-01 RX ORDER — GABAPENTIN 400 MG/1
100 CAPSULE ORAL
Refills: 0 | Status: DISCONTINUED | OUTPATIENT
Start: 2024-01-01 | End: 2024-01-01

## 2024-01-01 RX ORDER — SODIUM BICARBONATE 1 MEQ/ML
1 SYRINGE (ML) INTRAVENOUS
Qty: 0 | Refills: 0 | DISCHARGE
Start: 2024-01-01

## 2024-01-01 RX ORDER — FLUCONAZOLE 150 MG/1
100 TABLET ORAL DAILY
Refills: 0 | Status: DISCONTINUED | OUTPATIENT
Start: 2024-01-01 | End: 2024-01-01

## 2024-01-01 RX ORDER — PANTOPRAZOLE SODIUM 20 MG/1
40 TABLET, DELAYED RELEASE ORAL
Refills: 0 | Status: DISCONTINUED | OUTPATIENT
Start: 2024-01-01 | End: 2024-01-01

## 2024-01-01 RX ORDER — HYDROXYCHLOROQUINE SULFATE 200 MG
1 TABLET ORAL
Qty: 0 | Refills: 0 | DISCHARGE
Start: 2024-01-01

## 2024-01-01 RX ORDER — DEXTROSE 50 % IN WATER 50 %
15 SYRINGE (ML) INTRAVENOUS ONCE
Refills: 0 | Status: DISCONTINUED | OUTPATIENT
Start: 2024-01-01 | End: 2024-01-01

## 2024-01-01 RX ORDER — POMALIDOMIDE 1 MG/1
1 CAPSULE ORAL
Qty: 0 | Refills: 0 | DISCHARGE

## 2024-01-01 RX ORDER — LISINOPRIL 2.5 MG/1
2.5 TABLET ORAL DAILY
Refills: 0 | Status: DISCONTINUED | OUTPATIENT
Start: 2024-01-01 | End: 2024-01-01

## 2024-01-01 RX ORDER — INSULIN LISPRO 100/ML
6 VIAL (ML) SUBCUTANEOUS
Refills: 0 | Status: DISCONTINUED | OUTPATIENT
Start: 2024-01-01 | End: 2024-01-01

## 2024-01-01 RX ORDER — INSULIN LISPRO 100/ML
VIAL (ML) SUBCUTANEOUS
Refills: 0 | Status: DISCONTINUED | OUTPATIENT
Start: 2024-01-01 | End: 2024-01-01

## 2024-01-01 RX ORDER — INSULIN GLARGINE 100 [IU]/ML
5 INJECTION, SOLUTION SUBCUTANEOUS AT BEDTIME
Refills: 0 | Status: DISCONTINUED | OUTPATIENT
Start: 2024-01-01 | End: 2024-01-01

## 2024-01-01 RX ORDER — HEPARIN SODIUM 5000 [USP'U]/ML
5000 INJECTION INTRAVENOUS; SUBCUTANEOUS EVERY 8 HOURS
Refills: 0 | Status: DISCONTINUED | OUTPATIENT
Start: 2024-01-01 | End: 2024-01-01

## 2024-01-01 RX ORDER — AMLODIPINE BESYLATE 2.5 MG/1
2.5 TABLET ORAL DAILY
Refills: 0 | Status: DISCONTINUED | OUTPATIENT
Start: 2024-01-01 | End: 2024-01-01

## 2024-01-01 RX ORDER — RISPERIDONE 4 MG/1
0.25 TABLET ORAL AT BEDTIME
Refills: 0 | Status: DISCONTINUED | OUTPATIENT
Start: 2024-01-01 | End: 2024-01-01

## 2024-01-01 RX ORDER — ASPIRIN/CALCIUM CARB/MAGNESIUM 324 MG
1 TABLET ORAL
Qty: 0 | Refills: 0 | DISCHARGE
Start: 2024-01-01

## 2024-01-01 RX ORDER — SODIUM CHLORIDE 9 MG/ML
1000 INJECTION INTRAMUSCULAR; INTRAVENOUS; SUBCUTANEOUS
Refills: 0 | Status: DISCONTINUED | OUTPATIENT
Start: 2024-01-01 | End: 2024-01-01

## 2024-01-01 RX ORDER — CALCITRIOL 0.5 UG/1
1 CAPSULE ORAL
Refills: 0 | DISCHARGE

## 2024-01-01 RX ORDER — ACETAMINOPHEN 500 MG
1000 TABLET ORAL EVERY 8 HOURS
Refills: 0 | Status: DISCONTINUED | OUTPATIENT
Start: 2024-01-01 | End: 2024-01-01

## 2024-01-01 RX ORDER — INSULIN HUMAN 100 [IU]/ML
5 INJECTION, SOLUTION SUBCUTANEOUS ONCE
Refills: 0 | Status: COMPLETED | OUTPATIENT
Start: 2024-01-01 | End: 2024-01-01

## 2024-01-01 RX ORDER — AMLODIPINE BESYLATE 2.5 MG/1
2.5 TABLET ORAL ONCE
Refills: 0 | Status: COMPLETED | OUTPATIENT
Start: 2024-01-01 | End: 2024-01-01

## 2024-01-01 RX ORDER — PANTOPRAZOLE SODIUM 20 MG/1
1 TABLET, DELAYED RELEASE ORAL
Qty: 30 | Refills: 0
Start: 2024-01-01 | End: 2024-05-24

## 2024-01-01 RX ORDER — LINAGLIPTIN 5 MG/1
1 TABLET, FILM COATED ORAL
Qty: 30 | Refills: 0
Start: 2024-01-01 | End: 2024-01-01

## 2024-01-01 RX ORDER — ONDANSETRON 8 MG/1
4 TABLET, FILM COATED ORAL EVERY 8 HOURS
Refills: 0 | Status: DISCONTINUED | OUTPATIENT
Start: 2024-01-01 | End: 2024-01-01

## 2024-01-01 RX ORDER — FERROUS SULFATE 325(65) MG
1 TABLET ORAL
Qty: 0 | Refills: 0 | DISCHARGE

## 2024-01-01 RX ORDER — MORPHINE SULFATE 50 MG/1
1 CAPSULE, EXTENDED RELEASE ORAL ONCE
Refills: 0 | Status: DISCONTINUED | OUTPATIENT
Start: 2024-01-01 | End: 2024-01-01

## 2024-01-01 RX ORDER — LIDOCAINE 4 G/100G
1 CREAM TOPICAL DAILY
Refills: 0 | Status: DISCONTINUED | OUTPATIENT
Start: 2024-01-01 | End: 2024-01-01

## 2024-01-01 RX ORDER — CEFTRIAXONE 500 MG/1
1000 INJECTION, POWDER, FOR SOLUTION INTRAMUSCULAR; INTRAVENOUS EVERY 24 HOURS
Refills: 0 | Status: COMPLETED | OUTPATIENT
Start: 2024-01-01 | End: 2024-01-01

## 2024-01-01 RX ORDER — MEMANTINE HYDROCHLORIDE 10 MG/1
1 TABLET ORAL
Qty: 0 | Refills: 0 | DISCHARGE

## 2024-01-01 RX ORDER — FERROUS SULFATE 325(65) MG
325 TABLET ORAL DAILY
Refills: 0 | Status: DISCONTINUED | OUTPATIENT
Start: 2024-01-01 | End: 2024-01-01

## 2024-01-01 RX ORDER — DEXAMETHASONE 0.5 MG/5ML
4 ELIXIR ORAL
Refills: 0 | Status: DISCONTINUED | OUTPATIENT
Start: 2024-01-01 | End: 2024-01-01

## 2024-01-01 RX ORDER — POLYETHYLENE GLYCOL 3350 17 G/17G
17 POWDER, FOR SOLUTION ORAL
Qty: 238 | Refills: 0
Start: 2024-01-01 | End: 2024-05-09

## 2024-01-01 RX ORDER — RISPERIDONE 4 MG/1
1 TABLET ORAL
Qty: 0 | Refills: 0 | DISCHARGE
Start: 2024-01-01

## 2024-01-01 RX ORDER — MEMANTINE HYDROCHLORIDE 10 MG/1
1 TABLET ORAL
Qty: 0 | Refills: 0 | DISCHARGE
Start: 2024-01-01

## 2024-01-01 RX ORDER — GABAPENTIN 400 MG/1
2 CAPSULE ORAL
Refills: 0 | DISCHARGE

## 2024-01-01 RX ORDER — SENNA PLUS 8.6 MG/1
2 TABLET ORAL AT BEDTIME
Refills: 0 | Status: DISCONTINUED | OUTPATIENT
Start: 2024-01-01 | End: 2024-01-01

## 2024-01-01 RX ORDER — LANOLIN ALCOHOL/MO/W.PET/CERES
1 CREAM (GRAM) TOPICAL
Refills: 0 | DISCHARGE

## 2024-01-01 RX ORDER — GLUCAGON INJECTION, SOLUTION 0.5 MG/.1ML
1 INJECTION, SOLUTION SUBCUTANEOUS ONCE
Refills: 0 | Status: DISCONTINUED | OUTPATIENT
Start: 2024-01-01 | End: 2024-01-01

## 2024-01-01 RX ORDER — LIDOCAINE 4 G/100G
2 CREAM TOPICAL EVERY 24 HOURS
Refills: 0 | Status: DISCONTINUED | OUTPATIENT
Start: 2024-01-01 | End: 2024-01-01

## 2024-01-01 RX ORDER — SODIUM BICARBONATE 1 MEQ/ML
1 SYRINGE (ML) INTRAVENOUS
Refills: 0 | DISCHARGE

## 2024-01-01 RX ORDER — GABAPENTIN 400 MG/1
200 CAPSULE ORAL
Refills: 0 | Status: DISCONTINUED | OUTPATIENT
Start: 2024-01-01 | End: 2024-01-01

## 2024-01-01 RX ORDER — DEXTROSE 50 % IN WATER 50 %
12.5 SYRINGE (ML) INTRAVENOUS ONCE
Refills: 0 | Status: DISCONTINUED | OUTPATIENT
Start: 2024-01-01 | End: 2024-01-01

## 2024-01-01 RX ORDER — CALCITRIOL 0.5 UG/1
0.25 CAPSULE ORAL DAILY
Refills: 0 | Status: DISCONTINUED | OUTPATIENT
Start: 2024-01-01 | End: 2024-01-01

## 2024-01-01 RX ORDER — SODIUM CHLORIDE 9 MG/ML
500 INJECTION, SOLUTION INTRAVENOUS
Refills: 0 | Status: DISCONTINUED | OUTPATIENT
Start: 2024-01-01 | End: 2024-01-01

## 2024-01-01 RX ORDER — MEMANTINE HYDROCHLORIDE 10 MG/1
5 TABLET ORAL AT BEDTIME
Refills: 0 | Status: DISCONTINUED | OUTPATIENT
Start: 2024-01-01 | End: 2024-01-01

## 2024-01-01 RX ORDER — INSULIN LISPRO 100/ML
3 VIAL (ML) SUBCUTANEOUS
Refills: 0 | Status: DISCONTINUED | OUTPATIENT
Start: 2024-01-01 | End: 2024-01-01

## 2024-01-01 RX ORDER — TRAMADOL HYDROCHLORIDE 50 MG/1
25 TABLET ORAL ONCE
Refills: 0 | Status: DISCONTINUED | OUTPATIENT
Start: 2024-01-01 | End: 2024-01-01

## 2024-01-01 RX ORDER — LIDOCAINE 4 G/100G
1 CREAM TOPICAL
Qty: 5 | Refills: 0
Start: 2024-01-01 | End: 2024-05-20

## 2024-01-01 RX ORDER — RISPERIDONE 4 MG/1
1 TABLET ORAL
Refills: 0 | DISCHARGE

## 2024-01-01 RX ORDER — LISINOPRIL 2.5 MG/1
1 TABLET ORAL
Refills: 0 | DISCHARGE

## 2024-01-01 RX ORDER — LANOLIN ALCOHOL/MO/W.PET/CERES
3 CREAM (GRAM) TOPICAL AT BEDTIME
Refills: 0 | Status: DISCONTINUED | OUTPATIENT
Start: 2024-01-01 | End: 2024-01-01

## 2024-01-01 RX ORDER — ROSUVASTATIN CALCIUM 5 MG/1
1 TABLET ORAL
Refills: 0 | DISCHARGE

## 2024-01-01 RX ORDER — AMLODIPINE BESYLATE 2.5 MG/1
1 TABLET ORAL
Qty: 0 | Refills: 0 | DISCHARGE
Start: 2024-01-01

## 2024-01-01 RX ORDER — ASPIRIN/CALCIUM CARB/MAGNESIUM 324 MG
1 TABLET ORAL
Qty: 0 | Refills: 0 | DISCHARGE

## 2024-01-01 RX ORDER — TRAMADOL HYDROCHLORIDE 50 MG/1
50 TABLET ORAL EVERY 8 HOURS
Refills: 0 | Status: DISCONTINUED | OUTPATIENT
Start: 2024-01-01 | End: 2024-01-01

## 2024-01-01 RX ORDER — FOLIC ACID 0.8 MG
1 TABLET ORAL
Qty: 0 | Refills: 0 | DISCHARGE
Start: 2024-01-01

## 2024-01-01 RX ORDER — GABAPENTIN 400 MG/1
2 CAPSULE ORAL
Qty: 0 | Refills: 0 | DISCHARGE
Start: 2024-01-01

## 2024-01-01 RX ORDER — INSULIN GLARGINE 100 [IU]/ML
4 INJECTION, SOLUTION SUBCUTANEOUS AT BEDTIME
Refills: 0 | Status: DISCONTINUED | OUTPATIENT
Start: 2024-01-01 | End: 2024-01-01

## 2024-01-01 RX ORDER — LANOLIN ALCOHOL/MO/W.PET/CERES
1 CREAM (GRAM) TOPICAL
Qty: 0 | Refills: 0 | DISCHARGE
Start: 2024-01-01

## 2024-01-01 RX ORDER — DEXAMETHASONE 4 MG/1
4 TABLET ORAL
Qty: 20 | Refills: 3 | Status: ACTIVE | COMMUNITY
Start: 2023-02-16 | End: 1900-01-01

## 2024-01-01 RX ORDER — FLUCONAZOLE 150 MG/1
1 TABLET ORAL
Qty: 2 | Refills: 0
Start: 2024-01-01 | End: 2024-01-01

## 2024-01-01 RX ORDER — FERROUS SULFATE 325(65) MG
1 TABLET ORAL
Qty: 0 | Refills: 0 | DISCHARGE
Start: 2024-01-01

## 2024-01-01 RX ORDER — CALCITRIOL 0.5 UG/1
1 CAPSULE ORAL
Qty: 0 | Refills: 0 | DISCHARGE
Start: 2024-01-01

## 2024-01-01 RX ORDER — HYDROXYCHLOROQUINE SULFATE 200 MG
1 TABLET ORAL
Qty: 0 | Refills: 0 | DISCHARGE

## 2024-01-01 RX ADMIN — TRAMADOL HYDROCHLORIDE 50 MILLIGRAM(S): 50 TABLET ORAL at 10:31

## 2024-01-01 RX ADMIN — FLUCONAZOLE 100 MILLIGRAM(S): 150 TABLET ORAL at 11:50

## 2024-01-01 RX ADMIN — Medication 81 MILLIGRAM(S): at 11:59

## 2024-01-01 RX ADMIN — GABAPENTIN 200 MILLIGRAM(S): 400 CAPSULE ORAL at 06:41

## 2024-01-01 RX ADMIN — GABAPENTIN 200 MILLIGRAM(S): 400 CAPSULE ORAL at 05:20

## 2024-01-01 RX ADMIN — LIDOCAINE 1 PATCH: 4 CREAM TOPICAL at 12:26

## 2024-01-01 RX ADMIN — CEFTRIAXONE 1000 MILLIGRAM(S): 500 INJECTION, POWDER, FOR SOLUTION INTRAMUSCULAR; INTRAVENOUS at 20:57

## 2024-01-01 RX ADMIN — Medication 1000 MILLIGRAM(S): at 14:45

## 2024-01-01 RX ADMIN — LIDOCAINE 2 PATCH: 4 CREAM TOPICAL at 23:26

## 2024-01-01 RX ADMIN — ATORVASTATIN CALCIUM 80 MILLIGRAM(S): 80 TABLET, FILM COATED ORAL at 21:13

## 2024-01-01 RX ADMIN — RISPERIDONE 0.25 MILLIGRAM(S): 4 TABLET ORAL at 21:29

## 2024-01-01 RX ADMIN — MEMANTINE HYDROCHLORIDE 5 MILLIGRAM(S): 10 TABLET ORAL at 23:26

## 2024-01-01 RX ADMIN — PANTOPRAZOLE SODIUM 40 MILLIGRAM(S): 20 TABLET, DELAYED RELEASE ORAL at 05:59

## 2024-01-01 RX ADMIN — Medication 1000 MILLIGRAM(S): at 10:30

## 2024-01-01 RX ADMIN — Medication 1000 MILLIGRAM(S): at 06:23

## 2024-01-01 RX ADMIN — Medication 1 MILLIGRAM(S): at 11:55

## 2024-01-01 RX ADMIN — Medication 650 MILLIGRAM(S): at 06:42

## 2024-01-01 RX ADMIN — Medication 1 MILLIGRAM(S): at 12:27

## 2024-01-01 RX ADMIN — AMLODIPINE BESYLATE 2.5 MILLIGRAM(S): 2.5 TABLET ORAL at 05:57

## 2024-01-01 RX ADMIN — Medication 650 MILLIGRAM(S): at 20:35

## 2024-01-01 RX ADMIN — CEFTRIAXONE 100 MILLIGRAM(S): 500 INJECTION, POWDER, FOR SOLUTION INTRAMUSCULAR; INTRAVENOUS at 00:48

## 2024-01-01 RX ADMIN — SODIUM CHLORIDE 1000 MILLILITER(S): 9 INJECTION INTRAMUSCULAR; INTRAVENOUS; SUBCUTANEOUS at 00:52

## 2024-01-01 RX ADMIN — CEFTRIAXONE 100 MILLIGRAM(S): 500 INJECTION, POWDER, FOR SOLUTION INTRAMUSCULAR; INTRAVENOUS at 00:41

## 2024-01-01 RX ADMIN — CEFTRIAXONE 100 MILLIGRAM(S): 500 INJECTION, POWDER, FOR SOLUTION INTRAMUSCULAR; INTRAVENOUS at 21:18

## 2024-01-01 RX ADMIN — FLUCONAZOLE 100 MILLIGRAM(S): 150 TABLET ORAL at 11:55

## 2024-01-01 RX ADMIN — TRAMADOL HYDROCHLORIDE 50 MILLIGRAM(S): 50 TABLET ORAL at 21:17

## 2024-01-01 RX ADMIN — Medication 325 MILLIGRAM(S): at 11:27

## 2024-01-01 RX ADMIN — CALCITRIOL 0.25 MICROGRAM(S): 0.5 CAPSULE ORAL at 17:08

## 2024-01-01 RX ADMIN — LISINOPRIL 2.5 MILLIGRAM(S): 2.5 TABLET ORAL at 05:59

## 2024-01-01 RX ADMIN — Medication 1 MILLIGRAM(S): at 14:53

## 2024-01-01 RX ADMIN — HEPARIN SODIUM 5000 UNIT(S): 5000 INJECTION INTRAVENOUS; SUBCUTANEOUS at 21:19

## 2024-01-01 RX ADMIN — RISPERIDONE 0.25 MILLIGRAM(S): 4 TABLET ORAL at 21:13

## 2024-01-01 RX ADMIN — Medication 1 MILLIGRAM(S): at 11:51

## 2024-01-01 RX ADMIN — HEPARIN SODIUM 5000 UNIT(S): 5000 INJECTION INTRAVENOUS; SUBCUTANEOUS at 21:17

## 2024-01-01 RX ADMIN — Medication 1: at 11:52

## 2024-01-01 RX ADMIN — Medication 6 UNIT(S): at 11:52

## 2024-01-01 RX ADMIN — Medication 200 MILLIGRAM(S): at 11:50

## 2024-01-01 RX ADMIN — GABAPENTIN 200 MILLIGRAM(S): 400 CAPSULE ORAL at 17:35

## 2024-01-01 RX ADMIN — LIDOCAINE 1 PATCH: 4 CREAM TOPICAL at 08:42

## 2024-01-01 RX ADMIN — MORPHINE SULFATE 1 MILLIGRAM(S): 50 CAPSULE, EXTENDED RELEASE ORAL at 18:05

## 2024-01-01 RX ADMIN — LIDOCAINE 2 PATCH: 4 CREAM TOPICAL at 19:22

## 2024-01-01 RX ADMIN — Medication 3 MILLIGRAM(S): at 22:05

## 2024-01-01 RX ADMIN — Medication 650 MILLIGRAM(S): at 17:45

## 2024-01-01 RX ADMIN — Medication 650 MILLIGRAM(S): at 18:05

## 2024-01-01 RX ADMIN — Medication 3 UNIT(S): at 17:05

## 2024-01-01 RX ADMIN — Medication 650 MILLIGRAM(S): at 05:20

## 2024-01-01 RX ADMIN — Medication 2: at 12:03

## 2024-01-01 RX ADMIN — LIDOCAINE 1 PATCH: 4 CREAM TOPICAL at 00:05

## 2024-01-01 RX ADMIN — CALCITRIOL 0.25 MICROGRAM(S): 0.5 CAPSULE ORAL at 11:37

## 2024-01-01 RX ADMIN — PANTOPRAZOLE SODIUM 40 MILLIGRAM(S): 20 TABLET, DELAYED RELEASE ORAL at 06:13

## 2024-01-01 RX ADMIN — AMLODIPINE BESYLATE 2.5 MILLIGRAM(S): 2.5 TABLET ORAL at 21:19

## 2024-01-01 RX ADMIN — MEMANTINE HYDROCHLORIDE 5 MILLIGRAM(S): 10 TABLET ORAL at 22:27

## 2024-01-01 RX ADMIN — GABAPENTIN 100 MILLIGRAM(S): 400 CAPSULE ORAL at 05:57

## 2024-01-01 RX ADMIN — MEMANTINE HYDROCHLORIDE 5 MILLIGRAM(S): 10 TABLET ORAL at 21:13

## 2024-01-01 RX ADMIN — TRAMADOL HYDROCHLORIDE 25 MILLIGRAM(S): 50 TABLET ORAL at 19:07

## 2024-01-01 RX ADMIN — MEMANTINE HYDROCHLORIDE 5 MILLIGRAM(S): 10 TABLET ORAL at 21:29

## 2024-01-01 RX ADMIN — RISPERIDONE 0.25 MILLIGRAM(S): 4 TABLET ORAL at 21:36

## 2024-01-01 RX ADMIN — GABAPENTIN 100 MILLIGRAM(S): 400 CAPSULE ORAL at 19:07

## 2024-01-01 RX ADMIN — CALCITRIOL 0.25 MICROGRAM(S): 0.5 CAPSULE ORAL at 11:55

## 2024-01-01 RX ADMIN — INSULIN GLARGINE 5 UNIT(S): 100 INJECTION, SOLUTION SUBCUTANEOUS at 21:13

## 2024-01-01 RX ADMIN — ATORVASTATIN CALCIUM 80 MILLIGRAM(S): 80 TABLET, FILM COATED ORAL at 22:27

## 2024-01-01 RX ADMIN — FLUCONAZOLE 100 MILLIGRAM(S): 150 TABLET ORAL at 11:33

## 2024-01-01 RX ADMIN — LIDOCAINE 2 PATCH: 4 CREAM TOPICAL at 12:04

## 2024-01-01 RX ADMIN — Medication 650 MILLIGRAM(S): at 06:00

## 2024-01-01 RX ADMIN — MEMANTINE HYDROCHLORIDE 5 MILLIGRAM(S): 10 TABLET ORAL at 22:01

## 2024-01-01 RX ADMIN — Medication 81 MILLIGRAM(S): at 11:32

## 2024-01-01 RX ADMIN — MEMANTINE HYDROCHLORIDE 5 MILLIGRAM(S): 10 TABLET ORAL at 21:20

## 2024-01-01 RX ADMIN — Medication 200 MILLIGRAM(S): at 11:38

## 2024-01-01 RX ADMIN — CALCITRIOL 0.25 MICROGRAM(S): 0.5 CAPSULE ORAL at 12:22

## 2024-01-01 RX ADMIN — PANTOPRAZOLE SODIUM 40 MILLIGRAM(S): 20 TABLET, DELAYED RELEASE ORAL at 05:32

## 2024-01-01 RX ADMIN — HEPARIN SODIUM 5000 UNIT(S): 5000 INJECTION INTRAVENOUS; SUBCUTANEOUS at 06:00

## 2024-01-01 RX ADMIN — TRAMADOL HYDROCHLORIDE 50 MILLIGRAM(S): 50 TABLET ORAL at 05:59

## 2024-01-01 RX ADMIN — LISINOPRIL 2.5 MILLIGRAM(S): 2.5 TABLET ORAL at 06:13

## 2024-01-01 RX ADMIN — Medication 81 MILLIGRAM(S): at 11:50

## 2024-01-01 RX ADMIN — Medication 3 MILLIGRAM(S): at 21:17

## 2024-01-01 RX ADMIN — Medication 1: at 17:32

## 2024-01-01 RX ADMIN — GABAPENTIN 200 MILLIGRAM(S): 400 CAPSULE ORAL at 17:05

## 2024-01-01 RX ADMIN — Medication 650 MILLIGRAM(S): at 05:52

## 2024-01-01 RX ADMIN — TRAMADOL HYDROCHLORIDE 50 MILLIGRAM(S): 50 TABLET ORAL at 22:06

## 2024-01-01 RX ADMIN — AMLODIPINE BESYLATE 2.5 MILLIGRAM(S): 2.5 TABLET ORAL at 12:52

## 2024-01-01 RX ADMIN — ATORVASTATIN CALCIUM 80 MILLIGRAM(S): 80 TABLET, FILM COATED ORAL at 21:17

## 2024-01-01 RX ADMIN — CEFTRIAXONE 100 MILLIGRAM(S): 500 INJECTION, POWDER, FOR SOLUTION INTRAMUSCULAR; INTRAVENOUS at 21:33

## 2024-01-01 RX ADMIN — Medication 3: at 12:22

## 2024-01-01 RX ADMIN — LIDOCAINE 2 PATCH: 4 CREAM TOPICAL at 11:56

## 2024-01-01 RX ADMIN — GABAPENTIN 100 MILLIGRAM(S): 400 CAPSULE ORAL at 06:13

## 2024-01-01 RX ADMIN — LISINOPRIL 2.5 MILLIGRAM(S): 2.5 TABLET ORAL at 05:52

## 2024-01-01 RX ADMIN — Medication 650 MILLIGRAM(S): at 20:05

## 2024-01-01 RX ADMIN — INSULIN GLARGINE 5 UNIT(S): 100 INJECTION, SOLUTION SUBCUTANEOUS at 21:30

## 2024-01-01 RX ADMIN — Medication 325 MILLIGRAM(S): at 11:38

## 2024-01-01 RX ADMIN — Medication 3 UNIT(S): at 08:16

## 2024-01-01 RX ADMIN — TRAMADOL HYDROCHLORIDE 50 MILLIGRAM(S): 50 TABLET ORAL at 21:36

## 2024-01-01 RX ADMIN — LIDOCAINE 2 PATCH: 4 CREAM TOPICAL at 00:00

## 2024-01-01 RX ADMIN — LIDOCAINE 1 PATCH: 4 CREAM TOPICAL at 00:32

## 2024-01-01 RX ADMIN — MORPHINE SULFATE 1 MILLIGRAM(S): 50 CAPSULE, EXTENDED RELEASE ORAL at 18:35

## 2024-01-01 RX ADMIN — LIDOCAINE 1 PATCH: 4 CREAM TOPICAL at 11:28

## 2024-01-01 RX ADMIN — Medication 200 MILLIGRAM(S): at 12:05

## 2024-01-01 RX ADMIN — Medication 1000 MILLIGRAM(S): at 22:13

## 2024-01-01 RX ADMIN — Medication 650 MILLIGRAM(S): at 22:45

## 2024-01-01 RX ADMIN — Medication 650 MILLIGRAM(S): at 19:07

## 2024-01-01 RX ADMIN — GABAPENTIN 100 MILLIGRAM(S): 400 CAPSULE ORAL at 19:14

## 2024-01-01 RX ADMIN — RISPERIDONE 0.25 MILLIGRAM(S): 4 TABLET ORAL at 22:27

## 2024-01-01 RX ADMIN — Medication 1000 MILLIGRAM(S): at 05:53

## 2024-01-01 RX ADMIN — Medication 200 MILLIGRAM(S): at 12:24

## 2024-01-01 RX ADMIN — GABAPENTIN 200 MILLIGRAM(S): 400 CAPSULE ORAL at 05:33

## 2024-01-01 RX ADMIN — LIDOCAINE 1 PATCH: 4 CREAM TOPICAL at 19:07

## 2024-01-01 RX ADMIN — LIDOCAINE 2 PATCH: 4 CREAM TOPICAL at 00:20

## 2024-01-01 RX ADMIN — LIDOCAINE 2 PATCH: 4 CREAM TOPICAL at 00:35

## 2024-01-01 RX ADMIN — Medication 325 MILLIGRAM(S): at 12:05

## 2024-01-01 RX ADMIN — AMLODIPINE BESYLATE 2.5 MILLIGRAM(S): 2.5 TABLET ORAL at 05:21

## 2024-01-01 RX ADMIN — TRAMADOL HYDROCHLORIDE 50 MILLIGRAM(S): 50 TABLET ORAL at 06:29

## 2024-01-01 RX ADMIN — Medication 325 MILLIGRAM(S): at 12:27

## 2024-01-01 RX ADMIN — LIDOCAINE 2 PATCH: 4 CREAM TOPICAL at 19:24

## 2024-01-01 RX ADMIN — Medication 1 MILLIGRAM(S): at 11:38

## 2024-01-01 RX ADMIN — HEPARIN SODIUM 5000 UNIT(S): 5000 INJECTION INTRAVENOUS; SUBCUTANEOUS at 05:53

## 2024-01-01 RX ADMIN — AMLODIPINE BESYLATE 2.5 MILLIGRAM(S): 2.5 TABLET ORAL at 06:13

## 2024-01-01 RX ADMIN — LISINOPRIL 2.5 MILLIGRAM(S): 2.5 TABLET ORAL at 05:32

## 2024-01-01 RX ADMIN — AMLODIPINE BESYLATE 2.5 MILLIGRAM(S): 2.5 TABLET ORAL at 06:42

## 2024-01-01 RX ADMIN — Medication 650 MILLIGRAM(S): at 05:21

## 2024-01-01 RX ADMIN — LIDOCAINE 2 PATCH: 4 CREAM TOPICAL at 12:26

## 2024-01-01 RX ADMIN — LIDOCAINE 2 PATCH: 4 CREAM TOPICAL at 19:16

## 2024-01-01 RX ADMIN — Medication 3 MILLIGRAM(S): at 22:01

## 2024-01-01 RX ADMIN — HEPARIN SODIUM 5000 UNIT(S): 5000 INJECTION INTRAVENOUS; SUBCUTANEOUS at 05:57

## 2024-01-01 RX ADMIN — AMLODIPINE BESYLATE 2.5 MILLIGRAM(S): 2.5 TABLET ORAL at 05:59

## 2024-01-01 RX ADMIN — LISINOPRIL 2.5 MILLIGRAM(S): 2.5 TABLET ORAL at 05:10

## 2024-01-01 RX ADMIN — GABAPENTIN 100 MILLIGRAM(S): 400 CAPSULE ORAL at 18:05

## 2024-01-01 RX ADMIN — Medication 81 MILLIGRAM(S): at 12:22

## 2024-01-01 RX ADMIN — Medication 650 MILLIGRAM(S): at 19:56

## 2024-01-01 RX ADMIN — GABAPENTIN 200 MILLIGRAM(S): 400 CAPSULE ORAL at 17:44

## 2024-01-01 RX ADMIN — Medication 3 MILLIGRAM(S): at 21:36

## 2024-01-01 RX ADMIN — Medication 1 MILLIGRAM(S): at 12:48

## 2024-01-01 RX ADMIN — Medication 325 MILLIGRAM(S): at 14:53

## 2024-01-01 RX ADMIN — Medication 650 MILLIGRAM(S): at 22:01

## 2024-01-01 RX ADMIN — Medication 1000 MILLIGRAM(S): at 11:19

## 2024-01-01 RX ADMIN — Medication 81 MILLIGRAM(S): at 12:23

## 2024-01-01 RX ADMIN — Medication 200 MILLIGRAM(S): at 12:22

## 2024-01-01 RX ADMIN — Medication 650 MILLIGRAM(S): at 22:11

## 2024-01-01 RX ADMIN — ATORVASTATIN CALCIUM 80 MILLIGRAM(S): 80 TABLET, FILM COATED ORAL at 22:04

## 2024-01-01 RX ADMIN — ATORVASTATIN CALCIUM 80 MILLIGRAM(S): 80 TABLET, FILM COATED ORAL at 21:36

## 2024-01-01 RX ADMIN — MEMANTINE HYDROCHLORIDE 5 MILLIGRAM(S): 10 TABLET ORAL at 22:05

## 2024-01-01 RX ADMIN — Medication 1 MILLIGRAM(S): at 11:40

## 2024-01-01 RX ADMIN — Medication 3 MILLIGRAM(S): at 22:27

## 2024-01-01 RX ADMIN — Medication 1000 MILLIGRAM(S): at 15:38

## 2024-01-01 RX ADMIN — Medication 1000 MILLIGRAM(S): at 21:46

## 2024-01-01 RX ADMIN — AMLODIPINE BESYLATE 2.5 MILLIGRAM(S): 2.5 TABLET ORAL at 05:32

## 2024-01-01 RX ADMIN — Medication 650 MILLIGRAM(S): at 17:05

## 2024-01-01 RX ADMIN — Medication 3 MILLIGRAM(S): at 21:13

## 2024-01-01 RX ADMIN — Medication 81 MILLIGRAM(S): at 11:38

## 2024-01-01 RX ADMIN — Medication 650 MILLIGRAM(S): at 06:12

## 2024-01-01 RX ADMIN — Medication 1 MILLIGRAM(S): at 11:27

## 2024-01-01 RX ADMIN — LIDOCAINE 1 PATCH: 4 CREAM TOPICAL at 15:02

## 2024-01-01 RX ADMIN — Medication 3 MILLIGRAM(S): at 21:29

## 2024-01-01 RX ADMIN — GABAPENTIN 200 MILLIGRAM(S): 400 CAPSULE ORAL at 18:40

## 2024-01-01 RX ADMIN — RISPERIDONE 0.25 MILLIGRAM(S): 4 TABLET ORAL at 21:18

## 2024-01-01 RX ADMIN — RISPERIDONE 0.25 MILLIGRAM(S): 4 TABLET ORAL at 21:46

## 2024-01-01 RX ADMIN — CEFTRIAXONE 100 MILLIGRAM(S): 500 INJECTION, POWDER, FOR SOLUTION INTRAMUSCULAR; INTRAVENOUS at 00:35

## 2024-01-01 RX ADMIN — INSULIN HUMAN 5 UNIT(S): 100 INJECTION, SOLUTION SUBCUTANEOUS at 05:33

## 2024-01-01 RX ADMIN — CEFTRIAXONE 100 MILLIGRAM(S): 500 INJECTION, POWDER, FOR SOLUTION INTRAMUSCULAR; INTRAVENOUS at 00:52

## 2024-01-01 RX ADMIN — Medication 1: at 17:07

## 2024-01-01 RX ADMIN — GABAPENTIN 100 MILLIGRAM(S): 400 CAPSULE ORAL at 17:14

## 2024-01-01 RX ADMIN — CALCITRIOL 0.25 MICROGRAM(S): 0.5 CAPSULE ORAL at 11:50

## 2024-01-01 RX ADMIN — SODIUM CHLORIDE 80 MILLILITER(S): 9 INJECTION INTRAMUSCULAR; INTRAVENOUS; SUBCUTANEOUS at 00:34

## 2024-01-01 RX ADMIN — LIDOCAINE 2 PATCH: 4 CREAM TOPICAL at 12:25

## 2024-01-01 RX ADMIN — Medication 1 MILLIGRAM(S): at 12:05

## 2024-01-01 RX ADMIN — Medication 3 MILLIGRAM(S): at 21:19

## 2024-01-01 RX ADMIN — AMLODIPINE BESYLATE 2.5 MILLIGRAM(S): 2.5 TABLET ORAL at 05:52

## 2024-01-01 RX ADMIN — HEPARIN SODIUM 5000 UNIT(S): 5000 INJECTION INTRAVENOUS; SUBCUTANEOUS at 06:13

## 2024-01-01 RX ADMIN — Medication 3 UNIT(S): at 17:07

## 2024-01-01 RX ADMIN — PANTOPRAZOLE SODIUM 40 MILLIGRAM(S): 20 TABLET, DELAYED RELEASE ORAL at 05:52

## 2024-01-01 RX ADMIN — RISPERIDONE 0.25 MILLIGRAM(S): 4 TABLET ORAL at 22:05

## 2024-01-01 RX ADMIN — AMLODIPINE BESYLATE 2.5 MILLIGRAM(S): 2.5 TABLET ORAL at 05:33

## 2024-01-01 RX ADMIN — Medication 3: at 11:48

## 2024-01-01 RX ADMIN — Medication 3 MILLIGRAM(S): at 23:30

## 2024-01-01 RX ADMIN — TRAMADOL HYDROCHLORIDE 50 MILLIGRAM(S): 50 TABLET ORAL at 11:19

## 2024-01-01 RX ADMIN — POLYETHYLENE GLYCOL 3350 17 GRAM(S): 17 POWDER, FOR SOLUTION ORAL at 14:26

## 2024-01-01 RX ADMIN — Medication 81 MILLIGRAM(S): at 14:54

## 2024-01-01 RX ADMIN — RISPERIDONE 0.25 MILLIGRAM(S): 4 TABLET ORAL at 23:26

## 2024-01-01 RX ADMIN — Medication 81 MILLIGRAM(S): at 11:25

## 2024-01-01 RX ADMIN — MEMANTINE HYDROCHLORIDE 5 MILLIGRAM(S): 10 TABLET ORAL at 21:36

## 2024-01-01 RX ADMIN — Medication 81 MILLIGRAM(S): at 12:05

## 2024-01-01 RX ADMIN — ATORVASTATIN CALCIUM 80 MILLIGRAM(S): 80 TABLET, FILM COATED ORAL at 21:29

## 2024-01-01 RX ADMIN — Medication 3 UNIT(S): at 12:04

## 2024-01-01 RX ADMIN — TRAMADOL HYDROCHLORIDE 50 MILLIGRAM(S): 50 TABLET ORAL at 21:46

## 2024-01-01 RX ADMIN — CALCITRIOL 0.25 MICROGRAM(S): 0.5 CAPSULE ORAL at 11:33

## 2024-01-01 RX ADMIN — GABAPENTIN 200 MILLIGRAM(S): 400 CAPSULE ORAL at 05:10

## 2024-01-01 RX ADMIN — GABAPENTIN 100 MILLIGRAM(S): 400 CAPSULE ORAL at 05:53

## 2024-01-01 RX ADMIN — LISINOPRIL 2.5 MILLIGRAM(S): 2.5 TABLET ORAL at 05:57

## 2024-01-01 RX ADMIN — ATORVASTATIN CALCIUM 80 MILLIGRAM(S): 80 TABLET, FILM COATED ORAL at 21:19

## 2024-01-01 RX ADMIN — AMLODIPINE BESYLATE 2.5 MILLIGRAM(S): 2.5 TABLET ORAL at 05:53

## 2024-01-01 RX ADMIN — Medication 200 MILLIGRAM(S): at 11:32

## 2024-01-01 RX ADMIN — FLUCONAZOLE 100 MILLIGRAM(S): 150 TABLET ORAL at 12:06

## 2024-01-01 RX ADMIN — HEPARIN SODIUM 5000 UNIT(S): 5000 INJECTION INTRAVENOUS; SUBCUTANEOUS at 05:31

## 2024-01-01 RX ADMIN — Medication 1000 MILLIGRAM(S): at 21:36

## 2024-01-01 RX ADMIN — AMLODIPINE BESYLATE 2.5 MILLIGRAM(S): 2.5 TABLET ORAL at 05:20

## 2024-01-01 RX ADMIN — Medication 650 MILLIGRAM(S): at 05:33

## 2024-01-01 RX ADMIN — GABAPENTIN 100 MILLIGRAM(S): 400 CAPSULE ORAL at 05:59

## 2024-01-01 RX ADMIN — LIDOCAINE 2 PATCH: 4 CREAM TOPICAL at 11:43

## 2024-01-01 RX ADMIN — CALCITRIOL 0.25 MICROGRAM(S): 0.5 CAPSULE ORAL at 12:05

## 2024-01-01 RX ADMIN — TRAMADOL HYDROCHLORIDE 50 MILLIGRAM(S): 50 TABLET ORAL at 05:53

## 2024-01-01 RX ADMIN — Medication 200 MILLIGRAM(S): at 11:25

## 2024-01-01 RX ADMIN — Medication 1000 MILLIGRAM(S): at 06:02

## 2024-01-01 RX ADMIN — CALCITRIOL 0.25 MICROGRAM(S): 0.5 CAPSULE ORAL at 14:54

## 2024-01-01 RX ADMIN — GABAPENTIN 200 MILLIGRAM(S): 400 CAPSULE ORAL at 05:53

## 2024-01-01 RX ADMIN — Medication 325 MILLIGRAM(S): at 12:26

## 2024-01-01 RX ADMIN — HEPARIN SODIUM 5000 UNIT(S): 5000 INJECTION INTRAVENOUS; SUBCUTANEOUS at 15:30

## 2024-01-01 RX ADMIN — Medication 650 MILLIGRAM(S): at 17:40

## 2024-01-01 RX ADMIN — CALCITRIOL 0.25 MICROGRAM(S): 0.5 CAPSULE ORAL at 11:25

## 2024-01-01 RX ADMIN — Medication 1000 MILLIGRAM(S): at 14:53

## 2024-01-01 RX ADMIN — HEPARIN SODIUM 5000 UNIT(S): 5000 INJECTION INTRAVENOUS; SUBCUTANEOUS at 14:53

## 2024-01-01 RX ADMIN — Medication 650 MILLIGRAM(S): at 10:30

## 2024-01-01 RX ADMIN — HEPARIN SODIUM 5000 UNIT(S): 5000 INJECTION INTRAVENOUS; SUBCUTANEOUS at 21:36

## 2024-01-01 RX ADMIN — Medication 650 MILLIGRAM(S): at 17:37

## 2024-01-01 RX ADMIN — Medication 1000 MILLIGRAM(S): at 06:29

## 2024-01-01 RX ADMIN — ATORVASTATIN CALCIUM 80 MILLIGRAM(S): 80 TABLET, FILM COATED ORAL at 22:01

## 2024-01-01 RX ADMIN — HEPARIN SODIUM 5000 UNIT(S): 5000 INJECTION INTRAVENOUS; SUBCUTANEOUS at 21:13

## 2024-01-01 RX ADMIN — Medication 650 MILLIGRAM(S): at 22:31

## 2024-01-01 RX ADMIN — Medication 1000 MILLIGRAM(S): at 21:16

## 2024-01-01 RX ADMIN — TRAMADOL HYDROCHLORIDE 50 MILLIGRAM(S): 50 TABLET ORAL at 06:23

## 2024-01-01 RX ADMIN — GABAPENTIN 200 MILLIGRAM(S): 400 CAPSULE ORAL at 05:21

## 2024-01-01 RX ADMIN — Medication 325 MILLIGRAM(S): at 11:54

## 2024-01-01 RX ADMIN — Medication 1000 MILLIGRAM(S): at 15:15

## 2024-01-01 RX ADMIN — RISPERIDONE 0.25 MILLIGRAM(S): 4 TABLET ORAL at 22:02

## 2024-01-01 RX ADMIN — Medication 1000 MILLIGRAM(S): at 21:13

## 2024-01-01 RX ADMIN — Medication 200 MILLIGRAM(S): at 14:54

## 2024-01-01 RX ADMIN — Medication 1000 MILLIGRAM(S): at 05:32

## 2024-01-01 RX ADMIN — Medication 650 MILLIGRAM(S): at 05:32

## 2024-01-01 RX ADMIN — GABAPENTIN 100 MILLIGRAM(S): 400 CAPSULE ORAL at 05:32

## 2024-01-01 RX ADMIN — Medication 325 MILLIGRAM(S): at 11:40

## 2024-01-01 RX ADMIN — GABAPENTIN 200 MILLIGRAM(S): 400 CAPSULE ORAL at 17:39

## 2024-01-01 RX ADMIN — Medication 1: at 08:02

## 2024-01-01 RX ADMIN — Medication 650 MILLIGRAM(S): at 19:15

## 2024-01-01 RX ADMIN — CEFTRIAXONE 100 MILLIGRAM(S): 500 INJECTION, POWDER, FOR SOLUTION INTRAMUSCULAR; INTRAVENOUS at 21:35

## 2024-01-01 RX ADMIN — HEPARIN SODIUM 5000 UNIT(S): 5000 INJECTION INTRAVENOUS; SUBCUTANEOUS at 22:27

## 2024-01-01 RX ADMIN — HEPARIN SODIUM 5000 UNIT(S): 5000 INJECTION INTRAVENOUS; SUBCUTANEOUS at 14:27

## 2024-01-01 RX ADMIN — Medication 1 MILLIGRAM(S): at 12:26

## 2024-01-01 RX ADMIN — LIDOCAINE 2 PATCH: 4 CREAM TOPICAL at 19:30

## 2024-01-01 RX ADMIN — Medication 2: at 12:17

## 2024-01-01 RX ADMIN — Medication 325 MILLIGRAM(S): at 12:48

## 2024-01-01 RX ADMIN — ATORVASTATIN CALCIUM 80 MILLIGRAM(S): 80 TABLET, FILM COATED ORAL at 23:30

## 2024-01-01 RX ADMIN — LIDOCAINE 1 PATCH: 4 CREAM TOPICAL at 19:51

## 2024-01-01 RX ADMIN — Medication 650 MILLIGRAM(S): at 17:13

## 2024-01-01 RX ADMIN — Medication 650 MILLIGRAM(S): at 06:29

## 2024-01-01 RX ADMIN — SODIUM CHLORIDE 80 MILLILITER(S): 9 INJECTION INTRAMUSCULAR; INTRAVENOUS; SUBCUTANEOUS at 12:17

## 2024-01-01 RX ADMIN — Medication 3 UNIT(S): at 08:34

## 2024-01-01 RX ADMIN — Medication 1000 MILLIGRAM(S): at 22:06

## 2024-01-01 RX ADMIN — MEMANTINE HYDROCHLORIDE 5 MILLIGRAM(S): 10 TABLET ORAL at 21:18

## 2024-01-01 RX ADMIN — Medication 81 MILLIGRAM(S): at 12:49

## 2024-01-01 RX ADMIN — POLYETHYLENE GLYCOL 3350 17 GRAM(S): 17 POWDER, FOR SOLUTION ORAL at 14:53

## 2024-01-01 RX ADMIN — Medication 1000 MILLIGRAM(S): at 05:59

## 2024-01-01 RX ADMIN — LIDOCAINE 1 PATCH: 4 CREAM TOPICAL at 20:11

## 2024-01-01 RX ADMIN — Medication 1000 MILLIGRAM(S): at 14:27

## 2024-01-01 RX ADMIN — Medication 200 MILLIGRAM(S): at 11:54

## 2024-01-19 NOTE — H&P ADULT - REASON FOR ADMISSION
Admission Status; Secondary Review Determination     Admission Date: 1/18/2024  9:10 PM       Under the authority of the Utilization Management Committee, the utilization review process indicated a secondary review on the above patient.  The review outcome is based on review of the medical records, discussions with staff, and applying clinical experience noted on the date of the review.        (x)      Inpatient Status Appropriate - This patient's medical care is consistent with medical management for inpatient care and reasonable inpatient medical practice.       RATIONALE FOR DETERMINATION      Brief clinical presentation, information copied from the chart, abbreviated and edited for relevant content:     Discussed with attending. Patient is now going on hospice/comfort cares. Keep IP.     Patient is a 86 year old male with a past medical history of mantle cell lymphoma, coronary artery disease, pneumonia, bronchiectasis, dysphagia, recent hospitalization for pneumonia and multiple other medical issues was sent to the emergency room for shortness of breath with cough and altered mental status with fever.   He was hypoxic.  EKG shows A-fib with rapid ventricular rate.  Labs revealed white count of 12,100 with a CRP of 64.95 and a BNP of thousand 282, procalcitonin of 0.58 and a lactic acid of 1.4.  ABG showed a pH of 7.4.  Influenza COVID and RSV was negative.  CT chest and abdomen shows bronchiectasis in the chest and mild thickening of the cecum/rectum suggestive of infectious/inflammatory colitis.  Continues with fever, hypotension, hypoxia, unclear cause of end of life discussion with family, now desires comfort cares.         At the time of admission with the information available to the attending physician, more than 2 nights hospital complex care was anticipated. Also, there was a risk of adverse outcome if patient was treated outpatient or observation. High intensity of services anticipated. Inpatient  admission appropriate based on Medicare guidelines.       The information on this document is developed by the utilization review team in order for the business office to ensure compliance.  This only denotes the appropriateness of proper admission status and does not reflect the quality of care rendered.         The definitions of Inpatient Status and Observation Status used in making the determination above are those provided in the CMS Coverage Manual, Chapter 1 and Chapter 6, section 70.4.      Sincerely,      Linda Garcia MD   Utilization Review/ Case Management  [facility].         Dysphagia and Angioedema Dysphagia and Tongue swelling

## 2024-02-08 NOTE — REVIEW OF SYSTEMS
[Fatigue] : fatigue [Vision Problems] : vision problems [Joint Pain] : joint pain [Joint Stiffness] : joint stiffness [Difficulty Walking] : difficulty walking [Negative] : Allergic/Immunologic [Fever] : no fever [Chills] : no chills [Night Sweats] : no night sweats [Recent Change In Weight] : ~T no recent weight change [Eye Pain] : no eye pain [Red Eyes] : eyes not red [Dry Eyes] : no dryness of the eyes [Dysphagia] : no dysphagia [Loss of Hearing] : no loss of hearing [Nosebleeds] : no nosebleeds [Hoarseness] : no hoarseness [Odynophagia] : no odynophagia [Mucosal Pain] : no mucosal pain [Chest Pain] : no chest pain [Palpitations] : no palpitations [Leg Claudication] : no intermittent leg claudication [Lower Ext Edema] : no lower extremity edema [Cough] : no cough [Abdominal Pain] : no abdominal pain [Vomiting] : no vomiting [Skin Rash] : no skin rash [Skin Wound] : no skin wound [Dizziness] : no dizziness [Fainting] : no fainting [FreeTextEntry3] : Left cataract  [FreeTextEntry4] : Loose teeth [FreeTextEntry5] : bilateral LE varicose veins [FreeTextEntry9] : stable generalized arthritic. Pain of  lower back. Back brace on.  [de-identified] : In a wheelchair today,Occasional confusion

## 2024-02-08 NOTE — HISTORY OF PRESENT ILLNESS
[de-identified] : Multiple Myeloma s/p Thal/ Dex 2008 to 2010.....TIA in 2009.....Velcade 2012 to 2013...treatment held due to neuropathy..resumed b/c of POD...tolerating thus far [de-identified] : Patient presents for a follow-up visit.  She is ambulating with a cane. Her son is with her...She was hospitalized and recently discharged from rehab s/p infectious complications. New vertebral compression fx...still with painShe reports a healthy appetite. She notes varicose veins bilaterally in her LE and generalized arthritic pain which is stable. She notes some stiffness in hands bilaterally.  She states she followed up with neurology for issues with her discs. She states her hip pain is stable. She off  Velcade three weeks on and 1 week off with Decadron. Denies fever/chills, night sweats, mouth sores, eye dryness, blurred vision, nausea, vomiting, diarrhea. No CP, SOB or LE edema.   On 8/19//21, patient presents for a follow up visit. Overall patient is tolerating treatment.. and offers no acute concerns aside from continues low back pain similar to when she was hospitalized... Has generalized arthritic pain which is stable. Denies fever, chills, nausea, vomiting, diarrhea, rash, mouth sores, dysuria or any signs of active bleeding. Denies SOB, chest pain or B/L LE edema. Daughter  on telephone during today's visit.   2/16/22 Here with aide.fer on phone...back pain on and off..difficulty raising head up fully...using tylenol and lidocaine patch..tolerating pom dex...improved spep...  Admitted 4/13/22- 4/19/22. 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 Also with t-spine fracture on MRI, pursuing conservative management with TLSO and plan to re-assess outpt.   Fracture of thoracic spine; unstable 3 column horizontal T10 spinal fracture nsgy seen and followed  - plan for conservative orthotics management for now, potential surgery in future outpt follow up; multimodal pain control.   SAH (subarachnoid hemorrhage); Repeat CTH stable, MR without additional concerns. EEG neg for sz activity. Neuro & NSGY followed; still concern SAH-related seizure, vs contribution of hyperglycemia to neuro symptoms, they are now resolving.  - BP control; continue keppra, hold ASA and lovenox for now  Seizure precautions - speech and swallow   Altered mental state;  transient likely seizure in setting of SAH poss seizure, and hyperglycemia as above - now resolved.  Uncontrolled type 2 diabetes mellitus with hyperglycemia; suspect at least in part secondary to Dex , A1c is >11  - s/p IV fluid bolus; basal/bolus insulin w coverage sliding scale  Seen and followed by endo.; f/u mgmt given MM tx involving high dose steroid  and plan for f/u with endocrine  Rheumatoid arthritis; continue hydroxychloroquine  KAJAL on CKD - sCr overall stable.  Multiple myeloma; heme onc followed  HTN (hypertension); continue amlodipine - continue statin; improved  Pt. had last BM reported 4/17 and continue miralax & senna. Discharge planning; dispo to [facility] on 4/19 (d/w Dr. De La Vega)   On 5/19/22, patient presents for a follow up visit. Patient is currently in rehab and is accompanied by health aide today. Daughter on speaker phone during today's visit. Angelika is in a wheelchair and has a back brace on. Recently admitted 4/13/22- 4/19/22. Overall well and offers no acute concerns. On pomalyst every other day. Denies fever, chills, nausea, vomiting, diarrhea, rash, mouth sores, dysuria or any signs of active bleeding. Denies SOB, chest pain or B/L LE Edema.   2/16/23: Patient presents with daughter Binta and grand daughter...She is in a wheelchair...She is confused at times,has back pain and difficulty walking.She uses a walker to walk and at times she cannot lift herself up or walk.She has a pressure ulcer on sacrum which developed when she was in the rehab.Denies fever, chills, edema, SOB,chest pain,dysuria,nausea, vomiting, diarrhea or any signs of active bleeding.Pomalyst qod  On 3/29/23, patient presents for a follow up visit with granddaughter. On dexamethasone 4 mg twice a week and pomalyst daily. In a wheelchair today and continues to have difficulty walking. Stage two pressure ulcer of sacrum. Denies fever, chills, nausea, vomiting, diarrhea, rash, mouth sores or any signs of active bleeding. Denies SOB, chest pain or B/L LE edema. Unable to hold her head up....dental issues  On 6/26/23, patient presents for a follow up visit with home health aide. Daughter on telephone today during visit. Overall Angelika has loose teeth and would like to see a recommended dentist. Has left cataract and will need cataract surgery. Denies fever, chills, nausea, vomiting, diarrhea, rash, mouth sores or any signs of active bleeding. Denies SOB, chest pain or B/L LE edema.   On 8/31/23, patient presents for a follow up visit. Overall, well and offers no acute concerns. Patient has a cataract of left eye and is unable to move forward with cataract surgery as she can't bend her neck. On pomalyst every other day and is on Dexamethasone twice a week. Denies fever, chills, nausea, vomiting, diarrhea, rash, mouth sores or any signs of active bleeding. Denies SOB, chest pain or B/L LE edema.   11/2/23:  Patient is here for a follow up visit. She is accompanied by her aid, and her daughter was on phone. Denies fever, chills, nausea, vomiting, diarrhea, rash, mouth sores or any signs of active bleeding. Daughter explains that her mother is seeing things that are not there. Denies SOB, chest pain. Patient has a cataract of left eye and is unable to move forward with cataract surgery as she can't bend her neck. She is wearing a vest for her back. Patient cannot walk alone, has a walker.   2/8/24:  Patient is here for a follow up visit with her aid, daughter was on the phone. Denies fever, chills, nausea, vomiting, diarrhea, rash, mouth sores or any signs of active bleeding. Denies SOB, chest pain. On pomalyst 2 mg every other day and dexamethasone twice a week. Pt is having a hearing test next week and following up with ophthalmologist for cataracts. Pt uses a walker at home and has B/L LE edema.

## 2024-02-08 NOTE — ADDENDUM
[FreeTextEntry1] :  Documented by Concepción Doll acting as a scribe for Dr. Bailey Rosales on 2/8/24. All medical record entries made by the Scribe were at my, Dr. Bailey Rosales, direction and personally dictated by me on 2/8/24. I have reviewed the chart and agree that the record accurately reflects my personal performance of the history, physical exam, assessment and plan. I have also personally directed, reviewed, and agree with the discharge instructions.

## 2024-02-08 NOTE — PHYSICAL EXAM
[Capable of only limited self care, confined to bed or chair more than 50% of waking hours] : Status 3- Capable of only limited self care, confined to bed or chair more than 50% of waking hours [Normal] : grossly intact

## 2024-03-18 NOTE — SWALLOW BEDSIDE ASSESSMENT ADULT - COMMENTS
Hx cont: ED course: patient evaluated by Nsx and Neurology. Treated with Keppra. CTA w/ b/l cav carotid athero w/ mod stenosis, L parietal/coronaradiata hypodensity c/f acute/subacute infarct not called by rads, thin hyperdensity in L occ lobe tsah vs calcification/artifact. Exam: AOx2, mild-mod mixed aphasia, PERRL, EOMI, no facial, RUE +drift, 4+/5 hg, LUE/BLE 5/5. NIHSS4. No acute nsgy intervention." Per Neuro, "Impression: Fluctuating dysarthria and right sided weakness due to left hemispheric dysfunction possibly secondary to left SAH vs stroke mimic in the setting hyperglycemia vs seizure vs less likely acute stroke. Possible annita's paresis following reported witnessed seizure. Rule out other underlying toxic-metabolic or infectious etiologies."     IMAGING:  CT HEAD: 4/13/22  IMPRESSION:  No significant change when compared with prior study. No CT evidence of acute, large transcortical infarct. MR brain can be obtained for further evaluation if clinical concern remains.    CT BRAIN STROKE PROTOCOL: 4/12/22  IMPRESSION:  Suggestion of small left occipital subarachnoid bleed. No local mass effect or midline shift.    No chest imaging available for review.    Pt is known to the Speech Pathology service at [facility] from bedside swallow evaluation completed 3/22/21 which revealed an rox-pharyngeal dysphagia c/b prolonged mastication, palatal stasis, reduced hyo-laryngeal elevation and multiple swallow with delayed cough of solids and cough post oral intake of thin liquids. Rx at that time: Puree Textures/Nectar Liquids- NO STRAW. [facility]  Neurocritical Care Consult Note    Deepti Chen Patient Status:  Inpatient    1932 MRN [id number]   Location [place] [id number] Attending Ronni Arreola MD   Hosp Day # 1 PCP Mao Munson MD       Reason for Consultation:   sah    HPI:   Patient is a 89 year old female with a h/o htn, hl, dm2, cad, afib on doac s/p ppm, HFpEF, L hemispheric lacunar stroke w/residual R sided weakness, known L pca 5mm saccular aneurysm, PE , depression/anxiety do, and macular degeneration p/w H2F4 sah 3/17. Pt had onset of severe HA and walking difficulties thus came to [facility] ED where w/u revealed ct head with sah within L interpeduncular cistern and ivh in occipital horns, and cta revealed [id number] L pca/pcomm aneuryms, thus given andexxa for doac reversal and pt was transferred to  [facility] for further monitoring.     Past Medical History:   Diagnosis Date    Abnormal complete blood count     resolved    Allergic rhinitis     Anxiety     recent years    Arthritis     small amount in my hands    Cardiomyopathy ([facility])     stress induced cardiomyopathy. resolved. Dr Munson.    Carotid stenosis 2015    JEN  50-69% on doppler ; >70% in 2016; R carotid endarterectomy 2016    CVA (cerebral vascular accident) ([facility])     lacunar infarct with R sided weakness 10/2021. Rehab at [facility].    Cyst of left breast     breast cyst removed Left breast    Depression     small amount    Diabetes ([facility]) 10/2021    Diverticulosis 2006    Fibrocystic breast     left breast    GERD (gastroesophageal reflux disease)     H/O colonoscopy 2002    sigmoid polyp inflamed    H/O colonoscopy 2006    Dr Morocho cauterized and diverticulosis    Hearing loss 2015    hearing aides.     Hyperlipidemia     elevated cholesterol    Hypertension 2015    Hypothyroidism     as a teenager,not now    Lactose intolerance     Macular degeneration     Dr. Jaimes    Myocardial infarction ([facility])      Osteopenia 2011    Pacemaker 10/2021    Pulmonary emboli ([facility]) 10/2021    CT chest 10/20/21-acute LLL segmental and subsegmental pulmonary emboli.    Tear of meniscus of right knee -    TIA (transient ischemic attack)        Past Surgical History:   Procedure Laterality Date    ANGIOGRAM  2017    BREAST BIOPSY Left     BREAST SURGERY Left     CYST REMOVED    CARDIAC PACEMAKER PLACEMENT  10/2021    Dr Durbin    CAROTID ENDARTERECTOMY Right 2016    Dr Moreira    CATARACT      had them removed    CATARACT EXTRACTION Bilateral  and     Dr Plaza    COLONOSCOPY   ?    D & C      same as cauterize? If not no    HYSTERECTOMY      hyst and bso--fallen bladder    KNEE ARTHROSCOPY Right     Dr Pichardo    MICHAEL LOCALIZATION WIRE 1 SITE LEFT (CPT=19281)      NEEDLE BIOPSY LEFT      over 20 years ago-benign      '55,57,59,61    OTHER SURGICAL HISTORY      skin cancer behind left ear    SKIN SURGERY      spots removed    TONSILLECTOMY      1st grade       Medications Prior to Admission   Medication Sig Dispense Refill Last Dose    albuterol 108 (90 Base) MCG/ACT Inhalation Aero Soln Inhale 1 puff into the lungs every 6 (six) hours as needed for Wheezing.       furosemide 20 MG Oral Tab Take 1 tablet (20 mg total) by mouth at bedtime.       polyethylene glycol, PEG 3350, 17 g Oral Powd Pack Take 17 g by mouth daily.       famotidine 20 MG Oral Tab Take 1 tablet (20 mg total) by mouth daily. 90 tablet 0     metoprolol succinate ER 25 MG Oral Tablet 24 Hr Take 1 tablet (25 mg total) by mouth daily. 90 tablet 0     mirtazapine 7.5 MG Oral Tab Take 2 tablets (15 mg total) by mouth nightly. 180 tablet 0     ALPRAZolam 0.5 MG Oral Tab TAKE 0.5 TABLETS (0.25 MG TOTAL) BY MOUTH NIGHTLY AS NEEDED FOR SLEEP. 30 tablet 3     potassium chloride (KLOR-CON M20) 20 MEQ Oral Tab CR TAKE 2 TABLETS (40 MEQ) BY MOUTH IN THE MORNING AND 1 TAB (20 MEQ) AT NOON 90 tablet 11     meclizine 12.5 MG  Oral Tab Take 1 tablet (12.5 mg total) by mouth 3 (three) times daily as needed for Dizziness. (Patient not taking: Reported on 3/17/2024) 60 tablet 3     atorvastatin 40 MG Oral Tab TAKE 1 TABLET BY MOUTH EVERY DAY AT NIGHT 90 tablet 3     spironolactone 25 MG Oral Tab Take 0.5 tablets (12.5 mg total) by mouth daily. 45 tablet 3     furosemide 40 MG Oral Tab Take 1 tablet (40 mg total) by mouth every morning AND 0.5 tablets (20 mg total) daily before lunch. 135 tablet 3     rivaroxaban 15 MG Oral Tab Take 1 tablet (15 mg total) by mouth daily. Per cardiology  0     mirtazapine 15 MG Oral Tab Take 1 tablet (15 mg total) by mouth nightly. (Patient not taking: Reported on 3/17/2024) 90 tablet 3     Ferrous Sulfate 325 (65 Fe) MG Oral Tab Take 1 tablet (325 mg total) by mouth daily.  0     Melatonin 10 MG Oral Cap Take 10 mg by mouth daily as needed. 30 capsule 0     Multiple Vitamins-Minerals (PRESERVISION AREDS 2+MULTI VIT) Oral Cap Take 2 capsules by mouth daily. 1 capsule 0     aspirin 81 MG Oral Tab Take 1 tablet (81 mg total) by mouth daily.  0     Calcium Carb-Cholecalciferol 600-800 MG-UNIT Oral Tab Take 1 tablet by mouth 2 (two) times daily with meals. 60 tablet 0      Allergies   Allergen Reactions    Chocolate Flavor      Other reaction(s): GI Upset    Lactose      Other reaction(s): GI Upset    Pantoprazole Sodium      Other reaction(s): diarrhea    Augmentin [Amoxicillin-Pot Clavulanate] RASH     Rash 3/2022    Doxycycline RASH and ITCHING     Social History     Socioeconomic History    Marital status:    Tobacco Use    Smoking status: Never     Passive exposure: Never    Smokeless tobacco: Never   Vaping Use    Vaping Use: Never used   Substance and Sexual Activity    Alcohol use: Not Currently     Alcohol/week: 7.0 standard drinks of alcohol     Types: 7 Standard drinks or equivalent per week     Comment: usually for sleep    Drug use: No   Other Topics Concern    Caffeine Concern No     Comment:  Coffee 1-2 cup daily; 1 tea    Exercise No     Family History   Problem Relation Age of Onset    Stroke Father          age 87    Dementia Father     Heart Attack Mother          age 86    Heart Disorder Mother     Lung Disorder Sister         emphysema- age 67    Cancer Sister         CA larynx    Breast Cancer Sister 81    Other (breast cancer) Sister 82    Other (2 sisters) Other     Other (3 daughters, 1 son) Other     Allergies Other         children have allergies    Breast Cancer Maternal Grandmother 45        Not sure on exact age    Heart Disorder Maternal Grandmother     Cancer Maternal Aunt         pancreatic ca age 60s    Asthma Daughter     Asthma Daughter     Cancer Sister         smoking       Current Meds:  Current Facility-Administered Medications   Medication Dose Route Frequency    potassium chloride (K-Dur) tab 40 mEq  40 mEq Oral Q4H    sodium chloride 0.9% infusion   Intravenous Continuous    labetalol (Trandate) 5 mg/mL injection 10 mg  10 mg Intravenous Q10 Min PRN    hydrALAzine (Apresoline) 20 mg/mL injection 10 mg  10 mg Intravenous Q2H PRN    acetaminophen (Tylenol) tab 650 mg  650 mg Oral Q4H PRN    Or    acetaminophen (Tylenol) rectal suppository 650 mg  650 mg Rectal Q4H PRN    ondansetron (Zofran) 4 MG/2ML injection 4 mg  4 mg Intravenous Q6H PRN    Or    metoclopramide (Reglan) 5 mg/mL injection 10 mg  10 mg Intravenous Q8H PRN    levETIRAcetam (Keppra) 500 mg/5mL injection 500 mg  500 mg Intravenous Q12H    niMODipine (Nimotop) cap 60 mg  60 mg Oral 6 times per day    midazolam (Versed) 2 MG/2ML injection 2 mg  2 mg Intravenous Q15 Min PRN    niCARdipine in sodium chloride 0.86% (carDENE) 20 mg/200mL infusion premix  5-15 mg/hr Intravenous Continuous PRN    polyethylene glycol (PEG 3350) (Miralax) 17 g oral packet 17 g  17 g Oral Daily PRN    sennosides (Senokot) tab 17.2 mg  17.2 mg Oral Nightly PRN    bisacodyl (Dulcolax) 10 MG rectal suppository 10 mg  10 mg Rectal  Daily PRN    fleet enema (Fleet) 7-19 GM/118ML rectal enema 133 mL  1 enema Rectal Once PRN       Review of Systems:     Constitutional:    Denies unusual weight loss or weight gain, fever/chills or night sweats.  HEENT:                Denies changes in vision or difficulty swallowing.  Pulm:                    Denies dyspnea, cough, or sputum production  Cardiac:               Denies chest pain, palpitations or lower extremity edema.  GI:                         Denies constipation, heartburn or melena.  :                       Denies dysuria or hematuria.  Skin:                     Denies rashes or open areas.  Neuro:                  As per HPI    All other systems were reviewed and are negative.    Vitals:   Temp:  [97.3 °F (36.3 °C)-98.2 °F (36.8 °C)] 98 °F (36.7 °C)  Pulse:  [59-60] 60  Resp:  [9-19] 15  BP: (100-173)/() 106/81  SpO2:  [92 %-100 %] 97 %  Body mass index is 17.75 kg/m².    Intake/Output:    Intake/Output Summary (Last 24 hours) at 3/18/2024 0903  Last data filed at 3/18/2024 0600  Gross per 24 hour   Intake 377 ml   Output 200 ml   Net 177 ml       Physical Examination:   General- No acute distress  CV- RRR  Resp- CTA Bilat  Neuro-  Mental status- awake and alert, regards and follows commands, oriented x3, speech fluent  Cranial nerves- pupils equally round and reactive to light, extraocular muscles intact, face symmetric, visual fields full  Motor- 5/5 throughout  Sens-  Intact to light touch    Diagnostics:   US TRANSCRANIAL ARTERIES COMPLETE  (CPT=93886)    Result Date: 3/18/2024  CONCLUSION:  No sonographic evidence of vasospasm at this time.   LOCATION:  [facility]   Dictated by (CST): Orville Rivera MD on 3/18/2024 at 7:20 AM     Finalized by (CST): Orville Rivera MD on 3/18/2024 at 7:22 AM       CT BRAIN OR HEAD (08901)    Result Date: 3/18/2024  CONCLUSION:  Slight interval increase in prominence of intraventricular hemorrhage with stable hemorrhage within the pre pontine cistern, overlying  the left tentorial leaflet and within the left cerebellopontine angle cistern.  No significant midline shift or mass effect is seen.  Continued follow-up is recommended.  If there is persistent concern then consider MRI.  Critical results were discussed with HODA Brower by Dr. Rivera at approximately 0635 on 3/18/24.  Read back was performed.     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  The largest component is posteriorly oriented measuring 8 x 6 by 6 mm.  An inferiorly oriented component measures 4 x 3 x 4 mm.  Additional 4 x 5 x 5 mm posteriorly and inferiorly oriented component may represent additional portion of the complex aneurysm or pseudoaneurysm.  Recommend interventional neuroradiology consultation.  Fetal configuration of the left posterior circulation, with a prominent left posterior communicating artery and relatively hypoplastic P 1 segment of the left PCA.  This is a normal anatomic variant  Broad-based medially oriented 6 x 3 x 5 mm aneurysm of left internal carotid artery within the carotid siphon.   Four vessel configuration of the aortic arch.  Tortuosity of the great vessels within the lower neck, as is seen in chronic arterial hypertension.  Noncontrast CT of the brain is dictated separately.  See that report regarding subarachnoid hemorrhage within the interpeduncular cisterns and pre pontine cistern with mild mass effect on the left anterior lida, as well as regarding intraventricular hemorrhage with mild hydrocephalus.      Dictated by (CST): Yolanda Davis MD on 3/17/2024 at 7:04 PM     Finalized by (CST): Yolanda Davis MD  on 3/17/2024 at 7:23 PM          CT BRAIN OR HEAD (08009)    Result Date: 3/17/2024  CONCLUSION:   Moderate volume subarachnoid hemorrhage along left greater than right interpeduncular cistern, as well as tracking along the left pre pontine angle with mild-to-moderate mass effect upon the left anterior aspect of the lida.  Recommend CTA or diagnostic angiography for further assessment. Findings were discussed with Dr Andrews on 03/17/2024 at 5:44 p.m.  There is also hyperdense material within the occipital horns of the lateral ventricles bilaterally, suspicious for intraventricular blood products.  Mild hydrocephalus involving the bilateral lateral ventricles compared to prior examination from 02/28/2023.  Fullness of the cerebellum at the midline superiorly, new compared to prior and suspicious for underlying edema, or less likely underlying mass.  This can be further assessed with MRI, when patient is clinically able.    Dictated by (CST): Yolanda Davis MD on 3/17/2024 at 5:40 PM     Finalized by (CST): Yolanda Davis MD on 3/17/2024 at 5:51 PM           Lab Review     Recent Labs   Lab 03/17/24  1704 03/18/24  0503    139   K 3.7 3.5    107   CO2 30.0 27.0   * 83   BUN 14 10   CREATSERUM 0.95 0.74     Recent Labs   Lab 03/17/24  1704 03/18/24  0503   WBC 8.8 9.6   HGB 12.7 11.5*   .0 123.0*       Assesment/Plan:     Neuro:  H2F4 SAH- 2/2 L pca/pcomm aneurysm.   Cont sah protocol with neurochecks, keppra, nimotop and TCDs.   Plan for angio today per [facility] for possible aneurysm treatment.   Neurosurg on c/s given ivh and risk for hydrocephalus.   S/p doac reversal with andexxa.     H/o L hemispheric lacunar stroke  Cardiac:  Htn/hl/cad/afib/HFpEF- sbp goal 100-150  Hold doac 2/2 above until cleared per Neurosurg.  Pulmonary:  Stable on RA  Renal:  IVFs, monitor BUN/Cr  GI:  Npo for angio  Heme/ID:  Afebrile, no leukocytosis  H/o PE- hold doac 2/2 above  Endocrine/Rheum:  Monitor  glucose, sliding scale insulin prn  DVT Prophylaxis:  SCD’s    Goals of the Day: neurochecks, angio, sah protocol   A total of 80 minutes of critical care time (exclusive of billable procedures) was administered to manage and/or prevent neurologic instability. This involved direct patient intervention, complex decision making, and/or extensive discussions with the patient, family, and clinical staff.    Thank you for allowing me to participate in the care of this patient.     Andrea Natarajan MD, Health system  Medical Director of System Neurosciences  Chief, Section of Neurocritical Care  [facility]       Pt noted to be impulsive-- large bites at a fast rate

## 2024-03-27 NOTE — H&P ADULT - ATTENDING COMMENTS
Vital Signs Last 24 Hrs  T(C): 36.6 (27 Mar 2024 04:07), Max: 36.7 (27 Mar 2024 00:11)  T(F): 97.8 (27 Mar 2024 04:07), Max: 98 (27 Mar 2024 00:11)  HR: 68 (27 Mar 2024 04:07) (68 - 76)  BP: 181/67 (27 Mar 2024 04:07) (156/90 - 181/67)  RR: 18 (27 Mar 2024 04:07) (17 - 18)  SpO2: 97% (27 Mar 2024 04:07) (97% - 99%)  Parameters below as of 27 Mar 2024 04:07  Patient On (Oxygen Delivery Method): room air    Labs reviewed  INR 1.22  K - 5.6  Cl 112  CO2 - 17  BUN- 55 / Cr - 2.87  Glu - 471  Alb - 2.4    UA   wbc - 20  rbc -  > 50  Gluc 500      Impression   84 year old woman with multiple PMH including HTN, HLD, CKD, CVA, MM who comes in with features concerning for hemorrhagic UTI / cystitis.    Problems  # Acute UTI with hematuria  # KAJAL on CKD   # Multiple myeloma  # HLD   # HTN  # Hyperglycemia without hx of DM - steroid induced    Plan   - Admit to Medicine   - Sepsis work up   - IV fluid hydration   - Empiric antibiotics ; prior hx of pansensitive K. pneumonia in urine and blood.  - Insulin therapy;  - Check HgA1c and POC glucose  - Med reconciliation; resume home meds  - Hold nephrotoxic meds; amlodipine for BP control now    Other plans as above Vital Signs Last 24 Hrs  T(C): 36.6 (27 Mar 2024 04:07), Max: 36.7 (27 Mar 2024 00:11)  T(F): 97.8 (27 Mar 2024 04:07), Max: 98 (27 Mar 2024 00:11)  HR: 68 (27 Mar 2024 04:07) (68 - 76)  BP: 181/67 (27 Mar 2024 04:07) (156/90 - 181/67)  RR: 18 (27 Mar 2024 04:07) (17 - 18)  SpO2: 97% (27 Mar 2024 04:07) (97% - 99%)  Parameters below as of 27 Mar 2024 04:07  Patient On (Oxygen Delivery Method): room air    Labs reviewed  INR 1.22  K - 5.6  Cl 112  CO2 - 17  BUN- 55 / Cr - 2.87  Glu - 471  Alb - 2.4    UA   wbc - 20  rbc -  > 50  Gluc 500      Impression   84 year old woman with multiple PMH including HTN, HLD, CKD, CVA, MM who comes in with features concerning for hemorrhagic UTI / cystitis.    Problems  # Acute UTI with hematuria  # Progressive CKD vs KAJAL on CKD   # Multiple myeloma  # HLD   # HTN  # Hyperglycemia without hx of DM - steroid induced    Plan   - Admit to Medicine   - Sepsis work up   - IV fluid hydration   - Empiric antibiotics ; prior hx of pansensitive K. pneumonia in urine and blood.  - Insulin therapy;  - Check HgA1c and POC glucose  - Med reconciliation; resume home meds  - Hold nephrotoxic meds; amlodipine for BP control now    Other plans as above

## 2024-03-27 NOTE — H&P ADULT - NSHPPHYSICALEXAM_GEN_ALL_CORE
T(C): 36.6 (03-27-24 @ 04:07), Max: 36.7 (03-27-24 @ 00:11)  T(F): 97.8 (03-27-24 @ 04:07), Max: 98 (03-27-24 @ 00:11)  HR: 68 (03-27-24 @ 04:07) (68 - 76)  BP: 181/67 (03-27-24 @ 04:07) (156/90 - 181/67)  RR: 18 (03-27-24 @ 04:07) (17 - 18)  SpO2: 97% (03-27-24 @ 04:07) (97% - 99%)    GENERAL: NAD; lying in bed; elderly  HEAD:  Atraumatic, Normocephalic  EYES: EOMI, PERRLA, conjunctiva and sclera clear  ENMT: No tonsillar erythema, exudates, or enlargement; Moist mucous membranes  NECK: Supple, normal appearance, No JVD; Normal thyroid; Trachea midline  NERVOUS SYSTEM:  Alert & Oriented X1-2,  Non focal, clear speech  CHEST/LUNG: Lungs clear to auscultation bilaterally, No rales, rhonchi, wheezing   HEART: Regular rate and rhythm; No murmurs, rubs, or gallops  ABDOMEN: Soft, Nontender, Nondistended; Bowel sounds present  EXTREMITIES:  2+ Peripheral Pulses, No clubbing, cyanosis, or edema  SKIN: No rashes or lesions;

## 2024-03-27 NOTE — ED ADULT NURSE NOTE - ED STAT RN HANDOFF DETAILS
Patient admitted to medicine in no acute distress, report given by Jeanie Gross. Patient transported via stretcher stable in no acute distress safety maintained.

## 2024-03-27 NOTE — H&P ADULT - PROBLEM SELECTOR PLAN 4
- History of Multiple myeloma  - On Dexamethasone 4mg 2 times  a week at home, continue for now  - Patient on pomalyst at home, not resumed for now

## 2024-03-27 NOTE — H&P ADULT - ASSESSMENT
Impression: Presence of intraocular lens: Z96.1. OU. Plan: No treatment is required at this time. Will continue to observe condition and or symptoms. Patient is a 84 year old female from home, with PMH of HTN, HLD, CKD, CVA, SAH, Rheumatoid Arthritis, Multiple Myeloma, and Bladder incontinence who was brought to the ED for blood in the urine.  In the ED patient Hemodynamically stable and afebrile.  Patients labs showing Hb of 9.3 (at baseline from prior admissions), WBC of 3.6, K of 5.6, Bicarb of 17, BUN 55, SCr of 2.87, BG of 471.  UA positive With WBC 20, LE Moderate, Nitrate positive, RBC >50, and urine glucose of 500.  Patient is being admitted to medicine fo Hematuria and Acute UTI

## 2024-03-27 NOTE — ED PROVIDER NOTE - OBJECTIVE STATEMENT
84-year-old female history of mild dementia, accompanied with daughter Binta.  Binta states patient complaining of dysuria for past week and was prescribed Ceftin yesterday for UTI in which patient has already taken 2 doses.  Binta noted patient with hematuria this evening.  On evaluation patient is oriented to self and her age, denies any fever, no nausea, no vomiting, no abdominal pain, no back pain, no chest pain, no shortness of breath.  Meds: Lisinopril, pomalyst, folate, ferrous sulfate, Calcitrol, dexamethasone 4 mg twice a week, hydroxychloroquine, risperidone, aspirin, sodium bicarb, memantine, gabapentin,  rosuvastatin.

## 2024-03-27 NOTE — ED PROVIDER NOTE - CARE PLAN
Principal Discharge DX:	Urinary tract infection with hematuria  Secondary Diagnosis:	Acute on chronic renal failure   1

## 2024-03-27 NOTE — PATIENT PROFILE ADULT - FALL HARM RISK - HARM RISK INTERVENTIONS
Assistance with ambulation/Assistance OOB with selected safe patient handling equipment/Communicate Risk of Fall with Harm to all staff/Discuss with provider need for PT consult/Monitor gait and stability/Reinforce activity limits and safety measures with patient and family/Tailored Fall Risk Interventions/Visual Cue: Yellow wristband and red socks/Bed in lowest position, wheels locked, appropriate side rails in place/Call bell, personal items and telephone in reach/Instruct patient to call for assistance before getting out of bed or chair/Non-slip footwear when patient is out of bed/[place] to call system/Physically safe environment - no spills, clutter or unnecessary equipment/Purposeful Proactive Rounding/Room/bathroom lighting operational, light cord in reach

## 2024-03-27 NOTE — H&P ADULT - PROBLEM SELECTOR PLAN 6
- Patient with history of HLD  - Patient on Rosuvastatin 20mg qd at home  - Continue home antihyperlipidemic

## 2024-03-27 NOTE — H&P ADULT - PROBLEM SELECTOR PLAN 1
- Presented with Dysuria, Urinary Frequency , urinary incontinence   - positive UA   - Hemodynamically stable and afebrile   - Leukocytosis 3  - Lactate 1.3  - c/w IV fluids   - Prior Urine and blood cultures for haines sensitive K. Pneumonia in 2023  - f/u blood cultures   - f/u urine cultures   - start Ceftriaxone 1g daily

## 2024-03-27 NOTE — H&P ADULT - PROBLEM SELECTOR PLAN 5
- Patient with history of HTN  - Patient on Lisinopril 2.5mg qd at home  - Continue home antihypertensives with holding parameters - Patient with history of HTN  - Patient on Lisinopril 2.5mg qd at home  - Hold Lisinopril in KAJAL  - Start Amlodipine for HTN for now

## 2024-03-27 NOTE — H&P ADULT - NSHPREVIEWOFSYSTEMS_GEN_ALL_CORE
CONSTITUTIONAL: No fever, weight loss, or fatigue  EYES: No eye pain, visual disturbances, or discharge  ENT:  No difficulty hearing, tinnitus, vertigo; No sinus or throat pain  NECK: No pain or stiffness  RESPIRATORY: No cough, wheezing, chills or hemoptysis; No Shortness of Breath  CARDIOVASCULAR: No chest pain, palpitations, passing out, dizziness, or leg swelling  GASTROINTESTINAL: No abdominal or epigastric pain. No nausea, vomiting, or hematemesis; No diarrhea or constipation. No melena or hematochezia.  GENITOURINARY: + dysuria, frequency, hematuria, incontinence  NEUROLOGICAL: No headaches, memory loss, loss of strength, numbness, or tremors  SKIN: No itching, burning, rashes, or lesions

## 2024-03-27 NOTE — ED ADULT NURSE NOTE - ED STAT RN HAND OFF
Procedure To Be Performed At Next Visit: Excision Size Of Lesion In Cm (Optional): 0 Introduction Text (Please End With A Colon): The following procedure was deferred: Instructions (Optional): Standard excisions will be scheduled once Mohs surgery has been done for known skin cancer on left central eyebrow and that site has healed. Detail Level: Detailed Handoff

## 2024-03-27 NOTE — H&P ADULT - HISTORY OF PRESENT ILLNESS
Patient is a 84 year old female from home, with PMH of HTN, HLD, CKD, CVA, SAH, Rheumatoid Arthritis, Multiple Myeloma, and Bladder incontinence who was brought to the ED for blood in the urine.  Patients daughter at bedside assisting with history.  Patient found to have light red blood in the urine yesterday however patient and family did not think much of it becuase it was very light in color.  Patients daughter states today when the patient used the bed pain she noted deep dark red urine.  Patient also complained of burning pain with urination and increased urinary frequency over the past few days.  Patient states she has not had fever or chills.  Patient denies abdominal / suprapubic / flank pain.  Patient denies nausea, vomiting, headache, blurry vision, dizziness, chest pain, constipation, diarrhea, leg swelling.

## 2024-03-27 NOTE — DIETITIAN INITIAL EVALUATION ADULT. - PROBLEM/PLAN-7
No I am not.  If she feels she needs to be seen she can go to the urgent care see one of the APCs later this week.  Patient has not been seen by PCP in over a year.   DISPLAY PLAN FREE TEXT

## 2024-03-27 NOTE — ED ADULT NURSE NOTE - SUICIDE SCREENING QUESTION 1
Routine Foot Care    Date/Time: 12/12/2023 3:30 PM    Performed by: Ale Paredes DPM  Authorized by: Ale Paredes DPM    Consent Done?:  Yes (Verbal)    Nail Care Type:  Debride  Location(s): All  (Left 1st Toe, Left 3rd Toe, Left 2nd Toe, Left 4th Toe, Left 5th Toe, Right 1st Toe, Right 2nd Toe, Right 3rd Toe, Right 4th Toe and Right 5th Toe)  Patient tolerance:  Patient tolerated the procedure well with no immediate complications    
No

## 2024-03-27 NOTE — H&P ADULT - PROBLEM SELECTOR PLAN 2
- History of CKD  - Baseline SCr ~2 (however SCr fluctuates from 1.4- 3.5 from prior admissions)  - Current SCr 2.87  - Likely 2/2 UTI  - IV Fluids  - F/U Urine studies

## 2024-03-28 NOTE — PROGRESS NOTE ADULT - NS ATTEND AMEND GEN_ALL_CORE FT
84F, PMH HTN, HLD, CKD, MM on pomalidomide/dex, p/w cc hematuria, found with UTI, admission for UTI with hematuria and hyperglycemia likely from dexamethasone.    AP  UTI with hematuria  MM on pomalidomide/dex  uncontrolled DM2 with hyperglycemia  normocytic anemia  KAJAL on CKD  HTN  HLD    -started CTX for UTI. f/u UCx  -discussed with onc: obtain SPEP, FLC 84F, PMH HTN, HLD, CKD, MM on pomalidomide/dex, p/w cc hematuria, found with UTI, admission for UTI with hematuria and hyperglycemia likely from dexamethasone.    AP  UTI with hematuria  MM on pomalidomide/dex  new uncontrolled DM2 with hyperglycemia  normocytic anemia  KAJAL on CKD  HTN  HLD    -started CTX for UTI. f/u UCx  -discussed with onc: obtain S/UPEP, ROSSI, FLC  -for now, cont dex  -consulted endocrine as patient has new DM2 likely from chronic dex use  -for now, started lantus 5 with ISS  -AKJAL on CKD improved with IVF  -c/w home meds  -SCDs for dvt ppx    =================================  I independently reviewed the following tests: N/A  I discussed management with specialists and the plan of care is described above.  I ordered labs and studies: CBC, BMP  I reviewed the following labs to guide my management:                        8.5    4.13  )-----------( 183      ( 28 Mar 2024 07:12 )             25.8     03-28    139  |  115<H>  |  33<H>  ----------------------------<  183<H>  4.3   |  18<L>  |  1.78<H>    Ca    9.1      28 Mar 2024 07:12  Phos  2.9     03-28  Mg     1.8     03-28    TPro  7.0  /  Alb  2.4<L>  /  TBili  0.3  /  DBili  x   /  AST  8<L>  /  ALT  19  /  AlkPhos  111  03-27

## 2024-03-28 NOTE — PROGRESS NOTE ADULT - ASSESSMENT
84 year old female from home, with PMH of HTN, HLD, CKD, CVA, SAH, Rheumatoid Arthritis, Multiple Myeloma (sees Dr. Rhoda Rosales from [facility] oncology) and Bladder incontinence presented with blood in the urine.    In ED  Hb of 9.3 (at baseline from prior admissions), WBC of 3.6, K of 5.6, SCr of 2.87,   UA positive With WBC 20, LE Moderate, Nitrate positive, RBC >50, and urine glucose of 500.   Patient was admitted to medicine fo Hematuria and Acute UTI  Started on Ceftriaxone, urine culture pending   Oncology consulted Dr Ferrari, recommended repeat SPEP, Free light chain assay.   Pt was seen at bedside, no new complaints, still with some  hematuria, pending urine culture   Hemoglobin stable   Seen by PT, ambulating in hallway

## 2024-03-28 NOTE — CONSULT NOTE ADULT - SUBJECTIVE AND OBJECTIVE BOX
Reason for consult:    HPI:  Patient is a 84 year old female from home, with PMH of HTN, HLD, CKD, CVA, SAH, Rheumatoid Arthritis, Multiple Myeloma, and Bladder incontinence who was brought to the ED for blood in the urine.  Patients daughter at bedside assisting with history.  Patient found to have light red blood in the urine yesterday however patient and family did not think much of it becuase it was very light in color.  Patients daughter states today when the patient used the bed pain she noted deep dark red urine.  Patient also complained of burning pain with urination and increased urinary frequency over the past few days.  Patient states she has not had fever or chills.  Patient denies abdominal / suprapubic / flank pain.  Patient denies nausea, vomiting, headache, blurry vision, dizziness, chest pain, constipation, diarrhea, leg swelling.  (27 Mar 2024 04:48)      PAST MEDICAL & SURGICAL HISTORY:  Rheumatoid arthritis      Varicose veins of lower extremity      Hypertension      Multiple myeloma      HLD (hyperlipidemia)      Cerebrovascular accident (CVA)  remote hx, no residual deficits      No significant past surgical history          FAMILY HISTORY:  FHx: stroke (Father)        Alochol: Denied  Smoking: Nonsmoker  Drug Use: Denied  Marital Status:         Allergies    No Known Allergies    Intolerances        MEDICATIONS  (STANDING):  amLODIPine   Tablet 2.5 milliGRAM(s) Oral daily  aspirin  chewable 81 milliGRAM(s) Oral daily  atorvastatin 80 milliGRAM(s) Oral at bedtime  calcitriol   Capsule 0.25 MICROGram(s) Oral daily  cefTRIAXone   IVPB 1000 milliGRAM(s) IV Intermittent every 24 hours  dexAMETHasone     Tablet 4 milliGRAM(s) Oral <User Schedule>  dextrose 5%. 1000 milliLiter(s) (100 mL/Hr) IV Continuous <Continuous>  dextrose 5%. 1000 milliLiter(s) (50 mL/Hr) IV Continuous <Continuous>  dextrose 50% Injectable 25 Gram(s) IV Push once  dextrose 50% Injectable 25 Gram(s) IV Push once  dextrose 50% Injectable 12.5 Gram(s) IV Push once  ferrous    sulfate 325 milliGRAM(s) Oral daily  folic acid 1 milliGRAM(s) Oral daily  gabapentin 200 milliGRAM(s) Oral two times a day  glucagon  Injectable 1 milliGRAM(s) IntraMuscular once  hydroxychloroquine 200 milliGRAM(s) Oral daily  insulin glargine Injectable (LANTUS) 5 Unit(s) SubCutaneous at bedtime  insulin lispro (ADMELOG) corrective regimen sliding scale   SubCutaneous three times a day before meals  insulin lispro (ADMELOG) corrective regimen sliding scale   SubCutaneous at bedtime  lidocaine   4% Patch 2 Patch Transdermal every 24 hours  melatonin 3 milliGRAM(s) Oral at bedtime  memantine 5 milliGRAM(s) Oral at bedtime  risperiDONE   Tablet 0.25 milliGRAM(s) Oral at bedtime  sodium bicarbonate 650 milliGRAM(s) Oral two times a day  sodium chloride 0.9%. 1000 milliLiter(s) (80 mL/Hr) IV Continuous <Continuous>    MEDICATIONS  (PRN):  acetaminophen     Tablet .. 650 milliGRAM(s) Oral every 6 hours PRN Temp greater or equal to 38C (100.4F), Mild Pain (1 - 3)  dextrose Oral Gel 15 Gram(s) Oral once PRN Blood Glucose LESS THAN 70 milliGRAM(s)/deciliter      ROS  unable to obtain    T(C): 36.5 (03-28-24 @ 05:05), Max: 36.9 (03-27-24 @ 16:00)  HR: 67 (03-28-24 @ 05:05) (61 - 67)  BP: 165/75 (03-28-24 @ 05:05) (145/84 - 177/84)  RR: 16 (03-28-24 @ 05:05) (16 - 18)  SpO2: 99% (03-28-24 @ 05:05) (97% - 100%)  Wt(kg): --    PE  NAD  Awake, alert  Anicteric, MMM  RRR  CTAB  Abd soft, NT, ND  No c/c/e  No rash grossly  FROM                          8.5    4.13  )-----------( 183      ( 28 Mar 2024 07:12 )             25.8       03-28    139  |  115<H>  |  33<H>  ----------------------------<  183<H>  4.3   |  18<L>  |  1.78<H>    Ca    9.1      28 Mar 2024 07:12  Phos  2.9     03-28  Mg     1.8     03-28    TPro  7.0  /  Alb  2.4<L>  /  TBili  0.3  /  DBili  x   /  AST  8<L>  /  ALT  19  /  AlkPhos  111  03-27

## 2024-03-28 NOTE — CONSULT NOTE ADULT - SUBJECTIVE AND OBJECTIVE BOX
ENDOCRINE INITIAL CONSULT NOTE:    Patient is a 84y old  Female who presents with a chief complaint of hematuria, acute UTI, and KAJAL on CKD3 (28 Mar 2024 13:01)      HPI:  Patient is a 84 year old female from home, with PMH of HTN, HLD, CKD, CVA, SAH, Rheumatoid Arthritis, Multiple Myeloma, and Bladder incontinence who was brought to the ED for blood in the urine.  Patients daughter at bedside assisting with history.  Patient found to have light red blood in the urine yesterday however patient and family did not think much of it becuase it was very light in color.  Patients daughter states today when the patient used the bed pain she noted deep dark red urine.  Patient also complained of burning pain with urination and increased urinary frequency over the past few days.  Patient states she has not had fever or chills.  Patient denies abdominal / suprapubic / flank pain.  Patient denies nausea, vomiting, headache, blurry vision, dizziness, chest pain, constipation, diarrhea, leg swelling.  (27 Mar 2024 04:48)    84y Female    Diabetes History:  Diagnosis:  Home regimen:  Home fingersticks/ CGM:  Hypoglycemia events:  Retinopathy/most recent opthalmology:  Peripheral Neuropathy:  Nephropathy:  Cardiovascular disease:  Peripheral vascular disease:  Diet:  Recent A1C   PCP  Endocrinologist:    Review of systems:  Constitutional:  Constitutional: No fever, weight loss, fatigue, low energy, generalized weakness, poor appetite  Eyes: No redness, no dryness, no pain, no tearing, no gritty or jes feeling, no bulging  Cardiovascular/ Respiratory: No palpitations,, no chest pain, no shortness of breath, no exercise intolerance, no cough, no leg/ ankle swelling  Gastrointestinal: No trouble swallowing, no heart burn, no abdominal pain, no bloating, no nausea, no vomiting, no constipation, no diarrhea, no frequent bowel movements  Skin: No excessive hair growth, no hair loss, no acne, no excessive sweating, no rash, no easy bruising  Neurological: No headaches, no change in vision, no dizziness/ lightheadedness, no tremors, no numbness/ tingling in feet, no pain/ burning in feet, no trouble with balance, no muscular weakness.   Endocrine: Frequent urination, excessive urination, excessive thirst, symptoms     PAST MEDICAL & SURGICAL HISTORY:  Rheumatoid arthritis      Varicose veins of lower extremity      Hypertension      Multiple myeloma      HLD (hyperlipidemia)      Cerebrovascular accident (CVA)  remote hx, no residual deficits      No significant past surgical history          FAMILY HISTORY:  FHx: stroke (Father)        Social History:  Occupation:  Marital status/Lives with  Exercise:  Tobacco:  Alcohol:  illicit drug abuse:  Health insurance status:    MEDICATIONS  (STANDING):  amLODIPine   Tablet 2.5 milliGRAM(s) Oral daily  aspirin  chewable 81 milliGRAM(s) Oral daily  atorvastatin 80 milliGRAM(s) Oral at bedtime  calcitriol   Capsule 0.25 MICROGram(s) Oral daily  cefTRIAXone   IVPB 1000 milliGRAM(s) IV Intermittent every 24 hours  dexAMETHasone     Tablet 4 milliGRAM(s) Oral <User Schedule>  dextrose 5%. 1000 milliLiter(s) (100 mL/Hr) IV Continuous <Continuous>  dextrose 5%. 1000 milliLiter(s) (50 mL/Hr) IV Continuous <Continuous>  dextrose 50% Injectable 25 Gram(s) IV Push once  dextrose 50% Injectable 25 Gram(s) IV Push once  dextrose 50% Injectable 12.5 Gram(s) IV Push once  ferrous    sulfate 325 milliGRAM(s) Oral daily  folic acid 1 milliGRAM(s) Oral daily  gabapentin 200 milliGRAM(s) Oral two times a day  glucagon  Injectable 1 milliGRAM(s) IntraMuscular once  hydroxychloroquine 200 milliGRAM(s) Oral daily  insulin glargine Injectable (LANTUS) 5 Unit(s) SubCutaneous at bedtime  insulin lispro (ADMELOG) corrective regimen sliding scale   SubCutaneous three times a day before meals  insulin lispro (ADMELOG) corrective regimen sliding scale   SubCutaneous at bedtime  lidocaine   4% Patch 2 Patch Transdermal every 24 hours  melatonin 3 milliGRAM(s) Oral at bedtime  memantine 5 milliGRAM(s) Oral at bedtime  risperiDONE   Tablet 0.25 milliGRAM(s) Oral at bedtime  sodium bicarbonate 650 milliGRAM(s) Oral two times a day  sodium chloride 0.9%. 1000 milliLiter(s) (80 mL/Hr) IV Continuous <Continuous>    MEDICATIONS  (PRN):  acetaminophen     Tablet .. 650 milliGRAM(s) Oral every 6 hours PRN Temp greater or equal to 38C (100.4F), Mild Pain (1 - 3)  dextrose Oral Gel 15 Gram(s) Oral once PRN Blood Glucose LESS THAN 70 milliGRAM(s)/deciliter      Physical Examination  Vital Signs Last 24 Hrs  T(C): 36.4 (28 Mar 2024 12:55), Max: 36.9 (27 Mar 2024 16:00)  T(F): 97.6 (28 Mar 2024 12:55), Max: 98.4 (27 Mar 2024 16:00)  HR: 68 (28 Mar 2024 12:55) (64 - 68)  BP: 128/72 (28 Mar 2024 12:55) (128/72 - 165/75)  BP(mean): 104 (27 Mar 2024 16:00) (104 - 104)  RR: 17 (28 Mar 2024 12:55) (16 - 18)  SpO2: 98% (28 Mar 2024 12:55) (98% - 100%)    Parameters below as of 28 Mar 2024 12:55  Patient On (Oxygen Delivery Method): room air      Constitutional: No acute distress, ill- appearing, no anxious appearing, hyperkinetic, no diaphoretic  HEENT: Moist mucous membranes  Neck:  No JVD, bruits or thyromegaly, No thyroid nodules palpable, no LAD  Respiratory:  Respiratory effort normal, lungs clear to ausculation, without rales or rhonchi  Cardiovascular:  Regular heart rate, normal S1 and S2 sounds, without murmur, rub or gallop.  Gastrointestinal: Soft, non tender without hepatosplenomegaly and masses, no abdominal obesity  Extremities: Sensation intact to monofilament in feet, no cyanosis, clubbing or edema, positive pedal pulses  Neurological:  Oriented to person, place and time, No gross sensory or motor defects, visual fields intact to confrontation, normal deep tendon reflexes    Labs:                        8.5    4.13  )-----------( 183      ( 28 Mar 2024 07:12 )             25.8     03-28    139  |  115<H>  |  33<H>  ----------------------------<  183<H>  4.3   |  18<L>  |  1.78<H>    Ca    9.1      28 Mar 2024 07:12  Phos  2.9     03-28  Mg     1.8     03-28    TPro  7.0  /  Alb  2.4<L>  /  TBili  0.3  /  DBili  x   /  AST  8<L>  /  ALT  19  /  AlkPhos  111  03-27        PT/INR - ( 27 Mar 2024 00:40 )   PT: 13.8 sec;   INR: 1.22 ratio         PTT - ( 27 Mar 2024 00:40 )  PTT:22.1 sec  Urinalysis Basic - ( 28 Mar 2024 07:12 )    Color: x / Appearance: x / SG: x / pH: x  Gluc: 183 mg/dL / Ketone: x  / Bili: x / Urobili: x   Blood: x / Protein: x / Nitrite: x   Leuk Esterase: x / RBC: x / WBC x   Sq Epi: x / Non Sq Epi: x / Bacteria: x      CAPILLARY BLOOD GLUCOSE      POCT Blood Glucose.: 291 mg/dL (28 Mar 2024 12:02)  POCT Blood Glucose.: 163 mg/dL (28 Mar 2024 07:47)  POCT Blood Glucose.: 214 mg/dL (27 Mar 2024 21:29)  POCT Blood Glucose.: 199 mg/dL (27 Mar 2024 17:14)      Radiology and diagnostic studies:      Assessment and Plan:  84y Female   Endocrinology is consulted fro    1) Type 2 diabetes:      Recommendations:  Basal Insulin:   Glargine ( Lantus) units once daily    Nutritional Insulin:   Lispro (Admelog) units with breakfast, Hold if NPO or eating <50% of meals  Lispro ( Admelog) units with lunch, Hold if NPO or eating < 50% of meals  Lispro (Admelog) units with dinner, Hold if NPO or eating < 50% of meals    Correctional Insulin:  Normal Lispro ( Admelog) correctional scale with meals and bedtime    Oral Diabetes Medications:  Non in the hospital     ENDOCRINE INITIAL CONSULT NOTE:   84 year old  Female who presents with a chief complaint of hematuria, acute UTI, and KAJAL on CKD3 (28 Mar 2024 13:01)  HPI:   84 year old female with PMH of HTN, HLD, CKD, CVA, SAH, Rheumatoid Arthritis, Multiple Myeloma, chronic steroid use and Bladder incontinence who was brought to the ED for blood in the urine.  Patients daughter at bedside assisting with history.  Patient found to have light red blood in the urine yesterday however patient and family did not think much of it becuase it was very light in color.  Patients daughter states today when the patient used the bed pain she noted deep dark red urine.  Patient also complained of burning pain with urination and increased urinary frequency over the past few days.  Patient states she has not had fever or chills.  Patient denies abdominal / suprapubic / flank pain.  Patient denies nausea, vomiting, headache, blurry vision, dizziness, chest pain, constipation, diarrhea, leg swelling.  (27 Mar 2024 04:48). Endocrinology is consulted for glycemic management    Patient seen at the bedside with the daughter who is assisting in providing history. Patient is alert however not a good narrator  Patient has never been diagnosed with diabetes however she has been on chronic dexamethasone treatment.  Patient has a home health aide at home who cooks for her and gives her high carbohydrate diet.  In the hospital patient is having a fair appetite and eating greater than 50% of meals.    Review of systems:  Unable to obtain as patient is not a good historian      Past Medical and Surgical Hx:  Rheumatoid arthritis  Varicose veins of lower extremity  Hypertension  Multiple myeloma  HLD (hyperlipidemia)  Cerebrovascular accident (CVA)  remote hx, no residual deficits  No significant past surgical history    Family Hx:  FHx: stroke (Father)    Social History:  Tobacco: Denies  Alcohol: Denies    Medications (Standing):  amLODIPine   Tablet 2.5 milliGRAM(s) Oral daily  aspirin  chewable 81 milliGRAM(s) Oral daily  atorvastatin 80 milliGRAM(s) Oral at bedtime  calcitriol   Capsule 0.25 MICROGram(s) Oral daily  cefTRIAXone   IVPB 1000 milliGRAM(s) IV Intermittent every 24 hours  dexAMETHasone     Tablet 4 milliGRAM(s) Oral <User Schedule>  dextrose 5%. 1000 milliLiter(s) (100 mL/Hr) IV Continuous <Continuous>  dextrose 5%. 1000 milliLiter(s) (50 mL/Hr) IV Continuous <Continuous>  dextrose 50% Injectable 25 Gram(s) IV Push once  dextrose 50% Injectable 25 Gram(s) IV Push once  dextrose 50% Injectable 12.5 Gram(s) IV Push once  ferrous    sulfate 325 milliGRAM(s) Oral daily  folic acid 1 milliGRAM(s) Oral daily  gabapentin 200 milliGRAM(s) Oral two times a day  glucagon  Injectable 1 milliGRAM(s) IntraMuscular once  hydroxychloroquine 200 milliGRAM(s) Oral daily  insulin glargine Injectable (LANTUS) 5 Unit(s) SubCutaneous at bedtime  insulin lispro (ADMELOG) corrective regimen sliding scale   SubCutaneous three times a day before meals  insulin lispro (ADMELOG) corrective regimen sliding scale   SubCutaneous at bedtime  lidocaine   4% Patch 2 Patch Transdermal every 24 hours  melatonin 3 milliGRAM(s) Oral at bedtime  memantine 5 milliGRAM(s) Oral at bedtime  risperiDONE   Tablet 0.25 milliGRAM(s) Oral at bedtime  sodium bicarbonate 650 milliGRAM(s) Oral two times a day  sodium chloride 0.9%. 1000 milliLiter(s) (80 mL/Hr) IV Continuous <Continuous>    Medications (PRN):  acetaminophen     Tablet .. 650 milliGRAM(s) Oral every 6 hours PRN Temp greater or equal to 38C (100.4F), Mild Pain (1 - 3)  dextrose Oral Gel 15 Gram(s) Oral once PRN Blood Glucose LESS THAN 70 milliGRAM(s)/deciliter    Physical Examination  Vital Signs Last 24 Hrs  T(C): 36.4 (28 Mar 2024 12:55), Max: 36.9 (27 Mar 2024 16:00)  T(F): 97.6 (28 Mar 2024 12:55), Max: 98.4 (27 Mar 2024 16:00)  HR: 68 (28 Mar 2024 12:55) (64 - 68)  BP: 128/72 (28 Mar 2024 12:55) (128/72 - 165/75)  BP(mean): 104 (27 Mar 2024 16:00) (104 - 104)  RR: 17 (28 Mar 2024 12:55) (16 - 18)  SpO2: 98% (28 Mar 2024 12:55) (98% - 100%)    Constitutional: No acute distress yes ill- appearing,   HEENT: Dry mucous membranes  Gastrointestinal: Soft, non tender without hepatosplenomegaly and masses, Yes abdominal obesity  Extremities: Sensation intact in feet, no cyanosis, clubbing or edema, positive pedal pulses  Neurological:  Oriented to person, place and time    Labs:                   8.5    4.13  )-----------( 183      ( 28 Mar 2024 07:12 )             25.8     03-28  139  |  115<H>  |  33<H>  ----------------------------<  183<H>  4.3   |  18<L>  |  1.78<H>  Ca    9.1      28 Mar 2024 07:12  Phos  2.9     03-28  Mg     1.8     03-28    Capillary Blood Glucose:  POCT Blood Glucose.: 291 mg/dL (28 Mar 2024 12:02)  POCT Blood Glucose.: 163 mg/dL (28 Mar 2024 07:47)  POCT Blood Glucose.: 214 mg/dL (27 Mar 2024 21:29)  POCT Blood Glucose.: 199 mg/dL (27 Mar 2024 17:14)    A1C with Estimated Average Glucose Result: 8.2 % (03.28.24 @ 07:12)    Assessment and Plan:  84y Female with PMH of HTN, HLD, CKD, CVA, SAH, Rheumatoid Arthritis, Multiple Myeloma, chronic steroid use and Bladder incontinence who was brought to the ED for blood in the urine. Endocrinology is consulted for g.ycemic management    1) Steroid induced hyperglycemia  2) Newly diagnosed type 2 Diabetes    A1C is 8.2% indicating patient has diabetes  Discussed with daughter that diabetes might be due to steroids, however she still needs medication for Rx of her diabetes  Inpatient Fasting BS are slightly above goal, however postprandial BS are in 200    Inpatient Recommendations:  Basal Insulin:   Agree with Glargine ( Lantus) 5 units once daily    Nutritional Insulin:  Star Lispro (Admelog) 3 units before meals, Hold if NPO or eating <50% of meals    Correctional Insulin:  low Lispro ( Admelog) correctional scale with meals and bedtime    Oral Diabetes Medications:  None in the hospital    Check POC glucose with meals and bedtime  Consult Nutritionist    Counselled patient and daughter that she has now newly diagnosed diabetes, therefore she needs to follow low carbohydrate diet, and needs medications  to control her diet.  Educated on definition of HBA1C    Discussed endocrine plan of care with patient, patient"s daughter and primary team

## 2024-03-29 NOTE — PROGRESS NOTE ADULT - PROBLEM SELECTOR PLAN 4
- History of Multiple myeloma  - On Dexamethasone 4mg 2 times  a week at home, continue for now  - Patient on pomalyst at home, to be resumed today (daughter bringing from home, not available at pharmacy)  - hem/onc dr Morocho   - f/u EDEN, Free light chain assay.
- History of Multiple myeloma  - On Dexamethasone 4mg 2 times  a week at home, continue for now  - Patient on pomalyst at home, not resumed for now  - hem/onc consulted Dr Ferrari  - repeat SPEP, Free light chain assay.

## 2024-03-29 NOTE — PROGRESS NOTE ADULT - PROBLEM SELECTOR PLAN 1
- positive UA   - c/w IV fluids   - Prior Urine and blood cultures for haines sensitive K. Pneumonia in 2023  - f/u blood cultures   - f/u urine cultures   - Ceftriaxone 1g daily
- positive UA   - sp course of Ceftriaxone   - urine cultures rowing Candida   - Diflucan 100 mg for 5 days

## 2024-03-29 NOTE — PROGRESS NOTE ADULT - PROBLEM SELECTOR PLAN 3
- History of Rheumatoid arthritis  - Continue Home Hydroxychloroquine and Folic acid
- History of Rheumatoid arthritis  - Continue Home Hydroxychloroquine and Folic acid

## 2024-03-29 NOTE — PROGRESS NOTE ADULT - PROBLEM SELECTOR PLAN 5
- Patient with history of HTN  - Patient on Lisinopril 2.5mg qd at home  - Hold Lisinopril in KAJAL  - Start Amlodipine for HTN for now
- Patient with history of HTN  - Patient on Lisinopril 2.5mg qd at home  - Hold Lisinopril in KAJAL  - c/w Amlodipine

## 2024-03-29 NOTE — PROGRESS NOTE ADULT - PROBLEM SELECTOR PLAN 2
- History of CKD  - Baseline SCr ~2 (however SCr fluctuates from 1.4- 3.5 from prior admissions)  - Current SCr 2.87--1.78  - IV Fluids
- History of CKD  - Baseline SCr ~2 (however SCr fluctuates from 1.4- 3.5 from prior admissions)  - Current SCr 2.87--1.78  - Likely 2/2 UTI  - IV Fluids  - F/U Urine studies

## 2024-03-29 NOTE — PROGRESS NOTE ADULT - ASSESSMENT
84 year old female from home, with PMH of HTN, HLD, CKD, CVA, SAH, Rheumatoid Arthritis, Multiple Myeloma (sees Dr. Rhoda Rosales from [facility] oncology) and Bladder incontinence presented with blood in the urine.    In ED  Hb of 9.3 (at baseline from prior admissions), WBC of 3.6, K of 5.6, SCr of 2.87,   UA positive With WBC 20, LE Moderate, Nitrate positive, RBC >50, and urine glucose of 500.   Patient was admitted to medicine for Hematuria and Acute UTI  Completed course of  Ceftriaxone  Oncology consulted Dr Ferrari, recommended repeat SPEP, Free light chain assay. Recommended to start Pomalyst 2 mg every other day (daughter is bringing from home)   Pt was seen at bedside, no new complaints, still with some  hematuria,  urine culture growing Candida, started on Diflucan 100 mg for 5 days   Hemoglobin stable

## 2024-03-29 NOTE — PROGRESS NOTE ADULT - NS ATTEND AMEND GEN_ALL_CORE FT
84F, PMH HTN, HLD, CKD, MM on pomalidomide/dex, p/w cc hematuria, found with UTI, admission for UTI with hematuria and hyperglycemia likely from dexamethasone.    AP  Candida UTI with hematuria  MM on pomalidomide/dex  new uncontrolled DM2 with hyperglycemia  normocytic anemia  KAJAL on CKD  HTN  HLD    -started CTX for UTI, now Cx growing Candida  -discussed with ID: given immunocompromised state and glucosuria, will start diflucan 100 bid   -discussed with onc: obtain S/UPEP, ROSSI, FLC  -hold dex and resume home pomalyst  -discussed with endocrine as patient has new DM2 likely from chronic dex use: lantus 5, lispro 3 tid + ISS  -KAJAL on CKD improved with IVF  -c/w home meds  -SCDs for dvt ppx    =================================  I independently reviewed the following tests: N/A  I discussed management with specialists and the plan of care is described above.  I ordered labs and studies: CBC, BMP  I reviewed the following labs to guide my management:                        8.5    4.13  )-----------( 183      ( 28 Mar 2024 07:12 )             25.8     03-28    139  |  115<H>  |  33<H>  ----------------------------<  183<H>  4.3   |  18<L>  |  1.78<H>    Ca    9.1      28 Mar 2024 07:12  Phos  2.9     03-28  Mg     1.8     03-28    TPro  7.0  /  Alb  2.4<L>  /  TBili  0.3  /  DBili  x   /  AST  8<L>  /  ALT  19  /  AlkPhos  111  03-27 84F, PMH HTN, HLD, CKD, MM on pomalidomide/dex, p/w cc hematuria, found with UTI, admission for UTI with hematuria and newly dx uncontrolled DM2 from steroid use. Now found with candiduria.    AP  Candida UTI with hematuria  MM on pomalidomide/dex  new uncontrolled DM2 with hyperglycemia  normocytic anemia  KAJAL on CKD  HTN  HLD    -started CTX for UTI, now Cx growing Candida  -discussed with ID: given immunocompromised state and glucosuria, will start diflucan 100 bid   -discussed with onc: obtain S/UPEP, ROSSI, FLC  -hold dex and resume home pomalyst  -discussed with endocrine as patient has new DM2 likely from chronic dex use: lantus 5, lispro 3 tid + ISS  -KAJAL on CKD improved with IVF  -c/w home meds  -SCDs for dvt ppx    =================================  I independently reviewed the following tests: N/A  I discussed management with specialists and the plan of care is described above.  I ordered labs and studies: CBC, BMP  I reviewed the following labs to guide my management:                        8.5    4.13  )-----------( 183      ( 28 Mar 2024 07:12 )             25.8     03-28    139  |  115<H>  |  33<H>  ----------------------------<  183<H>  4.3   |  18<L>  |  1.78<H>    Ca    9.1      28 Mar 2024 07:12  Phos  2.9     03-28  Mg     1.8     03-28    TPro  7.0  /  Alb  2.4<L>  /  TBili  0.3  /  DBili  x   /  AST  8<L>  /  ALT  19  /  AlkPhos  111  03-27

## 2024-03-29 NOTE — PROGRESS NOTE ADULT - PROBLEM SELECTOR PROBLEM 2
MICHAEL - Requesting for her recent lab results and endocrine notes.   She's also requesting for trospium to help w/ her incontinence?     -[place]
Acute kidney injury superimposed on CKD
Acute kidney injury superimposed on CKD

## 2024-03-29 NOTE — ADVANCED PRACTICE NURSE CONSULT - ASSESSMENT
This is a 84yr old female patient admitted for UTI, presenting with evidence of healed wounds to the Bilateral Gluteus and Coccyx areas, as evident by scarring and hypopigmentation

## 2024-03-29 NOTE — PROGRESS NOTE ADULT - PROBLEM SELECTOR PLAN 6
- Patient with history of HLD  - Patient on Rosuvastatin 20mg qd at home  - Continue home antihyperlipidemic
on Rosuvastatin 20mg qd at home  - Continue home antihyperlipidemic

## 2024-03-30 NOTE — DIETITIAN INITIAL EVALUATION ADULT - PERTINENT LABORATORY DATA
03-30    143  |  116<H>  |  35<H>  ----------------------------<  113<H>  4.6   |  21<L>  |  2.04<H>    Ca    8.9      30 Mar 2024 05:41    POCT Blood Glucose.: 114 mg/dL (03-30-24 @ 07:58)  A1C with Estimated Average Glucose Result: 8.2 % (03-28-24 @ 07:12)

## 2024-03-30 NOTE — DISCHARGE NOTE PROVIDER - NSDCCPCAREPLAN_GEN_ALL_CORE_FT
PRINCIPAL DISCHARGE DIAGNOSIS  Diagnosis: Urinary tract infection with hematuria  Assessment and Plan of Treatment: Hematuria is blood in your urine.  This can be caused by an infection, kidney stone, kidney disease, enlarged prostate, intense exercise, trauma or certain types of cancer.  Certain foods can make your urine look like there is blood when there is not any.       Should you experience blood in your urine you should notify your primary doctor or Urologist.     Take all of your medications as presribed.  Follow up with your providers as directed.  If you were prescribed antibiotics, take them exactly as your caregiver instructs you. Finish the medication even if you feel better after you have only taken some of the medication.  Drink enough water and fluids to keep your urine clear or pale yellow.  Avoid caffeine, tea, and carbonated beverages. They tend to irritate your bladder.  Empty your bladder often. Avoid holding urine for long periods of time.  Empty your bladder before and after sexual intercourse.  After a bowel movement, women should cleanse from front to back. Use each tissue only once.  SEEK MEDICAL CARE IF:  You have back pain.  You develop a fever.  Your symptoms do not begin to resolve within 3 days.  SEEK IMMEDIATE MEDICAL CARE IF:  You have severe back pain or lower abdominal pain.  You develop chills.  You have nausea or vomiting.  You have continued burning or discomfort with urination.        SECONDARY DISCHARGE DIAGNOSES  Diagnosis: Multiple myeloma  Assessment and Plan of Treatment:     Diagnosis: HTN (hypertension)  Assessment and Plan of Treatment: You have a history of high blood pressure. High blood pressure is a condition that puts you at risk for heart attack, stroke and kidney disease. Please continue to take your medications as prescribed. You can also help control your blood pressure by maintaining a healthy weight, eating a diet low in fat and rich in fruits and vegetables, reduce the amount of salt in your diet. Also, reduce alcohol and try to include some form of physical activity daily for at least 30 mins. Follow up with your medical doctor to establish long term blood pressure treatment goals.  Notify your doctor if you have any of the following symptoms:   Dizziness, Lightheadedness, Blurry vision, Headache, Chest pain, Shortness of breath      Diagnosis: HLD (hyperlipidemia)  Assessment and Plan of Treatment: You have a history of hyperlipidemia, which is when you have too much cholesterol in your blood. High amounts of cholesterol in your blood can put you at higher risks for heart attck, strokes and other health problems. Follow up with PCP for treatment goals, continue medication as prescribed, have liver function testing every 3 months as anti lipid medications can cause liver irritation, eat low fat meals, avoid red meat, butter, fried foods and cheese. Get daily exercise.      Diagnosis: Rheumatoid arthritis  Assessment and Plan of Treatment:     Diagnosis: Acute on chronic renal failure  Assessment and Plan of Treatment:      PRINCIPAL DISCHARGE DIAGNOSIS  Diagnosis: Urinary tract infection with hematuria  Assessment and Plan of Treatment: Hematuria is blood in your urine.  This can be caused by an infection, kidney stone, kidney disease, enlarged prostate, intense exercise, trauma or certain types of cancer.  Certain foods can make your urine look like there is blood when there is not any.       Should you experience blood in your urine you should notify your primary doctor or Urologist.     Take all of your medications as presribed.  Follow up with your providers as directed.  If you were prescribed antibiotics, take them exactly as your caregiver instructs you. Finish the medication even if you feel better after you have only taken some of the medication.  Drink enough water and fluids to keep your urine clear or pale yellow.  Avoid caffeine, tea, and carbonated beverages. They tend to irritate your bladder.  Empty your bladder often. Avoid holding urine for long periods of time.  Empty your bladder before and after sexual intercourse.  After a bowel movement, women should cleanse from front to back. Use each tissue only once.  SEEK MEDICAL CARE IF:  You have back pain.  You develop a fever.  Your symptoms do not begin to resolve within 3 days.  SEEK IMMEDIATE MEDICAL CARE IF:  You have severe back pain or lower abdominal pain.  You develop chills.  You have nausea or vomiting.  You have continued burning or discomfort with urination.        SECONDARY DISCHARGE DIAGNOSES  Diagnosis: Multiple myeloma  Assessment and Plan of Treatment: You have a history of multiple myeloma. You were seen by a heme/oncologist who reccommeds that you  - HOLD further Dexamethasone   -Can resume home supply of Pomalidomide   -Out-pt f/u with primary Hem-onc on further discussion about Tx regimen for MM .    Diagnosis: HTN (hypertension)  Assessment and Plan of Treatment: You have a history of high blood pressure. High blood pressure is a condition that puts you at risk for heart attack, stroke and kidney disease. Please continue to take your medications as prescribed. You can also help control your blood pressure by maintaining a healthy weight, eating a diet low in fat and rich in fruits and vegetables, reduce the amount of salt in your diet. Also, reduce alcohol and try to include some form of physical activity daily for at least 30 mins. Follow up with your medical doctor to establish long term blood pressure treatment goals.  Notify your doctor if you have any of the following symptoms:   Dizziness, Lightheadedness, Blurry vision, Headache, Chest pain, Shortness of breath      Diagnosis: HLD (hyperlipidemia)  Assessment and Plan of Treatment: You have a history of hyperlipidemia, which is when you have too much cholesterol in your blood. High amounts of cholesterol in your blood can put you at higher risks for heart attck, strokes and other health problems. Follow up with PCP for treatment goals, continue medication as prescribed, have liver function testing every 3 months as anti lipid medications can cause liver irritation, eat low fat meals, avoid red meat, butter, fried foods and cheese. Get daily exercise.      Diagnosis: Rheumatoid arthritis  Assessment and Plan of Treatment: You have ahistory of rheumatoid arthritis    Diagnosis: Acute on chronic renal failure  Assessment and Plan of Treatment:      PRINCIPAL DISCHARGE DIAGNOSIS  Diagnosis: Urinary tract infection with hematuria  Assessment and Plan of Treatment: Hematuria is blood in your urine.  This can be caused by an infection, kidney stone, kidney disease, enlarged prostate, intense exercise, trauma or certain types of cancer.  Certain foods can make your urine look like there is blood when there is not any.       Should you experience blood in your urine you should notify your primary doctor or Urologist.     Take all of your medications as presribed.  Follow up with your providers as directed.  If you were prescribed antibiotics, take them exactly as your caregiver instructs you. Finish the medication even if you feel better after you have only taken some of the medication.  Drink enough water and fluids to keep your urine clear or pale yellow.  Avoid caffeine, tea, and carbonated beverages. They tend to irritate your bladder.  Empty your bladder often. Avoid holding urine for long periods of time.  Empty your bladder before and after sexual intercourse.  After a bowel movement, women should cleanse from front to back. Use each tissue only once.  SEEK MEDICAL CARE IF:  You have back pain.  You develop a fever.  Your symptoms do not begin to resolve within 3 days.  SEEK IMMEDIATE MEDICAL CARE IF:  You have severe back pain or lower abdominal pain.  You develop chills.  You have nausea or vomiting.  You have continued burning or discomfort with urination.        SECONDARY DISCHARGE DIAGNOSES  Diagnosis: Acute on chronic renal failure  Assessment and Plan of Treatment:     Diagnosis: Rheumatoid arthritis  Assessment and Plan of Treatment: You have ahistory of rheumatoid arthritis    Diagnosis: Multiple myeloma  Assessment and Plan of Treatment: You have a history of multiple myeloma. You were seen by a heme/oncologist who reccommeds that you  - HOLD further Dexamethasone   -Can resume home supply of Pomalidomide   -Out-pt f/u with primary Hem-onc on further discussion about Tx regimen for MM .    Diagnosis: HTN (hypertension)  Assessment and Plan of Treatment: You have a history of high blood pressure. High blood pressure is a condition that puts you at risk for heart attack, stroke and kidney disease. Please continue to take your medications as prescribed. You can also help control your blood pressure by maintaining a healthy weight, eating a diet low in fat and rich in fruits and vegetables, reduce the amount of salt in your diet. Also, reduce alcohol and try to include some form of physical activity daily for at least 30 mins. Follow up with your medical doctor to establish long term blood pressure treatment goals.  Notify your doctor if you have any of the following symptoms:   Dizziness, Lightheadedness, Blurry vision, Headache, Chest pain, Shortness of breath      Diagnosis: HLD (hyperlipidemia)  Assessment and Plan of Treatment: You have a history of hyperlipidemia, which is when you have too much cholesterol in your blood. High amounts of cholesterol in your blood can put you at higher risks for heart attck, strokes and other health problems. Follow up with PCP for treatment goals, continue medication as prescribed, have liver function testing every 3 months as anti lipid medications can cause liver irritation, eat low fat meals, avoid red meat, butter, fried foods and cheese. Get daily exercise.       PRINCIPAL DISCHARGE DIAGNOSIS  Diagnosis: Urinary tract infection with hematuria  Assessment and Plan of Treatment: Hematuria is blood in your urine.  This can be caused by an infection, kidney stone, kidney disease, enlarged prostate, intense exercise, trauma or certain types of cancer.  Certain foods can make your urine look like there is blood when there is not any.       Should you experience blood in your urine you should notify your primary doctor or Urologist.     Take all of your medications as presribed.  Follow up with your providers as directed.  If you were prescribed antibiotics, take them exactly as your caregiver instructs you. Finish the medication even if you feel better after you have only taken some of the medication.  Drink enough water and fluids to keep your urine clear or pale yellow.  Avoid caffeine, tea, and carbonated beverages. They tend to irritate your bladder.  Empty your bladder often. Avoid holding urine for long periods of time.  Empty your bladder before and after sexual intercourse.  After a bowel movement, women should cleanse from front to back. Use each tissue only once.  SEEK MEDICAL CARE IF:  You have back pain.  You develop a fever.  Your symptoms do not begin to resolve within 3 days.  SEEK IMMEDIATE MEDICAL CARE IF:  You have severe back pain or lower abdominal pain.  You develop chills.  You have nausea or vomiting.  You have continued burning or discomfort with urination.        SECONDARY DISCHARGE DIAGNOSES  Diagnosis: Acute on chronic renal failure  Assessment and Plan of Treatment:     Diagnosis: Rheumatoid arthritis  Assessment and Plan of Treatment: You have ahistory of rheumatoid arthritis    Diagnosis: Multiple myeloma  Assessment and Plan of Treatment: You have a history of multiple myeloma. You were seen by a heme/oncologist who reccommeds that you  - HOLD further Dexamethasone   -Can resume home supply of Pomalidomide   -Out-pt f/u with primary Hem-onc on further discussion about Tx regimen for MM .    Diagnosis: HTN (hypertension)  Assessment and Plan of Treatment: You have a history of high blood pressure. High blood pressure is a condition that puts you at risk for heart attack, stroke and kidney disease. Please continue to take your medications as prescribed. You can also help control your blood pressure by maintaining a healthy weight, eating a diet low in fat and rich in fruits and vegetables, reduce the amount of salt in your diet. Also, reduce alcohol and try to include some form of physical activity daily for at least 30 mins. Follow up with your medical doctor to establish long term blood pressure treatment goals.  Notify your doctor if you have any of the following symptoms:   Dizziness, Lightheadedness, Blurry vision, Headache, Chest pain, Shortness of breath      Diagnosis: HLD (hyperlipidemia)  Assessment and Plan of Treatment: You have a history of hyperlipidemia, which is when you have too much cholesterol in your blood. High amounts of cholesterol in your blood can put you at higher risks for heart attck, strokes and other health problems. Follow up with PCP for treatment goals, continue medication as prescribed, have liver function testing every 3 months as anti lipid medications can cause liver irritation, eat low fat meals, avoid red meat, butter, fried foods and cheese. Get daily exercise.      Diagnosis: DM2 (diabetes mellitus, type 2)  Assessment and Plan of Treatment: You have developed type 2 diabetes most likely due to steroid use. After discussing with our oncologist, we discontinued your dexamethasone. Please continue to hold the dexamethasone. You will need to start taking diabetes medication. We are prescribing LINAGLIPTIN 5mg to be taken once per day. Please follow up with your PCP to discuss further managment of your diabetes.

## 2024-03-30 NOTE — DISCHARGE NOTE PROVIDER - NSDCMRMEDTOKEN_GEN_ALL_CORE_FT
aspirin 81 mg oral tablet: 1 tab(s) orally once a day  calcitriol 0.25 mcg oral capsule: 1 cap(s) orally once a day  dexAMETHasone 4 mg oral tablet: 1 tab(s) orally 2 times a week  ferrous sulfate 325 mg (65 mg elemental iron) oral delayed release tablet: 1 tab(s) orally once a day  folic acid 1 mg oral tablet: 1 tab(s) orally once a day  gabapentin 100 mg oral capsule: 2 cap(s) orally 2 times a day  hydroxychloroquine 200 mg oral tablet: 1 tab(s) orally once a day. DO NOT RESUME UNTIL OKAY WITH YOUR DOCTOR.   linagliptin 5 mg oral tablet: 1 tab(s) orally once a day  lisinopril 2.5 mg oral tablet: 1 tab(s) orally once a day  melatonin 3 mg oral tablet: 1 tab(s) orally once a day (at bedtime)  memantine 5 mg oral tablet: 1 tab(s) orally once a day (at bedtime)  Pomalyst 2 mg oral capsule: 1 cap(s) orally every other day. DO NOT RESUME UNTIL AFTER REHAB AND OKAYED BY YOUR ONCOLOGIST  risperiDONE 0.25 mg oral tablet: 1 tab(s) orally once a day (at bedtime)  rosuvastatin 20 mg oral capsule: 1 cap(s) orally once a day (at bedtime)  sodium bicarbonate 650 mg oral tablet: 1 tab(s) orally 2 times a day   amLODIPine 2.5 mg oral tablet: 1 tab(s) orally once a day  aspirin 81 mg oral tablet, chewable: 1 tab(s) orally once a day  calcitriol 0.25 mcg oral capsule: 1 cap(s) orally once a day  ferrous sulfate 325 mg (65 mg elemental iron) oral tablet: 1 tab(s) orally once a day  fluconazole 100 mg oral tablet: 1 tab(s) orally once a day  folic acid 1 mg oral tablet: 1 tab(s) orally once a day  gabapentin 100 mg oral capsule: 2 cap(s) orally 2 times a day  hydroxychloroquine 200 mg oral tablet: 1 tab(s) orally once a day  linagliptin 5 mg oral tablet: 1 tab(s) orally once a day  lisinopril 2.5 mg oral tablet: 1 tab(s) orally once a day  melatonin 3 mg oral tablet: 1 tab(s) orally once a day (at bedtime)  memantine 5 mg oral tablet: 1 tab(s) orally once a day (at bedtime)  Pomalyst 2 mg oral capsule: 1 cap(s) orally every other day. DO NOT RESUME UNTIL AFTER REHAB AND OKAYED BY YOUR ONCOLOGIST  risperiDONE 0.25 mg oral tablet: 1 tab(s) orally once a day (at bedtime)  rosuvastatin 20 mg oral capsule: 1 cap(s) orally once a day (at bedtime)  sodium bicarbonate 650 mg oral tablet: 1 tab(s) orally 2 times a day

## 2024-03-30 NOTE — DISCHARGE NOTE PROVIDER - PROVIDER TOKENS
PROVIDER:[TOKEN:[[id number]],FOLLOWUP:[1 week]],PROVIDER:[TOKEN:[[id number]],FOLLOWUP:[1 week]],PROVIDER:[TOKEN:[[id number]],FOLLOWUP:[1 week]]

## 2024-03-30 NOTE — DIETITIAN INITIAL EVALUATION ADULT - OTHER INFO
Pt lives alone at home with HHA help, mostly bedbound PTA, alert, weak, verbally responsive, able to obtain some basic information, but confused at times per chart; Reported appetite good, unsure recent wt changes, denied GI distress, chewing or swallowing problem; Unknown food allergy, no specific food choices; h/o DM, [id number]=3.2, finger stick [id number] to [id number], Endocrinology on the case

## 2024-03-30 NOTE — DISCHARGE NOTE PROVIDER - CARE PROVIDERS DIRECT ADDRESSES
[email or web address]

## 2024-03-30 NOTE — DIETITIAN INITIAL EVALUATION ADULT - PROBLEM SELECTOR PLAN 5
- Patient with history of HTN  - Patient on Lisinopril 2.5mg qd at home  - Hold Lisinopril in KAJAL  - Start Amlodipine for HTN for now

## 2024-03-30 NOTE — DIETITIAN INITIAL EVALUATION ADULT - NSFNSGIIOFT_GEN_A_CORE
SNM=279 lb   Wt data in EMR: 121.4 lb 3/30/24; 125.8 lb 3/31/23; 119.9 lb 5/17/23-->145 lb 3/27/24 ( Admission data)

## 2024-03-30 NOTE — CONSULT NOTE ADULT - SUBJECTIVE AND OBJECTIVE BOX
84 year old female with PMH of HTN, HLD, CKD, CVA, SAH, Rheumatoid Arthritis, Multiple Myeloma, chronic steroid use and Bladder incontinence who was brought to the ED for blood in the urine.  Patients daughter at bedside assisting with history.  Patient found to have light red blood in the urine yesterday however patient and family did not think much of it becuase it was very light in color.  Patients daughter states today when the patient used the bed pain she noted deep dark red urine.  Patient also complained of burning pain with urination and increased urinary frequency over the past few days.  Patient states she has not had fever or chills.  Patient denies abdominal / suprapubic / flank pain.  Patient denies nausea, vomiting, headache, blurry vision, dizziness, chest pain, constipation, diarrhea, leg swelling.  (27 Mar 2024 04:48).     Hem-Onc consulted for h/o Multiple Myeloma   Patient seen this afternoon. Feels OK. Mentions she takes many meds; not a good historian about details of multiple myeloma.   Details obtained from chart review and discussion with Primary team     PMH / PSH: as per hpi   Review of systems: negative except as per hpi   Meds / allergies: reviewed in emr     MEDICATIONS  (STANDING):  amLODIPine   Tablet 2.5 milliGRAM(s) Oral daily  aspirin  chewable 81 milliGRAM(s) Oral daily  atorvastatin 80 milliGRAM(s) Oral at bedtime  calcitriol   Capsule 0.25 MICROGram(s) Oral daily  dextrose 5%. 1000 milliLiter(s) (100 mL/Hr) IV Continuous <Continuous>  dextrose 5%. 1000 milliLiter(s) (50 mL/Hr) IV Continuous <Continuous>  dextrose 50% Injectable 25 Gram(s) IV Push once  dextrose 50% Injectable 25 Gram(s) IV Push once  dextrose 50% Injectable 12.5 Gram(s) IV Push once  ferrous    sulfate 325 milliGRAM(s) Oral daily  fluconAZOLE   Tablet 100 milliGRAM(s) Oral daily  folic acid 1 milliGRAM(s) Oral daily  gabapentin 200 milliGRAM(s) Oral two times a day  glucagon  Injectable 1 milliGRAM(s) IntraMuscular once  hydroxychloroquine 200 milliGRAM(s) Oral daily  insulin lispro (ADMELOG) corrective regimen sliding scale   SubCutaneous at bedtime  insulin lispro (ADMELOG) corrective regimen sliding scale   SubCutaneous three times a day before meals  lidocaine   4% Patch 2 Patch Transdermal every 24 hours  melatonin 3 milliGRAM(s) Oral at bedtime  memantine 5 milliGRAM(s) Oral at bedtime  risperiDONE   Tablet 0.25 milliGRAM(s) Oral at bedtime  sodium bicarbonate 650 milliGRAM(s) Oral two times a day    MEDICATIONS  (PRN):  acetaminophen     Tablet .. 650 milliGRAM(s) Oral every 6 hours PRN Temp greater or equal to 38C (100.4F), Mild Pain (1 - 3)  dextrose Oral Gel 15 Gram(s) Oral once PRN Blood Glucose LESS THAN 70 milliGRAM(s)/deciliter    Vital Signs Last 24 Hrs  T(C): 36.4 (30 Mar 2024 13:34), Max: 37.1 (30 Mar 2024 05:10)  T(F): 97.6 (30 Mar 2024 13:34), Max: 98.7 (30 Mar 2024 05:10)  HR: 78 (30 Mar 2024 13:34) (68 - 78)  BP: 145/75 (30 Mar 2024 13:34) (145/75 - 161/75)  BP(mean): --  RR: 19 (30 Mar 2024 13:34) (16 - 19)  SpO2: 95% (30 Mar 2024 13:34) (95% - 98%)    Parameters below as of 30 Mar 2024 13:34  Patient On (Oxygen Delivery Method): room air    Gen: NAD   CVS: s1s2   Resp: ctabl   GI; soft, non tender     CBC Full  -  ( 30 Mar 2024 05:41 )  WBC Count : 4.70 K/uL  RBC Count : 2.91 M/uL  Hemoglobin : 8.3 g/dL  Hematocrit : 25.2 %  Platelet Count - Automated : 183 K/uL  Mean Cell Volume : 86.6 fl  Mean Cell Hemoglobin : 28.5 pg  Mean Cell Hemoglobin Concentration : 32.9 gm/dL  Auto Neutrophil # : x  Auto Lymphocyte # : x  Auto Monocyte # : x  Auto Eosinophil # : x  Auto Basophil # : x  Auto Neutrophil % : x  Auto Lymphocyte % : x  Auto Monocyte % : x  Auto Eosinophil % : x  Auto Basophil % : x

## 2024-03-30 NOTE — DISCHARGE NOTE PROVIDER - HOSPITAL COURSE
84 year old female from home, with PMH of HTN, HLD, CKD, CVA, SAH, Rheumatoid Arthritis, Multiple Myeloma (sees Dr. Rhoda Rosales from [facility] oncology) and Bladder incontinence presented with blood in the urine.    In ED  Hb of 9.3 (at baseline from prior admissions), WBC of 3.6, K of 5.6, SCr of 2.87,   UA positive With WBC 20, LE Moderate, Nitrate positive, RBC >50, and urine glucose of 500.   Patient was admitted to medicine for Hematuria and Acute UTI  Completed course of  Ceftriaxone. Urine culture growing Candida, started on Diflucan 100 mg for 5 days, IF consulted.     Hem-Onc consulted for h/o Multiple Myeloma, likely chronic steroid induced hyperglycemia / new onset DM2   -HOLD further Dexamethasone   -Can resume home supply of Pomalidomide   -Out-pt f/u with primary Hem-onc on further discussion about Tx regimen for MM     Endo reccs: ?????    PT reccs: ????    case discussed with attending, pt medically stable for d/c. Please note that this a brief summary of hospital course please refer to daily progress notes and consult notes for full course and events       84 year old female from home, with PMH of HTN, HLD, CKD, CVA, SAH, Rheumatoid Arthritis, Multiple Myeloma (sees Dr. Rhoda Rosales from [facility] oncology) and Bladder incontinence presented with blood in the urine.    In ED  Hb of 9.3 (at baseline from prior admissions), WBC of 3.6, K of 5.6, SCr of 2.87,   UA positive With WBC 20, LE Moderate, Nitrate positive, RBC >50, and urine glucose of 500.   Patient was admitted to medicine for Hematuria and Acute UTI  Completed course of  Ceftriaxone. Urine culture growing Candida, started on Diflucan 100 mg for 5 days, IF consulted.     Hem-Onc consulted for h/o Multiple Myeloma, likely chronic steroid induced hyperglycemia / new onset DM2   -HOLD further Dexamethasone   -Can resume home supply of Pomalidomide   -Out-pt f/u with primary Hem-onc on further discussion about Tx regimen for MM     Endo reccs: dexamethasone is discontinued, plan to send pt home on Linagliptin 5mg PO or equivalent    PT reccs: home PT with [facility] services     case discussed with attending, pt medically stable for d/c. Please note that this a brief summary of hospital course please refer to daily progress notes and consult notes for full course and events

## 2024-03-30 NOTE — PROGRESS NOTE ADULT - ASSESSMENT
84F, PMH HTN, HLD, CKD, MM on pomalidomide/dex, p/w cc hematuria, found with UTI, admission for UTI with hematuria and newly dx uncontrolled DM2 from steroid use. Now found with candiduria.    AP  Candida UTI with hematuria  MM on pomalidomide/dex  new uncontrolled DM2 with hyperglycemia  normocytic anemia  KAJAL on CKD  HTN  HLD    -started CTX for UTI, now Cx growing Candida  -discussed with ID: given immunocompromised state and glucosuria, will start diflucan 100 bid   -discussed with onc: obtain S/UPEP, ROSSI, FLC  -hold dex and resume home pomalyst  -discussed with endocrine as patient has new DM2 likely from chronic dex use: lantus 4, lispro 6 tid + ISS  -KAJAL on CKD improved with IVF  -c/w home meds  -SCDs for dvt ppx    =================================  I independently reviewed the following tests: N/A  I discussed management with specialists and the plan of care is described above.  I ordered labs and studies: CBC, BMP  I reviewed the following labs to guide my management:                        8.3    4.70  )-----------( 183      ( 30 Mar 2024 05:41 )             25.2     03-30    143  |  116<H>  |  35<H>  ----------------------------<  113<H>  4.6   |  21<L>  |  2.04<H>    Ca    8.9      30 Mar 2024 05:41       84F, PMH HTN, HLD, CKD, MM on pomalidomide/dex, p/w cc hematuria, found with UTI, admission for UTI with hematuria and newly dx uncontrolled DM2 from steroid use. Now found with candiduria.    AP  Candida UTI with hematuria  MM on pomalidomide/dex  new uncontrolled DM2 with hyperglycemia  normocytic anemia  KAJAL on CKD  HTN  HLD    -started CTX for UTI, now Cx growing Candida  -discussed with ID: given immunocompromised state and glucosuria, will start diflucan 100 bid   -discussed with onc: obtain S/UPEP, ROSSI, FLC  -hold dex and resume home pomalyst  -discussed with endocrine as patient has new DM2 likely from chronic dex use: lantus 4, lispro 6 tid + ISS  -since dex will be discontinued, plan to send pt home on Linagliptin 5mg PO or equivalent  -KAJAL on CKD improved with IVF  -c/w home meds  -SCDs for dvt ppx    =================================  I independently reviewed the following tests: N/A  I discussed management with specialists and the plan of care is described above.  I ordered labs and studies: CBC, BMP  I reviewed the following labs to guide my management:                        8.3    4.70  )-----------( 183      ( 30 Mar 2024 05:41 )             25.2     03-30    143  |  116<H>  |  35<H>  ----------------------------<  113<H>  4.6   |  21<L>  |  2.04<H>    Ca    8.9      30 Mar 2024 05:41

## 2024-03-30 NOTE — CONSULT NOTE ADULT - REASON FOR ADMISSION
hematuria, acute UTI, and KAJAL on CKD3

## 2024-03-30 NOTE — CONSULT NOTE ADULT - SUBJECTIVE AND OBJECTIVE BOX
HPI:  Patient is a 84 year old female from home, with PMH of HTN, HLD, CKD, CVA, SAH, Rheumatoid Arthritis, Multiple Myeloma, and Bladder incontinence who was brought to the ED for blood in the urine.  Patients daughter at bedside assisting with history.  Patient found to have light red blood in the urine yesterday however patient and family did not think much of it becuase it was very light in color.  Patients daughter states today when the patient used the bed pain she noted deep dark red urine.  Patient also complained of burning pain with urination and increased urinary frequency over the past few days.  Patient states she has not had fever or chills.  Patient denies abdominal / suprapubic / flank pain.  Patient denies nausea, vomiting, headache, blurry vision, dizziness, chest pain, constipation, diarrhea, leg swelling.  (27 Mar 2024 04:48)      PAST MEDICAL & SURGICAL HISTORY:  Rheumatoid arthritis      Varicose veins of lower extremity      Hypertension      Multiple myeloma      HLD (hyperlipidemia)      Cerebrovascular accident (CVA)  remote hx, no residual deficits      No significant past surgical history          No Known Allergies      Meds:  acetaminophen     Tablet .. 650 milliGRAM(s) Oral every 6 hours PRN  amLODIPine   Tablet 2.5 milliGRAM(s) Oral daily  aspirin  chewable 81 milliGRAM(s) Oral daily  atorvastatin 80 milliGRAM(s) Oral at bedtime  calcitriol   Capsule 0.25 MICROGram(s) Oral daily  dextrose 5%. 1000 milliLiter(s) IV Continuous <Continuous>  dextrose 5%. 1000 milliLiter(s) IV Continuous <Continuous>  dextrose 50% Injectable 25 Gram(s) IV Push once  dextrose 50% Injectable 12.5 Gram(s) IV Push once  dextrose 50% Injectable 25 Gram(s) IV Push once  dextrose Oral Gel 15 Gram(s) Oral once PRN  ferrous    sulfate 325 milliGRAM(s) Oral daily  fluconAZOLE   Tablet 100 milliGRAM(s) Oral daily  folic acid 1 milliGRAM(s) Oral daily  gabapentin 200 milliGRAM(s) Oral two times a day  glucagon  Injectable 1 milliGRAM(s) IntraMuscular once  hydroxychloroquine 200 milliGRAM(s) Oral daily  insulin lispro (ADMELOG) corrective regimen sliding scale   SubCutaneous at bedtime  insulin lispro (ADMELOG) corrective regimen sliding scale   SubCutaneous three times a day before meals  lidocaine   4% Patch 2 Patch Transdermal every 24 hours  melatonin 3 milliGRAM(s) Oral at bedtime  memantine 5 milliGRAM(s) Oral at bedtime  risperiDONE   Tablet 0.25 milliGRAM(s) Oral at bedtime  sodium bicarbonate 650 milliGRAM(s) Oral two times a day      SOCIAL HISTORY:  Smoker:  YES / NO        PACK YEARS:                         WHEN QUIT?  ETOH use:  YES / NO               FREQUENCY / QUANTITY:  Ilicit Drug use:  YES / NO  Occupation:  Assisted device use (Cane / Walker):  Live with:    FAMILY HISTORY:  FHx: stroke (Father)        VITALS:  Vital Signs Last 24 Hrs  T(C): 36.4 (30 Mar 2024 13:34), Max: 37.1 (30 Mar 2024 05:10)  T(F): 97.6 (30 Mar 2024 13:34), Max: 98.7 (30 Mar 2024 05:10)  HR: 78 (30 Mar 2024 13:34) (68 - 78)  BP: 145/75 (30 Mar 2024 13:34) (145/75 - 161/75)  BP(mean): --  RR: 19 (30 Mar 2024 13:34) (16 - 19)  SpO2: 95% (30 Mar 2024 13:34) (95% - 98%)    Parameters below as of 30 Mar 2024 13:34  Patient On (Oxygen Delivery Method): room air        LABS/DIAGNOSTIC TESTS:                          8.3    4.70  )-----------( 183      ( 30 Mar 2024 05:41 )             25.2     WBC Count: 4.70 K/uL ( @ 05:41)  WBC Count: 4.58 K/uL ( @ 05:05)  WBC Count: 4.13 K/uL ( @ 07:12)          143  |  116<H>  |  35<H>  ----------------------------<  113<H>  4.6   |  21<L>  |  2.04<H>    Ca    8.9      30 Mar 2024 05:41        Urinalysis Basic - ( 30 Mar 2024 11:16 )    Color: Orange / Appearance: Turbid / S.014 / pH: x  Gluc: x / Ketone: Negative mg/dL  / Bili: Negative / Urobili: 0.2 mg/dL   Blood: x / Protein: 100 mg/dL / Nitrite: Negative   Leuk Esterase: Large / RBC: 20 /HPF / WBC 25 /HPF   Sq Epi: x / Non Sq Epi: x / Bacteria: Moderate /HPF                LACTATE:    ABG -     CULTURES:   Clean Catch Clean Catch (Midstream)   @ 02:20   10,000 - 49,000 CFU/mL Candida albicans "Susceptibilities not performed"  Normal Urogenital amairani present  --  --      .Blood Blood-Peripheral   @ 00:20   No growth at 72 Hours  --  --      .Blood Blood-Peripheral   @ 00:10   No growth at 72 Hours  --  --          RADIOLOGY:      ROS  [  ] UNABLE TO ELICIT               HPI:  Patient is a 84 year old female from home, with PMH of HTN, HLD, CKD, CVA, SAH, Rheumatoid Arthritis, Multiple Myeloma, and Bladder incontinence who was brought to the ED for blood in the urine.  Patients daughter at bedside assisting with history.  Patient found to have light red blood in the urine yesterday however patient and family did not think much of it because it was very light in color.  Patients daughter states today when the patient used the bed pain she noted deep dark red urine.  Patient also complained of burning pain with urination and increased urinary frequency over the past few days.  Patient states she has not had fever or chills.  Patient denies abdominal / suprapubic / flank pain.  Patient denies nausea, vomiting, headache, blurry vision, dizziness, chest pain, constipation, diarrhea, leg swelling.  (27 Mar 2024 04:48)        History as above, asked to see this patient who is immunocompromised and presents from home with hematuria x 1 day, she also c/o dysuria and increased frequency of urination without any fevers or chills, no abdominal pain or back pain, no nausea , vomiting or other complaints. Her hematuria has resolved at this time. She grew out Candida Albicans in her urine cultures. Usually would not treat this but given that she is symptomatic and immunocompromised we are treating with diflucan. She is feeling better already.           PAST MEDICAL & SURGICAL HISTORY:  Rheumatoid arthritis      Varicose veins of lower extremity      Hypertension      Multiple myeloma      HLD (hyperlipidemia)      Cerebrovascular accident (CVA)  remote hx, no residual deficits      No significant past surgical history          No Known Allergies      Meds:  acetaminophen     Tablet .. 650 milliGRAM(s) Oral every 6 hours PRN  amLODIPine   Tablet 2.5 milliGRAM(s) Oral daily  aspirin  chewable 81 milliGRAM(s) Oral daily  atorvastatin 80 milliGRAM(s) Oral at bedtime  calcitriol   Capsule 0.25 MICROGram(s) Oral daily  dextrose 5%. 1000 milliLiter(s) IV Continuous <Continuous>  dextrose 5%. 1000 milliLiter(s) IV Continuous <Continuous>  dextrose 50% Injectable 25 Gram(s) IV Push once  dextrose 50% Injectable 12.5 Gram(s) IV Push once  dextrose 50% Injectable 25 Gram(s) IV Push once  dextrose Oral Gel 15 Gram(s) Oral once PRN  ferrous    sulfate 325 milliGRAM(s) Oral daily  fluconAZOLE   Tablet 100 milliGRAM(s) Oral daily  folic acid 1 milliGRAM(s) Oral daily  gabapentin 200 milliGRAM(s) Oral two times a day  glucagon  Injectable 1 milliGRAM(s) IntraMuscular once  hydroxychloroquine 200 milliGRAM(s) Oral daily  insulin lispro (ADMELOG) corrective regimen sliding scale   SubCutaneous at bedtime  insulin lispro (ADMELOG) corrective regimen sliding scale   SubCutaneous three times a day before meals  lidocaine   4% Patch 2 Patch Transdermal every 24 hours  melatonin 3 milliGRAM(s) Oral at bedtime  memantine 5 milliGRAM(s) Oral at bedtime  risperiDONE   Tablet 0.25 milliGRAM(s) Oral at bedtime  sodium bicarbonate 650 milliGRAM(s) Oral two times a day      SOCIAL HISTORY:  Smoker:  YES / NO        PACK YEARS:                         WHEN QUIT?  ETOH use:  YES / NO               FREQUENCY / QUANTITY:  Ilicit Drug use:  YES / NO  Occupation:  Assisted device use (Cane / Walker):  Live with:    FAMILY HISTORY:  FHx: stroke (Father)        VITALS:  Vital Signs Last 24 Hrs  T(C): 36.4 (30 Mar 2024 13:34), Max: 37.1 (30 Mar 2024 05:10)  T(F): 97.6 (30 Mar 2024 13:34), Max: 98.7 (30 Mar 2024 05:10)  HR: 78 (30 Mar 2024 13:34) (68 - 78)  BP: 145/75 (30 Mar 2024 13:34) (145/75 - 161/75)  BP(mean): --  RR: 19 (30 Mar 2024 13:34) (16 - 19)  SpO2: 95% (30 Mar 2024 13:34) (95% - 98%)    Parameters below as of 30 Mar 2024 13:34  Patient On (Oxygen Delivery Method): room air        LABS/DIAGNOSTIC TESTS:                          8.3    4.70  )-----------( 183      ( 30 Mar 2024 05:41 )             25.2     WBC Count: 4.70 K/uL ( @ 05:41)  WBC Count: 4.58 K/uL ( @ 05:05)  WBC Count: 4.13 K/uL ( @ 07:12)          143  |  116<H>  |  35<H>  ----------------------------<  113<H>  4.6   |  21<L>  |  2.04<H>    Ca    8.9      30 Mar 2024 05:41        Urinalysis Basic - ( 30 Mar 2024 11:16 )    Color: Orange / Appearance: Turbid / S.014 / pH: x  Gluc: x / Ketone: Negative mg/dL  / Bili: Negative / Urobili: 0.2 mg/dL   Blood: x / Protein: 100 mg/dL / Nitrite: Negative   Leuk Esterase: Large / RBC: 20 /HPF / WBC 25 /HPF   Sq Epi: x / Non Sq Epi: x / Bacteria: Moderate /HPF                LACTATE:    ABG -     CULTURES:   Clean Catch Clean Catch (Midstream)   @ 02:20   10,000 - 49,000 CFU/mL Candida albicans "Susceptibilities not performed"  Normal Urogenital amairani present  --  --      .Blood Blood-Peripheral   @ 00:20   No growth at 72 Hours  --  --      .Blood Blood-Peripheral   @ 00:10   No growth at 72 Hours  --  --          RADIOLOGY:      ROS  [  ] UNABLE TO ELICIT               HPI:  Patient is a 84 year old female from home, with PMH of HTN, HLD, CKD, CVA, SAH, Rheumatoid Arthritis, Multiple Myeloma, and Bladder incontinence who was brought to the ED for blood in the urine.  Patients daughter at bedside assisting with history.  Patient found to have light red blood in the urine yesterday however patient and family did not think much of it because it was very light in color.  Patients daughter states today when the patient used the bed pain she noted deep dark red urine.  Patient also complained of burning pain with urination and increased urinary frequency over the past few days.  Patient states she has not had fever or chills.  Patient denies abdominal / suprapubic / flank pain.  Patient denies nausea, vomiting, headache, blurry vision, dizziness, chest pain, constipation, diarrhea, leg swelling.  (27 Mar 2024 04:48)        History as above, asked to see this patient who is immunocompromised and presents from home with hematuria x 1 day, she also c/o dysuria and increased frequency of urination without any fevers or chills, no abdominal pain or back pain, no nausea , vomiting or other complaints. Her hematuria has resolved at this time. She grew out Candida Albicans in her urine cultures. Usually would not treat this but given that she is symptomatic and immunocompromised we are treating with diflucan. She is feeling better already.           PAST MEDICAL & SURGICAL HISTORY:  Rheumatoid arthritis      Varicose veins of lower extremity      Hypertension      Multiple myeloma      HLD (hyperlipidemia)      Cerebrovascular accident (CVA)  remote hx, no residual deficits      No significant past surgical history          No Known Allergies      Meds:  acetaminophen     Tablet .. 650 milliGRAM(s) Oral every 6 hours PRN  amLODIPine   Tablet 2.5 milliGRAM(s) Oral daily  aspirin  chewable 81 milliGRAM(s) Oral daily  atorvastatin 80 milliGRAM(s) Oral at bedtime  calcitriol   Capsule 0.25 MICROGram(s) Oral daily  dextrose 5%. 1000 milliLiter(s) IV Continuous <Continuous>  dextrose 5%. 1000 milliLiter(s) IV Continuous <Continuous>  dextrose 50% Injectable 25 Gram(s) IV Push once  dextrose 50% Injectable 12.5 Gram(s) IV Push once  dextrose 50% Injectable 25 Gram(s) IV Push once  dextrose Oral Gel 15 Gram(s) Oral once PRN  ferrous    sulfate 325 milliGRAM(s) Oral daily  fluconAZOLE   Tablet 100 milliGRAM(s) Oral daily  folic acid 1 milliGRAM(s) Oral daily  gabapentin 200 milliGRAM(s) Oral two times a day  glucagon  Injectable 1 milliGRAM(s) IntraMuscular once  hydroxychloroquine 200 milliGRAM(s) Oral daily  insulin lispro (ADMELOG) corrective regimen sliding scale   SubCutaneous at bedtime  insulin lispro (ADMELOG) corrective regimen sliding scale   SubCutaneous three times a day before meals  lidocaine   4% Patch 2 Patch Transdermal every 24 hours  melatonin 3 milliGRAM(s) Oral at bedtime  memantine 5 milliGRAM(s) Oral at bedtime  risperiDONE   Tablet 0.25 milliGRAM(s) Oral at bedtime  sodium bicarbonate 650 milliGRAM(s) Oral two times a day      SOCIAL HISTORY:  Smoker:  no  ETOH use:  no      FAMILY HISTORY:  FHx: stroke (Father)        VITALS:  Vital Signs Last 24 Hrs  T(C): 36.4 (30 Mar 2024 13:34), Max: 37.1 (30 Mar 2024 05:10)  T(F): 97.6 (30 Mar 2024 13:34), Max: 98.7 (30 Mar 2024 05:10)  HR: 78 (30 Mar 2024 13:34) (68 - 78)  BP: 145/75 (30 Mar 2024 13:34) (145/75 - 161/75)  BP(mean): --  RR: 19 (30 Mar 2024 13:34) (16 - 19)  SpO2: 95% (30 Mar 2024 13:34) (95% - 98%)    Parameters below as of 30 Mar 2024 13:34  Patient On (Oxygen Delivery Method): room air        LABS/DIAGNOSTIC TESTS:                          8.3    4.70  )-----------( 183      ( 30 Mar 2024 05:41 )             25.2     WBC Count: 4.70 K/uL ( @ 05:41)  WBC Count: 4.58 K/uL ( @ 05:05)  WBC Count: 4.13 K/uL ( @ 07:12)          143  |  116<H>  |  35<H>  ----------------------------<  113<H>  4.6   |  21<L>  |  2.04<H>    Ca    8.9      30 Mar 2024 05:41        Urinalysis Basic - ( 30 Mar 2024 11:16 )    Color: Orange / Appearance: Turbid / S.014 / pH: x  Gluc: x / Ketone: Negative mg/dL  / Bili: Negative / Urobili: 0.2 mg/dL   Blood: x / Protein: 100 mg/dL / Nitrite: Negative   Leuk Esterase: Large / RBC: 20 /HPF / WBC 25 /HPF   Sq Epi: x / Non Sq Epi: x / Bacteria: Moderate /HPF                LACTATE:    ABG -     CULTURES:   Clean Catch Clean Catch (Midstream)   @ 02:20   10,000 - 49,000 CFU/mL Candida albicans "Susceptibilities not performed"  Normal Urogenital amairani present  --  --      .Blood Blood-Peripheral   @ 00:20   No growth at 72 Hours  --  --      .Blood Blood-Peripheral   @ 00:10   No growth at 72 Hours  --  --          RADIOLOGY:      ROS  [  ] UNABLE TO ELICIT

## 2024-03-30 NOTE — DIETITIAN INITIAL EVALUATION ADULT - FACTORS AFF FOOD INTAKE
acute on chronic comorbidities/change in mental status/Taoist/ethnic/cultural/personal food preferences

## 2024-03-30 NOTE — DIETITIAN INITIAL EVALUATION ADULT - PERTINENT MEDS FT
MEDICATIONS  (STANDING):  amLODIPine   Tablet 2.5 milliGRAM(s) Oral daily  aspirin  chewable 81 milliGRAM(s) Oral daily  atorvastatin 80 milliGRAM(s) Oral at bedtime  calcitriol   Capsule 0.25 MICROGram(s) Oral daily  dextrose 5%. 1000 milliLiter(s) (50 mL/Hr) IV Continuous <Continuous>  dextrose 5%. 1000 milliLiter(s) (100 mL/Hr) IV Continuous <Continuous>  dextrose 50% Injectable 25 Gram(s) IV Push once  dextrose 50% Injectable 25 Gram(s) IV Push once  dextrose 50% Injectable 12.5 Gram(s) IV Push once  ferrous    sulfate 325 milliGRAM(s) Oral daily  fluconAZOLE   Tablet 100 milliGRAM(s) Oral daily  folic acid 1 milliGRAM(s) Oral daily  gabapentin 200 milliGRAM(s) Oral two times a day  glucagon  Injectable 1 milliGRAM(s) IntraMuscular once  hydroxychloroquine 200 milliGRAM(s) Oral daily  insulin glargine Injectable (LANTUS) 5 Unit(s) SubCutaneous at bedtime  insulin lispro (ADMELOG) corrective regimen sliding scale   SubCutaneous three times a day before meals  insulin lispro (ADMELOG) corrective regimen sliding scale   SubCutaneous at bedtime  insulin lispro Injectable (ADMELOG) 3 Unit(s) SubCutaneous three times a day before meals  lidocaine   4% Patch 2 Patch Transdermal every 24 hours  melatonin 3 milliGRAM(s) Oral at bedtime  memantine 5 milliGRAM(s) Oral at bedtime  risperiDONE   Tablet 0.25 milliGRAM(s) Oral at bedtime  sodium bicarbonate 650 milliGRAM(s) Oral two times a day  sodium chloride 0.9%. 1000 milliLiter(s) (80 mL/Hr) IV Continuous <Continuous>    MEDICATIONS  (PRN):  acetaminophen     Tablet .. 650 milliGRAM(s) Oral every 6 hours PRN Temp greater or equal to 38C (100.4F), Mild Pain (1 - 3)  dextrose Oral Gel 15 Gram(s) Oral once PRN Blood Glucose LESS THAN 70 milliGRAM(s)/deciliter

## 2024-03-30 NOTE — DISCHARGE NOTE PROVIDER - CARE PROVIDER_API CALL
Flori Campoverde  Endocrinology/Metab/Diabetes  [address] [facility], [address]  [place] [address]  Phone: [phone]  Fax: [phone]  Follow Up Time: 1 week    Fatuma Cruz  [facility]  [address] [place] [address]  Phone: [phone]  Fax: [phone]  Follow Up Time: 1 week    Bang Morocho  Medical Oncology  [address] [facility] [address] [facility], [address]  [place] [address]  Phone: [phone]  Fax: [phone]  Follow Up Time: 1 week

## 2024-03-30 NOTE — CONSULT NOTE ADULT - ASSESSMENT
# Multiple Myeloma:   Patient not a good historian about details of multiple myeloma.   Details obtained from chart review and discussion with Primary team.   Pt was on Pomalidomide and Dex as out-pt   Now admitted with likely chronic steroid induced hyperglycemia / new onset DM2     -Endocrinology on board   -HOLD further Dexamethasone   -Can resume home supply of Pomalidomide   -Out-pt f/u with primary Hem-onc on further discussion about Tx regimen for MM       #DVT ppx   Thank you for the consult   Case d/w Dr. Mae   Please call with questions   
Ms James is a pleasant 84 year old female with history of multiple myeloma admitted for UTI and weakness    1. Multiple Myeloma  ·	Spoke with daughter regarding her multiple myeloma history  ·	Patient sees Dr. Rhoda Rosales from [facility] oncology  ·	Patient is currently on maintenance pomalidomide and dexamethasone  ·	Per daughter, her myeloma labs has been stable.  ·	Will get repeat SPEP, Free light chain assay.   ·	Please consult oncology group from [facility].    2. UTI  ·	Currently on ceftriaxone  ·	continue management per primary team.    3. Anemia  ·	Hb of 8.5  ·	Likely multifactorial  ·	Transfuse for hb <7    Thank you for the opportunity to participate in the care of this patient.  Please do not hesitate to call for any questions or concerns.    
UTI - with Drea, pat is symptomatic and immunocompromised       Plan - Cont Diflucan 100mgs po q24hrs x 5 days in total  Reconsult prn.

## 2024-03-31 NOTE — PROGRESS NOTE ADULT - ASSESSMENT
84F, PMH HTN, HLD, CKD, MM on pomalidomide/dex, p/w cc hematuria, found with UTI, admission for UTI with hematuria and newly dx uncontrolled DM2 from steroid use. Now found with candiduria.    AP  Candida UTI with hematuria  MM on pomalidomide/dex  new uncontrolled DM2 with hyperglycemia  normocytic anemia  KAJAL on CKD  HTN  HLD    -started CTX for UTI, now Cx growing Candida  -discussed with ID: given immunocompromised state and glucosuria, will start diflucan 100 qday x5d  -discussed with onc: obtain S/UPEP, ROSSI, FLC  -hold dex and resume home pomalyst  -discussed with endocrine as patient has new DM2 likely from chronic dex use: lantus 4, lispro 6 tid + ISS  -since dex will be discontinued, plan to send pt home on Linagliptin 5mg PO or equivalent  -KAJAL on CKD improved with IVF  -c/w home meds  -SCDs for dvt ppx    PT eval    =================================  I independently reviewed the following tests: N/A  I discussed management with specialists and the plan of care is described above.  I ordered labs and studies: CBC, BMP  I reviewed the following labs to guide my management:                        9.6    3.61  )-----------( 202      ( 31 Mar 2024 05:40 )             30.1     03-31    139  |  112<H>  |  33<H>  ----------------------------<  162<H>  4.6   |  22  |  2.20<H>    Ca    9.6      31 Mar 2024 05:40  Phos  3.8     03-31  Mg     2.0     03-31

## 2024-04-01 NOTE — PHYSICAL THERAPY INITIAL EVALUATION ADULT - SITTING BALANCE: DYNAMIC
[address] [place] [address], [address] [facility]  [phone]      Cardiology Progress Note      8/3/2017 10:00 AM    Admit Date: 8/1/2017    Admit Diagnosis:   CHF (congestive heart failure) (HCC)  Hypertensive urgency    Subjective:     Esther Loomis is a 66 y.o. female with PMH CVA, HOLLY, CAD, sHF who presented to the ED sent from her doctor's office for SOB. Overnight events:  -BP still slightly elevated, but much improved. -slight increase in creatinine to 1.32; Slight decrease in H/H  -MsCyrus Moore is feeling well today. She denies any further chest tightness. She states her SOB is at her usual baseline.       Visit Vitals    /73    Pulse 68    Temp 97.6 °F (36.4 °C)    Resp 20    Ht 4' 11\" (1.499 m)    Wt 64.8 kg (142 lb 12.8 oz)    SpO2 96%    BMI 28.84 kg/m2       Current Facility-Administered Medications   Medication Dose Route Frequency    amLODIPine (NORVASC) tablet 5 mg  5 mg Oral DAILY    lisinopril (PRINIVIL, ZESTRIL) tablet 2.5 mg  2.5 mg Oral DAILY    sodium chloride (NS) flush 5-10 mL  5-10 mL IntraVENous PRN    0.9% sodium chloride infusion 250 mL  250 mL IntraVENous PRN    aspirin delayed-release tablet 81 mg  81 mg Oral DAILY    atorvastatin (LIPITOR) tablet 40 mg  40 mg Oral QHS    carvedilol (COREG) tablet 12.5 mg  12.5 mg Oral BID    sodium chloride (NS) flush 5-10 mL  5-10 mL IntraVENous Q8H    sodium chloride (NS) flush 5-10 mL  5-10 mL IntraVENous PRN    acetaminophen (TYLENOL) tablet 650 mg  650 mg Oral Q6H PRN    oxyCODONE-acetaminophen (PERCOCET) 5-325 mg per tablet 1 Tab  1 Tab Oral Q6H PRN    naloxone (NARCAN) injection 0.4 mg  0.4 mg IntraVENous PRN    ondansetron (ZOFRAN) injection 4 mg  4 mg IntraVENous Q4H PRN    bisacodyl (DULCOLAX) suppository 10 mg  10 mg Rectal DAILY PRN    enoxaparin (LOVENOX) injection 30 mg  30 mg SubCUTAneous Q24H    albuterol (PROVENTIL HFA, VENTOLIN HFA, PROAIR HFA) inhaler 2 Puff  2 Puff Inhalation Q4H PRN  nitroglycerin (NITROBID) 2 % ointment 2 Inch  2 Inch Topical Q6H PRN    hydrALAZINE (APRESOLINE) 20 mg/mL injection 20 mg  20 mg IntraVENous Q6H PRN       Objective:      Physical Exam:  General: pleasant, elderly, AAF resting in bed in NAD eating breakfast   Heart: RRR, no m/S3/JVD  Lungs: clear, diminished in bases.       Abdomen: Soft, +BS, NTND   Extremities: LE иван +DP/PT, no edema   Neurologic: aphasia from prior CVA  Skin:  Warm and dry.     Data Review:   Recent Labs      08/03/17   0155  08/02/17   0831  08/01/17   1342   WBC  5.7  4.9  6.0   HGB  10.9*  11.4*  12.5   HCT  33.1*  34.8*  37.4   PLT  153  178  171     Recent Labs      08/03/17   0155  08/02/17   0831  08/01/17   1342   NA  140  141  141   K  3.8  3.8  3.7   CL  106  106  105   CO2  29  32  30   GLU  117*  116*  96   BUN  30*  21*  19   CREA  1.32*  1.25*  1.22*   CA  8.6  9.0  9.3   MG  2.2   --    --    ALB  3.0*  3.2*   --    TBILI  0.5  0.5   --    SGOT  11*  15   --    ALT  10*  10*   --        Recent Labs      08/02/17   0831  08/01/17   1919  08/01/17   1342   TROIQ  0.15*  0.17*  0.18*   CPK  157   --    --    CKMB  1.8   --    --      Lab Results   Component Value Date/Time    Cholesterol, total 193 08/02/2017 03:21 AM    HDL Cholesterol 56 08/02/2017 03:21 AM    LDL, calculated 116.2 08/02/2017 03:21 AM    VLDL, calculated 20.8 08/02/2017 03:21 AM    Triglyceride 104 08/02/2017 03:21 AM    CHOL/HDL Ratio 3.4 08/02/2017 03:21 AM         Intake/Output Summary (Last 24 hours) at 08/03/17 1107  Last data filed at 08/03/17 0904   Gross per 24 hour   Intake              360 ml   Output              225 ml   Net              135 ml          Telemetry: SR  ECG: SR, LBBB, t-wave inversion lat leads  CXRAY: no acute process        Assessment:     Active Problems:    Other left bundle branch block (9/21/2012)      Elevated troponin (7/14/2016)      Hypertensive urgency (8/1/2017)      CHF (congestive heart failure) ([facility] [address] (8/1/2017)        Plan:     Hypertensive Urgency:  BP much improved. Chest tightness resolved with improvement in BP. Troponin flat: 0.18,0.17,0.15. Troponin and elevated pro-BNP 2/2 to hypertensive urgency from not taking medications at home. · Patient is tolerating BB, amlodipine well. Lisinopril added this morning at low dose for her HTN and chronic HF. · Patient still does not appear to need any diuretics. Kidney function holding well with the decreased BP so far. CAD risk:    · Continue ASA, BB, statin       Patient is okay for discharge from a cardiology perspective. Would recommend follow-up in 2 weeks with Dr. Fernando Garza to check for needed medication titrations for BP and chronic HF.        Татьяна Nichole, AKBAR  DNP, RN, AGACNP-BC good balance

## 2024-04-01 NOTE — PROGRESS NOTE ADULT - REASON FOR ADMISSION
hematuria, acute UTI, and KAJAL on CKD3

## 2024-04-01 NOTE — PROGRESS NOTE ADULT - SUBJECTIVE AND OBJECTIVE BOX
Interval of present illness: No acute events overnight. Pt seen at bedside. No new complaints.    REVIEW OF SYSTEMS: as above    O:  Vital Signs Last 24 Hrs  T(C): 37.1 (30 Mar 2024 05:10), Max: 37.2 (29 Mar 2024 13:00)  T(F): 98.7 (30 Mar 2024 05:10), Max: 98.9 (29 Mar 2024 13:00)  HR: 72 (30 Mar 2024 05:10) (68 - 85)  BP: 161/75 (30 Mar 2024 05:10) (151/72 - 161/75)  BP(mean): --  RR: 18 (30 Mar 2024 05:10) (16 - 18)  SpO2: 97% (30 Mar 2024 05:10) (96% - 98%)    Parameters below as of 30 Mar 2024 05:10  Patient On (Oxygen Delivery Method): room air        Gen: NAD  Neuro: alert, answering qs appropriately, moves all extremities  HEENT: anicteric, moist oral mucosa  Neck: supple  Cards: no murmurs appreciated  Pulm: good inspiratory effort, breathing comfortably  Abd: soft, NT/ND, BS+  Ext: no edema  Skin: warm, dry  : urine is pink      acetaminophen     Tablet .. 650 milliGRAM(s) Oral every 6 hours PRN  amLODIPine   Tablet 2.5 milliGRAM(s) Oral daily  aspirin  chewable 81 milliGRAM(s) Oral daily  atorvastatin 80 milliGRAM(s) Oral at bedtime  calcitriol   Capsule 0.25 MICROGram(s) Oral daily  dextrose 5%. 1000 milliLiter(s) IV Continuous <Continuous>  dextrose 5%. 1000 milliLiter(s) IV Continuous <Continuous>  dextrose 50% Injectable 25 Gram(s) IV Push once  dextrose 50% Injectable 12.5 Gram(s) IV Push once  dextrose 50% Injectable 25 Gram(s) IV Push once  dextrose Oral Gel 15 Gram(s) Oral once PRN  ferrous    sulfate 325 milliGRAM(s) Oral daily  fluconAZOLE   Tablet 100 milliGRAM(s) Oral daily  folic acid 1 milliGRAM(s) Oral daily  gabapentin 200 milliGRAM(s) Oral two times a day  glucagon  Injectable 1 milliGRAM(s) IntraMuscular once  hydroxychloroquine 200 milliGRAM(s) Oral daily  insulin glargine Injectable (LANTUS) 4 Unit(s) SubCutaneous at bedtime  insulin lispro (ADMELOG) corrective regimen sliding scale   SubCutaneous at bedtime  insulin lispro (ADMELOG) corrective regimen sliding scale   SubCutaneous three times a day before meals  insulin lispro Injectable (ADMELOG) 6 Unit(s) SubCutaneous three times a day before meals  lidocaine   4% Patch 2 Patch Transdermal every 24 hours  melatonin 3 milliGRAM(s) Oral at bedtime  memantine 5 milliGRAM(s) Oral at bedtime  risperiDONE   Tablet 0.25 milliGRAM(s) Oral at bedtime  sodium bicarbonate 650 milliGRAM(s) Oral two times a day                            8.3    4.70  )-----------( 183      ( 30 Mar 2024 05:41 )             25.2       03-30    143  |  116<H>  |  35<H>  ----------------------------<  113<H>  4.6   |  21<L>  |  2.04<H>    Ca    8.9      30 Mar 2024 05:41    
Interval of present illness: No acute events overnight. Pt seen at bedside. No new complaints.    REVIEW OF SYSTEMS: as above    O:  Vital Signs Last 24 Hrs  T(C): 37.1 (30 Mar 2024 05:10), Max: 37.2 (29 Mar 2024 13:00)  T(F): 98.7 (30 Mar 2024 05:10), Max: 98.9 (29 Mar 2024 13:00)  HR: 72 (30 Mar 2024 05:10) (68 - 85)  BP: 161/75 (30 Mar 2024 05:10) (151/72 - 161/75)  BP(mean): --  RR: 18 (30 Mar 2024 05:10) (16 - 18)  SpO2: 97% (30 Mar 2024 05:10) (96% - 98%)    Parameters below as of 30 Mar 2024 05:10  Patient On (Oxygen Delivery Method): room air        Gen: NAD  Neuro: alert, answering qs appropriately, moves all extremities  HEENT: anicteric, moist oral mucosa  Neck: supple  Cards: no murmurs appreciated  Pulm: good inspiratory effort, breathing comfortably  Abd: soft, NT/ND, BS+  Ext: no edema  Skin: warm, dry  : urine is pink      acetaminophen     Tablet .. 650 milliGRAM(s) Oral every 6 hours PRN  amLODIPine   Tablet 2.5 milliGRAM(s) Oral daily  aspirin  chewable 81 milliGRAM(s) Oral daily  atorvastatin 80 milliGRAM(s) Oral at bedtime  calcitriol   Capsule 0.25 MICROGram(s) Oral daily  dextrose 5%. 1000 milliLiter(s) IV Continuous <Continuous>  dextrose 5%. 1000 milliLiter(s) IV Continuous <Continuous>  dextrose 50% Injectable 25 Gram(s) IV Push once  dextrose 50% Injectable 12.5 Gram(s) IV Push once  dextrose 50% Injectable 25 Gram(s) IV Push once  dextrose Oral Gel 15 Gram(s) Oral once PRN  ferrous    sulfate 325 milliGRAM(s) Oral daily  fluconAZOLE   Tablet 100 milliGRAM(s) Oral daily  folic acid 1 milliGRAM(s) Oral daily  gabapentin 200 milliGRAM(s) Oral two times a day  glucagon  Injectable 1 milliGRAM(s) IntraMuscular once  hydroxychloroquine 200 milliGRAM(s) Oral daily  insulin glargine Injectable (LANTUS) 4 Unit(s) SubCutaneous at bedtime  insulin lispro (ADMELOG) corrective regimen sliding scale   SubCutaneous at bedtime  insulin lispro (ADMELOG) corrective regimen sliding scale   SubCutaneous three times a day before meals  insulin lispro Injectable (ADMELOG) 6 Unit(s) SubCutaneous three times a day before meals  lidocaine   4% Patch 2 Patch Transdermal every 24 hours  melatonin 3 milliGRAM(s) Oral at bedtime  memantine 5 milliGRAM(s) Oral at bedtime  risperiDONE   Tablet 0.25 milliGRAM(s) Oral at bedtime  sodium bicarbonate 650 milliGRAM(s) Oral two times a day                            8.3    4.70  )-----------( 183      ( 30 Mar 2024 05:41 )             25.2       03-30    143  |  116<H>  |  35<H>  ----------------------------<  113<H>  4.6   |  21<L>  |  2.04<H>    Ca    8.9      30 Mar 2024 05:41    
Events noted; case d/w primary team; no new complaints     Vital Signs Last 24 Hrs  T(C): 36.8 (01 Apr 2024 12:34), Max: 36.8 (01 Apr 2024 12:34)  T(F): 98.3 (01 Apr 2024 12:34), Max: 98.3 (01 Apr 2024 12:34)  HR: 70 (01 Apr 2024 12:34) (61 - 78)  BP: 114/68 (01 Apr 2024 12:34) (111/75 - 148/81)  BP(mean): --  RR: 16 (01 Apr 2024 12:34) (16 - 18)  SpO2: 98% (01 Apr 2024 12:34) (97% - 98%)    Parameters below as of 01 Apr 2024 12:34  Patient On (Oxygen Delivery Method): room air    CBC Full  -  ( 01 Apr 2024 10:00 )  WBC Count : 4.68 K/uL  RBC Count : 3.14 M/uL  Hemoglobin : 9.0 g/dL  Hematocrit : 28.7 %  Platelet Count - Automated : 238 K/uL  Mean Cell Volume : 91.4 fl  Mean Cell Hemoglobin : 28.7 pg  Mean Cell Hemoglobin Concentration : 31.4 gm/dL  Auto Neutrophil # : x  Auto Lymphocyte # : x  Auto Monocyte # : x  Auto Eosinophil # : x  Auto Basophil # : x  Auto Neutrophil % : x  Auto Lymphocyte % : x  Auto Monocyte % : x  Auto Eosinophil % : x  Auto Basophil % : x    MEDICATIONS  (STANDING):  amLODIPine   Tablet 2.5 milliGRAM(s) Oral daily  aspirin  chewable 81 milliGRAM(s) Oral daily  atorvastatin 80 milliGRAM(s) Oral at bedtime  calcitriol   Capsule 0.25 MICROGram(s) Oral daily  dextrose 5%. 1000 milliLiter(s) (100 mL/Hr) IV Continuous <Continuous>  dextrose 5%. 1000 milliLiter(s) (50 mL/Hr) IV Continuous <Continuous>  dextrose 50% Injectable 25 Gram(s) IV Push once  dextrose 50% Injectable 12.5 Gram(s) IV Push once  dextrose 50% Injectable 25 Gram(s) IV Push once  ferrous    sulfate 325 milliGRAM(s) Oral daily  fluconAZOLE   Tablet 100 milliGRAM(s) Oral daily  folic acid 1 milliGRAM(s) Oral daily  gabapentin 200 milliGRAM(s) Oral two times a day  glucagon  Injectable 1 milliGRAM(s) IntraMuscular once  hydroxychloroquine 200 milliGRAM(s) Oral daily  insulin lispro (ADMELOG) corrective regimen sliding scale   SubCutaneous three times a day before meals  insulin lispro (ADMELOG) corrective regimen sliding scale   SubCutaneous at bedtime  lactated ringers. 500 milliLiter(s) (250 mL/Hr) IV Continuous <Continuous>  lidocaine   4% Patch 2 Patch Transdermal every 24 hours  melatonin 3 milliGRAM(s) Oral at bedtime  memantine 5 milliGRAM(s) Oral at bedtime  risperiDONE   Tablet 0.25 milliGRAM(s) Oral at bedtime  sodium bicarbonate 650 milliGRAM(s) Oral two times a day    MEDICATIONS  (PRN):  acetaminophen     Tablet .. 650 milliGRAM(s) Oral every 6 hours PRN Temp greater or equal to 38C (100.4F), Mild Pain (1 - 3)  dextrose Oral Gel 15 Gram(s) Oral once PRN Blood Glucose LESS THAN 70 milliGRAM(s)/deciliter  
NP Note discussed with  primary attending    Patient is a 84y old  Female who presents with a chief complaint of hematuria, acute UTI, and KAJAL on CKD3 (28 Mar 2024 14:31)      INTERVAL HPI/OVERNIGHT EVENTS: no new complaints    MEDICATIONS  (STANDING):  amLODIPine   Tablet 2.5 milliGRAM(s) Oral daily  aspirin  chewable 81 milliGRAM(s) Oral daily  atorvastatin 80 milliGRAM(s) Oral at bedtime  calcitriol   Capsule 0.25 MICROGram(s) Oral daily  cefTRIAXone   IVPB 1000 milliGRAM(s) IV Intermittent every 24 hours  dextrose 5%. 1000 milliLiter(s) (100 mL/Hr) IV Continuous <Continuous>  dextrose 5%. 1000 milliLiter(s) (50 mL/Hr) IV Continuous <Continuous>  dextrose 50% Injectable 25 Gram(s) IV Push once  dextrose 50% Injectable 25 Gram(s) IV Push once  dextrose 50% Injectable 12.5 Gram(s) IV Push once  ferrous    sulfate 325 milliGRAM(s) Oral daily  fluconAZOLE   Tablet 100 milliGRAM(s) Oral daily  folic acid 1 milliGRAM(s) Oral daily  gabapentin 200 milliGRAM(s) Oral two times a day  glucagon  Injectable 1 milliGRAM(s) IntraMuscular once  hydroxychloroquine 200 milliGRAM(s) Oral daily  insulin glargine Injectable (LANTUS) 5 Unit(s) SubCutaneous at bedtime  insulin lispro (ADMELOG) corrective regimen sliding scale   SubCutaneous three times a day before meals  insulin lispro (ADMELOG) corrective regimen sliding scale   SubCutaneous at bedtime  insulin lispro Injectable (ADMELOG) 3 Unit(s) SubCutaneous three times a day before meals  lidocaine   4% Patch 2 Patch Transdermal every 24 hours  melatonin 3 milliGRAM(s) Oral at bedtime  memantine 5 milliGRAM(s) Oral at bedtime  risperiDONE   Tablet 0.25 milliGRAM(s) Oral at bedtime  sodium bicarbonate 650 milliGRAM(s) Oral two times a day  sodium chloride 0.9%. 1000 milliLiter(s) (80 mL/Hr) IV Continuous <Continuous>    MEDICATIONS  (PRN):  acetaminophen     Tablet .. 650 milliGRAM(s) Oral every 6 hours PRN Temp greater or equal to 38C (100.4F), Mild Pain (1 - 3)  dextrose Oral Gel 15 Gram(s) Oral once PRN Blood Glucose LESS THAN 70 milliGRAM(s)/deciliter      __________________________________________________  REVIEW OF SYSTEMS:    CONSTITUTIONAL: No fever,   RESPIRATORY: No cough; No shortness of breath  CARDIOVASCULAR: No chest pain, no palpitations  GASTROINTESTINAL: No pain. No nausea or vomiting; No diarrhea   NEUROLOGICAL: No headache or numbness, no tremors  MUSCULOSKELETAL: No joint pain, no muscle pain  GENITOURINARY: no dysuria, no frequency, no hesitancy        Vital Signs Last 24 Hrs  T(C): 36.9 (29 Mar 2024 05:14), Max: 36.9 (28 Mar 2024 20:46)  T(F): 98.5 (29 Mar 2024 05:14), Max: 98.5 (29 Mar 2024 05:14)  HR: 67 (29 Mar 2024 05:14) (67 - 72)  BP: 121/69 (29 Mar 2024 05:14) (121/69 - 128/72)  BP(mean): --  RR: 16 (29 Mar 2024 05:14) (16 - 17)  SpO2: 96% (29 Mar 2024 05:14) (96% - 98%)    Parameters below as of 29 Mar 2024 05:14  Patient On (Oxygen Delivery Method): room air        ________________________________________________  PHYSICAL EXAM:  GENERAL: NAD  CHEST/LUNG: Clear to ausculitation bilaterally  HEART: S1 S2  regular;   ABDOMEN: Soft, Nontender, Nondistended; Bowel sounds present  urine blood tinged   EXTREMITIES: no cyanosis; no edema; no calf tenderness  SKIN: warm and dry; no rash  NERVOUS SYSTEM:  Awake and alert; Oriented  to place, person and time ; no new deficits    _________________________________________________  LABS:                        7.9    4.58  )-----------( 178      ( 29 Mar 2024 05:05 )             23.3     03-29    140  |  116<H>  |  31<H>  ----------------------------<  125<H>  4.4   |  21<L>  |  1.90<H>    Ca    8.6      29 Mar 2024 05:05  Phos  2.9     03-28  Mg     1.8     03-28        Urinalysis Basic - ( 29 Mar 2024 05:05 )    Color: x / Appearance: x / SG: x / pH: x  Gluc: 125 mg/dL / Ketone: x  / Bili: x / Urobili: x   Blood: x / Protein: x / Nitrite: x   Leuk Esterase: x / RBC: x / WBC x   Sq Epi: x / Non Sq Epi: x / Bacteria: x      CAPILLARY BLOOD GLUCOSE      POCT Blood Glucose.: 116 mg/dL (29 Mar 2024 07:41)  POCT Blood Glucose.: 154 mg/dL (28 Mar 2024 21:00)  POCT Blood Glucose.: 190 mg/dL (28 Mar 2024 16:42)  POCT Blood Glucose.: 291 mg/dL (28 Mar 2024 12:02)        RADIOLOGY & ADDITIONAL TESTS:    Imaging Personally Reviewed:  YES/NO    Consultant(s) Notes Reviewed:   YES/ No    Care Discussed with Consultants :     Plan of care was discussed with patient and /or primary care giver; all questions and concerns were addressed and care was aligned with patient's wishes.    
NP Note discussed with  primary attending    Patient is a 84y old  Female who presents with a chief complaint of hematuria, acute UTI, and KAJAL on CKD3 (28 Mar 2024 09:55)      INTERVAL HPI/OVERNIGHT EVENTS: no new complaints    MEDICATIONS  (STANDING):  amLODIPine   Tablet 2.5 milliGRAM(s) Oral daily  aspirin  chewable 81 milliGRAM(s) Oral daily  atorvastatin 80 milliGRAM(s) Oral at bedtime  calcitriol   Capsule 0.25 MICROGram(s) Oral daily  cefTRIAXone   IVPB 1000 milliGRAM(s) IV Intermittent every 24 hours  dexAMETHasone     Tablet 4 milliGRAM(s) Oral <User Schedule>  dextrose 5%. 1000 milliLiter(s) (50 mL/Hr) IV Continuous <Continuous>  dextrose 5%. 1000 milliLiter(s) (100 mL/Hr) IV Continuous <Continuous>  dextrose 50% Injectable 25 Gram(s) IV Push once  dextrose 50% Injectable 25 Gram(s) IV Push once  dextrose 50% Injectable 12.5 Gram(s) IV Push once  ferrous    sulfate 325 milliGRAM(s) Oral daily  folic acid 1 milliGRAM(s) Oral daily  gabapentin 200 milliGRAM(s) Oral two times a day  glucagon  Injectable 1 milliGRAM(s) IntraMuscular once  hydroxychloroquine 200 milliGRAM(s) Oral daily  insulin glargine Injectable (LANTUS) 5 Unit(s) SubCutaneous at bedtime  insulin lispro (ADMELOG) corrective regimen sliding scale   SubCutaneous three times a day before meals  insulin lispro (ADMELOG) corrective regimen sliding scale   SubCutaneous at bedtime  lidocaine   4% Patch 2 Patch Transdermal every 24 hours  melatonin 3 milliGRAM(s) Oral at bedtime  memantine 5 milliGRAM(s) Oral at bedtime  risperiDONE   Tablet 0.25 milliGRAM(s) Oral at bedtime  sodium bicarbonate 650 milliGRAM(s) Oral two times a day  sodium chloride 0.9%. 1000 milliLiter(s) (80 mL/Hr) IV Continuous <Continuous>    MEDICATIONS  (PRN):  acetaminophen     Tablet .. 650 milliGRAM(s) Oral every 6 hours PRN Temp greater or equal to 38C (100.4F), Mild Pain (1 - 3)  dextrose Oral Gel 15 Gram(s) Oral once PRN Blood Glucose LESS THAN 70 milliGRAM(s)/deciliter      __________________________________________________  REVIEW OF SYSTEMS:    CONSTITUTIONAL: No fever,   EYES: no acute visual disturbances  NECK: No pain or stiffness  RESPIRATORY: No cough; No shortness of breath  CARDIOVASCULAR: No chest pain, no palpitations  GASTROINTESTINAL: No pain. No nausea or vomiting; No diarrhea   NEUROLOGICAL: No headache or numbness, no tremors  MUSCULOSKELETAL: No joint pain, no muscle pain  GENITOURINARY: no dysuria,       Vital Signs Last 24 Hrs  T(C): 36.5 (28 Mar 2024 05:05), Max: 36.9 (27 Mar 2024 16:00)  T(F): 97.7 (28 Mar 2024 05:05), Max: 98.4 (27 Mar 2024 16:00)  HR: 67 (28 Mar 2024 05:05) (64 - 67)  BP: 165/75 (28 Mar 2024 05:05) (145/84 - 165/75)  BP(mean): 104 (27 Mar 2024 16:00) (104 - 104)  RR: 16 (28 Mar 2024 05:05) (16 - 18)  SpO2: 99% (28 Mar 2024 05:05) (98% - 100%)    Parameters below as of 28 Mar 2024 05:05  Patient On (Oxygen Delivery Method): room air        ________________________________________________  PHYSICAL EXAM:  GENERAL: NAD, obese   CHEST/LUNG: Clear to ausculitation bilaterally   HEART: S1 S2  regular; no murmurs, gallops or rubs  ABDOMEN: Soft, Nontender, Nondistended; Bowel sounds present  urine blood tinged   EXTREMITIES: no cyanosis; no edema; no calf tenderness  SKIN: warm and dry; no rash  NERVOUS SYSTEM:  Awake and alert; Oriented  to place, person and time ; no new deficits    _________________________________________________  LABS:                        8.5    4.13  )-----------( 183      ( 28 Mar 2024 07:12 )             25.8     03-28    139  |  115<H>  |  33<H>  ----------------------------<  183<H>  4.3   |  18<L>  |  1.78<H>    Ca    9.1      28 Mar 2024 07:12  Phos  2.9     03-28  Mg     1.8     03-28    TPro  7.0  /  Alb  2.4<L>  /  TBili  0.3  /  DBili  x   /  AST  8<L>  /  ALT  19  /  AlkPhos  111  03-27    PT/INR - ( 27 Mar 2024 00:40 )   PT: 13.8 sec;   INR: 1.22 ratio         PTT - ( 27 Mar 2024 00:40 )  PTT:22.1 sec  Urinalysis Basic - ( 28 Mar 2024 07:12 )    Color: x / Appearance: x / SG: x / pH: x  Gluc: 183 mg/dL / Ketone: x  / Bili: x / Urobili: x   Blood: x / Protein: x / Nitrite: x   Leuk Esterase: x / RBC: x / WBC x   Sq Epi: x / Non Sq Epi: x / Bacteria: x      CAPILLARY BLOOD GLUCOSE      POCT Blood Glucose.: 291 mg/dL (28 Mar 2024 12:02)  POCT Blood Glucose.: 163 mg/dL (28 Mar 2024 07:47)  POCT Blood Glucose.: 214 mg/dL (27 Mar 2024 21:29)  POCT Blood Glucose.: 199 mg/dL (27 Mar 2024 17:14)        RADIOLOGY & ADDITIONAL TESTS:    Imaging Personally Reviewed:  YES/NO    Consultant(s) Notes Reviewed:   YES/ No    Care Discussed with Consultants :     Plan of care was discussed with patient and /or primary care giver; all questions and concerns were addressed and care was aligned with patient's wishes.

## 2024-04-01 NOTE — PHYSICAL THERAPY INITIAL EVALUATION ADULT - GENERAL OBSERVATIONS, REHAB EVAL
female pt, from home with services, in care of the daughter, wishes home, presented independent bedmobility activities

## 2024-04-01 NOTE — PROGRESS NOTE ADULT - ASSESSMENT
# Multiple Myeloma:   Patient not a good historian about details of multiple myeloma.   Details obtained from chart review and discussion with Primary team.   Pt was on Pomalidomide and Dex as out-pt   Now admitted with likely chronic steroid induced hyperglycemia / new onset DM2     -Endocrinology on board   -HOLD further Dexamethasone   -Continue home supply of Pomalidomide   -Out-pt f/u with primary Hem-onc on further discussion about Tx regimen for MM       #DVT ppx   Case d/w primary team   We will sign off

## 2024-04-01 NOTE — DISCHARGE NOTE NURSING/CASE MANAGEMENT/SOCIAL WORK - PATIENT PORTAL LINK FT
You can access the FollowMyHealth Patient Portal offered by [facility] by registering at the following website: [email or web address]. By joining [employer]’s FollowMyHealth portal, you will also be able to view your health information using other applications (apps) compatible with our system.

## 2024-04-01 NOTE — CHART NOTE - NSCHARTNOTEFT_GEN_A_CORE
EVENT:  notified by Rn pt had episode of hypoglycemia in 60s      HPI:  Patient is a 84 year old female from home, with PMH of HTN, HLD, CKD, CVA, SAH, Rheumatoid Arthritis, Multiple Myeloma, and Bladder incontinence who was brought to the ED for blood in the urine.  Patients daughter at bedside assisting with history.  Patient found to have light red blood in the urine yesterday however patient and family did not think much of it becuase it was very light in color.  Patients daughter states today when the patient used the bed pain she noted deep dark red urine.  Patient also complained of burning pain with urination and increased urinary frequency over the past few days.  Patient states she has not had fever or chills.  Patient denies abdominal / suprapubic / flank pain.  Patient denies nausea, vomiting, headache, blurry vision, dizziness, chest pain, constipation, diarrhea, leg swelling.  (27 Mar 2024 04:48)        OBJECTIVE:  Vital Signs Last 24 Hrs  T(C): 36.4 (30 Mar 2024 13:34), Max: 37.1 (30 Mar 2024 05:10)  T(F): 97.6 (30 Mar 2024 13:34), Max: 98.7 (30 Mar 2024 05:10)  HR: 78 (30 Mar 2024 13:34) (68 - 78)  BP: 145/75 (30 Mar 2024 13:34) (145/75 - 161/75)  BP(mean): --  RR: 19 (30 Mar 2024 13:34) (16 - 19)  SpO2: 95% (30 Mar 2024 13:34) (95% - 98%)    Parameters below as of 30 Mar 2024 13:34  Patient On (Oxygen Delivery Method): room air        LABS:                        8.3    4.70  )-----------( 183      ( 30 Mar 2024 05:41 )             25.2     03-30    143  |  116<H>  |  35<H>  ----------------------------<  113<H>  4.6   |  21<L>  |  2.04<H>    Ca    8.9      30 Mar 2024 05:41        ASSESSMENT:  1. per nurse pt awake and alert    PLAN:   1. instructed nurse provide pt with carbs and repeat blood sugar in 15 min  2. discussed hypoglycemic episode with Dr. Campoverde  3. d/c'd premeal insulins and lantus (pt no longer on decadron)- per endo reccs  4. c/w low dose sliding scale
Inpatient Recommendation    Reduce Lantus to 4 units at bedtime  Increase lispro to 6 units before meals  Continue low correctional scale with meals and bedtime    Discussed with primary team
called daughter oralia odell today, to updated on clinical status, treatment plan/options and discharge plan/options, no response.
Called PCP at the phone number listed on sunrise. Reached answering service but unable to reach provider
verified Tradjenta 5mg tablet is covered by insurance at [id number]  with zero co pay.    Sneha pharmacist

## 2024-04-01 NOTE — DISCHARGE NOTE NURSING/CASE MANAGEMENT/SOCIAL WORK - NSDCVIVACCINE_GEN_ALL_CORE_FT
COVID-19 vaccine, vector-nr, rS-Ad26, PF, 0.5 mL (Applits); 29-Mar-2021 13:11; Maria D Hightower (Student, Nursing); Applits; [id number] (Exp. Date: 29-Mar-2021); IntraMuscular; Deltoid Right.; 0.5 milliLiter(s);

## 2024-04-21 NOTE — ED ADULT TRIAGE NOTE - ARRIVAL FROM
Home Bilobed Flap Text: The defect edges were debeveled with a #15 scalpel blade.  Given the location of the defect and the proximity to free margins a bilobe flap was deemed most appropriate.  Using a sterile surgical marker, an appropriate bilobe flap drawn around the defect.    The area thus outlined was incised deep to adipose tissue with a #15 scalpel blade.  The skin margins were undermined to an appropriate distance in all directions utilizing iris scissors.

## 2024-04-21 NOTE — ED PROVIDER NOTE - OBJECTIVE STATEMENT
84 year old with complaint of pain to left heel and inablity to bear wt due to the pain  hs of RA, htn, CVA, Mult myloma gayathrier noted foot ulcer to left heal and also sacral decub.  no fever no chlls no discharge

## 2024-04-21 NOTE — H&P ADULT - HISTORY OF PRESENT ILLNESS
84 y.o. F from home, with PMHx of HTN, HLD, CKD, CVA, SAH, Rheumatoid Arthritis, Multiple Myeloma, and Bladder incontinence, presents to the ED with worsening heel ulcer and unable to bear weight. Daughter at the bedside states that her mother has had this foot wound for about two weeks. She noticed it when they were transferring her mother from the wheelchair that she was no longer able to put any weight on that foot. Yesterday, the daughter noticed swelling in the foot and leg with increased pain. Stated that her mother was hospitalized around 2 weeks ago for a UTI and they had nurses coming into the home once a week to check on her. During this time, they found the wound and was applying an allevyn pad to help offload the area. Daughter stated that her mother kept a pillow under her feet at all times while laying in bed. Otherwise pt denies any chest pain, palpitations, shortness of breath, fever, chills, headache, visual changes, nausea, vomiting, diarrhea, dysuria, frequency.

## 2024-04-21 NOTE — H&P ADULT - PROBLEM SELECTOR PLAN 3
h/o HTN   Monitor BP  c/w home meds -  BP target <130/90 mmHg  DASH/TLC diet  Adjust medications as needed to meet target. Pt w/ SCr  2.3 on admission  Baseline SCr - around 2.3  gentle IVF for now, follow BMP daily

## 2024-04-21 NOTE — ED PROVIDER NOTE - CLINICAL SUMMARY MEDICAL DECISION MAKING FREE TEXT BOX
pt tiwth multiple med problems mostly in bed but usually can transfer to chair and get up for bathroom now with ucler to left heel that makes wt. bearing impossible.   also with pain to sacral area with noted decub there as well most likely stage 3.   will get podiatry consult and admit

## 2024-04-21 NOTE — H&P ADULT - ASSESSMENT
84 y.o. F from home, with PMHx of HTN, HLD, CKD, CVA, SAH, Rheumatoid Arthritis, Multiple Myeloma, and Bladder incontinence, presents to the ED with worsening heel ulcer and unable to bear weight. Admitted to medicine for diabetic foot ulcer.

## 2024-04-21 NOTE — CONSULT NOTE ADULT - ATTENDING COMMENTS
I, Dr. Solo Arriaga,  personally performed the services described in this document. all Medical entries made by the resident where at my direction and in my presence. I reviewed the chart, agree that the record reflects my personal performance in is accurate and complete. greater than 30 minutes were spent on time with the patient, reviewing the medical records, and overseeing the documentation in the chart

## 2024-04-21 NOTE — ED PROVIDER NOTE - CARE PLAN
Principal Discharge DX:	Decubitus ulcer, heel  Secondary Diagnosis:	Decubitus ulcer of sacral area   1

## 2024-04-21 NOTE — ED ADULT NURSE NOTE - NSFALLRISKINTERV_ED_ALL_ED

## 2024-04-21 NOTE — ED ADULT NURSE NOTE - OBJECTIVE STATEMENT
Patient c/o worsening L foot diabetic ulcer x 1 week & chronic low back pain x 2 years. Patient denies NVD, chills or fever. Resolving stage 2 PU noted on sacral area dressing applied.

## 2024-04-21 NOTE — H&P ADULT - PROBLEM SELECTOR PLAN 8
IMPROVE VTE Individual Risk Assessment          RISK                                                          Points  [  ] Previous VTE                                                3  [  ] Thrombophilia                                             2  [  ] Lower limb paralysis                                   2        (unable to hold up >15 seconds)    [  ] Current Cancer                                             2         (within 6 months)  [ x ] Immobilization > 24 hrs                              1  [  ] ICU/CCU stay > 24 hours                             1  [ x ] Age > 60                                                         1    IMPROVE VTE Score:         [   2     ]

## 2024-04-21 NOTE — H&P ADULT - ATTENDING COMMENTS
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 Patient seen at bedside, A+Ox2. Reports back pain and unbearable foot pain. Per daughter, worsening foot pain with swelling.     PE: as above; obese, elderly, sacral decub wound, left foot dressed   Labs: CBC, BMP, Mg, Phos reviewed; monitor daily     -Podiatry consulted in the ED  -XR and ESAU/PVR   -Wound care consulted for sacral decubitus ulcer.  -UA+, patient symptomatic  -Continue ceftriaxone, UCx pending   -Gentle hydration  -Continue home meds for chronic conditions

## 2024-04-21 NOTE — ED PROVIDER NOTE - PHYSICAL EXAMINATION
I will STOP taking the medications listed below when I get home from the hospital:    aspirin 81 mg oral tablet, chewable  -- 1 tab(s) by mouth once a day
left heel with 3-5 cm ulcer noted some small drainage  sacral decub noted state 2/3

## 2024-04-21 NOTE — H&P ADULT - NSHPPHYSICALEXAM_GEN_ALL_CORE
VITALS:     GENERAL: NAD, lying in bed comfortably  HEAD:  Atraumatic, Normocephalic  EYES: EOMI, PERRLA, conjunctiva and sclera clear  ENT: Moist mucous membranes  NECK: Supple, No JVD  CHEST/LUNG: Clear to auscultation bilaterally; No rales, rhonchi, wheezing, or rubs. Unlabored respirations  HEART: Regular rate and rhythm; No murmurs, rubs, or gallops  ABDOMEN: Bowel sounds present; Soft, Nontender, Nondistended. No hepatomegaly  EXTREMITIES:  2+ Peripheral Pulses, brisk capillary refill. No clubbing, cyanosis, or edema  NERVOUS SYSTEM:  Alert & Oriented X2 forgetfull, speech clear. No deficits   MSK: FROM all 4 extremities, full and equal strength  SKIN:  Open lesion noted on left/right foot measuring 2.5x3cm. Mild Periwound erythema present. No drainage noted. Hyperkeratotic rim noted. No Macerated edges noted. Wound base is noted to be fibrogranular with no tracking noted, though patient was in pain while probing. No Fluctuance/crepitus/streaking noted. No Malodor noted. Stage one pressure sacral pressure ulcer,  No rashes or lesions

## 2024-04-21 NOTE — ED ADULT NURSE NOTE - ED STAT RN HANDOFF DETAILS
Patient A&Ox4 admitted to medicine IV intact, no redness or swelling noted.  repeat troponin pending Endorsed to Griselda SAEZ.

## 2024-04-21 NOTE — CONSULT NOTE ADULT - ASSESSMENT
A:  L pressure heel wound     P:  Patient evaluated and Chart reviewed  Discussed diagnosis and treatment with patient  Xrays pending  ESAU/PVR was ordered, but cancelled due to patient unable to tolerate because of pain  Venous duplex ordered  Culture pending  Wound was flushed with normal saline  Left foot was dressed with santyl allevyn pad  Discussed offloading b/l heels while laying in bed. Patient to have pillow under heels at all times.   If patient gets admitted, patient to be given CAIR boots on the floors  Educated patient on importance of managing fasting blood glucose levels, daily foot examinations, and proper shoe gear.  Podiatry to follow while in house  Discussed with Attending Dr. Arriaga    A:  L pressure heel wound     P:  Patient evaluated and Chart reviewed  Discussed diagnosis and treatment with patient  Xrays pending  ESAU/PVR was ordered, but cancelled due to patient unable to tolerate because of pain  Venous duplex ordered  Culture pending  Wound was flushed with normal saline  Left foot was dressed with santyl allevyn pad  Discussed offloading b/l heels while laying in bed. Patient to have pillow under heels at all times.   Upon admission, patient to be given CAIR boots on the floors  Educated patient on importance of managing fasting blood glucose levels, daily foot examinations, and proper shoe gear.  Podiatry to follow while in house  Discussed with Attending Dr. Arriaga

## 2024-04-21 NOTE — H&P ADULT - PROBLEM SELECTOR PLAN 7
IMPROVE VTE Individual Risk Assessment          RISK                                                          Points  [  ] Previous VTE                                                3  [  ] Thrombophilia                                             2  [  ] Lower limb paralysis                                   2        (unable to hold up >15 seconds)    [  ] Current Cancer                                             2         (within 6 months)  [ x ] Immobilization > 24 hrs                              1  [  ] ICU/CCU stay > 24 hours                             1  [ x ] Age > 60                                                         1    IMPROVE VTE Score:         [   2     ] c/w home meds

## 2024-04-21 NOTE — H&P ADULT - PROBLEM SELECTOR PLAN 2
Pt w/ SCr  2.3 on admission  Baseline SCr - around 2.3  gentle IVF for now, follow BMP daily p/w  frequency  UA positive  f/u Urine culture  Start ABx - Rocephin 1g qd for now  last admission UCx grew candida, completed 5 days course of diflucan

## 2024-04-21 NOTE — H&P ADULT - PROBLEM SELECTOR PLAN 4
c/w home meds hydroxychloroquine 200mg qd h/o HTN   Monitor BP  c/w home meds -  BP target <130/90 mmHg  DASH/TLC diet  Adjust medications as needed to meet target.

## 2024-04-21 NOTE — H&P ADULT - PROBLEM SELECTOR PLAN 1
Pw b/l heel ulcer  podiatry following   Xrays pending  ESAU/PVR was ordered, but cancelled due to patient unable to tolerate because of pain  f/u Venous duplex   f/u wound culture

## 2024-04-21 NOTE — ED ADULT NURSE REASSESSMENT NOTE - NS ED NURSE REASSESS COMMENT FT1
YAMILA Sandoval notified of Troponin level 55.7, trop to be repeated per MD. Doctor also notified that patient c/o chronic back pain, awaiting medication order.

## 2024-04-22 NOTE — PROGRESS NOTE ADULT - SUBJECTIVE AND OBJECTIVE BOX
NP Note discussed with  Primary Attending    Patient is a 84y old  Female who presents with a chief complaint of     INTERVAL HPI/OVERNIGHT EVENTS: no new complaints    MEDICATIONS  (STANDING):  amLODIPine   Tablet 2.5 milliGRAM(s) Oral daily  aspirin  chewable 81 milliGRAM(s) Oral daily  atorvastatin 80 milliGRAM(s) Oral at bedtime  calcitriol   Capsule 0.25 MICROGram(s) Oral daily  cefTRIAXone   IVPB      cefTRIAXone   IVPB 1000 milliGRAM(s) IV Intermittent every 24 hours  ferrous    sulfate 325 milliGRAM(s) Oral daily  folic acid 1 milliGRAM(s) Oral daily  gabapentin 100 milliGRAM(s) Oral two times a day  heparin   Injectable 5000 Unit(s) SubCutaneous every 8 hours  hydroxychloroquine 200 milliGRAM(s) Oral daily  insulin lispro (ADMELOG) corrective regimen sliding scale   SubCutaneous at bedtime  insulin lispro (ADMELOG) corrective regimen sliding scale   SubCutaneous three times a day before meals  lisinopril 2.5 milliGRAM(s) Oral daily  melatonin 3 milliGRAM(s) Oral at bedtime  memantine 5 milliGRAM(s) Oral at bedtime  risperiDONE   Tablet 0.25 milliGRAM(s) Oral at bedtime  sodium bicarbonate 650 milliGRAM(s) Oral two times a day    MEDICATIONS  (PRN):  acetaminophen     Tablet .. 650 milliGRAM(s) Oral every 6 hours PRN Temp greater or equal to 38C (100.4F), Mild Pain (1 - 3)  ondansetron Injectable 4 milliGRAM(s) IV Push every 8 hours PRN Nausea and/or Vomiting      __________________________________________________  REVIEW OF SYSTEMS:    CONSTITUTIONAL: No fever,   EYES: no acute visual disturbances  NECK: No pain or stiffness  RESPIRATORY: No cough; No shortness of breath  CARDIOVASCULAR: No chest pain, no palpitations  GASTROINTESTINAL: No pain. No nausea or vomiting; No diarrhea   NEUROLOGICAL: No headache or numbness, no tremors  MUSCULOSKELETAL: No joint pain, no muscle pain  GENITOURINARY: no dysuria, no frequency, no hesitancy  PSYCHIATRY: no depression , no anxiety  ALL OTHER  ROS negative        Vital Signs Last 24 Hrs  T(C): 36.6 (2024 08:24), Max: 37.2 (2024 19:49)  T(F): 97.8 (2024 08:24), Max: 99 (2024 19:49)  HR: 68 (2024 08:24) (61 - 88)  BP: 187/73 (2024 08:24) (168/75 - 187/73)  BP(mean): 105 (2024 05:00) (105 - 105)  RR: 17 (2024 08:24) (16 - 17)  SpO2: 98% (2024 08:24) (96% - 98%)    Parameters below as of 2024 05:00  Patient On (Oxygen Delivery Method): room air        ________________________________________________  PHYSICAL EXAM:  GENERAL: NAD  HEENT: Normocephalic;  conjunctivae and sclerae clear; moist mucous membranes;   NECK : supple  CHEST/LUNG: Clear to auscultation bilaterally with good air entry   HEART: S1 S2  regular; no murmurs, gallops or rubs  ABDOMEN: Soft, Nontender, Nondistended; Bowel sounds present  EXTREMITIES: no cyanosis; no edema; no calf tenderness  SKIN: warm and dry; no rash  NERVOUS SYSTEM:  Awake and alert; Oriented  to place, person and time ; no new deficits    _________________________________________________  LABS:                        7.9    4.48  )-----------( 194      ( 2024 14:55 )             25.4     04-21    145  |  117<H>  |  32<H>  ----------------------------<  81  4.8   |  23  |  2.35<H>    Ca    9.4      2024 14:55  Phos  4.0     04-22  Mg     2.2     04-22    TPro  7.0  /  Alb  2.5<L>  /  TBili  0.4  /  DBili  x   /  AST  18  /  ALT  22  /  AlkPhos  76  04-21      Urinalysis Basic - ( 2024 15:45 )    Color: Yellow / Appearance: Slightly Cloudy / S.011 / pH: x  Gluc: x / Ketone: Negative mg/dL  / Bili: Negative / Urobili: 0.2 mg/dL   Blood: x / Protein: Trace mg/dL / Nitrite: Negative   Leuk Esterase: Large / RBC: 3 /HPF / WBC >40 /HPF   Sq Epi: x / Non Sq Epi: x / Bacteria: Moderate /HPF      CAPILLARY BLOOD GLUCOSE      POCT Blood Glucose.: 69 mg/dL (2024 08:03)  POCT Blood Glucose.: 89 mg/dL (2024 21:10)        RADIOLOGY & ADDITIONAL TESTS:    Imaging  Reviewed:  YES/NO    Consultant(s) Notes Reviewed:   YES/ No      Plan of care was discussed with patient and /or primary care giver; all questions and concerns were addressed  NP Note discussed with  Primary Attending    Patient is a 84y old  Female who presents with a chief complaint of     INTERVAL HPI/OVERNIGHT EVENTS: confused and refusing medication     MEDICATIONS  (STANDING):  amLODIPine   Tablet 2.5 milliGRAM(s) Oral daily  aspirin  chewable 81 milliGRAM(s) Oral daily  atorvastatin 80 milliGRAM(s) Oral at bedtime  calcitriol   Capsule 0.25 MICROGram(s) Oral daily  cefTRIAXone   IVPB      cefTRIAXone   IVPB 1000 milliGRAM(s) IV Intermittent every 24 hours  ferrous    sulfate 325 milliGRAM(s) Oral daily  folic acid 1 milliGRAM(s) Oral daily  gabapentin 100 milliGRAM(s) Oral two times a day  heparin   Injectable 5000 Unit(s) SubCutaneous every 8 hours  hydroxychloroquine 200 milliGRAM(s) Oral daily  insulin lispro (ADMELOG) corrective regimen sliding scale   SubCutaneous at bedtime  insulin lispro (ADMELOG) corrective regimen sliding scale   SubCutaneous three times a day before meals  lisinopril 2.5 milliGRAM(s) Oral daily  melatonin 3 milliGRAM(s) Oral at bedtime  memantine 5 milliGRAM(s) Oral at bedtime  risperiDONE   Tablet 0.25 milliGRAM(s) Oral at bedtime  sodium bicarbonate 650 milliGRAM(s) Oral two times a day    MEDICATIONS  (PRN):  acetaminophen     Tablet .. 650 milliGRAM(s) Oral every 6 hours PRN Temp greater or equal to 38C (100.4F), Mild Pain (1 - 3)  ondansetron Injectable 4 milliGRAM(s) IV Push every 8 hours PRN Nausea and/or Vomiting      __________________________________________________  REVIEW OF SYSTEMS:    CONSTITUTIONAL: No fever,   EYES: no acute visual disturbances  NECK: No pain or stiffness  RESPIRATORY: No cough; No shortness of breath  CARDIOVASCULAR: No chest pain, no palpitations  GASTROINTESTINAL: No pain. No nausea or vomiting; No diarrhea   NEUROLOGICAL: No headache or numbness, no tremors  MUSCULOSKELETAL: bilateral lower leg pain, left < foot more than right  GENITOURINARY: no dysuria, no frequency, no hesitancy  PSYCHIATRY: no depression , no anxiety  ALL OTHER  ROS negative        Vital Signs Last 24 Hrs  T(C): 36.6 (2024 08:24), Max: 37.2 (2024 19:49)  T(F): 97.8 (2024 08:24), Max: 99 (2024 19:49)  HR: 68 (2024 08:24) (61 - 88)  BP: 187/73 (2024 08:24) (168/75 - 187/73)  BP(mean): 105 (2024 05:00) (105 - 105)  RR: 17 (2024 08:24) (16 - 17)  SpO2: 98% (2024 08:24) (96% - 98%)    Parameters below as of 2024 05:00  Patient On (Oxygen Delivery Method): room air        ________________________________________________  PHYSICAL EXAM:  GENERAL: NAD  HEENT: Normocephalic;  conjunctivae and sclerae clear; moist mucous membranes;   NECK : supple  CHEST/LUNG: Clear to auscultation bilaterally with good air entry   HEART: S1 S2  regular; no murmurs, gallops or rubs  ABDOMEN: Soft, Nontender, Nondistended; Bowel sounds present  EXTREMITIES: no cyanosis; no edema; no calf tenderness  SKIN: Left heel ulcer  NERVOUS SYSTEM:  Awake and alert; Oriented  to person, confused, restless, agitated at times    _________________________________________________  LABS:                        7.9    4.48  )-----------( 194      ( 2024 14:55 )             25.4     04-21    145  |  117<H>  |  32<H>  ----------------------------<  81  4.8   |  23  |  2.35<H>    Ca    9.4      2024 14:55  Phos  4.0     04-22  Mg     2.2     04-22    TPro  7.0  /  Alb  2.5<L>  /  TBili  0.4  /  DBili  x   /  AST  18  /  ALT  22  /  AlkPhos  76  -      Urinalysis Basic - ( 2024 15:45 )    Color: Yellow / Appearance: Slightly Cloudy / S.011 / pH: x  Gluc: x / Ketone: Negative mg/dL  / Bili: Negative / Urobili: 0.2 mg/dL   Blood: x / Protein: Trace mg/dL / Nitrite: Negative   Leuk Esterase: Large / RBC: 3 /HPF / WBC >40 /HPF   Sq Epi: x / Non Sq Epi: x / Bacteria: Moderate /HPF      CAPILLARY BLOOD GLUCOSE      POCT Blood Glucose.: 69 mg/dL (2024 08:03)  POCT Blood Glucose.: 89 mg/dL (2024 21:10)        RADIOLOGY & ADDITIONAL TESTS:  < from: US Duplex Arterial Lower Ext Ltd, Left (24 @ 14:10) >    ACC: [id number] EXAM:  US DPLX LWR EXT ARTS LTD LT   ORDERED BY: NANCI PAGAN     PROCEDURE DATE:  2024          INTERPRETATION:  US LOWER EXTREMITY ARTERIAL DUPLEX LIMITED LEFT    CLINICAL INFORMATION: Peripheral artery disease with pain with left calf   squeeze    COMPARISON: None    Real-time sonography of the left lower extremity arterial system was   performed using a high-resolution linear array transducer and including   color and spectral Doppler.    There is no measurable plaque in the lower extremity arteries. No soft   tissue abnormalities are demonstrated in the left lower extremity. Flow   phase patterns and peak systolic velocity measurements (in cm/s) were   observed as follows:    LEFT:  CFA: Triphasic; 76  Proximal SFA: Biphasic; 112  Mid SFA: Biphasic; 92  Distal SFA: Biphasic;  95  Popliteal: Biphasic;  85  Anterior tibial: Biphasic; 45  Posterior tibial: Biphasic; 47  Peroneal: Monophasic;  48  Dorsalis pedis: Not seen;    IMPRESSION:  *  No significant plaque or stenosis in the left leg    --- End of Report ---    < end of copied text >  < from: Xray Foot AP + Lateral + Oblique, Left (24 @ 14:09) >  ACC: [id number] EXAM:  XR FOOT COMP MIN 3 VIEWS LT   ORDERED BY: KARSTEN WHEELER     PROCEDURE DATE:  2024          INTERPRETATION:  Left foot. 3 views. Patient has an ulcer around the   calcaneus.    There are posterior and inferior calcanealspurs. Arterial calcifications   are noted.    There is a soft tissue ulcer posterior to the upper posterior calcaneus   and this is more evident than on 2022. No bone destruction   or fracture.    IMPRESSION: There is an ulcer posteriorto the upper posterior calcaneus   increased from prior. Bones appear intact.    --- End of Report ---    < end of copied text >    Imaging  Reviewed:  YES    Consultant(s) Notes Reviewed:   YES      Plan of care was discussed with patient and /or primary care giver; all questions and concerns were addressed  NP Note discussed with  Primary Attending    Patient is a 84y old  Female who presents with a chief complaint of     INTERVAL HPI/OVERNIGHT EVENTS: confused and refusing medication     MEDICATIONS  (STANDING):  amLODIPine   Tablet 2.5 milliGRAM(s) Oral daily  aspirin  chewable 81 milliGRAM(s) Oral daily  atorvastatin 80 milliGRAM(s) Oral at bedtime  calcitriol   Capsule 0.25 MICROGram(s) Oral daily  cefTRIAXone   IVPB      cefTRIAXone   IVPB 1000 milliGRAM(s) IV Intermittent every 24 hours  ferrous    sulfate 325 milliGRAM(s) Oral daily  folic acid 1 milliGRAM(s) Oral daily  gabapentin 100 milliGRAM(s) Oral two times a day  heparin   Injectable 5000 Unit(s) SubCutaneous every 8 hours  hydroxychloroquine 200 milliGRAM(s) Oral daily  insulin lispro (ADMELOG) corrective regimen sliding scale   SubCutaneous at bedtime  insulin lispro (ADMELOG) corrective regimen sliding scale   SubCutaneous three times a day before meals  lisinopril 2.5 milliGRAM(s) Oral daily  melatonin 3 milliGRAM(s) Oral at bedtime  memantine 5 milliGRAM(s) Oral at bedtime  risperiDONE   Tablet 0.25 milliGRAM(s) Oral at bedtime  sodium bicarbonate 650 milliGRAM(s) Oral two times a day    MEDICATIONS  (PRN):  acetaminophen     Tablet .. 650 milliGRAM(s) Oral every 6 hours PRN Temp greater or equal to 38C (100.4F), Mild Pain (1 - 3)  ondansetron Injectable 4 milliGRAM(s) IV Push every 8 hours PRN Nausea and/or Vomiting      __________________________________________________  REVIEW OF SYSTEMS:    CONSTITUTIONAL: No fever,   EYES: no acute visual disturbances  NECK: No pain or stiffness  RESPIRATORY: No cough; No shortness of breath  CARDIOVASCULAR: No chest pain, no palpitations  GASTROINTESTINAL: No pain. No nausea or vomiting; No diarrhea   NEUROLOGICAL: No headache or numbness, no tremors  MUSCULOSKELETAL: bilateral lower leg pain, left < foot more than right  GENITOURINARY: no dysuria, no frequency, no hesitancy  PSYCHIATRY: no depression , no anxiety  ALL OTHER  ROS negative        Vital Signs Last 24 Hrs  T(C): 36.6 (2024 08:24), Max: 37.2 (2024 19:49)  T(F): 97.8 (2024 08:24), Max: 99 (2024 19:49)  HR: 68 (2024 08:24) (61 - 88)  BP: 187/73 (2024 08:24) (168/75 - 187/73)  BP(mean): 105 (2024 05:00) (105 - 105)  RR: 17 (2024 08:24) (16 - 17)  SpO2: 98% (2024 08:24) (96% - 98%)    Parameters below as of 2024 05:00  Patient On (Oxygen Delivery Method): room air        ________________________________________________  PHYSICAL EXAM:  GENERAL: NAD  HEENT: Normocephalic;  conjunctivae and sclerae clear; moist mucous membranes;   NECK : supple  CHEST/LUNG: Clear to auscultation bilaterally with good air entry   HEART: S1 S2  regular; no murmurs, gallops or rubs  ABDOMEN: Soft, Nontender, Nondistended; Bowel sounds present  EXTREMITIES: no cyanosis; no edema; no calf tenderness  SKIN: Left heel ulcer  NERVOUS SYSTEM:  Awake and alert; Oriented  to person, confused, restless, agitated at times    _________________________________________________  LABS:                        7.9    4.48  )-----------( 194      ( 2024 14:55 )             25.4     04-21    145  |  117<H>  |  32<H>  ----------------------------<  81  4.8   |  23  |  2.35<H>    Ca    9.4      2024 14:55  Phos  4.0     04-22  Mg     2.2     04-22    TPro  7.0  /  Alb  2.5<L>  /  TBili  0.4  /  DBili  x   /  AST  18  /  ALT  22  /  AlkPhos  76  -      Urinalysis Basic - ( 2024 15:45 )    Color: Yellow / Appearance: Slightly Cloudy / S.011 / pH: x  Gluc: x / Ketone: Negative mg/dL  / Bili: Negative / Urobili: 0.2 mg/dL   Blood: x / Protein: Trace mg/dL / Nitrite: Negative   Leuk Esterase: Large / RBC: 3 /HPF / WBC >40 /HPF   Sq Epi: x / Non Sq Epi: x / Bacteria: Moderate /HPF      CAPILLARY BLOOD GLUCOSE      POCT Blood Glucose.: 69 mg/dL (2024 08:03)  POCT Blood Glucose.: 89 mg/dL (2024 21:10)        RADIOLOGY & ADDITIONAL TESTS:  < from: US Duplex Arterial Lower Ext Ltd, Left (24 @ 14:10) >    ACC: [id number] EXAM:  US DPLX LWR EXT ARTS LTD LT   ORDERED BY: NANCI PAGAN     PROCEDURE DATE:  2024          INTERPRETATION:  US LOWER EXTREMITY ARTERIAL DUPLEX LIMITED LEFT    CLINICAL INFORMATION: Peripheral artery disease with pain with left calf   squeeze    COMPARISON: None    Real-time sonography of the left lower extremity arterial system was   performed using a high-resolution linear array transducer and including   color and spectral Doppler.    There is no measurable plaque in the lower extremity arteries. No soft   tissue abnormalities are demonstrated in the left lower extremity. Flow   phase patterns and peak systolic velocity measurements (in cm/s) were   observed as follows:    LEFT:  CFA: Triphasic; 76  Proximal SFA: Biphasic; 112  Mid SFA: Biphasic; 92  Distal SFA: Biphasic;  95  Popliteal: Biphasic;  85  Anterior tibial: Biphasic; 45  Posterior tibial: Biphasic; 47  Peroneal: Monophasic;  48  Dorsalis pedis: Not seen;    IMPRESSION:  *  No significant plaque or stenosis in the left leg    --- End of Report ---    < end of copied text >  < from: Xray Foot AP + Lateral + Oblique, Left (24 @ 14:09) >  ACC: [id number] EXAM:  XR FOOT COMP MIN 3 VIEWS LT   ORDERED BY: KARSTEN WHEELER     PROCEDURE DATE:  2024          INTERPRETATION:  Left foot. 3 views. Patient has an ulcer around the   calcaneus.    There are posterior and inferior calcanealspurs. Arterial calcifications   are noted.    There is a soft tissue ulcer posterior to the upper posterior calcaneus   and this is more evident than on 2022. No bone destruction   or fracture.    IMPRESSION: There is an ulcer posterior to the upper posterior calcaneus   increased from prior. Bones appear intact.    --- End of Report ---    < end of copied text >    Imaging  Reviewed:  YES    Consultant(s) Notes Reviewed:   YES      Plan of care was discussed with patient and /or primary care giver; all questions and concerns were addressed  NP Note discussed with  Primary Attending    Patient is a 84y old  Female who presents with a chief complaint of     INTERVAL HPI/OVERNIGHT EVENTS: confused and refusing medication     MEDICATIONS  (STANDING):  amLODIPine   Tablet 2.5 milliGRAM(s) Oral daily  aspirin  chewable 81 milliGRAM(s) Oral daily  atorvastatin 80 milliGRAM(s) Oral at bedtime  calcitriol   Capsule 0.25 MICROGram(s) Oral daily  cefTRIAXone   IVPB      cefTRIAXone   IVPB 1000 milliGRAM(s) IV Intermittent every 24 hours  ferrous    sulfate 325 milliGRAM(s) Oral daily  folic acid 1 milliGRAM(s) Oral daily  gabapentin 100 milliGRAM(s) Oral two times a day  heparin   Injectable 5000 Unit(s) SubCutaneous every 8 hours  hydroxychloroquine 200 milliGRAM(s) Oral daily  insulin lispro (ADMELOG) corrective regimen sliding scale   SubCutaneous at bedtime  insulin lispro (ADMELOG) corrective regimen sliding scale   SubCutaneous three times a day before meals  lisinopril 2.5 milliGRAM(s) Oral daily  melatonin 3 milliGRAM(s) Oral at bedtime  memantine 5 milliGRAM(s) Oral at bedtime  risperiDONE   Tablet 0.25 milliGRAM(s) Oral at bedtime  sodium bicarbonate 650 milliGRAM(s) Oral two times a day    MEDICATIONS  (PRN):  acetaminophen     Tablet .. 650 milliGRAM(s) Oral every 6 hours PRN Temp greater or equal to 38C (100.4F), Mild Pain (1 - 3)  ondansetron Injectable 4 milliGRAM(s) IV Push every 8 hours PRN Nausea and/or Vomiting      __________________________________________________  REVIEW OF SYSTEMS:    CONSTITUTIONAL: No fever,   EYES: no acute visual disturbances  NECK: No pain or stiffness  RESPIRATORY: No cough; No shortness of breath  CARDIOVASCULAR: No chest pain, no palpitations  GASTROINTESTINAL: No pain. No nausea or vomiting; No diarrhea   NEUROLOGICAL: No headache or numbness, no tremors  MUSCULOSKELETAL: bilateral lower leg pain, left < foot more than right, lower back pain  GENITOURINARY: no dysuria, no frequency, no hesitancy  PSYCHIATRY: no depression , no anxiety  ALL OTHER  ROS negative        Vital Signs Last 24 Hrs  T(C): 36.6 (2024 08:24), Max: 37.2 (2024 19:49)  T(F): 97.8 (2024 08:24), Max: 99 (2024 19:49)  HR: 68 (2024 08:24) (61 - 88)  BP: 187/73 (2024 08:24) (168/75 - 187/73)  BP(mean): 105 (2024 05:00) (105 - 105)  RR: 17 (2024 08:24) (16 - 17)  SpO2: 98% (2024 08:24) (96% - 98%)    Parameters below as of 2024 05:00  Patient On (Oxygen Delivery Method): room air        ________________________________________________  PHYSICAL EXAM:  GENERAL: NAD  HEENT: Normocephalic;  conjunctivae and sclerae clear; moist mucous membranes;   NECK : supple  CHEST/LUNG: Clear to auscultation bilaterally with good air entry   HEART: S1 S2  regular; no murmurs, gallops or rubs  ABDOMEN: Soft, Nontender, Nondistended; Bowel sounds present  EXTREMITIES: no cyanosis; no edema; no calf tenderness  SKIN: Left heel ulcer, sacral redness  NERVOUS SYSTEM:  Awake and alert; Oriented  to person, confused, restless, agitated at times    _________________________________________________  LABS:                        7.9    4.48  )-----------( 194      ( 2024 14:55 )             25.4     04-21    145  |  117<H>  |  32<H>  ----------------------------<  81  4.8   |  23  |  2.35<H>    Ca    9.4      2024 14:55  Phos  4.0     04-22  Mg     2.2     04-22    TPro  7.0  /  Alb  2.5<L>  /  TBili  0.4  /  DBili  x   /  AST  18  /  ALT  22  /  AlkPhos  76        Urinalysis Basic - ( 2024 15:45 )    Color: Yellow / Appearance: Slightly Cloudy / S.011 / pH: x  Gluc: x / Ketone: Negative mg/dL  / Bili: Negative / Urobili: 0.2 mg/dL   Blood: x / Protein: Trace mg/dL / Nitrite: Negative   Leuk Esterase: Large / RBC: 3 /HPF / WBC >40 /HPF   Sq Epi: x / Non Sq Epi: x / Bacteria: Moderate /HPF      CAPILLARY BLOOD GLUCOSE      POCT Blood Glucose.: 69 mg/dL (2024 08:03)  POCT Blood Glucose.: 89 mg/dL (2024 21:10)        RADIOLOGY & ADDITIONAL TESTS:  < from: US Duplex Arterial Lower Ext Ltd, Left (24 @ 14:10) >    ACC: [id number] EXAM:  US DPLX LWR EXT [employer] LTD LT   ORDERED BY: NANCI PAGAN     PROCEDURE DATE:  2024          INTERPRETATION:  US LOWER EXTREMITY ARTERIAL DUPLEX LIMITED LEFT    CLINICAL INFORMATION: Peripheral artery disease with pain with left calf   squeeze    COMPARISON: None    Real-time sonography of the left lower extremity arterial system was   performed using a high-resolution linear array transducer and including   color and spectral Doppler.    There is no measurable plaque in the lower extremity arteries. No soft   tissue abnormalities are demonstrated in the left lower extremity. Flow   phase patterns and peak systolic velocity measurements (in cm/s) were   observed as follows:    LEFT:  CFA: Triphasic; 76  Proximal SFA: Biphasic; 112  Mid SFA: Biphasic; 92  Distal SFA: Biphasic;  95  Popliteal: Biphasic;  85  Anterior tibial: Biphasic; 45  Posterior tibial: Biphasic; 47  Peroneal: Monophasic;  48  Dorsalis pedis: Not seen;    IMPRESSION:  *  No significant plaque or stenosis in the left leg    --- End of Report ---    < end of copied text >  < from: Xray Foot AP + Lateral + Oblique, Left (24 @ 14:09) >  ACC: [id number] EXAM:  XR FOOT COMP MIN 3 VIEWS LT   ORDERED BY: KARSTEN WHEELER     PROCEDURE DATE:  2024          INTERPRETATION:  Left foot. 3 views. Patient has an ulcer around the   calcaneus.    There are posterior and inferior calcanealspurs. Arterial calcifications   are noted.    There is a soft tissue ulcer posterior to the upper posterior calcaneus   and this is more evident than on 2022. No bone destruction   or fracture.    IMPRESSION: There is an ulcer posterior to the upper posterior calcaneus   increased from prior. Bones appear intact.    --- End of Report ---    < end of copied text >    Imaging  Reviewed:  YES    Consultant(s) Notes Reviewed:   YES      Plan of care was discussed with patient and /or primary care giver; all questions and concerns were addressed

## 2024-04-22 NOTE — PROGRESS NOTE ADULT - PROBLEM SELECTOR PLAN 2
p/w  frequency  UA positive  f/u Urine culture  Start ABx - Rocephin 1g qd for now  last admission UCx grew candida, completed 5 days course of diflucan Pw b/l heel ulcer  podiatry following   Xrays see as above  ESAU/PVR unremarkable  f/u Venous duplex patient refuse  f/u wound culture  patient refusing

## 2024-04-22 NOTE — ADVANCED PRACTICE NURSE CONSULT - ASSESSMENT
This is a 84yr old female patient admitted for Pressure Injury, presenting with a L Foot wound to which the patient is being followed by Podiatry with a treatment plan in place to address this issue. There is currently no further need for wound care specialist consultation at this time

## 2024-04-22 NOTE — CHART NOTE - NSCHARTNOTEFT_GEN_A_CORE
EVENT: Notified by RN that patient BP is high    HPI: 84 y.o. F from home, with PMHx of HTN, HLD, CKD, CVA, SAH, Rheumatoid Arthritis, Multiple Myeloma, and Bladder incontinence, presents to the ED with worsening heel ulcer and unable to bear weight. Admitted to medicine for diabetic foot ulcer. Patient with hypertensive urgency (Asymptomatic)    SUBJECTIVE: Patient in NAD, calm-resting in bed, daughter at bedside.     OBJECTIVE:  Vital Signs Last 24 Hrs  T(C): 36.5 (22 Apr 2024 15:40), Max: 37.2 (21 Apr 2024 23:55)  T(F): 97.7 (22 Apr 2024 15:40), Max: 99 (21 Apr 2024 23:55)  HR: 86 (22 Apr 2024 19:14) (67 - 86)  BP: 184/94 (22 Apr 2024 19:14) (171/73 - 187/73)  BP(mean): 124 (22 Apr 2024 19:14) (105 - 124)  RR: 18 (22 Apr 2024 19:14) (16 - 18)  SpO2: 99% (22 Apr 2024 19:14) (96% - 99%)    Parameters below as of 22 Apr 2024 19:14  Patient On (Oxygen Delivery Method): room air    PHYSICAL EXAM:  Neuro: Awake and alert, oriented to person, place, and time  Cardiovascular: + S1, S2, no murmurs, rubs, or bruits  Respiratory: clear to auscultation bilaterally with good air entry   GI: Abdomen soft, non-tender, bowel sounds present   : Non distended;   Skin: warm and dry; no rash      LABS:                        7.9    4.48  )-----------( 194      ( 21 Apr 2024 14:55 )             25.4     04-21    145  |  117<H>  |  32<H>  ----------------------------<  81  4.8   |  23  |  2.35<H>    Ca    9.4      21 Apr 2024 14:55  Phos  4.0     04-22  Mg     2.2     04-22    TPro  7.0  /  Alb  2.5<L>  /  TBili  0.4  /  DBili  x   /  AST  18  /  ALT  22  /  AlkPhos  76  04-21    Problem: Hypertensive urgency( asymptomatic)    PLAN:   Amlodipine   Tablet 2.5 dee dee GRAM(s) Oral once stat  Continue current treatment  and supportive measure   Reduce external stimuli     FOLLOW UP / RESULT: Reassess for effect of therapy

## 2024-04-22 NOTE — PROGRESS NOTE ADULT - PROBLEM SELECTOR PLAN 3
Pt w/ SCr  2.3 on admission  Baseline SCr - around 2.3  gentle IVF for now, follow BMP daily p/w  frequency  UA positive  f/u Urine culture  continue rocephin   last admission UCx grew candida, completed 5 days course of diflucan

## 2024-04-22 NOTE — PROGRESS NOTE ADULT - NS ATTEND AMEND GEN_ALL_CORE FT
#L-heel ulcer  #Acute UTI  #Sacral ulcer  #Hx CVA  #Hx SAH   #Type 2 DM  #RA  #MM   #Urinary incontinence   #Dementia  #CKD, likely diabetic nephropathy  #HTN  #HLD   #DVT ppx     84yF with extensive PMH who presented from home with L-heel ulcer in setting of type 2 DM, admitted for UTI and further workup of wounds. Patient seen at bedside. Appears confused, refusing all care.     PE: as above; obese, elderly, sacral decub wound, left foot dressed   Labs: CBC, BMP, Mg, Phos reviewed; monitor daily     -CT head for acute encephalopathy  -Podiatry following   -ID consult   -Notified LÓPEZ Betancourt about patient being admitted; hold MM treatment   -XR left foot and ESAU/PVR   -Troponin elevated, likley demand ischemia   -Wound care consulted for sacral decubitus ulcer.  -UA+, patient symptomatic  -Continue ceftriaxone, UCx pending   -Gentle hydration  -Continue home meds for chronic conditions

## 2024-04-22 NOTE — PROGRESS NOTE ADULT - ASSESSMENT
Patient is a 84 y.o. F from home, with PMHx of HTN, HLD, CKD, CVA, SAH, Rheumatoid Arthritis, Multiple Myeloma, and Bladder incontinence. Patient presented to the ED with worsening heel ulcer and unable to bear weight. Admitted to medicine for diabetic foot ulcer. Podiatry consulted arterial duplex unremarkable.  Patient is a 84 y.o. F from home, with PMHx of HTN, HLD, CKD, CVA, SAH, Rheumatoid Arthritis, Multiple Myeloma, and Bladder incontinence. Patient presented to the ED with worsening heel ulcer and unable to bear weight. Admitted to medicine for diabetic foot ulcer and UTI. Podiatry consulted arterial duplex unremarkable. Hemonc consulted for MM. Patient refusing treatment, confused, restless. Unable to get in contact with family.  Patient is a 84 y.o. F from home, with PMHx of HTN, HLD, CKD, CVA, SAH, Rheumatoid Arthritis, Multiple Myeloma, and Bladder incontinence. Patient presented to the ED with worsening heel ulcer and unable to bear weight, sacral pain. Admitted to medicine for diabetic foot ulcer and UTI. Podiatry consulted arterial duplex unremarkable. Hemonc consulted for MM. Patient refusing treatment, confused, restless.

## 2024-04-22 NOTE — PROGRESS NOTE ADULT - ASSESSMENT
A:  L pressure heel wound     P:  Patient evaluated and Chart reviewed  Discussed diagnosis and treatment with patient  Xrays reviewed, see above notes   ESAU/PVR was ordered, but cancelled due to patient unable to tolerate because of pain  Duplex reviewed see above notes  Culture pending  Left foot was dressed with santyl allevyn pad  Discussed offloading b/l heels while laying in bed. Patient to have pillow under heels at all times. Strict CAIR boots   Podiatry to follow while in house    Discussed with Attending Dr. Arriaga

## 2024-04-22 NOTE — PROGRESS NOTE ADULT - PROBLEM SELECTOR PLAN 1
Pw b/l heel ulcer  podiatry following   Xrays see as above  ESAU/PVR unremarkable  f/u Venous duplex patient refuse  f/u wound culture  patient refusing possible infection versus dementia  f/u CT head  f/u vit B12, folate, TSH  patient refusing medication and labs  unable to get hold of daughter multiple attempts made to call  f/u infectious work up

## 2024-04-22 NOTE — PROGRESS NOTE ADULT - PROBLEM SELECTOR PLAN 6
c/w home meds Pomalyst   f/u Heme onc recs c/w home meds hydroxychloroquine 200mg qd c/w home meds hydroxychloroquine 200mg qd  Tramadol 25mg x1 PO   starting Lidocaine   pain consult management

## 2024-04-22 NOTE — PROGRESS NOTE ADULT - SUBJECTIVE AND OBJECTIVE BOX
Interval: Patient was seen resting bedside with pillow under left leg to offload heel. Patient denies any other pedal complaints and denies pain in the left heel.     Chief Complaint Quote:	L foot diabetic ulcer x 1 week, low back pain x 2 years  Chief Complaint: Wound check, low back pain    Podiatry HPI: All pertinent history was given from patients daughter. Daughter states that her mother has had this foot wound for about two weeks. She noticed it when they were transferring her mother from the wheelchair that she was no longer able to put any weight on that foot. Yesterday, the daughter noticed swelling in the foot and leg with increased pain. Daughter stated that her mother was hospitalized around 2 weeks ago for a UTI and they had nurses coming into the home once a week to check on her. During this time, they found the wound and was applying an allevyn pad to help offload the area. Daughter stated that her mother kept a pillow under her feet at all times while laying in bed. Patient denied any constitutional symptoms and other pedal complaints.     PMH:No pertinent past medical history  Rheumatoid arthritis  Varicose veins of lower extremity  Hypertension  Multiple myeloma  HLD (hyperlipidemia)  Cerebrovascular accident (CVA)      Allergies: No Known Allergies      Medications acetaminophen     Tablet .. 650 milliGRAM(s) Oral every 6 hours PRN  amLODIPine   Tablet 2.5 milliGRAM(s) Oral daily  aspirin  chewable 81 milliGRAM(s) Oral daily  atorvastatin 80 milliGRAM(s) Oral at bedtime  calcitriol   Capsule 0.25 MICROGram(s) Oral daily  cefTRIAXone   IVPB 1000 milliGRAM(s) IV Intermittent every 24 hours  cefTRIAXone   IVPB      ferrous    sulfate 325 milliGRAM(s) Oral daily  folic acid 1 milliGRAM(s) Oral daily  gabapentin 100 milliGRAM(s) Oral two times a day  heparin   Injectable 5000 Unit(s) SubCutaneous every 8 hours  hydroxychloroquine 200 milliGRAM(s) Oral daily  insulin lispro (ADMELOG) corrective regimen sliding scale   SubCutaneous three times a day before meals  insulin lispro (ADMELOG) corrective regimen sliding scale   SubCutaneous at bedtime  lisinopril 2.5 milliGRAM(s) Oral daily  melatonin 3 milliGRAM(s) Oral at bedtime  memantine 5 milliGRAM(s) Oral at bedtime  ondansetron Injectable 4 milliGRAM(s) IV Push every 8 hours PRN  risperiDONE   Tablet 0.25 milliGRAM(s) Oral at bedtime  sodium bicarbonate 650 milliGRAM(s) Oral two times a day    FHFHx: stroke (Father)    ,   PMHNo pertinent past medical history    Rheumatoid arthritis    Varicose veins of lower extremity    Hypertension    Multiple myeloma    HLD (hyperlipidemia)    Cerebrovascular accident (CVA)       PSHNo significant past surgical history        Labs                          7.9    4.48  )-----------( 194      ( 21 Apr 2024 14:55 )             25.4      04-21    145  |  117<H>  |  32<H>  ----------------------------<  81  4.8   |  23  |  2.35<H>    Ca    9.4      21 Apr 2024 14:55  Phos  4.0     04-22  Mg     2.2     04-22    TPro  7.0  /  Alb  2.5<L>  /  TBili  0.4  /  DBili  x   /  AST  18  /  ALT  22  /  AlkPhos  76  04-21     Vital Signs Last 24 Hrs  T(C): 36.6 (22 Apr 2024 08:24), Max: 37.2 (21 Apr 2024 19:49)  T(F): 97.8 (22 Apr 2024 08:24), Max: 99 (21 Apr 2024 19:49)  HR: 68 (22 Apr 2024 08:24) (61 - 88)  BP: 187/73 (22 Apr 2024 08:24) (168/75 - 187/73)  BP(mean): 105 (22 Apr 2024 05:00) (105 - 105)  RR: 17 (22 Apr 2024 08:24) (16 - 17)  SpO2: 98% (22 Apr 2024 08:24) (96% - 98%)    Parameters below as of 22 Apr 2024 05:00  Patient On (Oxygen Delivery Method): room air      Sedimentation Rate, Erythrocyte: 92 mm/Hr (04-21-24 @ 14:55)         C-Reactive Protein, Serum: 28 mg/L (04-21-24 @ 14:55)   WBC Count: 4.48 K/uL (04-21-24 @ 14:55)      Physical Exam:  Vasc: DP and PT pulses palpable 1/4 b/l. TG warm to warm with no increase in temperature to left foot around wound. Pedal hair present. Edema noted to b/l feet and legs  Derm: Open lesion noted on left/right foot measuring 2.5x3cm. Mild Periwound erythema present. No drainage noted. Hyperkeratotic rim noted. No Macerated edges noted. Wound base is noted to be fibrogranular with no tracking noted, though patient was in pain while probing. No Fluctuance/crepitus/streaking noted. No Malodor noted.  Neuro: Gross sensation diminshed  Msk: Deferred, POP to L heel wound and leg pain with squeeze test    IMAGING:    < from: US Duplex Arterial Lower Ext Ltd, Left (04.21.24 @ 14:10) >    INTERPRETATION:  US LOWER EXTREMITY ARTERIAL DUPLEX LIMITED LEFT    CLINICAL INFORMATION: Peripheral artery disease with pain with left calf   squeeze    COMPARISON: None    Real-time sonography of the left lower extremity arterial system was   performed using a high-resolution linear array transducer and including   color and spectral Doppler.    There is no measurable plaque in the lower extremity arteries. No soft   tissue abnormalities are demonstrated in the left lower extremity. Flow   phase patterns and peak systolic velocity measurements (in cm/s) were   observed as follows:    LEFT:  CFA: Triphasic; 76  Proximal SFA: Biphasic; 112  Mid SFA: Biphasic; 92  Distal SFA: Biphasic;  95  Popliteal: Biphasic;  85  Anterior tibial: Biphasic; 45  Posterior tibial: Biphasic; 47  Peroneal: Monophasic;  48  Dorsalis pedis: Not seen;    IMPRESSION:  *  No significant plaque or stenosis in the left leg    --- End of Report ---      < end of copied text >  < from: Xray Foot AP + Lateral + Oblique, Left (04.21.24 @ 14:09) >  [facility]     PROCEDURE DATE:  04/21/2024          INTERPRETATION:  Left foot. 3 views. Patient has an ulcer around the   calcaneus.    There are posterior and inferior calcanealspurs. Arterial calcifications   are noted.    There is a soft tissue ulcer posterior to the upper posterior calcaneus   and this is more evident than on September 6, 2022. No bone destruction   or fracture.    IMPRESSION: There is an ulcer posteriorto the upper posterior calcaneus   increased from prior. Bones appear intact.    --- End of Report ---      < end of copied text >      CULTURES: pending

## 2024-04-23 NOTE — PATIENT PROFILE ADULT - VISION (WITH CORRECTIVE LENSES IF THE PATIENT USUALLY WEARS THEM):
26-Aug-2023 17:20 Normal vision: sees adequately in most situations; can see medication labels, newsprint

## 2024-04-23 NOTE — PROGRESS NOTE ADULT - ASSESSMENT
A:  L pressure heel wound     P:  Patient evaluated and Chart reviewed  Discussed diagnosis and treatment with patient  Xrays reviewed, see above notes   ESAU/PVR was ordered, but cancelled due to patient unable to tolerate because of pain  Duplex reviewed see above notes  Cx reviewed, staph aureus   Left foot was dressed with santyl allevyn pad  Discussed offloading b/l heels while laying in bed. Patient to have pillow under heels at all times. Strict CAIR boots   Podiatry to follow while in house    Discussed with Attending Dr. Arriaga

## 2024-04-23 NOTE — PROGRESS NOTE ADULT - ASSESSMENT
Patient is a 84 y.o. F from home, with PMHx of HTN, HLD, CKD, CVA, SAH, Rheumatoid Arthritis, Multiple Myeloma, and Bladder incontinence. Patient presented to the ED with worsening heel ulcer and unable to bear weight, sacral pain. Admitted to medicine for diabetic foot ulcer and UTI. Podiatry consulted arterial duplex unremarkable. Hemonc consulted for MM

## 2024-04-23 NOTE — PROGRESS NOTE ADULT - SUBJECTIVE AND OBJECTIVE BOX
NP Note discussed with  Primary Attending    Patient is a 84y old  Female who presents with a chief complaint of Left Heel Pressure Injury (2024 08:45)      INTERVAL HPI/OVERNIGHT EVENTS: no new complaints    MEDICATIONS  (STANDING):  acetaminophen     Tablet .. 1000 milliGRAM(s) Oral every 8 hours  amLODIPine   Tablet 2.5 milliGRAM(s) Oral daily  aspirin  chewable 81 milliGRAM(s) Oral daily  atorvastatin 80 milliGRAM(s) Oral at bedtime  calcitriol   Capsule 0.25 MICROGram(s) Oral daily  cefTRIAXone   IVPB      cefTRIAXone   IVPB 1000 milliGRAM(s) IV Intermittent every 24 hours  ferrous    sulfate 325 milliGRAM(s) Oral daily  folic acid 1 milliGRAM(s) Oral daily  gabapentin 100 milliGRAM(s) Oral two times a day  heparin   Injectable 5000 Unit(s) SubCutaneous every 8 hours  hydroxychloroquine 200 milliGRAM(s) Oral daily  insulin lispro (ADMELOG) corrective regimen sliding scale   SubCutaneous three times a day before meals  insulin lispro (ADMELOG) corrective regimen sliding scale   SubCutaneous at bedtime  lidocaine   4% Patch 1 Patch Transdermal daily  lisinopril 2.5 milliGRAM(s) Oral daily  melatonin 3 milliGRAM(s) Oral at bedtime  memantine 5 milliGRAM(s) Oral at bedtime  pantoprazole    Tablet 40 milliGRAM(s) Oral before breakfast  risperiDONE   Tablet 0.25 milliGRAM(s) Oral at bedtime  sodium bicarbonate 650 milliGRAM(s) Oral two times a day  traMADol 50 milliGRAM(s) Oral every 8 hours    MEDICATIONS  (PRN):  acetaminophen     Tablet .. 650 milliGRAM(s) Oral every 6 hours PRN Temp greater or equal to 38C (100.4F), Mild Pain (1 - 3)  ondansetron Injectable 4 milliGRAM(s) IV Push every 8 hours PRN Nausea and/or Vomiting      __________________________________________________  REVIEW OF SYSTEMS:    CONSTITUTIONAL: No fever,   EYES: no acute visual disturbances  NECK: No pain or stiffness  RESPIRATORY: No cough; No shortness of breath  CARDIOVASCULAR: No chest pain, no palpitations  GASTROINTESTINAL: No pain. No nausea or vomiting; No diarrhea   NEUROLOGICAL: No headache or numbness, no tremors  MUSCULOSKELETAL: No joint pain, no muscle pain  GENITOURINARY: no dysuria, no frequency, no hesitancy  PSYCHIATRY: no depression , no anxiety  ALL OTHER  ROS negative        Vital Signs Last 24 Hrs  T(C): 36.9 (2024 05:28), Max: 37.4 (2024 20:40)  T(F): 98.5 (2024 05:28), Max: 99.4 (2024 20:40)  HR: 61 (2024 05:28) (61 - 98)  BP: 174/84 (2024 06:59) (174/84 - 184/94)  BP(mean): 104 (2024 20:40) (104 - 124)  RR: 16 (2024 05:28) (16 - 18)  SpO2: 97% (2024 06:59) (97% - 99%)    Parameters below as of 2024 06:59  Patient On (Oxygen Delivery Method): room air        ________________________________________________  PHYSICAL EXAM:  GENERAL: NAD  HEENT: Normocephalic;  conjunctivae and sclerae clear; moist mucous membranes;   NECK : supple  CHEST/LUNG: Clear to auscultation bilaterally with good air entry   HEART: S1 S2  regular; no murmurs, gallops or rubs  ABDOMEN: Soft, Nontender, Nondistended; Bowel sounds present  EXTREMITIES: no cyanosis; no edema; no calf tenderness  SKIN: warm and dry; no rash  NERVOUS SYSTEM:  Awake and alert; Oriented  to place, person and time ; no new deficits    _________________________________________________  LABS:                        7.9    4.48  )-----------( 194      ( 2024 14:55 )             25.4     04-21    145  |  117<H>  |  32<H>  ----------------------------<  81  4.8   |  23  |  2.35<H>    Ca    9.4      2024 14:55  Phos  4.0     -  Mg     2.2     -    TPro  7.0  /  Alb  2.5<L>  /  TBili  0.4  /  DBili  x   /  AST  18  /  ALT  22  /  AlkPhos  76  -      Urinalysis Basic - ( 2024 15:45 )    Color: Yellow / Appearance: Slightly Cloudy / S.011 / pH: x  Gluc: x / Ketone: Negative mg/dL  / Bili: Negative / Urobili: 0.2 mg/dL   Blood: x / Protein: Trace mg/dL / Nitrite: Negative   Leuk Esterase: Large / RBC: 3 /HPF / WBC >40 /HPF   Sq Epi: x / Non Sq Epi: x / Bacteria: Moderate /HPF      CAPILLARY BLOOD GLUCOSE      POCT Blood Glucose.: 108 mg/dL (2024 12:10)  POCT Blood Glucose.: 66 mg/dL (2024 11:43)  POCT Blood Glucose.: 76 mg/dL (2024 08:19)  POCT Blood Glucose.: 84 mg/dL (2024 21:46)        RADIOLOGY & ADDITIONAL TESTS:    Imaging Personally Reviewed:  YES/NO    Consultant(s) Notes Reviewed:   YES/ No    Care Discussed with Consultants :     Plan of care was discussed with patient and /or primary care giver; all questions and concerns were addressed and care was aligned with patient's wishes.     NP Note discussed with  Primary Attending    Patient is a 84y old  Female who presents with a chief complaint of Left Heel Pressure Injury (2024 08:45)      INTERVAL HPI/OVERNIGHT EVENTS: no new complaints    MEDICATIONS  (STANDING):  acetaminophen     Tablet .. 1000 milliGRAM(s) Oral every 8 hours  amLODIPine   Tablet 2.5 milliGRAM(s) Oral daily  aspirin  chewable 81 milliGRAM(s) Oral daily  atorvastatin 80 milliGRAM(s) Oral at bedtime  calcitriol   Capsule 0.25 MICROGram(s) Oral daily  cefTRIAXone   IVPB      cefTRIAXone   IVPB 1000 milliGRAM(s) IV Intermittent every 24 hours  ferrous    sulfate 325 milliGRAM(s) Oral daily  folic acid 1 milliGRAM(s) Oral daily  gabapentin 100 milliGRAM(s) Oral two times a day  heparin   Injectable 5000 Unit(s) SubCutaneous every 8 hours  hydroxychloroquine 200 milliGRAM(s) Oral daily  insulin lispro (ADMELOG) corrective regimen sliding scale   SubCutaneous three times a day before meals  insulin lispro (ADMELOG) corrective regimen sliding scale   SubCutaneous at bedtime  lidocaine   4% Patch 1 Patch Transdermal daily  lisinopril 2.5 milliGRAM(s) Oral daily  melatonin 3 milliGRAM(s) Oral at bedtime  memantine 5 milliGRAM(s) Oral at bedtime  pantoprazole    Tablet 40 milliGRAM(s) Oral before breakfast  risperiDONE   Tablet 0.25 milliGRAM(s) Oral at bedtime  sodium bicarbonate 650 milliGRAM(s) Oral two times a day  traMADol 50 milliGRAM(s) Oral every 8 hours    MEDICATIONS  (PRN):  acetaminophen     Tablet .. 650 milliGRAM(s) Oral every 6 hours PRN Temp greater or equal to 38C (100.4F), Mild Pain (1 - 3)  ondansetron Injectable 4 milliGRAM(s) IV Push every 8 hours PRN Nausea and/or Vomiting      __________________________________________________  REVIEW OF SYSTEMS:    CONSTITUTIONAL: No fever,   EYES: no acute visual disturbances  NECK: No pain or stiffness  RESPIRATORY: No cough; No shortness of breath  CARDIOVASCULAR: No chest pain, no palpitations  GASTROINTESTINAL: No pain. No nausea or vomiting; No diarrhea   NEUROLOGICAL: No headache or numbness, no tremors  MUSCULOSKELETAL: No joint pain, no muscle pain  GENITOURINARY: no dysuria, no frequency, no hesitancy  PSYCHIATRY: no depression , no anxiety  ALL OTHER  ROS negative        Vital Signs Last 24 Hrs  T(C): 36.9 (2024 05:28), Max: 37.4 (2024 20:40)  T(F): 98.5 (2024 05:28), Max: 99.4 (2024 20:40)  HR: 61 (2024 05:28) (61 - 98)  BP: 174/84 (2024 06:59) (174/84 - 184/94)  BP(mean): 104 (2024 20:40) (104 - 124)  RR: 16 (2024 05:28) (16 - 18)  SpO2: 97% (2024 06:59) (97% - 99%)    Parameters below as of 2024 06:59  Patient On (Oxygen Delivery Method): room air        ________________________________________________  PHYSICAL EXAM:  GENERAL: NAD  HEENT: Normocephalic;  conjunctivae and sclerae clear; moist mucous membranes;   NECK : supple  CHEST/LUNG: Clear to auscultation bilaterally  HEART: S1 S2  regular  ABDOMEN: Soft, Nontender, Nondistended; Bowel sounds present  EXTREMITIES: no cyanosis; no edema; no calf tenderness; + heel wound in dressing  SKIN: warm and dry; no rash  NERVOUS SYSTEM:  Awake and alert; Oriented  to person    _________________________________________________  LABS:                        7.9    4.48  )-----------( 194      ( 2024 14:55 )             25.4     04-21    145  |  117<H>  |  32<H>  ----------------------------<  81  4.8   |    |  2.35<H>    Ca    9.4      2024 14:55  Phos  4.0     -  Mg     2.2     -    TPro  7.0  /  Alb  2.5<L>  /  TBili  0.4  /  DBili  x   /  AST  18  /  ALT  22  /  AlkPhos  76  -      Urinalysis Basic - ( 2024 15:45 )    Color: Yellow / Appearance: Slightly Cloudy / S.011 / pH: x  Gluc: x / Ketone: Negative mg/dL  / Bili: Negative / Urobili: 0.2 mg/dL   Blood: x / Protein: Trace mg/dL / Nitrite: Negative   Leuk Esterase: Large / RBC: 3 /HPF / WBC >40 /HPF   Sq Epi: x / Non Sq Epi: x / Bacteria: Moderate /HPF      CAPILLARY BLOOD GLUCOSE      POCT Blood Glucose.: 108 mg/dL (2024 12:10)  POCT Blood Glucose.: 66 mg/dL (2024 11:43)  POCT Blood Glucose.: 76 mg/dL (2024 08:19)  POCT Blood Glucose.: 84 mg/dL (2024 21:46)        RADIOLOGY & ADDITIONAL TESTS:    < from: CT Head No Cont (24 @ 19:20) >    FINDINGS:    Chronic infarct with laminar necrosis in the left occipital lobe   medially, unchanged. Right frontal meningioma is isodense to adjacent   brain parenchyma, not well seen on today's exam. There is no associated   vasogenic edema.    Mild-to-moderate generalized cerebral volume loss, with distention of the   sulci and concomitant ex-vacuo ventricular dilatation. Mild-to-moderate   nonspecific low attenuation inthe periventricular and subcortical white   matter, likely due to small vessel disease.    No acute intracranial hemorrhage. No midline shift or herniation. No CT   evidence of acute territorial infarction, although MRI with DWI would be   more sensitive.    The visualized sinuses and mastoids are clear.    Bilateral lens implants. Limited views of the orbits and visualized soft   tissues of the neck, face, scalp, skull base, and calvarium are otherwise   unremarkable.    IMPRESSION:    1.  No significant change, without acute intracranial hemorrhage.  2.  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  3.  Isodense right frontal meningioma, without vasogenic edema.  4.  Senescent cerebral changes.        Patient Name: BETTY JEANSTEWART  MRN: [id number], Accession: [id number]  DOS: 24 07:20 PM    < end of copied text >      < from: US Duplex Arterial Lower Ext Ltd, Left (24 @ 14:10) >    INTERPRETATION:  US LOWER EXTREMITY ARTERIAL DUPLEX LIMITED LEFT    CLINICAL INFORMATION: Peripheral artery disease with pain with left calf   squeeze    COMPARISON: None    Real-time sonography of the left lower extremity arterial system was   performed using a high-resolution linear array transducer and including   color and spectral Doppler.    There is no measurable plaque in the lower extremity arteries. No soft   tissue abnormalities are demonstrated in the left lower extremity. Flow   phase patterns and peak systolic velocity measurements (in cm/s) were   observed as follows:    LEFT:  CFA: Triphasic; 76  Proximal SFA: Biphasic; 112  Mid SFA: Biphasic; 92  Distal SFA: Biphasic;  95  Popliteal: Biphasic;  85  Anterior tibial: Biphasic; 45  Posterior tibial: Biphasic; 47  Peroneal: Monophasic;  48  Dorsalis pedis: Not seen;    IMPRESSION:  *  No significant plaque or stenosis in the left leg    --- End of Report ---    < end of copied text >    Imaging Personally Reviewed:  YES    Consultant(s) Notes Reviewed:   YES      Plan of care was discussed with patient and /or primary care giver; all questions and concerns were addressed and care was aligned with patient's wishes.

## 2024-04-23 NOTE — PROGRESS NOTE ADULT - PROBLEM SELECTOR PLAN 1
possible infection versus dementia  CTH as above  f/u vit B12, folate, TSH  patient refusing medication and labs  f/u infectious work up

## 2024-04-23 NOTE — CONSULT NOTE ADULT - PROBLEM SELECTOR RECOMMENDATION 9
Pt with acute left foot pain which is somatic and neuropathic in nature due to Left foot pressure injury, podiatry following. Patient also with complaints of chronic low back pain which is somatic in nature likely due to history of rheumatoid arthritis and decreased functional mobility, per notes patient is wheel chair bound. High risk medications reviewed. Avoid polypharmacy. Avoid IV opioids. Avoid NSAIDs and benzodiazepines. Non-pharmacological sleep aides initiated. Non-opioid medications and non-pharmacological pain management measures initiated. Patient with UTI on ceftriaxone.  Opioid pain recommendations   - Start Tramadol 50mg PO q 8 hours x 2 days. Monitor for sedation/ respiratory depression.   Non-opioid pain recommendations   - No NSAIDs due to CKD  - Start Acetaminophen 1 gram PO q 8 hours x 3 days. Monitor LFTs  - Continue Gabapentin 100 mg po BID (as ordered by primary team.) Patient with CKD Cr. 235, continue to monitor renal function  - Continue Lidoderm 4% patch daily to low back (12 hrs on/12 hrs off)  Bowel Regimen  - Continue Miralax 17G PO daily  - Continue Senna 2 tablets at bedtime for constipation  Mild pain (score 1-3)  - Non-pharmacological pain treatment recommendations  - Warm/ Cool packs PRN   - Repositioning extremity, elevation, imagery, relaxation, distraction.  - Physical therapy OOB if no contraindications   Recommendations discussed with primary team and RN

## 2024-04-23 NOTE — ADVANCED PRACTICE NURSE CONSULT - ASSESSMENT
This is a 84yr old female patient admitted for Pressure Injury, presenting with a L Foot wound to which the patient is being followed by Podiatry with a treatment plan in place to address this issue. There is also evidence of healed wounds to the Bilateral Gluteus and Bilateral Knees. There is currently no further need for wound care specialist consultation at this time

## 2024-04-23 NOTE — PROGRESS NOTE ADULT - PROBLEM SELECTOR PLAN 5
h/o HTN   Monitor BP  lisnopril continued  amlodipine added overnight due to elevated BP  BP improved  BP target <130/90 mmHg  DASH/TLC diet  Adjust medications as needed to meet target.

## 2024-04-23 NOTE — CONSULT NOTE ADULT - SUBJECTIVE AND OBJECTIVE BOX
Chief Complaint Quote:	L foot diabetic ulcer x 1 week, low back pain x 2 years  Chief Complaint: Wound check, low back pain    Podiatry HPI: All pertinent history was given from patients daughter. Daughter states that her mother has had this foot wound for about two weeks. She noticed it when they were transferring her mother from the wheelchair that she was no longer able to put any weight on that foot. Yesterday, the daughter noticed swelling in the foot and leg with increased pain. Daughter stated that her mother was hospitalized around 2 weeks ago for a UTI and they had nurses coming into the home once a week to check on her. During this time, they found the wound and was applying an allevyn pad to help offload the area. Daughter stated that her mother kept a pillow under her feet at all times while laying in bed. Patient denied any constitutional symptoms and other pedal complaints.     PMH:No pertinent past medical history  Rheumatoid arthritis  Varicose veins of lower extremity  Hypertension  Multiple myeloma  HLD (hyperlipidemia)  Cerebrovascular accident (CVA)      Allergies: No Known Allergies    Medications:   FH:FHx: stroke (Father)      PSX: No significant past surgical history      SH: No drinking, smoking, drug use    Vital Signs Last 24 Hrs  T(C): 36.8 (21 Apr 2024 10:44), Max: 36.8 (21 Apr 2024 10:44)  T(F): 98.2 (21 Apr 2024 10:44), Max: 98.2 (21 Apr 2024 10:44)  HR: 74 (21 Apr 2024 10:44) (74 - 74)  BP: 170/70 (21 Apr 2024 10:44) (170/70 - 170/70)  BP(mean): --  RR: 17 (21 Apr 2024 10:44) (17 - 17)  SpO2: 96% (21 Apr 2024 10:44) (96% - 96%)    Parameters below as of 21 Apr 2024 10:44  Patient On (Oxygen Delivery Method): room air    LABS                  ROS  REVIEW OF SYSTEMS:    CONSTITUTIONAL: No fever, weight loss, or fatigue  EYES: No eye pain, visual disturbances, or discharge  ENMT:  No difficulty hearing, tinnitus, vertigo; No sinus or throat pain  NECK: No pain or stiffness  BREASTS: No pain, masses, or nipple discharge  RESPIRATORY: No cough, wheezing, chills or hemoptysis; No shortness of breath  CARDIOVASCULAR: No chest pain, palpitations, dizziness, or leg swelling  GASTROINTESTINAL: No abdominal or epigastric pain. No nausea, vomiting, or hematemesis; No diarrhea or constipation. No melena or hematochezia.  GENITOURINARY: No dysuria, frequency, hematuria, or incontinence  NEUROLOGICAL: No headaches, memory loss, loss of strength, numbness, or tremors  SKIN: No itching, burning, rashes, or lesions   LYMPH NODES: No enlarged glands  ENDOCRINE: No heat or cold intolerance; No hair loss  MUSCULOSKELETAL: No joint pain or swelling; No muscle, back, or extremity pain  PSYCHIATRIC: No depression, anxiety, mood swings, or difficulty sleeping  HEME/LYMPH: No easy bruising, or bleeding gums  ALLERY AND IMMUNOLOGIC: No hives or eczema    Physical Exam:  Vasc: DP and PT pulses palpable 1/4 b/l. TG warm to warm with no increase in temperature to left foot around wound. Pedal hair present. Edema noted to b/l feet and legs  Derm: Open lesion noted on left/right foot measuring 2.5x3cm. Mild Periwound erythema present. No drainage noted. Hyperkeratotic rim noted. No Macerated edges noted. Wound base is noted to be fibrogranular with no tracking noted, though patient was in pain while probing. No Fluctuance/crepitus/streaking noted. No Malodor noted.  Neuro: Gross sensation diminshed  Msk: Deferred, POP to L heel wound and leg pain with squeeze test    IMAGING:  Xray: pending     CULTURES: pending     
Source of information: ANGELO STRATTON, Chart review  Patient language: English  : n/a    HPI:  84 y.o. F from home, with PMHx of HTN, HLD, CKD, CVA, SAH, Rheumatoid Arthritis, Multiple Myeloma, and Bladder incontinence, presents to the ED with worsening heel ulcer and unable to bear weight. Daughter at the bedside states that her mother has had this foot wound for about two weeks. She noticed it when they were transferring her mother from the wheelchair that she was no longer able to put any weight on that foot. Yesterday, the daughter noticed swelling in the foot and leg with increased pain. Stated that her mother was hospitalized around 2 weeks ago for a UTI and they had nurses coming into the home once a week to check on her. During this time, they found the wound and was applying an allevyn pad to help offload the area. Daughter stated that her mother kept a pillow under her feet at all times while laying in bed. Otherwise pt denies any chest pain, palpitations, shortness of breath, fever, chills, headache, visual changes, nausea, vomiting, diarrhea, dysuria, frequency.    (2024 15:51)    Pt is admitted for left heel pressure injury and UTI, on ceftriaxone, podiatry following. Pain service consulted on  for management of left foot pain and chronic low back pain. Pt seen and examined at bedside, this morning. At time of assessment, patient laying in bed, A&Ox1-2, reports pain to left heel and low back, unable to quantify level of pain SCALE USED: (1-10 VNRS). Pt describes pain as localized to left heel, which is exacerbated by movement and touch, she also endorses chronic low back pain which is exacerbated by repositioning. Pt tolerating modified PO diet. Denies lethargy, chest pain, SOB, nausea, vomiting, constipation. Unable to recall date of last BM. Patient unable to identify current pain goals, patient is wheelchair bound.    PAST MEDICAL & SURGICAL HISTORY:  Rheumatoid arthritis    Varicose veins of lower extremity    Hypertension    Multiple myeloma    HLD (hyperlipidemia)    Cerebrovascular accident (CVA)  remote hx, no residual deficits    No significant past surgical history    FAMILY HISTORY:  FHx: stroke (Father)    Social History:  Pt lives at home with family, mostly bedbound, denies any tobacco, alcohol, drug use. (2024 15:51)   [x] Denies ETOH use, illicit drug use and smoking    Allergies    No Known Allergies    Intolerances    MEDICATIONS  (STANDING):  acetaminophen     Tablet .. 1000 milliGRAM(s) Oral every 8 hours  amLODIPine   Tablet 2.5 milliGRAM(s) Oral daily  aspirin  chewable 81 milliGRAM(s) Oral daily  atorvastatin 80 milliGRAM(s) Oral at bedtime  calcitriol   Capsule 0.25 MICROGram(s) Oral daily  cefTRIAXone   IVPB 1000 milliGRAM(s) IV Intermittent every 24 hours  cefTRIAXone   IVPB      ferrous    sulfate 325 milliGRAM(s) Oral daily  folic acid 1 milliGRAM(s) Oral daily  gabapentin 100 milliGRAM(s) Oral two times a day  heparin   Injectable 5000 Unit(s) SubCutaneous every 8 hours  hydroxychloroquine 200 milliGRAM(s) Oral daily  insulin lispro (ADMELOG) corrective regimen sliding scale   SubCutaneous at bedtime  insulin lispro (ADMELOG) corrective regimen sliding scale   SubCutaneous three times a day before meals  lidocaine   4% Patch 1 Patch Transdermal daily  lisinopril 2.5 milliGRAM(s) Oral daily  melatonin 3 milliGRAM(s) Oral at bedtime  memantine 5 milliGRAM(s) Oral at bedtime  pantoprazole    Tablet 40 milliGRAM(s) Oral before breakfast  risperiDONE   Tablet 0.25 milliGRAM(s) Oral at bedtime  sodium bicarbonate 650 milliGRAM(s) Oral two times a day  traMADol 50 milliGRAM(s) Oral every 8 hours    MEDICATIONS  (PRN):  acetaminophen     Tablet .. 650 milliGRAM(s) Oral every 6 hours PRN Temp greater or equal to 38C (100.4F), Mild Pain (1 - 3)  ondansetron Injectable 4 milliGRAM(s) IV Push every 8 hours PRN Nausea and/or Vomiting    Vital Signs Last 24 Hrs  T(C): 36.9 (2024 05:28), Max: 37.4 (2024 20:40)  T(F): 98.5 (2024 05:28), Max: 99.4 (2024 20:40)  HR: 61 (2024 05:28) (61 - 98)  BP: 174/84 (2024 06:59) (174/84 - 184/94)  BP(mean): 104 (2024 20:40) (104 - 124)  RR: 16 (2024 05:28) (16 - 18)  SpO2: 97% (2024 06:59) (97% - 99%)    Parameters below as of 2024 06:59  Patient On (Oxygen Delivery Method): room air    LABS: Reviewed                          7.9    4.48  )-----------( 194      ( 2024 14:55 )             25.4     04-21    145  |  117<H>  |  32<H>  ----------------------------<  81  4.8   |  23  |  2.35<H>    Ca    9.4      2024 14:55  Phos  4.0     04-  Mg     2.2     -    TPro  7.0  /  Alb  2.5<L>  /  TBili  0.4  /  DBili  x   /  AST  18  /  ALT  22  /  AlkPhos  76  04-21    LIVER FUNCTIONS - ( 2024 14:55 )  Alb: 2.5 g/dL / Pro: 7.0 g/dL / ALK PHOS: 76 U/L / ALT: 22 U/L DA / AST: 18 U/L / GGT: x           Urinalysis Basic - ( 2024 15:45 )    Color: Yellow / Appearance: Slightly Cloudy / S.011 / pH: x  Gluc: x / Ketone: Negative mg/dL  / Bili: Negative / Urobili: 0.2 mg/dL   Blood: x / Protein: Trace mg/dL / Nitrite: Negative   Leuk Esterase: Large / RBC: 3 /HPF / WBC >40 /HPF   Sq Epi: x / Non Sq Epi: x / Bacteria: Moderate /HPF    CAPILLARY BLOOD GLUCOSE    POCT Blood Glucose.: 76 mg/dL (2024 08:19)  POCT Blood Glucose.: 84 mg/dL (2024 21:46)    Radiology: Reviewed.   < from: Xray Foot AP + Lateral + Oblique, Left (24 @ 14:09) >    ACC: [id number] EXAM:  XR FOOT COMP MIN 3 VIEWS LT   ORDERED BY: KARSTEN WHEELER     PROCEDURE DATE:  2024      INTERPRETATION:  Left foot. 3 views. Patient has an ulcer around the   calcaneus.    There are posterior and inferior calcanealspurs. Arterial calcifications   are noted.    There is a soft tissue ulcer posterior to the upper posterior calcaneus   and this is more evident than on 2022. No bone destruction   or fracture.    IMPRESSION: There is an ulcer posteriorto the upper posterior calcaneus   increased from prior. Bones appear intact.    --- End of Report ---    LUIS PIAZRRO MD; Attending Radiologist  This document has been electronically signed. 2024  3:55PM    < end of copied text >    ORT Score -   Family Hx of substance abuse	Female	      Male  Alcohol 	                                           1                     3  Illegal drugs	                                   2                     3  Rx drugs                                           4 	                  4  Personal Hx of substance abuse		  Alcohol 	                                          3	                  3  Illegal drugs                                     4	                  4  Rx drugs                                            5 	                  5  Age between 16- 45 years	           1                     1  hx preadolescent sexual abuse	   3 	                  0  Psychological disease		  ADD, OCD, bipolar, schizophrenia   2	          2  Depression                                           1 	          1  Total: 0    a score of 3 or lower indicates low risk for opioid abuse		  a score of 4-7 indicates moderate risk for opioid abuse		  a score of 8 or higher indicates high risk for opioid abuse  	  REVIEW OF SYSTEMS:  CONSTITUTIONAL: No fever or fatigue  HEENT:  No difficulty hearing, no change in vision  NECK: No pain or stiffness  RESPIRATORY: No cough, wheezing, chills or hemoptysis; No shortness of breath  CARDIOVASCULAR: No chest pain, palpitations, dizziness, or leg swelling  GASTROINTESTINAL: No loss of appetite, decreased PO intake. No abdominal or epigastric pain. No nausea, vomiting; No diarrhea or constipation.   GENITOURINARY: Hx of bladder incontienence  MUSCULOSKELETAL: + hx of chronic low back pain; L heel pain, chronic upper or lower motor strength weakness, no saddle anesthesia, hx of bladder incontinence, no falls   NEURO: No headaches, No numbness/tingling b/l LE  ENDOCRINE: No polyuria, polydipsia, heat or cold intolerance; No hair loss  PSYCHIATRIC: No depression, anxiety or difficulty sleeping    PHYSICAL EXAM:  GENERAL:  Alert & Oriented X 1-2, able to follow simple commands, slow speech  RESPIRATORY: Respirations even and unlabored. Clear to auscultation bilaterall  CARDIOVASCULAR: Normal S1/S2, regular rate and rhythm  GASTROINTESTINAL:  Soft, Nontender, Nondistended; Bowel sounds present  PERIPHERAL VASCULAR:  Extremities warm without edema. 2+ Peripheral Pulses, No cyanosis, No calf tenderness  MUSCULOSKELETAL: Motor Strength 3+/5 B/L upper and lower extremities; moves all extremities equally against gravity; + left foot pain on palpation  SKIN: Warm, dry, L heel pressure injury covered with Allevyn dressing C/D/I    Risk factors associated with adverse outcomes related to opioid treatment  [ ] Concurrent benzodiazepine use  [ ] History/ Active substance use or alcohol use disorder  [ ] Psychiatric co-morbidity  [ ] Sleep apnea  [ ] COPD  [ ] BMI> 35  [ ] Liver dysfunction  [x Renal dysfunction  [ ] CHF  [ ] Smoker  [x Age > 60 years    [x] [place]  Reviewed and Copied to Chart. See below.    Plan of care and goal oriented pain management treatment options were discussed with patient and /or primary care giver; all questions and concerns were addressed and care was aligned with patient's wishes.    Educated patient on goal oriented pain management treatment options

## 2024-04-23 NOTE — PROGRESS NOTE ADULT - NS ATTEND AMEND GEN_ALL_CORE FT
84yF with extensive PMH who presented from home with L-heel ulcer in setting of type 2 DM, admitted for UTI and further workup of wounds. Patient seen at bedside. Appears confused but seems improved compared to yesterday and also complaint with treatment plan    #L-heel ulcer  #Acute UTI  #Sacral ulcer  #Hx CVA  #Hx SAH   #Type 2 DM  #RA  #MM   #Urinary incontinence   #Dementia  #CKD, likely diabetic nephropathy  #HTN  #HLD   #DVT ppx     -CT head for acute encephalopathy - showing new meningioma  -Podiatry following   -Notified LÓPEZ Betancourt about patient being admitted; hold MM treatment   -XR left foot and ESAU/PVR  - ulcer seen   - podiatry following  -Troponin elevated but normalized with no chest pain, likley demand ischemia   -Wound care consulted for sacral decubitus ulcer - healed well, evaluated by wound care  -UA+, patient symptomatic  -Continue ceftriaxone, UCx pending - E.coli, pending sensitivities  -Gentle hydration  -Continue home meds for chronic conditions. 84yF with extensive PMH who presented from home with L-heel ulcer in setting of type 2 DM, admitted for UTI and further workup of wounds. Patient seen at bedside. Appears confused but seems improved compared to yesterday and also complaint with treatment plan    #L-heel ulcer  #Acute UTI  #Sacral ulcer  #Hx CVA  #Hx SAH   #Type 2 DM  #RA  #MM   #Urinary incontinence   #Dementia  #CKD, likely diabetic nephropathy  #HTN  #HLD   #DVT ppx     -CT head for acute encephalopathy - showing new meningioma  -Podiatry following   -Notified LÓPEZ Betancourt about patient being admitted; hold MM treatment   -XR left foot and ESAU/PVR  - ulcer seen  -podiatry following  -Troponin elevated but normalized with no chest pain, likely demand ischemia   -Wound care consulted for sacral decubitus ulcer - healed well, evaluated by wound care  -UA+, patient symptomatic  -Continue ceftriaxone, UCx pending - E.coli, pending sensitivities  -Gentle hydration  -Continue home meds for chronic conditions.

## 2024-04-23 NOTE — PATIENT PROFILE ADULT - FALL HARM RISK - HARM RISK INTERVENTIONS
Assistance with ambulation/Assistance OOB with selected safe patient handling equipment/Communicate Risk of Fall with Harm to all staff/Discuss with provider need for PT consult/Monitor gait and stability/Reinforce activity limits and safety measures with patient and family/Tailored Fall Risk Interventions/Visual Cue: Yellow wristband and red socks/Bed in lowest position, wheels locked, appropriate side rails in place/Call bell, personal items and telephone in reach/Instruct patient to call for assistance before getting out of bed or chair/Non-slip footwear when patient is out of bed/[place] to call system/Physically safe environment - no spills, clutter or unnecessary equipment/Purposeful Proactive Rounding/Room/bathroom lighting operational, light cord in reach

## 2024-04-23 NOTE — PROGRESS NOTE ADULT - SUBJECTIVE AND OBJECTIVE BOX
Interval: Patient was seen resting bedside without CAIR boots. Patient denies any other pedal complaints at this time.     Chief Complaint Quote:	L foot diabetic ulcer x 1 week, low back pain x 2 years  Chief Complaint: Wound check, low back pain    Podiatry HPI: All pertinent history was given from patients daughter. Daughter states that her mother has had this foot wound for about two weeks. She noticed it when they were transferring her mother from the wheelchair that she was no longer able to put any weight on that foot. Yesterday, the daughter noticed swelling in the foot and leg with increased pain. Daughter stated that her mother was hospitalized around 2 weeks ago for a UTI and they had nurses coming into the home once a week to check on her. During this time, they found the wound and was applying an allevyn pad to help offload the area. Daughter stated that her mother kept a pillow under her feet at all times while laying in bed. Patient denied any constitutional symptoms and other pedal complaints.     PMH:No pertinent past medical history  Rheumatoid arthritis  Varicose veins of lower extremity  Hypertension  Multiple myeloma  HLD (hyperlipidemia)  Cerebrovascular accident (CVA)      Allergies: No Known Allergies    Medications acetaminophen     Tablet .. 1000 milliGRAM(s) Oral every 8 hours  acetaminophen     Tablet .. 650 milliGRAM(s) Oral every 6 hours PRN  amLODIPine   Tablet 2.5 milliGRAM(s) Oral daily  aspirin  chewable 81 milliGRAM(s) Oral daily  atorvastatin 80 milliGRAM(s) Oral at bedtime  calcitriol   Capsule 0.25 MICROGram(s) Oral daily  cefTRIAXone   IVPB      cefTRIAXone   IVPB 1000 milliGRAM(s) IV Intermittent every 24 hours  ferrous    sulfate 325 milliGRAM(s) Oral daily  folic acid 1 milliGRAM(s) Oral daily  gabapentin 100 milliGRAM(s) Oral two times a day  heparin   Injectable 5000 Unit(s) SubCutaneous every 8 hours  hydroxychloroquine 200 milliGRAM(s) Oral daily  insulin lispro (ADMELOG) corrective regimen sliding scale   SubCutaneous three times a day before meals  insulin lispro (ADMELOG) corrective regimen sliding scale   SubCutaneous at bedtime  lidocaine   4% Patch 1 Patch Transdermal daily  lisinopril 2.5 milliGRAM(s) Oral daily  melatonin 3 milliGRAM(s) Oral at bedtime  memantine 5 milliGRAM(s) Oral at bedtime  ondansetron Injectable 4 milliGRAM(s) IV Push every 8 hours PRN  pantoprazole    Tablet 40 milliGRAM(s) Oral before breakfast  risperiDONE   Tablet 0.25 milliGRAM(s) Oral at bedtime  sodium bicarbonate 650 milliGRAM(s) Oral two times a day  traMADol 50 milliGRAM(s) Oral every 8 hours    FHFHx: stroke (Father)    ,   PMHNo pertinent past medical history    Rheumatoid arthritis    Varicose veins of lower extremity    Hypertension    Multiple myeloma    HLD (hyperlipidemia)    Cerebrovascular accident (CVA)       PSHNo significant past surgical history        Labs                          7.9    4.48  )-----------( 194      ( 21 Apr 2024 14:55 )             25.4      04-21    145  |  117<H>  |  32<H>  ----------------------------<  81  4.8   |  23  |  2.35<H>    Ca    9.4      21 Apr 2024 14:55  Phos  4.0     04-22  Mg     2.2     04-22    TPro  7.0  /  Alb  2.5<L>  /  TBili  0.4  /  DBili  x   /  AST  18  /  ALT  22  /  AlkPhos  76  04-21     Vital Signs Last 24 Hrs  T(C): 36.9 (23 Apr 2024 05:28), Max: 37.4 (22 Apr 2024 20:40)  T(F): 98.5 (23 Apr 2024 05:28), Max: 99.4 (22 Apr 2024 20:40)  HR: 61 (23 Apr 2024 05:28) (61 - 98)  BP: 174/84 (23 Apr 2024 06:59) (174/84 - 184/94)  BP(mean): 104 (22 Apr 2024 20:40) (104 - 124)  RR: 16 (23 Apr 2024 05:28) (16 - 18)  SpO2: 97% (23 Apr 2024 06:59) (97% - 99%)    Parameters below as of 23 Apr 2024 06:59  Patient On (Oxygen Delivery Method): room air      Sedimentation Rate, Erythrocyte: 92 mm/Hr (04-21-24 @ 14:55)         C-Reactive Protein, Serum: 28 mg/L (04-21-24 @ 14:55)       Physical Exam:  Vasc: DP and PT pulses palpable 1/4 b/l. TG warm to warm with no increase in temperature to left foot around wound. Pedal hair present. Edema noted to b/l feet and legs  Derm: Open lesion noted on left/right foot measuring 2.5x3cm. Mild Periwound erythema present. No drainage noted. Hyperkeratotic rim noted. No Macerated edges noted. Wound base is noted to be fibrogranular with no tracking noted, though patient was in pain while probing. No Fluctuance/crepitus/streaking noted. No Malodor noted.  Neuro: Gross sensation diminshed  Msk: Deferred, POP to L heel wound and leg pain with squeeze test    IMAGING:    < from: US Duplex Arterial Lower Ext Ltd, Left (04.21.24 @ 14:10) >    INTERPRETATION:  US LOWER EXTREMITY ARTERIAL DUPLEX LIMITED LEFT    CLINICAL INFORMATION: Peripheral artery disease with pain with left calf   squeeze    COMPARISON: None    Real-time sonography of the left lower extremity arterial system was   performed using a high-resolution linear array transducer and including   color and spectral Doppler.    There is no measurable plaque in the lower extremity arteries. No soft   tissue abnormalities are demonstrated in the left lower extremity. Flow   phase patterns and peak systolic velocity measurements (in cm/s) were   observed as follows:    LEFT:  CFA: Triphasic; 76  Proximal SFA: Biphasic; 112  Mid SFA: Biphasic; 92  Distal SFA: Biphasic;  95  Popliteal: Biphasic;  85  Anterior tibial: Biphasic; 45  Posterior tibial: Biphasic; 47  Peroneal: Monophasic;  48  Dorsalis pedis: Not seen;    IMPRESSION:  *  No significant plaque or stenosis in the left leg    --- End of Report ---      < end of copied text >  < from: Xray Foot AP + Lateral + Oblique, Left (04.21.24 @ 14:09) >  [facility]     PROCEDURE DATE:  04/21/2024          INTERPRETATION:  Left foot. 3 views. Patient has an ulcer around the   calcaneus.    There are posterior and inferior calcanealspurs. Arterial calcifications   are noted.    There is a soft tissue ulcer posterior to the upper posterior calcaneus   and this is more evident than on September 6, 2022. No bone destruction   or fracture.    IMPRESSION: There is an ulcer posteriorto the upper posterior calcaneus   increased from prior. Bones appear intact.    --- End of Report ---      < end of copied text >      CULTURES: pending

## 2024-04-23 NOTE — PROGRESS NOTE ADULT - PROBLEM SELECTOR PLAN 7
c/w home meds Pomalyst   f/u Heme onc recs home meds Pomalyst   will hold while inpatient  f/u Heme onc recs

## 2024-04-23 NOTE — PROGRESS NOTE ADULT - PROBLEM SELECTOR PLAN 3
p/w  frequency  UA positive  f/u Urine culture  continue rocephin   last admission UCx grew candida, completed 5 days course of diflucan

## 2024-04-23 NOTE — PROGRESS NOTE ADULT - PROBLEM SELECTOR PLAN 2
Pw b/l heel ulcer  podiatry following   Xrays see as above  ESAU/PVR unremarkable  f/u Venous duplex--->patient refused  f/u wound culture

## 2024-04-23 NOTE — CONSULT NOTE ADULT - ASSESSMENT
Search Terms: Angelika James, 1940Search Date: 04/23/2024 08:44:16 AM  The Drug Utilization Report below displays all of the controlled substance prescriptions, if any, that your patient has filled in the last twelve months. The information displayed on this report is compiled from pharmacy submissions to the Department, and accurately reflects the information as submitted by the pharmacies.    This report was requested by: Lizzie Mixon | Reference #: [id number]    There are no results for the search terms that you entered.         Search Terms: Angelika James, 1940Search Date: 04/23/2024 08:44:16 AM  The Drug Utilization Report below displays all of the controlled substance prescriptions, if any, that your patient has filled in the last twelve months. The information displayed on this report is compiled from pharmacy submissions to the Department, and accurately reflects the information as submitted by the pharmacies.    This report was requested by: Lizzie Mixon | Reference #: [id number]    There are no results for the search terms that you entered.

## 2024-04-24 NOTE — PROGRESS NOTE ADULT - PROBLEM SELECTOR PLAN 2
Pw b/l heel ulcer  podiatry following   Xray-->an ulcer posterior to the upper posterior calcaneus increased from prior. Bones appear intact.  ESAU/PVR unremarkable  f/u Venous duplex--->patient refused  wound culture grew Numerous Staphylococcus aureus

## 2024-04-24 NOTE — PHYSICAL THERAPY INITIAL EVALUATION ADULT - MANUAL MUSCLE TESTING RESULTS, REHAB EVAL
B UE >3/5 in all motions. L LE> 1/5 in all motions. R LE >3/5 in all motions/grossly assessed due to

## 2024-04-24 NOTE — PHYSICAL THERAPY INITIAL EVALUATION ADULT - PERTINENT HX OF CURRENT PROBLEM, REHAB EVAL
Pt admitted for Diabetic foot ulcer on 4/21/24. X ray confirms ulcer on posterior calcaneus worsening but the bone is still intact. US revealed no significant plaque or stenosis in L LE.

## 2024-04-24 NOTE — PHYSICAL THERAPY INITIAL EVALUATION ADULT - RANGE OF MOTION EXAMINATION, REHAB EVAL
L LE ~1/5 of full range In all motion. R LE ~3/5 of full range in all motions./bilateral upper extremity ROM was WFL (within functional limits)/deficits as listed below

## 2024-04-24 NOTE — PROGRESS NOTE ADULT - ASSESSMENT
A:  L pressure heel wound     P:  Patient evaluated and Chart reviewed  Discussed diagnosis and treatment with patient  Xrays reviewed, see above notes   ESAU/PVR was ordered, but cancelled due to patient unable to tolerate because of pain  Duplex reviewed see above notes  Cx reviewed, staph aureus   Left foot was dressed with santyl allevyn pad  Patient stable from podiatry standpoint, no acute intervention at this time.   Discussed offloading b/l heels while laying in bed. Patient to have pillow under heels at all times. Strict CAIR boots   Podiatry to follow while in house    Seen, discussed and reviewed with Dr. Espinosa  Discussed with Attending Dr. Arriaga   WCO: santyl, allevyn pad to the left heel   Upon discharge patient to follow up at [address] [place] [address]

## 2024-04-24 NOTE — PHYSICAL THERAPY INITIAL EVALUATION ADULT - ADDITIONAL COMMENTS
Pt is a poor historian. As per daughter and chart, pt has recently been bed bound since previous hospitalization a month ago. Pt would be transferred and wheeled on wheel chair if needed to mobilize. Daughter states that pt has had a rapid decline in cognitive status as well. Daughter states that pt has a HHA 7 days/week for 12 hours/day. When HHA is not there, daughter is primary care taker of pt.

## 2024-04-24 NOTE — PROGRESS NOTE ADULT - NS ATTEND AMEND GEN_ALL_CORE FT
84yF with extensive PMH who presented from home with L-heel ulcer in setting of type 2 DM, admitted for UTI and further workup of wounds. Patient seen at bedside. 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    - Family in agreement with current medical plain and pain management recs and plan for possible DC back home tomorrow with HHA.  -CT head for acute encephalopathy - showing new meningioma - discussed with daughter who will follow up with outpatient provider  -Podiatry following   -Notified LÓPEZ Betancourt about patient being admitted; hold MM treatment   -XR left foot and ESAU/PVR  - ulcer seen  -podiatry following  -Troponin elevated but normalized with no chest pain, likely demand ischemia   -Wound care consulted for sacral decubitus ulcer - healed well, evaluated by wound care  -UA+, patient symptomatic  -Continue ceftriaxone, UCx pending - E.coli, pending sensitivities  -Gentle hydration  -Continue home meds for chronic conditions.

## 2024-04-24 NOTE — PROGRESS NOTE ADULT - PROBLEM SELECTOR PLAN 4
Pt w/ SCr  2.3 on admission  Baseline SCr - around 2.3  follow BMP daily Pt w/ SCr  2.3 on admission-IMPROVING  Baseline SCr - around 2.3  SCr today (4/24) 1.96  Avoid nephrotoxins

## 2024-04-24 NOTE — PROGRESS NOTE ADULT - PROBLEM SELECTOR PLAN 5
h/o HTN   Monitor BP  lisnopril continued  amlodipine added overnight due to elevated BP  BP improved  BP target <130/90 mmHg  DASH/TLC diet  Adjust medications as needed to meet target. lisnopril continued  amlodipine added overnight due to elevated BP  BP improved  DASH/TLC diet  Adjust medications as needed

## 2024-04-24 NOTE — PHYSICAL THERAPY INITIAL EVALUATION ADULT - IMPAIRMENTS FOUND, PT EVAL
arousal, attention, and cognition/gait, locomotion, and balance/gross motor/integumentary integrity/joint integrity and mobility/muscle strength

## 2024-04-24 NOTE — PROGRESS NOTE ADULT - PROBLEM SELECTOR PLAN 3
p/w  frequency  UA positive  f/u Urine culture  continue rocephin   last admission UCx grew candida, completed 5 days course of diflucan Currently afebrile with no leukocytosis, denies urinary symptoms  p/w  frequency  UA positive  Urine culture grew E. Coli  Continue Rocephin   last admission UCx grew candida, completed 5 days course of diflucan

## 2024-04-24 NOTE — PHYSICAL THERAPY INITIAL EVALUATION ADULT - SKIN INTEGRITY
L Heel wound dressed. Dressing CDI. Unable to assess sacral wound secondary to pt resisting bed mobility secondary to pain.

## 2024-04-24 NOTE — PHYSICAL THERAPY INITIAL EVALUATION ADULT - LEVEL OF INDEPENDENCE, REHAB EVAL
Pt deferred to participate in bed mobility secondary to pain. Will assess in future sessions./unable to perform

## 2024-04-24 NOTE — PHYSICAL THERAPY INITIAL EVALUATION ADULT - PASSIVE RANGE OF MOTION EXAMINATION, REHAB EVAL
L LE ~1/5 of full range In all motion. R LE ~3/5 of full range in all motions./bilateral upper extremity Passive ROM was WFL (within functional limits)/deficits as listed below

## 2024-04-24 NOTE — PROGRESS NOTE ADULT - SUBJECTIVE AND OBJECTIVE BOX
Source of information: ANGELO STRATTON, Chart review  Patient language: English  : n/a    HPI:  84 y.o. F from home, with PMHx of HTN, HLD, CKD, CVA, SAH, Rheumatoid Arthritis, Multiple Myeloma, and Bladder incontinence, presents to the ED with worsening heel ulcer and unable to bear weight. Daughter at the bedside states that her mother has had this foot wound for about two weeks. She noticed it when they were transferring her mother from the wheelchair that she was no longer able to put any weight on that foot. Yesterday, the daughter noticed swelling in the foot and leg with increased pain. Stated that her mother was hospitalized around 2 weeks ago for a UTI and they had nurses coming into the home once a week to check on her. During this time, they found the wound and was applying an allevyn pad to help offload the area. Daughter stated that her mother kept a pillow under her feet at all times while laying in bed. Otherwise pt denies any chest pain, palpitations, shortness of breath, fever, chills, headache, visual changes, nausea, vomiting, diarrhea, dysuria, frequency.    (21 Apr 2024 15:51)    Pt is admitted for left heel pressure injury and UTI, on ceftriaxone, podiatry following. Pain service consulted on 4/23 for management of left foot pain and chronic low back pain. Pt seen and examined at bedside, this morning. At time of assessment, patient laying in bed, A&Ox3 (more alert and oriented today) reports pain to left heel and low back, 10/10 SCALE USED: (1-10 VNRS). Pt describes pain as localized to left heel, which is exacerbated by movement and touch, she also endorses chronic low back pain which is exacerbated by repositioning. The pain is alleviated with current regimen and is exacerbated by movement. Pt tolerating modified PO diet. Denies lethargy, chest pain, SOB, nausea, vomiting, constipation. Unable to recall date of last BM. Patient unable to identify current pain goals, patient is wheelchair bound.    PAST MEDICAL & SURGICAL HISTORY:  Rheumatoid arthritis    Varicose veins of lower extremity    Hypertension    Multiple myeloma    HLD (hyperlipidemia)    Cerebrovascular accident (CVA)  remote hx, no residual deficits    No significant past surgical history    FAMILY HISTORY:  FHx: stroke (Father)    Social History:  Pt lives at home with family, mostly bedbound, denies any tobacco, alcohol, drug use. (21 Apr 2024 15:51)   [x] Denies ETOH use, illicit drug use and smoking    Allergies    No Known Allergies    Intolerances            Radiology: Reviewed.   < from: Xray Foot AP + Lateral + Oblique, Left (04.21.24 @ 14:09) >    ACC: [id number] EXAM:  XR FOOT COMP MIN 3 VIEWS LT   ORDERED BY: KARSTEN WHEELER     PROCEDURE DATE:  04/21/2024      INTERPRETATION:  Left foot. 3 views. Patient has an ulcer around the   calcaneus.    There are posterior and inferior calcanealspurs. Arterial calcifications   are noted.    There is a soft tissue ulcer posterior to the upper posterior calcaneus   and this is more evident than on September 6, 2022. No bone destruction   or fracture.    IMPRESSION: There is an ulcer posteriorto the upper posterior calcaneus   increased from prior. Bones appear intact.    --- End of Report ---    LUIS PIZARRO MD; Attending Radiologist  This document has been electronically signed. Apr 21 2024  3:55PM    < end of copied text >    ORT Score -   Family Hx of substance abuse	Female	      Male  Alcohol 	                                           1                     3  Illegal drugs	                                   2                     3  Rx drugs                                           4 	                  4  Personal Hx of substance abuse		  Alcohol 	                                          3	                  3  Illegal drugs                                     4	                  4  Rx drugs                                            5 	                  5  Age between 16- 45 years	           1                     1  hx preadolescent sexual abuse	   3 	                  0  Psychological disease		  ADD, OCD, bipolar, schizophrenia   2	          2  Depression                                           1 	          1  Total: 0    a score of 3 or lower indicates low risk for opioid abuse		  a score of 4-7 indicates moderate risk for opioid abuse		  a score of 8 or higher indicates high risk for opioid abuse  	  REVIEW OF SYSTEMS:  CONSTITUTIONAL: No fever or fatigue  HEENT:  No difficulty hearing, no change in vision  NECK: No pain or stiffness  RESPIRATORY: No cough, wheezing, chills or hemoptysis; No shortness of breath  CARDIOVASCULAR: No chest pain, palpitations, dizziness, or leg swelling  GASTROINTESTINAL: No loss of appetite, decreased PO intake. No abdominal or epigastric pain. No nausea, vomiting; No diarrhea or constipation.   GENITOURINARY: Hx of bladder incontinence  MUSCULOSKELETAL: + hx of chronic low back pain; L heel pain, chronic upper or lower motor strength weakness, no saddle anesthesia, hx of bladder incontinence, no falls   NEURO: No headaches, No numbness/tingling b/l LE  ENDOCRINE: No polyuria, polydipsia, heat or cold intolerance; No hair loss  PSYCHIATRIC: No depression, anxiety or difficulty sleeping    PHYSICAL EXAM:  GENERAL:  Alert & Oriented X 2-3, able to follow simple commands, slow speech  RESPIRATORY: Respirations even and unlabored. Clear to auscultation bilaterally  CARDIOVASCULAR: Normal S1/S2, regular rate and rhythm  GASTROINTESTINAL:  Soft, Nontender, Nondistended; Bowel sounds present  PERIPHERAL VASCULAR:  Extremities warm without edema. 2+ Peripheral Pulses, No cyanosis, No calf tenderness  MUSCULOSKELETAL: Motor Strength 3+/5 B/L upper and lower extremities; moves all extremities equally against gravity; + left foot pain on palpation  SKIN: Warm, dry, L heel pressure injury covered with Allevyn dressing C/D/I    Risk factors associated with adverse outcomes related to opioid treatment  [ ] Concurrent benzodiazepine use  [ ] History/ Active substance use or alcohol use disorder  [ ] Psychiatric co-morbidity  [ ] Sleep apnea  [ ] COPD  [ ] BMI> 35  [ ] Liver dysfunction  [x Renal dysfunction  [ ] CHF  [ ] Smoker  [x Age > 60 years    [x] [place]  Reviewed and Copied to Chart. See below.    Plan of care and goal oriented pain management treatment options were discussed with patient and /or primary care giver; all questions and concerns were addressed and care was aligned with patient's wishes.    Educated patient on goal oriented pain management treatment options      Source of information: ANGELO STRATTON, Chart review  Patient language: English  : n/a    HPI:  84 y.o. F from home, with PMHx of HTN, HLD, CKD, CVA, SAH, Rheumatoid Arthritis, Multiple Myeloma, and Bladder incontinence, presents to the ED with worsening heel ulcer and unable to bear weight. Daughter at the bedside states that her mother has had this foot wound for about two weeks. She noticed it when they were transferring her mother from the wheelchair that she was no longer able to put any weight on that foot. Yesterday, the daughter noticed swelling in the foot and leg with increased pain. Stated that her mother was hospitalized around 2 weeks ago for a UTI and they had nurses coming into the home once a week to check on her. During this time, they found the wound and was applying an allevyn pad to help offload the area. Daughter stated that her mother kept a pillow under her feet at all times while laying in bed. Otherwise pt denies any chest pain, palpitations, shortness of breath, fever, chills, headache, visual changes, nausea, vomiting, diarrhea, dysuria, frequency.    (21 Apr 2024 15:51)    Pt is admitted for left heel pressure injury and UTI, on ceftriaxone, podiatry following. Pain service consulted on 4/23 for management of left foot pain and chronic low back pain. Pt seen and examined at bedside, this morning. At time of assessment, patient laying in bed, A&Ox3 (more alert and oriented today) reports pain to left heel and low back, 10/10 SCALE USED: (1-10 VNRS). Pt describes pain as localized to left heel, which is exacerbated by movement and touch, she also endorses chronic low back pain which is exacerbated by repositioning. The pain is alleviated with current regimen and is exacerbated by movement. Pt tolerating modified PO diet. Denies lethargy, chest pain, SOB, nausea, vomiting, constipation. Unable to recall date of last BM. Patient unable to identify current pain goals, patient is wheelchair bound.    PAST MEDICAL & SURGICAL HISTORY:  Rheumatoid arthritis    Varicose veins of lower extremity    Hypertension    Multiple myeloma    HLD (hyperlipidemia)    Cerebrovascular accident (CVA)  remote hx, no residual deficits    No significant past surgical history    FAMILY HISTORY:  FHx: stroke (Father)    Social History:  Pt lives at home with family, mostly bedbound, denies any tobacco, alcohol, drug use. (21 Apr 2024 15:51)   [x] Denies ETOH use, illicit drug use and smoking    Allergies    No Known Allergies    Intolerances      MEDICATIONS  (STANDING):  acetaminophen     Tablet .. 1000 milliGRAM(s) Oral every 8 hours  amLODIPine   Tablet 2.5 milliGRAM(s) Oral daily  aspirin  chewable 81 milliGRAM(s) Oral daily  atorvastatin 80 milliGRAM(s) Oral at bedtime  calcitriol   Capsule 0.25 MICROGram(s) Oral daily  cefTRIAXone   IVPB      cefTRIAXone   IVPB 1000 milliGRAM(s) IV Intermittent every 24 hours  ferrous    sulfate 325 milliGRAM(s) Oral daily  folic acid 1 milliGRAM(s) Oral daily  gabapentin 100 milliGRAM(s) Oral two times a day  heparin   Injectable 5000 Unit(s) SubCutaneous every 8 hours  hydroxychloroquine 200 milliGRAM(s) Oral daily  insulin lispro (ADMELOG) corrective regimen sliding scale   SubCutaneous three times a day before meals  insulin lispro (ADMELOG) corrective regimen sliding scale   SubCutaneous at bedtime  lidocaine   4% Patch 1 Patch Transdermal daily  lisinopril 2.5 milliGRAM(s) Oral daily  melatonin 3 milliGRAM(s) Oral at bedtime  memantine 5 milliGRAM(s) Oral at bedtime  pantoprazole    Tablet 40 milliGRAM(s) Oral before breakfast  risperiDONE   Tablet 0.25 milliGRAM(s) Oral at bedtime  sodium bicarbonate 650 milliGRAM(s) Oral two times a day  traMADol 50 milliGRAM(s) Oral every 8 hours    MEDICATIONS  (PRN):  acetaminophen     Tablet .. 650 milliGRAM(s) Oral every 6 hours PRN Temp greater or equal to 38C (100.4F), Mild Pain (1 - 3)  ondansetron Injectable 4 milliGRAM(s) IV Push every 8 hours PRN Nausea and/or Vomiting    Vital Signs Last 24 Hrs  T(C): 36.3 (24 Apr 2024 05:20), Max: 36.6 (23 Apr 2024 20:20)  T(F): 97.4 (24 Apr 2024 05:20), Max: 97.9 (23 Apr 2024 20:20)  HR: 78 (24 Apr 2024 09:45) (67 - 78)  BP: 155/89 (24 Apr 2024 09:45) (123/70 - 175/93)  BP(mean): --  RR: 16 (24 Apr 2024 05:20) (16 - 18)  SpO2: 100% (24 Apr 2024 09:45) (98% - 100%)    Parameters below as of 24 Apr 2024 09:45  Patient On (Oxygen Delivery Method): room air    LABS: Reviewed                          8.8    3.76  )-----------( 204      ( 24 Apr 2024 07:25 )             27.9     04-24    140  |  112<H>  |  25<H>  ----------------------------<  85  4.4   |  22  |  1.96<H>    Ca    8.7      24 Apr 2024 07:25  Phos  4.4     04-24  Mg     2.3     04-24      Urinalysis Basic - ( 24 Apr 2024 07:25 )    Color: x / Appearance: x / SG: x / pH: x  Gluc: 85 mg/dL / Ketone: x  / Bili: x / Urobili: x   Blood: x / Protein: x / Nitrite: x   Leuk Esterase: x / RBC: x / WBC x   Sq Epi: x / Non Sq Epi: x / Bacteria: x    CAPILLARY BLOOD GLUCOSE    POCT Blood Glucose.: 85 mg/dL (24 Apr 2024 08:04)  POCT Blood Glucose.: 96 mg/dL (23 Apr 2024 21:37)  POCT Blood Glucose.: 91 mg/dL (23 Apr 2024 16:41)  POCT Blood Glucose.: 108 mg/dL (23 Apr 2024 12:10)  POCT Blood Glucose.: 66 mg/dL (23 Apr 2024 11:43)      Radiology: Reviewed.   < from: Xray Foot AP + Lateral + Oblique, Left (04.21.24 @ 14:09) >    ACC: [id number] EXAM:  XR FOOT COMP MIN 3 VIEWS LT   ORDERED BY: KARSTEN WHEELER     PROCEDURE DATE:  04/21/2024      INTERPRETATION:  Left foot. 3 views. Patient has an ulcer around the   calcaneus.    There are posterior and inferior calcanealspurs. Arterial calcifications   are noted.    There is a soft tissue ulcer posterior to the upper posterior calcaneus   and this is more evident than on September 6, 2022. No bone destruction   or fracture.    IMPRESSION: There is an ulcer posteriorto the upper posterior calcaneus   increased from prior. Bones appear intact.    --- End of Report ---    LUIS PIZARRO MD; Attending Radiologist  This document has been electronically signed. Apr 21 2024  3:55PM    < end of copied text >    ORT Score -   Family Hx of substance abuse	Female	      Male  Alcohol 	                                           1                     3  Illegal drugs	                                   2                     3  Rx drugs                                           4 	                  4  Personal Hx of substance abuse		  Alcohol 	                                          3	                  3  Illegal drugs                                     4	                  4  Rx drugs                                            5 	                  5  Age between 16- 45 years	           1                     1  hx preadolescent sexual abuse	   3 	                  0  Psychological disease		  ADD, OCD, bipolar, schizophrenia   2	          2  Depression                                           1 	          1  Total: 0    a score of 3 or lower indicates low risk for opioid abuse		  a score of 4-7 indicates moderate risk for opioid abuse		  a score of 8 or higher indicates high risk for opioid abuse  	  REVIEW OF SYSTEMS:  CONSTITUTIONAL: No fever or fatigue  HEENT:  No difficulty hearing, no change in vision  NECK: No pain or stiffness  RESPIRATORY: No cough, wheezing, chills or hemoptysis; No shortness of breath  CARDIOVASCULAR: No chest pain, palpitations, dizziness, or leg swelling  GASTROINTESTINAL: No loss of appetite, decreased PO intake. No abdominal or epigastric pain. No nausea, vomiting; No diarrhea or constipation.   GENITOURINARY: Hx of bladder incontinence  MUSCULOSKELETAL: + hx of chronic low back pain; L heel pain, chronic upper or lower motor strength weakness, no saddle anesthesia, hx of bladder incontinence, no falls   NEURO: No headaches, No numbness/tingling b/l LE  ENDOCRINE: No polyuria, polydipsia, heat or cold intolerance; No hair loss  PSYCHIATRIC: No depression, anxiety or difficulty sleeping    PHYSICAL EXAM:  GENERAL:  Alert & Oriented X 2-3, able to follow simple commands, slow speech  RESPIRATORY: Respirations even and unlabored. Clear to auscultation bilaterally  CARDIOVASCULAR: Normal S1/S2, regular rate and rhythm  GASTROINTESTINAL:  Soft, Nontender, Nondistended; Bowel sounds present  PERIPHERAL VASCULAR:  Extremities warm without edema. 2+ Peripheral Pulses, No cyanosis, No calf tenderness  MUSCULOSKELETAL: Motor Strength 3+/5 B/L upper and lower extremities; moves all extremities equally against gravity; + left foot pain on palpation  SKIN: Warm, dry, L heel pressure injury covered with Allevyn dressing C/D/I    Risk factors associated with adverse outcomes related to opioid treatment  [ ] Concurrent benzodiazepine use  [ ] History/ Active substance use or alcohol use disorder  [ ] Psychiatric co-morbidity  [ ] Sleep apnea  [ ] COPD  [ ] BMI> 35  [ ] Liver dysfunction  [x Renal dysfunction  [ ] CHF  [ ] Smoker  [x Age > 60 years    [x] [place]  Reviewed and Copied to Chart. See below.    Plan of care and goal oriented pain management treatment options were discussed with patient and /or primary care giver; all questions and concerns were addressed and care was aligned with patient's wishes.    Educated patient on goal oriented pain management treatment options

## 2024-04-24 NOTE — PROGRESS NOTE ADULT - PROBLEM SELECTOR PLAN 1
Pt with acute left foot pain which is somatic and neuropathic in nature due to Left foot pressure injury, podiatry following. Patient also with complaints of chronic low back pain which is somatic in nature likely due to history of rheumatoid arthritis and decreased functional mobility. High risk medications reviewed. Avoid polypharmacy. Avoid IV opioids. Avoid NSAIDs and benzodiazepines. Non-pharmacological sleep aides initiated. Non-opioid medications and non-pharmacological pain management measures initiated. Patient with UTI on ceftriaxone.  Opioid pain recommendations   - Continue Tramadol 50mg PO q 8 hours x 2 days. Monitor for sedation/ respiratory depression.   Non-opioid pain recommendations   - No NSAIDs due to CKD  - Continue Acetaminophen 1 gram PO q 8 hours x 3 days. Monitor LFTs  - Continue Gabapentin 100 mg po BID (as ordered by primary team.) Patient with CKD Cr. 1.96 (improving)  - Continue Lidoderm 4% patch daily to low back (12 hrs on/12 hrs off)  Bowel Regimen  - Continue Miralax 17G PO daily  - Continue Senna 2 tablets at bedtime for constipation  Mild pain (score 1-3)  - Non-pharmacological pain treatment recommendations  - Warm/ Cool packs PRN   - Repositioning extremity, elevation, imagery, relaxation, distraction.  - Physical therapy OOB if no contraindications   Recommendations discussed with primary team and RN.

## 2024-04-24 NOTE — PROGRESS NOTE ADULT - PROBLEM SELECTOR PLAN 1
possible infection versus dementia  CTH as above  f/u vit B12, folate, TSH  patient refusing medication and labs  Urine Cx grew E. Coli sens to Ceftriaxone possible infection versus dementia-IMPROVED  CTH-->  1.  No significant change, without acute intracranial hemorrhage.  2.  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  3.  Isodense right frontal meningioma, without vasogenic edema.  4.  Senescent cerebral changes.  f/u vit B12, folate, TSH  patient refusing medication and labs  Urine Cx grew E. Coli sens to Ceftriaxone

## 2024-04-24 NOTE — PHYSICAL THERAPY INITIAL EVALUATION ADULT - GENERAL OBSERVATIONS, REHAB EVAL
Consult received, chart reviewed. Patient received supine in bed, NAD, + Primafit, +CAIR boot L LE. Patient agreed to EVALUATION from Physical Therapist.

## 2024-04-24 NOTE — PROGRESS NOTE ADULT - ASSESSMENT
Patient is a 84 y.o. F from home, with PMHx of HTN, HLD, CKD, CVA, SAH, Rheumatoid Arthritis, Multiple Myeloma, and Bladder incontinence. Patient presented to the ED with worsening heel ulcer and unable to bear weight, sacral pain. Admitted to medicine for diabetic foot ulcer and UTI. Podiatry consulted arterial duplex unremarkable. Hemonc consulted for MM Patient is a 84 y.o. F from home, with PMHx of HTN, HLD, CKD, CVA, SAH, Rheumatoid Arthritis, Multiple Myeloma, and Bladder incontinence. Patient presented to the ED with worsening heel ulcer and unable to bear weight, sacral pain. Admitted to medicine for diabetic foot ulcer and UTI. Podiatry consulted arterial duplex unremarkable. UTI treated with Rocephin.  Hemonc consulted for MM.  Pain mngnt consulted, note and reccs appreciated.

## 2024-04-24 NOTE — PROGRESS NOTE ADULT - SUBJECTIVE AND OBJECTIVE BOX
NP Note discussed with  Primary Attending    Patient is a 84y old  Female who presents with a chief complaint of Left Heel Pressure Injury (24 Apr 2024 09:23).  Seen and examined at bedside.  Noted A&O x 2, follows simple commands.      INTERVAL HPI/OVERNIGHT EVENTS: no new complaints    MEDICATIONS  (STANDING):  acetaminophen     Tablet .. 1000 milliGRAM(s) Oral every 8 hours  amLODIPine   Tablet 2.5 milliGRAM(s) Oral daily  aspirin  chewable 81 milliGRAM(s) Oral daily  atorvastatin 80 milliGRAM(s) Oral at bedtime  calcitriol   Capsule 0.25 MICROGram(s) Oral daily  cefTRIAXone   IVPB 1000 milliGRAM(s) IV Intermittent every 24 hours  cefTRIAXone   IVPB      ferrous    sulfate 325 milliGRAM(s) Oral daily  folic acid 1 milliGRAM(s) Oral daily  gabapentin 100 milliGRAM(s) Oral two times a day  heparin   Injectable 5000 Unit(s) SubCutaneous every 8 hours  hydroxychloroquine 200 milliGRAM(s) Oral daily  insulin lispro (ADMELOG) corrective regimen sliding scale   SubCutaneous three times a day before meals  insulin lispro (ADMELOG) corrective regimen sliding scale   SubCutaneous at bedtime  lidocaine   4% Patch 1 Patch Transdermal daily  lisinopril 2.5 milliGRAM(s) Oral daily  melatonin 3 milliGRAM(s) Oral at bedtime  memantine 5 milliGRAM(s) Oral at bedtime  pantoprazole    Tablet 40 milliGRAM(s) Oral before breakfast  risperiDONE   Tablet 0.25 milliGRAM(s) Oral at bedtime  sodium bicarbonate 650 milliGRAM(s) Oral two times a day  traMADol 50 milliGRAM(s) Oral every 8 hours    MEDICATIONS  (PRN):  acetaminophen     Tablet .. 650 milliGRAM(s) Oral every 6 hours PRN Temp greater or equal to 38C (100.4F), Mild Pain (1 - 3)  ondansetron Injectable 4 milliGRAM(s) IV Push every 8 hours PRN Nausea and/or Vomiting      __________________________________________________  REVIEW OF SYSTEMS:    CONSTITUTIONAL: No fever,   EYES: no acute visual disturbances  NECK: No pain or stiffness  RESPIRATORY: No cough; No shortness of breath  CARDIOVASCULAR: No chest pain, no palpitations  GASTROINTESTINAL: No pain. No nausea or vomiting; No diarrhea   NEUROLOGICAL: No headache or numbness, no tremors  MUSCULOSKELETAL: No joint pain, no muscle pain  GENITOURINARY: no dysuria, no frequency, no hesitancy  PSYCHIATRY: no depression , no anxiety  ALL OTHER  ROS negative        Vital Signs Last 24 Hrs  T(C): 36.3 (24 Apr 2024 05:20), Max: 36.6 (23 Apr 2024 20:20)  T(F): 97.4 (24 Apr 2024 05:20), Max: 97.9 (23 Apr 2024 20:20)  HR: 78 (24 Apr 2024 09:45) (67 - 78)  BP: 155/89 (24 Apr 2024 09:45) (123/70 - 175/93)  BP(mean): --  RR: 16 (24 Apr 2024 05:20) (16 - 18)  SpO2: 100% (24 Apr 2024 09:45) (98% - 100%)    Parameters below as of 24 Apr 2024 09:45  Patient On (Oxygen Delivery Method): room air        ________________________________________________  PHYSICAL EXAM:  Well developed  GENERAL: NAD  HEENT: Normocephalic;  conjunctivae and sclerae clear; moist mucous membranes;   NECK : supple  CHEST/LUNG: Clear to auscultation bilaterally with good air entry   HEART: S1 S2  regular; no murmurs, gallops or rubs  ABDOMEN: Soft, Nontender, Nondistended; Bowel sounds present  EXTREMITIES: no cyanosis; no edema; no calf tenderness  SKIN: warm and dry; no rash  NERVOUS SYSTEM:  Awake and alert; Oriented  to place, person and time ; no new deficits    _________________________________________________  LABS:                        8.8    3.76  )-----------( 204      ( 24 Apr 2024 07:25 )             27.9     04-24    140  |  112<H>  |  25<H>  ----------------------------<  85  4.4   |  22  |  1.96<H>    Ca    8.7      24 Apr 2024 07:25  Phos  4.4     04-24  Mg     2.3     04-24        Urinalysis Basic - ( 24 Apr 2024 07:25 )    Color: x / Appearance: x / SG: x / pH: x  Gluc: 85 mg/dL / Ketone: x  / Bili: x / Urobili: x   Blood: x / Protein: x / Nitrite: x   Leuk Esterase: x / RBC: x / WBC x   Sq Epi: x / Non Sq Epi: x / Bacteria: x    Culture - Urine (04.21.24 @ 15:45)    -  Amoxicillin/Clavulanic Acid: S <=8/4   -  Ampicillin: S <=8 These ampicillin results predict results for amoxicillin   -  Ampicillin/Sulbactam: S <=4/2   -  Aztreonam: S <=4   -  Cefazolin: S <=2 For uncomplicated UTI with K. pneumoniae, E. coli, or P. mirablis: MYA <=16 is sensitive and MYA >=32 is resistant. This also predicts results for oral agents cefaclor, cefdinir, cefpodoxime, cefprozil, cefuroxime axetil, cephalexin and locarbef for uncomplicated UTI. Note that some isolates may be susceptible to these agents while testing resistant to cefazolin.   -  Cefepime: S <=2   -  Cefoxitin: S <=8   -  Ceftriaxone: S <=1   -  Cefuroxime: S 8   -  Ciprofloxacin: S <=0.25   -  Ertapenem: S <=0.5   -  Gentamicin: S <=2   -  Imipenem: S <=1   -  Levofloxacin: S <=0.5   -  Meropenem: S <=1   -  Nitrofurantoin: S <=32 Should not be used to treat pyelonephritis   -  Piperacillin/Tazobactam: S <=8   -  Tobramycin: S <=2   -  Trimethoprim/Sulfamethoxazole: S <=0.5/9.5   Specimen Source: Clean Catch   Culture Results:   50,000 - 99,000 CFU/mL Escherichia coli   Organism Identification: Escherichia coli   Organism: Escherichia coli   Method Type: MYA    Culture - Surgical Swab (04.21.24 @ 12:25)    -  Trimethoprim/Sulfamethoxazole: S <=0.5/9.5   -  Vancomycin: S 1   -  Clindamycin: R 0.5 This isolate is presumed to be clindamycin resistant based on detection of inducible resistance. Clindamycin may still be effective in some patients.   -  Erythromycin: R >4   -  Gentamicin: S 2 Should not be used as monotherapy   -  Oxacillin: S <=0.25 Oxacillin predicts susceptibility for dicloxacillin, methicillin, and nafcillin   -  Penicillin: R >8   -  Rifampin: S <=1 Should not be used as monotherapy   -  Tetracycline: S <=1   Specimen Source: .Surgical Swab L foot wound   Culture Results:   Numerous Staphylococcus aureus  Few Prevotella bivia "Susceptibilities not performed"   Organism Identification: Staphylococcus aureus   Organism: Staphylococcus aureus   Method Type: MYA      CAPILLARY BLOOD GLUCOSE      POCT Blood Glucose.: 81 mg/dL (24 Apr 2024 11:32)  POCT Blood Glucose.: 85 mg/dL (24 Apr 2024 08:04)  POCT Blood Glucose.: 96 mg/dL (23 Apr 2024 21:37)  POCT Blood Glucose.: 91 mg/dL (23 Apr 2024 16:41)      RADIOLOGY & ADDITIONAL TESTS:    < from: CT Head No Cont (04.22.24 @ 19:20) >    ACC: [id number] EXAM:  CT BRAIN   ORDERED BY: BARBIE ROSADO     PROCEDURE DATE:  04/22/2024          INTERPRETATION:  EXAMINATION: CT HEAD    CLINICAL INDICATION: confusion  TECHNIQUE: CT images of the head were obtained without contrast. Coronal  and sagittal reconstructions were performed. Dose reduction techniques   were utilized including but not limited to automated exposure control   (AEC), iterative reconstruction technique, and/or mA and/or kV dose   adjustments based on patient size.  COMPARISON: Head CT 5/12/2023. Head MRI 7/24/2022. Head CT 4/12/2022.    FINDINGS:    Chronic infarct with laminar necrosis in the left occipital lobe   medially, unchanged. Right frontal meningioma is isodense to adjacent   brain parenchyma, not well seen on today's exam. There is no associated   vasogenic edema.    Mild-to-moderate generalized cerebral volume loss, with distention of the   sulci and concomitant ex-vacuo ventricular dilatation. Mild-to-moderate   nonspecific low attenuation inthe periventricular and subcortical white   matter, likely due to small vessel disease.    No acute intracranial hemorrhage. No midline shift or herniation. No CT   evidence of acute territorial infarction, although MRI with DWI would be   more sensitive.    The visualized sinuses and mastoids are clear.    Bilateral lens implants. Limited views of the orbits and visualized soft   tissues of the neck, face, scalp, skull base, and calvarium are otherwise   unremarkable.    IMPRESSION:    1.  No significant change, without acute intracranial hemorrhage.  2.  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  3.  Isodense right frontal meningioma, without vasogenic edema.  4.  Senescent cerebral changes.        Patient Name: BETTY JEANSTEWART  MRN: [id number], Accession: [id number]  DOS: 04/22/24 07:20 PM    --- End of Report ---    < end of copied text >    < from: US Duplex Arterial Lower Ext Ltd, Left (04.21.24 @ 14:10) >    ACC: [id number] EXAM:  US DPLX LWR EXT ARTS LTD LT   ORDERED BY: NANCI PAGAN     PROCEDURE DATE:  04/21/2024          INTERPRETATION:  US LOWER EXTREMITY ARTERIAL DUPLEX LIMITED LEFT    CLINICAL INFORMATION: Peripheral artery disease with pain with left calf   squeeze    COMPARISON: None    Real-time sonography of the left lower extremity arterial system was   performed using a high-resolution linear array transducer and including   color and spectral Doppler.    There is no measurable plaque in the lower extremity arteries. No soft   tissue abnormalities are demonstrated in the left lower extremity. Flow   phase patterns and peak systolic velocity measurements (in cm/s) were   observed as follows:    LEFT:  CFA: Triphasic; 76  Proximal SFA: Biphasic; 112  Mid SFA: Biphasic; 92  Distal SFA: Biphasic;  95  Popliteal: Biphasic;  85  Anterior tibial: Biphasic; 45  Posterior tibial: Biphasic; 47  Peroneal: Monophasic;  48  Dorsalis pedis: Not seen;    IMPRESSION:  *  No significant plaque or stenosis in the left leg    --- End of Report ---    < end of copied text >    < from: Xray Foot AP + Lateral + Oblique, Left (04.21.24 @ 14:09) >    ACC: [id number] EXAM:  XR FOOT COMP MIN 3 VIEWS LT   ORDERED BY: KARSTEN WHEELER     PROCEDURE DATE:  04/21/2024          INTERPRETATION:  Left foot. 3 views. Patient has an ulcer around the   calcaneus.    There are posterior and inferior calcanealspurs. Arterial calcifications   are noted.    There is a soft tissue ulcer posterior to the upper posterior calcaneus   and this is more evident than on September 6, 2022. No bone destruction   or fracture.    IMPRESSION: There is an ulcer posterior to the upper posterior calcaneus   increased from prior. Bones appear intact.    --- End of Report ---    < end of copied text >      Imaging Personally Reviewed:  YES/NO    Consultant(s) Notes Reviewed:   YES/ No    Care Discussed with Consultants :     Plan of care was discussed with patient and /or primary care giver; all questions and concerns were addressed and care was aligned with patient's wishes.

## 2024-04-24 NOTE — PROGRESS NOTE ADULT - SUBJECTIVE AND OBJECTIVE BOX
Interval: Patient was seen resting bedside without CAIR boots. Spoke to patients daughter in regards to left heel wound.  Patient denies any other pedal complaints at this time.     Chief Complaint Quote:	L foot diabetic ulcer x 1 week, low back pain x 2 years  Chief Complaint: Wound check, low back pain    Podiatry HPI: All pertinent history was given from patients daughter. Daughter states that her mother has had this foot wound for about two weeks. She noticed it when they were transferring her mother from the wheelchair that she was no longer able to put any weight on that foot. Yesterday, the daughter noticed swelling in the foot and leg with increased pain. Daughter stated that her mother was hospitalized around 2 weeks ago for a UTI and they had nurses coming into the home once a week to check on her. During this time, they found the wound and was applying an allevyn pad to help offload the area. Daughter stated that her mother kept a pillow under her feet at all times while laying in bed. Patient denied any constitutional symptoms and other pedal complaints.     PMH:No pertinent past medical history  Rheumatoid arthritis  Varicose veins of lower extremity  Hypertension  Multiple myeloma  HLD (hyperlipidemia)  Cerebrovascular accident (CVA)      Allergies: No Known Allergies    Medications acetaminophen     Tablet .. 1000 milliGRAM(s) Oral every 8 hours  acetaminophen     Tablet .. 650 milliGRAM(s) Oral every 6 hours PRN  amLODIPine   Tablet 2.5 milliGRAM(s) Oral daily  aspirin  chewable 81 milliGRAM(s) Oral daily  atorvastatin 80 milliGRAM(s) Oral at bedtime  calcitriol   Capsule 0.25 MICROGram(s) Oral daily  cefTRIAXone   IVPB 1000 milliGRAM(s) IV Intermittent every 24 hours  cefTRIAXone   IVPB      ferrous    sulfate 325 milliGRAM(s) Oral daily  folic acid 1 milliGRAM(s) Oral daily  gabapentin 100 milliGRAM(s) Oral two times a day  heparin   Injectable 5000 Unit(s) SubCutaneous every 8 hours  hydroxychloroquine 200 milliGRAM(s) Oral daily  insulin lispro (ADMELOG) corrective regimen sliding scale   SubCutaneous at bedtime  insulin lispro (ADMELOG) corrective regimen sliding scale   SubCutaneous three times a day before meals  lidocaine   4% Patch 1 Patch Transdermal daily  lisinopril 2.5 milliGRAM(s) Oral daily  melatonin 3 milliGRAM(s) Oral at bedtime  memantine 5 milliGRAM(s) Oral at bedtime  ondansetron Injectable 4 milliGRAM(s) IV Push every 8 hours PRN  pantoprazole    Tablet 40 milliGRAM(s) Oral before breakfast  risperiDONE   Tablet 0.25 milliGRAM(s) Oral at bedtime  sodium bicarbonate 650 milliGRAM(s) Oral two times a day  traMADol 50 milliGRAM(s) Oral every 8 hours    FHFHx: stroke (Father)    ,   PMHNo pertinent past medical history    Rheumatoid arthritis    Varicose veins of lower extremity    Hypertension    Multiple myeloma    HLD (hyperlipidemia)    Cerebrovascular accident (CVA)       PSHNo significant past surgical history        Labs                          8.8    3.76  )-----------( 204      ( 24 Apr 2024 07:25 )             27.9      04-24    140  |  112<H>  |  25<H>  ----------------------------<  85  4.4   |  22  |  1.96<H>    Ca    8.7      24 Apr 2024 07:25  Phos  4.4     04-24  Mg     2.3     04-24       Vital Signs Last 24 Hrs  T(C): 36.3 (24 Apr 2024 13:44), Max: 36.6 (23 Apr 2024 20:20)  T(F): 97.4 (24 Apr 2024 13:44), Max: 97.9 (23 Apr 2024 20:20)  HR: 72 (24 Apr 2024 13:44) (67 - 78)  BP: 126/76 (24 Apr 2024 13:44) (126/76 - 175/93)  BP(mean): --  RR: 18 (24 Apr 2024 13:44) (16 - 18)  SpO2: 99% (24 Apr 2024 13:44) (98% - 100%)    Parameters below as of 24 Apr 2024 13:44  Patient On (Oxygen Delivery Method): room air      Sedimentation Rate, Erythrocyte: 92 mm/Hr (04-21-24 @ 14:55)         C-Reactive Protein, Serum: 28 mg/L (04-21-24 @ 14:55)   WBC Count: 3.76 K/uL *L* (04-24-24 @ 07:25)    Physical Exam:  Vasc: DP and PT pulses palpable 1/4 b/l. TG warm to warm with no increase in temperature to left foot around wound. Pedal hair present. Edema noted to b/l feet and legs  Derm: Open lesion noted on left/right foot measuring 2.5x3cm. Mild Periwound erythema present. No drainage noted. Hyperkeratotic rim noted. No Macerated edges noted. Wound base is noted to be fibrogranular with no tracking noted, though patient was in pain while probing. No Fluctuance/crepitus/streaking noted. No Malodor noted.  Neuro: Gross sensation diminshed  Msk: Deferred, POP to L heel wound and leg pain with squeeze test    IMAGING:    < from: US Duplex Arterial Lower Ext Ltd, Left (04.21.24 @ 14:10) >    INTERPRETATION:  US LOWER EXTREMITY ARTERIAL DUPLEX LIMITED LEFT    CLINICAL INFORMATION: Peripheral artery disease with pain with left calf   squeeze    COMPARISON: None    Real-time sonography of the left lower extremity arterial system was   performed using a high-resolution linear array transducer and including   color and spectral Doppler.    There is no measurable plaque in the lower extremity arteries. No soft   tissue abnormalities are demonstrated in the left lower extremity. Flow   phase patterns and peak systolic velocity measurements (in cm/s) were   observed as follows:    LEFT:  CFA: Triphasic; 76  Proximal SFA: Biphasic; 112  Mid SFA: Biphasic; 92  Distal SFA: Biphasic;  95  Popliteal: Biphasic;  85  Anterior tibial: Biphasic; 45  Posterior tibial: Biphasic; 47  Peroneal: Monophasic;  48  Dorsalis pedis: Not seen;    IMPRESSION:  *  No significant plaque or stenosis in the left leg    --- End of Report ---      < end of copied text >  < from: Xray Foot AP + Lateral + Oblique, Left (04.21.24 @ 14:09) >  [facility]     PROCEDURE DATE:  04/21/2024          INTERPRETATION:  Left foot. 3 views. Patient has an ulcer around the   calcaneus.    There are posterior and inferior calcanealspurs. Arterial calcifications   are noted.    There is a soft tissue ulcer posterior to the upper posterior calcaneus   and this is more evident than on September 6, 2022. No bone destruction   or fracture.    IMPRESSION: There is an ulcer posteriorto the upper posterior calcaneus   increased from prior. Bones appear intact.    --- End of Report ---      < end of copied text >      CULTURES: pending

## 2024-04-24 NOTE — PHYSICAL THERAPY INITIAL EVALUATION ADULT - ACTIVE RANGE OF MOTION EXAMINATION, REHAB EVAL
L LE ~1/5 of full range In all motion. R LE ~3/5 of full range in all motions./bilateral upper extremity Active ROM was WFL (within functional limits)/deficits as listed below

## 2024-04-24 NOTE — PROGRESS NOTE ADULT - ASSESSMENT
Search Terms: Angelika James, 1940Search Date: 04/23/2024 08:44:16 AM  The Drug Utilization Report below displays all of the controlled substance prescriptions, if any, that your patient has filled in the last twelve months. The information displayed on this report is compiled from pharmacy submissions to the Department, and accurately reflects the information as submitted by the pharmacies.    This report was requested by: Lizzie Mixon | Reference #: [id number]    There are no results for the search terms that you entered.

## 2024-04-25 NOTE — PROGRESS NOTE ADULT - PROBLEM SELECTOR PLAN 4
Pt w/ SCr  2.3 on admission  Baseline SCr - around 2.3  Currently with SCr 2.35, not improved after straight cath  Bowel regimen implemented for constipation x 3 days  Avoid nephrotoxins  Follow up am bmp

## 2024-04-25 NOTE — PROGRESS NOTE ADULT - SUBJECTIVE AND OBJECTIVE BOX
Interval: Patient was seen resting bedside without CAIR boots.  Patient denies any other pedal complaints at this time.     Chief Complaint Quote:	L foot diabetic ulcer x 1 week, low back pain x 2 years  Chief Complaint: Wound check, low back pain    Podiatry HPI: All pertinent history was given from patients daughter. Daughter states that her mother has had this foot wound for about two weeks. She noticed it when they were transferring her mother from the wheelchair that she was no longer able to put any weight on that foot. Yesterday, the daughter noticed swelling in the foot and leg with increased pain. Daughter stated that her mother was hospitalized around 2 weeks ago for a UTI and they had nurses coming into the home once a week to check on her. During this time, they found the wound and was applying an allevyn pad to help offload the area. Daughter stated that her mother kept a pillow under her feet at all times while laying in bed. Patient denied any constitutional symptoms and other pedal complaints.     PMH:No pertinent past medical history  Rheumatoid arthritis  Varicose veins of lower extremity  Hypertension  Multiple myeloma  HLD (hyperlipidemia)  Cerebrovascular accident (CVA)      Allergies: No Known Allergies    Medications acetaminophen     Tablet .. 650 milliGRAM(s) Oral every 6 hours PRN  acetaminophen     Tablet .. 1000 milliGRAM(s) Oral every 8 hours  amLODIPine   Tablet 2.5 milliGRAM(s) Oral daily  aspirin  chewable 81 milliGRAM(s) Oral daily  atorvastatin 80 milliGRAM(s) Oral at bedtime  calcitriol   Capsule 0.25 MICROGram(s) Oral daily  ferrous    sulfate 325 milliGRAM(s) Oral daily  folic acid 1 milliGRAM(s) Oral daily  gabapentin 100 milliGRAM(s) Oral two times a day  heparin   Injectable 5000 Unit(s) SubCutaneous every 8 hours  hydroxychloroquine 200 milliGRAM(s) Oral daily  insulin lispro (ADMELOG) corrective regimen sliding scale   SubCutaneous three times a day before meals  insulin lispro (ADMELOG) corrective regimen sliding scale   SubCutaneous at bedtime  lidocaine   4% Patch 1 Patch Transdermal daily  lisinopril 2.5 milliGRAM(s) Oral daily  melatonin 3 milliGRAM(s) Oral at bedtime  memantine 5 milliGRAM(s) Oral at bedtime  ondansetron Injectable 4 milliGRAM(s) IV Push every 8 hours PRN  pantoprazole    Tablet 40 milliGRAM(s) Oral before breakfast  risperiDONE   Tablet 0.25 milliGRAM(s) Oral at bedtime  sodium bicarbonate 650 milliGRAM(s) Oral two times a day  sodium chloride 0.9% Bolus 1000 milliLiter(s) IV Bolus once    FHFHx: stroke (Father)    ,   PMHNo pertinent past medical history    Rheumatoid arthritis    Varicose veins of lower extremity    Hypertension    Multiple myeloma    HLD (hyperlipidemia)    Cerebrovascular accident (CVA)       PSHNo significant past surgical history        Labs                          8.4    4.24  )-----------( 217      ( 25 Apr 2024 05:36 )             26.4      04-25    142  |  111<H>  |  30<H>  ----------------------------<  85  4.6   |  23  |  2.35<H>    Ca    8.4      25 Apr 2024 05:36  Phos  4.6     04-25  Mg     2.3     04-25       Vital Signs Last 24 Hrs  T(C): 36.4 (25 Apr 2024 05:32), Max: 36.7 (24 Apr 2024 20:40)  T(F): 97.5 (25 Apr 2024 05:32), Max: 98.1 (24 Apr 2024 20:40)  HR: 75 (25 Apr 2024 05:32) (69 - 81)  BP: 150/68 (25 Apr 2024 05:32) (126/76 - 155/89)  BP(mean): 95 (25 Apr 2024 05:32) (95 - 101)  RR: 18 (25 Apr 2024 05:32) (18 - 18)  SpO2: 99% (25 Apr 2024 05:32) (98% - 100%)    Parameters below as of 25 Apr 2024 05:32  Patient On (Oxygen Delivery Method): room air      Sedimentation Rate, Erythrocyte: 92 mm/Hr (04-21-24 @ 14:55)         C-Reactive Protein, Serum: 28 mg/L (04-21-24 @ 14:55)   WBC Count: 4.24 K/uL (04-25-24 @ 05:36)      Physical Exam:  Vasc: DP and PT pulses palpable 1/4 b/l. TG warm to warm with no increase in temperature to left foot around wound. Pedal hair present. Edema noted to b/l feet and legs  Derm: Open lesion noted on left/right foot measuring 2.5x3cm. Mild Periwound erythema present. No drainage noted. Hyperkeratotic rim noted. No Macerated edges noted. Wound base is noted to be fibrogranular with no tracking noted, though patient was in pain while probing. No Fluctuance/crepitus/streaking noted. No Malodor noted.  Neuro: Gross sensation diminshed  Msk: Deferred, POP to L heel wound and leg pain with squeeze test    IMAGING:    < from: US Duplex Arterial Lower Ext Ltd, Left (04.21.24 @ 14:10) >    INTERPRETATION:  US LOWER EXTREMITY ARTERIAL DUPLEX LIMITED LEFT    CLINICAL INFORMATION: Peripheral artery disease with pain with left calf   squeeze    COMPARISON: None    Real-time sonography of the left lower extremity arterial system was   performed using a high-resolution linear array transducer and including   color and spectral Doppler.    There is no measurable plaque in the lower extremity arteries. No soft   tissue abnormalities are demonstrated in the left lower extremity. Flow   phase patterns and peak systolic velocity measurements (in cm/s) were   observed as follows:    LEFT:  CFA: Triphasic; 76  Proximal SFA: Biphasic; 112  Mid SFA: Biphasic; 92  Distal SFA: Biphasic;  95  Popliteal: Biphasic;  85  Anterior tibial: Biphasic; 45  Posterior tibial: Biphasic; 47  Peroneal: Monophasic;  48  Dorsalis pedis: Not seen;    IMPRESSION:  *  No significant plaque or stenosis in the left leg    --- End of Report ---      < end of copied text >  < from: Xray Foot AP + Lateral + Oblique, Left (04.21.24 @ 14:09) >  LIAM     PROCEDURE DATE:  04/21/2024          INTERPRETATION:  Left foot. 3 views. Patient has an ulcer around the   calcaneus.    There are posterior and inferior calcanealspurs. Arterial calcifications   are noted.    There is a soft tissue ulcer posterior to the upper posterior calcaneus   and this is more evident than on September 6, 2022. No bone destruction   or fracture.    IMPRESSION: There is an ulcer posteriorto the upper posterior calcaneus   increased from prior. Bones appear intact.    --- End of Report ---      < end of copied text >      CULTURES: pending

## 2024-04-25 NOTE — DISCHARGE NOTE PROVIDER - HOSPITAL COURSE
84 y.o. F from home, with PMHx of HTN, HLD, CKD, CVA, SAH, Rheumatoid Arthritis, Multiple Myeloma, and Bladder incontinence. Patient presented to the ED with worsening heel ulcer and unable to bear weight, sacral pain. Admitted to medicine for diabetic foot ulcer and UTI. Podiatry consulted arterial duplex unremarkable. UTI treated with Rocephin.  Hemonc consulted for MM.  Pain mgnt consulted, note and reccs appreciated.    podiatry recc: no acute intervention at this time.   Discussed offloading b/l heels while laying in bed. Patient to have pillow under heels at all times. Strict CAIR boots   WCO: santyl, allevyn pad to the left heel   Upon discharge patient to follow up at [address] [place] [address] clinic      PT recc: No skilled PT needs; Pt is at baseline.    Please note that this a brief summary of hospital course please refer to daily progress notes and consult notes for full course and events

## 2024-04-25 NOTE — DIETITIAN INITIAL EVALUATION ADULT - ADD RECOMMEND
provide soft and bite sized, carbohydrate consistent ,DASH/TLC diet . patient will tolerate diet with improved glycemic control

## 2024-04-25 NOTE — DISCHARGE NOTE PROVIDER - CARE PROVIDER_API CALL
Prosper De Jesus  Internal Medicine  Phone: [phone]  Fax: ()-  Follow Up Time: 1 week   Prosper De Jesus  Internal Medicine  Phone: [phone]  Fax: ()-  Follow Up Time: 1 week    Podiatry Clinic,   [address] [facility] [address]  Phone: [phone]  Fax: (   )    -  Follow Up Time:

## 2024-04-25 NOTE — PROGRESS NOTE ADULT - ASSESSMENT
Search Terms: Angelika James, 1940 Search Date: 04/23/2024 08:44:16 AM  The Drug Utilization Report below displays all of the controlled substance prescriptions, if any, that your patient has filled in the last twelve months. The information displayed on this report is compiled from pharmacy submissions to the Department, and accurately reflects the information as submitted by the pharmacies.    This report was requested by: Lizzie Mixon | Reference #: [id number]    There are no results for the search terms that you entered.

## 2024-04-25 NOTE — DISCHARGE NOTE PROVIDER - NSDCACTIVITY_GEN_ALL_CORE
8.5    7.60  )-----------( 221      ( 30 Dec 2020 16:25 )             28.6       12-30    136  |  97<L>  |  11  ----------------------------<  115<H>  3.6   |  29  |  0.8    Ca    8.9      30 Dec 2020 16:25    TPro  6.3  /  Alb  3.8  /  TBili  2.3<H>  /  DBili  x   /  AST  29  /  ALT  25  /  AlkPhos  125<H>  12-30          Urinalysis Basic - ( 30 Dec 2020 20:11 )    Color: Yellow / Appearance: Clear / S.025 / pH: x  Gluc: x / Ketone: Negative  / Bili: Negative / Urobili: >=8.0 mg/dL   Blood: x / Protein: 100 mg/dL / Nitrite: Negative   Leuk Esterase: Negative / RBC: 3-5 /HPF / WBC 10-25 /HPF   Sq Epi: x / Non Sq Epi: Occasional /HPF / Bacteria: Few        PT/INR - ( 30 Dec 2020 16:25 )   PT: >40.00 sec;   INR: 5.62 ratio         PTT - ( 30 Dec 2020 16:25 )  PTT:56.7 sec    Lactate Trend      CAPILLARY BLOOD GLUCOSE
No restrictions

## 2024-04-25 NOTE — DISCHARGE NOTE PROVIDER - NSDCCPCAREPLAN_GEN_ALL_CORE_FT
PRINCIPAL DISCHARGE DIAGNOSIS  Diagnosis: Decubitus ulcer, heel  Assessment and Plan of Treatment: You presented to the hospital with complaints of a worsening heel ulcer and inability to bear weight. You were followed by podiatry who reccommends: santyl, allevyn pad to the left heel . Upon discharge patient to follow up at [address] [place] [address]      SECONDARY DISCHARGE DIAGNOSES  Diagnosis: Diabetes mellitus  Assessment and Plan of Treatment: Continue to follow with your primary care MD or your endocrinologist. Discuss what the goal hemoglobin A1C level is for you.  Follow a heart healthy diabetic diet. If you check your fingerstick glucose at home, call your MD if it is greater than 250mg/dL on 2 occasions or less than 100mg/dL on 2 occasions. Know signs of low blood sugar, such as: dizziness, shakiness, sweating, confusion, hunger, nervousness- drink 4 ounces apple juice if occurs and call your doctor. Know early signs of high blood sugar, such as: frequent urination, increased thirst, blurry vision, fatigue, headache - call your doctor if this occurs.      Diagnosis: UTI (urinary tract infection)  Assessment and Plan of Treatment: You were treated with IV antibiotics for a urinary tract infection.  Drink enough water and fluids to keep your urine clear or pale yellow.  Avoid caffeine, tea, and carbonated beverages. They tend to irritate your bladder.  Empty your bladder often. Avoid holding urine for long periods of time.  Empty your bladder before and after sexual intercourse.  After a bowel movement, women should cleanse from front to back. Use each tissue only once.  SEEK MEDICAL CARE IF:  You have back pain.  You develop a fever.  Your symptoms do not begin to resolve within 3 days.  SEEK IMMEDIATE MEDICAL CARE IF:  You have severe back pain or lower abdominal pain.  You develop chills.  You have nausea or vomiting.  You have continued burning or discomfort with urination.      Diagnosis: HTN (hypertension)  Assessment and Plan of Treatment: You have a history of high blood pressure. High blood pressure is a condition that puts you at risk for heart attack, stroke and kidney disease. Please continue to take your medications as prescribed. You can also help control your blood pressure by maintaining a healthy weight, eating a diet low in fat and rich in fruits and vegetables, reduce the amount of salt in your diet. Also, reduce alcohol and try to include some form of physical activity daily for at least 30 mins. Follow up with your medical doctor to establish long term blood pressure treatment goals.  Notify your doctor if you have any of the following symptoms:   Dizziness, Lightheadedness, Blurry vision, Headache, Chest pain, Shortness of breath      Diagnosis: Chronic kidney disease, unspecified CKD stage  Assessment and Plan of Treatment: Avoid taking (NSAIDs) - (ex: Ibuprofen, Advil, Celebrex, Naprosyn)  Avoid taking any nephrotoxic agents (can harm kidneys) - Intravenous contrast for diagnostic testing, combination cold medications.  Have all medications adjusted for your renal function by your Health Care Provider.  Blood pressure control is important.  Take all medication as prescribed.      Diagnosis: Multiple myeloma  Assessment and Plan of Treatment: You have a history of MM, please follow- up with your PCP for further care.    Diagnosis: Rheumatoid arthritis  Assessment and Plan of Treatment: You have a history of RA, please take your medications as prescribed and follow with your rheumatologist for further management.    Diagnosis: HLD (hyperlipidemia)  Assessment and Plan of Treatment: You have a history of hyperlipidemia, which is when you have too much cholesterol in your blood. High amounts of cholesterol in your blood can put you at higher risks for heart attck, strokes and other health problems. Follow up with PCP for treatment goals, continue medication as prescribed, have liver function testing every 3 months as anti lipid medications can cause liver irritation, eat low fat meals, avoid red meat, butter, fried foods and cheese. Get daily exercise.      Diagnosis: Meningioma  Assessment and Plan of Treatment: You were found to have a meningioma on ct scan of your head. Please follow- up with your PCP for further management. A meningioma is a tumor that grows from the membranes that surround the brain and spinal cord, called the meninges. Most symptoms of a meningioma come on slowly. But sometimes a meningioma needs care right away.  Seek emergency care if you have:  Sudden onset of seizures.  Sudden changes in vision or memory.

## 2024-04-25 NOTE — PROGRESS NOTE ADULT - PROBLEM SELECTOR PLAN 2
Pw b/l heel ulcer  podiatry following   Xray-->an ulcer posterior to the upper posterior calcaneus increased from prior. Bones appear intact.  ESAU/PVR unremarkable  f/u Venous duplex--->patient refused  wound culture grew Numerous Staphylococcus aureus  Wound care per podiatry

## 2024-04-25 NOTE — DISCHARGE NOTE PROVIDER - NSDCMRMEDTOKEN_GEN_ALL_CORE_FT
amLODIPine 2.5 mg oral tablet: 1 tab(s) orally once a day  aspirin 81 mg oral tablet, chewable: 1 tab(s) orally once a day  calcitriol 0.25 mcg oral capsule: 1 cap(s) orally once a day  ferrous sulfate 325 mg (65 mg elemental iron) oral tablet: 1 tab(s) orally once a day  folic acid 1 mg oral tablet: 1 tab(s) orally once a day  gabapentin 100 mg oral capsule: 2 cap(s) orally 2 times a day  hydroxychloroquine 200 mg oral tablet: 1 tab(s) orally once a day  linagliptin 5 mg oral tablet: 1 tab(s) orally once a day  lisinopril 2.5 mg oral tablet: 1 tab(s) orally once a day  melatonin 3 mg oral tablet: 1 tab(s) orally once a day (at bedtime)  memantine 5 mg oral tablet: 1 tab(s) orally once a day (at bedtime)  Pomalyst 2 mg oral capsule: 1 cap(s) orally every other day. DO NOT RESUME UNTIL AFTER REHAB AND OKAYED BY YOUR ONCOLOGIST  risperiDONE 0.25 mg oral tablet: 1 tab(s) orally once a day (at bedtime)  rosuvastatin 20 mg oral capsule: 1 cap(s) orally once a day (at bedtime)  sodium bicarbonate 650 mg oral tablet: 1 tab(s) orally 2 times a day   amLODIPine 2.5 mg oral tablet: 1 tab(s) orally once a day  aspirin 81 mg oral tablet, chewable: 1 tab(s) orally once a day  atorvastatin 80 mg oral tablet: 1 tab(s) orally once a day (at bedtime)  calcitriol 0.25 mcg oral capsule: 1 cap(s) orally once a day  ferrous sulfate 325 mg (65 mg elemental iron) oral tablet: 1 tab(s) orally once a day  folic acid 1 mg oral tablet: 1 tab(s) orally once a day  gabapentin 100 mg oral capsule: 2 cap(s) orally 2 times a day  hydroxychloroquine 200 mg oral tablet: 1 tab(s) orally once a day  linagliptin 5 mg oral tablet: 1 tab(s) orally once a day  lisinopril 2.5 mg oral tablet: 1 tab(s) orally once a day  melatonin 3 mg oral tablet: 1 tab(s) orally once a day (at bedtime)  memantine 5 mg oral tablet: 1 tab(s) orally once a day (at bedtime)  pantoprazole 40 mg oral delayed release tablet: 1 tab(s) orally once a day (before a meal)  Pomalyst 2 mg oral capsule: 1 cap(s) orally every other day. DO NOT RESUME UNTIL AFTER REHAB AND OKAYED BY YOUR ONCOLOGIST  risperiDONE 0.25 mg oral tablet: 1 tab(s) orally once a day (at bedtime)  sodium bicarbonate 650 mg oral tablet: 1 tab(s) orally 2 times a day   amLODIPine 2.5 mg oral tablet: 1 tab(s) orally once a day  aspirin 81 mg oral tablet, chewable: 1 tab(s) orally once a day  atorvastatin 80 mg oral tablet: 1 tab(s) orally once a day (at bedtime)  calcitriol 0.25 mcg oral capsule: 1 cap(s) orally once a day  ferrous sulfate 325 mg (65 mg elemental iron) oral tablet: 1 tab(s) orally once a day  folic acid 1 mg oral tablet: 1 tab(s) orally once a day  gabapentin 100 mg oral capsule: 2 cap(s) orally 2 times a day  hydroxychloroquine 200 mg oral tablet: 1 tab(s) orally once a day  lidocaine 4% topical film: Apply topically to affected area once a day as needed for  moderate pain apply to the left leg  linagliptin 5 mg oral tablet: 1 tab(s) orally once a day  lisinopril 2.5 mg oral tablet: 1 tab(s) orally once a day  melatonin 3 mg oral tablet: 1 tab(s) orally once a day (at bedtime)  memantine 5 mg oral tablet: 1 tab(s) orally once a day (at bedtime)  pantoprazole 40 mg oral delayed release tablet: 1 tab(s) orally once a day (before a meal)  polyethylene glycol 3350 oral powder for reconstitution: 17 gram(s) orally every 24 hours  Pomalyst 2 mg oral capsule: 1 cap(s) orally every other day. DO NOT RESUME UNTIL AFTER REHAB AND OKAYED BY YOUR ONCOLOGIST  risperiDONE 0.25 mg oral tablet: 1 tab(s) orally once a day (at bedtime)  sodium bicarbonate 650 mg oral tablet: 1 tab(s) orally 2 times a day

## 2024-04-25 NOTE — DISCHARGE NOTE PROVIDER - PROVIDER TOKENS
PROVIDER:[TOKEN:[[id number]],FOLLOWUP:[1 week]] PROVIDER:[TOKEN:[[id number]],FOLLOWUP:[1 week]],FREE:[LAST:[Podiatry Clinic],PHONE:[phone]],FAX:[(   )    -],ADDRESS:[[address] [id number]]]

## 2024-04-25 NOTE — DIETITIAN INITIAL EVALUATION ADULT - PERTINENT LABORATORY DATA
04-25    143  |  111<H>  |  30<H>  ----------------------------<  108<H>  4.3   |  24  |  2.32<H>    Ca    8.5      25 Apr 2024 10:32  Phos  4.6     04-25  Mg     2.3     04-25    POCT Blood Glucose.: 107 mg/dL (04-25-24 @ 11:21)  A1C with Estimated Average Glucose Result: 7.5 % (04-21-24 @ 14:55)  A1C with Estimated Average Glucose Result: 8.2 % (03-28-24 @ 07:12)

## 2024-04-25 NOTE — PROGRESS NOTE ADULT - ASSESSMENT
A:  L pressure heel wound     P:  Patient evaluated and Chart reviewed  Discussed diagnosis and treatment with patient  Xrays reviewed, see above notes   ESAU/PVR was ordered, but cancelled due to patient unable to tolerate because of pain  Duplex reviewed see above notes  Cx reviewed, staph aureus   Left foot was dressed with santyl allevyn pad  Patient stable from podiatry standpoint, no acute intervention at this time.   Discussed offloading b/l heels while laying in bed. Patient to have pillow under heels at all times. Strict CAIR boots   Podiatry to follow while in house    Seen, discussed and reviewed with Dr. Rodgers   Discussed with Attending Dr. Arriaga   WCO: santyl, allevyn pad to the left heel   Upon discharge patient to follow up at [address] [place] [address]

## 2024-04-25 NOTE — PROGRESS NOTE ADULT - PROBLEM SELECTOR PLAN 9
DVT PPX: Heparin  GI PPX: PPI

## 2024-04-25 NOTE — PROGRESS NOTE ADULT - SUBJECTIVE AND OBJECTIVE BOX
Source of information: ANGELO STRATTON, Chart review  Patient language: English  : n/a    HPI:  84 y.o. F from home, with PMHx of HTN, HLD, CKD, CVA, SAH, Rheumatoid Arthritis, Multiple Myeloma, and Bladder incontinence, presents to the ED with worsening heel ulcer and unable to bear weight. Daughter at the bedside states that her mother has had this foot wound for about two weeks. She noticed it when they were transferring her mother from the wheelchair that she was no longer able to put any weight on that foot. Yesterday, the daughter noticed swelling in the foot and leg with increased pain. Stated that her mother was hospitalized around 2 weeks ago for a UTI and they had nurses coming into the home once a week to check on her. During this time, they found the wound and was applying an allevyn pad to help offload the area. Daughter stated that her mother kept a pillow under her feet at all times while laying in bed. Otherwise pt denies any chest pain, palpitations, shortness of breath, fever, chills, headache, visual changes, nausea, vomiting, diarrhea, dysuria, frequency.    (21 Apr 2024 15:51)      This is a Patient is a 84y old  Female who presents with a chief complaint of Diabetic left heel ulcer/sacral ulcer (24 Apr 2024 12:56)    X-ray of left foot demonstrated that there is an ulcer posterior to the upper posterior calcaneus increased from prior. Bones appear intact.  Arterial Duplex US of LLE demonstrated no significant plaque or stenosis in the left leg.    Pt is admitted for left heel pressure injury and UTI, on ceftriaxone, podiatry following. Pain service consulted on 4/23 for management of left foot pain and chronic low back pain. Pt seen and examined at bedside while eating breakfast. Pt is calm, alert, A & O x 2 (reoriented to time).  Pt reports pain to left heel is better today and stated 0/10 SCALE USED: (1-10 VNRS). On examination of feet patient instructed me not to lift the feet too high as she also has arthritis pain in her knees with movement.  Pt endorses chronic low back pain which is exacerbated by repositioning. The pain is alleviated with current regimen and is exacerbated by movement. Pt tolerating modified PO diet. Denies lethargy, chest pain, SOB, nausea, vomiting, constipation. Patient unable to state current pain goals, patient is wheelchair bound.    PAST MEDICAL & SURGICAL HISTORY:    Rheumatoid arthritis    Varicose veins of lower extremity    Hypertension    Multiple myeloma    HLD (hyperlipidemia)    Cerebrovascular accident (CVA)  remote hx, no residual deficits    No significant past surgical history    FAMILY HISTORY:  FHx: stroke (Father)    Social History:  Pt lives at home with family, mostly bedbound, denies any tobacco, alcohol, drug use. (21 Apr 2024 15:51)  [X]Denies ETOH use, illicit drug use, and smoking     Allergies  No Known Allergies    MEDICATIONS  (STANDING):  acetaminophen     Tablet .. 1000 milliGRAM(s) Oral every 8 hours  amLODIPine   Tablet 2.5 milliGRAM(s) Oral daily  aspirin  chewable 81 milliGRAM(s) Oral daily  atorvastatin 80 milliGRAM(s) Oral at bedtime  calcitriol   Capsule 0.25 MICROGram(s) Oral daily  ferrous    sulfate 325 milliGRAM(s) Oral daily  folic acid 1 milliGRAM(s) Oral daily  gabapentin 100 milliGRAM(s) Oral two times a day  heparin   Injectable 5000 Unit(s) SubCutaneous every 8 hours  hydroxychloroquine 200 milliGRAM(s) Oral daily  insulin lispro (ADMELOG) corrective regimen sliding scale   SubCutaneous three times a day before meals  insulin lispro (ADMELOG) corrective regimen sliding scale   SubCutaneous at bedtime  lidocaine   4% Patch 1 Patch Transdermal daily  lisinopril 2.5 milliGRAM(s) Oral daily  melatonin 3 milliGRAM(s) Oral at bedtime  memantine 5 milliGRAM(s) Oral at bedtime  pantoprazole    Tablet 40 milliGRAM(s) Oral before breakfast  risperiDONE   Tablet 0.25 milliGRAM(s) Oral at bedtime  sodium bicarbonate 650 milliGRAM(s) Oral two times a day  sodium chloride 0.9% Bolus 1000 milliLiter(s) IV Bolus once    MEDICATIONS  (PRN):  acetaminophen     Tablet .. 650 milliGRAM(s) Oral every 6 hours PRN Temp greater or equal to 38C (100.4F), Mild Pain (1 - 3)  ondansetron Injectable 4 milliGRAM(s) IV Push every 8 hours PRN Nausea and/or Vomiting      Vital Signs Last 24 Hrs  T(C): 36.4 (25 Apr 2024 05:32), Max: 36.7 (24 Apr 2024 20:40)  T(F): 97.5 (25 Apr 2024 05:32), Max: 98.1 (24 Apr 2024 20:40)  HR: 75 (25 Apr 2024 05:32) (72 - 81)  BP: 150/68 (25 Apr 2024 05:32) (126/76 - 152/75)  BP(mean): 95 (25 Apr 2024 05:32) (95 - 101)  RR: 18 (25 Apr 2024 05:32) (18 - 18)  SpO2: 99% (25 Apr 2024 05:32) (99% - 99%)    Parameters below as of 25 Apr 2024 05:32  Patient On (Oxygen Delivery Method): room air    LABS: Reviewed                        8.4    4.24  )-----------( 217      ( 25 Apr 2024 05:36 )             26.4     04-25    143  |  111<H>  |  30<H>  ----------------------------<  108<H>  4.3   |  24  |  2.32<H>    Ca    8.5      25 Apr 2024 10:32  Phos  4.6     04-25  Mg     2.3     04-25    Urinalysis Basic - ( 25 Apr 2024 10:32 )    Color: x / Appearance: x / SG: x / pH: x  Gluc: 108 mg/dL / Ketone: x  / Bili: x / Urobili: x   Blood: x / Protein: x / Nitrite: x   Leuk Esterase: x / RBC: x / WBC x   Sq Epi: x / Non Sq Epi: x / Bacteria: x    CAPILLARY BLOOD GLUCOSE    POCT Blood Glucose.: 107 mg/dL (25 Apr 2024 11:21)  POCT Blood Glucose.: 83 mg/dL (25 Apr 2024 07:46)  POCT Blood Glucose.: 107 mg/dL (24 Apr 2024 21:11)  POCT Blood Glucose.: 89 mg/dL (24 Apr 2024 16:44)    Radiology: Reviewed  < from: Xray Foot AP + Lateral + Oblique, Left (04.21.24 @ 14:09) >  ACC: [id number] EXAM:  XR FOOT COMP MIN 3 VIEWS LT   ORDERED BY: KARSTEN WHEELER     PROCEDURE DATE:  04/21/2024      INTERPRETATION:  Left foot. 3 views. Patient has an ulcer around the   calcaneus.    There are posterior and inferior calcanealspurs. Arterial calcifications   are noted.    There is a soft tissue ulcer posterior to the upper posterior calcaneus   and this is more evident than on September 6, 2022. No bone destruction   or fracture.    IMPRESSION: There is an ulcer posteriorto the upper posterior calcaneus   increased from prior. Bones appear intact.    --- End of Report ---    LUIS PIZARRO MD; Attending Radiologist  This document has been electronically signed. Apr 21 2024  3:55PM    < end of copied text >    < from: US Duplex Arterial Lower Ext Ltd, Left (04.21.24 @ 14:10) >    ACC: [id number] EXAM:  US DPLX LWR EXT ARTS LTD LT   ORDERED BY: NANCI PAGAN     PROCEDURE DATE:  04/21/2024        INTERPRETATION:  US LOWER EXTREMITY ARTERIAL DUPLEX LIMITED LEFT    CLINICAL INFORMATION: Peripheral artery disease with pain with left calf   squeeze    COMPARISON: None    Real-time sonography of the left lower extremity arterial system was   performed using a high-resolution linear array transducer and including   color and spectral Doppler.    There is no measurable plaque in the lower extremity arteries. No soft   tissue abnormalities are demonstrated in the left lower extremity. Flow   phase patterns and peak systolic velocity measurements (in cm/s) were   observed as follows:    LEFT:  CFA: Triphasic; 76  Proximal SFA: Biphasic; 112  Mid SFA: Biphasic; 92  Distal SFA: Biphasic;  95  Popliteal: Biphasic;  85  Anterior tibial: Biphasic; 45  Posterior tibial: Biphasic; 47  Peroneal: Monophasic;  48  Dorsalis pedis: Not seen;    IMPRESSION:  *  No significant plaque or stenosis in the left leg    --- End of Report ---    KAYY COVINGTON MD; Attending Radiologist  This document has been electronically signed. Apr 21 2024  5:26PM    < end of copied text >    ORT Score -   Family Hx of substance abuse	Female	      Male  Alcohol 	                                           1                     3  Illegal drugs	                                   2                     3  Rx drugs                                           4 	                  4  Personal Hx of substance abuse		  Alcohol 	                                          3	                  3  Illegal drugs                                     4	                  4  Rx drugs                                            5 	                  5  Age between 16- 45 years	           1                     1  hx preadolescent sexual abuse	   3 	                  0  Psychological disease		  ADD, OCD, bipolar, schizophrenia   2	          2  Depression                                           1 	          1  Total: 0    a score of 3 or lower indicates low risk for opioid abuse		  a score of 4-7 indicates moderate risk for opioid abuse		  a score of 8 or higher indicates high risk for opioid abuse    REVIEW OF SYSTEMS:  CONSTITUTIONAL: No fever or fatigue  HEENT:  No difficulty hearing, no change in vision  NECK: No pain or stiffness  RESPIRATORY: No cough, wheezing, chills or hemoptysis; No shortness of breath  CARDIOVASCULAR: No chest pain, palpitations, dizziness, or leg swelling  GASTROINTESTINAL: No loss of appetite, + decreased PO intake. No abdominal or epigastric pain. No nausea, vomiting; No diarrhea or constipation.   GENITOURINARY: Hx of bladder incontinence  MUSCULOSKELETAL: + hx of chronic low back pain and joint pains; L heel pain, chronic upper or lower motor strength weakness, no saddle anesthesia, hx of bladder incontinence, no falls   NEURO: No headaches, No numbness/tingling b/l LE  PSYCHIATRIC: No depression, anxiety or difficulty sleeping    PHYSICAL EXAM:  GENERAL:  Alert & Oriented X 2-3, able to follow simple commands, slow speech  RESPIRATORY: Respirations even and unlabored. Clear to auscultation bilaterally  CARDIOVASCULAR: Normal S1/S2, regular rate and rhythm  GASTROINTESTINAL:  Soft, Nontender, Nondistended; Bowel sounds present  PERIPHERAL VASCULAR:  Extremities warm without edema. 2+ Peripheral Pulses, No cyanosis, No calf tenderness  MUSCULOSKELETAL: Motor Strength 3+/5 B/L upper and lower extremities; moves all extremities equally against gravity; + left foot pain on palpation  SKIN: Warm, dry, L heel pressure injury covered with Allevyn dressing C/D/I, Soft boot in place    Risk factors associated with adverse outcomes related to opioid treatment  [ ] Concurrent benzodiazepine use  [ ] History/ Active substance use or alcohol use disorder  [X] Psychiatric co-morbidity  [ ] Sleep apnea  [ ] COPD  [ ] BMI> 35  [ ] Liver dysfunction  [x Renal dysfunction  [ ] CHF  [ ] Smoker  [x Age > 60 years    [x] [place]  Reviewed and Copied to Chart. See below.    Plan of care and goal oriented pain management treatment options were discussed with patient and /or primary care giver; all questions and concerns were addressed and care was aligned with patient's wishes.    Educated patient on goal oriented pain management treatment options     04-25-24 @ 12:05

## 2024-04-25 NOTE — DIETITIAN INITIAL EVALUATION ADULT - PROBLEM SELECTOR PLAN 2
p/w  frequency  UA positive  f/u Urine culture  Start ABx - Rocephin 1g qd for now  last admission UCx grew candida, completed 5 days course of diflucan

## 2024-04-25 NOTE — PROGRESS NOTE ADULT - REASON FOR ADMISSION
Left Heel Pressure Injury
Worsening left heel ulcer and unable to bear weight.
left foot ulcer
Diabetic left heel ulcer/sacral ulcer

## 2024-04-25 NOTE — PROGRESS NOTE ADULT - ASSESSMENT
Patient is a 84 y.o. F from home, with PMHx of HTN, HLD, CKD, CVA, SAH, Rheumatoid Arthritis, Multiple Myeloma, and Bladder incontinence. Patient presented to the ED with worsening heel ulcer and unable to bear weight, sacral pain. Admitted to medicine for diabetic foot ulcer and UTI. Podiatry consulted arterial duplex unremarkable. UTI treated with Rocephin.  Hemonc consulted for MM.  Pain mngnt consulted, note and reccs appreciated.    4/25-Pt. with uptrending SCr, otherwise medically stable for discharge to home.  Will observe over night, will discharge to home likely tomorrow 4/26.

## 2024-04-25 NOTE — PROGRESS NOTE ADULT - SUBJECTIVE AND OBJECTIVE BOX
NP Note discussed with  Primary Attending    Patient is a 84y old  Female who presents with a chief complaint of Worsening left heel ulcer and unable to bear weight. (25 Apr 2024 12:05).  Pt. appears comfortable with no complaints.      INTERVAL HPI/OVERNIGHT EVENTS: no new complaints    MEDICATIONS  (STANDING):  acetaminophen     Tablet .. 1000 milliGRAM(s) Oral every 8 hours  amLODIPine   Tablet 2.5 milliGRAM(s) Oral daily  aspirin  chewable 81 milliGRAM(s) Oral daily  atorvastatin 80 milliGRAM(s) Oral at bedtime  calcitriol   Capsule 0.25 MICROGram(s) Oral daily  ferrous    sulfate 325 milliGRAM(s) Oral daily  folic acid 1 milliGRAM(s) Oral daily  gabapentin 100 milliGRAM(s) Oral two times a day  heparin   Injectable 5000 Unit(s) SubCutaneous every 8 hours  hydroxychloroquine 200 milliGRAM(s) Oral daily  insulin lispro (ADMELOG) corrective regimen sliding scale   SubCutaneous three times a day before meals  insulin lispro (ADMELOG) corrective regimen sliding scale   SubCutaneous at bedtime  lidocaine   4% Patch 1 Patch Transdermal daily  lisinopril 2.5 milliGRAM(s) Oral daily  melatonin 3 milliGRAM(s) Oral at bedtime  memantine 5 milliGRAM(s) Oral at bedtime  pantoprazole    Tablet 40 milliGRAM(s) Oral before breakfast  polyethylene glycol 3350 17 Gram(s) Oral every 24 hours  risperiDONE   Tablet 0.25 milliGRAM(s) Oral at bedtime  senna 2 Tablet(s) Oral at bedtime  sodium bicarbonate 650 milliGRAM(s) Oral two times a day  sodium chloride 0.9% Bolus 1000 milliLiter(s) IV Bolus once    MEDICATIONS  (PRN):  ondansetron Injectable 4 milliGRAM(s) IV Push every 8 hours PRN Nausea and/or Vomiting      __________________________________________________  REVIEW OF SYSTEMS:    CONSTITUTIONAL: No fever,   EYES: no acute visual disturbances  NECK: No pain or stiffness  RESPIRATORY: No cough; No shortness of breath  CARDIOVASCULAR: No chest pain, no palpitations  GASTROINTESTINAL: No pain. No nausea or vomiting; No diarrhea   NEUROLOGICAL: No headache or numbness, no tremors  MUSCULOSKELETAL: No joint pain, no muscle pain  GENITOURINARY: no dysuria, no frequency, no hesitancy  PSYCHIATRY: no depression , no anxiety  ALL OTHER  ROS negative        Vital Signs Last 24 Hrs  T(C): 36.4 (25 Apr 2024 05:32), Max: 36.7 (24 Apr 2024 20:40)  T(F): 97.5 (25 Apr 2024 05:32), Max: 98.1 (24 Apr 2024 20:40)  HR: 75 (25 Apr 2024 05:32) (75 - 81)  BP: 150/68 (25 Apr 2024 05:32) (150/68 - 152/75)  BP(mean): 95 (25 Apr 2024 05:32) (95 - 101)  RR: 18 (25 Apr 2024 05:32) (18 - 18)  SpO2: 99% (25 Apr 2024 05:32) (99% - 99%)    Parameters below as of 25 Apr 2024 05:32  Patient On (Oxygen Delivery Method): room air        ________________________________________________  PHYSICAL EXAM:  well developed  GENERAL: NAD  HEENT: Normocephalic;  conjunctivae and sclerae clear; moist mucous membranes;   NECK : supple  CHEST/LUNG: Clear to auscultation bilaterally with good air entry   HEART: S1 S2  regular; no murmurs, gallops or rubs  ABDOMEN: Soft, Nontender, Nondistended; Bowel sounds present  EXTREMITIES: Left foot drsg C/D/I, CAIR boot on, no cyanosis; LEs swollen  SKIN: warm and dry; no rash  NERVOUS SYSTEM:  Awake and alert; Oriented x 2  _________________________________________________  LABS:                        8.4    4.24  )-----------( 217      ( 25 Apr 2024 05:36 )             26.4     04-25    143  |  111<H>  |  30<H>  ----------------------------<  108<H>  4.3   |  24  |  2.32<H>    Ca    8.5      25 Apr 2024 10:32  Phos  4.6     04-25  Mg     2.3     04-25        Urinalysis Basic - ( 25 Apr 2024 10:32 )    Color: x / Appearance: x / SG: x / pH: x  Gluc: 108 mg/dL / Ketone: x  / Bili: x / Urobili: x   Blood: x / Protein: x / Nitrite: x   Leuk Esterase: x / RBC: x / WBC x   Sq Epi: x / Non Sq Epi: x / Bacteria: x      CAPILLARY BLOOD GLUCOSE      POCT Blood Glucose.: 107 mg/dL (25 Apr 2024 11:21)  POCT Blood Glucose.: 83 mg/dL (25 Apr 2024 07:46)  POCT Blood Glucose.: 107 mg/dL (24 Apr 2024 21:11)  POCT Blood Glucose.: 89 mg/dL (24 Apr 2024 16:44)    RADIOLOGY & ADDITIONAL TESTS:    < from: CT Head No Cont (04.22.24 @ 19:20) >    ACC: [id number] EXAM:  CT BRAIN   ORDERED BY: BARBIE ROSADO     PROCEDURE DATE:  04/22/2024          INTERPRETATION:  EXAMINATION: CT HEAD    CLINICAL INDICATION: confusion  TECHNIQUE: CT images of the head were obtained without contrast. Coronal  and sagittal reconstructions were performed. Dose reduction techniques   were utilized including but not limited to automated exposure control   (AEC), iterative reconstruction technique, and/or mA and/or kV dose   adjustments based on patient size.  COMPARISON: Head CT 5/12/2023. Head MRI 7/24/2022. Head CT 4/12/2022.    FINDINGS:    Chronic infarct with laminar necrosis in the left occipital lobe   medially, unchanged. Right frontal meningioma is isodense to adjacent   brain parenchyma, not well seen on today's exam. There is no associated   vasogenic edema.    Mild-to-moderate generalized cerebral volume loss, with distention of the   sulci and concomitant ex-vacuo ventricular dilatation. Mild-to-moderate   nonspecific low attenuation inthe periventricular and subcortical white   matter, likely due to small vessel disease.    No acute intracranial hemorrhage. No midline shift or herniation. No CT   evidence of acute territorial infarction, although MRI with DWI would be   more sensitive.    The visualized sinuses and mastoids are clear.    Bilateral lens implants. Limited views of the orbits and visualized soft   tissues of the neck, face, scalp, skull base, and calvarium are otherwise   unremarkable.    IMPRESSION:    1.  No significant change, without acute intracranial hemorrhage.  2.  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  3.  Isodense right frontal meningioma, without vasogenic edema.  4.  Senescent cerebral changes.    < end of copied text >    < from: US Duplex Arterial Lower Ext Ltd, Left (04.21.24 @ 14:10) >    ACC: [id number] EXAM:  US DPLX LWR EXT ARTS LTD LT   ORDERED BY: NANCI PAGAN     PROCEDURE DATE:  04/21/2024          INTERPRETATION:  US LOWER EXTREMITY ARTERIAL DUPLEX LIMITED LEFT    CLINICAL INFORMATION: Peripheral artery disease with pain with left calf   squeeze    COMPARISON: None    Real-time sonography of the left lower extremity arterial system was   performed using a high-resolution linear array transducer and including   color and spectral Doppler.    There is no measurable plaque in the lower extremity arteries. No soft   tissue abnormalities are demonstrated in the left lower extremity. Flow   phase patterns and peak systolic velocity measurements (in cm/s) were   observed as follows:    LEFT:  CFA: Triphasic; 76  Proximal SFA: Biphasic; 112  Mid SFA: Biphasic; 92  Distal SFA: Biphasic;  95  Popliteal: Biphasic;  85  Anterior tibial: Biphasic; 45  Posterior tibial: Biphasic; 47  Peroneal: Monophasic;  48  Dorsalis pedis: Not seen;    IMPRESSION:  *  No significant plaque or stenosis in the left leg    --- End of Report ---    < from: Xray Foot AP + Lateral + Oblique, Left (04.21.24 @ 14:09) >      ACC: [id number] EXAM:  XR FOOT COMP MIN 3 VIEWS LT   ORDERED BY: KARSTEN WHEELER     PROCEDURE DATE:  04/21/2024          INTERPRETATION:  Left foot. 3 views. Patient has an ulcer around the   calcaneus.    There are posterior and inferior calcanealspurs. Arterial calcifications   are noted.    There is a soft tissue ulcer posterior to the upper posterior calcaneus   and this is more evident than on September 6, 2022. No bone destruction   or fracture.    IMPRESSION: There is an ulcer posteriorto the upper posterior calcaneus   increased from prior. Bones appear intact.    < end of copied text >        < end of copied text >    Imaging Personally Reviewed:  YES/NO    Consultant(s) Notes Reviewed:   YES/ No    Care Discussed with Consultants :     Plan of care was discussed with patient and /or primary care giver; all questions and concerns were addressed and care was aligned with patient's wishes.

## 2024-04-25 NOTE — PROGRESS NOTE ADULT - PROBLEM SELECTOR PROBLEM 10
Acute metabolic encephalopathy
Discharge planning issues
Acute metabolic encephalopathy
Discharge planning issues

## 2024-04-25 NOTE — PROGRESS NOTE ADULT - NS ATTEND AMEND GEN_ALL_CORE FT
84yF with extensive PMH who presented from home with L-heel ulcer in setting of type 2 DM, admitted for UTI and further workup of wounds. Patient seen at bedside. 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    - Family in agreement with current medical plain and pain management recs and plan for possible DC back home tomorrow with HHA.  -CT head for acute encephalopathy - showing new meningioma - discussed with daughter who will follow up with outpatient provider  -Podiatry following   -Notified LÓPEZ Betancourt about patient being admitted; hold MM treatment   -XR left foot and EASU/PVR  - ulcer seen  -podiatry following  -Troponin elevated but normalized with no chest pain, likely demand ischemia   -Wound care consulted for sacral decubitus ulcer - healed well, evaluated by wound care  -UA+, patient symptomatic  -Continue ceftriaxone, UCx pending - E.coli, pending sensitivities  -Gentle hydration - Cr slightly elevated above baseline  -Continue home meds for chronic conditions.

## 2024-04-25 NOTE — PROGRESS NOTE ADULT - PROBLEM SELECTOR PLAN 1
possible infection versus dementia-IMPROVED  CTH-->  1.  No significant change, without acute intracranial hemorrhage.  2.  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  3.  Isodense right frontal meningioma, without vasogenic edema.  4.  Senescent cerebral changes.    Urine Cx grew E. Coli sens to Ceftriaxone  Completed Ceftriaxone course  As per family, pt.'s mental status back to base line

## 2024-04-25 NOTE — DIETITIAN INITIAL EVALUATION ADULT - PERTINENT MEDS FT
MEDICATIONS  (STANDING):  acetaminophen     Tablet .. 1000 milliGRAM(s) Oral every 8 hours  amLODIPine   Tablet 2.5 milliGRAM(s) Oral daily  aspirin  chewable 81 milliGRAM(s) Oral daily  atorvastatin 80 milliGRAM(s) Oral at bedtime  calcitriol   Capsule 0.25 MICROGram(s) Oral daily  ferrous    sulfate 325 milliGRAM(s) Oral daily  folic acid 1 milliGRAM(s) Oral daily  gabapentin 100 milliGRAM(s) Oral two times a day  heparin   Injectable 5000 Unit(s) SubCutaneous every 8 hours  hydroxychloroquine 200 milliGRAM(s) Oral daily  insulin lispro (ADMELOG) corrective regimen sliding scale   SubCutaneous three times a day before meals  insulin lispro (ADMELOG) corrective regimen sliding scale   SubCutaneous at bedtime  lidocaine   4% Patch 1 Patch Transdermal daily  lisinopril 2.5 milliGRAM(s) Oral daily  melatonin 3 milliGRAM(s) Oral at bedtime  memantine 5 milliGRAM(s) Oral at bedtime  pantoprazole    Tablet 40 milliGRAM(s) Oral before breakfast  polyethylene glycol 3350 17 Gram(s) Oral every 24 hours  risperiDONE   Tablet 0.25 milliGRAM(s) Oral at bedtime  senna 2 Tablet(s) Oral at bedtime  sodium bicarbonate 650 milliGRAM(s) Oral two times a day    MEDICATIONS  (PRN):  ondansetron Injectable 4 milliGRAM(s) IV Push every 8 hours PRN Nausea and/or Vomiting

## 2024-04-25 NOTE — PROGRESS NOTE ADULT - PROBLEM SELECTOR PROBLEM 9
Prophylactic measure
Chronic kidney disease, unspecified CKD stage
Chronic kidney disease, unspecified CKD stage
Prophylactic measure

## 2024-04-25 NOTE — PROGRESS NOTE ADULT - PROBLEM SELECTOR PLAN 5
lisnopril continued  amlodipine added overnight due to elevated BP  BP improved  DASH/TLC diet  Adjust medications as needed

## 2024-04-25 NOTE — PROGRESS NOTE ADULT - PROBLEM SELECTOR PLAN 10
Lives at home with daughter  Has 12/7 HHA, rest of time cared for by daughter  PT-->no skilled needs, pt. is bedbound  CM following to reinstate HHA and d/c planning  Discharge likely tomorrow 4/26
Lives at home with daughter  Has 12/7 HHA, rest of time cared for by daughter  PT-->no skilled needs, pt. is bedbound  CM following to reinstate HHA and d/c planning

## 2024-04-25 NOTE — PROGRESS NOTE ADULT - PROBLEM SELECTOR PLAN 3
Currently afebrile with no leukocytosis, denies urinary symptoms  p/w  frequency  UA positive  Urine culture grew E. Coli  Completed Rocephin   last admission UCx grew candida, completed 5 days course of diflucan

## 2024-04-25 NOTE — PROGRESS NOTE ADULT - PROBLEM SELECTOR PLAN 1
Pt with acute left foot pain which is somatic and neuropathic in nature due to left foot pressure injury, podiatry following. Patient also with complaints of chronic low back pain which is somatic in nature likely due to history of rheumatoid arthritis and decreased functional mobility. High risk medications reviewed. Avoid polypharmacy. Avoid IV opioids. Avoid NSAIDs and benzodiazepines. Non-pharmacological sleep aides maintained. Non-opioid medications and non-pharmacological pain management measures maintained.   Opioid pain recommendations   - Pt completed Tramadol 50 mg PO q 8 hours x 2 days.   Non-opioid pain recommendations   - Avoid NSAIDs due to CKD  - Reordered Acetaminophen 1 gram PO q 8 hours x 3 days. Monitor LFTs  - Continue Gabapentin 100 mg po BID (as ordered by primary team.) Patient with CKD Cr. 2.32 today  - Continue Lidoderm 4% patch daily to low back (12 hrs on/12 hrs off)  Bowel Regimen  - Continue Miralax 17G PO daily  - Continue Senna 2 tablets at bedtime for constipation  Mild pain (score 1-3)  - Non-pharmacological pain treatment recommendations  - Warm/ Cool packs PRN   - Repositioning extremity, guided imagery, relaxation, distraction.  - Physical therapy OOB if no contraindications   Recommendations discussed with primary team and RN.

## 2024-04-26 NOTE — PROGRESS NOTE ADULT - ASSESSMENT
A:  L pressure heel wound     P:  Patient evaluated and Chart reviewed  Discussed diagnosis and treatment with patient  Xrays reviewed, see above notes   ESAU/PVR was ordered, but cancelled due to patient unable to tolerate because of pain  Duplex reviewed see above notes  Cx reviewed, staph aureus   Left foot was dressed with santyl allevyn pad  Patient stable from podiatry standpoint, no acute intervention at this time.   Discussed offloading b/l heels while laying in bed. Patient to have pillow under heels at all times. Strict CAIR boots   Podiatry to follow while in house    Seen, discussed and reviewed with Dr. Rodgers   Discussed with Attending Dr. Arriaga   WCO: santyl, allevyn pad to the left heel   Upon discharge patient to follow up at [address] [place] [address]

## 2024-04-26 NOTE — PROGRESS NOTE ADULT - SUBJECTIVE AND OBJECTIVE BOX
Interval: Patient was seen resting bedside without CAIR boots.  Patient denies any other pedal complaints at this time. Pending discharge today.    Chief Complaint Quote:	L foot diabetic ulcer x 1 week, low back pain x 2 years  Chief Complaint: Wound check, low back pain    Podiatry HPI: All pertinent history was given from patients daughter. Daughter states that her mother has had this foot wound for about two weeks. She noticed it when they were transferring her mother from the wheelchair that she was no longer able to put any weight on that foot. Yesterday, the daughter noticed swelling in the foot and leg with increased pain. Daughter stated that her mother was hospitalized around 2 weeks ago for a UTI and they had nurses coming into the home once a week to check on her. During this time, they found the wound and was applying an allevyn pad to help offload the area. Daughter stated that her mother kept a pillow under her feet at all times while laying in bed. Patient denied any constitutional symptoms and other pedal complaints.     PMH:No pertinent past medical history  Rheumatoid arthritis  Varicose veins of lower extremity  Hypertension  Multiple myeloma  HLD (hyperlipidemia)  Cerebrovascular accident (CVA)      Allergies: No Known Allergies    Medications acetaminophen     Tablet .. 1000 milliGRAM(s) Oral every 8 hours  amLODIPine   Tablet 2.5 milliGRAM(s) Oral daily  aspirin  chewable 81 milliGRAM(s) Oral daily  atorvastatin 80 milliGRAM(s) Oral at bedtime  calcitriol   Capsule 0.25 MICROGram(s) Oral daily  ferrous    sulfate 325 milliGRAM(s) Oral daily  folic acid 1 milliGRAM(s) Oral daily  gabapentin 100 milliGRAM(s) Oral two times a day  heparin   Injectable 5000 Unit(s) SubCutaneous every 8 hours  hydroxychloroquine 200 milliGRAM(s) Oral daily  insulin lispro (ADMELOG) corrective regimen sliding scale   SubCutaneous at bedtime  insulin lispro (ADMELOG) corrective regimen sliding scale   SubCutaneous three times a day before meals  lidocaine   4% Patch 1 Patch Transdermal daily  lisinopril 2.5 milliGRAM(s) Oral daily  melatonin 3 milliGRAM(s) Oral at bedtime  memantine 5 milliGRAM(s) Oral at bedtime  ondansetron Injectable 4 milliGRAM(s) IV Push every 8 hours PRN  pantoprazole    Tablet 40 milliGRAM(s) Oral before breakfast  polyethylene glycol 3350 17 Gram(s) Oral every 24 hours  risperiDONE   Tablet 0.25 milliGRAM(s) Oral at bedtime  sodium bicarbonate 650 milliGRAM(s) Oral two times a day  sodium chloride 0.9%. 1000 milliLiter(s) IV Continuous <Continuous>    FHFHx: stroke (Father)    ,   PMHNo pertinent past medical history    Rheumatoid arthritis    Varicose veins of lower extremity    Hypertension    Multiple myeloma    HLD (hyperlipidemia)    Cerebrovascular accident (CVA)       PSHNo significant past surgical history        Labs                          7.8    3.42  )-----------( 226      ( 26 Apr 2024 07:49 )             24.9      04-26    144  |  114<H>  |  25<H>  ----------------------------<  82  4.3   |  24  |  1.96<H>    Ca    8.5      26 Apr 2024 07:49  Phos  4.6     04-25  Mg     2.3     04-25       Vital Signs Last 24 Hrs  T(C): 36.8 (26 Apr 2024 05:23), Max: 36.9 (25 Apr 2024 20:12)  T(F): 98.2 (26 Apr 2024 05:23), Max: 98.5 (25 Apr 2024 20:12)  HR: 71 (26 Apr 2024 06:49) (66 - 76)  BP: 142/66 (26 Apr 2024 06:49) (128/73 - 165/76)  BP(mean): --  RR: 16 (26 Apr 2024 05:23) (16 - 18)  SpO2: 99% (26 Apr 2024 05:23) (99% - 100%)    Parameters below as of 26 Apr 2024 05:23  Patient On (Oxygen Delivery Method): room air      Sedimentation Rate, Erythrocyte: 92 mm/Hr (04-21-24 @ 14:55)         C-Reactive Protein, Serum: 28 mg/L (04-21-24 @ 14:55)   WBC Count: 3.42 K/uL *L* (04-26-24 @ 07:49)      Physical Exam:  Vasc: DP and PT pulses palpable 1/4 b/l. TG warm to warm with no increase in temperature to left foot around wound. Pedal hair present. Edema noted to b/l feet and legs  Derm: Open lesion noted on left/right foot measuring 2.5x3cm. Mild Periwound erythema present. No drainage noted. Hyperkeratotic rim noted. No Macerated edges noted. Wound base is noted to be fibrogranular with no tracking noted, though patient was in pain while probing. No Fluctuance/crepitus/streaking noted. No Malodor noted.  Neuro: Gross sensation diminshed  Msk: Deferred, POP to L heel wound and leg pain with squeeze test    IMAGING:    < from: US Duplex Arterial Lower Ext Ltd, Left (04.21.24 @ 14:10) >    INTERPRETATION:  US LOWER EXTREMITY ARTERIAL DUPLEX LIMITED LEFT    CLINICAL INFORMATION: Peripheral artery disease with pain with left calf   squeeze    COMPARISON: None    Real-time sonography of the left lower extremity arterial system was   performed using a high-resolution linear array transducer and including   color and spectral Doppler.    There is no measurable plaque in the lower extremity arteries. No soft   tissue abnormalities are demonstrated in the left lower extremity. Flow   phase patterns and peak systolic velocity measurements (in cm/s) were   observed as follows:    LEFT:  CFA: Triphasic; 76  Proximal SFA: Biphasic; 112  Mid SFA: Biphasic; 92  Distal SFA: Biphasic;  95  Popliteal: Biphasic;  85  Anterior tibial: Biphasic; 45  Posterior tibial: Biphasic; 47  Peroneal: Monophasic;  48  Dorsalis pedis: Not seen;    IMPRESSION:  *  No significant plaque or stenosis in the left leg    --- End of Report ---      < end of copied text >  < from: Xray Foot AP + Lateral + Oblique, Left (04.21.24 @ 14:09) >  LIAM     PROCEDURE DATE:  04/21/2024          INTERPRETATION:  Left foot. 3 views. Patient has an ulcer around the   calcaneus.    There are posterior and inferior calcanealspurs. Arterial calcifications   are noted.    There is a soft tissue ulcer posterior to the upper posterior calcaneus   and this is more evident than on September 6, 2022. No bone destruction   or fracture.    IMPRESSION: There is an ulcer posteriorto the upper posterior calcaneus   increased from prior. Bones appear intact.    --- End of Report ---      < end of copied text >      CULTURES:

## 2024-04-26 NOTE — DISCHARGE NOTE NURSING/CASE MANAGEMENT/SOCIAL WORK - NSDCVIVACCINE_GEN_ALL_CORE_FT
COVID-19 vaccine, vector-nr, rS-Ad26, PF, 0.5 mL ([employer] [facility]); 29-Mar-2021 13:11; Maria D Hightower (Student, Nursing); [employer] [facility]; [id number] (Exp. Date: 29-Mar-2021); IntraMuscular; Deltoid Right.; 0.5 milliLiter(s);

## 2024-04-26 NOTE — DISCHARGE NOTE NURSING/CASE MANAGEMENT/SOCIAL WORK - NSCORESITESY/N_GEN_A_CORE_RD
It was a pleasure taking care of you today.  I've included a brief summary of our discussion and care plan from today's visit below.  Please review this information with your primary care provider.  _______________________________________________________________________    My recommendations are summarized as follows:    Please continue to monitor your symptoms with respect to your swallowing.   The next step for your difficulty swallowing would be a repeat endoscopy with botox injection if you choose to pursue that.      To schedule endoscopic procedures you may call: [phone]  To schedule radiology tests you may call: [phone]  To schedule an ENT appointment you may call: [phone]    Please call my nurse care coordinator Samanta ([phone]) or Ana Lilia ([phone]), with any questions or concerns.  If you were seen through the [facility] please feel free to reach out to Shima at [phone]   --    Return to GI Clinic in 6 months to review your progress.    _______________________________________________________________________    Who do I call with any questions after my visit?  Please be in touch if there are any further questions that arise following today's visit.  There are multiple ways to contact your gastroenterology care team.      During business hours, you may reach a Gastroenterology nurse at [phone] and choose option 3.       To schedule or reschedule an appointment, please call [phone].     You can always send a secure message through [employer].  [employer] messages are answered by your nurse or doctor typically within 24 hours.  Please allow extra time on weekends and holidays.      For urgent/emergent questions after business hours, you may reach the on-call GI Fellow by contacting the [facility] at [phone].     How will I get the results of any tests ordered?    You will receive all of your results.  If you have signed up for MyChart, any tests  ordered at your visit will be available to you after your physician reviews them.  Typically this takes 1-2 weeks.  If there are urgent results that require a change in your care plan, your physician or nurse will call you to discuss the next steps.      What is [employer]?  [employer] is a secure way for you to access all of your healthcare records from the [facility].  It is a web based computer program, so you can sign on to it from any location.  It also allows you to send secure messages to your care team.  I recommend signing up for [employer] access if you have not already done so and are comfortable with using a computer.      How to I schedule a follow-up visit?  If you did not schedule a follow-up visit today, please call [phone] to schedule a follow-up office visit.      If you feel you received exceptional care and are interested in supporting the clinical and research goals of Dr. Lynch or the Division of Gastroenterology, Hepatology, and Nutrition please contact him directly through [employer] to discuss opportunities to donate.    Sincerely,    Parminder Lynch DO   of Medicine  Director, Esophageal Disorders Program  Division of Gastroenterology, Hepatology, and Nutrition  [facility]     Yes

## 2024-04-26 NOTE — DISCHARGE NOTE NURSING/CASE MANAGEMENT/SOCIAL WORK - NSDCPEFALRISK_GEN_ALL_CORE
For information on Fall & Injury Prevention, visit: [email or web address] OR  [email or web address] OR  https://www.cdc.gov/steadi/patient.html

## 2024-04-26 NOTE — DISCHARGE NOTE NURSING/CASE MANAGEMENT/SOCIAL WORK - PATIENT PORTAL LINK FT
You can access the FollowMyHealth Patient Portal offered by [facility] by registering at the following website: [email or web address]. By joining [employer]’s FollowMyHealth portal, you will also be able to view your health information using other applications (apps) compatible with our system.

## 2024-04-30 RX ORDER — POMALIDOMIDE 2 MG/1
2 CAPSULE ORAL
Qty: 14 | Refills: 0 | Status: ACTIVE | COMMUNITY
Start: 2021-06-25 | End: 1900-01-01

## 2024-08-13 ENCOUNTER — APPOINTMENT (OUTPATIENT)
Dept: HEMATOLOGY ONCOLOGY | Facility: CLINIC | Age: 84
End: 2024-08-13

## 2024-12-30 NOTE — PROGRESS NOTE ADULT - PROBLEM SELECTOR PROBLEM 8
CTA TAVR on 1/2/25  Carotid Ultrasound TODAY at 145pm 1st floor imaging   Cardiac Catheterization with Dr Whitlock on Friday, 1/3/25. His office will call you with instructions       After testing completed, Valve Team will review imaging and call you with final recommendations. Dr Garcia has 2 surgical nurses. Depending on which surgery you need, either Gregory or Ramila will be calling you.         You are tentatively scheduled for surgery on 1/31/25    If team feels TAVR is appropriate, RAHEEM Huston will call with timing.        Prophylactic measure

## 2025-01-06 NOTE — ED PROVIDER NOTE - CPE EDP NEURO NORM
How Severe Is It?: moderate Is This A New Presentation, Or A Follow-Up?: Follow Up Isotretinoin normal...

## 2025-05-02 NOTE — PROVIDER CONTACT NOTE (CRITICAL VALUE NOTIFICATION) - NS PROVIDER READ BACK
[Stable] : stable yes [de-identified] : 44 year old female presents for follow up evaluation of lower back pain since .  She states she underwent a  in  and has been having pain since.  Has pain that radiates down the RLE anterolaterally to the thigh intermittently.  Denies numbness/tingling.  Endometriosis. Laying down, sitting, getting up from a seated position aggravates the pain.  She takes advil for the pain.  She did pelvic floor PT in 2022 x 1 year which did not help.  She has had trigger point injections in  which did not help. Denies SHANNAN.  Last seen in 2024. Presents today for MRI Pelvis review.  Has MRI Lumbar done in .  PMHx: Hashimoto's thyroiditis No fever, chills, sweats, nausea/vomiting. No bowel or bladder dysfunction, no recent weight loss or gain. No night pain. This history is in addition to the intake form that I personally reviewed.

## 2025-06-30 NOTE — ED ADULT TRIAGE NOTE - NS ED NURSE BANDS TYPE
Follow up:  - Follow up with Dr Mccracken in 6 months    Labs:  - all labs monthly  - CMV every 2 weeks  - will check cystatin C    Medications:  - lasix 20 mg daily as needed    Miscellaneous:  - call [phone] to schedule bone density  - to complete waldo, new order   Name band;

## 2025-06-30 NOTE — SWALLOW BEDSIDE ASSESSMENT ADULT - CONSISTENCIES ADMINISTERED
water x4; applesauce x6/nectar thick/puree Suresh Hoang  Endovascular Surgical Neuroradiology  [address] [place] [address], [address]  [place] [address]  Phone: [phone]  Fax: [phone]  Follow Up Time: 1 month   ice chips x4 x3/honey thick water x2; ryan cracker mixed with applesauce x4/nectar thick/mech soft x3/thin liquid